# Patient Record
Sex: FEMALE | Race: BLACK OR AFRICAN AMERICAN | NOT HISPANIC OR LATINO | Employment: OTHER | URBAN - METROPOLITAN AREA
[De-identification: names, ages, dates, MRNs, and addresses within clinical notes are randomized per-mention and may not be internally consistent; named-entity substitution may affect disease eponyms.]

---

## 2017-01-09 ENCOUNTER — TELEPHONE (OUTPATIENT)
Dept: NEPHROLOGY | Facility: CLINIC | Age: 72
End: 2017-01-09

## 2017-01-11 ENCOUNTER — OFFICE VISIT (OUTPATIENT)
Dept: TRANSPLANT | Facility: CLINIC | Age: 72
End: 2017-01-11
Payer: COMMERCIAL

## 2017-01-11 ENCOUNTER — LAB VISIT (OUTPATIENT)
Dept: LAB | Facility: HOSPITAL | Age: 72
End: 2017-01-11
Attending: INTERNAL MEDICINE
Payer: COMMERCIAL

## 2017-01-11 VITALS
HEIGHT: 66 IN | SYSTOLIC BLOOD PRESSURE: 154 MMHG | WEIGHT: 177.25 LBS | HEART RATE: 86 BPM | DIASTOLIC BLOOD PRESSURE: 82 MMHG | BODY MASS INDEX: 28.49 KG/M2

## 2017-01-11 DIAGNOSIS — I50.1 LEFT HEART FAILURE: ICD-10-CM

## 2017-01-11 DIAGNOSIS — I50.42 CHRONIC COMBINED SYSTOLIC AND DIASTOLIC HEART FAILURE, NYHA CLASS 3: ICD-10-CM

## 2017-01-11 DIAGNOSIS — R06.00 DYSPNEA, UNSPECIFIED TYPE: Primary | ICD-10-CM

## 2017-01-11 DIAGNOSIS — I50.1 LEFT HEART FAILURE: Primary | ICD-10-CM

## 2017-01-11 DIAGNOSIS — E03.8 SUBCLINICAL HYPOTHYROIDISM: ICD-10-CM

## 2017-01-11 DIAGNOSIS — N18.30 CKD (CHRONIC KIDNEY DISEASE) STAGE 3, GFR 30-59 ML/MIN: ICD-10-CM

## 2017-01-11 DIAGNOSIS — I10 ESSENTIAL HYPERTENSION: ICD-10-CM

## 2017-01-11 LAB — BNP SERPL-MCNC: 665 PG/ML

## 2017-01-11 PROCEDURE — 99204 OFFICE O/P NEW MOD 45 MIN: CPT | Mod: S$GLB,,,

## 2017-01-11 PROCEDURE — 99999 PR PBB SHADOW E&M-EST. PATIENT-LVL III: CPT | Mod: PBBFAC,,,

## 2017-01-11 NOTE — PROGRESS NOTES
Subjective:   HPI:  Ms. Sal is a 71 y.o. year old Black or  female with PMH significant for CMP, EF 40%, HTN, and hypothyroidism, on Synthroid who presents as a referral from Dr. Live Young from [facility] for CHF management. She is accompanied by her daughter who is her caretaker and gives her history. She reports that her mom has had dx of CMP for years, initially followed at [facility], but exacerbated in the last couple of months. She reports that in 2007, after Pinky, she was in the hospital for HF. She was admitted to [facility] under IM 3 weeks ago with worsening SOB. She was diuresed with IVP Lasix with some clinical improvement. She doesn't know what her weight was before or after hospitalization but does report that she made a lot of urine. She does not measure her weight or blood pressure at home. Her daughter gets her medications ready and reports that she takes 40 mg Lasix (2 pills) in the morning and was instructed to take an extra at night if needed (has not given). Her BNP in the hospital was 927 and 665 today. Labs from [facility] today show Cr stable at 1.65. She is unsure if she's ever had an angiogram but remembers having a procedure done through her groin. She knows that she doesn't have any stents.   She currently describes NYHA FC III activity intolerance and sleeps on 3 pillows. Her daughter reports that once she goes to sleep however, she lays flat and is comfortable. She has CP on and off but not very frequent. She is a former smoker but quit 20 years ago.         Past Medical History   Diagnosis Date    Breast cancer     CHF (congestive heart failure)     Coronary artery disease     Hypertension     Hazel     Renal disorder     Thyroid disease      Past Surgical History   Procedure Laterality Date    Breast surgery      Hysterectomy       OB History     No data available        Review of Systems   Constitution: Negative for decreased appetite, weakness and malaise/fatigue.  "  HENT: Negative.    Cardiovascular: Positive for chest pain and dyspnea on exertion.   Respiratory: Positive for shortness of breath. Negative for cough.    Endocrine: Negative.    Hematologic/Lymphatic: Negative.    Skin: Negative.    Musculoskeletal: Negative.    Gastrointestinal: Negative for anorexia and nausea.   Genitourinary: Negative.    Neurological: Negative.    Psychiatric/Behavioral: Negative.    Allergic/Immunologic: Negative.        Objective:   Blood pressure (!) 154/82, pulse 86, height 5' 5.5" (1.664 m), weight 80.4 kg (177 lb 4 oz).body mass index is 29.05 kg/(m^2).  Physical Exam   Constitutional: She is oriented to person, place, and time. She appears well-developed and well-nourished. No distress.   HENT:   Head: Normocephalic and atraumatic.   Eyes: Conjunctivae and EOM are normal. Pupils are equal, round, and reactive to light.   Neck: Normal range of motion. Neck supple. No JVD present.   Cardiovascular: Normal rate and regular rhythm.    Pulmonary/Chest: Effort normal. She has rales ( few scattered rales in bases).   Abdominal: Soft. Bowel sounds are normal.   Musculoskeletal: Normal range of motion. She exhibits edema.   Neurological: She is alert and oriented to person, place, and time. She has normal reflexes.   Skin: Skin is warm and dry. She is not diaphoretic.   Psychiatric: She has a normal mood and affect. Her behavior is normal. Thought content normal.       Labs:    Chemistry        Component Value Date/Time     12/20/2016 0628    K 3.5 12/20/2016 0628     12/20/2016 0628    CO2 21 (L) 12/20/2016 0628    BUN 38 (H) 12/20/2016 0628    CREATININE 1.7 (H) 12/20/2016 0628     12/20/2016 0628        Component Value Date/Time    CALCIUM 8.2 (L) 12/20/2016 0628    ALKPHOS 82 12/20/2016 0628    AST 52 (H) 12/20/2016 0628    ALT 43 12/20/2016 0628    BILITOT 0.4 12/20/2016 0628          Magnesium   Date Value Ref Range Status   12/17/2016 1.8 1.6 - 2.6 mg/dL Final "     Lab Results   Component Value Date    WBC 5.03 12/20/2016    HGB 11.2 (L) 12/20/2016    HCT 32.1 (L) 12/20/2016     12/20/2016     Lab Results   Component Value Date    INR 1.0 12/16/2016     BNP   Date Value Ref Range Status   01/11/2017 665 (H) 0 - 99 pg/mL Final     Comment:     Values of less than 100 pg/ml are consistent with non-CHF populations.   12/16/2016 927 (H) 0 - 99 pg/mL Final     Comment:     Values of less than 100 pg/ml are consistent with non-CHF populations.     No results found for: LDH  No results found for this or any previous visit.  No results found for this or any previous visit.    Assessment:      1. Dyspnea, unspecified type    2. Chronic combined systolic and diastolic heart failure, NYHA class 3    3. Essential hypertension    4. Subclinical hypothyroidism    5. CKD (chronic kidney disease) stage 3, GFR 30-59 ml/min        Plan:     Increase Lasix to 60 mg po daily x 3 days only and resume 40 mg po daily  Dobutamine Stress Echo r/o ischemia  F/u with Endocrinology as scheduled for management of hypothyroidism  Pt and her daughter are instructed to get a blood pressure cuff and monitor blood pressures daily and start daily am weights and document; she will call me on Friday 1/13 with readings  F/u in month with BMP, BNP or prn      Patient is now NYHA III  Recommend 2 gram sodium restriction and 1500cc fluid restriction.  Encourage physical activity with graded exercise program.  Requested patient to weigh themselves daily, and to notify us if their weight increases by more than 3 lbs in 1 day or 5 lbs in 1 week.

## 2017-01-11 NOTE — MR AVS SNAPSHOT
[facility]  [address]  [place] [address]  Phone: [phone]                  Anai Sal   2017 11:30 AM   Office Visit    Description:  Female : 1945   Provider:  NUSRAT Myers   Department:  [facility]           Reason for Visit     Congestive Heart Failure           Diagnoses this Visit        Comments    Dyspnea, unspecified type    -  Primary            To Do List           Future Appointments        Provider Department Dept Phone    2017 10:00 AM MD [facility] - Endo/Diab/Metab [phone]    2017 10:30 AM MD [facility] - Hematology Oncology [phone]      Goals (5 Years of Data)     None      Follow-Up and Disposition     Return in about 1 month (around 2017).      [facility]     [facility] Nurse Care [facility] -  Assistance  Registered nurses in the [facility] provide clinical advisement, health education, appointment booking, and other advisory services.  Call for this free service at [phone].             Medications           Message regarding Medications     Verify the changes and/or additions to your medication regime listed below are the same as discussed with your clinician today.  If any of these changes or additions are incorrect, please notify your healthcare provider.             Verify that the below list of medications is an accurate representation of the medications you are currently taking.  If none reported, the list may be blank. If incorrect, please contact your healthcare provider. Carry this list with you in case of emergency.           Current Medications     allopurinol (ZYLOPRIM) 100 MG tablet Take 100 mg by mouth 2 (two) times daily.     aspirin 81 MG Chew Take 1 tablet (81 mg total) by mouth once daily.    benztropine (COGENTIN) 0.5 MG tablet Take 1 tablet (0.5 mg total) by mouth nightly.    cetirizine (ZYRTEC) 5 MG tablet Take 1 tablet (5 mg  "total) by mouth once daily.    fluticasone (FLONASE) 50 mcg/actuation nasal spray 2 sprays by Each Nare route once daily.    furosemide (LASIX) 20 MG tablet Take 3 tablets (60 mg total) by mouth once daily.    gabapentin (NEURONTIN) 300 MG capsule Take 300 mg by mouth 3 (three) times daily as needed (for nerve pain).     guaifenesin (MUCINEX) 600 mg 12 hr tablet Take 1 tablet (600 mg total) by mouth 2 (two) times daily.    levothyroxine (SYNTHROID) 50 MCG tablet Take 1 tablet (50 mcg total) by mouth before breakfast.    melatonin 3 mg Tab Take by mouth every evening.    quetiapine (SEROQUEL) 200 MG Tab Take 1 tablet (200 mg total) by mouth nightly.    valsartan (DIOVAN) 160 MG tablet Take 1 tablet (160 mg total) by mouth once daily.           Clinical Reference Information           Vital Signs - Last Recorded  Most recent update: 1/11/2017 12:20 PM by Shawn Richards    BP Pulse Ht Wt BMI    (!) 154/82 86 5' 5.5" (1.664 m) 80.4 kg (177 lb 4 oz) 29.05 kg/m2      Blood Pressure          Most Recent Value    BP  (!)  154/82      Allergies as of 1/11/2017     No Known Allergies      Immunizations Administered on Date of Encounter - 1/11/2017     None      Orders Placed During Today's Visit     Future Labs/Procedures Expected by Expires    Pharmacological stress test w/ Color Remy  As directed 1/11/2018      [facility] Sign-Up     Activating your [facility] account is as easy as 1-2-3!     1) Visit [email or web address], select Sign Up Now, enter this activation code and your date of birth, then select Next.  [email or web address]  Expires: 2/25/2017  8:19 AM      2) Create a username and password to use when you visit [facility] in the future and select a security question in case you lose your password and select Next.    3) Enter your e-mail address and click Sign Up!    Additional Information  If you have questions, please e-mail [email or web address] or call [phone] to talk to our [facility] staff. Remember, [facility] is NOT " to be used for urgent needs. For medical emergencies, dial 911.

## 2017-01-25 ENCOUNTER — TELEPHONE (OUTPATIENT)
Dept: TRANSPLANT | Facility: CLINIC | Age: 72
End: 2017-01-25

## 2017-01-25 ENCOUNTER — OFFICE VISIT (OUTPATIENT)
Dept: ENDOCRINOLOGY | Facility: CLINIC | Age: 72
End: 2017-01-25
Payer: COMMERCIAL

## 2017-01-25 VITALS
HEART RATE: 101 BPM | BODY MASS INDEX: 28.63 KG/M2 | SYSTOLIC BLOOD PRESSURE: 130 MMHG | HEIGHT: 66 IN | DIASTOLIC BLOOD PRESSURE: 72 MMHG | WEIGHT: 178.13 LBS

## 2017-01-25 DIAGNOSIS — R06.02 SHORTNESS OF BREATH: ICD-10-CM

## 2017-01-25 DIAGNOSIS — E03.9 PRIMARY HYPOTHYROIDISM: Primary | ICD-10-CM

## 2017-01-25 PROCEDURE — 99204 OFFICE O/P NEW MOD 45 MIN: CPT | Mod: S$GLB,,, | Performed by: INTERNAL MEDICINE

## 2017-01-25 PROCEDURE — 99999 PR PBB SHADOW E&M-EST. PATIENT-LVL III: CPT | Mod: PBBFAC,,, | Performed by: INTERNAL MEDICINE

## 2017-01-25 NOTE — LETTER
January 26, 2017      Live Young MD  [address] [facility] [address]           [facility] - Endo/Diab/Metab  [address]  [place] [address]  Phone: [phone]  Fax: [phone]          Patient: Anai Sal   MR Number: [id number]   YOB: 1945   Date of Visit: 1/25/2017       Dear Dr. Live Young:    Thank you for referring Anai Sal to me for evaluation. Attached you will find relevant portions of my assessment and plan of care.    If you have questions, please do not hesitate to call me. I look forward to following Anai Sal along with you.    Sincerely,    Kaci Whalen MD    Enclosure  CC:  No Recipients    If you would like to receive this communication electronically, please contact [email or web address] or [phone] to request more information on [employer] access.    For providers and/or their staff who would like to refer a patient to [facility], please contact us through our one-stop-shop provider referral line, [facility], at [phone].    If you feel you have received this communication in error or would no longer like to receive these types of communications, please e-mail [email or web address]

## 2017-01-25 NOTE — Clinical Note
We forgot to have her sign medical release form, any way you can mail it to her or get her to sign it?

## 2017-01-25 NOTE — TELEPHONE ENCOUNTER
1215 pm:  Reviewed chart and returned call to daughter, DAVID, LVMOHINI asking she call me back to discuss her mothers weights and generally how she is feeling.     ----- Message from Antonieta Zeng sent at 1/25/2017 11:40 AM CST -----  Contact: Pt daughter Treshone #[phone]  Pt daughter Treshone called to report pt weight is 180 from last visit with Yue. If needed call Pt daughter Treshone #[phone]. Thanks.

## 2017-01-25 NOTE — MR AVS SNAPSHOT
[facility] Endo/Diab/Metab  [address]  [facility] [address]  Phone: [phone]  Fax: [phone]                  Anai Sal   2017 10:00 AM   Office Visit    Description:  Female : 1945   Provider:  Kaci Whalen MD   Department:  [facility] - Endo/Diab/Metab           Reason for Visit     Consult           Diagnoses this Visit        Comments    Primary hypothyroidism    -  Primary            To Do List           Future Appointments        Provider Department Dept Phone    2017 11:20 AM LAB, APPOINTMENT [place] [facility] [phone]    2017 3:15 PM DOBUTAMINE, ECHO [facility] - Echo/Stress Lab [phone]    2017 4:40 PM Bud Benoit MD [facility] - Nephrology [phone]    2017 9:30 AM NUSRAT Myers [facility] [phone]    2017 10:30 AM Gogo Herrera MD [facility] - Hematology Oncology [phone]      Goals (5 Years of Data)     None      [facility]     [facility] Care [facility] -  Assistance  Registered nurses in the [facility] provide clinical advisement, health education, appointment booking, and other advisory services.  Call for this free service at [phone].             Medications           Message regarding Medications     Verify the changes and/or additions to your medication regime listed below are the same as discussed with your clinician today.  If any of these changes or additions are incorrect, please notify your healthcare provider.             Verify that the below list of medications is an accurate representation of the medications you are currently taking.  If none reported, the list may be blank. If incorrect, please contact your healthcare provider. Carry this list with you in case of emergency.           Current Medications     allopurinol (ZYLOPRIM) 100 MG tablet Take 100 mg by mouth 2 (two) times daily.     aspirin 81 MG Chew Take 1 tablet (81 mg  "total) by mouth once daily.    cetirizine (ZYRTEC) 5 MG tablet Take 1 tablet (5 mg total) by mouth once daily.    fluticasone (FLONASE) 50 mcg/actuation nasal spray 2 sprays by Each Nare route once daily.    furosemide (LASIX) 20 MG tablet Take 3 tablets (60 mg total) by mouth once daily.    gabapentin (NEURONTIN) 300 MG capsule Take 300 mg by mouth 3 (three) times daily as needed (for nerve pain).     guaifenesin (MUCINEX) 600 mg 12 hr tablet Take 1 tablet (600 mg total) by mouth 2 (two) times daily.    levothyroxine (SYNTHROID) 50 MCG tablet Take 1 tablet (50 mcg total) by mouth before breakfast.    melatonin 3 mg Tab Take by mouth every evening.    valsartan (DIOVAN) 160 MG tablet Take 1 tablet (160 mg total) by mouth once daily.    benztropine (COGENTIN) 0.5 MG tablet Take 1 tablet (0.5 mg total) by mouth nightly.    quetiapine (SEROQUEL) 200 MG Tab Take 1 tablet (200 mg total) by mouth nightly.           Clinical Reference Information           Vital Signs - Last Recorded  Most recent update: 1/25/2017 10:15 AM by Gabi Weaver LPN    BP Pulse Ht Wt BMI    130/72 101 5' 6" (1.676 m) 80.8 kg (178 lb 2.1 oz) 28.75 kg/m2      Blood Pressure          Most Recent Value    BP  130/72      Allergies as of 1/25/2017     No Known Allergies      Immunizations Administered on Date of Encounter - 1/25/2017     None      Orders Placed During Today's Visit      Normal Orders This Visit    TSH     Future Labs/Procedures Expected by Expires    TSH  1/25/2017 3/26/2018    TSH  1/25/2017 3/26/2018      [facility] Sign-Up     Activating your [facility] account is as easy as 1-2-3!     1) Visit [email or web address], select Sign Up Now, enter this activation code and your date of birth, then select Next.  [email or web address]  Expires: 2/25/2017  8:19 AM      2) Create a username and password to use when you visit [facility] in the future and select a security question in case you lose your password and select Next.    3) Enter your e-mail " address and click Sign Up!    Additional Information  If you have questions, please e-mail [email or web address] or call [phone] to talk to our [facility] staff. Remember, [facility] is NOT to be used for urgent needs. For medical emergencies, dial 911.

## 2017-01-25 NOTE — PROGRESS NOTES
"Subjective:     Patient ID: Oziel Temple is a 71 y.o. female.    Chief Complaint: Consult    HPI:   Ms. Temple is a 71 y.o. female who is here for a consult visit for evaluation of hypothyroidism. This was diagnosed   Presented with    She is currently feeling well. Denies any current symptoms including weight gain, inability to lose weight, cold intolerance, depressed mood, fatigue and constipation. Denies any palpitations, difficulty getting to sleep, restlessness, anxiety or irritability.     Current medication is   Takes it properly without food first thing in the morning with water. Waits 30 - 45 minutes before breakfast or other pills.    Others tried    Review of Systems  Objective:     Physical Exam    Vitals:    01/25/17 1014   BP: 130/72   Pulse: 101   Weight: 80.8 kg (178 lb 2.1 oz)   Height: 5' 6" (1.676 m)   Results for OZIEL TEMPLE (MRN [id number]) as of 1/25/2017 10:19   Ref. Range 12/16/2016 18:52   TSH Latest Ref Range: 0.400 - 4.000 uIU/mL 20.038 (H)   Free T4 Latest Ref Range: 0.71 - 1.51 ng/dL 0.79   Results for OZIEL TEMPLE (MRN [id number]) as of 1/25/2017 10:19   Ref. Range 1/11/2017 14:18   Creatinine Latest Ref Range: 0.5 - 1.4 mg/dL 1.7 (H)   eGFR if non African American Latest Ref Range: >60 mL/min/1.73 m^2 29.9 (A)   eGFR if African American Latest Ref Range: >60 mL/min/1.73 m^2 34.5 (A)   Results for OZIEL TEMPLE (MRN [id number]) as of 1/25/2017 10:19   Ref. Range 12/20/2016 06:28   Hemoglobin Latest Ref Range: 12.0 - 16.0 g/dL 11.2 (L)   Hematocrit Latest Ref Range: 37.0 - 48.5 % 32.1 (L)       Assessment/Plan:     ***    "

## 2017-01-25 NOTE — PROGRESS NOTES
Subjective:       Patient ID: Anai Sal is a 71 y.o. female.      Chief Complaint: thyroid issues      HPI  Anai Sal is a 71 y.o. female with multiple medical problems including CHF, COPD who comes to establish care for thyroid dysfunction. Family reports difficulty controlling thyroid hormone. Initially presented with thyrotoxicosis, underwent complete evaluation at [facility]. Treated with radioactive iodine. Clinical course was labile and per the family she required use of levothyroxine and methimazole. She was recently hospitalized in December for CHF exacerbation and acute bronchitis, she was found to be hypothyroid at that time and so she was started on levothyroxine 50 mcg.    She complains of palpitations, cold intolerance, SOB with exertion and while lying flat, fatigue and weight gain that she attributes to CHF  She denies tremors, diarrhea, constipation, diaphoresis.  She is on lasix 20 tid.          Review of Systems   Constitutional: Negative for chills, diaphoresis, fever, malaise/fatigue and weight loss.   HENT: Negative for sore throat.    Eyes: Negative for blurred vision and pain.   Respiratory: Positive for shortness of breath. Negative for cough.    Cardiovascular: Negative for chest pain and leg swelling.   Gastrointestinal: Negative for abdominal pain, nausea and vomiting.   Genitourinary: Negative for dysuria and frequency.   Musculoskeletal: Negative for back pain and myalgias.   Skin: Negative for itching and rash.   Neurological: Negative for dizziness, tremors, loss of consciousness and headaches.         Objective:     Vitals:    01/25/17 1014   BP: 130/72   Pulse: 101       Physical Exam   Constitutional: She is oriented to person, place, and time and well-developed, well-nourished, and in no distress.   HENT:   Head: Normocephalic and atraumatic.   Moist mucous membranes   Eyes: Conjunctivae and EOM are normal. Pupils are equal, round, and reactive to light.   Neck: Neck supple.  No JVD present. No tracheal deviation present. No thyromegaly present.   Barely palpable thyroid gland   Cardiovascular: Normal rate, regular rhythm, normal heart sounds and intact distal pulses.    No murmur heard.  Pulmonary/Chest: Effort normal. She has no wheezes.   Abdominal: Soft. Bowel sounds are normal. She exhibits no distension. There is no tenderness.   Musculoskeletal: She exhibits no edema.   Neurological: She is alert and oriented to person, place, and time. GCS score is 15.   Skin: Skin is warm and dry.         Assessment/Plan:       Primary hypothyroidism  --TSH lab today, plan to make adjustments in levothyroxine based on labs.  Advised to take on an empty stomach with no other pills/vitamins, and no breakfast for 30 minutes.    --may need to get medical records from [facility] before ordering further tests.      This case was discussed with Dr. Koby Allne, PGY-1

## 2017-01-26 NOTE — PROGRESS NOTES
I have personally taken the history and examined this patient and agree with the resident's note as stated above.

## 2017-02-01 DIAGNOSIS — E03.9 HYPOTHYROIDISM, UNSPECIFIED TYPE: Primary | ICD-10-CM

## 2017-02-03 DIAGNOSIS — J44.1 COPD WITH EXACERBATION: Primary | ICD-10-CM

## 2017-02-08 ENCOUNTER — OFFICE VISIT (OUTPATIENT)
Dept: NEPHROLOGY | Facility: CLINIC | Age: 72
End: 2017-02-08
Payer: COMMERCIAL

## 2017-02-08 ENCOUNTER — HOSPITAL ENCOUNTER (OUTPATIENT)
Dept: CARDIOLOGY | Facility: CLINIC | Age: 72
Discharge: HOME | End: 2017-02-08
Payer: COMMERCIAL

## 2017-02-08 VITALS
HEART RATE: 92 BPM | WEIGHT: 179.69 LBS | BODY MASS INDEX: 28.88 KG/M2 | HEIGHT: 66 IN | SYSTOLIC BLOOD PRESSURE: 140 MMHG | DIASTOLIC BLOOD PRESSURE: 90 MMHG | OXYGEN SATURATION: 98 %

## 2017-02-08 DIAGNOSIS — I10 ESSENTIAL HYPERTENSION: ICD-10-CM

## 2017-02-08 DIAGNOSIS — R06.00 DYSPNEA, UNSPECIFIED TYPE: ICD-10-CM

## 2017-02-08 DIAGNOSIS — N18.30 CKD (CHRONIC KIDNEY DISEASE) STAGE 3, GFR 30-59 ML/MIN: Primary | ICD-10-CM

## 2017-02-08 DIAGNOSIS — R60.9 EDEMA, UNSPECIFIED TYPE: ICD-10-CM

## 2017-02-08 DIAGNOSIS — E87.20 METABOLIC ACIDOSIS: ICD-10-CM

## 2017-02-08 LAB
AORTIC VALVE REGURGITATION: ABNORMAL
DIASTOLIC DYSFUNCTION: YES
ESTIMATED PA SYSTOLIC PRESSURE: 64
GLOBAL PERICARDIAL EFFUSION: ABNORMAL
MITRAL VALVE MOBILITY: NORMAL
MITRAL VALVE REGURGITATION: ABNORMAL
RETIRED EF AND QEF - SEE NOTES: 25 (ref 55–65)
TRICUSPID VALVE REGURGITATION: ABNORMAL

## 2017-02-08 PROCEDURE — 99999 PR PBB SHADOW E&M-EST. PATIENT-LVL III: CPT | Mod: PBBFAC,,, | Performed by: INTERNAL MEDICINE

## 2017-02-08 PROCEDURE — 93306 TTE W/DOPPLER COMPLETE: CPT | Mod: S$GLB,,, | Performed by: INTERNAL MEDICINE

## 2017-02-08 PROCEDURE — 99215 OFFICE O/P EST HI 40 MIN: CPT | Mod: S$GLB,,, | Performed by: INTERNAL MEDICINE

## 2017-02-08 RX ORDER — DICLOFENAC SODIUM 10 MG/G
4 GEL TOPICAL 4 TIMES DAILY
Status: ON HOLD | COMMUNITY
End: 2018-04-23

## 2017-02-08 RX ORDER — SODIUM BICARBONATE 650 MG/1
1300 TABLET ORAL 2 TIMES DAILY
Qty: 360 TABLET | Refills: 3 | Status: ON HOLD | COMMUNITY
Start: 2017-02-08 | End: 2017-12-18 | Stop reason: HOSPADM

## 2017-02-08 RX ORDER — VALSARTAN 320 MG/1
320 TABLET ORAL DAILY
Qty: 90 TABLET | Refills: 3 | Status: ON HOLD | OUTPATIENT
Start: 2017-02-08 | End: 2017-03-16

## 2017-02-08 NOTE — MR AVS SNAPSHOT
[facility]  [address]  [place] [address]  Phone: [phone]  Fax: [phone]                  Anai Sal   2017 4:40 PM   Office Visit    Description:  Female : 1945   Provider:  Bud Benoit MD   Department:  [facility] - Nephrology           Diagnoses this Visit        Comments    CKD (chronic kidney disease) stage 3, GFR 30-59 ml/min    -  Primary     Edema, unspecified type         Metabolic acidosis         Essential hypertension                To Do List           Future Appointments        Provider Department Dept Phone    2017 9:30 AM NUSRAT Myers [facility] [phone]    2/15/2017 7:45 AM NUCLEAR CAMERA, [facility] - Nuclear Camera [phone]    2017 10:30 AM MD [facility] - Hematology Oncology [phone]    3/22/2017 10:30 AM AUDIOGRAM, AUDIO [facility] - Audiology [phone]    3/22/2017 11:15 AM Negro Norris MD [facility] - Otorhinolaryngology [phone]      Goals (5 Years of Data)     None      Follow-Up and Disposition     Return in about 3 months (around 2017).       These Medications        Disp Refills Start End    valsartan (DIOVAN) 320 MG tablet 90 tablet 3 2017    Take 1 tablet (320 mg total) by mouth once daily. - Oral    Pharmacy: [facility] [place], [place] - [address] Ph #: [phone]         PURCHASE these Medications (No prescription required)        Start End    sodium bicarbonate 650 MG tablet 2017    Sig - Route: Take 2 tablets (1,300 mg total) by mouth 2 (two) times daily. - Oral    Class: OTC      [facility]     [facility] On Call Nurse Care Line -  Assistance  Registered nurses in the [facility] On Call Center provide clinical advisement, health education, appointment booking, and other advisory services.  Call for this free service at [phone].             Medications           Message regarding  Medications     Verify the changes and/or additions to your medication regime listed below are the same as discussed with your clinician today.  If any of these changes or additions are incorrect, please notify your healthcare provider.        START taking these NEW medications        Refills    sodium bicarbonate 650 MG tablet 3    Sig: Take 2 tablets (1,300 mg total) by mouth 2 (two) times daily.    Class: OTC    Route: Oral      CHANGE how you are taking these medications     Start Taking Instead of    valsartan (DIOVAN) 320 MG tablet valsartan (DIOVAN) 160 MG tablet    Dosage:  Take 1 tablet (320 mg total) by mouth once daily. Dosage:  Take 1 tablet (160 mg total) by mouth once daily.    Reason for Change:  Reorder            Verify that the below list of medications is an accurate representation of the medications you are currently taking.  If none reported, the list may be blank. If incorrect, please contact your healthcare provider. Carry this list with you in case of emergency.           Current Medications     allopurinol (ZYLOPRIM) 100 MG tablet Take 100 mg by mouth 2 (two) times daily.     aspirin 81 MG Chew Take 1 tablet (81 mg total) by mouth once daily.    benztropine (COGENTIN) 0.5 MG tablet Take 1 tablet (0.5 mg total) by mouth nightly.    cetirizine (ZYRTEC) 5 MG tablet Take 1 tablet (5 mg total) by mouth once daily.    diclofenac sodium 1 % Gel Apply 4 g topically 4 (four) times daily. Prn pain    fluticasone (FLONASE) 50 mcg/actuation nasal spray 2 sprays by Each Nare route once daily.    furosemide (LASIX) 20 MG tablet Take 3 tablets (60 mg total) by mouth once daily.    gabapentin (NEURONTIN) 300 MG capsule Take 300 mg by mouth 3 (three) times daily as needed (for nerve pain).     guaifenesin (MUCINEX) 600 mg 12 hr tablet Take 1 tablet (600 mg total) by mouth 2 (two) times daily.    levothyroxine (SYNTHROID) 50 MCG tablet Take 1 tablet (50 mcg total) by mouth before breakfast.    melatonin 3 mg  "Tab Take by mouth every evening.    quetiapine (SEROQUEL) 200 MG Tab Take 1 tablet (200 mg total) by mouth nightly.    valsartan (DIOVAN) 320 MG tablet Take 1 tablet (320 mg total) by mouth once daily.    sodium bicarbonate 650 MG tablet Take 2 tablets (1,300 mg total) by mouth 2 (two) times daily.           Clinical Reference Information           Your Vitals Were     BP Pulse Height Weight SpO2 BMI    140/90 92 5' 6" (1.676 m) 81.5 kg (179 lb 10.8 oz) 98% 29 kg/m2      Blood Pressure          Most Recent Value    BP  (!)  140/90      Allergies as of 2/8/2017     No Known Allergies      Immunizations Administered on Date of Encounter - 2/8/2017     None      [facility] Sign-Up     Activating your [facility] account is as easy as 1-2-3!     1) Visit [email or web address], select Sign Up Now, enter this activation code and your date of birth, then select Next.  [email or web address]  Expires: 2/25/2017  8:19 AM      2) Create a username and password to use when you visit [facility] in the future and select a security question in case you lose your password and select Next.    3) Enter your e-mail address and click Sign Up!    Additional Information  If you have questions, please e-mail [email or web address] or call [phone] to talk to our [facility] staff. Remember, [facility] is NOT to be used for urgent needs. For medical emergencies, dial 911.         Language Assistance Services     ATTENTION: Language assistance services are available, free of charge. Please call [phone].      ATENCIÓN: Si habla español, tiene a mane disposición servicios gratuitos de asistencia lingüística. Llame al [phone].     LAURIE Ý: N?u b?n nói Ti?ng Vi?t, có các d?ch v? h? tr? ngôn ng? mi?n phí dành cho b?n. G?i s? [phone].         [facility] complies with applicable Federal civil rights laws and does not discriminate on the basis of race, color, national origin, age, disability, or sex.        "

## 2017-02-08 NOTE — PROGRESS NOTES
Pt here for dse today. Echo portion done. Informed by sonographer pt function is down from December and has moderate to severe mr. She spoke with dr gould. md suggesting pt be changed to nuclear medicine stress test. appt made for 2-15-17 @4948 . Instructions given to jason rosenberg. Verbalizes understanding. Spoke with francesco marques re: above as clayton jade is off today. She gave vorb for nuc med stress test.  Sl to right wrist d/jadiel. Pressure applied.

## 2017-02-09 DIAGNOSIS — J43.8 OTHER EMPHYSEMA: Primary | ICD-10-CM

## 2017-02-09 DIAGNOSIS — N18.30 CHRONIC KIDNEY DISEASE, STAGE III (MODERATE): Primary | ICD-10-CM

## 2017-02-10 ENCOUNTER — HOSPITAL ENCOUNTER (OUTPATIENT)
Dept: PULMONOLOGY | Facility: CLINIC | Age: 72
Discharge: HOME | End: 2017-02-10
Payer: COMMERCIAL

## 2017-02-10 DIAGNOSIS — J44.9 CHRONIC OBSTRUCTIVE PULMONARY DISEASE: ICD-10-CM

## 2017-02-10 DIAGNOSIS — J43.8 OTHER EMPHYSEMA: ICD-10-CM

## 2017-02-10 PROCEDURE — 94060 EVALUATION OF WHEEZING: CPT | Mod: S$GLB,,, | Performed by: INTERNAL MEDICINE

## 2017-02-10 PROCEDURE — 82803 BLOOD GASES ANY COMBINATION: CPT | Mod: S$GLB,,, | Performed by: INTERNAL MEDICINE

## 2017-02-10 PROCEDURE — 94729 DIFFUSING CAPACITY: CPT | Mod: S$GLB,,, | Performed by: INTERNAL MEDICINE

## 2017-02-10 PROCEDURE — 36415 COLL VENOUS BLD VENIPUNCTURE: CPT | Mod: 59,S$GLB,, | Performed by: INTERNAL MEDICINE

## 2017-02-14 ENCOUNTER — TELEPHONE (OUTPATIENT)
Dept: CARDIOLOGY | Facility: CLINIC | Age: 72
End: 2017-02-14

## 2017-02-14 NOTE — PROGRESS NOTES
Return Visit Report  Nephrology      Consult Requested By: PCP  Reason for Consult: Proteinuria    History of Present Illness:  Patient is a 71 y.o. female returns for evaluation of proteinuria. Three months ago, a routine UA showed large protein by dipstick. Her PMH is significant for HTN, diastolic heart failure, bipolar depression and insomnia. 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  She was in her usual state of health until 6 months ago when she started noticing edema in lower extremities. The edema has been constant.     Today, she reports improvement in edema. Denies pain, SOB, CP, rash, hematuria or foamy urine.  The patient denies taking NSAIDs or new antibiotics, recreational drugs, recent episode of dehydration, diarrhea, nausea or vomiting, acute illness, hospitalization or exposure to IV radiocontrast.     Past Medical History   Diagnosis Date    Breast cancer     CHF (congestive heart failure)     Coronary artery disease     Hypertension     Hazel     Renal disorder     Thyroid disease    Bipolar depression  Hypertension  Proteinuria  Osteoarthritis  Diastolic heart failure    Current Outpatient Prescriptions:     allopurinol (ZYLOPRIM) 100 MG tablet, Take 100 mg by mouth 2 (two) times daily. , Disp: , Rfl:     aspirin 81 MG Chew, Take 1 tablet (81 mg total) by mouth once daily., Disp: , Rfl: 0    benztropine (COGENTIN) 0.5 MG tablet, Take 1 tablet (0.5 mg total) by mouth nightly., Disp: 30 tablet, Rfl: 0    cetirizine (ZYRTEC) 5 MG tablet, Take 1 tablet (5 mg total) by mouth once daily., Disp: , Rfl: 0    diclofenac sodium 1 % Gel, Apply 4 g topically 4 (four) times daily. Prn pain, Disp: , Rfl:     fluticasone (FLONASE) 50 mcg/actuation nasal spray, 2 sprays by Each Nare route once daily., Disp: 1 Bottle, Rfl: 3    furosemide (LASIX) 20 MG tablet, Take 3 tablets (60 mg total) by mouth once daily., Disp: 180 tablet, Rfl: 3    gabapentin (NEURONTIN) 300 MG capsule,  Take 300 mg by mouth 3 (three) times daily as needed (for nerve pain). , Disp: , Rfl:     guaifenesin (MUCINEX) 600 mg 12 hr tablet, Take 1 tablet (600 mg total) by mouth 2 (two) times daily., Disp: , Rfl:     levothyroxine (SYNTHROID) 50 MCG tablet, Take 1 tablet (50 mcg total) by mouth before breakfast., Disp: 30 tablet, Rfl: 11    melatonin 3 mg Tab, Take by mouth every evening., Disp: , Rfl:     quetiapine (SEROQUEL) 200 MG Tab, Take 1 tablet (200 mg total) by mouth nightly., Disp: 30 tablet, Rfl: 0    valsartan (DIOVAN) 320 MG tablet, Take 1 tablet (320 mg total) by mouth once daily., Disp: 90 tablet, Rfl: 3    sodium bicarbonate 650 MG tablet, Take 2 tablets (1,300 mg total) by mouth 2 (two) times daily., Disp: 360 tablet, Rfl: 3  Review of patient's allergies indicates:  No Known Allergies       Review of Systems   Constitutional: Negative.    Eyes: Negative.    Cardiovascular: Positive for leg swelling. Negative for chest pain, palpitations and orthopnea.   Gastrointestinal: Negative.    Genitourinary: Negative.    Musculoskeletal: Positive for joint pain.   Skin: Negative.    Neurological: Negative.    Psychiatric/Behavioral: Negative for depression. The patient is not nervous/anxious and does not have insomnia.    All other systems reviewed and are negative.      Vitals:    02/08/17 1639   BP: (!) 140/90   Pulse: 92       PHYSICAL EXAMINATION:  General: no distress, well nourished  Skin: color, texture, turgor normal. No rash or lesions  HEENT: Head: normocephalic. Ear/Nose: normal. Eyes: reactive pupils, normal conjunctiva. Oral mucosa moist, no ulcers. Throat: no erythema.  Neck: supple, symmetrical, trachea midline, no JVD, no carotid bruit  Lungs: clear to auscultation bilaterally and normal respiratory effort  Cardiovascular: Heart: regular rate and rhythm, S1, S2 normal, no murmur, rub or gallop. Pulses: 2+ and symmetric.  Abdomen: bowel sounds present, no abdominal bruit, soft, non-tender  non-distented; no masses, organomegaly or ascites.   Musculoskeletal: no pitting edema in lower extremities, no clubbing or cyanosis  Lymph Nodes: No cervical or supraclavicular adenopathy  Neurologic: AAOx3, normal strength and tone. No focal deficit. No asterixis.       LABORATORY DATA: Reviewed. Of note:  Serum Cr 1.7 mg/dL  UA: 3+ protein  UPCR 1.5 g/g    IMAGING STUDIES  Kidney US: Reviewed, size 8.3 and 9.8 cm, multiple cysts, non-obstructing stones      IMPRESSION / RECOMMENDATIONS:     1. CKD (chronic kidney disease) stage 3B, GFR 35 ml/min  Mild renal impairment. Unknown cause. Overtly proteinuric. Given race and proteinuria, FSGS is a consideration. Not a diabetic. Kidney size and appearance precludes performing a biopsy. Serology work up unrevealing. High risk for progression to ESRD. On an ARB, will increase the dose.  Will follow work up below. SBP elevated and is edematous. Edema improved after increasing furosemide (LASIX) 20 MG tablet to bid. Has metabolic acidosis. Will start oral alkali.    2. Proteinuria  Increasing valsartan dose   3. Edema, unspecified type  Better managed with higher loop diuretic dose.        SUMMARY OF PLAN  1. Continue furosemide 20 mg bid  2. Increase valsartan from 160 to 320 mg qd  3. Start sodium bicarbonate 1300 mg bid  4. Low salt diet  5. RTC in 2-3 months

## 2017-02-14 NOTE — TELEPHONE ENCOUNTER
Called pt and spoke with pt's daughter, explained to her regarding Spect test not being approved as of today. I will be calling her in the morning, our authorization department is working on it. Gave her instructions for her mom.

## 2017-02-15 ENCOUNTER — TELEPHONE (OUTPATIENT)
Dept: CARDIOLOGY | Facility: CLINIC | Age: 72
End: 2017-02-15

## 2017-02-15 NOTE — TELEPHONE ENCOUNTER
Called pt, left msg on pt's daughter's number regarding SPECT test not being approved yet.  It needs to be rescheduled.  Per authorization insurance, pending still with insurance.

## 2017-02-16 ENCOUNTER — INITIAL CONSULT (OUTPATIENT)
Dept: HEMATOLOGY/ONCOLOGY | Facility: CLINIC | Age: 72
End: 2017-02-16
Payer: COMMERCIAL

## 2017-02-16 VITALS
HEIGHT: 66 IN | HEART RATE: 93 BPM | TEMPERATURE: 98 F | RESPIRATION RATE: 20 BRPM | SYSTOLIC BLOOD PRESSURE: 144 MMHG | BODY MASS INDEX: 29.2 KG/M2 | OXYGEN SATURATION: 99 % | DIASTOLIC BLOOD PRESSURE: 80 MMHG | WEIGHT: 181.69 LBS

## 2017-02-16 DIAGNOSIS — Z85.3 HISTORY OF BREAST CANCER: ICD-10-CM

## 2017-02-16 PROCEDURE — 99999 PR PBB SHADOW E&M-EST. PATIENT-LVL III: CPT | Mod: PBBFAC,,, | Performed by: INTERNAL MEDICINE

## 2017-02-16 PROCEDURE — 99205 OFFICE O/P NEW HI 60 MIN: CPT | Mod: S$GLB,,, | Performed by: INTERNAL MEDICINE

## 2017-02-16 NOTE — LETTER
February 16, 2017      Live Young MD  [address] [facility] [address]           [facility]  [address]  [place] [address]  Phone: [phone]          Patient: Anai Sal   MR Number: [id number]   YOB: 1945   Date of Visit: 2/16/2017       Dear Dr. Live Young:    Thank you for referring Anai Sal to me for evaluation. Attached you will find relevant portions of my assessment and plan of care.    If you have questions, please do not hesitate to call me. I look forward to following Anai Sal along with you.    Sincerely,    Gogo Herrera MD    Enclosure  CC:  No Recipients    If you would like to receive this communication electronically, please contact [email or web address] or [phone] to request more information on [employer] access.    For providers and/or their staff who would like to refer a patient to [facility], please contact us through our one-stop-shop provider referral line, [facility], at [phone].    If you feel you have received this communication in error or would no longer like to receive these types of communications, please e-mail [email or web address]

## 2017-02-16 NOTE — PROGRESS NOTES
Subjective:       Patient ID: Anai Sal is a 71 y.o. female.    Chief Complaint: Consult    HPI     Presents to establish care for history of breast cancer  - diagnosed with right breast cancer in 1979  Treated at [facility]  Underwent mastectomy and states got 1 dose of chemotherapy    She has multiple comorbidities- she has multiple issues related to these but no breast concerns.    Active Ambulatory Problems     Diagnosis Date Noted    Recurrent major depressive disorder, in partial remission 09/30/2016    Insomnia 09/30/2016    CKD (chronic kidney disease) stage 3, GFR 30-59 ml/min 11/11/2016    Proteinuria 11/11/2016    Edema 11/11/2016    HTN (hypertension) 12/16/2016    Acute exacerbation of CHF (congestive heart failure) 12/16/2016    COPD exacerbation 12/16/2016    Demand ischemia of myocardium 12/16/2016    Primary hypothyroidism 12/16/2016    Chronic gout 12/16/2016    Acute bronchitis due to infection 12/18/2016    Chronic combined systolic and diastolic heart failure, NYHA class 3 01/11/2017    History of breast cancer 02/16/2017     Resolved Ambulatory Problems     Diagnosis Date Noted    Diastolic heart failure 10/09/2008    Dyspnea 12/16/2016    Atypical pneumonia 12/16/2016    Hx of diastolic dysfunction 12/16/2016     Past Medical History   Diagnosis Date    Breast cancer     CHF (congestive heart failure)     Coronary artery disease     Hypertension     Hazel     Renal disorder     Thyroid disease      FH:  No breast cancer  No cancer    SH:    3 children  No tobacco  Prior     Review of Systems   Constitutional: Negative for activity change, appetite change, chills, diaphoresis, fatigue, fever and unexpected weight change.   HENT: Negative for congestion, dental problem, ear pain, hearing loss, mouth sores, nosebleeds, rhinorrhea, tinnitus, trouble swallowing and voice change.    Eyes: Negative for redness and visual disturbance.    Respiratory: Negative for cough, chest tightness, shortness of breath and wheezing.    Cardiovascular: Negative for chest pain, palpitations and leg swelling.   Gastrointestinal: Negative for abdominal distention, abdominal pain, blood in stool, constipation, diarrhea, nausea and vomiting.   Genitourinary: Negative for decreased urine volume, difficulty urinating, dysuria, frequency and hematuria.   Musculoskeletal: Negative for arthralgias, back pain, gait problem, joint swelling, neck pain and neck stiffness.   Skin: Negative for color change, pallor, rash and wound.   Neurological: Positive for weakness (generalized) and light-headedness (with position change). Negative for dizziness, numbness and headaches.   Hematological: Negative for adenopathy. Does not bruise/bleed easily.   Psychiatric/Behavioral: Negative for confusion, dysphoric mood and sleep disturbance.       Objective:      Physical Exam   Constitutional: She is oriented to person, place, and time. She appears well-developed and well-nourished. No distress.   Presents alone   HENT:   Head: Normocephalic and atraumatic.   Right Ear: External ear normal.   Left Ear: External ear normal.   Nose: Nose normal.   Mouth/Throat: Oropharynx is clear and moist. No oropharyngeal exudate.   Eyes: Conjunctivae and EOM are normal. Pupils are equal, round, and reactive to light. Right eye exhibits no discharge. Left eye exhibits no discharge. No scleral icterus. Right pupil is round and reactive. Left pupil is round and reactive.   Neck: Normal range of motion. Neck supple. No JVD present. No tracheal deviation present. No thyromegaly present.   Cardiovascular: Normal rate, regular rhythm, normal heart sounds and intact distal pulses.  Exam reveals no gallop and no friction rub.    No murmur heard.  Pulmonary/Chest: Effort normal and breath sounds normal. No respiratory distress. She has no wheezes. She has no rales. She exhibits no tenderness.   Breasts- post  right mastectomy  Left breast- no masses  No LAD   Abdominal: Soft. Bowel sounds are normal. She exhibits no distension and no mass. There is no hepatosplenomegaly. There is no tenderness. There is no rebound and no guarding.   No organomegaly   Musculoskeletal: Normal range of motion. She exhibits no edema or tenderness.   Lymphadenopathy:        Head (right side): No submandibular adenopathy present.        Head (left side): No submandibular adenopathy present.     She has no cervical adenopathy.        Right cervical: No superficial cervical, no deep cervical and no posterior cervical adenopathy present.       Left cervical: No superficial cervical, no deep cervical and no posterior cervical adenopathy present.        Right: No inguinal and no supraclavicular adenopathy present.        Left: No inguinal and no supraclavicular adenopathy present.   Neurological: She is alert and oriented to person, place, and time. She has normal strength. No cranial nerve deficit or sensory deficit. She exhibits normal muscle tone. Coordination normal.   Skin: Skin is warm and dry. No bruising, no lesion, no petechiae and no rash noted. She is not diaphoretic. No erythema. No pallor.   Psychiatric: She has a normal mood and affect. Her behavior is normal. Judgment and thought content normal. Her mood appears not anxious. She does not exhibit a depressed mood.   Nursing note and vitals reviewed.      Assessment:       1. History of breast cancer        Plan:     1. KASSY  Can maintain mammogram schedule annually        Distress Screening Results: Psychosocial Distress screening score of Distress Score: 4 noted and reviewed. No intervention indicated.

## 2017-02-17 DIAGNOSIS — Z85.3 HISTORY OF BREAST CANCER: Primary | ICD-10-CM

## 2017-03-07 ENCOUNTER — HOSPITAL ENCOUNTER (INPATIENT)
Facility: HOSPITAL | Age: 72
LOS: 7 days | Discharge: HOME HEALTH | DRG: 291 | End: 2017-03-16
Attending: EMERGENCY MEDICINE | Admitting: HOSPITALIST
Payer: COMMERCIAL

## 2017-03-07 DIAGNOSIS — R06.02 SOB (SHORTNESS OF BREATH): ICD-10-CM

## 2017-03-07 DIAGNOSIS — I10 ESSENTIAL HYPERTENSION: ICD-10-CM

## 2017-03-07 DIAGNOSIS — E03.9 PRIMARY HYPOTHYROIDISM: ICD-10-CM

## 2017-03-07 DIAGNOSIS — N18.30 CKD (CHRONIC KIDNEY DISEASE) STAGE 3, GFR 30-59 ML/MIN: ICD-10-CM

## 2017-03-07 DIAGNOSIS — N17.9 AKI (ACUTE KIDNEY INJURY): ICD-10-CM

## 2017-03-07 DIAGNOSIS — I50.9 ACUTE ON CHRONIC CONGESTIVE HEART FAILURE: ICD-10-CM

## 2017-03-07 DIAGNOSIS — I50.42 CHRONIC COMBINED SYSTOLIC AND DIASTOLIC HEART FAILURE, NYHA CLASS 3: ICD-10-CM

## 2017-03-07 DIAGNOSIS — I50.9 CONGESTIVE HEART FAILURE, UNSPECIFIED CONGESTIVE HEART FAILURE CHRONICITY, UNSPECIFIED CONGESTIVE HEART FAILURE TYPE: ICD-10-CM

## 2017-03-07 DIAGNOSIS — M1A.9XX0 CHRONIC GOUT, UNSPECIFIED CAUSE, UNSPECIFIED SITE: ICD-10-CM

## 2017-03-07 DIAGNOSIS — R60.9 EDEMA, UNSPECIFIED TYPE: ICD-10-CM

## 2017-03-07 DIAGNOSIS — R80.9 PROTEINURIA: ICD-10-CM

## 2017-03-07 DIAGNOSIS — I50.33 ACUTE ON CHRONIC DIASTOLIC CONGESTIVE HEART FAILURE: Primary | ICD-10-CM

## 2017-03-07 DIAGNOSIS — R79.89 ELEVATED TROPONIN: ICD-10-CM

## 2017-03-07 PROCEDURE — 93005 ELECTROCARDIOGRAM TRACING: CPT

## 2017-03-07 PROCEDURE — 99285 EMERGENCY DEPT VISIT HI MDM: CPT | Mod: ,,, | Performed by: EMERGENCY MEDICINE

## 2017-03-07 PROCEDURE — 99291 CRITICAL CARE FIRST HOUR: CPT | Mod: 25

## 2017-03-07 PROCEDURE — 93010 ELECTROCARDIOGRAM REPORT: CPT | Mod: ,,, | Performed by: INTERNAL MEDICINE

## 2017-03-07 NOTE — IP AVS SNAPSHOT
[facility]  [address]  [facility] [address]  Phone: [phone]           Patient Discharge Instructions     Our goal is to set you up for success. This packet includes information on your condition, medications, and your home care. It will help you to care for yourself so you don't get sicker and need to go back to the hospital.     Please ask your nurse if you have any questions.        There are many details to remember when preparing to leave the hospital. Here is what you will need to do:    1. Take your medicine. If you are prescribed medications, review your Medication List in the following pages. You may have new medications to  at the pharmacy and others that you'll need to stop taking. Review the instructions for how and when to take your medications. Talk with your doctor or nurses if you are unsure of what to do.     2. Go to your follow-up appointments. Specific follow-up information is listed in the following pages. Your may be contacted by a transition nurse or clinical provider about future appointments. Be sure we have all of the phone numbers to reach you, if needed. Please contact your provider's office if you are unable to make an appointment.     3. Watch for warning signs. Your doctor or nurse will give you detailed warning signs to watch for and when to call for assistance. These instructions may also include educational information about your condition. If you experience any of warning signs to your health, call your doctor.               [facility]  Unless otherwise directed by your provider, please contact [facility], our nurse care line that is available for 24/7 assistance.     [phone] (toll-free)    Registered nurses in the [facility] provide clinical advisement, health education, appointment booking, and other advisory services.                    ** Verify the list of medication(s) below is accurate and up  to date. Carry this with you in case of emergency. If your medications have changed, please notify your healthcare provider.             Medication List      START taking these medications        Additional Info    Begin Date AM Noon PM Bedtime    carvedilol 12.5 MG tablet   Commonly known as:  COREG   Quantity:  60 tablet   Refills:  1   Dose:  12.5 mg    Last time this was given:  12.5 mg on 3/16/2017  8:33 AM   Instructions:  Take 1 tablet (12.5 mg total) by mouth 2 (two) times daily.     3/16                               CHANGE how you take these medications        Additional Info    Begin Date AM Noon PM Bedtime    furosemide 20 MG tablet   Commonly known as:  LASIX   Quantity:  180 tablet   Refills:  3   Dose:  40 mg   What changed:  how much to take    Instructions:  Take 2 tablets (40 mg total) by mouth once daily.     3/17                          valsartan 320 MG tablet   Commonly known as:  DIOVAN   Quantity:  90 tablet   Refills:  3   What changed:    - how much to take  - how to take this  - when to take this  - additional instructions    Instructions:  HOLD UNTIL FOLLOW UP WITH YOUR DOCTOR WITH KIDNEY LABWORK BEFOREHAND     Do not take until you have seen kidney doctor                         CONTINUE taking these medications        Additional Info    Begin Date AM Noon PM Bedtime    allopurinol 100 MG tablet   Commonly known as:  ZYLOPRIM   Refills:  0   Dose:  100 mg    Last time this was given:  100 mg on 3/12/2017  9:19 PM   Instructions:  Take 100 mg by mouth 2 (two) times daily.     3/16                             aspirin 81 MG Chew   Refills:  0   Dose:  81 mg    Last time this was given:  81 mg on 3/16/2017  8:33 AM   Instructions:  Take 1 tablet (81 mg total) by mouth once daily.     3/17                          benztropine 0.5 MG tablet   Commonly known as:  COGENTIN   Quantity:  30 tablet   Refills:  0   Dose:  0.5 mg    Last time this was given:  0.5 mg on 3/15/2017  8:12 PM    Instructions:  Take 1 tablet (0.5 mg total) by mouth nightly.     3/16                          cetirizine 5 MG tablet   Commonly known as:  ZYRTEC   Refills:  0   Dose:  5 mg    Last time this was given:  5 mg on 3/16/2017  8:33 AM   Instructions:  Take 1 tablet (5 mg total) by mouth once daily.     3/17                          diclofenac sodium 1 % Gel   Refills:  0   Dose:  4 g    Instructions:  Apply 4 g topically 4 (four) times daily. Prn pain     As needed for pain                       fluticasone 50 mcg/actuation nasal spray   Commonly known as:  FLONASE   Quantity:  1 Bottle   Refills:  3   Dose:  2 spray    Last time this was given:  2 sprays on 3/14/2017 10:05 AM   Instructions:  2 sprays by Each Nare route once daily.     3/17                          gabapentin 300 MG capsule   Commonly known as:  NEURONTIN   Refills:  0   Dose:  300 mg    Instructions:  Take 300 mg by mouth 3 (three) times daily as needed (for nerve pain).     As needed for nerve pain                       guaifenesin 600 mg 12 hr tablet   Commonly known as:  MUCINEX   Refills:  0   Dose:  600 mg    Last time this was given:  600 mg on 3/12/2017  9:02 AM   Instructions:  Take 1 tablet (600 mg total) by mouth 2 (two) times daily.     3/16                             levothyroxine 50 MCG tablet   Commonly known as:  SYNTHROID   Quantity:  30 tablet   Refills:  11   Dose:  50 mcg    Last time this was given:  50 mcg on 3/16/2017  5:00 AM   Instructions:  Take 1 tablet (50 mcg total) by mouth before breakfast.     3/17                            melatonin 3 mg Tab   Refills:  0    Instructions:  Take by mouth every evening.     3/16                          quetiapine 200 MG Tab   Commonly known as:  SEROQUEL   Quantity:  30 tablet   Refills:  0   Dose:  200 mg    Last time this was given:  200 mg on 3/15/2017  8:10 PM   Instructions:  Take 1 tablet (200 mg total) by mouth nightly.     3/16                          sodium bicarbonate  650 MG tablet   Quantity:  360 tablet   Refills:  3   Dose:  1300 mg    Last time this was given:  1,300 mg on 3/16/2017  8:33 AM   Instructions:  Take 2 tablets (1,300 mg total) by mouth 2 (two) times daily.     3/16                                  Where to Get Your Medications      These medications were sent to [facility] [address]  [address] [place] [address]     Phone:  [phone]     carvedilol 12.5 MG tablet         You can get these medications from any pharmacy     Bring a paper prescription for each of these medications     furosemide 20 MG tablet         Information about where to get these medications is not yet available     ! Ask your nurse or doctor about these medications     valsartan 320 MG tablet                  Please bring to all follow up appointments:    1. A copy of your discharge instructions.  2. All medicines you are currently taking in their original bottles.  3. Identification and insurance card.    Please arrive 15 minutes ahead of scheduled appointment time.    Please call 24 hours in advance if you must reschedule your appointment and/or time.        Your Scheduled Appointments     Mar 17, 2017  1:00 PM CDT   Mammo Diag with [facility] MAMMO3 DX   [facility] ([facility] )    [address] [facility] [address] [phone]            Mar 22, 2017 10:30 AM CDT   Audiogram with AUDIOGRAM, AUDIO   [facility] - Audiology ([facility] )    [address]  [place] [address] [phone]            Mar 22, 2017 11:15 AM CDT   New Patient with Negro Norris MD   [facility] - Otorhinolaryngology ([facility] )    [address]  [place] [address]   [phone]            May 01, 2017 10:30 AM CDT   Non-Fasting Lab with LAB, APPOINTMENT [place] [facility] ([facility])    [address] [facility] [address]   [phone]             May 01, 2017 10:35 AM CDT   Urine with SPECIMEN, MAIN CAMPUS [facility] ([facility])    [address] [facility] [address] [phone]              Follow-up Information     Follow up with Live Young MD In 2 weeks.    Specialty:  Family Medicine    Contact information:    [address] [facility] [address]  [phone]          Follow up with Len Live MD.    Specialties:  Cardiology, Transplant    Why:  They will call to schedule follow up    Contact information:    [address]  [place] [address]  [phone]          [facility]    Specialty:  Cardiology    Why:  They will call to schedule follow up        Follow up with [facility] .    Specialty:  Physical Therapy    Contact information:    [address] [place] [id number] [phone]          Follow up with [facility] In 2 days.    Specialty:  Physical Therapy    Why:  Please contact Alvaro Reed CM for [facility]#[phone] for name of  home health agency.    Contact information:    [address] [facility] [id number] [phone]        Referrals     Future Orders    Ambulatory Referral to Interventional Cardiology     Ambulatory Referral to Nephrology     Ambulatory Referral to Transplant, Heart         Discharge Instructions     Future Orders    Activity as tolerated     Call MD for:  difficulty breathing or increased cough     Diet Cardiac         Primary Diagnosis     Your primary diagnosis was:  Heart Failure      Admission Information     Date & Time Provider Department CSN    3/7/2017 11:29 PM Servando Obrien MD [facility] [id number]      Care Providers     Provider Role Specialty Primary office phone    Servando Obrien MD Attending Provider Hospitalist [phone]    Randell Friedman MD Team Attending  Hospitalist [phone]    Servando Obrien MD Team Attending   "Hospitalist [phone]    Usama Fletcher DO Consulting Physician  Nephrology [phone]      Your Vitals Were     BP Pulse Temp Resp Height Weight    112/68 (BP Location: Left arm, Patient Position: Lying, BP Method: Automatic) 63 97.8 °F (36.6 °C) (Oral) 18 5' 7" (1.702 m) 80.1 kg (176 lb 9.4 oz)    SpO2 BMI             98% 27.66 kg/m2         Recent Lab Values        1/11/2017                           2:18 PM           A1C 4.2 (L)           Comment for A1C at  2:18 PM on 1/11/2017:  According to ADA guidelines, hemoglobin A1C <7.0% represents  optimal control in non-pregnant diabetic patients.  Different  metrics may apply to specific populations.   Standards of Medical Care in Diabetes - 2016.  For the purpose of screening for the presence of diabetes:  <5.7%     Consistent with the absence of diabetes  5.7-6.4%  Consistent with increasing risk for diabetes   (prediabetes)  >or=6.5%  Consistent with diabetes  Currently no consensus exists for use of hemoglobin A1C  for diagnosis of diabetes for children.        Allergies as of 3/16/2017     No Known Allergies      Advance Directives     An advance directive is a document which, in the event you are no longer able to make decisions for yourself, tells your healthcare team what kind of treatment you do or do not want to receive, or who you would like to make those decisions for you.  If you do not currently have an advance directive, [facility] encourages you to create one.  For more information call:  [phone]), [phone] ([phone]),  or log on to [email or web address].        Language Assistance Services     ATTENTION: Language assistance services are available, free of charge. Please call [phone].      ATENCIÓN: Si habla español, tiene a mane disposición servicios gratuitos de asistencia lingüística. Llame al [phone].     CHÚ Ý: N?u b?n nói Ti?ng Vi?t, có các d?ch v? h? tr? ngôn ng? mi?n phí dành cho b?n. G?i s? " [phone].        Heart Failure Education       Heart Failure: Being Active  You have a condition called heart failure. Being active doesnt mean that you have to wear yourself out. Even a little movement each day helps to strengthen your heart. If you cant get out to exercise, you can do simple stretching and strengthening exercises at home. These are good ways to keep you well-conditioned and prevent you and your heart from becoming excessively weak.    Ideas to get you started  · Add a little movement to things you do now. Walk to mail letters. Park your car at the far end of the parking lot and walk to the store. Walk up a flight of stairs instead of taking the elevator.  · Choose activities you enjoy. You might walk, swim, or ride an exercise bike. Things like gardening and washing the car count, too. Other possibilities include: washing dishes, walking the dog, walking around the mall, and doing aerobic activities with friends.  · Join a group exercise program at a [facility] or [facility], a senior center, or a community center. Or look into a hospital cardiac rehabilitation program. Ask your doctor if you qualify.  Tips to keep you going  · Get up and get dressed each day. Go to a coffee shop and read a newspaper or go somewhere that you'll be in the presence of other active people. Youll feel more like being active.  · Make a plan. Choose one or more activities that you enjoy and that you can easily do. Then plan to do at least one each day. You might write your plan on a calendar.  · Go with a friend or a group if you like company. This can help you feel supported and stay motivated, too.  · Plan social events that you enjoy. This will keep you mentally engaged as well as physically motivated to do things you find pleasure in.  For your safety  · Talk with your healthcare provider before starting an exercise program.  · Exercise indoors when its too hot or too cold outside, or when the air quality is poor. Try  walking at a shopping mall.  · Wear socks and sturdy shoes to maintain your balance and prevent falls.  · Start slowly. Do a few minutes several times a day at first. Increase your time and speed little by little.  · Stop and rest whenever you feel tired or get short of breath.  · Dont push yourself on days when you dont feel well.  Date Last Reviewed: 3/20/2016  © 6412-1702 [employer]. [address] [place] [address], [place] [address]. All rights reserved. This information is not intended as a substitute for professional medical care. Always follow your healthcare professional's instructions.              Heart Failure: Evaluating Your Heart  You have a condition called heart failure. To evaluate your condition, your doctor will examine you, ask questions, and do some tests. Along with looking for signs of heart failure, the doctor looks for any other health problems that may have led to heart failure. The results of your evaluation will help your doctor form a treatment plan.  Health history and physical exam  Your visit will start with a health history. Tell the doctor about any symptoms youve noticed and about all medicines you take. Then youll have a physical exam. This includes listening to your heartbeat and breathing. Youll also be checked for swelling (edema) in your legs and neck. When you have fluid buildup or fluid in the lungs, it may be called congestive heart failure.  Diagnosing heart failure     During an echocardiogram, sound waves bounce off the heart. These are converted into a picture on the screen.   The following may be done to help your doctor form a diagnosis:  · X-rays show the size and shape of your heart. These pictures can also show fluid in your lungs.  · An electrocardiogram (ECG or EKG) shows the pattern of your heartbeat. Small pads (electrodes) are placed on your chest, arms, and legs. Wires connect the pads to the ECG machine, which records your hearts electrical  signals. This can give the doctor information about heart function.  · An echocardiogram uses ultrasound waves to show the structure and movement of your heart muscle. This shows how well the heart pumps. It also shows the thickness of the heart walls, and if the heart is enlarged. It is one of the most useful, non-invasive tests as it provides information about the heart's general function. This helps your doctor make treatment decisions.  · Lab tests evaluate small amounts of blood or urine for signs of problems. A BNP lab test can help diagnose and evaluate heart failure. BNP stands for B-type natriuretic peptide. The ventricles secrete more BNP when heart failure worsens. Lab tests can also provide information about metabolic dysfunction or heart dysfunction.  Your treatment plan  Based on the results of your evaluation and tests, your doctor will develop a treatment plan. This plan is designed to relieve some of your heart failure symptoms and help make you more comfortable. Your treatment plan may include:  · Medicine to help your heart work better and improve your quality of life  · Changes in what you eat and drink to help prevent fluid from backing up in your body  · Daily monitoring of your weight and heart failure symptoms to see how well your treatment plan is working  · Exercise to help you stay healthy  · Help with quitting smoking  · Emotional and psychological support to help adjust to the changes  · Referrals to other specialists to make sure you are being treated comprehensively  Date Last Reviewed: 3/21/2016  © 0871-5097 [employer]. [address] [place] [address], [place] [address]. All rights reserved. This information is not intended as a substitute for professional medical care. Always follow your healthcare professional's instructions.              Heart Failure: Making Changes to Your Diet  You have a condition called heart failure. When you have heart failure, excess fluid is more  likely to build up in your body because your heart isn't working well. This makes the heart work harder to pump blood. Fluid buildup causes symptoms such as shortness of breath and swelling (edema). This is often referred to as congestive heart failure or CHF. Controlling the amount of salt (sodium) you eat may help stop fluid from building up. Your doctor may also tell you to reduce the amount of fluid you drink.  Reading food labels    Your healthcare provider will tell you how much sodium you can eat each day. Read food labels to keep track. Keep in mind that certain foods are high in salt. These include canned, frozen, and processed foods. Check the amount of sodium in each serving. Watch out for high-sodium ingredients. These include MSG (monosodium glutamate), baking soda, and sodium phosphate.   Eating less salt  Give yourself time to get used to eating less salt. It may take a little while. Here are some tips to help:  · Take the saltshaker off the table. Replace it with salt-free herb mixes and spices.  · Eat fresh or plain frozen vegetables. These have much less salt than canned vegetables.  · Choose low-sodium snacks like sodium-free pretzels, crackers, or air-popped popcorn.  · Dont add salt to your food when youre cooking. Instead, season your foods with pepper, lemon, garlic, or onion.  · When you eat out, ask that your food be cooked without added salt.  · Avoid eating fried foods as these often have a great deal of salt.  If youre told to limit fluids  You may need to limit how much fluid you have to help prevent swelling. This includes anything that is liquid at room temperature, such as ice cream and soup. If your doctor tells you to limit fluid, try these tips:  · Measure drinks in a measuring cup before you drink them. This will help you meet daily goals.  · Chill drinks to make them more refreshing.  · Suck on frozen lemon wedges to quench thirst.  · Only drink when youre thirsty.  · Chew  sugarless gum or suck on hard candy to keep your mouth moist.  · Weigh yourself daily to know if your body's fluid content is rising.  My sodium goal  Your healthcare provider may give you a sodium goal to meet each day. This includes sodium found in food as well as salt that you add. My goal is to eat no more than ___________ mg of sodium per day.     When to call your doctor  Call your doctor right away if you have any symptoms of worsening heart failure. These can include:  · Sudden weight gain  · Increased swelling of your legs or ankles  · Trouble breathing when youre resting or at night  · Increase in the number of pillows you have to sleep on  · Chest pain, pressure, discomfort, or pain in the jaw, neck, or back   Date Last Reviewed: 3/21/2016  © 5105-7672 [employer]. [address] [place] [address]. All rights reserved. This information is not intended as a substitute for professional medical care. Always follow your healthcare professional's instructions.              Heart Failure: Medicines to Help Your Heart    You have a condition called heart failure (also known as congestive heart failure, or CHF). Your doctor will likely prescribe medicines for heart failure and any underlying health problems you have. Most heart failure patients take one or more types of medicinen. Your healthcare provider will work to find the combination of medicines that works best for you.  Heart failure medicines  Here are the most common heart failure medicines:  · ACE inhibitors lower blood pressure and decrease strain on the heart. This makes it easier for the heart to pump. Angiotensin receptor blockers have similar effects. These are prescribed for some patients instead of ACE inhibitors.  · Beta-blockers relieve stress on the heart. They also improve symptoms. They may also improve the heart's pumping action over time.  · Diuretics (also called water pills) help rid your body of excess water.  This can help rid your body of swelling (edema). Having less fluid to pump means your heart doesnt have to work as hard. Some diuretics make your body lose a mineral called potassium. Your doctor will tell you if you need to take supplements or eat more foods high in potassium.  · Digoxin helps your heart pump with more strength. This helps your heart pump more blood with each beat. So, more oxygen-rich blood travels to the rest of the body.  · Aldosterone antagonists help alter hormones and decrease strain on the heart.  · Hydralazine and nitrates are two separate medicines used together to treat heart failure. They may come in one combination pill. They lower blood pressure and decrease how hard the heart has to pump.  Medicines for related conditions  Controlling other heart problems helps keep heart failure under control, too. Depending on other heart problems you have, medicines may be prescribed to:  · Lower blood pressure (antihypertensives).  · Lower cholesterol levels (statins).  · Prevent blood clots (anticoagulants or aspirin).  · Keep the heartbeat steady (antiarrhythmics).  Date Last Reviewed: 3/5/2016  © 7412-9098 [employer]. [address] [place] [address]. All rights reserved. This information is not intended as a substitute for professional medical care. Always follow your healthcare professional's instructions.              Heart Failure: Procedures That May Help    The heart is a muscle that pumps oxygen-rich blood to all parts of the body. When you have heart failure, the heart is not able to pump as well as it should. Blood and fluid may back up into the lungs (congestive heart failure), and some parts of the body dont get enough oxygen-rich blood to work normally. These problems lead to the symptoms of heart failure.     Certain procedures may help the heart pump better in some cases of heart failure. Some procedures are done to treat health problems that may have  caused the heart failure such as coronary artery disease or heart rhythm problems. For more serious heart failure, other options are available.  Treating artery and valve problems  If you have coronary artery disease or valve disease, procedures may be done to improve blood flow. This helps the heart pump better, which can improve heart failure symptoms. First, your doctor may do a cardiac catheterization to help detect clogged blood vessels or valve damage. During this procedure, a  thin tube (catheter) in inserted into a blood vessel and guided to the heart. There a dye is injected and a special type of X-ray (angiogram) is taken of the blood vessels. Procedures to open a blocked artery or fix damaged valves can also be done using catheterization.  · Angioplasty uses a balloon-tipped instrument at the end of the catheter. The balloon is inflated to widen the narrowed artery. In many cases, a stent is expanded to further support the narrowed artery. A stent is a metal mesh tube.  · Valve surgery repairs or replacement of faulty valves can also be done during catheterization so blood can flow properly through the chambers of the heart.  Bypass surgery is another option to help treat blocked arteries. It uses a healthy blood vessel from elsewhere in the body. The healthy blood vessel is attached above and below the blocked area so that blood can flow around the blocked artery.  Treating heart rhythm problems  A device may be placed in the chest to help a weak heart maintain a healthy, heartbeat so the heart can pump more effectively:  · Pacemaker. A pacemaker is an implanted device that regulates your heartbeat electronically. It monitors your heart's rhythm and generates a painless electric impulse that helps the heart beat in a regular rhythm. A pacemaker is programmed to meet your specific heart rhythm needs.  · Biventricular pacing/cardiac resynchronization therapy. A type of pacemaker that paces both pumping  chambers of the heart at the same time to coordinate contractions and to improve the heart's function. Some people with heart failure are candidates for this therapy.  · Implantable cardioverter defibrillator. A device similar to a pacemaker that senses when the heart is beating too fast and delivers an electrical shock to convert the fast rhythm to a normal rhythm. This can be a life saving device.  In severe cases  In more serious cases of heart failure when other treatments no longer work, other options may include:  · Ventricular assist devices (VADs). These are mechanical devices used to take over the pumping function for one or both of the heart's ventricles, or pumping chambers. A VAD may be necessary when heart failure progresses to the point that medicines and other treatments no longer help. In some cases, a VAD may be used as a bridge to transplant.  · Heart transplant. This is replacing the diseased heart with a healthy one from a donor. This is an option for a few people who are very sick. A heart transplant is very serious and not an option for all patients. Your doctor can tell you more.  Date Last Reviewed: 3/20/2016  © 4202-6439 [employer]. [address] [place] [address]. All rights reserved. This information is not intended as a substitute for professional medical care. Always follow your healthcare professional's instructions.              Heart Failure: Tracking Your Weight  You have a condition called heart failure. When you have heart failure, a sudden weight gain or a steady rise in weight is a warning sign that your body is retaining too much water and salt. This could mean your heart failure is getting worse. If left untreated, it can cause problems for your lungs and result in shortness of breath. Weighing yourself each day is the best way to know if youre retaining water. If your weight goes up quickly, call your doctor. You will be given instructions on how to  get rid of the excess water. You will likely need medicines and to avoid salt. This will help your heart work better.  Call your doctor if you gain more than 2 pounds in 1 day, more than 5 pounds in 1 week, or whatever weight gain you were told to report by your doctor. This is often a sign of worsening heart failure and needs to be evaluated and treated. Your doctor will tell you what to do next.   Tips for weighing yourself    · Weigh yourself at the same time each morning, wearing the same clothes. Weigh yourself after urinating and before eating.  · Use the same scale each day. Make sure the numbers are easy to read. Put the scale on a flat, hard surface -- not on a rug or carpet.  · Do not stop weighing yourself. If you forget one day, weigh again the next morning.  How to use your weight chart  · Keep your weight chart near the scale. Write your weight on the chart as soon as you get off the scale.  · Fill in the month and the start date on the chart. Then write down your weight each day. Your chart will look like this:    · If you miss a day, leave the space blank. Weigh yourself the next day and write your weight in the next space.  · Take your weight chart with you when you go to see your doctor.  Date Last Reviewed: 3/20/2016  © 1131-3044 [employer]. [address] [place] [address]. All rights reserved. This information is not intended as a substitute for professional medical care. Always follow your healthcare professional's instructions.              Heart Failure: Warning Signs of a Flare-Up  You have a condition called heart failure. Once you have heart failure, flare-ups can happen. Below are signs that can mean your heart failure is getting worse. If you notice any of these warning signs, call your healthcare provider.  Swelling    · Your feet, ankles, or lower legs get puffier.  · You notice skin changes on your lower legs.  · Your shoes feel too tight.  · Your clothes are  tighter in the waist.  · You have trouble getting rings on or off your fingers.  Shortness of breath  · You have to breathe harder even when youre doing your normal activities or when youre resting.  · You are short of breath walking up stairs or even short distances.  · You wake up at night short of breath or coughing.  · You need to use more pillows or sit up to sleep.  · You wake up tired or restless.  Other warning signs  · You feel weaker, dizzy, or more tired.  · You have chest pain or changes in your heartbeat.  · You have a cough that wont go away.  · You cant remember things or dont feel like eating.  Tracking your weight  Gaining weight is often the first warning sign that heart failure is getting worse. Gaining even a few pounds can be a sign that your body is retaining excess water and salt. Weighing yourself each day in the morning after you urinate and before you eat, is the best way to know if you're retaining water. Get a scale that is easy to read and make sure you wear the same clothes and use the same scale every time you weigh. Your healthcare provider will show you how to track your weight. Call your doctor if you gain more than 2 pounds in 1 day, 5 pounds in 1 week, or whatever weight gain you were told to report by your doctor. This is often a sign of worsening heart failure and needs to be evaluated and treated before it compromises your breathing. Your doctor will tell you what to do next.    Date Last Reviewed: 3/15/2016  © 2964-3736 [employer]. [address] [place] [address], [place] [address]. All rights reserved. This information is not intended as a substitute for professional medical care. Always follow your healthcare professional's instructions.              Pneumonmia Discharge Instructions                Chronic Kindey Disease Education              [facility] complies with applicable Federal civil rights laws and does not discriminate on the basis  of race, color, national origin, age, disability, or sex.

## 2017-03-08 PROBLEM — I50.9 CONGESTIVE HEART FAILURE: Status: ACTIVE | Noted: 2017-03-08

## 2017-03-08 LAB
ALBUMIN SERPL BCP-MCNC: 3.3 G/DL
ALP SERPL-CCNC: 106 U/L
ALT SERPL W/O P-5'-P-CCNC: 32 U/L
ANION GAP SERPL CALC-SCNC: 7 MMOL/L
AST SERPL-CCNC: 37 U/L
BASOPHILS # BLD AUTO: 0.02 K/UL
BASOPHILS NFR BLD: 0.5 %
BILIRUB SERPL-MCNC: 1 MG/DL
BNP SERPL-MCNC: 1886 PG/ML
BUN SERPL-MCNC: 41 MG/DL
CALCIUM SERPL-MCNC: 8.9 MG/DL
CHLORIDE SERPL-SCNC: 108 MMOL/L
CO2 SERPL-SCNC: 21 MMOL/L
CREAT SERPL-MCNC: 2 MG/DL
DIFFERENTIAL METHOD: ABNORMAL
EOSINOPHIL # BLD AUTO: 0.1 K/UL
EOSINOPHIL NFR BLD: 2.3 %
ERYTHROCYTE [DISTWIDTH] IN BLOOD BY AUTOMATED COUNT: 14.4 %
EST. GFR  (AFRICAN AMERICAN): 28.3 ML/MIN/1.73 M^2
EST. GFR  (NON AFRICAN AMERICAN): 24.6 ML/MIN/1.73 M^2
FLUAV AG SPEC QL IA: NEGATIVE
FLUBV AG SPEC QL IA: NEGATIVE
GLUCOSE SERPL-MCNC: 102 MG/DL
HCT VFR BLD AUTO: 32.6 %
HGB BLD-MCNC: 10.9 G/DL
LYMPHOCYTES # BLD AUTO: 1.3 K/UL
LYMPHOCYTES NFR BLD: 32.1 %
MAGNESIUM SERPL-MCNC: 2 MG/DL
MCH RBC QN AUTO: 32.2 PG
MCHC RBC AUTO-ENTMCNC: 33.4 %
MCV RBC AUTO: 96 FL
MONOCYTES # BLD AUTO: 0.4 K/UL
MONOCYTES NFR BLD: 9 %
NEUTROPHILS # BLD AUTO: 2.2 K/UL
NEUTROPHILS NFR BLD: 55.6 %
PHOSPHATE SERPL-MCNC: 4 MG/DL
PLATELET # BLD AUTO: 168 K/UL
PMV BLD AUTO: 12 FL
POTASSIUM SERPL-SCNC: 4.6 MMOL/L
PROT SERPL-MCNC: 7.6 G/DL
RBC # BLD AUTO: 3.38 M/UL
SODIUM SERPL-SCNC: 136 MMOL/L
SPECIMEN SOURCE: NORMAL
T4 FREE SERPL-MCNC: 1.1 NG/DL
TROPONIN I SERPL DL<=0.01 NG/ML-MCNC: 0.05 NG/ML
TROPONIN I SERPL DL<=0.01 NG/ML-MCNC: 0.05 NG/ML
TROPONIN I SERPL DL<=0.01 NG/ML-MCNC: 0.06 NG/ML
TSH SERPL DL<=0.005 MIU/L-ACNC: 16.69 UIU/ML
WBC # BLD AUTO: 3.89 K/UL

## 2017-03-08 PROCEDURE — 84100 ASSAY OF PHOSPHORUS: CPT

## 2017-03-08 PROCEDURE — 84484 ASSAY OF TROPONIN QUANT: CPT

## 2017-03-08 PROCEDURE — 94640 AIRWAY INHALATION TREATMENT: CPT

## 2017-03-08 PROCEDURE — 94761 N-INVAS EAR/PLS OXIMETRY MLT: CPT

## 2017-03-08 PROCEDURE — 25000003 PHARM REV CODE 250: Performed by: EMERGENCY MEDICINE

## 2017-03-08 PROCEDURE — G0378 HOSPITAL OBSERVATION PER HR: HCPCS

## 2017-03-08 PROCEDURE — 83880 ASSAY OF NATRIURETIC PEPTIDE: CPT

## 2017-03-08 PROCEDURE — 80053 COMPREHEN METABOLIC PANEL: CPT

## 2017-03-08 PROCEDURE — 84484 ASSAY OF TROPONIN QUANT: CPT | Mod: 91

## 2017-03-08 PROCEDURE — 83735 ASSAY OF MAGNESIUM: CPT

## 2017-03-08 PROCEDURE — 96376 TX/PRO/DX INJ SAME DRUG ADON: CPT

## 2017-03-08 PROCEDURE — 99222 1ST HOSP IP/OBS MODERATE 55: CPT | Mod: ,,, | Performed by: PHYSICIAN ASSISTANT

## 2017-03-08 PROCEDURE — 96374 THER/PROPH/DIAG INJ IV PUSH: CPT

## 2017-03-08 PROCEDURE — 25000003 PHARM REV CODE 250: Performed by: NURSE PRACTITIONER

## 2017-03-08 PROCEDURE — 25000242 PHARM REV CODE 250 ALT 637 W/ HCPCS: Performed by: EMERGENCY MEDICINE

## 2017-03-08 PROCEDURE — 63600175 PHARM REV CODE 636 W HCPCS: Performed by: EMERGENCY MEDICINE

## 2017-03-08 PROCEDURE — 93010 ELECTROCARDIOGRAM REPORT: CPT | Mod: 76,,, | Performed by: INTERNAL MEDICINE

## 2017-03-08 PROCEDURE — 85025 COMPLETE CBC W/AUTO DIFF WBC: CPT

## 2017-03-08 PROCEDURE — 25000242 PHARM REV CODE 250 ALT 637 W/ HCPCS: Performed by: NURSE PRACTITIONER

## 2017-03-08 PROCEDURE — 63600175 PHARM REV CODE 636 W HCPCS: Performed by: NURSE PRACTITIONER

## 2017-03-08 PROCEDURE — 84443 ASSAY THYROID STIM HORMONE: CPT

## 2017-03-08 PROCEDURE — 25000003 PHARM REV CODE 250: Performed by: HOSPITALIST

## 2017-03-08 PROCEDURE — 93005 ELECTROCARDIOGRAM TRACING: CPT

## 2017-03-08 PROCEDURE — 87400 INFLUENZA A/B EACH AG IA: CPT

## 2017-03-08 PROCEDURE — 84439 ASSAY OF FREE THYROXINE: CPT

## 2017-03-08 PROCEDURE — 96372 THER/PROPH/DIAG INJ SC/IM: CPT

## 2017-03-08 RX ORDER — VALSARTAN 160 MG/1
320 TABLET ORAL DAILY
Status: DISCONTINUED | OUTPATIENT
Start: 2017-03-08 | End: 2017-03-08

## 2017-03-08 RX ORDER — HEPARIN SODIUM 5000 [USP'U]/ML
5000 INJECTION, SOLUTION INTRAVENOUS; SUBCUTANEOUS EVERY 8 HOURS
Status: DISCONTINUED | OUTPATIENT
Start: 2017-03-08 | End: 2017-03-16 | Stop reason: HOSPADM

## 2017-03-08 RX ORDER — IPRATROPIUM BROMIDE AND ALBUTEROL SULFATE 2.5; .5 MG/3ML; MG/3ML
3 SOLUTION RESPIRATORY (INHALATION)
Status: COMPLETED | OUTPATIENT
Start: 2017-03-08 | End: 2017-03-08

## 2017-03-08 RX ORDER — BENZTROPINE MESYLATE 0.5 MG/1
0.5 TABLET ORAL NIGHTLY
Status: DISCONTINUED | OUTPATIENT
Start: 2017-03-08 | End: 2017-03-16 | Stop reason: HOSPADM

## 2017-03-08 RX ORDER — GABAPENTIN 300 MG/1
300 CAPSULE ORAL 3 TIMES DAILY PRN
Status: DISCONTINUED | OUTPATIENT
Start: 2017-03-08 | End: 2017-03-16 | Stop reason: HOSPADM

## 2017-03-08 RX ORDER — GUAIFENESIN 600 MG/1
600 TABLET, EXTENDED RELEASE ORAL 2 TIMES DAILY
Status: DISCONTINUED | OUTPATIENT
Start: 2017-03-08 | End: 2017-03-12

## 2017-03-08 RX ORDER — HYDRALAZINE HYDROCHLORIDE 25 MG/1
25 TABLET, FILM COATED ORAL EVERY 8 HOURS PRN
Status: DISCONTINUED | OUTPATIENT
Start: 2017-03-08 | End: 2017-03-16 | Stop reason: HOSPADM

## 2017-03-08 RX ORDER — ACETAMINOPHEN 325 MG/1
650 TABLET ORAL EVERY 6 HOURS PRN
Status: DISCONTINUED | OUTPATIENT
Start: 2017-03-08 | End: 2017-03-16 | Stop reason: HOSPADM

## 2017-03-08 RX ORDER — ALLOPURINOL 100 MG/1
100 TABLET ORAL 2 TIMES DAILY
Status: DISCONTINUED | OUTPATIENT
Start: 2017-03-08 | End: 2017-03-13

## 2017-03-08 RX ORDER — NAPROXEN SODIUM 220 MG/1
81 TABLET, FILM COATED ORAL DAILY
Status: DISCONTINUED | OUTPATIENT
Start: 2017-03-08 | End: 2017-03-16 | Stop reason: HOSPADM

## 2017-03-08 RX ORDER — FUROSEMIDE 10 MG/ML
40 INJECTION INTRAMUSCULAR; INTRAVENOUS
Status: COMPLETED | OUTPATIENT
Start: 2017-03-08 | End: 2017-03-08

## 2017-03-08 RX ORDER — FLUTICASONE PROPIONATE 50 MCG
2 SPRAY, SUSPENSION (ML) NASAL DAILY
Status: DISCONTINUED | OUTPATIENT
Start: 2017-03-08 | End: 2017-03-16 | Stop reason: HOSPADM

## 2017-03-08 RX ORDER — CETIRIZINE HYDROCHLORIDE 5 MG/1
5 TABLET ORAL DAILY
Status: DISCONTINUED | OUTPATIENT
Start: 2017-03-08 | End: 2017-03-16 | Stop reason: HOSPADM

## 2017-03-08 RX ORDER — GLUCAGON 1 MG
1 KIT INJECTION
Status: DISCONTINUED | OUTPATIENT
Start: 2017-03-08 | End: 2017-03-16 | Stop reason: HOSPADM

## 2017-03-08 RX ORDER — SODIUM BICARBONATE 650 MG/1
1300 TABLET ORAL 2 TIMES DAILY
Status: DISCONTINUED | OUTPATIENT
Start: 2017-03-08 | End: 2017-03-16 | Stop reason: HOSPADM

## 2017-03-08 RX ORDER — QUETIAPINE FUMARATE 200 MG/1
200 TABLET, FILM COATED ORAL NIGHTLY
Status: DISCONTINUED | OUTPATIENT
Start: 2017-03-08 | End: 2017-03-10

## 2017-03-08 RX ORDER — ONDANSETRON 2 MG/ML
4 INJECTION INTRAMUSCULAR; INTRAVENOUS EVERY 12 HOURS PRN
Status: DISCONTINUED | OUTPATIENT
Start: 2017-03-08 | End: 2017-03-16 | Stop reason: HOSPADM

## 2017-03-08 RX ORDER — IBUPROFEN 200 MG
24 TABLET ORAL
Status: DISCONTINUED | OUTPATIENT
Start: 2017-03-08 | End: 2017-03-16 | Stop reason: HOSPADM

## 2017-03-08 RX ORDER — LEVOTHYROXINE SODIUM 50 UG/1
50 TABLET ORAL
Status: DISCONTINUED | OUTPATIENT
Start: 2017-03-09 | End: 2017-03-16 | Stop reason: HOSPADM

## 2017-03-08 RX ORDER — ASPIRIN 325 MG
325 TABLET ORAL
Status: COMPLETED | OUTPATIENT
Start: 2017-03-08 | End: 2017-03-08

## 2017-03-08 RX ORDER — IPRATROPIUM BROMIDE AND ALBUTEROL SULFATE 2.5; .5 MG/3ML; MG/3ML
3 SOLUTION RESPIRATORY (INHALATION)
Status: DISCONTINUED | OUTPATIENT
Start: 2017-03-08 | End: 2017-03-12

## 2017-03-08 RX ORDER — IBUPROFEN 200 MG
16 TABLET ORAL
Status: DISCONTINUED | OUTPATIENT
Start: 2017-03-08 | End: 2017-03-16 | Stop reason: HOSPADM

## 2017-03-08 RX ORDER — FUROSEMIDE 10 MG/ML
40 INJECTION INTRAMUSCULAR; INTRAVENOUS 2 TIMES DAILY
Status: DISCONTINUED | OUTPATIENT
Start: 2017-03-08 | End: 2017-03-10

## 2017-03-08 RX ORDER — ONDANSETRON 8 MG/1
8 TABLET, ORALLY DISINTEGRATING ORAL EVERY 8 HOURS PRN
Status: DISCONTINUED | OUTPATIENT
Start: 2017-03-08 | End: 2017-03-16 | Stop reason: HOSPADM

## 2017-03-08 RX ADMIN — IPRATROPIUM BROMIDE AND ALBUTEROL SULFATE 3 ML: .5; 3 SOLUTION RESPIRATORY (INHALATION) at 03:03

## 2017-03-08 RX ADMIN — ASPIRIN 325 MG ORAL TABLET 325 MG: 325 PILL ORAL at 03:03

## 2017-03-08 RX ADMIN — IPRATROPIUM BROMIDE AND ALBUTEROL SULFATE 3 ML: .5; 3 SOLUTION RESPIRATORY (INHALATION) at 12:03

## 2017-03-08 RX ADMIN — FUROSEMIDE 40 MG: 10 INJECTION, SOLUTION INTRAMUSCULAR; INTRAVENOUS at 05:03

## 2017-03-08 RX ADMIN — HEPARIN SODIUM 5000 UNITS: 5000 INJECTION, SOLUTION INTRAVENOUS; SUBCUTANEOUS at 02:03

## 2017-03-08 RX ADMIN — PANTOPRAZOLE SODIUM 600 MG: 40 TABLET, DELAYED RELEASE ORAL at 08:03

## 2017-03-08 RX ADMIN — PANTOPRAZOLE SODIUM 600 MG: 40 TABLET, DELAYED RELEASE ORAL at 02:03

## 2017-03-08 RX ADMIN — CETIRIZINE HYDROCHLORIDE 5 MG: 5 TABLET, FILM COATED ORAL at 02:03

## 2017-03-08 RX ADMIN — ASPIRIN 81 MG CHEWABLE TABLET 81 MG: 81 TABLET CHEWABLE at 02:03

## 2017-03-08 RX ADMIN — IPRATROPIUM BROMIDE AND ALBUTEROL SULFATE 3 ML: .5; 3 SOLUTION RESPIRATORY (INHALATION) at 07:03

## 2017-03-08 RX ADMIN — HYDRALAZINE HYDROCHLORIDE 25 MG: 25 TABLET, FILM COATED ORAL at 09:03

## 2017-03-08 RX ADMIN — SODIUM BICARBONATE 650 MG TABLET 1300 MG: at 02:03

## 2017-03-08 RX ADMIN — HEPARIN SODIUM 5000 UNITS: 5000 INJECTION, SOLUTION INTRAVENOUS; SUBCUTANEOUS at 09:03

## 2017-03-08 RX ADMIN — SODIUM BICARBONATE 650 MG TABLET 1300 MG: at 08:03

## 2017-03-08 RX ADMIN — QUETIAPINE FUMARATE 200 MG: 200 TABLET, FILM COATED ORAL at 10:03

## 2017-03-08 RX ADMIN — FUROSEMIDE 40 MG: 10 INJECTION, SOLUTION INTRAMUSCULAR; INTRAVENOUS at 03:03

## 2017-03-08 RX ADMIN — ALLOPURINOL 100 MG: 100 TABLET ORAL at 08:03

## 2017-03-08 NOTE — H&P
"[facility] Hospital Medicine  History & Physical    Patient Name: Anai Sal  MRN: [id number]  Admission Date: 3/7/2017  Attending Physician: Servando Obrien MD   Primary Care Provider: Live Young MD    [facility] Medicine Team: [facility] HOSP MED E Alma Jhaveri PA-C     Patient information was obtained from patient and ER records.     Subjective:     Principal Problem:Congestive heart failure    Chief Complaint:   Chief Complaint   Patient presents with    Fatigue     Feeling shaky. Having palpitations.        HPI: 70 y/o female who presents to the ED with c/o worsening SOB that has progressed over the past few days. She has a PMH of CAD, CHF, HTN, hypothyroidism, and renal disease.  While in the ED she received 40 mg of lasix IV x1 and breathing treatments.  At this time she states her breathing has improved. However, she does state that she remains mildly SOB. She is tolerating room air with O2 saturations of %.  She denies any significant dietary changes, and states that she does not use salt.  She does state that she drinks "plenty" of water.  She denies HA, chills, nausea, or chest pain at this time. However, she states that at times she does have feeling of dizziness with sudden movement.  BNP on arrival was 1886.      In the ED, BUN / Crt were elevated as well 41/2.0. CXR performed while in the ED suggestive of small right sided pleural effusion without overt signs of pulmonary edema. Her last ECHO was performed 2/8/17 and her EF was down to 25% from 40% December 2016.            Past Medical History:   Diagnosis Date    Breast cancer     CHF (congestive heart failure)     Coronary artery disease     Hypertension     Hazel     Renal disorder     Thyroid disease        Past Surgical History:   Procedure Laterality Date    BREAST SURGERY      HYSTERECTOMY      MASTECTOMY         Review of patient's allergies indicates:  No Known Allergies    No current " facility-administered medications on file prior to encounter.      Current Outpatient Prescriptions on File Prior to Encounter   Medication Sig    allopurinol (ZYLOPRIM) 100 MG tablet Take 100 mg by mouth 2 (two) times daily.     aspirin 81 MG Chew Take 1 tablet (81 mg total) by mouth once daily.    cetirizine (ZYRTEC) 5 MG tablet Take 1 tablet (5 mg total) by mouth once daily.    diclofenac sodium 1 % Gel Apply 4 g topically 4 (four) times daily. Prn pain    fluticasone (FLONASE) 50 mcg/actuation nasal spray 2 sprays by Each Nare route once daily.    furosemide (LASIX) 20 MG tablet Take 3 tablets (60 mg total) by mouth once daily.    gabapentin (NEURONTIN) 300 MG capsule Take 300 mg by mouth 3 (three) times daily as needed (for nerve pain).     guaifenesin (MUCINEX) 600 mg 12 hr tablet Take 1 tablet (600 mg total) by mouth 2 (two) times daily.    levothyroxine (SYNTHROID) 50 MCG tablet Take 1 tablet (50 mcg total) by mouth before breakfast.    melatonin 3 mg Tab Take by mouth every evening.    sodium bicarbonate 650 MG tablet Take 2 tablets (1,300 mg total) by mouth 2 (two) times daily.    valsartan (DIOVAN) 320 MG tablet Take 1 tablet (320 mg total) by mouth once daily.    benztropine (COGENTIN) 0.5 MG tablet Take 1 tablet (0.5 mg total) by mouth nightly.    quetiapine (SEROQUEL) 200 MG Tab Take 1 tablet (200 mg total) by mouth nightly.     Family History     Family history is unknown by patient.        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   HENT: Positive for hearing loss. Negative for congestion and rhinorrhea.    Respiratory:  Positive for chest tightness and shortness of breath. Negative for cough.    Cardiovascular: Positive for leg swelling. Negative for chest pain and palpitations.   Gastrointestinal: Negative for abdominal distention, abdominal pain and constipation.   Endocrine: Negative for cold intolerance and heat intolerance.   Genitourinary: Negative for dysuria, frequency and urgency.   Musculoskeletal: Negative for arthralgias and back pain.   Allergic/Immunologic: Negative for environmental allergies and food allergies.   Neurological: Negative for dizziness, seizures, syncope and light-headedness.     Objective:     Vital Signs (Most Recent):  Temp: 97.8 °F (36.6 °C) (03/08/17 0558)  Pulse: 86 (03/08/17 0558)  Resp: 18 (03/08/17 0558)  BP: 136/86 (03/08/17 0558)  SpO2: 100 % (RA) (03/08/17 0558) Vital Signs (24h Range):  Temp:  [97.8 °F (36.6 °C)-98.5 °F (36.9 °C)] 97.8 °F (36.6 °C)  Pulse:  [] 86  Resp:  [16-24] 18  SpO2:  [94 %-100 %] 100 %  BP: (136-154)/(82-92) 136/86     Weight: 80.7 kg (178 lb)  Body mass index is 27.88 kg/(m^2).    Physical Exam   Constitutional: She is oriented to person, place, and time. She appears well-developed and well-nourished. No distress.   HENT:   Head: Normocephalic and atraumatic.   Eyes: EOM are normal. Pupils are equal, round, and reactive to light.   Neck: Normal range of motion. Neck supple.   Cardiovascular: Normal rate, regular rhythm and normal heart sounds.    No murmur heard.  Pulmonary/Chest: Effort normal. No respiratory distress. She has rales.   Hx of right sided mastectomy    Abdominal: Soft. Bowel sounds are normal. She exhibits no distension. There is no tenderness.   Musculoskeletal: Normal range of motion. She exhibits edema (+2 BLE).   Neurological: She is alert and oriented to person, place, and time.   Skin: Skin is warm and dry. She is not diaphoretic.   Psychiatric: She has a normal mood and affect. Her behavior is normal.   Nursing note and vitals  reviewed.       Significant Labs:   BMP:     Recent Labs  Lab 03/08/17  0020         K 4.6      CO2 21*   BUN 41*   CREATININE 2.0*   CALCIUM 8.9     CBC:     Recent Labs  Lab 03/08/17 0020   WBC 3.89*   HGB 10.9*   HCT 32.6*        Cardiac Markers:     Recent Labs  Lab 03/08/17 0020   BNP 1886*       Significant Imaging: CXR: I have reviewed all pertinent results/findings within the past 24 hours and my personal findings are:  similar to those of the images findings.    Assessment/Plan:     * Congestive heart failure  - lasix 40mg IV BID  - 1500 fluid restriction  - continue to trend troponin which appears to be at her baseline but trending up slightly (0.047-->0.053)  - monitor electrolytes closely and replete as indicated  - repeat EKG consistent with prior      CKD (chronic kidney disease) stage 3, GFR 30-59 ml/min  - trend labs daily BUN / Cr - increased from baseline (1.7-1.8) (2.0)   - hold home does of valsartan for now  - strict I/O's, daily weights        HTN (hypertension)  - hold valsartan for now  - add PRN hydralazine for SBP greater than 160      Primary hypothyroidism  - continue levothyroxine as prescribed  - TSH pending      Chronic gout  - continue home allopurinol     VTE Risk Mitigation         Ordered     heparin (porcine) injection 5,000 Units  Every 8 hours     Route:  Subcutaneous        03/08/17 0902     Medium Risk of VTE  Once      03/08/17 0902     Place JANIA hose  Until discontinued      03/08/17 0902     Place sequential compression device  Until discontinued      03/08/17 0902        Alma Jhaveri PA-C  Department of Hospital Medicine   [facility]  Staff: Dr. Obrien

## 2017-03-08 NOTE — PROVIDER PROGRESS NOTES - EMERGENCY DEPT.
Encounter Date: 3/7/2017    ED Physician Progress Notes       SCRIBE NOTE: I, Tia Ch, am scribing for, and in the presence of,  Dr. Sauceda.  Physician Statement: I, Dr. Sauceda, personally performed the services described in this documentation as scribed by Tia Ch in my presence, and it is both accurate and complete.      EKG - STEMI Decision  Initial Reading: No STEMI present.

## 2017-03-08 NOTE — ASSESSMENT & PLAN NOTE
- lasix 40mg IV BID  - 1500 fluid restriction  - continue to trend troponin which appears to be at her baseline but trending up slightly (0.047-->0.053)  - monitor electrolytes closely and replete as indicated  - repeat EKG

## 2017-03-08 NOTE — PROVIDER PROGRESS NOTES - EMERGENCY DEPT.
Encounter Date: 3/7/2017    ED Physician Progress Notes       SCRIBE NOTE: I, Megan Serrato, am scribing for, and in the presence of,  Dr. Hartmann.  Physician Statement: I, Dr. Hartmann, personally performed the services described in this documentation as scribed by Megan Serrato in my presence, and it is both accurate and complete.     Physician Note:   Time seen by provider: 11:40 PM    This is a 71 y.o. female with PM Hx of HTN, CHF, and renal disorder who presents with complaint of SOB since yesterday. Pt also endorses palpitations, cough productive of mucus, congestion, and chest pain described as tightness. Pt states her blood pressure at home was high. She denies fever, chills, or any other symptoms at this time.     I initially evaluated this patient and ordered workup while in intake.  The patient will receive a full evaluation in an ED pod when space is available.  All results from tests ordered in intake will be not be followed by the intake team, including myself. All results will be followed by the ED Pod team.

## 2017-03-08 NOTE — SUBJECTIVE & OBJECTIVE
Past Medical History:   Diagnosis Date    Breast cancer     CHF (congestive heart failure)     Coronary artery disease     Hypertension     Hazel     Renal disorder     Thyroid disease        Past Surgical History:   Procedure Laterality Date    BREAST SURGERY      HYSTERECTOMY      MASTECTOMY         Review of patient's allergies indicates:  No Known Allergies    No current facility-administered medications on file prior to encounter.      Current Outpatient Prescriptions on File Prior to Encounter   Medication Sig    allopurinol (ZYLOPRIM) 100 MG tablet Take 100 mg by mouth 2 (two) times daily.     aspirin 81 MG Chew Take 1 tablet (81 mg total) by mouth once daily.    cetirizine (ZYRTEC) 5 MG tablet Take 1 tablet (5 mg total) by mouth once daily.    diclofenac sodium 1 % Gel Apply 4 g topically 4 (four) times daily. Prn pain    fluticasone (FLONASE) 50 mcg/actuation nasal spray 2 sprays by Each Nare route once daily.    furosemide (LASIX) 20 MG tablet Take 3 tablets (60 mg total) by mouth once daily.    gabapentin (NEURONTIN) 300 MG capsule Take 300 mg by mouth 3 (three) times daily as needed (for nerve pain).     guaifenesin (MUCINEX) 600 mg 12 hr tablet Take 1 tablet (600 mg total) by mouth 2 (two) times daily.    levothyroxine (SYNTHROID) 50 MCG tablet Take 1 tablet (50 mcg total) by mouth before breakfast.    melatonin 3 mg Tab Take by mouth every evening.    sodium bicarbonate 650 MG tablet Take 2 tablets (1,300 mg total) by mouth 2 (two) times daily.    valsartan (DIOVAN) 320 MG tablet Take 1 tablet (320 mg total) by mouth once daily.    benztropine (COGENTIN) 0.5 MG tablet Take 1 tablet (0.5 mg total) by mouth nightly.    quetiapine (SEROQUEL) 200 MG Tab Take 1 tablet (200 mg total) by mouth nightly.     Family History     Family history is unknown by patient.        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   HENT: Positive for hearing loss. Negative for congestion and rhinorrhea.    Respiratory: Positive for chest tightness and shortness of breath. Negative for cough.    Cardiovascular: Positive for leg swelling. Negative for chest pain and palpitations.   Gastrointestinal: Negative for abdominal distention, abdominal pain and constipation.   Endocrine: Negative for cold intolerance and heat intolerance.   Genitourinary: Negative for dysuria, frequency and urgency.   Musculoskeletal: Negative for arthralgias and back pain.   Allergic/Immunologic: Negative for environmental allergies and food allergies.   Neurological: Negative for dizziness, seizures, syncope and light-headedness.     Objective:     Vital Signs (Most Recent):  Temp: 97.8 °F (36.6 °C) (03/08/17 0558)  Pulse: 86 (03/08/17 0558)  Resp: 18 (03/08/17 0558)  BP: 136/86 (03/08/17 0558)  SpO2: 100 % (RA) (03/08/17 0558) Vital Signs (24h Range):  Temp:  [97.8 °F (36.6 °C)-98.5 °F (36.9 °C)] 97.8 °F (36.6 °C)  Pulse:  [] 86  Resp:  [16-24] 18  SpO2:  [94 %-100 %] 100 %  BP: (136-154)/(82-92) 136/86     Weight: 80.7 kg (178 lb)  Body mass index is 27.88 kg/(m^2).    Physical Exam   Constitutional: She is oriented to person, place, and time. She appears well-developed and well-nourished. No distress.   HENT:   Head: Normocephalic and atraumatic.   Eyes: EOM are normal. Pupils are equal, round, and reactive to light.   Neck: Normal range of motion. Neck supple.   Cardiovascular: Normal rate, regular rhythm and normal heart sounds.    No murmur heard.  Pulmonary/Chest: Effort normal. No respiratory distress. She has rales.   Hx of right sided mastectomy     Abdominal: Soft. Bowel sounds are normal. She exhibits no distension. There is no tenderness.   Musculoskeletal: Normal range of motion. She exhibits edema (+2 BLE).   Neurological: She is alert and oriented to person, place, and time.   Skin: Skin is warm and dry. She is not diaphoretic.   Psychiatric: She has a normal mood and affect. Her behavior is normal.   Nursing note and vitals reviewed.       Significant Labs:   BMP:     Recent Labs  Lab 03/08/17  0020         K 4.6      CO2 21*   BUN 41*   CREATININE 2.0*   CALCIUM 8.9     CBC:     Recent Labs  Lab 03/08/17  0020   WBC 3.89*   HGB 10.9*   HCT 32.6*        Cardiac Markers:     Recent Labs  Lab 03/08/17  0020   BNP 1886*       Significant Imaging: CXR: I have reviewed all pertinent results/findings within the past 24 hours and my personal findings are:  similar to those of the images findings.

## 2017-03-08 NOTE — ED TRIAGE NOTES
Patient states she was lying in bed and started feeling palpitations, SOB, anxious, nausea, and unsure of vomiting.

## 2017-03-08 NOTE — ED PROVIDER NOTES
Encounter Date: 3/7/2017       History     Chief Complaint   Patient presents with    Fatigue     Feeling shaky. Having palpitations.     Review of patient's allergies indicates:  No Known Allergies  HPI Comments: 71-year-old female with a past medical history significant for CAD and CHF presents for the evaluation of shortness of breath.  Patient reports that she has chronic shortness of breath, however it has gotten progressively worse over the past week.  She is no longer able to climb the stairs in her house and has had to move her bed downstairs.  Patient reports she sleeps approximately 3 pillows at night.  She is prescribed 60 mg of Lasix by mouth daily, and her son reports that the sister keeps track of her medications and make sure that she takes each of them daily.  Patient does state that when she gets short of breath sometimes she [facility], however she was unable to today.  After 1 breathing treatment here in the ER she is not improved.  She reports that she does have chest pain occasionally with shortness of breath episodes, however she is not currently short of breath.  She reports generalized fatigue and chills.  Shortness of breath associated with a cough.    The history is provided by the patient. No  was used.     Past Medical History:   Diagnosis Date    Breast cancer     CHF (congestive heart failure)     Coronary artery disease     Hypertension     Hazel     Renal disorder     Thyroid disease      Past Surgical History:   Procedure Laterality Date    BREAST SURGERY      HYSTERECTOMY      MASTECTOMY       Family History   Problem Relation Age of Onset    Family history unknown: Yes     Social History   Substance Use Topics    Smoking status: Former Smoker     Packs/day: 2.00     Years: 20.00     Types: Cigarettes     Quit date: 2/16/1997    Smokeless tobacco: Not on file    Alcohol use No      Comment: alcoholic 20  yrs ago     Review of Systems   Constitutional: Positive for activity change (decreased), chills and fatigue. Negative for unexpected weight change.   HENT: Negative for facial swelling.    Eyes: Negative for pain.   Respiratory: Positive for cough and shortness of breath. Negative for choking and stridor.    Cardiovascular: Positive for palpitations and leg swelling.   Gastrointestinal: Negative for abdominal distention, abdominal pain, nausea and vomiting.   Genitourinary: Negative for difficulty urinating and hematuria.   Musculoskeletal: Negative for neck stiffness.   Skin: Negative for rash.   Neurological: Negative for syncope and facial asymmetry.   Hematological: Negative.    Psychiatric/Behavioral: Negative.        Physical Exam   Initial Vitals   BP Pulse Resp Temp SpO2   03/07/17 2034 03/07/17 2034 03/07/17 2034 03/07/17 2034 03/07/17 2034   148/82 100 16 98.5 °F (36.9 °C) 97 %     Physical Exam    Nursing note and vitals reviewed.  Constitutional: She appears well-developed. She is not diaphoretic.   HENT:   Head: Normocephalic and atraumatic.   Right Ear: External ear normal.   Left Ear: External ear normal.   edentulous   Eyes: Right eye exhibits no discharge. Left eye exhibits no discharge.   Neck: No tracheal deviation present.   Cardiovascular: Normal rate and regular rhythm.   1+ edema of BLE   Pulmonary/Chest: No respiratory distress. She has rales (R>L). She exhibits no tenderness.   Abdominal: Soft. Bowel sounds are normal. There is no tenderness.   Musculoskeletal: She exhibits no edema.   Neurological: She is alert and oriented to person, place, and time. She has normal strength.   Skin: Skin is warm and dry. No erythema. No pallor.   Psychiatric: She has a normal mood and affect. Thought content normal.         ED Course   Procedures  Labs Reviewed   CBC W/ AUTO DIFFERENTIAL - Abnormal; Notable for the following:        Result Value    WBC 3.89 (*)     RBC 3.38 (*)     Hemoglobin 10.9 (*)      Hematocrit 32.6 (*)     MCH 32.2 (*)     All other components within normal limits   COMPREHENSIVE METABOLIC PANEL - Abnormal; Notable for the following:     CO2 21 (*)     BUN, Bld 41 (*)     Creatinine 2.0 (*)     Albumin 3.3 (*)     Anion Gap 7 (*)     eGFR if  28.3 (*)     eGFR if non  24.6 (*)     All other components within normal limits   B-TYPE NATRIURETIC PEPTIDE - Abnormal; Notable for the following:     BNP 1886 (*)     All other components within normal limits   TROPONIN I - Abnormal; Notable for the following:     Troponin I 0.047 (*)     All other components within normal limits   INFLUENZA A AND B ANTIGEN   TROPONIN I     EKG Readings: (Independently Interpreted)   Sinus, no acute st elevation, no ectopy, lateral st depression    Repeat ekg:  Sinus, no acute st elevation, no ectopy, lateral st depression       X-Rays:   Independently Interpreted Readings:   Other Readings:  Cxr:  Mild congestive changes, no consolidation, no ptx.     Medical Decision Making:   Initial Assessment:   71-year-old female presents for emergent evaluation of shortness of breath  Differential Diagnosis:   CHF exacerbation, pneumonia, bronchitis, flu, COPD, asthma, ACS, pneumothorax  ED Management:  Labwork was ordered in triage.  Chest x-ray shows a right sided pleural effusion.  Troponin is elevated, however chart review symptoms chronically elevated.  The patient's BNP is over 1800, which is twice the value when she was last Inpatient for a CHF exacerbation. Assessment after nebulizer treatment shows only mildly improved symptoms.  We will administer 60 mg of IV Lasix and consult medicine for an observation stay for CHF exacerbation.  Patient's vital signs are stable and oxygen saturation is 98% on room air at this time.  We'll continue to monitor.  Case discussed with Dr. Taylor.              Attending Attestation:   Physician Attestation Statement for Resident:  As the supervising MD    Physician Attestation Statement: I have personally seen and examined this patient.   I agree with the above history. -:   As the supervising MD I agree with the above PE.   -: Bibasilar crackles, scattered wheezes, speaking full sentences  Rrr, nl s1/2  abd soft, ntnd  2+ edema, symmetric  aox3   As the supervising MD I agree with the above treatment, course, plan, and disposition.  I have reviewed and agree with the residents interpretation of the following: lab data, x-rays and EKG.  I have reviewed the following: old records at this facility.                    ED Course     Clinical Impression:   The primary encounter diagnosis was Congestive heart failure, unspecified congestive heart failure chronicity, unspecified congestive heart failure type. Diagnoses of SOB (shortness of breath), Essential hypertension, CKD (chronic kidney disease) stage 3, GFR 30-59 ml/min, and Elevated troponin were also pertinent to this visit.          Lit Urban MD  Resident  03/08/17 0400       Shady Tyalor MD  03/08/17 0531       Shady Taylor MD  03/08/17 0542

## 2017-03-08 NOTE — ASSESSMENT & PLAN NOTE
- trend labs daily BUN / Crt increased from baseline (1.7-1.8) (2.0)   - hold home does of valsartan for now  - strict I/O's

## 2017-03-09 LAB
ALBUMIN SERPL BCP-MCNC: 2.9 G/DL
ALP SERPL-CCNC: 95 U/L
ALT SERPL W/O P-5'-P-CCNC: 26 U/L
ANION GAP SERPL CALC-SCNC: 10 MMOL/L
AST SERPL-CCNC: 33 U/L
BACTERIA #/AREA URNS AUTO: NORMAL /HPF
BASOPHILS # BLD AUTO: 0.02 K/UL
BASOPHILS NFR BLD: 0.6 %
BILIRUB SERPL-MCNC: 1.2 MG/DL
BILIRUB UR QL STRIP: NEGATIVE
BUN SERPL-MCNC: 42 MG/DL
CALCIUM SERPL-MCNC: 8.6 MG/DL
CHLORIDE SERPL-SCNC: 109 MMOL/L
CLARITY UR REFRACT.AUTO: CLEAR
CO2 SERPL-SCNC: 21 MMOL/L
COLOR UR AUTO: YELLOW
CREAT SERPL-MCNC: 1.8 MG/DL
DIFFERENTIAL METHOD: ABNORMAL
EOSINOPHIL # BLD AUTO: 0.1 K/UL
EOSINOPHIL NFR BLD: 2.8 %
ERYTHROCYTE [DISTWIDTH] IN BLOOD BY AUTOMATED COUNT: 14.4 %
EST. GFR  (AFRICAN AMERICAN): 32.2 ML/MIN/1.73 M^2
EST. GFR  (NON AFRICAN AMERICAN): 27.9 ML/MIN/1.73 M^2
GLUCOSE SERPL-MCNC: 92 MG/DL
GLUCOSE UR QL STRIP: NEGATIVE
HCT VFR BLD AUTO: 29.4 %
HGB BLD-MCNC: 10 G/DL
HGB UR QL STRIP: NEGATIVE
HYALINE CASTS UR QL AUTO: 0 /LPF
KETONES UR QL STRIP: NEGATIVE
LEUKOCYTE ESTERASE UR QL STRIP: NEGATIVE
LYMPHOCYTES # BLD AUTO: 1.2 K/UL
LYMPHOCYTES NFR BLD: 32.5 %
MCH RBC QN AUTO: 32.1 PG
MCHC RBC AUTO-ENTMCNC: 34 %
MCV RBC AUTO: 94 FL
MICROSCOPIC COMMENT: NORMAL
MONOCYTES # BLD AUTO: 0.4 K/UL
MONOCYTES NFR BLD: 10.9 %
NEUTROPHILS # BLD AUTO: 1.9 K/UL
NEUTROPHILS NFR BLD: 52.6 %
NITRITE UR QL STRIP: NEGATIVE
PH UR STRIP: 6 [PH] (ref 5–8)
PLATELET # BLD AUTO: 151 K/UL
PMV BLD AUTO: 12.2 FL
POTASSIUM SERPL-SCNC: 3.7 MMOL/L
PROCALCITONIN SERPL IA-MCNC: <0.09 NG/ML
PROT SERPL-MCNC: 6.9 G/DL
PROT UR QL STRIP: ABNORMAL
RBC # BLD AUTO: 3.12 M/UL
RBC #/AREA URNS AUTO: 1 /HPF (ref 0–4)
SODIUM SERPL-SCNC: 140 MMOL/L
SP GR UR STRIP: 1.01 (ref 1–1.03)
URN SPEC COLLECT METH UR: ABNORMAL
UROBILINOGEN UR STRIP-ACNC: NEGATIVE EU/DL
WBC # BLD AUTO: 3.57 K/UL
WBC #/AREA URNS AUTO: 1 /HPF (ref 0–5)

## 2017-03-09 PROCEDURE — 80053 COMPREHEN METABOLIC PANEL: CPT

## 2017-03-09 PROCEDURE — 36415 COLL VENOUS BLD VENIPUNCTURE: CPT

## 2017-03-09 PROCEDURE — 25000003 PHARM REV CODE 250: Performed by: NURSE PRACTITIONER

## 2017-03-09 PROCEDURE — 94640 AIRWAY INHALATION TREATMENT: CPT

## 2017-03-09 PROCEDURE — 84145 PROCALCITONIN (PCT): CPT

## 2017-03-09 PROCEDURE — 99232 SBSQ HOSP IP/OBS MODERATE 35: CPT | Mod: ,,, | Performed by: PHYSICIAN ASSISTANT

## 2017-03-09 PROCEDURE — 63600175 PHARM REV CODE 636 W HCPCS: Performed by: NURSE PRACTITIONER

## 2017-03-09 PROCEDURE — 94761 N-INVAS EAR/PLS OXIMETRY MLT: CPT

## 2017-03-09 PROCEDURE — 63600175 PHARM REV CODE 636 W HCPCS: Performed by: PHYSICIAN ASSISTANT

## 2017-03-09 PROCEDURE — 81001 URINALYSIS AUTO W/SCOPE: CPT

## 2017-03-09 PROCEDURE — 25000003 PHARM REV CODE 250: Performed by: HOSPITALIST

## 2017-03-09 PROCEDURE — 25000003 PHARM REV CODE 250: Performed by: PHYSICIAN ASSISTANT

## 2017-03-09 PROCEDURE — 11000001 HC ACUTE MED/SURG PRIVATE ROOM

## 2017-03-09 PROCEDURE — 25000242 PHARM REV CODE 250 ALT 637 W/ HCPCS: Performed by: NURSE PRACTITIONER

## 2017-03-09 PROCEDURE — 85025 COMPLETE CBC W/AUTO DIFF WBC: CPT

## 2017-03-09 PROCEDURE — 25000003 PHARM REV CODE 250: Performed by: INTERNAL MEDICINE

## 2017-03-09 RX ORDER — CARVEDILOL 12.5 MG/1
12.5 TABLET ORAL 2 TIMES DAILY
Status: DISCONTINUED | OUTPATIENT
Start: 2017-03-09 | End: 2017-03-10

## 2017-03-09 RX ORDER — RAMELTEON 8 MG/1
8 TABLET ORAL NIGHTLY PRN
Status: DISCONTINUED | OUTPATIENT
Start: 2017-03-09 | End: 2017-03-16 | Stop reason: HOSPADM

## 2017-03-09 RX ORDER — PREDNISONE 20 MG/1
60 TABLET ORAL DAILY
Status: DISCONTINUED | OUTPATIENT
Start: 2017-03-09 | End: 2017-03-12

## 2017-03-09 RX ORDER — POTASSIUM CHLORIDE 20 MEQ/1
20 TABLET, EXTENDED RELEASE ORAL DAILY
Status: DISCONTINUED | OUTPATIENT
Start: 2017-03-09 | End: 2017-03-10

## 2017-03-09 RX ADMIN — IPRATROPIUM BROMIDE AND ALBUTEROL SULFATE 3 ML: .5; 3 SOLUTION RESPIRATORY (INHALATION) at 05:03

## 2017-03-09 RX ADMIN — POTASSIUM CHLORIDE 20 MEQ: 1500 TABLET, EXTENDED RELEASE ORAL at 09:03

## 2017-03-09 RX ADMIN — PANTOPRAZOLE SODIUM 600 MG: 40 TABLET, DELAYED RELEASE ORAL at 10:03

## 2017-03-09 RX ADMIN — BENZTROPINE MESYLATE 0.5 MG: 0.5 TABLET ORAL at 12:03

## 2017-03-09 RX ADMIN — BENZTROPINE MESYLATE 0.5 MG: 0.5 TABLET ORAL at 10:03

## 2017-03-09 RX ADMIN — SODIUM BICARBONATE 650 MG TABLET 1300 MG: at 10:03

## 2017-03-09 RX ADMIN — SODIUM BICARBONATE 650 MG TABLET 1300 MG: at 09:03

## 2017-03-09 RX ADMIN — ALLOPURINOL 100 MG: 100 TABLET ORAL at 10:03

## 2017-03-09 RX ADMIN — IPRATROPIUM BROMIDE AND ALBUTEROL SULFATE 3 ML: .5; 3 SOLUTION RESPIRATORY (INHALATION) at 09:03

## 2017-03-09 RX ADMIN — LEVOTHYROXINE SODIUM 50 MCG: 50 TABLET ORAL at 06:03

## 2017-03-09 RX ADMIN — FUROSEMIDE 40 MG: 10 INJECTION, SOLUTION INTRAMUSCULAR; INTRAVENOUS at 05:03

## 2017-03-09 RX ADMIN — PANTOPRAZOLE SODIUM 600 MG: 40 TABLET, DELAYED RELEASE ORAL at 09:03

## 2017-03-09 RX ADMIN — IPRATROPIUM BROMIDE AND ALBUTEROL SULFATE 3 ML: .5; 3 SOLUTION RESPIRATORY (INHALATION) at 12:03

## 2017-03-09 RX ADMIN — ASPIRIN 81 MG CHEWABLE TABLET 81 MG: 81 TABLET CHEWABLE at 09:03

## 2017-03-09 RX ADMIN — FLUTICASONE PROPIONATE 2 SPRAY: 50 SPRAY, METERED NASAL at 11:03

## 2017-03-09 RX ADMIN — IPRATROPIUM BROMIDE AND ALBUTEROL SULFATE 3 ML: .5; 3 SOLUTION RESPIRATORY (INHALATION) at 08:03

## 2017-03-09 RX ADMIN — CARVEDILOL 12.5 MG: 12.5 TABLET, FILM COATED ORAL at 10:03

## 2017-03-09 RX ADMIN — PREDNISONE 60 MG: 20 TABLET ORAL at 09:03

## 2017-03-09 RX ADMIN — HEPARIN SODIUM 5000 UNITS: 5000 INJECTION, SOLUTION INTRAVENOUS; SUBCUTANEOUS at 05:03

## 2017-03-09 RX ADMIN — CETIRIZINE HYDROCHLORIDE 5 MG: 5 TABLET, FILM COATED ORAL at 09:03

## 2017-03-09 RX ADMIN — ALLOPURINOL 100 MG: 100 TABLET ORAL at 09:03

## 2017-03-09 RX ADMIN — HEPARIN SODIUM 5000 UNITS: 5000 INJECTION, SOLUTION INTRAVENOUS; SUBCUTANEOUS at 10:03

## 2017-03-09 RX ADMIN — RAMELTEON 8 MG: 8 TABLET, FILM COATED ORAL at 10:03

## 2017-03-09 NOTE — PROGRESS NOTES
Pt only PIV infiltrated. Charge nurse and RN stuck pt 5 times. ICU nurse Stuck pt 1 time. Unable to get access. MD notified. Pt has no IV medications. Asked MD if wanting to put in PICC consult. MD Hui stated hold off on it and will talk to day team about it.

## 2017-03-09 NOTE — ED NOTES
Attempted to call report, nurse unable to take report at this time. States she or the charge nurse will call back.

## 2017-03-09 NOTE — PROGRESS NOTES
"[facility] Hospital Medicine  Progress Note    Patient Name: Anai Sal  MRN: [id number]  Patient Class: OP- Observation   Admission Date: 3/7/2017  Length of Stay: 0 days  Attending Physician: Servando Obrien MD  Primary Care Provider: Live Young MD    [facility] Medicine Team: [facility] MED E Alma Jhaveri PA-C    Subjective:     Principal Problem:Congestive heart failure    HPI:  72 y/o female who presents to the ED with c/o worsening SOB that has progressed over the past few days. She has a PMH of CAD, CHF, HTN, hypothyroidism, and renal disease.  While in the ED she received 40 mg of lasix IV x1 and breathing treatments.  At this time she states her breathing has improved. However, she does state that she remains mildly SOB. She is tolerating room air with O2 saturations of %.  She denies any significant dietary changes, and states that she does not use salt.  She does state that she drinks "plenty" of water.  She denies HA, chills, nausea, or chest pain at this time. However, she states that at times she does have feeling of dizziness with sudden movement.  BNP on arrival was 1886.  Her BUN / Crt were elevated as well 41/2.0. CXR performed while in the ED suggestive of small right sided pleural effusion without overt signs of pulmonary edema. Her last ECHO was performed 2/8/17 and her EF was down to 25% from 40% which was done in December 2016.            Hospital Course:  Admitted for worsening SOB and exacerbation of CHF, She is receiving IV furosemide and edema is improving.  She remains with C/O SOB despite breathing treatments and diuresis.  She remains somewhat labored with a slight wheeze noted today - prednisone started. Repeat ECHO pending.  Family states that she was restless and agitated despite receiving her home dose of Seroquel.  U/A pending     Interval History: No acute events overnight. Reports continued shortness of breath, but not requiring supplemental " oxygen and with good oxygen saturation. Daughter reported some confusion overnight, patient alert and oriented on exam this morning. Will check UA to rule out other infectious process.    Review of Systems   Constitutional: Negative for chills and fever.   Respiratory: Positive for chest tightness and shortness of breath. Negative for cough.    Cardiovascular: Positive for leg swelling. Negative for chest pain and palpitations.   Gastrointestinal: Negative for abdominal distention and abdominal pain.   Genitourinary: Negative for dysuria and frequency.   Musculoskeletal: Negative for arthralgias and back pain.   Neurological: Negative for dizziness and light-headedness.   Psychiatric/Behavioral: Positive for sleep disturbance.     Objective:     Vital Signs (Most Recent):  Temp: 98.5 °F (36.9 °C) (03/09/17 0800)  Pulse: 86 (03/09/17 1100)  Resp: 12 (03/09/17 0842)  BP: (!) 138/91 (03/09/17 0800)  SpO2: 96 % (03/09/17 0842) Vital Signs (24h Range):  Temp:  [98.5 °F (36.9 °C)] 98.5 °F (36.9 °C)  Pulse:  [86-98] 86  Resp:  [12-30] 12  SpO2:  [96 %-99 %] 96 %  BP: (138-161)/(81-98) 138/91     Weight: 80.7 kg (178 lb)  Body mass index is 27.88 kg/(m^2).    Intake/Output Summary (Last 24 hours) at 03/09/17 1124  Last data filed at 03/09/17 0900   Gross per 24 hour   Intake              180 ml   Output              250 ml   Net              -70 ml      Physical Exam   Constitutional: She is oriented to person, place, and time. She appears well-developed and well-nourished. No distress.   Cardiovascular: Normal rate, regular rhythm and normal heart sounds.    No murmur heard.  Pulmonary/Chest: She has wheezes. She has no rales.   Hx of right sided mastectomy, mild use of accessory muscles.   Abdominal: Soft. Bowel sounds are normal. She exhibits no distension. There is no tenderness.   Musculoskeletal: Normal range of motion. She exhibits edema (+1 BLE).   Neurological: She is alert and oriented to person, place, and time.    Skin: Skin is warm and dry. She is not diaphoretic.   Psychiatric: She has a normal mood and affect.   Nursing note and vitals reviewed.      Significant Labs:   BMP:   Recent Labs  Lab 03/08/17 1959 03/09/17 0351   GLU  --  92   NA  --  140   K  --  3.7   CL  --  109   CO2  --  21*   BUN  --  42*   CREATININE  --  1.8*   CALCIUM  --  8.6*   MG 2.0  --      CBC:   Recent Labs  Lab 03/08/17 0020 03/09/17 0351   WBC 3.89* 3.57*   HGB 10.9* 10.0*   HCT 32.6* 29.4*    151     Cardiac Markers:   Recent Labs  Lab 03/08/17 0020   BNP 1886*       Significant Imaging: I have reviewed all pertinent imaging results/findings within the past 24 hours.    Assessment/Plan:      * Congestive heart failure  - remains SOB despite reduction in edema  - repeat ECHO pending  - Will start carvedilol 12.5 mg BID as patient not currently on BB or ACE and BP mildly elevated   - continue lasix 40mg IV BID, will start PO prednisone daily as well  - 1500 fluid restriction  - monitor electrolytes closely and replete as indicated      CKD (chronic kidney disease) stage 3, GFR 30-59 ml/min  -continue to trend labs daily - Crt trending down near baseline   - hold home does of valsartan for now  - strict I/O's      Insomnia  - Agitated overnight with inability to sleep  - continue home Seroquel dosing      Essential hypertension  - BP stable   - continue to hold valsartan  - add PRN hydralazine for SBP greater than 160      Primary hypothyroidism  - TSH 16,692 - T4 1.10  - continue levothyroxine as prescribed       Chronic gout  - no active flares   - continue home allopurinol     VTE Risk Mitigation         Ordered     heparin (porcine) injection 5,000 Units  Every 8 hours     Route:  Subcutaneous        03/08/17 0902     Medium Risk of VTE  Once      03/08/17 0902     Place JANIA hose  Until discontinued      03/08/17 0902     Place sequential compression device  Until discontinued      03/08/17 0902          Alma Jhaveri,  JESSICA  Department of Hospital Medicine   [facility]  Staff: Dr. Obrien

## 2017-03-09 NOTE — PLAN OF CARE
Problem: Patient Care Overview  Goal: Plan of Care Review  Outcome: Ongoing (interventions implemented as appropriate)  Lasix 40 mg BID IVP. Troponin positive x3 in ED. 1.5L fluid restriction. No IV access- see previous note.Plan of care reviewed with patient verbalized understanding. Pt has no complaints of any pain or SOB. VSS. Right masectomy-limb alert placed. Fall precaution in place. No acute events overnight. Will continue to monitor

## 2017-03-09 NOTE — PROGRESS NOTES
Pt had no concept of POC    Pt educated on purpose of lasix (to remove excess fluid). Pt educated to pee and Hat so we can measure her urine and keep track of urine output. Pt educated on fluid restriction. Pt and family at bedside verbalized understanding.

## 2017-03-09 NOTE — ASSESSMENT & PLAN NOTE
- rest and agitated overnight with inability to sleep  - continue home Seroquel dosing   - will consider adding sleep aid tomorrow if unable to rest now that she has been admitted to the floor.

## 2017-03-09 NOTE — ASSESSMENT & PLAN NOTE
- remains SOB despite reduction in edema  - repeat ECHO    - continue lasix 40mg IV BID  - 1500 fluid restriction  - monitor electrolytes closely and replete as indicated

## 2017-03-09 NOTE — ASSESSMENT & PLAN NOTE
-continue to trend labs daily - Crt trending down near baseline   - hold home does of valsartan for now  - strict I/O's

## 2017-03-09 NOTE — PROGRESS NOTES
Patient transferred to CSU. Patient arrived to floor via stretcher with transport, no evidence of distress. Patient placed on tele. Vital signs obtained. Patient voices no complaints at this time. Plan of care initiated with patient. Bed in lowest position, locked, SR up x2, call bell in reach. Will continue to monitor patient.

## 2017-03-09 NOTE — SUBJECTIVE & OBJECTIVE
Interval History: No reported events overnight, remains with c/o SOB    Review of Systems   Constitutional: Negative for chills and fever.   HENT: Positive for hearing loss. Negative for congestion and rhinorrhea.    Eyes: Negative for visual disturbance.   Respiratory: Positive for chest tightness and shortness of breath. Negative for cough.    Cardiovascular: Positive for leg swelling. Negative for chest pain and palpitations.   Gastrointestinal: Negative for abdominal distention and abdominal pain.   Endocrine: Negative for cold intolerance and heat intolerance.   Genitourinary: Negative for dysuria and frequency.   Musculoskeletal: Negative for arthralgias and back pain.   Skin: Negative for rash.   Allergic/Immunologic: Negative for environmental allergies and food allergies.   Neurological: Negative for dizziness and light-headedness.   Hematological: Does not bruise/bleed easily.   Psychiatric/Behavioral: Positive for sleep disturbance.     Objective:     Vital Signs (Most Recent):  Temp: 98.5 °F (36.9 °C) (03/09/17 0800)  Pulse: 86 (03/09/17 1100)  Resp: 12 (03/09/17 0842)  BP: (!) 138/91 (03/09/17 0800)  SpO2: 96 % (03/09/17 0842) Vital Signs (24h Range):  Temp:  [98.5 °F (36.9 °C)] 98.5 °F (36.9 °C)  Pulse:  [86-98] 86  Resp:  [12-30] 12  SpO2:  [96 %-99 %] 96 %  BP: (138-161)/(81-98) 138/91     Weight: 80.7 kg (178 lb)  Body mass index is 27.88 kg/(m^2).    Intake/Output Summary (Last 24 hours) at 03/09/17 1124  Last data filed at 03/09/17 0900   Gross per 24 hour   Intake              180 ml   Output              250 ml   Net              -70 ml      Physical Exam   Constitutional: She is oriented to person, place, and time. She appears well-developed and well-nourished. No distress.   HENT:   Head: Normocephalic and atraumatic.   Eyes: EOM are normal.   Neck: Normal range of motion. Neck supple.   Cardiovascular: Normal rate, regular rhythm and normal heart sounds.    No murmur heard.  Pulmonary/Chest:  She has wheezes. She has no rales.   Hx of right sided mastectomy, mild use of accessory muscles.   Abdominal: Soft. Bowel sounds are normal. She exhibits no distension. There is no tenderness.   Musculoskeletal: Normal range of motion. She exhibits edema (+1 BLE).   Neurological: She is alert and oriented to person, place, and time.   Skin: Skin is warm and dry. She is not diaphoretic.   Psychiatric: She has a normal mood and affect.   Nursing note and vitals reviewed.      Significant Labs:   BMP:   Recent Labs  Lab 03/08/17 1959 03/09/17  0351   GLU  --  92   NA  --  140   K  --  3.7   CL  --  109   CO2  --  21*   BUN  --  42*   CREATININE  --  1.8*   CALCIUM  --  8.6*   MG 2.0  --      CBC:   Recent Labs  Lab 03/08/17 0020 03/09/17  0351   WBC 3.89* 3.57*   HGB 10.9* 10.0*   HCT 32.6* 29.4*    151     Cardiac Markers:   Recent Labs  Lab 03/08/17  0020   BNP 1886*       Significant Imaging: I have reviewed all pertinent imaging results/findings within the past 24 hours.

## 2017-03-09 NOTE — PROGRESS NOTES
Patient is enrolled in PACE Program (PACE [facility]) for care management. Pt is not eligible for enrollment in [facility] HF Program. Dietary consult placed for education and reinforcement of a low sodium (1500mg/day) + fluid restricted (50oz/day) diet.     Removed from list.

## 2017-03-09 NOTE — ED NOTES
Spoke with HELEN Harris about patient requesting seroquel. Patient takes at home nightly to help her sleep. MD to review chart.

## 2017-03-09 NOTE — PLAN OF CARE
Live Young MD  [address] [facility] [address]    Future Appointments  Date Time Provider Department Center   3/17/2017 1:00 PM NOM MAMMO3 DX [facility] MAMMO [facility]   3/22/2017 10:30 AM AUDIOGRAM, AUDIO [facility] AUDIO [facility]   3/22/2017 11:15 AM Negro Norris MD [facility] ENT [facility]   4/11/2017 7:15 AM NUCLEAR CAMERA, [facility] NOM NUC CAM [facility]   5/1/2017 10:30 AM LAB, APPOINTMENT [facility] LAB VNP [facility]   5/1/2017 10:35 AM SPECIMEN, [facility] SPECLAB [facility]   5/10/2017 11:40 AM Bud Benoit MD [facility] NEPHRO [facility]         [facility] [place] - [address]  [address]  [address]  [facility] [address]  Phone: [phone] Fax: [phone]      Payor: [employer] / Plan: PACE [facility] / Product Type: Medicare Advantage /        03/09/17 1427   Discharge Assessment   Assessment Type Discharge Planning Assessment   Confirmed/corrected address and phone number on facesheet? Yes   Assessment information obtained from? Patient   Expected Length of Stay (days) 3   Prior to hospitilization cognitive status: Alert/Oriented   Prior to hospitalization functional status: Assistive Equipment   Current cognitive status: Alert/Oriented   Current Functional Status: Assistive Equipment   Arrived From (PACE patient)   Lives With child(kylie), adult   Able to Return to Prior Arrangements yes   Is patient able to care for self after discharge? Yes   Who are your caregiver(s) and their phone number(s)? Treshone Turner  daughter [phone]   Patient's perception of discharge disposition (home with family resume PACE  program)   Equipment Currently Used at Home bath bench;cane, straight;walker, rolling   Does the patient have transportation to healthcare appointments? Yes   Transportation Available family or friend will provide   On Dialysis? No   Discharge Plan A Home with family  (resume PACE )   Patient/Family In  Agreement With Plan yes

## 2017-03-09 NOTE — PROGRESS NOTES
11 beat run of VT. Pt stating she just went to the bathroom. Pt denies any pain. MD Hui notified. No orders at this time. Will wait for morning labs to be drawn. Will continue to monitor.

## 2017-03-09 NOTE — PLAN OF CARE
Sw covering SANDRA today 3/9/2017. Sw ready to assist in discharge planning once discharge needs are known.     Halima Graham, CAROLINE [phone]

## 2017-03-10 LAB
ALBUMIN SERPL BCP-MCNC: 3 G/DL
ALP SERPL-CCNC: 94 U/L
ALT SERPL W/O P-5'-P-CCNC: 24 U/L
ANION GAP SERPL CALC-SCNC: 9 MMOL/L
AST SERPL-CCNC: 30 U/L
BASOPHILS # BLD AUTO: 0 K/UL
BASOPHILS NFR BLD: 0 %
BILIRUB SERPL-MCNC: 1.4 MG/DL
BNP SERPL-MCNC: 3140 PG/ML
BUN SERPL-MCNC: 45 MG/DL
CALCIUM SERPL-MCNC: 9 MG/DL
CHLORIDE SERPL-SCNC: 109 MMOL/L
CO2 SERPL-SCNC: 23 MMOL/L
CREAT SERPL-MCNC: 2 MG/DL
DIASTOLIC DYSFUNCTION: YES
DIFFERENTIAL METHOD: ABNORMAL
EOSINOPHIL # BLD AUTO: 0 K/UL
EOSINOPHIL NFR BLD: 0 %
ERYTHROCYTE [DISTWIDTH] IN BLOOD BY AUTOMATED COUNT: 14.5 %
EST. GFR  (AFRICAN AMERICAN): 28.3 ML/MIN/1.73 M^2
EST. GFR  (NON AFRICAN AMERICAN): 24.6 ML/MIN/1.73 M^2
ESTIMATED PA SYSTOLIC PRESSURE: 45
GLUCOSE SERPL-MCNC: 114 MG/DL
HCT VFR BLD AUTO: 28.9 %
HGB BLD-MCNC: 10.1 G/DL
LYMPHOCYTES # BLD AUTO: 0.5 K/UL
LYMPHOCYTES NFR BLD: 18.2 %
MCH RBC QN AUTO: 32.4 PG
MCHC RBC AUTO-ENTMCNC: 34.9 %
MCV RBC AUTO: 93 FL
MITRAL VALVE REGURGITATION: ABNORMAL
MONOCYTES # BLD AUTO: 0.2 K/UL
MONOCYTES NFR BLD: 8.6 %
NEUTROPHILS # BLD AUTO: 2 K/UL
NEUTROPHILS NFR BLD: 72.8 %
PLATELET # BLD AUTO: 178 K/UL
PMV BLD AUTO: 12.2 FL
POTASSIUM SERPL-SCNC: 4.7 MMOL/L
PROT SERPL-MCNC: 7.1 G/DL
RBC # BLD AUTO: 3.12 M/UL
RETIRED EF AND QEF - SEE NOTES: 20 (ref 55–65)
SODIUM SERPL-SCNC: 141 MMOL/L
TRICUSPID VALVE REGURGITATION: ABNORMAL
TROPONIN I SERPL DL<=0.01 NG/ML-MCNC: 0.05 NG/ML
WBC # BLD AUTO: 2.69 K/UL

## 2017-03-10 PROCEDURE — 36415 COLL VENOUS BLD VENIPUNCTURE: CPT

## 2017-03-10 PROCEDURE — 63600175 PHARM REV CODE 636 W HCPCS: Performed by: PHYSICIAN ASSISTANT

## 2017-03-10 PROCEDURE — 25000003 PHARM REV CODE 250: Performed by: PHYSICIAN ASSISTANT

## 2017-03-10 PROCEDURE — G8987 SELF CARE CURRENT STATUS: HCPCS | Mod: CK

## 2017-03-10 PROCEDURE — 63600175 PHARM REV CODE 636 W HCPCS: Performed by: NURSE PRACTITIONER

## 2017-03-10 PROCEDURE — 97161 PT EVAL LOW COMPLEX 20 MIN: CPT

## 2017-03-10 PROCEDURE — 93306 TTE W/DOPPLER COMPLETE: CPT | Mod: 26,,, | Performed by: INTERNAL MEDICINE

## 2017-03-10 PROCEDURE — 93306 TTE W/DOPPLER COMPLETE: CPT

## 2017-03-10 PROCEDURE — 97166 OT EVAL MOD COMPLEX 45 MIN: CPT

## 2017-03-10 PROCEDURE — 83880 ASSAY OF NATRIURETIC PEPTIDE: CPT

## 2017-03-10 PROCEDURE — 99232 SBSQ HOSP IP/OBS MODERATE 35: CPT | Mod: ,,, | Performed by: PHYSICIAN ASSISTANT

## 2017-03-10 PROCEDURE — 94640 AIRWAY INHALATION TREATMENT: CPT

## 2017-03-10 PROCEDURE — G8988 SELF CARE GOAL STATUS: HCPCS | Mod: CJ

## 2017-03-10 PROCEDURE — 25000003 PHARM REV CODE 250: Performed by: HOSPITALIST

## 2017-03-10 PROCEDURE — 11000001 HC ACUTE MED/SURG PRIVATE ROOM

## 2017-03-10 PROCEDURE — 85025 COMPLETE CBC W/AUTO DIFF WBC: CPT

## 2017-03-10 PROCEDURE — 25000242 PHARM REV CODE 250 ALT 637 W/ HCPCS: Performed by: NURSE PRACTITIONER

## 2017-03-10 PROCEDURE — 25000003 PHARM REV CODE 250: Performed by: NURSE PRACTITIONER

## 2017-03-10 PROCEDURE — 80053 COMPREHEN METABOLIC PANEL: CPT

## 2017-03-10 PROCEDURE — 84484 ASSAY OF TROPONIN QUANT: CPT

## 2017-03-10 RX ORDER — QUETIAPINE FUMARATE 200 MG/1
200 TABLET, FILM COATED ORAL
Status: DISCONTINUED | OUTPATIENT
Start: 2017-03-10 | End: 2017-03-16 | Stop reason: HOSPADM

## 2017-03-10 RX ORDER — FUROSEMIDE 10 MG/ML
60 INJECTION INTRAMUSCULAR; INTRAVENOUS 2 TIMES DAILY
Status: DISCONTINUED | OUTPATIENT
Start: 2017-03-10 | End: 2017-03-11

## 2017-03-10 RX ORDER — ATORVASTATIN CALCIUM 20 MG/1
80 TABLET, FILM COATED ORAL NIGHTLY
Status: DISCONTINUED | OUTPATIENT
Start: 2017-03-10 | End: 2017-03-16

## 2017-03-10 RX ORDER — CARVEDILOL 25 MG/1
25 TABLET ORAL 2 TIMES DAILY
Status: DISCONTINUED | OUTPATIENT
Start: 2017-03-10 | End: 2017-03-12

## 2017-03-10 RX ADMIN — IPRATROPIUM BROMIDE AND ALBUTEROL SULFATE 3 ML: .5; 3 SOLUTION RESPIRATORY (INHALATION) at 04:03

## 2017-03-10 RX ADMIN — PREDNISONE 60 MG: 20 TABLET ORAL at 08:03

## 2017-03-10 RX ADMIN — IPRATROPIUM BROMIDE AND ALBUTEROL SULFATE 3 ML: .5; 3 SOLUTION RESPIRATORY (INHALATION) at 12:03

## 2017-03-10 RX ADMIN — HEPARIN SODIUM 5000 UNITS: 5000 INJECTION, SOLUTION INTRAVENOUS; SUBCUTANEOUS at 01:03

## 2017-03-10 RX ADMIN — CARVEDILOL 12.5 MG: 12.5 TABLET, FILM COATED ORAL at 08:03

## 2017-03-10 RX ADMIN — FLUTICASONE PROPIONATE 2 SPRAY: 50 SPRAY, METERED NASAL at 08:03

## 2017-03-10 RX ADMIN — CARVEDILOL 25 MG: 25 TABLET, FILM COATED ORAL at 09:03

## 2017-03-10 RX ADMIN — CETIRIZINE HYDROCHLORIDE 5 MG: 5 TABLET, FILM COATED ORAL at 08:03

## 2017-03-10 RX ADMIN — SODIUM BICARBONATE 650 MG TABLET 1300 MG: at 09:03

## 2017-03-10 RX ADMIN — QUETIAPINE FUMARATE 200 MG: 200 TABLET, FILM COATED ORAL at 04:03

## 2017-03-10 RX ADMIN — ALLOPURINOL 100 MG: 100 TABLET ORAL at 08:03

## 2017-03-10 RX ADMIN — ATORVASTATIN CALCIUM 80 MG: 20 TABLET, FILM COATED ORAL at 09:03

## 2017-03-10 RX ADMIN — HEPARIN SODIUM 5000 UNITS: 5000 INJECTION, SOLUTION INTRAVENOUS; SUBCUTANEOUS at 09:03

## 2017-03-10 RX ADMIN — PANTOPRAZOLE SODIUM 600 MG: 40 TABLET, DELAYED RELEASE ORAL at 08:03

## 2017-03-10 RX ADMIN — IPRATROPIUM BROMIDE AND ALBUTEROL SULFATE 3 ML: .5; 3 SOLUTION RESPIRATORY (INHALATION) at 07:03

## 2017-03-10 RX ADMIN — QUETIAPINE FUMARATE 200 MG: 200 TABLET, FILM COATED ORAL at 12:03

## 2017-03-10 RX ADMIN — LEVOTHYROXINE SODIUM 50 MCG: 50 TABLET ORAL at 05:03

## 2017-03-10 RX ADMIN — ALLOPURINOL 100 MG: 100 TABLET ORAL at 09:03

## 2017-03-10 RX ADMIN — PANTOPRAZOLE SODIUM 600 MG: 40 TABLET, DELAYED RELEASE ORAL at 09:03

## 2017-03-10 RX ADMIN — FUROSEMIDE 40 MG: 10 INJECTION, SOLUTION INTRAMUSCULAR; INTRAVENOUS at 08:03

## 2017-03-10 RX ADMIN — ASPIRIN 81 MG CHEWABLE TABLET 81 MG: 81 TABLET CHEWABLE at 08:03

## 2017-03-10 RX ADMIN — HEPARIN SODIUM 5000 UNITS: 5000 INJECTION, SOLUTION INTRAVENOUS; SUBCUTANEOUS at 05:03

## 2017-03-10 RX ADMIN — FUROSEMIDE 60 MG: 10 INJECTION, SOLUTION INTRAMUSCULAR; INTRAVENOUS at 05:03

## 2017-03-10 RX ADMIN — SODIUM BICARBONATE 650 MG TABLET 1300 MG: at 08:03

## 2017-03-10 NOTE — ASSESSMENT & PLAN NOTE
- work of breathing improved  - continue lasix 40mg IV BID  - 1500 fluid restriction  - monitor electrolytes closely and replete as indicated  - repeat ECHO pending for today

## 2017-03-10 NOTE — PLAN OF CARE
Problem: Patient Care Overview  Goal: Plan of Care Review  Outcome: Ongoing (interventions implemented as appropriate)  Pt remains free of falls and injury this shift. Vital signs stable. Plan of care reviewed with patient, verbalized understanding. Pt went for 2D echo and US today. Pt getting 40 lasix IVP BID to decrease fluid. New PIV placed by picc team. No signs of acute distress noted. Will continue to monitor.

## 2017-03-10 NOTE — PROGRESS NOTES
Cardiology consult   Attending Physician: Servando Obrien MD  Reason for Consult: acute on chronic heart failure exacerbation     HPI:   71 y.o. woman with PMH of HTN, combined systolic and diastolic heart failure, CKD3, hypothyroidism, + 40 ppd smoking hx, and ?COPD, ?CAD who presented with shortness of breath and chest pain. Patient states she had worsening shortness of breath for the past few days and presented to the ED where she was given IV lasix with improvement in symptoms. [facility] states she had a cardiologist but does not remember the physician's name. ECHO 12/2016 with EF 40%, ECHO 2/2017 EF 25-30%, and repeat ECHO this admission EF unchanged from the one in February.     Patient states at baseline, she gets shortness of breath with exertion associated with chest pressure and lightheadedness, relieved with rest. Patient endorses orthopnea and sleeps with 3-4 pillows at night. She did notice weight gain which has been decreasing since receiving lasix. Patient normally takes 60 mg lasix po daily. She was scheduled for a spect, although per chart review it appears it is awaiting insurance authorization.    At home, patient was taking ASA and valsartan but was not on a beta blocker or statin.      ROS:    Constitution: Negative for fever, chills, +weight gain  HENT: Negative for sore throat, rhinorrhea, or headache.  Eyes: Negative for blurred or double vision.   Cardiovascular: See above  Pulmonary: Positive for SOB   Gastrointestinal: Negative for abdominal pain, nausea, vomiting, or diarrhea.   : Negative for dysuria.   Extremities: Positive for lower extremity swelling   Neurological: Negative for focal weakness or sensory changes.  PMH:     Past Medical History:   Diagnosis Date    Breast cancer     CHF (congestive heart failure)     Coronary artery disease     Hypertension     Hazel     Renal disorder     Thyroid disease      Past Surgical History:    Procedure Laterality Date    BREAST SURGERY      HYSTERECTOMY      MASTECTOMY       Allergies:   Review of patient's allergies indicates:  No Known Allergies  Medications:     No current facility-administered medications on file prior to encounter.      Current Outpatient Prescriptions on File Prior to Encounter   Medication Sig Dispense Refill    allopurinol (ZYLOPRIM) 100 MG tablet Take 100 mg by mouth 2 (two) times daily.       aspirin 81 MG Chew Take 1 tablet (81 mg total) by mouth once daily.  0    cetirizine (ZYRTEC) 5 MG tablet Take 1 tablet (5 mg total) by mouth once daily.  0    diclofenac sodium 1 % Gel Apply 4 g topically 4 (four) times daily. Prn pain      fluticasone (FLONASE) 50 mcg/actuation nasal spray 2 sprays by Each Nare route once daily. 1 Bottle 3    furosemide (LASIX) 20 MG tablet Take 3 tablets (60 mg total) by mouth once daily. 180 tablet 3    gabapentin (NEURONTIN) 300 MG capsule Take 300 mg by mouth 3 (three) times daily as needed (for nerve pain).       guaifenesin (MUCINEX) 600 mg 12 hr tablet Take 1 tablet (600 mg total) by mouth 2 (two) times daily.      levothyroxine (SYNTHROID) 50 MCG tablet Take 1 tablet (50 mcg total) by mouth before breakfast. 30 tablet 11    melatonin 3 mg Tab Take by mouth every evening.      sodium bicarbonate 650 MG tablet Take 2 tablets (1,300 mg total) by mouth 2 (two) times daily. 360 tablet 3    valsartan (DIOVAN) 320 MG tablet Take 1 tablet (320 mg total) by mouth once daily. 90 tablet 3    benztropine (COGENTIN) 0.5 MG tablet Take 1 tablet (0.5 mg total) by mouth nightly. 30 tablet 0    quetiapine (SEROQUEL) 200 MG Tab Take 1 tablet (200 mg total) by mouth nightly. 30 tablet 0       Inpatient Medications   Continuous Infusions:   Scheduled Meds:   albuterol-ipratropium 2.5mg-0.5mg/3mL  3 mL Nebulization Q4H WAKE    allopurinol  100 mg Oral BID    aspirin  81 mg Oral Daily    benztropine  0.5 mg Oral Nightly    carvedilol  12.5 mg  Oral BID    cetirizine  5 mg Oral Daily    fluticasone  2 spray Each Nare Daily    furosemide  40 mg Intravenous BID    guaifenesin  600 mg Oral BID    heparin (porcine)  5,000 Units Subcutaneous Q8H    levothyroxine  50 mcg Oral Before breakfast    predniSONE  60 mg Oral Daily    quetiapine  200 mg Oral Daily with dinner    sodium bicarbonate  1,300 mg Oral BID     PRN Meds:acetaminophen, dextrose 50%, dextrose 50%, gabapentin, glucagon (human recombinant), glucose, glucose, hydrALAZINE, ondansetron, ondansetron, ramelteon     Social History:     Social History   Substance Use Topics    Smoking status: Former Smoker     Packs/day: 2.00     Years: 20.00     Types: Cigarettes     Quit date: 2/16/1997    Smokeless tobacco: Not on file    Alcohol use No      Comment: 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     Family History:     Family History   Problem Relation Age of Onset    Family history unknown: Yes     Physical Exam:     Vitals:  Temp:  [97.6 °F (36.4 °C)-99.1 °F (37.3 °C)]   Pulse:  []   Resp:  [14-18]   BP: (118-168)/()   SpO2:  [94 %-98 %]  on room air  I/O's:    Intake/Output Summary (Last 24 hours) at 03/10/17 1335  Last data filed at 03/10/17 0342   Gross per 24 hour   Intake              600 ml   Output              750 ml   Net             -150 ml        Constitutional: NAD, conversant, occasionally short of breath when speaking  HEENT: Sclera anicteric, PERRLA, EOMI  Neck: Unable to visualize JVD, no carotid bruits  CV: RRR, no murmur, normal S1/S2  Pulm: crackles appreciated at right lung base, otherwise CTAB. Occasionally short of breath when conversing  GI: Abdomen soft, NTND, +BS  Extremities: 2+ LE edema, warm and well perfused  Skin: No ecchymosis, erythema, or ulcers  Psych: AOx3, appropriate affect  Neuro: CNII-XII intact, no focal deficits    Labs:       Recent Labs  Lab 03/08/17  0020 03/09/17  0351 03/10/17  0530    140 141   K 4.6 3.7 4.7    109 109   CO2 21* 21* 23   BUN  41* 42* 45*   CREATININE 2.0* 1.8* 2.0*   ANIONGAP 7* 10 9       Recent Labs  Lab 03/08/17  0020 03/09/17  0351 03/10/17  0530   AST 37 33 30   ALT 32 26 24   ALKPHOS 106 95 94   BILITOT 1.0 1.2* 1.4*   ALBUMIN 3.3* 2.9* 3.0*       Recent Labs  Lab 03/08/17  0020 03/08/17  0446 03/08/17  1959   TROPONINI 0.047* 0.053* 0.058*   BNP 1886*  --   --       Recent Labs  Lab 03/08/17  0020 03/09/17  0351 03/10/17  0530   WBC 3.89* 3.57* 2.69*   HGB 10.9* 10.0* 10.1*   HCT 32.6* 29.4* 28.9*    151 178   GRAN 55.6  2.2 52.6  1.9 72.8  2.0     No results for input(s): PTT, INR in the last 168 hours.  Lab Results   Component Value Date    CHOL 176 01/11/2017    HDL 63 01/11/2017    LDLCALC 76.8 01/11/2017    TRIG 181 (H) 01/11/2017     Lab Results   Component Value Date    HGBA1C 4.2 (L) 01/11/2017        Micro:  Blood Cultures  Lab Results   Component Value Date    LABBLOO No growth after 5 days. 12/16/2016    LABBLOO No growth after 5 days. 12/16/2016     Urine Cultures  Lab Results   Component Value Date    LABURIN No growth 12/16/2016       Imaging:       EF   Date Value Ref Range Status   03/10/2017 20 (A) 55 - 65    02/08/2017 25 (A) 55 - 65    12/19/2016 40 (A) 55 - 65        EKG: ST and T wave abnormality in inferior leads, concerning for inferior ischemia      ASSESSMENT/PLAN:     Current Problems List:  Active Hospital Problems    Diagnosis  POA    *Congestive heart failure [I50.9]  Yes    Essential hypertension [I10]  Yes    Chronic gout [M1A.9XX0]  Yes    Primary hypothyroidism [E03.9]  Yes    CKD (chronic kidney disease) stage 3, GFR 30-59 ml/min [N18.3]  Yes    Insomnia [G47.00]  Yes      Resolved Hospital Problems    Diagnosis Date Resolved POA   No resolved problems to display.        ASSESSMENT:   71 y.o. year old female with 71 y.o. woman with PMH of HTN, combined systolic and diastolic heart failure, CKD3, hypothyroidism, and ?COPD, ?CAD who presented shortness of breath. Cardiology consulted  for acute on chronic heart failure exacerbation.    PLAN:  Acute on chronic heart failure exacerbation  - patient presented with shortness of breath and chest pain, improved with IV lasix however remains with shortness of breath and LE edema   - 2/8/17 ECHO with EF 25%, repeat ECHO unchanged   - recommend continuing fluid restriction   - recommend strict I&Os and daily weights   - recommend aggressive diuresis by increasing lasix iv to 60 mg bid  - recommend optimizing medical management. Agree with addition of coreg, would recommend increasing coreg to 25 mg bid for HR goal <60 to decrease oxygen demand  - recommend continuation of ASA and valsartan with addition of atorvastatin 80 mg daily to optimize medical management    Unstable angina  - patient endorses hx of chest pressure and shortness of breath that occurs at rest and with exertion  - 3/8/17 EKG concerning for inferior ischemia   - in setting of chest pain, recommend trending troponins q6h  - patient previously scheduled for spect, however awaiting insurance authorization. Agree with spect for ischemic workup.   - patient originally with cardiologist at [facility], however, she does not remember the name. Will attempt to obtain records from [facility] to determine if patient has had recent ischemic workup/prior heart cath  - will consider diagnostic left heart cath pending information obtained from [facility]'s records  - patient will need establishment of care with cardiologist at [facility] upon discharge    Thank you for this consult.  Will continue to follow with you.    Bailee Rizzo MD, PGY1  Pager: [phone]

## 2017-03-10 NOTE — ASSESSMENT & PLAN NOTE
- remains restless and agitated overnight  - advance timing of home Seroquel to 17:00   - PRN ramelteon

## 2017-03-10 NOTE — PLAN OF CARE
Problem: Patient Care Overview  Goal: Plan of Care Review  Outcome: Ongoing (interventions implemented as appropriate)  Lasix BID IVP. Educated pt and family on 1.5L fluid restriction. Pt received rozerem and Seroquel tonight-pt slept a little better tonight but awoke during random times of the night restless. Awaiting repeat echo. U/S done=no DVT. Plan of care reviewed with patient. VSS. Fall precaution in place. Daughter at bedside. No acute events overnight. Will continue to monitor

## 2017-03-10 NOTE — PLAN OF CARE
Problem: Physical Therapy Goal  Goal: Physical Therapy Goal  Goals to be met by: 3/20/17     Patient will increase functional independence with mobility by performin. Supine to sit with Supervision  2. Sit to stand transfer with Supervision  3. Bed to chair transfer with Supervision using Rolling Walker  4. Gait x 150 feet with Supervision using Rolling Walker.   5. Ascend/descend 12 stairs with bilateral Handrails Contact Guard Assistance.   6. Lower extremity exercise program x10 reps, with assistance as needed, in order to increase LE strength and (I) with functional mobility   Outcome: Ongoing (interventions implemented as appropriate)  PT evaluation complete and appropriate goals established.     Tia Maciel, PT, DPT   3/10/2017  [phone]

## 2017-03-10 NOTE — PROGRESS NOTES
"[facility] Hospital Medicine  Progress Note    Patient Name: Anai Sal  MRN: [id number]  Patient Class: IP- Inpatient   Admission Date: 3/7/2017  Length of Stay: 1 days  Attending Physician: Servando Obrien MD  Primary Care Provider: Live Young MD    [facility] Medicine Team: [facility] MED E Alma Jhaveri PA-C    Subjective:     Principal Problem:Congestive heart failure    HPI:  72 y/o female who presents to the ED with c/o worsening SOB that has progressed over the past few days. She has a PMH of CAD, CHF, HTN, hypothyroidism, and renal disease.  While in the ED she received 40 mg of lasix IV x1 and breathing treatments.  At this time she states her breathing has improved. However, she does state that she remains mildly SOB. She is tolerating room air with O2 saturations of %.  She denies any significant dietary changes, and states that she does not use salt.  She does state that she drinks "plenty" of water.  She denies HA, chills, nausea, or chest pain at this time. However, she states that at times she does have feeling of dizziness with sudden movement.  BNP on arrival was 1886.  Her BUN / Crt were elevated as well 41/2.0. CXR performed while in the ED suggestive of small right sided pleural effusion without overt signs of pulmonary edema. Her last ECHO was performed 2/8/17 and her EF was down to 25% from 40% which was done in December 2016.            Hospital Course:  Admitted for worsening SOB and exacerbation of CHF, She is receiving IV furosemide and edema is improving. SOB improving. Prednisone started 3/9/17.  Family states that she remains restless and agitated despite receiving her home dose of Seroquel and PRN ramelteon.  U/A negative, US BLE negative, repeat ECHO with EF 20 (decreased from 40 in December 2016), diastolic dysfunction, moderate to severe mitral valve regurgitation, and PA pressure 45.    Interval History: Family reports patient remains restless " overnight, otherwise no reported overnight events. Patient states shortness of breath is continuing to improve daily.     Review of Systems   Constitutional: Negative for chills and fever.   HENT: Positive for hearing loss. Negative for congestion and rhinorrhea.    Respiratory: Positive for shortness of breath. Negative for cough and chest tightness.    Cardiovascular: Positive for leg swelling. Negative for chest pain and palpitations.   Gastrointestinal: Negative for abdominal distention and abdominal pain.   Musculoskeletal: Negative for arthralgias and back pain.   Neurological: Negative for dizziness and light-headedness.   Psychiatric/Behavioral: Positive for sleep disturbance.     Objective:     Vital Signs (Most Recent):  Temp: 98.2 °F (36.8 °C) (03/10/17 0756)  Pulse: 80 (03/10/17 0900)  Resp: 18 (03/10/17 0756)  BP: 118/72 (03/10/17 0756)  SpO2: 95 % (03/10/17 0756) Vital Signs (24h Range):  Temp:  [97.7 °F (36.5 °C)-99.1 °F (37.3 °C)] 98.2 °F (36.8 °C)  Pulse:  [] 80  Resp:  [12-18] 18  SpO2:  [94 %-98 %] 95 %  BP: (118-168)/() 118/72     Weight: 77.4 kg (170 lb 10.2 oz)  Body mass index is 26.73 kg/(m^2).    Intake/Output Summary (Last 24 hours) at 03/10/17 1015  Last data filed at 03/10/17 0342   Gross per 24 hour   Intake              600 ml   Output              750 ml   Net             -150 ml      Physical Exam   Constitutional: She is oriented to person, place, and time. She appears well-developed and well-nourished. No distress.   Cardiovascular: Normal rate, regular rhythm and normal heart sounds.    No murmur heard.  Pulmonary/Chest: Effort normal and breath sounds normal. No respiratory distress. She has no wheezes. She has no rales.   Hx of right sided mastectomy, mild use of accessory muscles.   Abdominal: Soft. Bowel sounds are normal. She exhibits no distension. There is no tenderness.   Musculoskeletal: Normal range of motion. She exhibits edema (+1 BLE).   Neurological: She  is alert and oriented to person, place, and time.   Skin: Skin is warm and dry. She is not diaphoretic.   Psychiatric: She has a normal mood and affect.   Nursing note and vitals reviewed.      Significant Labs:   BMP:   Recent Labs  Lab 03/08/17  1959  03/10/17  0530   GLU  --   < > 114*   NA  --   < > 141   K  --   < > 4.7   CL  --   < > 109   CO2  --   < > 23   BUN  --   < > 45*   CREATININE  --   < > 2.0*   CALCIUM  --   < > 9.0   MG 2.0  --   --    < > = values in this interval not displayed.  CBC:   Recent Labs  Lab 03/09/17  0351 03/10/17  0530   WBC 3.57* 2.69*   HGB 10.0* 10.1*   HCT 29.4* 28.9*    178       Significant Imaging: I have reviewed all pertinent imaging results/findings within the past 24 hours.    Assessment/Plan:      * Congestive heart failure  - work of breathing improved  - continue lasix 40mg IV BID  - 1500 fluid restriction  - monitor electrolytes closely and replete as indicated  - repeat ECHO with EF of 20 (decreased from 40 in December 2016), diastolic dysfunction, moderate to severe mitral valve regurgitation, and PA pressure 45.  - Will consult cardiology for further recommendations given rapid decrease in EF over past 3 months.       CKD (chronic kidney disease) stage 3, GFR 30-59 ml/min  -continue to trend labs daily - Crt ranging between 1.8-2.0   - hold home valsartan  - strict I/O's      Insomnia  - remains restless and agitated overnight  - advance timing of home Seroquel to 17:00   - PRN ramelteon        Essential hypertension  - BP stable   - continue to hold valsartan  - add PRN hydralazine for SBP greater than 160      Primary hypothyroidism  - TSH 16,692 - T4 1.10  - continue levothyroxine as prescribed       Chronic gout  - no active flares   - continue home allopurinol     VTE Risk Mitigation         Ordered     heparin (porcine) injection 5,000 Units  Every 8 hours     Route:  Subcutaneous        03/08/17 0902     Medium Risk of VTE  Once      03/08/17 0902      Place JANIA hose  Until discontinued      03/08/17 0902     Place sequential compression device  Until discontinued      03/08/17 0902          Alma Jhaveri PA-C  Department of Hospital Medicine   [facility]  Staff: Dr. Obrien

## 2017-03-10 NOTE — PLAN OF CARE
Problem: Occupational Therapy Goal  Goal: Occupational Therapy Goal  Goals to be met by: 3/20/17     Patient will increase functional independence with ADLs by performing:    UE Dressing with Stand-by Assistance.  LE Dressing with Stand-by Assistance.  Grooming while standing at sink with Stand-by Assistance.  Toileting from toilet with Stand-by Assistance for hygiene and clothing management.   Toilet transfer to toilet with Stand-by Assistance.  Outcome: Ongoing (interventions implemented as appropriate)  OT eval completed.  POC set 4x per week with goals due x10 days.   ERIBERTO Holly  3/10/2017

## 2017-03-10 NOTE — ASSESSMENT & PLAN NOTE
-continue to trend labs daily - Crt ranging between 1.8-2.0   - hold home valsartan  - strict I/O's

## 2017-03-10 NOTE — SUBJECTIVE & OBJECTIVE
Interval History: family reports patient remains restless overnight, otherwise no reported overnight events    Review of Systems   Constitutional: Negative for chills and fever.   HENT: Positive for hearing loss. Negative for congestion and rhinorrhea.    Eyes: Negative for visual disturbance.   Respiratory: Positive for shortness of breath. Negative for cough and chest tightness.    Cardiovascular: Positive for leg swelling. Negative for chest pain and palpitations.   Gastrointestinal: Negative for abdominal distention and abdominal pain.   Endocrine: Negative for cold intolerance and heat intolerance.   Genitourinary: Negative for dysuria and frequency.   Musculoskeletal: Negative for arthralgias and back pain.   Neurological: Negative for dizziness and light-headedness.   Psychiatric/Behavioral: Positive for sleep disturbance.     Objective:     Vital Signs (Most Recent):  Temp: 98.2 °F (36.8 °C) (03/10/17 0756)  Pulse: 80 (03/10/17 0900)  Resp: 18 (03/10/17 0756)  BP: 118/72 (03/10/17 0756)  SpO2: 95 % (03/10/17 0756) Vital Signs (24h Range):  Temp:  [97.7 °F (36.5 °C)-99.1 °F (37.3 °C)] 98.2 °F (36.8 °C)  Pulse:  [] 80  Resp:  [12-18] 18  SpO2:  [94 %-98 %] 95 %  BP: (118-168)/() 118/72     Weight: 77.4 kg (170 lb 10.2 oz)  Body mass index is 26.73 kg/(m^2).    Intake/Output Summary (Last 24 hours) at 03/10/17 1015  Last data filed at 03/10/17 0342   Gross per 24 hour   Intake              600 ml   Output              750 ml   Net             -150 ml      Physical Exam   Constitutional: She is oriented to person, place, and time. She appears well-developed and well-nourished. No distress.   HENT:   Head: Normocephalic and atraumatic.   Eyes: EOM are normal.   Neck: Normal range of motion. Neck supple.   Cardiovascular: Normal rate, regular rhythm and normal heart sounds.    No murmur heard.  Pulmonary/Chest: Effort normal and breath sounds normal. No respiratory distress. She has no wheezes. She has  no rales.   Hx of right sided mastectomy, mild use of accessory muscles.   Abdominal: Soft. Bowel sounds are normal. She exhibits no distension. There is no tenderness.   Musculoskeletal: Normal range of motion. She exhibits edema (+1 BLE).   Neurological: She is alert and oriented to person, place, and time.   Skin: Skin is warm and dry. She is not diaphoretic.   Psychiatric: She has a normal mood and affect.   Nursing note and vitals reviewed.      Significant Labs:   BMP:   Recent Labs  Lab 03/08/17  1959  03/10/17  0530   GLU  --   < > 114*   NA  --   < > 141   K  --   < > 4.7   CL  --   < > 109   CO2  --   < > 23   BUN  --   < > 45*   CREATININE  --   < > 2.0*   CALCIUM  --   < > 9.0   MG 2.0  --   --    < > = values in this interval not displayed.  CBC:   Recent Labs  Lab 03/09/17  0351 03/10/17  0530   WBC 3.57* 2.69*   HGB 10.0* 10.1*   HCT 29.4* 28.9*    178       Significant Imaging: I have reviewed all pertinent imaging results/findings within the past 24 hours.

## 2017-03-10 NOTE — PLAN OF CARE
Sw assigned to SANDRA today. Sw awaiting PT OT recs to inform d/c plan.     Halima Graham, [facility] [place] [place] [id number]

## 2017-03-10 NOTE — PT/OT/SLP EVAL
Occupational Therapy  Evaluation    Anai Sal   MRN: [id number]   Admitting Diagnosis: Congestive heart failure    OT Date of Treatment: 03/10/17   OT Start Time: 1005  OT Stop Time: 1018  OT Total Time (min): 13 min    Billable Minutes:  Evaluation 13    Diagnosis: Congestive heart failure   SOB    Past Medical History:   Diagnosis Date    Breast cancer     CHF (congestive heart failure)     Coronary artery disease     Hypertension     Hazel     Renal disorder     Thyroid disease       Past Surgical History:   Procedure Laterality Date    BREAST SURGERY      HYSTERECTOMY      MASTECTOMY         Referring physician: Servando Obrien  Date referred to OT: 3/9/17    General Precautions: Standard, fall (cardiac)  Orthopedic Precautions: N/A  Braces: N/A    Do you have any cultural, spiritual, Pentecostal conflicts, given your current situation?: none     Patient History:  Living Environment  Lives With: child(kylie), adult  Living Arrangements: house  Home Accessibility: stairs to enter home, stairs within home  Home Layout: Able to live on 1st floor  Number of Stairs to Enter Home: 3  Number of Stairs Within Home:  (1 flight)  Stair Railings at Home: outside, present on left side, inside, present on right side  Transportation Available: car, family or friend will provide  Living Environment Comment: Pt lives with daughter in a 2 story house with 3 VITALIY and L rail and 1 flight of stairs to second floor with R rail.  Pt's tub/shower combo is on the second floor.  Pt was (I) PTA for self-care and ADLs with exception of tub transfers for which the  CNA was assisting 3x per week.  Pt reports she has not ambulated 100+ years in approx 2 years and has not walked more than x~30 feet in a few months.  Pt will have assist of daughter at D/C but she will be alone for 2 hours at a time 2* daughter attends school.   Equipment Currently Used at Home: bath bench, walker, rolling, cane, straight, bedside commode    Prior level  "of function:   Bed Mobility/Transfers: needs device and assist  Grooming: independent  Bathing: independent  Upper Body Dressing: independent  Lower Body Dressing: independent  Toileting: independent  Home Management Skills: unable to perform  Driving License: No  Mode of Transportation: Car, Family, Friends     Dominant hand: right    Subjective:  Communicated with RN prior to session.  "What do you mean get closer to the walker?  Can you show me?"  Chief Complaint: none  Patient/Family stated goals: to get stronger and return home     Pain Ratin/10  Pain Rating Post-Intervention: 0/10    Objective:  Patient found with: peripheral IV, telemetry, bed alarm (IV not connected)    Cognitive Exam:  Oriented to: x4  Follows Commands/attention: Follows multistep  commands  Communication: clear/fluent  Memory:  No Deficits noted  Safety awareness/insight to disability: ranged  Coping skills/emotional control: Appropriate to situation    Visual/perceptual:  Intact    Physical Exam:  Postural examination/scapula alignment: Rounded shoulder  Skin integrity: Visible skin intact  Edema: None noted     Sensation:   Intact    Upper Extremity Range of Motion:  Right Upper Extremity: Deficits: shoulder flex ~100 deg; shoulder abd ~90 deg; PROM only approx 10 deg more in each plane  Left Upper Extremity: Deficits:   shoulder flex ~100 deg; shoulder abd ~90 deg; PROM only approx 10 deg more in each plane    Upper Extremity Strength:  Right Upper Extremity: WFL in available range  Left Upper Extremity: WFL in available range   Strength: 4/5    Fine motor coordination:   Intact    Gross motor coordination: WFL    Functional Mobility:  Bed Mobility:  Rolling/Turning Right: Stand by assistance  Scooting/Bridging: Stand by Assistance (towards EOB)  Supine to Sit: Stand by Assistance  Sit to Supine: Stand by Assistance    Transfers:  Sit <> Stand Assistance: Stand By Assistance (from EOB x1 trial)  Sit <> Stand Assistive Device: " "Rolling Walker    Functional Ambulation: Pt performed x~75 feet with CGA and RW but Mod VC's for walker mgmt (to remain close and slow speed)    Activities of Daily Living:  Feeding Level of Assistance: Set-up Assistance (drinking)  UE Dressing Level of Assistance: Moderate assistance (gown mgmt behind back in standing)  LE Dressing Level of Assistance: Moderate assistance (B socks)  Toileting Level of Assistance: Activity did not occur  Bathing Level of Assistance: Activity did not occur    Balance:   Static Sit: FAIR+: Able to take MINIMAL challenges from all directions  Dynamic Sit: FAIR+: Maintains balance through MINIMAL excursions of active trunk motion  Static Stand: FAIR: Maintains without assist but unable to take challenges  Dynamic stand: FAIR: Needs CONTACT GUARD during gait    Therapeutic Activities and Exercises:  Pt educated on OT role and POC.  Pt encouraged to sit UIC but she declined.  Pt VU to call RN staff for all transfers and gait and was agreeable to current recommendation of  OT/PT.      AM-PAC 6 CLICK ADL  How much help from another person does this patient currently need?  1 = Unable, Total/Dependent Assistance  2 = A lot, Maximum/Moderate Assistance  3 = A little, Minimum/Contact Guard/Supervision  4 = None, Modified [place]/Independent    Putting on and taking off regular lower body clothing? : 2  Bathing (including washing, rinsing, drying)?: 2  Toileting, which includes using toilet, bedpan, or urinal? : 3  Putting on and taking off regular upper body clothing?: 2  Taking care of personal grooming such as brushing teeth?: 3  Eating meals?: 4  Total Score: 16    AM-PAC Raw Score CMS "G-Code Modifier Level of Impairment Assistance   6 % Total / Unable   7 - 9 CM 80 - 100% Maximal Assist   10 - 14 CL 60 - 80% Moderate Assist   15 - 19 CK 40 - 60% Moderate Assist   20 - 22 CJ 20 - 40% Minimal Assist   23 CI 1-20% SBA / CGA   24 CH 0% Independent/ Mod I       Patient left HOB " elevated with all lines intact, call button in reach, bed alarm on and RN notified    Assessment:  Anai Sal is a 71 y.o. female with a medical diagnosis of Congestive heart failure and presents with good motivation and participation with therapy services.  Pt tolerated treatment well with no reports of pain.  Pt remains limited with higher level safety and activity tolerance.  She required increased VC's and assist for ADL completion and she remains debilitated with gait safety.  Pt remains a good candidate for skilled OT services to address deficits, improve safety and quality of life, and to promote pt's ability to return home to age in place.     Rehab identified problem list/impairments: Rehab identified problem list/impairments: weakness, impaired endurance, impaired self care skills, impaired functional mobilty, gait instability, impaired balance, decreased ROM, decreased upper extremity function, decreased coordination, decreased safety awareness, impaired coordination, impaired cardiopulmonary response to activity    Rehab potential is good.    Activity tolerance: Good    Discharge recommendations: Discharge Facility/Level Of Care Needs: home health PT, home health OT     Barriers to discharge: Barriers to Discharge: Decreased caregiver support, Inaccessible home environment (steps to enter home and inside home; pt alone at times during day)    Equipment recommendations: none     GOALS:   Occupational Therapy Goals        Problem: Occupational Therapy Goal    Goal Priority Disciplines Outcome Interventions   Occupational Therapy Goal     OT, PT/OT Ongoing (interventions implemented as appropriate)    Description:  Goals to be met by: 3/20/17     Patient will increase functional independence with ADLs by performing:    UE Dressing with Stand-by Assistance.  LE Dressing with Stand-by Assistance.  Grooming while standing at sink with Stand-by Assistance.  Toileting from toilet with Stand-by Assistance  for hygiene and clothing management.   Toilet transfer to toilet with Stand-by Assistance.                PLAN:  Patient to be seen 4 x/week to address the above listed problems via self-care/home management, therapeutic activities, therapeutic exercises  Plan of Care expires: 04/09/17  Plan of Care reviewed with: patient    OT G-codes  Functional Assessment Tool Used: [facility]  Score: 16  Functional Limitation: Self care  Self Care Current Status (): CK  Self Care Goal Status (): ERIBERTO Ovalle  03/10/2017

## 2017-03-10 NOTE — PLAN OF CARE
Problem: Patient Care Overview  Goal: Plan of Care Review  Outcome: Ongoing (interventions implemented as appropriate)  Patient free of falls/trauma/injuries. Patient with heart failure being diuresed with lasix IVP. Diuresing. Patient has trace edema to the BLE. Pt has GIBBONS, but denies any chest pain.  General skin remains intact. Pt not eating meal trays. Contacted dietary for something patient might like to eat.

## 2017-03-10 NOTE — PT/OT/SLP EVAL
Physical Therapy  Evaluation    Anai Sal   MRN: [id number]   Admitting Diagnosis: Congestive heart failure    PT Received On: 03/10/17  PT Start Time: 1004     PT Stop Time: 1018    PT Total Time (min): 14 min       Billable Minutes:  Evaluation 14 (co-eval with OT)    Diagnosis: Congestive heart failure    Past Medical History:   Diagnosis Date    Breast cancer     CHF (congestive heart failure)     Coronary artery disease     Hypertension     Hazel     Renal disorder     Thyroid disease       Past Surgical History:   Procedure Laterality Date    BREAST SURGERY      HYSTERECTOMY      MASTECTOMY         Referring physician: Servando Obrien MD  Date referred to PT: 3/9/17    General Precautions: Standard, fall  Orthopedic Precautions: N/A   Braces: N/A       Do you have any cultural, spiritual, Zoroastrianism conflicts, given your current situation?: none noted     Patient History:  Lives With: child(kylie), adult  Living Arrangements: house  Home Accessibility: stairs within home, stairs to enter home  Home Layout: Bathroom on 2nd floor  Number of Stairs to Enter Home: 3  Number of Stairs Within Home: 12  Stair Railings at Home: inside, present on right side, outside, present on left side  Transportation Available: family or friend will provide  Living Environment Comment: Pt lives with her daughter in a 2SH with 3 VITALIY and L handrail. Pt reports that PTA she used RW for mobility, and required assist with bathing with bathroom on 2nd floor of home. Pt had caregiver 3x/week, who provided assist with bathing. Pt was also attending [employer] M-[facility] from 6am to 3 pm. Pt's daughter provides limited assist, as she is in school and pt is home alone at times.   Equipment Currently Used at Home: cane, straight, bath bench, walker, rolling  DME owned (not currently used): none    Previous Level of Function:  Ambulation Skills: needs device  Transfer Skills: needs device  ADL Skills: needs assist  (bathing)    Subjective:  Communicated with RN prior to session.  Pt agreeable to therapy.  Chief Complaint: none noted  Patient goals: return home     Pain Ratin/10     Objective:   Patient found with: telemetry     Cognitive Exam:  Oriented to: Person, Place, Time and Situation    Follows Commands/attention: Follows two-step commands  Communication: clear/fluent  Safety awareness/insight to disability: impaired    Physical Exam:  Postural examination/scapula alignment: Rounded shoulder and Head forward    Skin integrity: Visible skin intact  Edema: None noted     Sensation:   Intact    Lower Extremity Range of Motion:  Right Lower Extremity: WFL  Left Lower Extremity: WFL    Lower Extremity Strength:  Right Lower Extremity: WFL  Left Lower Extremity: WFL    Functional Mobility:  Bed Mobility:  Supine to Sit: Stand by Assistance (with HOB elevated)    Transfers:  Sit <> Stand Assistance: Stand By Assistance  Sit <> Stand Assistive Device: Rolling Walker    Max v/c for hand placement and RW management. Limited understanding noted.       Gait:   Gait Distance: 75 ft.   Assistance 1: Contact Guard Assistance  Gait Assistive Device: Rolling walker  Gait Pattern: swing-through gait  Gait Deviation(s): forward lean, decreased weight-shifting ability   Max v/c and t/c throughout for maintaining close proximity to RW, decreasing forward lean, and slowing pace. Pt with limited understanding.     Balance:   Static Sit: GOOD: Takes MODERATE challenges from all directions  Dynamic Sit: GOOD: Maintains balance through MODERATE excursions of active trunk movement  Static Stand: FAIR+: Takes MINIMAL challenges from all directions  Dynamic stand: FAIR: Needs CONTACT GUARD during gait    Therapeutic Activities and Exercises:  Pt educated on role of PT and PT POC. Pt verbalized understanding.   Pt educated on safety with mobility including RW management during transfers and ambulation. Pt verbalized understanding, but pt will  likely need reinforcement as understanding appears limited.     AM-PAC 6 CLICK MOBILITY  How much help from another person does this patient currently need?   1 = Unable, Total/Dependent Assistance  2 = A lot, Maximum/Moderate Assistance  3 = A little, Minimum/Contact Guard/Supervision  4 = None, Modified [place]/Independent    Turning over in bed (including adjusting bedclothes, sheets and blankets)?: 4  Sitting down on and standing up from a chair with arms (e.g., wheelchair, bedside commode, etc.): 3  Moving from lying on back to sitting on the side of the bed?: 3  Moving to and from a bed to a chair (including a wheelchair)?: 3  Need to walk in hospital room?: 3  Climbing 3-5 steps with a railing?: 3  Total Score: 19     AM-PAC Raw Score CMS G-Code Modifier Level of Impairment Assistance   6 % Total / Unable   7 - 9 CM 80 - 100% Maximal Assist   10 - 14 CL 60 - 80% Moderate Assist   15 - 19 CK 40 - 60% Moderate Assist   20 - 22 CJ 20 - 40% Minimal Assist   23 CI 1-20% SBA / CGA   24 CH 0% Independent/ Mod I     Patient left supine with all lines intact and call button in reach.    Assessment:   Anai Sal is a 71 y.o. female with a medical diagnosis of Congestive heart failure and presents with limited functional mobility 2* impaired endurance, decreased balance, and deconditioning. Pt with poor safety awareness and RW management during ambulation, requiring at least CGA and max cueing during gait. Pt will need further education on technique and RW management to improve safety awareness during transfers and ambulation. Pt will also require stair training prior to d/c as her bathroom is on the 2nd floor of house. Pt would benefit from skilled IP PT in order to address current deficits and progress functional mobility.     Rehab identified problem list/impairments: Rehab identified problem list/impairments: weakness, impaired self care skills, impaired balance, decreased coordination, decreased  safety awareness, impaired endurance, impaired functional mobilty, gait instability    Rehab potential is good.    Activity tolerance: Good    Discharge recommendations: Discharge Facility/Level Of Care Needs: home health PT, home health OT     Barriers to discharge: Barriers to Discharge: Inaccessible home environment, Decreased caregiver support (daughter provides limited assist- pt home alone at times; 3 VITALIY home with bathroom on 2nd floor of house)    Equipment recommendations: Equipment Needed After Discharge: none     GOALS:   Physical Therapy Goals        Problem: Physical Therapy Goal    Goal Priority Disciplines Outcome Goal Variances Interventions   Physical Therapy Goal     PT/OT, PT Ongoing (interventions implemented as appropriate)     Description:  Goals to be met by: 3/20/17     Patient will increase functional independence with mobility by performin. Supine to sit with Supervision  2. Sit to stand transfer with Supervision  3. Bed to chair transfer with Supervision using Rolling Walker  4. Gait  x 150 feet with Supervision using Rolling Walker.   5. Ascend/descend 12 stairs with bilateral Handrails Contact Guard Assistance.   6. Lower extremity exercise program x10 reps, with assistance as needed, in order to increase LE strength and (I) with functional mobility                 PLAN:    Patient to be seen 3 x/week to address the above listed problems via gait training, therapeutic activities, therapeutic exercises  Plan of Care expires: 17  Plan of Care reviewed with: patient        Tia Raheem, PT, DPT   3/10/2017  [phone]

## 2017-03-11 LAB
ALBUMIN SERPL BCP-MCNC: 2.9 G/DL
ALP SERPL-CCNC: 84 U/L
ALT SERPL W/O P-5'-P-CCNC: 26 U/L
ANION GAP SERPL CALC-SCNC: 10 MMOL/L
AST SERPL-CCNC: 35 U/L
BASOPHILS # BLD AUTO: 0 K/UL
BASOPHILS NFR BLD: 0 %
BILIRUB SERPL-MCNC: 1.1 MG/DL
BUN SERPL-MCNC: 56 MG/DL
CALCIUM SERPL-MCNC: 8.9 MG/DL
CHLORIDE SERPL-SCNC: 109 MMOL/L
CHOLEST/HDLC SERPL: 2.7 {RATIO}
CO2 SERPL-SCNC: 21 MMOL/L
CREAT SERPL-MCNC: 2.5 MG/DL
DIFFERENTIAL METHOD: ABNORMAL
EOSINOPHIL # BLD AUTO: 0 K/UL
EOSINOPHIL NFR BLD: 0 %
ERYTHROCYTE [DISTWIDTH] IN BLOOD BY AUTOMATED COUNT: 14.5 %
EST. GFR  (AFRICAN AMERICAN): 21.6 ML/MIN/1.73 M^2
EST. GFR  (NON AFRICAN AMERICAN): 18.8 ML/MIN/1.73 M^2
GLUCOSE SERPL-MCNC: 111 MG/DL
HCT VFR BLD AUTO: 29.1 %
HDL/CHOLESTEROL RATIO: 36.9 %
HDLC SERPL-MCNC: 103 MG/DL
HDLC SERPL-MCNC: 38 MG/DL
HGB BLD-MCNC: 10.3 G/DL
LDLC SERPL CALC-MCNC: 57 MG/DL
LYMPHOCYTES # BLD AUTO: 0.9 K/UL
LYMPHOCYTES NFR BLD: 27.1 %
MCH RBC QN AUTO: 32.8 PG
MCHC RBC AUTO-ENTMCNC: 35.4 %
MCV RBC AUTO: 93 FL
MONOCYTES # BLD AUTO: 0.3 K/UL
MONOCYTES NFR BLD: 8.8 %
NEUTROPHILS # BLD AUTO: 2 K/UL
NEUTROPHILS NFR BLD: 64.1 %
NONHDLC SERPL-MCNC: 65 MG/DL
PLATELET # BLD AUTO: 170 K/UL
PMV BLD AUTO: 12.3 FL
POTASSIUM SERPL-SCNC: 5.1 MMOL/L
PROT SERPL-MCNC: 6.6 G/DL
RBC # BLD AUTO: 3.14 M/UL
SODIUM SERPL-SCNC: 140 MMOL/L
TRIGL SERPL-MCNC: 40 MG/DL
TROPONIN I SERPL DL<=0.01 NG/ML-MCNC: 0.05 NG/ML
WBC # BLD AUTO: 3.17 K/UL

## 2017-03-11 PROCEDURE — 85025 COMPLETE CBC W/AUTO DIFF WBC: CPT

## 2017-03-11 PROCEDURE — 94761 N-INVAS EAR/PLS OXIMETRY MLT: CPT

## 2017-03-11 PROCEDURE — 94640 AIRWAY INHALATION TREATMENT: CPT

## 2017-03-11 PROCEDURE — 25000003 PHARM REV CODE 250: Performed by: NURSE PRACTITIONER

## 2017-03-11 PROCEDURE — 80061 LIPID PANEL: CPT

## 2017-03-11 PROCEDURE — 99222 1ST HOSP IP/OBS MODERATE 55: CPT | Mod: ,,, | Performed by: INTERNAL MEDICINE

## 2017-03-11 PROCEDURE — 25000003 PHARM REV CODE 250: Performed by: PHYSICIAN ASSISTANT

## 2017-03-11 PROCEDURE — 80053 COMPREHEN METABOLIC PANEL: CPT

## 2017-03-11 PROCEDURE — 36415 COLL VENOUS BLD VENIPUNCTURE: CPT

## 2017-03-11 PROCEDURE — 94760 N-INVAS EAR/PLS OXIMETRY 1: CPT

## 2017-03-11 PROCEDURE — 99232 SBSQ HOSP IP/OBS MODERATE 35: CPT | Mod: ,,, | Performed by: PHYSICIAN ASSISTANT

## 2017-03-11 PROCEDURE — 63600175 PHARM REV CODE 636 W HCPCS: Performed by: PHYSICIAN ASSISTANT

## 2017-03-11 PROCEDURE — 25000242 PHARM REV CODE 250 ALT 637 W/ HCPCS: Performed by: NURSE PRACTITIONER

## 2017-03-11 PROCEDURE — 84484 ASSAY OF TROPONIN QUANT: CPT

## 2017-03-11 PROCEDURE — 11000001 HC ACUTE MED/SURG PRIVATE ROOM

## 2017-03-11 RX ADMIN — ALLOPURINOL 100 MG: 100 TABLET ORAL at 09:03

## 2017-03-11 RX ADMIN — PREDNISONE 60 MG: 20 TABLET ORAL at 09:03

## 2017-03-11 RX ADMIN — IPRATROPIUM BROMIDE AND ALBUTEROL SULFATE 3 ML: .5; 3 SOLUTION RESPIRATORY (INHALATION) at 05:03

## 2017-03-11 RX ADMIN — HEPARIN SODIUM 5000 UNITS: 5000 INJECTION, SOLUTION INTRAVENOUS; SUBCUTANEOUS at 09:03

## 2017-03-11 RX ADMIN — FLUTICASONE PROPIONATE 2 SPRAY: 50 SPRAY, METERED NASAL at 09:03

## 2017-03-11 RX ADMIN — IPRATROPIUM BROMIDE AND ALBUTEROL SULFATE 3 ML: .5; 3 SOLUTION RESPIRATORY (INHALATION) at 11:03

## 2017-03-11 RX ADMIN — IPRATROPIUM BROMIDE AND ALBUTEROL SULFATE 3 ML: .5; 3 SOLUTION RESPIRATORY (INHALATION) at 08:03

## 2017-03-11 RX ADMIN — CETIRIZINE HYDROCHLORIDE 5 MG: 5 TABLET, FILM COATED ORAL at 09:03

## 2017-03-11 RX ADMIN — LEVOTHYROXINE SODIUM 50 MCG: 50 TABLET ORAL at 06:03

## 2017-03-11 RX ADMIN — CARVEDILOL 25 MG: 25 TABLET, FILM COATED ORAL at 09:03

## 2017-03-11 RX ADMIN — PANTOPRAZOLE SODIUM 600 MG: 40 TABLET, DELAYED RELEASE ORAL at 09:03

## 2017-03-11 RX ADMIN — ATORVASTATIN CALCIUM 80 MG: 20 TABLET, FILM COATED ORAL at 09:03

## 2017-03-11 RX ADMIN — SODIUM BICARBONATE 650 MG TABLET 1300 MG: at 09:03

## 2017-03-11 RX ADMIN — ASPIRIN 81 MG CHEWABLE TABLET 81 MG: 81 TABLET CHEWABLE at 09:03

## 2017-03-11 RX ADMIN — FUROSEMIDE 60 MG: 10 INJECTION, SOLUTION INTRAMUSCULAR; INTRAVENOUS at 09:03

## 2017-03-11 RX ADMIN — QUETIAPINE FUMARATE 200 MG: 200 TABLET, FILM COATED ORAL at 04:03

## 2017-03-11 RX ADMIN — IPRATROPIUM BROMIDE AND ALBUTEROL SULFATE 3 ML: .5; 3 SOLUTION RESPIRATORY (INHALATION) at 09:03

## 2017-03-11 RX ADMIN — HEPARIN SODIUM 5000 UNITS: 5000 INJECTION, SOLUTION INTRAVENOUS; SUBCUTANEOUS at 01:03

## 2017-03-11 RX ADMIN — HEPARIN SODIUM 5000 UNITS: 5000 INJECTION, SOLUTION INTRAVENOUS; SUBCUTANEOUS at 06:03

## 2017-03-11 NOTE — ASSESSMENT & PLAN NOTE
- remains restless and agitated overnight. No evidence of acute infection, no electrolyte derangements   - Continue home Seroquel  - PRN ramelteon

## 2017-03-11 NOTE — ASSESSMENT & PLAN NOTE
- Cr elevated to 2.5 today. Holding IV lasix. Will consider adding back PO tomorrow depending on volume status/labs.   - hold home valsartan  - strict I/O's

## 2017-03-11 NOTE — SUBJECTIVE & OBJECTIVE
Interval History: Family reports patient slept well until around 1:30am, then was restless and at times confused about where she was.     Review of Systems   Constitutional: Negative for chills and fever.   Respiratory: Positive for shortness of breath. Negative for cough and chest tightness.    Cardiovascular: Positive for leg swelling. Negative for chest pain and palpitations.   Gastrointestinal: Negative for abdominal pain, nausea and vomiting.   Neurological: Negative for dizziness and light-headedness.   Psychiatric/Behavioral: Positive for sleep disturbance.     Objective:     Vital Signs (Most Recent):  Temp: 97.5 °F (36.4 °C) (03/11/17 1230)  Pulse: 67 (03/11/17 1230)  Resp: 18 (03/11/17 1230)  BP: (!) 90/53 (03/11/17 1230)  SpO2: 98 % (03/11/17 1230) Vital Signs (24h Range):  Temp:  [97.5 °F (36.4 °C)-98.7 °F (37.1 °C)] 97.5 °F (36.4 °C)  Pulse:  [62-78] 67  Resp:  [16-18] 18  SpO2:  [96 %-98 %] 98 %  BP: ()/(53-88) 90/53     Weight: 79.9 kg (176 lb 2.4 oz)  Body mass index is 27.59 kg/(m^2).    Intake/Output Summary (Last 24 hours) at 03/11/17 1315  Last data filed at 03/11/17 0423   Gross per 24 hour   Intake              720 ml   Output              630 ml   Net               90 ml      Physical Exam   Constitutional: She is oriented to person, place, and time. She appears well-developed and well-nourished. No distress.   Cardiovascular: Normal rate, regular rhythm and normal heart sounds.    No murmur heard.  Pulmonary/Chest: Effort normal and breath sounds normal. No respiratory distress. She has no wheezes. She has no rales.   Crackles to RLL, no wheezes or rhonchi.    Abdominal: Soft. Bowel sounds are normal. She exhibits no distension. There is no tenderness.   Musculoskeletal: Normal range of motion. She exhibits edema (1+ LE).   Neurological: She is alert and oriented to person, place, and time.   Skin: She is not diaphoretic.       Significant Labs: All pertinent labs within the past 24  hours have been reviewed.    Significant Imaging: I have reviewed all pertinent imaging results/findings within the past 24 hours.

## 2017-03-11 NOTE — PROGRESS NOTES
Cardiology  Progress Note                                                              Team: [facility]     Patient Name: Anai Sal   YOB: 1945  Location: [address] [place] [address] A    Admit Date: 3/7/2017                     LOS: 2    SUBJECTIVE     INTERVAL HISTORY:   Patient doing well this AM. States her shortness of breath has improved and denies episodes of chest pain since yesterday. She is upset she can not have salt on her food, however, I discussed this would likely worsen her heart failure symptoms. Of note, patient's sister noted increased confusion of patient, mostly at night. Patient oriented to self, place, but not year or month.    HPI:   71 y.o. woman with PMH of HTN, combined systolic and diastolic heart failure, CKD3, hypothyroidism, + 40 ppd smoking hx, and ?COPD, ?CAD who presented with shortness of breath and chest pain. Patient states she had worsening shortness of breath for the past few days and presented to the ED where she was given IV lasix with improvement in symptoms. Patient used to follow with [facility] where she states she had a cardiologist but does not remember the physician's name. ECHO 12/2016 with EF 40%, ECHO 2/2017 EF 25-30%, and repeat ECHO this admission EF unchanged from the one in February.      Patient states at baseline, she gets shortness of breath with exertion associated with chest pressure and lightheadedness, relieved with rest. Patient endorses orthopnea and sleeps with 3-4 pillows at night. She did notice weight gain which has been decreasing since receiving lasix. Patient normally takes 60 mg lasix po daily. She was scheduled for a spect, although per chart review it appears it is awaiting insurance authorization.     At home, patient was taking ASA and valsartan but was not on a beta blocker or statin.     REVIEW OF SYSTEMS:  Constitution: Negative for fever, chills, +weight change  HENT: Negative for sore throat, rhinorrhea, or headache.  Eyes: Negative  "for blurred or double vision.   Cardiovascular: See above  Pulmonary: Positive for SOB   Gastrointestinal: Negative for abdominal pain, nausea, vomiting, or diarrhea.   : Negative for dysuria.   Extremities: Positive for lower extremity swelling   Neurological: Negative for focal weakness or sensory changes.    MEDICATIONS:  Scheduled:   albuterol-ipratropium 2.5mg-0.5mg/3mL  3 mL Nebulization Q4H WAKE    allopurinol  100 mg Oral BID    aspirin  81 mg Oral Daily    atorvastatin  80 mg Oral QHS    benztropine  0.5 mg Oral Nightly    carvedilol  25 mg Oral BID    cetirizine  5 mg Oral Daily    fluticasone  2 spray Each Nare Daily    furosemide  60 mg Intravenous BID    guaifenesin  600 mg Oral BID    heparin (porcine)  5,000 Units Subcutaneous Q8H    levothyroxine  50 mcg Oral Before breakfast    predniSONE  60 mg Oral Daily    quetiapine  200 mg Oral Daily with dinner    sodium bicarbonate  1,300 mg Oral BID     Continuous:     PRN:  acetaminophen, dextrose 50%, dextrose 50%, gabapentin, glucagon (human recombinant), glucose, glucose, hydrALAZINE, ondansetron, ondansetron, ramelteon      OBJECTIVE:     VITALS  Most Recent Range (Last 24H)   BP (!) 91/57 (BP Location: Left arm, Patient Position: Lying, BP Method: Automatic)  Pulse 68  Temp 97.6 °F (36.4 °C) (Oral)   Resp 18  Ht 5' 7" (1.702 m)  Wt 79.9 kg (176 lb 2.4 oz)  SpO2 98%  Breastfeeding? No  BMI 27.59 kg/m2 Temp:  [97.6 °F (36.4 °C)-98.7 °F (37.1 °C)]   Pulse:  [63-83]   Resp:  [14-18]   BP: ()/(57-88)   SpO2:  [95 %-98 %]      Intake and Output  BMI     Intake/Output Summary (Last 24 hours) at 03/11/17 0765  Last data filed at 03/11/17 0423   Gross per 24 hour   Intake              720 ml   Output              630 ml   Net               90 ml       Net I/O since admission: Body mass index is 27.59 kg/(m^2).        PHYSICAL EXAM  Constitutional: NAD, conversant, occasionally short of breath when speaking  HEENT: Sclera " anicteric, PERRLA, EOMI  Neck: Unable to visualize JVD, no carotid bruits  CV: RRR, no murmur, normal S1/S2  Pulm: crackles appreciated at right lung base, otherwise CTAB. Occasionally short of breath when conversing   GI: Abdomen soft, NTND, +BS  Extremities: 2+ LE edema, warm and well perfused  Skin: No ecchymosis, erythema, or ulcers  Psych: alert and oriented to self, place, but not year  Neuro: CNII-XII intact, no focal deficits      LABS  CBC/Anemia Labs Coag Labs     Recent Labs  Lab 03/09/17  0351 03/10/17  0530 03/11/17  0412   WBC 3.57* 2.69* 3.17*   HGB 10.0* 10.1* 10.3*   HCT 29.4* 28.9* 29.1*   MCV 94 93 93    178 170    Lab Results   Component Value Date    INR 1.0 12/16/2016        BMP     Recent Labs  Lab 03/09/17  0351 03/10/17  0530 03/11/17  0412   GLU 92 114* 111*    141 140   K 3.7 4.7 5.1    109 109   CO2 21* 23 21*   BUN 42* 45* 56*   CREATININE 1.8* 2.0* 2.5*   CALCIUM 8.6* 9.0 8.9      Mag & Phos LFTs     Recent Labs  Lab 03/08/17 1959   MG 2.0   PHOS 4.0      Recent Labs  Lab 03/09/17  0351 03/10/17  0530 03/11/17  0412   PROT 6.9 7.1 6.6   BILITOT 1.2* 1.4* 1.1*   ALKPHOS 95 94 84   AST 33 30 35   ALT 26 24 26             Cardiac Enzymes Ejection Fractions/BNP     Recent Labs  Lab 03/08/17  1959 03/10/17  1744 03/11/17  0412   TROPONINI 0.058* 0.048* 0.053*    EF   Date Value Ref Range Status   03/10/2017 20 (A) 55 - 65    02/08/2017 25 (A) 55 - 65    12/19/2016 40 (A) 55 - 65        Recent Labs  Lab 03/08/17  0020 03/10/17  1215   BNP 1886* 3140*        Lab Results   Component Value Date    HGBA1C 4.2 (L) 01/11/2017         Microbiology Results (last 7 days)     ** No results found for the last 168 hours. **          INVESTIGATION RESULTS    Imaging Results         US Lower Extremity Veins Bilateral (Final result) Result time:  03/09/17 15:08:48    Final result by Kenyatta Reddy MD (03/09/17 15:08:48)    Impression:      #1. No evidence of left or right lower  extremity DVT.      Electronically signed by: HILARIO HARDY MD  Date:     03/09/17  Time:    15:08     Narrative:    HISTORY: Pain and swelling    COMPARISON: None    FINDINGS:Duplex scan of the bilateral lower extremities was performed using B-Mode/gray scale imaging and Doppler spectral analysis and color flow.      The bilateral Common Femoral, proximal GSV, Superficial Femoral, Popliteal, Peroneal, Anterior Tibial, and Posterior Tibial veins are patent and unremarkable.            X-Ray Chest PA And Lateral (Final result) Result time:  03/08/17 00:56:18    Final result by Leroy Vargas MD (03/08/17 00:56:18)    Impression:         Small right effusion with associated compressive atelectasis/scarring, otherwise, stable chronic findings.          Electronically signed by: LEROY VARGAS MD  Date:     03/08/17  Time:    00:56     Narrative:    Chest PA and lateral    Indication:Shortness of breath    Comparison:12/16/2016    Findings:  The cardiomediastinal silhouette is prominent, similar to the previous examination noting calcification of the aorta.  There is a right pleural effusion with associated atelectasis/scarring.  The trachea is midline.  The lungs are symmetrically expanded bilaterally with coarse interstitial attenuation bilaterally, similar to the previous exam. No large focal consolidation seen.  There is no pneumothorax.  The osseous structures are remarkable for degenerative changes of the spine and shoulders.  Postsurgical changes of right mastectomy noted.                ASSESSMENT/PLAN:     Current Problems List:  Active Hospital Problems    Diagnosis  POA    *Congestive heart failure [I50.9]  Yes    Essential hypertension [I10]  Yes    Chronic gout [M1A.9XX0]  Yes    Primary hypothyroidism [E03.9]  Yes    CKD (chronic kidney disease) stage 3, GFR 30-59 ml/min [N18.3]  Yes    Insomnia [G47.00]  Yes      Resolved Hospital Problems    Diagnosis Date Resolved POA   No resolved problems  to display.        ASSESSMENT:   71 y.o. year old female with 71 y.o. woman with PMH of HTN, combined systolic and diastolic heart failure, CKD3, hypothyroidism, and ?COPD, ?CAD who presented shortness of breath. Cardiology consulted for acute on chronic heart failure exacerbation.      PLAN:  Acute on chronic heart failure exacerbation  - patient presented with shortness of breath and chest pain, improved with IV lasix however remains with shortness of breath and LE edema   - 2/8/17 ECHO with EF 25%, repeat ECHO unchanged   - recommend continuing fluid restriction   - recommend strict I&Os and daily weights   - given worsening kidney function, would hold lasix iv today and would reassess the need for diuresis tomorrow. May consider po lasix tomorrow.    - recommend optimizing medical management. Agree with addition of coreg, would recommend continuing coreg to 25 mg bid for HR goal <60 to decrease oxygen demand  - recommend continuation of ASA and valsartan, atorvastatin to optimize medical management     Unstable angina  - patient endorses hx of chest pressure and shortness of breath that occurs at rest and with exertion  - 3/8/17 EKG concerning for inferior ischemia   - in setting of chest pain, recommend trending troponins q6h  - patient previously scheduled for spect, however awaiting insurance authorization. Agree with spect for ischemic workup.   - patient originally with cardiologist at [facility], however, she does not remember the name. Will attempt to obtain records from [facility] to determine if patient has had recent ischemic workup/prior heart cath  -with worsening kidney function, would defer diagnostic left heart cath. However, recommend PET stress test for ischemic workup   - patient will need establishment of care with cardiologist at [facility] upon discharge     Thank you for this consult.  Will continue to follow with you.    Discussed with cardiology and staff. Staff attestation to follow.     Bailee  Luzma Rizzo MD, PGY1   Pager: [phone]

## 2017-03-11 NOTE — PLAN OF CARE
Problem: Patient Care Overview  Goal: Plan of Care Review  Outcome: Ongoing (interventions implemented as appropriate)  Plan of care discussed with patient. Patient is free of fall/trauma/injury. Denies CP, SOB, or pain/discomfort. All questions addressed. Will continue to monitor.

## 2017-03-11 NOTE — PROGRESS NOTES
"[facility] Hospital Medicine  Progress Note    Patient Name: Anai Sal  MRN: [id number]  Patient Class: IP- Inpatient   Admission Date: 3/7/2017  Length of Stay: 2 days  Attending Physician: Servando Obrien MD  Primary Care Provider: Live Young MD    [facility] Medicine Team: [facility] Alma Jhaveri PA-C    Subjective:     Principal Problem:Congestive heart failure    HPI:  72 y/o female who presents to the ED with c/o worsening SOB that has progressed over the past few days. She has a PMH of CAD, CHF, HTN, hypothyroidism, and renal disease.  While in the ED she received 40 mg of lasix IV x1 and breathing treatments.  At this time she states her breathing has improved. However, she does state that she remains mildly SOB. She is tolerating room air with O2 saturations of %.  She denies any significant dietary changes, and states that she does not use salt.  She does state that she drinks "plenty" of water.  She denies HA, chills, nausea, or chest pain at this time. However, she states that at times she does have feeling of dizziness with sudden movement.  BNP on arrival was 1886.  Her BUN / Crt were elevated as well 41/2.0. CXR performed while in the ED suggestive of small right sided pleural effusion without overt signs of pulmonary edema. Her last ECHO was performed 2/8/17 and her EF was down to 25% from 40% which was done in December 2016.            Hospital Course:  Admitted for worsening SOB and exacerbation of CHF. Diuresed with IV furosemide with improvement in edema, held 3/11 due to worsening kidney function.  Prednisone started 3/9/17.  U/A negative, US BLE negative, repeat ECHO stable from ECHO of 2/8/17 with EF 25%. Added coreg. Cardiology consulted and following, increased coreg dose, added ASA and valsartan, atorvastatin. Recommended PET stress, pending at this time.     Interval History: Family reports patient slept well until around 1:30am, then was restless " and at times confused about where she was.     Review of Systems   Constitutional: Negative for chills and fever.   Respiratory: Positive for shortness of breath. Negative for cough and chest tightness.    Cardiovascular: Positive for leg swelling. Negative for chest pain and palpitations.   Gastrointestinal: Negative for abdominal pain, nausea and vomiting.   Neurological: Negative for dizziness and light-headedness.   Psychiatric/Behavioral: Positive for sleep disturbance.     Objective:     Vital Signs (Most Recent):  Temp: 97.5 °F (36.4 °C) (03/11/17 1230)  Pulse: 67 (03/11/17 1230)  Resp: 18 (03/11/17 1230)  BP: (!) 90/53 (03/11/17 1230)  SpO2: 98 % (03/11/17 1230) Vital Signs (24h Range):  Temp:  [97.5 °F (36.4 °C)-98.7 °F (37.1 °C)] 97.5 °F (36.4 °C)  Pulse:  [62-78] 67  Resp:  [16-18] 18  SpO2:  [96 %-98 %] 98 %  BP: ()/(53-88) 90/53     Weight: 79.9 kg (176 lb 2.4 oz)  Body mass index is 27.59 kg/(m^2).    Intake/Output Summary (Last 24 hours) at 03/11/17 1315  Last data filed at 03/11/17 0423   Gross per 24 hour   Intake              720 ml   Output              630 ml   Net               90 ml      Physical Exam   Constitutional: She is oriented to person, place, and time. She appears well-developed and well-nourished. No distress.   Cardiovascular: Normal rate, regular rhythm and normal heart sounds.    No murmur heard.  Pulmonary/Chest: Effort normal and breath sounds normal. No respiratory distress. She has no wheezes. She has no rales.   Crackles to RLL, no wheezes or rhonchi.    Abdominal: Soft. Bowel sounds are normal. She exhibits no distension. There is no tenderness.   Musculoskeletal: Normal range of motion. She exhibits edema (1+ LE).   Neurological: She is alert and oriented to person, place, and time.   Skin: She is not diaphoretic.       Significant Labs: All pertinent labs within the past 24 hours have been reviewed.    Significant Imaging: I have reviewed all pertinent imaging  results/findings within the past 24 hours.    Assessment/Plan:      * Congestive heart failure  - Cardiology consulted and following. Shortness of breath and LE edema with some improvement since admission.   - Holding IV lasix today given increased Cr. Consider adding PO lasix back tomorrow.   - 1500 fluid restriction  - Strict intake/output, daily weights  - repeat ECHO stable from prior, EF 20-25%  - Continue optimizing medical management - continue increased coreg, ASA, valsartan, atorvastatin  - Troponins trended, appears to be at baseline. No report of chest pain today.  - Order for PET stress placed per cardiology recommendations      CKD (chronic kidney disease) stage 3, GFR 30-59 ml/min  - Cr elevated to 2.5 today. Holding IV lasix. Will consider adding back PO tomorrow depending on volume status/labs.   - hold home valsartan  - strict I/O's        Insomnia  - remains restless and agitated overnight. No evidence of acute infection, no electrolyte derangements   - Continue home Seroquel  - PRN ramelteon        Essential hypertension  - BP stable   - continue to hold valsartan  - add PRN hydralazine for SBP greater than 160      Primary hypothyroidism  - TSH 16,692 - T4 1.10  - continue levothyroxine as prescribed       Chronic gout  - no active flares   - continue home allopurinol     VTE Risk Mitigation         Ordered     heparin (porcine) injection 5,000 Units  Every 8 hours     Route:  Subcutaneous        03/08/17 0902     Medium Risk of VTE  Once      03/08/17 0902     Place JANIA hose  Until discontinued      03/08/17 0902     Place sequential compression device  Until discontinued      03/08/17 0902          Alma Jhaveri PA-C  Department of Hospital Medicine   [facility]  Staff: Dr. Obrien

## 2017-03-11 NOTE — PROGRESS NOTES
Pt hasn't urinated since 1300. Lasix received at 1700. Encouraged pt to sit on toilet. Pt refused.  Family member noted patient tends to hold urine. Bladder scan pt shows 381cc. Encourage pt to use patient- pt stated she doesn't has the  Urge to go. MD notified. MD Delon notified stated no need to use in and out cath and to wait until pt has urge to go. No orders at this time. Will continue to monitor.

## 2017-03-11 NOTE — ASSESSMENT & PLAN NOTE
- Cardiology consulted and following. Shortness of breath and LE edema with some improvement since admission.   - Holding IV lasix today given increased Cr. Consider adding PO lasix back tomorrow.   - 1500 fluid restriction  - Strict intake/output, daily weights  - repeat ECHO stable from prior, EF 20-25%  - Continue optimizing medical management - continue increased coreg, ASA, valsartan, atorvastatin  - Troponins trended, appears to be at baseline. No report of chest pain today.  - Order for PET stress placed per cardiology recommendations

## 2017-03-11 NOTE — PLAN OF CARE
Problem: Patient Care Overview  Goal: Plan of Care Review  Outcome: Ongoing (interventions implemented as appropriate)  Lasix increase to 60mg BID IVP 1.5L fluid restriction reinforced to patient and family. BNP 3140. Pt sleeping a lot better tonight. Sister at bedside.Plan of care reviewed with patient. Pt has no complaints of any pain or SOB. VSS. Fall precaution in place. No acute events overnight. Will continue to monitor

## 2017-03-11 NOTE — NURSING
Notified MD on call that pt experienced 10 beat run of v tach on tele. Pt asymptomatic, sleeping. Pt also hypotensive with most recent BP of 87/54. No new orders at this time, will continue to monitor.

## 2017-03-12 LAB
ALBUMIN SERPL BCP-MCNC: 2.9 G/DL
ALP SERPL-CCNC: 78 U/L
ALT SERPL W/O P-5'-P-CCNC: 41 U/L
ANION GAP SERPL CALC-SCNC: 13 MMOL/L
AST SERPL-CCNC: 60 U/L
BACTERIA #/AREA URNS AUTO: ABNORMAL /HPF
BILIRUB SERPL-MCNC: 0.8 MG/DL
BILIRUB UR QL STRIP: NEGATIVE
BUN SERPL-MCNC: 71 MG/DL
CALCIUM SERPL-MCNC: 8.4 MG/DL
CHLORIDE SERPL-SCNC: 107 MMOL/L
CLARITY UR REFRACT.AUTO: CLEAR
CO2 SERPL-SCNC: 17 MMOL/L
COLOR UR AUTO: YELLOW
CREAT SERPL-MCNC: 3.2 MG/DL
CREAT UR-MCNC: 125 MG/DL
EST. GFR  (AFRICAN AMERICAN): 16.1 ML/MIN/1.73 M^2
EST. GFR  (NON AFRICAN AMERICAN): 13.9 ML/MIN/1.73 M^2
GLUCOSE SERPL-MCNC: 120 MG/DL
GLUCOSE UR QL STRIP: NEGATIVE
HGB UR QL STRIP: NEGATIVE
HYALINE CASTS UR QL AUTO: 4 /LPF
KETONES UR QL STRIP: NEGATIVE
LEUKOCYTE ESTERASE UR QL STRIP: NEGATIVE
MICROSCOPIC COMMENT: ABNORMAL
NITRITE UR QL STRIP: NEGATIVE
PH UR STRIP: 6 [PH] (ref 5–8)
POTASSIUM SERPL-SCNC: 5.3 MMOL/L
PROT SERPL-MCNC: 6.6 G/DL
PROT UR QL STRIP: ABNORMAL
PROT UR-MCNC: 29 MG/DL
PROT/CREAT RATIO, UR: 0.23
RBC #/AREA URNS AUTO: 1 /HPF (ref 0–4)
SODIUM SERPL-SCNC: 137 MMOL/L
SP GR UR STRIP: 1.01 (ref 1–1.03)
SQUAMOUS #/AREA URNS AUTO: 1 /HPF
URN SPEC COLLECT METH UR: ABNORMAL
UROBILINOGEN UR STRIP-ACNC: NEGATIVE EU/DL
WBC #/AREA URNS AUTO: 1 /HPF (ref 0–5)

## 2017-03-12 PROCEDURE — 25000003 PHARM REV CODE 250: Performed by: NURSE PRACTITIONER

## 2017-03-12 PROCEDURE — 99232 SBSQ HOSP IP/OBS MODERATE 35: CPT | Mod: ,,, | Performed by: INTERNAL MEDICINE

## 2017-03-12 PROCEDURE — 82570 ASSAY OF URINE CREATININE: CPT

## 2017-03-12 PROCEDURE — 99232 SBSQ HOSP IP/OBS MODERATE 35: CPT | Mod: ,,, | Performed by: PHYSICIAN ASSISTANT

## 2017-03-12 PROCEDURE — 25000242 PHARM REV CODE 250 ALT 637 W/ HCPCS: Performed by: NURSE PRACTITIONER

## 2017-03-12 PROCEDURE — 81001 URINALYSIS AUTO W/SCOPE: CPT

## 2017-03-12 PROCEDURE — 94640 AIRWAY INHALATION TREATMENT: CPT

## 2017-03-12 PROCEDURE — 11000001 HC ACUTE MED/SURG PRIVATE ROOM

## 2017-03-12 PROCEDURE — 63600175 PHARM REV CODE 636 W HCPCS: Performed by: PHYSICIAN ASSISTANT

## 2017-03-12 PROCEDURE — 25000003 PHARM REV CODE 250: Performed by: HOSPITALIST

## 2017-03-12 PROCEDURE — 25000003 PHARM REV CODE 250: Performed by: PHYSICIAN ASSISTANT

## 2017-03-12 PROCEDURE — 80053 COMPREHEN METABOLIC PANEL: CPT

## 2017-03-12 RX ORDER — CARVEDILOL 12.5 MG/1
12.5 TABLET ORAL 2 TIMES DAILY
Status: DISCONTINUED | OUTPATIENT
Start: 2017-03-13 | End: 2017-03-16 | Stop reason: HOSPADM

## 2017-03-12 RX ORDER — VALSARTAN 160 MG/1
160 TABLET ORAL DAILY
Status: DISCONTINUED | OUTPATIENT
Start: 2017-03-13 | End: 2017-03-12

## 2017-03-12 RX ORDER — CARVEDILOL 12.5 MG/1
12.5 TABLET ORAL 2 TIMES DAILY
Status: DISCONTINUED | OUTPATIENT
Start: 2017-03-12 | End: 2017-03-12

## 2017-03-12 RX ORDER — IPRATROPIUM BROMIDE AND ALBUTEROL SULFATE 2.5; .5 MG/3ML; MG/3ML
3 SOLUTION RESPIRATORY (INHALATION) EVERY 4 HOURS PRN
Status: DISCONTINUED | OUTPATIENT
Start: 2017-03-12 | End: 2017-03-16 | Stop reason: HOSPADM

## 2017-03-12 RX ADMIN — PANTOPRAZOLE SODIUM 600 MG: 40 TABLET, DELAYED RELEASE ORAL at 09:03

## 2017-03-12 RX ADMIN — ALLOPURINOL 100 MG: 100 TABLET ORAL at 09:03

## 2017-03-12 RX ADMIN — HEPARIN SODIUM 5000 UNITS: 5000 INJECTION, SOLUTION INTRAVENOUS; SUBCUTANEOUS at 05:03

## 2017-03-12 RX ADMIN — PREDNISONE 60 MG: 20 TABLET ORAL at 09:03

## 2017-03-12 RX ADMIN — ATORVASTATIN CALCIUM 80 MG: 20 TABLET, FILM COATED ORAL at 09:03

## 2017-03-12 RX ADMIN — ASPIRIN 81 MG CHEWABLE TABLET 81 MG: 81 TABLET CHEWABLE at 09:03

## 2017-03-12 RX ADMIN — HEPARIN SODIUM 5000 UNITS: 5000 INJECTION, SOLUTION INTRAVENOUS; SUBCUTANEOUS at 09:03

## 2017-03-12 RX ADMIN — CETIRIZINE HYDROCHLORIDE 5 MG: 5 TABLET, FILM COATED ORAL at 09:03

## 2017-03-12 RX ADMIN — FLUTICASONE PROPIONATE 2 SPRAY: 50 SPRAY, METERED NASAL at 09:03

## 2017-03-12 RX ADMIN — SODIUM BICARBONATE 650 MG TABLET 1300 MG: at 09:03

## 2017-03-12 RX ADMIN — LEVOTHYROXINE SODIUM 50 MCG: 50 TABLET ORAL at 05:03

## 2017-03-12 RX ADMIN — HEPARIN SODIUM 5000 UNITS: 5000 INJECTION, SOLUTION INTRAVENOUS; SUBCUTANEOUS at 02:03

## 2017-03-12 RX ADMIN — BENZTROPINE MESYLATE 0.5 MG: 0.5 TABLET ORAL at 09:03

## 2017-03-12 RX ADMIN — IPRATROPIUM BROMIDE AND ALBUTEROL SULFATE 3 ML: .5; 3 SOLUTION RESPIRATORY (INHALATION) at 09:03

## 2017-03-12 NOTE — ASSESSMENT & PLAN NOTE
- Cr increased to 3.2 today. Continue holding IV lasix. Will consider adding back PO depending on volume status/labs.   - Continue to hold home valsartan  - strict I/O's

## 2017-03-12 NOTE — PROGRESS NOTES
Cardiology  Progress Note                                                              Team: [facility]     Patient Name: Anai Sal   YOB: 1945  Location: [facility]    Admit Date: 3/7/2017                     LOS: 3    SUBJECTIVE     INTERVAL HISTORY:   Patient doing well this AM. States her shortness of breath has improved and denies episodes of chest pain since yesterday. Of note, patient's sister noted increased confusion of patient, mostly at night. Patient oriented to self, place, but not year or month.    HPI:   71 y.o. woman with PMH of HTN, combined systolic and diastolic heart failure, CKD3, hypothyroidism, + 40 ppd smoking hx, and ?COPD, ?CAD who presented with shortness of breath and chest pain. Patient states she had worsening shortness of breath for the past few days and presented to the ED where she was given IV lasix with improvement in symptoms. Patient used to follow with [facility] where she states she had a cardiologist but does not remember the physician's name. ECHO 12/2016 with EF 40%, ECHO 2/2017 EF 25-30%, and repeat ECHO this admission EF unchanged from the one in February.      Patient states at baseline, she gets shortness of breath with exertion associated with chest pressure and lightheadedness, relieved with rest. Patient endorses orthopnea and sleeps with 3-4 pillows at night. She did notice weight gain which has been decreasing since receiving lasix. Patient normally takes 60 mg lasix po daily. She was scheduled for a spect, although per chart review it appears it is awaiting insurance authorization.     At home, patient was taking ASA and valsartan but was not on a beta blocker or statin.     REVIEW OF SYSTEMS:  Constitution: Negative for fever, chills, +weight change  HENT: Negative for sore throat, rhinorrhea, or headache.  Eyes: Negative for blurred or double vision.   Cardiovascular: See above  Pulmonary: Positive for SOB (improving)   Gastrointestinal:  "Negative for abdominal pain, nausea, vomiting, or diarrhea.   : Negative for dysuria.   Extremities: Positive for lower extremity swelling   Neurological: Negative for focal weakness or sensory changes.    MEDICATIONS:  Scheduled:   allopurinol  100 mg Oral BID    aspirin  81 mg Oral Daily    atorvastatin  80 mg Oral QHS    benztropine  0.5 mg Oral Nightly    [START ON 3/13/2017] carvedilol  12.5 mg Oral BID    cetirizine  5 mg Oral Daily    fluticasone  2 spray Each Nare Daily    guaifenesin  600 mg Oral BID    heparin (porcine)  5,000 Units Subcutaneous Q8H    levothyroxine  50 mcg Oral Before breakfast    quetiapine  200 mg Oral Daily with dinner    sodium bicarbonate  1,300 mg Oral BID     Continuous:     PRN:  acetaminophen, albuterol-ipratropium 2.5mg-0.5mg/3mL, dextrose 50%, dextrose 50%, gabapentin, glucagon (human recombinant), glucose, glucose, hydrALAZINE, ondansetron, ondansetron, ramelteon      OBJECTIVE:     VITALS  Most Recent Range (Last 24H)   BP (!) 85/52 (BP Location: Right arm, Patient Position: Lying, BP Method: Automatic)  Pulse 60  Temp 97.5 °F (36.4 °C) (Oral)   Resp 16  Ht 5' 7" (1.702 m)  Wt 80.9 kg (178 lb 5.6 oz)  SpO2 99%  Breastfeeding? No  BMI 27.93 kg/m2 Temp:  [97.1 °F (36.2 °C)-97.6 °F (36.4 °C)]   Pulse:  [56-70]   Resp:  [12-20]   BP: ()/(52-82)   SpO2:  [97 %-99 %]      Intake and Output  BMI     Intake/Output Summary (Last 24 hours) at 03/12/17 1145  Last data filed at 03/12/17 0600   Gross per 24 hour   Intake              500 ml   Output              500 ml   Net                0 ml       Net I/O since admission: Body mass index is 27.93 kg/(m^2).        PHYSICAL EXAM  Constitutional: NAD, conversant, occasionally short of breath when speaking  HEENT: Sclera anicteric, PERRLA, EOMI  Neck: Unable to visualize JVD, no carotid bruits  CV: RRR, no murmur, normal S1/S2  Pulm: crackles appreciated at right lung base, otherwise CTAB. Occasionally short of " breath when conversing   GI: Abdomen soft, NTND, +BS  Extremities: 2+ LE edema, warm and well perfused  Skin: No ecchymosis, erythema, or ulcers  Psych: alert and oriented to self, place, but not year  Neuro: CNII-XII intact, no focal deficits      LABS  CBC/Anemia Labs Coag Labs     Recent Labs  Lab 03/09/17  0351 03/10/17  0530 03/11/17  0412   WBC 3.57* 2.69* 3.17*   HGB 10.0* 10.1* 10.3*   HCT 29.4* 28.9* 29.1*   MCV 94 93 93    178 170    Lab Results   Component Value Date    INR 1.0 12/16/2016        BMP     Recent Labs  Lab 03/10/17  0530 03/11/17  0412 03/12/17  0430   * 111* 120*    140 137   K 4.7 5.1 5.3*    109 107   CO2 23 21* 17*   BUN 45* 56* 71*   CREATININE 2.0* 2.5* 3.2*   CALCIUM 9.0 8.9 8.4*      Mag & Phos LFTs     Recent Labs  Lab 03/08/17 1959   MG 2.0   PHOS 4.0      Recent Labs  Lab 03/10/17  0530 03/11/17  0412 03/12/17  0430   PROT 7.1 6.6 6.6   BILITOT 1.4* 1.1* 0.8   ALKPHOS 94 84 78   AST 30 35 60*   ALT 24 26 41             Cardiac Enzymes Ejection Fractions/BNP     Recent Labs  Lab 03/08/17  1959 03/10/17  1744 03/11/17  0412   TROPONINI 0.058* 0.048* 0.053*    EF   Date Value Ref Range Status   03/10/2017 20 (A) 55 - 65    02/08/2017 25 (A) 55 - 65    12/19/2016 40 (A) 55 - 65        Recent Labs  Lab 03/08/17  0020 03/10/17  1215   BNP 1886* 3140*        Lab Results   Component Value Date    HGBA1C 4.2 (L) 01/11/2017         Microbiology Results (last 7 days)     ** No results found for the last 168 hours. **          INVESTIGATION RESULTS    Imaging Results         US Lower Extremity Veins Bilateral (Final result) Result time:  03/09/17 15:08:48    Final result by Kenyatta Hardy MD (03/09/17 15:08:48)    Impression:      #1. No evidence of left or right lower extremity DVT.      Electronically signed by: KENYATTA HARDY MD  Date:     03/09/17  Time:    15:08     Narrative:    HISTORY: Pain and swelling    COMPARISON: None    FINDINGS:Duplex scan of the  bilateral lower extremities was performed using B-Mode/gray scale imaging and Doppler spectral analysis and color flow.      The bilateral Common Femoral, proximal GSV, Superficial Femoral, Popliteal, Peroneal, Anterior Tibial, and Posterior Tibial veins are patent and unremarkable.            X-Ray Chest PA And Lateral (Final result) Result time:  03/08/17 00:56:18    Final result by Leroy Vargas MD (03/08/17 00:56:18)    Impression:         Small right effusion with associated compressive atelectasis/scarring, otherwise, stable chronic findings.          Electronically signed by: LEROY VARGAS MD  Date:     03/08/17  Time:    00:56     Narrative:    Chest PA and lateral    Indication:Shortness of breath    Comparison:12/16/2016    Findings:  The cardiomediastinal silhouette is prominent, similar to the previous examination noting calcification of the aorta.  There is a right pleural effusion with associated atelectasis/scarring.  The trachea is midline.  The lungs are symmetrically expanded bilaterally with coarse interstitial attenuation bilaterally, similar to the previous exam. No large focal consolidation seen.  There is no pneumothorax.  The osseous structures are remarkable for degenerative changes of the spine and shoulders.  Postsurgical changes of right mastectomy noted.                ASSESSMENT/PLAN:     Current Problems List:  Active Hospital Problems    Diagnosis  POA    *Acute on chronic congestive heart failure [I50.9]  Yes    Essential hypertension [I10]  Yes    Chronic gout [M1A.9XX0]  Yes    Primary hypothyroidism [E03.9]  Yes    CKD (chronic kidney disease) stage 3, GFR 30-59 ml/min [N18.3]  Yes    Insomnia [G47.00]  Yes      Resolved Hospital Problems    Diagnosis Date Resolved POA   No resolved problems to display.        ASSESSMENT:   71 y.o. year old female with 71 y.o. woman with PMH of HTN, combined systolic and diastolic heart failure, CKD3, hypothyroidism, and ?COPD, ?CAD  who presented shortness of breath. Cardiology consulted for acute on chronic heart failure exacerbation.      PLAN:  Acute on chronic heart failure exacerbation  - patient presented with shortness of breath and chest pain, improved with IV lasix however remains with shortness of breath and LE edema   - 2/8/17 ECHO with EF 25%, repeat ECHO unchanged   - recommend continuing fluid restriction   - recommend strict I&Os and daily weights   - given worsening kidney function, continue to hold lasix iv today, for patient remains hemodynamically stable on fluid overload stand point. May consider po lasix tomorrow.    - recommend continuation of ASA and valsartan, coreg, atorvastatin to optimize medical management     Unstable angina  - patient endorses hx of chest pressure and shortness of breath that occurs at rest and with exertion  - 3/8/17 EKG concerning for inferior ischemia   - in setting of chest pain, recommend trending troponins q6h  - patient previously scheduled for spect, however awaiting insurance authorization. Agree with spect for ischemic workup.   - patient originally with cardiologist at [facility], however, she does not remember the name. Will attempt to obtain records from [facility] to determine if patient has had recent ischemic workup/prior heart cath  -with worsening kidney function, would defer diagnostic left heart cath. However, recommended PET stress test for ischemic workup   - patient will need establishment of care with cardiologist at [facility] upon discharge     Thank you for this consult.  Will continue to follow with you.    Discussed with cardiology and staff. Staff attestation to follow.     Sultana Fierro MD  PGY1  Page: [phone]

## 2017-03-12 NOTE — PROGRESS NOTES
"[facility] Hospital Medicine  Progress Note    Patient Name: Anai Sal  MRN: [id number]  Patient Class: IP- Inpatient   Admission Date: 3/7/2017  Length of Stay: 3 days  Attending Physician: Servando Obrien MD  Primary Care Provider: Live Young MD    [facility] Medicine Team: [facility]OSP MED E Alma Jhaveri PA-C    Subjective:     Principal Problem:Acute on chronic congestive heart failure    HPI:  70 y/o female who presents to the ED with c/o worsening SOB that has progressed over the past few days. She has a PMH of CAD, CHF, HTN, hypothyroidism, and renal disease.  While in the ED she received 40 mg of lasix IV x1 and breathing treatments.  At this time she states her breathing has improved. However, she does state that she remains mildly SOB. She is tolerating room air with O2 saturations of %.  She denies any significant dietary changes, and states that she does not use salt.  She does state that she drinks "plenty" of water.  She denies HA, chills, nausea, or chest pain at this time. However, she states that at times she does have feeling of dizziness with sudden movement.  BNP on arrival was 1886.  Her BUN / Crt were elevated as well 41/2.0. CXR performed while in the ED suggestive of small right sided pleural effusion without overt signs of pulmonary edema. Her last ECHO was performed 2/8/17 and her EF was down to 25% from 40% which was done in December 2016.            Hospital Course:  Admitted for worsening SOB and exacerbation of CHF. Diuresed with IV furosemide with improvement in edema, held 3/11 and 3/12 due to worsening kidney function.  Prednisone started 3/9/17.  U/A negative, US BLE negative, repeat ECHO stable from ECHO of 2/8/17 with EF 25%. Added coreg. Cardiology consulted and following, increased coreg dose, added ASA and valsartan, atorvastatin. Recommended PET stress, pending at this time.     Interval History: No acute events overnight. Reports breathing is " continuing to improve. Patient appears to have had better night of sleeping per nursing notes.    Review of Systems   Constitutional: Negative for chills and fever.   Respiratory: Positive for shortness of breath (improving). Negative for cough and chest tightness.    Cardiovascular: Positive for leg swelling (improving). Negative for chest pain and palpitations.   Gastrointestinal: Negative for abdominal pain, nausea and vomiting.   Neurological: Negative for dizziness and light-headedness.   Psychiatric/Behavioral: Positive for sleep disturbance.     Objective:     Vital Signs (Most Recent):  Temp: 97.5 °F (36.4 °C) (03/12/17 1134)  Pulse: 60 (03/12/17 1134)  Resp: 16 (03/12/17 1134)  BP: (!) 85/52 (03/12/17 1134)  SpO2: 99 % (03/12/17 1134) Vital Signs (24h Range):  Temp:  [97.1 °F (36.2 °C)-97.6 °F (36.4 °C)] 97.5 °F (36.4 °C)  Pulse:  [56-70] 60  Resp:  [12-20] 16  SpO2:  [97 %-99 %] 99 %  BP: ()/(52-82) 85/52     Weight: 80.9 kg (178 lb 5.6 oz)  Body mass index is 27.93 kg/(m^2).    Intake/Output Summary (Last 24 hours) at 03/12/17 1212  Last data filed at 03/12/17 0600   Gross per 24 hour   Intake              500 ml   Output              500 ml   Net                0 ml      Physical Exam   Constitutional: She is oriented to person, place, and time. She appears well-developed and well-nourished. No distress.   Cardiovascular: Normal rate, regular rhythm and normal heart sounds.    No murmur heard.  Pulmonary/Chest: Effort normal and breath sounds normal. No respiratory distress. She has no wheezes. She has no rales.   Mild crackles to RLL, no wheezes or rhonchi.    Abdominal: Soft. Bowel sounds are normal. She exhibits no distension. There is no tenderness.   Musculoskeletal: Normal range of motion. She exhibits no edema (improved).   Neurological: She is alert and oriented to person, place, and time.       Significant Labs: All pertinent labs within the past 24 hours have been reviewed.    Significant  Imaging: I have reviewed all pertinent imaging results/findings within the past 24 hours.    Assessment/Plan:      * Acute on chronic congestive heart failure  - Cardiology consulted and following. Shortness of breath and LE edema improving since admission.   - Holding IV lasix again today given increased Cr. Consider adding PO lasix back once kidney function improving.   - 1500 fluid restriction  - Strict intake/output, daily weights  - repeat ECHO stable from prior, EF 20-25%  - Continue optimizing medical management - continue increased coreg, ASA, valsartan, atorvastatin. Holding coreg and valsartan today given low BP.  - Troponins trended, appears to be at baseline. No report of chest pain today.  - Order for PET stress placed per cardiology recommendations, pending      CKD (chronic kidney disease) stage 3, GFR 30-59 ml/min  - Cr increased to 3.2 today. Continue holding IV lasix. Will consider adding back PO depending on volume status/labs.   - Continue to hold home valsartan  - strict I/O's        Insomnia  - No nursing notes reporting agitation overnight, no family at bedside this am. No evidence of acute infection, no electrolyte derangements   - Continue home Seroquel  - PRN ramelteon        Essential hypertension  - BP stable   - continue to hold valsartan  - add PRN hydralazine for SBP greater than 160      Primary hypothyroidism  - TSH 16.692 - T4 1.10  - continue levothyroxine as prescribed       Chronic gout  - no active flares   - continue home allopurinol     VTE Risk Mitigation         Ordered     heparin (porcine) injection 5,000 Units  Every 8 hours     Route:  Subcutaneous        03/08/17 0902     Medium Risk of VTE  Once      03/08/17 0902     Place JANIA hose  Until discontinued      03/08/17 0902     Place sequential compression device  Until discontinued      03/08/17 0902          Alma Jhaveri PA-C  Department of Hospital Medicine   [facility]  Staff: Dr. Obrien

## 2017-03-12 NOTE — ASSESSMENT & PLAN NOTE
- Cardiology consulted and following. Shortness of breath and LE edema improving since admission.   - Holding IV lasix again today given increased Cr. Consider adding PO lasix back once kidney function improving.   - 1500 fluid restriction  - Strict intake/output, daily weights  - repeat ECHO stable from prior, EF 20-25%  - Continue optimizing medical management - continue increased coreg, ASA, valsartan, atorvastatin. Holding coreg and valsartan today given low BP.  - Troponins trended, appears to be at baseline. No report of chest pain today.  - Order for PET stress placed per cardiology recommendations, pending

## 2017-03-12 NOTE — ASSESSMENT & PLAN NOTE
- No nursing notes reporting agitation overnight, no family at bedside this am. No evidence of acute infection, no electrolyte derangements   - Continue home Seroquel  - PRN ramelteon

## 2017-03-12 NOTE — PLAN OF CARE
Problem: Patient Care Overview  Goal: Plan of Care Review  Outcome: Ongoing (interventions implemented as appropriate)  Plan of care reviewed with patient. Lasix on hold due to CR increase Cr 2.5. Possible change to PO Lasix tomorrow. Pet stress test ordered. Dose of Seroquel given at 1600. Pt sleeping well with no interruptions. VSS. Fall precaution in place. No acute events overnight. Will continue to monitor

## 2017-03-12 NOTE — SUBJECTIVE & OBJECTIVE
Interval History: No acute events overnight. Reports breathing is continuing to improve. Patient appears to have had better night of sleeping per nursing notes.    Review of Systems   Constitutional: Negative for chills and fever.   Respiratory: Positive for shortness of breath (improving). Negative for cough and chest tightness.    Cardiovascular: Positive for leg swelling (improving). Negative for chest pain and palpitations.   Gastrointestinal: Negative for abdominal pain, nausea and vomiting.   Neurological: Negative for dizziness and light-headedness.   Psychiatric/Behavioral: Positive for sleep disturbance.     Objective:     Vital Signs (Most Recent):  Temp: 97.5 °F (36.4 °C) (03/12/17 1134)  Pulse: 60 (03/12/17 1134)  Resp: 16 (03/12/17 1134)  BP: (!) 85/52 (03/12/17 1134)  SpO2: 99 % (03/12/17 1134) Vital Signs (24h Range):  Temp:  [97.1 °F (36.2 °C)-97.6 °F (36.4 °C)] 97.5 °F (36.4 °C)  Pulse:  [56-70] 60  Resp:  [12-20] 16  SpO2:  [97 %-99 %] 99 %  BP: ()/(52-82) 85/52     Weight: 80.9 kg (178 lb 5.6 oz)  Body mass index is 27.93 kg/(m^2).    Intake/Output Summary (Last 24 hours) at 03/12/17 1212  Last data filed at 03/12/17 0600   Gross per 24 hour   Intake              500 ml   Output              500 ml   Net                0 ml      Physical Exam   Constitutional: She is oriented to person, place, and time. She appears well-developed and well-nourished. No distress.   Cardiovascular: Normal rate, regular rhythm and normal heart sounds.    No murmur heard.  Pulmonary/Chest: Effort normal and breath sounds normal. No respiratory distress. She has no wheezes. She has no rales.   Mild crackles to RLL, no wheezes or rhonchi.    Abdominal: Soft. Bowel sounds are normal. She exhibits no distension. There is no tenderness.   Musculoskeletal: Normal range of motion. She exhibits no edema (improved).   Neurological: She is alert and oriented to person, place, and time.       Significant Labs: All  pertinent labs within the past 24 hours have been reviewed.    Significant Imaging: I have reviewed all pertinent imaging results/findings within the past 24 hours.

## 2017-03-13 LAB
ALBUMIN SERPL BCP-MCNC: 3.1 G/DL
ALP SERPL-CCNC: 83 U/L
ALT SERPL W/O P-5'-P-CCNC: 58 U/L
ANION GAP SERPL CALC-SCNC: 14 MMOL/L
AST SERPL-CCNC: 73 U/L
BASOPHILS # BLD AUTO: 0 K/UL
BASOPHILS NFR BLD: 0 %
BILIRUB SERPL-MCNC: 0.7 MG/DL
BUN SERPL-MCNC: 86 MG/DL
CALCIUM SERPL-MCNC: 8.5 MG/DL
CHLORIDE SERPL-SCNC: 104 MMOL/L
CO2 SERPL-SCNC: 20 MMOL/L
CREAT SERPL-MCNC: 3.4 MG/DL
DIFFERENTIAL METHOD: ABNORMAL
EOSINOPHIL # BLD AUTO: 0 K/UL
EOSINOPHIL NFR BLD: 0 %
ERYTHROCYTE [DISTWIDTH] IN BLOOD BY AUTOMATED COUNT: 14.1 %
EST. GFR  (AFRICAN AMERICAN): 14.9 ML/MIN/1.73 M^2
EST. GFR  (NON AFRICAN AMERICAN): 12.9 ML/MIN/1.73 M^2
GLUCOSE SERPL-MCNC: 127 MG/DL
HCT VFR BLD AUTO: 29.3 %
HGB BLD-MCNC: 10.5 G/DL
LYMPHOCYTES # BLD AUTO: 0.6 K/UL
LYMPHOCYTES NFR BLD: 20.2 %
MCH RBC QN AUTO: 32.5 PG
MCHC RBC AUTO-ENTMCNC: 35.8 %
MCV RBC AUTO: 91 FL
MONOCYTES # BLD AUTO: 0.2 K/UL
MONOCYTES NFR BLD: 7 %
NEUTROPHILS # BLD AUTO: 2.2 K/UL
NEUTROPHILS NFR BLD: 72.8 %
PLATELET # BLD AUTO: 176 K/UL
PMV BLD AUTO: 12.7 FL
POTASSIUM SERPL-SCNC: 4.6 MMOL/L
PROT SERPL-MCNC: 6.9 G/DL
RBC # BLD AUTO: 3.23 M/UL
SODIUM SERPL-SCNC: 138 MMOL/L
WBC # BLD AUTO: 3.02 K/UL

## 2017-03-13 PROCEDURE — 99232 SBSQ HOSP IP/OBS MODERATE 35: CPT | Mod: ,,, | Performed by: PHYSICIAN ASSISTANT

## 2017-03-13 PROCEDURE — 11000001 HC ACUTE MED/SURG PRIVATE ROOM

## 2017-03-13 PROCEDURE — 36415 COLL VENOUS BLD VENIPUNCTURE: CPT

## 2017-03-13 PROCEDURE — 25000003 PHARM REV CODE 250: Performed by: NURSE PRACTITIONER

## 2017-03-13 PROCEDURE — 25000003 PHARM REV CODE 250: Performed by: PHYSICIAN ASSISTANT

## 2017-03-13 PROCEDURE — 99232 SBSQ HOSP IP/OBS MODERATE 35: CPT | Mod: ,,, | Performed by: INTERNAL MEDICINE

## 2017-03-13 PROCEDURE — 85025 COMPLETE CBC W/AUTO DIFF WBC: CPT

## 2017-03-13 PROCEDURE — 80053 COMPREHEN METABOLIC PANEL: CPT

## 2017-03-13 PROCEDURE — 25000003 PHARM REV CODE 250: Performed by: HOSPITALIST

## 2017-03-13 RX ADMIN — SODIUM BICARBONATE 650 MG TABLET 1300 MG: at 08:03

## 2017-03-13 RX ADMIN — CARVEDILOL 12.5 MG: 12.5 TABLET, FILM COATED ORAL at 08:03

## 2017-03-13 RX ADMIN — HEPARIN SODIUM 5000 UNITS: 5000 INJECTION, SOLUTION INTRAVENOUS; SUBCUTANEOUS at 05:03

## 2017-03-13 RX ADMIN — CARVEDILOL 12.5 MG: 12.5 TABLET, FILM COATED ORAL at 09:03

## 2017-03-13 RX ADMIN — HEPARIN SODIUM 5000 UNITS: 5000 INJECTION, SOLUTION INTRAVENOUS; SUBCUTANEOUS at 01:03

## 2017-03-13 RX ADMIN — ASPIRIN 81 MG CHEWABLE TABLET 81 MG: 81 TABLET CHEWABLE at 08:03

## 2017-03-13 RX ADMIN — BENZTROPINE MESYLATE 0.5 MG: 0.5 TABLET ORAL at 09:03

## 2017-03-13 RX ADMIN — SODIUM BICARBONATE 650 MG TABLET 1300 MG: at 09:03

## 2017-03-13 RX ADMIN — ATORVASTATIN CALCIUM 80 MG: 20 TABLET, FILM COATED ORAL at 09:03

## 2017-03-13 RX ADMIN — CETIRIZINE HYDROCHLORIDE 5 MG: 5 TABLET, FILM COATED ORAL at 08:03

## 2017-03-13 RX ADMIN — LEVOTHYROXINE SODIUM 50 MCG: 50 TABLET ORAL at 05:03

## 2017-03-13 RX ADMIN — FLUTICASONE PROPIONATE 2 SPRAY: 50 SPRAY, METERED NASAL at 08:03

## 2017-03-13 RX ADMIN — HEPARIN SODIUM 5000 UNITS: 5000 INJECTION, SOLUTION INTRAVENOUS; SUBCUTANEOUS at 09:03

## 2017-03-13 NOTE — SUBJECTIVE & OBJECTIVE
Interval History: No acute events overnight. Slept well per daughter at bedside. Walked around the floor yesterday, daughter reports she became short of breath with farther distance but appears somewhat improved since admission. Not back to baseline at this point.    Review of Systems   Constitutional: Negative for chills and fever.   Respiratory: Positive for shortness of breath (improving). Negative for cough and chest tightness.    Cardiovascular: Positive for leg swelling (improving). Negative for chest pain and palpitations.   Gastrointestinal: Negative for abdominal pain, nausea and vomiting.   Neurological: Negative for dizziness and light-headedness.   Psychiatric/Behavioral: Negative for agitation and sleep disturbance.     Objective:     Vital Signs (Most Recent):  Temp: 97.7 °F (36.5 °C) (03/13/17 0830)  Pulse: 63 (03/13/17 0900)  Resp: 18 (03/13/17 0830)  BP: 119/74 (03/13/17 0830)  SpO2: 97 % (03/13/17 0830) Vital Signs (24h Range):  Temp:  [97.5 °F (36.4 °C)-98.1 °F (36.7 °C)] 97.7 °F (36.5 °C)  Pulse:  [56-74] 63  Resp:  [16-18] 18  SpO2:  [96 %-99 %] 97 %  BP: ()/(52-79) 119/74     Weight: 81.4 kg (179 lb 7.3 oz)  Body mass index is 28.11 kg/(m^2).    Intake/Output Summary (Last 24 hours) at 03/13/17 1059  Last data filed at 03/13/17 0500   Gross per 24 hour   Intake              240 ml   Output              975 ml   Net             -735 ml      Physical Exam   Constitutional: She is oriented to person, place, and time. She appears well-developed and well-nourished. No distress.   Cardiovascular: Normal rate, regular rhythm and normal heart sounds.    No murmur heard.  Pulmonary/Chest: Effort normal and breath sounds normal. No respiratory distress. She has no wheezes. She has no rales.   Abdominal: Soft. Bowel sounds are normal. She exhibits no distension. There is no tenderness.   Musculoskeletal: Normal range of motion. She exhibits no edema (improved).   Neurological: She is alert and  oriented to person, place, and time.       Significant Labs: All pertinent labs within the past 24 hours have been reviewed.    Significant Imaging: I have reviewed all pertinent imaging results/findings within the past 24 hours.

## 2017-03-13 NOTE — ASSESSMENT & PLAN NOTE
- Cr mildly increased to 3.4 today. Continue holding IV lasix, holding allopurinol. Will consider adding back PO depending on volume status/labs.   - Continue to hold home valsartan  - strict I/O's

## 2017-03-13 NOTE — ASSESSMENT & PLAN NOTE
- Cardiology consulted and following. Shortness of breath and LE edema improving since admission.   - Holding IV lasix again today given increased Cr. Consider adding PO lasix back once kidney function improving.   - 1500 fluid restriction  - Strict intake/output, daily weights  - repeat ECHO stable from prior, EF 20-25%  - Continue optimizing medical management - continue increased coreg, ASA, valsartan, atorvastatin. Restarted coreg at lower dose. Will restart valsartan when CANDI improved.   - Troponins trended, appears to be at baseline. No report of chest pain today.  - Order for PET stress placed per cardiology recommendations, pending (likely will not get done until Wednesday)  - NM spect ordered per cardiology recommendations for ischemic evaluation, pending today. Will consider JEN to evaluate mitral valve as showing moderate-severe regurgitation, could be contributing to shortness of breath.

## 2017-03-13 NOTE — PLAN OF CARE
Problem: Patient Care Overview  Goal: Plan of Care Review  Outcome: Ongoing (interventions implemented as appropriate)  Pt verbalizes no complaints. Denies CP, SOB, or other discomforts. Pt lasix is on hold r/t increased creatinine. Pt stress test moved to tomorrow. Notified physician.  Pt remains free of fall or injury. Pt verbalizes understanding of plan of care. Will continue to monitor.

## 2017-03-13 NOTE — PLAN OF CARE
Problem: Patient Care Overview  Goal: Plan of Care Review  Outcome: Ongoing (interventions implemented as appropriate)  Plan of care discussed with pt. Pt free of falls/trauma/injuries this shift. Daughter at bedside. NPO tonight. Pet stress test planned. Pt sleeping throughout night. VSS & NADN. Pt denies CP, SOB, or pain/discomfort at this time. All questions addressed.  Will continue to monitor.

## 2017-03-13 NOTE — PROGRESS NOTES
Cardiology  Progress Note                                                              Team: [facility]     Patient Name: Anai Sal   YOB: 1945  Location: 356/[facility]    Admit Date: 3/7/2017                     LOS: 4    SUBJECTIVE     INTERVAL HISTORY:   Patient states shortness of breath has improved. Denies significant chest pain. Endorses some palpitations this AM. Oriented to self, place, day, but not year or month. States leg swelling seems improved.    Per daughter, patient's breathing appears improved, however, she does have some dyspnea on exertion.     HPI:   71 y.o. woman with PMH of HTN, combined systolic and diastolic heart failure, HCV, CKD3, hypothyroidism, + 40 ppd smoking hx, and ?COPD, ?CAD who presented with shortness of breath and chest pain. Patient states she had worsening shortness of breath for the past few days and presented to the ED where she was given IV lasix with improvement in symptoms. [facility] she states she had a cardiologist but does not remember the physician's name. ECHO 12/2016 with EF 40%, ECHO 2/2017 EF 25-30%, and repeat ECHO this admission EF unchanged from the one in February.      Patient states at baseline, she gets shortness of breath with exertion associated with chest pressure and lightheadedness, relieved with rest. Patient endorses orthopnea and sleeps with 3-4 pillows at night. She did notice weight gain which has been decreasing since receiving lasix. Patient normally takes 60 mg lasix po daily. She was scheduled for a spect, although per chart review it appears it is awaiting insurance authorization.     At home, patient was taking ASA and valsartan but was not on a beta blocker or statin.     REVIEW OF SYSTEMS:  Constitution: Negative for fever, chills, +weight change  HENT: Negative for sore throat, rhinorrhea, or headache.  Eyes: Negative for blurred or double vision.   Cardiovascular: See  "above  Pulmonary: Positive for SOB (improving)   Gastrointestinal: Negative for abdominal pain, nausea, vomiting, or diarrhea.   : Negative for dysuria.   Extremities: Positive for lower extremity swelling   Neurological: Negative for focal weakness or sensory changes.    MEDICATIONS:  Scheduled:   allopurinol  100 mg Oral BID    aspirin  81 mg Oral Daily    atorvastatin  80 mg Oral QHS    benztropine  0.5 mg Oral Nightly    carvedilol  12.5 mg Oral BID    cetirizine  5 mg Oral Daily    fluticasone  2 spray Each Nare Daily    heparin (porcine)  5,000 Units Subcutaneous Q8H    levothyroxine  50 mcg Oral Before breakfast    quetiapine  200 mg Oral Daily with dinner    sodium bicarbonate  1,300 mg Oral BID     Continuous:     PRN:  acetaminophen, albuterol-ipratropium 2.5mg-0.5mg/3mL, dextrose 50%, dextrose 50%, gabapentin, glucagon (human recombinant), glucose, glucose, hydrALAZINE, ondansetron, ondansetron, ramelteon      OBJECTIVE:     VITALS  Most Recent Range (Last 24H)   /79 (BP Location: Left arm, Patient Position: Lying, BP Method: Automatic)  Pulse 67  Temp 97.7 °F (36.5 °C) (Oral)   Resp 18  Ht 5' 7" (1.702 m)  Wt 81.4 kg (179 lb 7.3 oz)  SpO2 97%  Breastfeeding? No  BMI 28.11 kg/m2 Temp:  [97.5 °F (36.4 °C)-98.1 °F (36.7 °C)]   Pulse:  [56-74]   Resp:  [16-18]   BP: ()/(52-79)   SpO2:  [96 %-99 %]      Intake and Output  BMI     Intake/Output Summary (Last 24 hours) at 03/13/17 0658  Last data filed at 03/13/17 0500   Gross per 24 hour   Intake              240 ml   Output              975 ml   Net             -735 ml       Net I/O since admission: Body mass index is 28.11 kg/(m^2).        PHYSICAL EXAM  Constitutional: awake, alert, NAD  HEENT: Sclera anicteric, PERRLA, EOMI  Neck: Unable to visualize JVD, no carotid bruits  CV: RRR, no murmur, normal S1/S2  Pulm: clear to auscultation bilaterally   GI: Abdomen soft, NTND, +BS  Extremities: 1+ LE edema, warm and well " perfused  Skin: No ecchymosis, erythema, or ulcers  Psych: alert and oriented to self, place, day but not year or month   Neuro: CNII-XII intact, no focal deficits      LABS  CBC/Anemia Labs Coag Labs     Recent Labs  Lab 03/10/17  0530 03/11/17  0412 03/13/17  0414   WBC 2.69* 3.17* 3.02*   HGB 10.1* 10.3* 10.5*   HCT 28.9* 29.1* 29.3*   MCV 93 93 91    170 176    Lab Results   Component Value Date    INR 1.0 12/16/2016        BMP     Recent Labs  Lab 03/11/17  0412 03/12/17  0430 03/13/17  0414   * 120* 127*    137 138   K 5.1 5.3* 4.6    107 104   CO2 21* 17* 20*   BUN 56* 71* 86*   CREATININE 2.5* 3.2* 3.4*   CALCIUM 8.9 8.4* 8.5*      Mag & Phos LFTs     Recent Labs  Lab 03/08/17 1959   MG 2.0   PHOS 4.0      Recent Labs  Lab 03/11/17  0412 03/12/17  0430 03/13/17  0414   PROT 6.6 6.6 6.9   BILITOT 1.1* 0.8 0.7   ALKPHOS 84 78 83   AST 35 60* 73*   ALT 26 41 58*             Cardiac Enzymes Ejection Fractions/BNP     Recent Labs  Lab 03/08/17  1959 03/10/17  1744 03/11/17  0412   TROPONINI 0.058* 0.048* 0.053*    EF   Date Value Ref Range Status   03/10/2017 20 (A) 55 - 65    02/08/2017 25 (A) 55 - 65    12/19/2016 40 (A) 55 - 65        Recent Labs  Lab 03/08/17  0020 03/10/17  1215   BNP 1886* 3140*        Lab Results   Component Value Date    HGBA1C 4.2 (L) 01/11/2017         Microbiology Results (last 7 days)     ** No results found for the last 168 hours. **          INVESTIGATION RESULTS    Imaging Results         US Lower Extremity Veins Bilateral (Final result) Result time:  03/09/17 15:08:48    Final result by Kenyatta Hardy MD (03/09/17 15:08:48)    Impression:      #1. No evidence of left or right lower extremity DVT.      Electronically signed by: KENYATTA HARDY MD  Date:     03/09/17  Time:    15:08     Narrative:    HISTORY: Pain and swelling    COMPARISON: None    FINDINGS:Duplex scan of the bilateral lower extremities was performed using B-Mode/gray scale imaging  and Doppler spectral analysis and color flow.      The bilateral Common Femoral, proximal GSV, Superficial Femoral, Popliteal, Peroneal, Anterior Tibial, and Posterior Tibial veins are patent and unremarkable.            X-Ray Chest PA And Lateral (Final result) Result time:  03/08/17 00:56:18    Final result by Leroy Vargas MD (03/08/17 00:56:18)    Impression:         Small right effusion with associated compressive atelectasis/scarring, otherwise, stable chronic findings.          Electronically signed by: LEROY VARGAS MD  Date:     03/08/17  Time:    00:56     Narrative:    Chest PA and lateral    Indication:Shortness of breath    Comparison:12/16/2016    Findings:  The cardiomediastinal silhouette is prominent, similar to the previous examination noting calcification of the aorta.  There is a right pleural effusion with associated atelectasis/scarring.  The trachea is midline.  The lungs are symmetrically expanded bilaterally with coarse interstitial attenuation bilaterally, similar to the previous exam. No large focal consolidation seen.  There is no pneumothorax.  The osseous structures are remarkable for degenerative changes of the spine and shoulders.  Postsurgical changes of right mastectomy noted.                ASSESSMENT/PLAN:     Current Problems List:  Active Hospital Problems    Diagnosis  POA    *Acute on chronic congestive heart failure [I50.9]  Yes    Essential hypertension [I10]  Yes    Chronic gout [M1A.9XX0]  Yes    Primary hypothyroidism [E03.9]  Yes    CKD (chronic kidney disease) stage 3, GFR 30-59 ml/min [N18.3]  Yes    Insomnia [G47.00]  Yes      Resolved Hospital Problems    Diagnosis Date Resolved POA   No resolved problems to display.        ASSESSMENT:   71 y.o. year old female with 71 y.o. woman with PMH of HTN, combined systolic and diastolic heart failure, CKD3, HCV, hypothyroidism, and ?COPD, ?CAD who presented shortness of breath. Cardiology consulted for acute on  chronic heart failure exacerbation.    PLAN:  Acute on chronic heart failure exacerbation  - patient presented with shortness of breath and chest pain, improved with IV lasix however remains with shortness of breath and LE edema   - 2/8/17 ECHO with EF 25%, repeat ECHO unchanged   - recommend continuing fluid restriction   - recommend strict I&Os and daily weights   - given worsening kidney function, continue to hold lasix iv today, for patient remains hemodynamically stable on fluid overload stand point. May consider po lasix if appears volume overloaded tomorrow.     - recommend continuation of ASA , coreg, atorvastatin to optimize medical management. Recommend continuation of valsartan when CANDI improves.      Unstable angina  - patient endorses hx of chest pressure and shortness of breath that occurs at rest and with exertion  - 3/8/17 EKG concerning for inferior ischemia   - in setting of chest pain, recommend trending troponins q6h  - patient previously scheduled for spect, however awaiting insurance authorization. Agree with spect for ischemic workup.   - patient originally with cardiologist at [facility], however, she does not remember the name. Will attempt to obtain records from [facility] to determine if patient has had recent ischemic workup/prior heart cath  -with worsening kidney function, would defer diagnostic left heart cath. However, recommend NM spect for ischemic evaluation (order has been placed), as PET stress test unavailable until Wednesday.   - will consider JEN to evaluate mitral valve, as showing moderate-severe regurgitation and could be complicating heart failure symptoms  - patient follows Dr. Live in heart failure clinic. Will need f/u 1-2 weeks post-discharge.    Thank you for this consult.  Will continue to follow with you.    Discussed with cardiology and staff. Staff attestation to follow.     Bailee Rizzo MD, PGY1  Pager: [phone]

## 2017-03-13 NOTE — PLAN OF CARE
03/13/17 1516   Right Care Assessment   Can the patient answer the patient profile reliably? Yes, cognitively intact   How often would a person be available to care for the patient? Often   Describe the patient's ability to walk at the present time. Walks with the help of equipment   How does the patient rate their overall health at the present time? Poor   Number of comorbid conditions (as recorded on the chart) Three   During the past month, has the patient often been bothered by feeling down, depressed or hopeless? No   During the past month, has the patient often been bothered by little interest or pleasure in doing things? No     Discharge pending completion of further cardiology recommendations.  Patient currently is a [facility] patient.

## 2017-03-13 NOTE — ASSESSMENT & PLAN NOTE
- Slept well overnight per daughter at bedside.. No evidence of acute infection, no electrolyte derangements   - Continue home Seroquel  - PRN ramelteon

## 2017-03-13 NOTE — PROGRESS NOTES
"[facility] Hospital Medicine  Progress Note    Patient Name: Anai Sal  MRN: [id number]  Patient Class: IP- Inpatient   Admission Date: 3/7/2017  Length of Stay: 4 days  Attending Physician: Servando Obrien MD  Primary Care Provider: Live Young MD    [facility] Medicine Team: [facility] Alma Jhaveri PA-C    Subjective:     Principal Problem:Acute on chronic congestive heart failure    HPI:  70 y/o female who presents to the ED with c/o worsening SOB that has progressed over the past few days. She has a PMH of CAD, CHF, HTN, hypothyroidism, and renal disease.  While in the ED she received 40 mg of lasix IV x1 and breathing treatments.  At this time she states her breathing has improved. However, she does state that she remains mildly SOB. She is tolerating room air with O2 saturations of %.  She denies any significant dietary changes, and states that she does not use salt.  She does state that she drinks "plenty" of water.  She denies HA, chills, nausea, or chest pain at this time. However, she states that at times she does have feeling of dizziness with sudden movement.  BNP on arrival was 1886.  Her BUN / Crt were elevated as well 41/2.0. CXR performed while in the ED suggestive of small right sided pleural effusion without overt signs of pulmonary edema. Her last ECHO was performed 2/8/17 and her EF was down to 25% from 40% which was done in December 2016.            Hospital Course:  Admitted for worsening SOB and exacerbation of CHF. Diuresed with IV furosemide with improvement in edema, held 3/11 and 3/12 due to worsening kidney function.  Prednisone started 3/9/17.  U/A negative, US BLE negative, repeat ECHO stable from ECHO of 2/8/17 with EF 25%. Added coreg. Cardiology consulted and following, increased coreg dose, added ASA and valsartan, atorvastatin. Recommended PET stress, pending at this time. Completing NM spect for ischemic evaluation 3/13.    Interval History: " No acute events overnight. Slept well per daughter at bedside. Walked around the floor yesterday, daughter reports she became short of breath with farther distance but appears somewhat improved since admission. Not back to baseline at this point.    Review of Systems   Constitutional: Negative for chills and fever.   Respiratory: Positive for shortness of breath (improving). Negative for cough and chest tightness.    Cardiovascular: Positive for leg swelling (improving). Negative for chest pain and palpitations.   Gastrointestinal: Negative for abdominal pain, nausea and vomiting.   Neurological: Negative for dizziness and light-headedness.   Psychiatric/Behavioral: Negative for agitation and sleep disturbance.     Objective:     Vital Signs (Most Recent):  Temp: 97.7 °F (36.5 °C) (03/13/17 0830)  Pulse: 63 (03/13/17 0900)  Resp: 18 (03/13/17 0830)  BP: 119/74 (03/13/17 0830)  SpO2: 97 % (03/13/17 0830) Vital Signs (24h Range):  Temp:  [97.5 °F (36.4 °C)-98.1 °F (36.7 °C)] 97.7 °F (36.5 °C)  Pulse:  [56-74] 63  Resp:  [16-18] 18  SpO2:  [96 %-99 %] 97 %  BP: ()/(52-79) 119/74     Weight: 81.4 kg (179 lb 7.3 oz)  Body mass index is 28.11 kg/(m^2).    Intake/Output Summary (Last 24 hours) at 03/13/17 1059  Last data filed at 03/13/17 0500   Gross per 24 hour   Intake              240 ml   Output              975 ml   Net             -735 ml      Physical Exam   Constitutional: She is oriented to person, place, and time. She appears well-developed and well-nourished. No distress.   Cardiovascular: Normal rate, regular rhythm and normal heart sounds.    No murmur heard.  Pulmonary/Chest: Effort normal and breath sounds normal. No respiratory distress. She has no wheezes. She has no rales.   Abdominal: Soft. Bowel sounds are normal. She exhibits no distension. There is no tenderness.   Musculoskeletal: Normal range of motion. She exhibits no edema (improved).   Neurological: She is alert and oriented to person,  place, and time.       Significant Labs: All pertinent labs within the past 24 hours have been reviewed.    Significant Imaging: I have reviewed all pertinent imaging results/findings within the past 24 hours.    Assessment/Plan:      * Acute on chronic congestive heart failure  - Cardiology consulted and following. Shortness of breath and LE edema improving since admission.   - Holding IV lasix again today given increased Cr. Consider adding PO lasix back once kidney function improving.   - 1500 fluid restriction  - Strict intake/output, daily weights  - repeat ECHO stable from prior, EF 20-25%  - Continue optimizing medical management - continue increased coreg, ASA, valsartan, atorvastatin. Restarted coreg at lower dose. Will restart valsartan when CANDI improved.   - Troponins trended, appears to be at baseline. No report of chest pain today.  - Order for PET stress placed per cardiology recommendations, pending (likely will not get done until Wednesday)  - NM spect ordered per cardiology recommendations for ischemic evaluation, pending today. Will consider JEN to evaluate mitral valve as showing moderate-severe regurgitation, could be contributing to shortness of breath.      CKD (chronic kidney disease) stage 3, GFR 30-59 ml/min  - Cr mildly increased to 3.4 today. Continue holding IV lasix, holding allopurinol. Will consider adding back PO depending on volume status/labs.   - Continue to hold home valsartan  - strict I/O's        Insomnia  - Slept well overnight per daughter at bedside.. No evidence of acute infection, no electrolyte derangements   - Continue home Seroquel  - PRN ramelteon        Essential hypertension  - BP stable   - continue to hold valsartan  - add PRN hydralazine for SBP greater than 160      Primary hypothyroidism  - TSH 16.692 - T4 1.10  - continue levothyroxine as prescribed       Chronic gout  - no active flares   - Holding home allopurinol in setting of CANDI    VTE Risk Mitigation          Ordered     heparin (porcine) injection 5,000 Units  Every 8 hours     Route:  Subcutaneous        03/08/17 0902     Medium Risk of VTE  Once      03/08/17 0902     Place JANIA hose  Until discontinued      03/08/17 0902     Place sequential compression device  Until discontinued      03/08/17 0902          Alma Jhaveri PA-C  Department of Hospital Medicine   [facility]  Staff: Dr. Obrien

## 2017-03-14 PROBLEM — I20.0 UNSTABLE ANGINA: Status: ACTIVE | Noted: 2017-03-14

## 2017-03-14 PROBLEM — N17.9 AKI (ACUTE KIDNEY INJURY): Status: ACTIVE | Noted: 2017-03-14

## 2017-03-14 PROBLEM — B18.2 CHRONIC HEPATITIS C VIRUS INFECTION: Status: ACTIVE | Noted: 2017-03-14

## 2017-03-14 LAB
ALBUMIN SERPL BCP-MCNC: 3 G/DL
ALP SERPL-CCNC: 80 U/L
ALT SERPL W/O P-5'-P-CCNC: 65 U/L
ANION GAP SERPL CALC-SCNC: 11 MMOL/L
AORTIC VALVE REGURGITATION: ABNORMAL
AST SERPL-CCNC: 70 U/L
BASOPHILS # BLD AUTO: 0.01 K/UL
BASOPHILS NFR BLD: 0.2 %
BILIRUB SERPL-MCNC: 0.8 MG/DL
BUN SERPL-MCNC: 91 MG/DL
C3 SERPL-MCNC: 60 MG/DL
C4 SERPL-MCNC: 19 MG/DL
CALCIUM SERPL-MCNC: 8.3 MG/DL
CHLORIDE SERPL-SCNC: 108 MMOL/L
CK MB SERPL-MCNC: 2.3 NG/ML
CK MB SERPL-RTO: 4.1 %
CK SERPL-CCNC: 56 U/L
CK SERPL-CCNC: 56 U/L
CO2 SERPL-SCNC: 21 MMOL/L
CREAT SERPL-MCNC: 3.2 MG/DL
DIASTOLIC DYSFUNCTION: YES
DIFFERENTIAL METHOD: ABNORMAL
EOSINOPHIL # BLD AUTO: 0 K/UL
EOSINOPHIL NFR BLD: 0.2 %
ERYTHROCYTE [DISTWIDTH] IN BLOOD BY AUTOMATED COUNT: 14.5 %
EST. GFR  (AFRICAN AMERICAN): 16.1 ML/MIN/1.73 M^2
EST. GFR  (NON AFRICAN AMERICAN): 13.9 ML/MIN/1.73 M^2
ESTIMATED PA SYSTOLIC PRESSURE: 60.16
GLUCOSE SERPL-MCNC: 108 MG/DL
HCT VFR BLD AUTO: 29.3 %
HGB BLD-MCNC: 10.3 G/DL
LYMPHOCYTES # BLD AUTO: 1.5 K/UL
LYMPHOCYTES NFR BLD: 34.5 %
MCH RBC QN AUTO: 32.8 PG
MCHC RBC AUTO-ENTMCNC: 35.2 %
MCV RBC AUTO: 93 FL
MITRAL VALVE MOBILITY: NORMAL
MITRAL VALVE REGURGITATION: ABNORMAL
MONOCYTES # BLD AUTO: 0.8 K/UL
MONOCYTES NFR BLD: 17.8 %
NEUTROPHILS # BLD AUTO: 2 K/UL
NEUTROPHILS NFR BLD: 46.8 %
PLATELET # BLD AUTO: 171 K/UL
PMV BLD AUTO: 12.9 FL
POTASSIUM SERPL-SCNC: 4.2 MMOL/L
PROT SERPL-MCNC: 6.6 G/DL
RBC # BLD AUTO: 3.14 M/UL
RETIRED EF AND QEF - SEE NOTES: 20 (ref 55–65)
SODIUM SERPL-SCNC: 140 MMOL/L
TRICUSPID VALVE REGURGITATION: ABNORMAL
TROPONIN I SERPL DL<=0.01 NG/ML-MCNC: 0.12 NG/ML
TROPONIN I SERPL DL<=0.01 NG/ML-MCNC: 0.12 NG/ML
URATE SERPL-MCNC: 8.7 MG/DL
WBC # BLD AUTO: 4.32 K/UL

## 2017-03-14 PROCEDURE — 25000003 PHARM REV CODE 250: Performed by: STUDENT IN AN ORGANIZED HEALTH CARE EDUCATION/TRAINING PROGRAM

## 2017-03-14 PROCEDURE — 85025 COMPLETE CBC W/AUTO DIFF WBC: CPT

## 2017-03-14 PROCEDURE — 25000003 PHARM REV CODE 250: Performed by: NURSE PRACTITIONER

## 2017-03-14 PROCEDURE — 80053 COMPREHEN METABOLIC PANEL: CPT

## 2017-03-14 PROCEDURE — 93010 ELECTROCARDIOGRAM REPORT: CPT | Mod: ,,, | Performed by: INTERNAL MEDICINE

## 2017-03-14 PROCEDURE — 99233 SBSQ HOSP IP/OBS HIGH 50: CPT | Mod: ,,, | Performed by: PHYSICIAN ASSISTANT

## 2017-03-14 PROCEDURE — 99223 1ST HOSP IP/OBS HIGH 75: CPT | Mod: ,,, | Performed by: INTERNAL MEDICINE

## 2017-03-14 PROCEDURE — 97535 SELF CARE MNGMENT TRAINING: CPT

## 2017-03-14 PROCEDURE — 25000003 PHARM REV CODE 250: Performed by: PHYSICIAN ASSISTANT

## 2017-03-14 PROCEDURE — 93306 TTE W/DOPPLER COMPLETE: CPT

## 2017-03-14 PROCEDURE — 86160 COMPLEMENT ANTIGEN: CPT | Mod: 59

## 2017-03-14 PROCEDURE — 11000001 HC ACUTE MED/SURG PRIVATE ROOM

## 2017-03-14 PROCEDURE — 86160 COMPLEMENT ANTIGEN: CPT

## 2017-03-14 PROCEDURE — 93306 TTE W/DOPPLER COMPLETE: CPT | Mod: 26,,, | Performed by: INTERNAL MEDICINE

## 2017-03-14 PROCEDURE — 84484 ASSAY OF TROPONIN QUANT: CPT | Mod: 91

## 2017-03-14 PROCEDURE — 84550 ASSAY OF BLOOD/URIC ACID: CPT

## 2017-03-14 PROCEDURE — 36415 COLL VENOUS BLD VENIPUNCTURE: CPT

## 2017-03-14 PROCEDURE — 82553 CREATINE MB FRACTION: CPT

## 2017-03-14 PROCEDURE — 99232 SBSQ HOSP IP/OBS MODERATE 35: CPT | Mod: ,,, | Performed by: INTERNAL MEDICINE

## 2017-03-14 PROCEDURE — 25000003 PHARM REV CODE 250: Performed by: HOSPITALIST

## 2017-03-14 PROCEDURE — 97530 THERAPEUTIC ACTIVITIES: CPT

## 2017-03-14 PROCEDURE — 93005 ELECTROCARDIOGRAM TRACING: CPT

## 2017-03-14 RX ORDER — NITROGLYCERIN 0.4 MG/1
0.4 TABLET SUBLINGUAL EVERY 5 MIN PRN
Status: DISCONTINUED | OUTPATIENT
Start: 2017-03-14 | End: 2017-03-16 | Stop reason: HOSPADM

## 2017-03-14 RX ORDER — CLOPIDOGREL BISULFATE 75 MG/1
75 TABLET ORAL DAILY
Status: DISCONTINUED | OUTPATIENT
Start: 2017-03-15 | End: 2017-03-16

## 2017-03-14 RX ORDER — CLOPIDOGREL 300 MG/1
300 TABLET, FILM COATED ORAL ONCE
Status: COMPLETED | OUTPATIENT
Start: 2017-03-14 | End: 2017-03-14

## 2017-03-14 RX ADMIN — HEPARIN SODIUM 5000 UNITS: 5000 INJECTION, SOLUTION INTRAVENOUS; SUBCUTANEOUS at 02:03

## 2017-03-14 RX ADMIN — ASPIRIN 81 MG CHEWABLE TABLET 81 MG: 81 TABLET CHEWABLE at 10:03

## 2017-03-14 RX ADMIN — SODIUM BICARBONATE 650 MG TABLET 1300 MG: at 09:03

## 2017-03-14 RX ADMIN — CARVEDILOL 12.5 MG: 12.5 TABLET, FILM COATED ORAL at 10:03

## 2017-03-14 RX ADMIN — SODIUM BICARBONATE 650 MG TABLET 1300 MG: at 08:03

## 2017-03-14 RX ADMIN — ATORVASTATIN CALCIUM 80 MG: 20 TABLET, FILM COATED ORAL at 08:03

## 2017-03-14 RX ADMIN — HEPARIN SODIUM 5000 UNITS: 5000 INJECTION, SOLUTION INTRAVENOUS; SUBCUTANEOUS at 08:03

## 2017-03-14 RX ADMIN — HEPARIN SODIUM 5000 UNITS: 5000 INJECTION, SOLUTION INTRAVENOUS; SUBCUTANEOUS at 05:03

## 2017-03-14 RX ADMIN — BENZTROPINE MESYLATE 0.5 MG: 0.5 TABLET ORAL at 08:03

## 2017-03-14 RX ADMIN — LEVOTHYROXINE SODIUM 50 MCG: 50 TABLET ORAL at 05:03

## 2017-03-14 RX ADMIN — QUETIAPINE FUMARATE 200 MG: 200 TABLET, FILM COATED ORAL at 11:03

## 2017-03-14 RX ADMIN — CLOPIDOGREL BISULFATE 300 MG: 300 TABLET, FILM COATED ORAL at 11:03

## 2017-03-14 RX ADMIN — CARVEDILOL 12.5 MG: 12.5 TABLET, FILM COATED ORAL at 08:03

## 2017-03-14 RX ADMIN — CETIRIZINE HYDROCHLORIDE 5 MG: 5 TABLET, FILM COATED ORAL at 10:03

## 2017-03-14 RX ADMIN — FLUTICASONE PROPIONATE 2 SPRAY: 50 SPRAY, METERED NASAL at 10:03

## 2017-03-14 NOTE — CONSULTS
Consult Note  Nephrology    Consult Requested By: Servando Obrien MD  Reason for Consult: CANDI on CKD stage 3     SUBJECTIVE:     History of Present Illness:  Patient is a 71 y.o. female admitted 3/7/2017 presented to the ED with c/o worsening SOB due to acute on CHF (systolic with EF of 20% and diastolic dysfunction) . She has a PMH of CAD, CHF, HTN, hypothyroidism and CKD stage 3. Pt was initially started on diuretics until 3/11 but then held due to worsening kidney function.      Renal consulted for CANDI on CKD. Pt being followed at renal clinic by Dr. Benoit. Seen last on 2/8/2017. She does have significant proteinuria but biopsy was deferred due to chronic disease and multiple cysts B/L on renal USG. DEcision was taken to treat conservatively with ACE inhibitors at that time. No h/o nausea, vomiting, NSAID use, recurrent UTIs or stones.     Past Medical History:   Diagnosis Date    Breast cancer     CHF (congestive heart failure)     Coronary artery disease     Hypertension     Hazel     Renal disorder     Thyroid disease      Past Surgical History:   Procedure Laterality Date    BREAST SURGERY      HYSTERECTOMY      MASTECTOMY       Family History   Problem Relation Age of Onset    Family history unknown: Yes     Social History   Substance Use Topics    Smoking status: Former Smoker     Packs/day: 2.00     Years: 20.00     Types: Cigarettes     Quit date: 2/16/1997    Smokeless tobacco: None    Alcohol use No      Comment: 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 No abdominal pain  Neuro: no headache, no dizziness.   Psych: no depression, no agitation    OBJECTIVE:     Vital Signs (Most Recent)  Temp: 98 °F (36.7 °C) (03/14/17 1143)  Pulse: (!) 59 (03/14/17  1143)  Resp: 20 (03/14/17 1143)  BP: 117/73 (03/14/17 1143)  SpO2: 100 % (03/14/17 1143)    Vital Signs Range (Last 24H):  Temp:  [98 °F (36.7 °C)-98.2 °F (36.8 °C)]   Pulse:  [56-74]   Resp:  [18-20]   BP: (112-136)/(61-82)   SpO2:  [98 %-100 %]     Physical Exam:  General: Well developed, well nourished in NAD  HEENT: Conjunctiva clear; Oropharynx clear  Neck: No JVD noted, Supple  CV- Normal S1, S2 with no murmurs,gallops,rubs  Resp- Lungs CTA Bilaterally, Unlabored  Abdomen- NTND, BS normoactive x4 quads, soft  Extrem- No cyanosis, clubbing, edema.  Skin- No rashes, lesions, ulcers  Neuro: awake, Oriented x3, no FND    Laboratory:  CBC:   Recent Labs  Lab 03/14/17  0550   WBC 4.32   RBC 3.14*   HGB 10.3*   HCT 29.3*      MCV 93   MCH 32.8*   MCHC 35.2     CMP:   Recent Labs  Lab 03/14/17  0550      CALCIUM 8.3*   ALBUMIN 3.0*   PROT 6.6      K 4.2   CO2 21*      BUN 91*   CREATININE 3.2*   ALKPHOS 80   ALT 65*   AST 70*   BILITOT 0.8       Diagnostic Results:  Labs: Reviewed    ASSESSMENT/PLAN:     71 y.o. female with PMH of CAD, systolic and diastolic CHF, HTN, hypothyroidism and CKD stage 3 admitted due to acute on chronic heart failure. Renal consulted for CANDI on CKD stage 3     CANDI on CKD stage 3   - etiology of CANDI may be due to cardiorenal syndrome vs pre renal due to over diuresis   - CKD may be due to HTN  - baseline creat 1.7 to 1.8   - creat at admission 2.0, peaked yest at 3.4, today down to 3.2   - pt non oliguric   -.rec Obtain urine lytes, urine creat, urine urea to calculate FeNa/Fe urea  -  Urine protein creat ratio 0.23 better than before,  - lytes and acid base stable   - continue to hold ACE inhibitor and diuretics  - Avoid NSAIDs, contrast, nephrotoxins   - Monitor strict I/Os, daily weights  - Renally dose medications to current GFR  - Will continue to monitor.    Anemia   - check iron studies     MBD  - add daily phos  - check PTH, Vit D     Jessy Man MD    Nephrology fellow   Pager [phone]      I have reviewed and concur with the fellow's history, physical, assessment, and plan. I have personally interviewed and examined the patient at bedside.

## 2017-03-14 NOTE — PLAN OF CARE
Problem: Patient Care Overview  Goal: Plan of Care Review  Outcome: Ongoing (interventions implemented as appropriate)  Pt verbalizes no complaints. Denies CP, SOB, or other discomforts. Pt reports chest pain to cardiology. When RN asked pt, pt stated no current chest pain.  STAT troponin drawn and EKG taken. Pt stress test canceled. Pt remains free of fall or injury. Pt verbalizes understanding of plan of care. Will continue to monitor.

## 2017-03-14 NOTE — ASSESSMENT & PLAN NOTE
Cr at 3.2 today, down from 3.4 yesterday. Nephrology consulted, appreciate recs.  -Continue holding IV lasix, holding allopurinol. Will consider adding back PO depending on volume status/labs.   - Continue to hold home valsartan  - strict I/O's

## 2017-03-14 NOTE — PLAN OF CARE
Problem: Patient Care Overview  Goal: Plan of Care Review  Outcome: Ongoing (interventions implemented as appropriate)  Plan of care discussed with pt. Pt free of falls/trauma/injuries this shift. All medications given as scheduled. PET stres test rescheduled for Wednesday 3/15.  VSS & NADN. Pt denies CP, SOB, or pain/discomfort at this time. Daughter staying over night.  All questions addressed.  Will continue to monitor.

## 2017-03-14 NOTE — PT/OT/SLP PROGRESS
"Occupational Therapy  Treatment    Anai Sal   MRN: [id number]   Admitting Diagnosis: Acute on chronic congestive heart failure    OT Date of Treatment: 17   OT Start Time: 1415  OT Stop Time: 1438  OT Total Time (min): 23 min    Billable Minutes:  Self Care/Home Management 13 and Therapeutic Activity 10    General Precautions: Standard, fall  Orthopedic Precautions: N/A  Braces: N/A    Do you have any cultural, spiritual, Congregation conflicts, given your current situation?: none    Subjective:  Communicated with RN prior to session.  "I need to use the bathroom."    Pain Ratin/10  Pain Rating Post-Intervention: 0/10    Objective:  Patient found with: telemetry     Functional Mobility:  Bed Mobility:  Supine to Sit: Stand by Assistance, With side rail    Transfers:  Sit <> Stand Assistance: Stand By Assistance from EOB and toilet  Sit <> Stand Assistive Device: Rolling Walker  Toilet Transfer Assistance: Stand By Assistance  Toilet Transfer Assistive Device: No Assistive Device    Functional Ambulation: Within room and hallway household distance with CGA using RW; cues for safety using RW    Activities of Daily Living:  UE Dressing Level of Assistance: Minimum assistance to don gown around back  Grooming Position: Standing at sink  Grooming Level of Assistance: Stand by assistance to wash hands  Toileting Where Assessed: Toilet  Toileting Level of Assistance: Contact guard; assist to pull down pants    Balance:   Static Sit: GOOD+: Takes MAXIMAL challenges from all directions.    Dynamic Sit: GOOD+: Maintains balance through MAXIMAL excursions of active trunk motion  Static Stand: GOOD-: Takes MODERATE challenges from all directions inconsistently  Dynamic stand: FAIR+: Needs CLOSE SUPERVISION during gait and is able to right self with minor LOB    Therapeutic Activities and Exercises:  Pt performed functional mobility within room and hallway, requiring cues for safety awareness using RW; returned to " room, performed toileting with CGA and grooming standing at sink with SBA; returned to bedside chair, c/o SOB which resolved after seated rest break    AM-PAC 6 CLICK ADL   How much help from another person does this patient currently need?   1 = Unable, Total/Dependent Assistance  2 = A lot, Maximum/Moderate Assistance  3 = A little, Minimum/Contact Guard/Supervision  4 = None, Modified [place]/Independent    Putting on and taking off regular lower body clothing? : 2  Bathing (including washing, rinsing, drying)?: 2  Toileting, which includes using toilet, bedpan, or urinal? : 3  Putting on and taking off regular upper body clothing?: 3  Taking care of personal grooming such as brushing teeth?: 3  Eating meals?: 4  Total Score: 17     AM-PAC Raw Score CMS G-Code Modifier Level of Impairment Assistance   6 % Total / Unable   7 - 9 CM 80 - 100% Maximal Assist   10 - 14 CL 60 - 80% Moderate Assist   15 - 19 CK 40 - 60% Moderate Assist   20 - 22 CJ 20 - 40% Minimal Assist   23 CI 1-20% SBA / CGA   24 CH 0% Independent/ Mod I     Patient left up in chair with all lines intact, call button in reach and sister present    ASSESSMENT:  Anai Sal is a 71 y.o. female with a medical diagnosis of Acute on chronic congestive heart failure and presents with decreased strength, endurance, and safety awareness needed for ADLs and functional mobility. Pt would continue to benefit from OT to increase independence. Recommend HH upon D/C.    Rehab potential is good.    Activity tolerance: Good    Discharge recommendations: Discharge Facility/Level Of Care Needs: home with home health     Barriers to discharge: Barriers to Discharge: Inaccessible home environment, Decreased caregiver support (VITALIY; home alone during day )    Equipment recommendations:       GOALS:   Occupational Therapy Goals        Problem: Occupational Therapy Goal    Goal Priority Disciplines Outcome Interventions   Occupational Therapy Goal     OT,  PT/OT Ongoing (interventions implemented as appropriate)    Description:  Goals to be met by: 3/20/17     Patient will increase functional independence with ADLs by performing:    UE Dressing with Stand-by Assistance.  LE Dressing with Stand-by Assistance.  Grooming while standing at sink with Stand-by Assistance.-MET  Revised: Grooming while standing at sink with supervision.  Toileting from toilet with Stand-by Assistance for hygiene and clothing management.   Toilet transfer to toilet with Stand-by Assistance.-MET  Revised: Toilet transfer to toilet with supervision.                   Plan:  Patient to be seen 4 x/week to address the above listed problems via self-care/home management, therapeutic activities, therapeutic exercises  Plan of Care expires: 04/09/17  Plan of Care reviewed with: patient, sibling         Annabel ERIBERTO Cassidy  03/14/2017

## 2017-03-14 NOTE — ASSESSMENT & PLAN NOTE
CANDI in setting of cardiorenal syndrome v. Pre renal due to overdiuresis  -Baseline creatinine 1.7-1.8, peaked at 3.4 yesterday, down to 3.2 today.  -urine studies pending  -continue to hold ACE-i and diuretics  -avoid NSAIDs, contrast, nephrotoxins  -monitor strict I/Os, daily weights  -renally dose medications to current GFR

## 2017-03-14 NOTE — PROGRESS NOTES
Cardiology  Progress Note                                                              [place] [facility] [place] [facility]     Patient Name: Anai Sal   YOB: 1945  Location: [facility]    Admit Date: 3/7/2017                     LOS: 5    SUBJECTIVE     INTERVAL HISTORY:   Patient still endorses shortness of breath with exertion. Endorsing pressure-like chest pain this AM, points to left of sternum. States it started a few minutes prior. Endorses shortness of breath, however, does not appear to be in respiratory distress. Denies dizziness, lightheadedness. Preferentially lying on her side as she states her breathing is worse when she lies on her back.     HPI:   71 y.o. woman with PMH of HTN, combined systolic and diastolic heart failure, HCV, CKD3, hypothyroidism, + 40 ppd smoking hx, and ?COPD, ?CAD who presented with shortness of breath and chest pain. Patient states she had worsening shortness of breath for the past few days and presented to the ED where she was given IV lasix with improvement in symptoms. Patient used to follow with [facility] where she states she had a cardiologist but does not remember the physician's name. ECHO 12/2016 with EF 40%, ECHO 2/2017 EF 25-30%, and repeat ECHO this admission EF unchanged from the one in February.      Patient states at baseline, she gets shortness of breath with exertion associated with chest pressure and lightheadedness, relieved with rest. Patient endorses orthopnea and sleeps with 3-4 pillows at night. She did notice weight gain which has been decreasing since receiving lasix. Patient normally takes 60 mg lasix po daily. She was scheduled for a spect, although per chart review it appears it is awaiting insurance authorization.     At home, patient was taking ASA and valsartan but was not on a beta blocker or statin.     REVIEW OF SYSTEMS:  Constitution: Negative for fever, chills, +weight change  HENT: Negative for sore throat, rhinorrhea, or  "headache.  Eyes: Negative for blurred or double vision.   Cardiovascular: See above  Pulmonary: Positive for SOB (improving)   Gastrointestinal: Negative for abdominal pain, nausea, vomiting, or diarrhea.   : Negative for dysuria.   Extremities: Positive for lower extremity swelling, improved  Neurological: Negative for focal weakness or sensory changes.    MEDICATIONS:  Scheduled:   aspirin  81 mg Oral Daily    atorvastatin  80 mg Oral QHS    benztropine  0.5 mg Oral Nightly    carvedilol  12.5 mg Oral BID    cetirizine  5 mg Oral Daily    fluticasone  2 spray Each Nare Daily    heparin (porcine)  5,000 Units Subcutaneous Q8H    levothyroxine  50 mcg Oral Before breakfast    quetiapine  200 mg Oral Daily with dinner    sodium bicarbonate  1,300 mg Oral BID     Continuous:     PRN:  acetaminophen, albuterol-ipratropium 2.5mg-0.5mg/3mL, dextrose 50%, dextrose 50%, gabapentin, glucagon (human recombinant), glucose, glucose, hydrALAZINE, ondansetron, ondansetron, ramelteon      OBJECTIVE:     VITALS  Most Recent Range (Last 24H)   /66 (BP Location: Left arm, Patient Position: Lying, BP Method: Automatic)  Pulse (!) 59  Temp 98.1 °F (36.7 °C) (Oral)   Resp 20  Ht 5' 7" (1.702 m)  Wt 81.4 kg (179 lb 7.3 oz)  SpO2 98%  Breastfeeding? No  BMI 28.11 kg/m2 Temp:  [97.7 °F (36.5 °C)-98.2 °F (36.8 °C)]   Pulse:  [56-66]   Resp:  [18-20]   BP: (112-130)/(61-82)   SpO2:  [97 %-98 %]      Intake and Output  BMI     Intake/Output Summary (Last 24 hours) at 03/14/17 0717  Last data filed at 03/14/17 0600   Gross per 24 hour   Intake              780 ml   Output             1250 ml   Net             -470 ml       Net I/O since admission: Body mass index is 28.11 kg/(m^2).        PHYSICAL EXAM  Constitutional: awake, alert, NAD  HEENT: Sclera anicteric, PERRLA, EOMI  Neck: Unable to visualize JVD, no carotid bruits  CV: RRR, no murmur, normal S1/S2  Pulm: clear to auscultation bilaterally   GI: Abdomen soft, " NTND, +BS  Extremities: 1+ LE edema, warm and well perfused  Skin: No ecchymosis, erythema, or ulcers  Psych: alert and oriented to self, place, day but not year or month   Neuro: CNII-XII intact, no focal deficits      LABS  CBC/Anemia Labs Coag Labs     Recent Labs  Lab 03/11/17  0412 03/13/17  0414 03/14/17  0550   WBC 3.17* 3.02* 4.32   HGB 10.3* 10.5* 10.3*   HCT 29.1* 29.3* 29.3*   MCV 93 91 93    176 171    Lab Results   Component Value Date    INR 1.0 12/16/2016        BMP     Recent Labs  Lab 03/12/17  0430 03/13/17  0414 03/14/17  0550   * 127* 108    138 140   K 5.3* 4.6 4.2    104 108   CO2 17* 20* 21*   BUN 71* 86* 91*   CREATININE 3.2* 3.4* 3.2*   CALCIUM 8.4* 8.5* 8.3*      Mag & Phos LFTs     Recent Labs  Lab 03/08/17 1959   MG 2.0   PHOS 4.0      Recent Labs  Lab 03/12/17  0430 03/13/17  0414 03/14/17  0550   PROT 6.6 6.9 6.6   BILITOT 0.8 0.7 0.8   ALKPHOS 78 83 80   AST 60* 73* 70*   ALT 41 58* 65*             Cardiac Enzymes Ejection Fractions/BNP     Recent Labs  Lab 03/08/17  1959 03/10/17  1744 03/11/17  0412   TROPONINI 0.058* 0.048* 0.053*    EF   Date Value Ref Range Status   03/10/2017 20 (A) 55 - 65    02/08/2017 25 (A) 55 - 65    12/19/2016 40 (A) 55 - 65        Recent Labs  Lab 03/08/17  0020 03/10/17  1215   BNP 1886* 3140*        Lab Results   Component Value Date    HGBA1C 4.2 (L) 01/11/2017         Microbiology Results (last 7 days)     ** No results found for the last 168 hours. **          INVESTIGATION RESULTS    Imaging Results         US Lower Extremity Veins Bilateral (Final result) Result time:  03/09/17 15:08:48    Final result by Kenyatta Hardy MD (03/09/17 15:08:48)    Impression:      #1. No evidence of left or right lower extremity DVT.      Electronically signed by: KENYATTA HARDY MD  Date:     03/09/17  Time:    15:08     Narrative:    HISTORY: Pain and swelling    COMPARISON: None    FINDINGS:Duplex scan of the bilateral lower  extremities was performed using B-Mode/gray scale imaging and Doppler spectral analysis and color flow.      The bilateral Common Femoral, proximal GSV, Superficial Femoral, Popliteal, Peroneal, Anterior Tibial, and Posterior Tibial veins are patent and unremarkable.            X-Ray Chest PA And Lateral (Final result) Result time:  03/08/17 00:56:18    Final result by Leroy Vargas MD (03/08/17 00:56:18)    Impression:         Small right effusion with associated compressive atelectasis/scarring, otherwise, stable chronic findings.          Electronically signed by: LEROY VARGAS MD  Date:     03/08/17  Time:    00:56     Narrative:    Chest PA and lateral    Indication:Shortness of breath    Comparison:12/16/2016    Findings:  The cardiomediastinal silhouette is prominent, similar to the previous examination noting calcification of the aorta.  There is a right pleural effusion with associated atelectasis/scarring.  The trachea is midline.  The lungs are symmetrically expanded bilaterally with coarse interstitial attenuation bilaterally, similar to the previous exam. No large focal consolidation seen.  There is no pneumothorax.  The osseous structures are remarkable for degenerative changes of the spine and shoulders.  Postsurgical changes of right mastectomy noted.                ASSESSMENT/PLAN:     Current Problems List:  Active Hospital Problems    Diagnosis  POA    *Acute on chronic congestive heart failure [I50.9]  Yes    Essential hypertension [I10]  Yes    Chronic gout [M1A.9XX0]  Yes    Primary hypothyroidism [E03.9]  Yes    CKD (chronic kidney disease) stage 3, GFR 30-59 ml/min [N18.3]  Yes    Insomnia [G47.00]  Yes      Resolved Hospital Problems    Diagnosis Date Resolved POA   No resolved problems to display.        ASSESSMENT:   71 y.o. year old female with 71 y.o. woman with PMH of HTN, combined systolic and diastolic heart failure, CKD3, HCV, hypothyroidism, and ?COPD, ?CAD who presented  shortness of breath. Cardiology consulted for acute on chronic heart failure exacerbation.    PLAN:  Acute on chronic heart failure exacerbation  - patient presented with shortness of breath and chest pain, improved with IV lasix however remains with shortness of breath and LE edema   - 2/8/17 ECHO with EF 25%, repeat ECHO unchanged   - recommend continuing fluid restriction   - recommend strict I&Os and daily weights   - given worsening kidney function, continue to hold lasix iv today, for patient remains hemodynamically stable on fluid overload stand point. May consider po lasix if appears volume overloaded tomorrow.     - recommend continuation of ASA , coreg to optimize medical management. Recommend continuation of valsartan when CANDI improves.   - Recommend holding statin in setting of transaminitis and LDL <70. Per daughter, patient previously placed on statin at [facility] but later statin was discontinued (unsure of reason).     Unstable angina  - patient endorses hx of chest pressure and shortness of breath that occurs at rest and with exertion  - 3/8/17 EKG concerning for inferior ischemia   - in setting of chest pain, recommend trending troponins q6h  - patient previously scheduled for spect, however awaiting insurance authorization. Agree with spect for ischemic workup.   - patient originally with cardiologist at [facility], however, she does not remember the name. Will attempt to obtain records from [facility] to determine if patient has had recent ischemic workup/prior heart cath  - patient with chest pain this AM. Troponin mildly increased from previous at 0.122. EKG unchanged from previous, c/w with LVH although some T wave inversion anterolateral leads. Will f/u repeat troponin.   - recommend starting plavix, with plavix load 300 mg today and 75 mg daily starting tomorrow (order has been placed)  - recommend sublingual NTG prn for chest pain (order has been placed)  - recommend ischemic evaluation. If repeat  troponin peaks, will consider spect today. Given patient's CANDI, left heart cath was originally deferred, however, will consider left heart cath if troponin continues to increase. Risks and benefits were discussed with patient and patient's family.  - recommend repeat 2D ECHO (order has been placed)  - recommend JEN to evaluate mitral valve for severe MR, as showing moderate-severe regurgitation on ECHO with reduced LVEF and cavity dilatation.  - patient follows Dr. Live in heart failure clinic. Will need f/u 1-2 weeks post-discharge.    Thank you for this consult.  Will continue to follow with you.    Discussed with cardiology and staff. Staff attestation to follow.     Bailee Rizzo MD, PGY1  Pager: [phone]

## 2017-03-14 NOTE — PLAN OF CARE
Problem: Occupational Therapy Goal  Goal: Occupational Therapy Goal  Goals to be met by: 3/20/17     Patient will increase functional independence with ADLs by performing:    UE Dressing with Stand-by Assistance.  LE Dressing with Stand-by Assistance.  Grooming while standing at sink with Stand-by Assistance.-MET  Revised: Grooming while standing at sink with supervision.  Toileting from toilet with Stand-by Assistance for hygiene and clothing management.   Toilet transfer to toilet with Stand-by Assistance.-MET  Revised: Toilet transfer to toilet with supervision.     Outcome: Ongoing (interventions implemented as appropriate)  Continue OT plan of care.

## 2017-03-14 NOTE — ASSESSMENT & PLAN NOTE
Patient endorses hx of chest pressure and shortness of breath that occurs at rest and with exertion  -active chest pain today, Troponin was .118, will trend and cardiology will continue to monitor.  -SPECT on hold due to active chest pain, will reschedule if CP subsides.  -Plavix ordered with load 300 mg today and 75 mg daily starting tomorrow.  -sublingual NTG prn for chest pain.  - repeat 2D ECHO ordered.  - recommend JEN to evaluate mitral valve for severe MR, as showing moderate-severe regurgitation on ECHO with reduced LVEF and cavity dilatation.  - patient follows Dr. Live in heart failure clinic, will f/u in 1-2 weeks after discharge.

## 2017-03-14 NOTE — ASSESSMENT & PLAN NOTE
- Cardiology consulted and following. Shortness of breath and LE edema improving since admission.   - Holding IV lasix again today given increased Cr. Consider adding PO lasix back once kidney function improving.   - 1500 fluid restriction  - Strict intake/output, daily weights  - repeat ECHO stable from prior, EF 20-25%  - Continue optimizing medical management - continue increased coreg, ASA, valsartan, atorvastatin. Restarted coreg at lower dose. Will restart valsartan when CANDI improved.

## 2017-03-14 NOTE — PROGRESS NOTES
Spect held today 2/2 patient with chest pain and concern for NSTEMI. Troponins remain flat. Will plan for PET stress tomorrow. Will place order to make patient NPO at midnight.      Bailee Rizzo MD, PGY1  Pager: [phone]

## 2017-03-14 NOTE — SUBJECTIVE & OBJECTIVE
Interval History: Patient still SOB today. Elevated Troponin .118 this morning, cardiology monitoring. Awaiting Nephrology recs.    Review of Systems   Constitutional: Negative for chills and fever.   Respiratory: Positive for shortness of breath (improving at baseline). Negative for cough and chest tightness.    Cardiovascular: Positive for chest pain and leg swelling (improving). Negative for palpitations.   Gastrointestinal: Negative for abdominal pain, nausea and vomiting.   Neurological: Negative for dizziness and light-headedness.   Psychiatric/Behavioral: Negative for agitation and sleep disturbance.     Objective:     Vital Signs (Most Recent):  Temp: 98 °F (36.7 °C) (03/14/17 1143)  Pulse: (!) 59 (03/14/17 1143)  Resp: 20 (03/14/17 1143)  BP: 117/73 (03/14/17 1143)  SpO2: 100 % (03/14/17 1143) Vital Signs (24h Range):  Temp:  [98 °F (36.7 °C)-98.2 °F (36.8 °C)] 98 °F (36.7 °C)  Pulse:  [56-74] 59  Resp:  [18-20] 20  SpO2:  [98 %-100 %] 100 %  BP: (112-136)/(61-82) 117/73     Weight: 81.4 kg (179 lb 7.3 oz)  Body mass index is 28.11 kg/(m^2).    Intake/Output Summary (Last 24 hours) at 03/14/17 1407  Last data filed at 03/14/17 1200   Gross per 24 hour   Intake              720 ml   Output              900 ml   Net             -180 ml      Physical Exam   Constitutional: She is oriented to person, place, and time. She appears well-developed and well-nourished. No distress.   Eyes: EOM are normal.   Neck: Normal range of motion.   Cardiovascular: Normal rate, regular rhythm and normal heart sounds.    No murmur heard.  Pulmonary/Chest: Effort normal. No respiratory distress. She has decreased breath sounds. She has no wheezes. She has no rales.   Abdominal: Soft. Bowel sounds are normal. She exhibits no distension. There is no tenderness.   Musculoskeletal: Normal range of motion. She exhibits edema (improved).   Neurological: She is alert and oriented to person, place, and time.   Skin: Skin is warm and dry.        Significant Labs:   CBC:   Recent Labs  Lab 03/13/17  0414 03/14/17  0550   WBC 3.02* 4.32   HGB 10.5* 10.3*   HCT 29.3* 29.3*    171     CMP:   Recent Labs  Lab 03/13/17  0414 03/14/17  0550    140   K 4.6 4.2    108   CO2 20* 21*   * 108   BUN 86* 91*   CREATININE 3.4* 3.2*   CALCIUM 8.5* 8.3*   PROT 6.9 6.6   ALBUMIN 3.1* 3.0*   BILITOT 0.7 0.8   ALKPHOS 83 80   AST 73* 70*   ALT 58* 65*   ANIONGAP 14 11   EGFRNONAA 12.9* 13.9*     Troponin:   Recent Labs  Lab 03/14/17  0857 03/14/17  1102   TROPONINI 0.122* 0.118*     All pertinent labs within the past 24 hours have been reviewed.    Significant Imaging: I have reviewed all pertinent imaging results/findings within the past 24 hours.

## 2017-03-14 NOTE — PROGRESS NOTES
DR. SLIM Rizzo ordered that pt test be put on hold to monitor troponin and other cardiac enzymes. Will continue to monitor.

## 2017-03-15 ENCOUNTER — CLINICAL SUPPORT (OUTPATIENT)
Dept: CARDIOLOGY | Facility: CLINIC | Age: 72
End: 2017-03-15
Payer: COMMERCIAL

## 2017-03-15 LAB
ALBUMIN SERPL BCP-MCNC: 2.9 G/DL
ALP SERPL-CCNC: 77 U/L
ALT SERPL W/O P-5'-P-CCNC: 62 U/L
ANION GAP SERPL CALC-SCNC: 11 MMOL/L
AST SERPL-CCNC: 63 U/L
BASOPHILS # BLD AUTO: 0 K/UL
BASOPHILS NFR BLD: 0 %
BILIRUB SERPL-MCNC: 1 MG/DL
BUN SERPL-MCNC: 79 MG/DL
CALCIUM SERPL-MCNC: 8.4 MG/DL
CHLORIDE SERPL-SCNC: 108 MMOL/L
CO2 SERPL-SCNC: 24 MMOL/L
CREAT SERPL-MCNC: 2.7 MG/DL
DIASTOLIC DYSFUNCTION: NO
DIFFERENTIAL METHOD: ABNORMAL
EOSINOPHIL # BLD AUTO: 0 K/UL
EOSINOPHIL NFR BLD: 0.3 %
ERYTHROCYTE [DISTWIDTH] IN BLOOD BY AUTOMATED COUNT: 14.5 %
EST. GFR  (AFRICAN AMERICAN): 19.7 ML/MIN/1.73 M^2
EST. GFR  (NON AFRICAN AMERICAN): 17.1 ML/MIN/1.73 M^2
GLUCOSE SERPL-MCNC: 97 MG/DL
HCT VFR BLD AUTO: 28.3 %
HGB BLD-MCNC: 9.8 G/DL
LYMPHOCYTES # BLD AUTO: 0.9 K/UL
LYMPHOCYTES NFR BLD: 29 %
MCH RBC QN AUTO: 32.2 PG
MCHC RBC AUTO-ENTMCNC: 34.6 %
MCV RBC AUTO: 93 FL
MONOCYTES # BLD AUTO: 0.7 K/UL
MONOCYTES NFR BLD: 23.1 %
NEUTROPHILS # BLD AUTO: 1.4 K/UL
NEUTROPHILS NFR BLD: 47.3 %
PLATELET # BLD AUTO: 171 K/UL
PMV BLD AUTO: 12.8 FL
POTASSIUM SERPL-SCNC: 3.9 MMOL/L
PROT SERPL-MCNC: 6.6 G/DL
RBC # BLD AUTO: 3.04 M/UL
SODIUM SERPL-SCNC: 143 MMOL/L
WBC # BLD AUTO: 3.03 K/UL

## 2017-03-15 PROCEDURE — 93018 CV STRESS TEST I&R ONLY: CPT | Mod: ,,, | Performed by: INTERNAL MEDICINE

## 2017-03-15 PROCEDURE — 99999 PR PBB SHADOW E&M-EST. PATIENT-LVL III: CPT | Mod: PBBFAC,,,

## 2017-03-15 PROCEDURE — 93016 CV STRESS TEST SUPVJ ONLY: CPT | Mod: ,,, | Performed by: INTERNAL MEDICINE

## 2017-03-15 PROCEDURE — 99233 SBSQ HOSP IP/OBS HIGH 50: CPT | Mod: ,,, | Performed by: PHYSICIAN ASSISTANT

## 2017-03-15 PROCEDURE — 25000003 PHARM REV CODE 250: Performed by: STUDENT IN AN ORGANIZED HEALTH CARE EDUCATION/TRAINING PROGRAM

## 2017-03-15 PROCEDURE — 78492 MYOCRD IMG PET MLT RST&STRS: CPT | Mod: 26,,, | Performed by: INTERNAL MEDICINE

## 2017-03-15 PROCEDURE — 97530 THERAPEUTIC ACTIVITIES: CPT

## 2017-03-15 PROCEDURE — 97116 GAIT TRAINING THERAPY: CPT

## 2017-03-15 PROCEDURE — 25000003 PHARM REV CODE 250: Performed by: NURSE PRACTITIONER

## 2017-03-15 PROCEDURE — 25000003 PHARM REV CODE 250: Performed by: HOSPITALIST

## 2017-03-15 PROCEDURE — 78492 MYOCRD IMG PET MLT RST&STRS: CPT

## 2017-03-15 PROCEDURE — 11000001 HC ACUTE MED/SURG PRIVATE ROOM

## 2017-03-15 PROCEDURE — 25000003 PHARM REV CODE 250: Performed by: INTERNAL MEDICINE

## 2017-03-15 PROCEDURE — 36415 COLL VENOUS BLD VENIPUNCTURE: CPT

## 2017-03-15 PROCEDURE — 99233 SBSQ HOSP IP/OBS HIGH 50: CPT | Mod: GC,,, | Performed by: INTERNAL MEDICINE

## 2017-03-15 PROCEDURE — 85025 COMPLETE CBC W/AUTO DIFF WBC: CPT

## 2017-03-15 PROCEDURE — 99231 SBSQ HOSP IP/OBS SF/LOW 25: CPT | Mod: ,,, | Performed by: INTERNAL MEDICINE

## 2017-03-15 PROCEDURE — 25000003 PHARM REV CODE 250: Performed by: PHYSICIAN ASSISTANT

## 2017-03-15 PROCEDURE — 80053 COMPREHEN METABOLIC PANEL: CPT

## 2017-03-15 RX ADMIN — BENZTROPINE MESYLATE 0.5 MG: 0.5 TABLET ORAL at 08:03

## 2017-03-15 RX ADMIN — CETIRIZINE HYDROCHLORIDE 5 MG: 5 TABLET, FILM COATED ORAL at 08:03

## 2017-03-15 RX ADMIN — ASPIRIN 81 MG CHEWABLE TABLET 81 MG: 81 TABLET CHEWABLE at 08:03

## 2017-03-15 RX ADMIN — QUETIAPINE FUMARATE 200 MG: 200 TABLET, FILM COATED ORAL at 08:03

## 2017-03-15 RX ADMIN — LEVOTHYROXINE SODIUM 50 MCG: 50 TABLET ORAL at 05:03

## 2017-03-15 RX ADMIN — RAMELTEON 8 MG: 8 TABLET, FILM COATED ORAL at 08:03

## 2017-03-15 RX ADMIN — ATORVASTATIN CALCIUM 80 MG: 20 TABLET, FILM COATED ORAL at 08:03

## 2017-03-15 RX ADMIN — CARVEDILOL 12.5 MG: 12.5 TABLET, FILM COATED ORAL at 08:03

## 2017-03-15 RX ADMIN — SODIUM BICARBONATE 650 MG TABLET 1300 MG: at 08:03

## 2017-03-15 RX ADMIN — HEPARIN SODIUM 5000 UNITS: 5000 INJECTION, SOLUTION INTRAVENOUS; SUBCUTANEOUS at 05:03

## 2017-03-15 RX ADMIN — HEPARIN SODIUM 5000 UNITS: 5000 INJECTION, SOLUTION INTRAVENOUS; SUBCUTANEOUS at 10:03

## 2017-03-15 RX ADMIN — CLOPIDOGREL 75 MG: 75 TABLET, FILM COATED ORAL at 08:03

## 2017-03-15 RX ADMIN — HEPARIN SODIUM 5000 UNITS: 5000 INJECTION, SOLUTION INTRAVENOUS; SUBCUTANEOUS at 02:03

## 2017-03-15 NOTE — PLAN OF CARE
Problem: Physical Therapy Goal  Goal: Physical Therapy Goal  Goals to be met by: 3/20/17     Patient will increase functional independence with mobility by performin. Supine to sit with Supervision  2. Sit to stand transfer with Supervision  3. Bed to chair transfer with Supervision using Rolling Walker  4. Gait x 150 feet with Supervision using Rolling Walker.   5. Ascend/descend 12 stairs with bilateral Handrails Contact Guard Assistance.   6. Lower extremity exercise program x10 reps, with assistance as needed, in order to increase LE strength and (I) with functional mobility    Outcome: Ongoing (interventions implemented as appropriate)  Goals reviewed and remain appropriate. Pt progressing towards goals.     Tia Maciel, PT, DPT   3/15/2017  [phone]

## 2017-03-15 NOTE — PT/OT/SLP PROGRESS
Physical Therapy  Treatment    Anai Sal   MRN: [id number]   Admitting Diagnosis: Acute on chronic congestive heart failure    PT Received On: 03/15/17  PT Start Time: 853     PT Stop Time: 902    PT Total Time (min): 9 min       Billable Minutes:  Gait Training9    Treatment Type: Treatment  PT/PTA: PT     PTA Visit Number: 0       General Precautions: Standard, fall, NPO  Orthopedic Precautions: N/A   Braces: N/A    Do you have any cultural, spiritual, Confucianism conflicts, given your current situation?: none noted     Subjective:  Communicated with RN prior to session.  Pt agreeable to therapy.     Pain Ratin/10    Objective:   Patient found with: telemetry    Functional Mobility:  Bed Mobility:   Supine to Sit: Stand by Assistance    Transfers:  Sit <> Stand Assistance: Stand By Assistance  Sit <> Stand Assistive Device: Rolling Walker    Mod v/c and t/c for safety and RW management     Gait:   Gait Distance: 30 ft.   Assistance 1: Contact Guard Assistance  Gait Assistive Device: Rolling walker  Gait Pattern: swing-through gait  Gait Deviation(s): forward lean, decreased weight-shifting ability   Mod v/c for upright posture and RW management    RN arrived to transport pt to PET stress test with w/c parked down hallway. Pt encouraged to increase ambulation distance but pt reported increased fatigue and agreeable to ambulating only distance to w/c.     Balance:   Static Sit: GOOD: Takes MODERATE challenges from all directions  Dynamic Sit: GOOD: Maintains balance through MODERATE excursions of active trunk movement  Static Stand: GOOD-: Takes MODERATE challenges from all directions inconsistently  Dynamic stand: FAIR: Needs CONTACT GUARD during gait      AM-PAC 6 CLICK MOBILITY  How much help from another person does this patient currently need?   1 = Unable, Total/Dependent Assistance  2 = A lot, Maximum/Moderate Assistance  3 = A little, Minimum/Contact Guard/Supervision  4 = None, Modified  [place]/Independent    Turning over in bed (including adjusting bedclothes, sheets and blankets)?: 4  Sitting down on and standing up from a chair with arms (e.g., wheelchair, bedside commode, etc.): 3  Moving from lying on back to sitting on the side of the bed?: 3  Moving to and from a bed to a chair (including a wheelchair)?: 3  Need to walk in hospital room?: 3  Climbing 3-5 steps with a railing?: 3  Total Score: 19    AM-PAC Raw Score CMS G-Code Modifier Level of Impairment Assistance   6 % Total / Unable   7 - 9 CM 80 - 100% Maximal Assist   10 - 14 CL 60 - 80% Moderate Assist   15 - 19 CK 40 - 60% Moderate Assist   20 - 22 CJ 20 - 40% Minimal Assist   23 CI 1-20% SBA / CGA   24 CH 0% Independent/ Mod I     Patient left seated in w/c to go to PET stress with all lines intact, call button in reach, ASNDIP Aj notified and PET stress RN present.    Assessment:  Anai Sal is a 71 y.o. female with a medical diagnosis of Acute on chronic congestive heart failure and presents with limited functional mobility 2* impaired endurance, decreased balance, and weakness. Therapy session limited 2* arrival of RN to escort pt to PET stress test. Pt not able to progress ambulation distance 2* fatigue, but mild improvements noted with RW management and upright posture; however, pt continues to require cueing for improved safety and technique with RW. Pt would continue to benefit from skilled IP PT in order to address current deficits and progress functional mobility. Will implement stair training as appropriate.     Rehab identified problem list/impairments: Rehab identified problem list/impairments: weakness, gait instability, impaired endurance, impaired balance, impaired self care skills, impaired functional mobilty, decreased coordination, decreased safety awareness, impaired cardiopulmonary response to activity    Rehab potential is good.    Activity tolerance: Fair    Discharge recommendations: Discharge  Facility/Level Of Care Needs: home health PT, home health OT     Barriers to discharge: Barriers to Discharge: Inaccessible home environment, Decreased caregiver support (VITALIY home; alone during the day)    Equipment recommendations: Equipment Needed After Discharge: none     GOALS:   Physical Therapy Goals        Problem: Physical Therapy Goal    Goal Priority Disciplines Outcome Goal Variances Interventions   Physical Therapy Goal     PT/OT, PT Ongoing (interventions implemented as appropriate)     Description:  Goals to be met by: 3/20/17     Patient will increase functional independence with mobility by performin. Supine to sit with Supervision  2. Sit to stand transfer with Supervision  3. Bed to chair transfer with Supervision using Rolling Walker  4. Gait  x 150 feet with Supervision using Rolling Walker.   5. Ascend/descend 12 stairs with bilateral Handrails Contact Guard Assistance.   6. Lower extremity exercise program x10 reps, with assistance as needed, in order to increase LE strength and (I) with functional mobility                 PLAN:    Patient to be seen 3 x/week  to address the above listed problems via gait training, therapeutic activities, therapeutic exercises  Plan of Care expires: 17  Plan of Care reviewed with: patient        Tia Raheem, PT, DPT   3/15/2017  [phone]

## 2017-03-15 NOTE — ASSESSMENT & PLAN NOTE
- Cardiology consulted and following. Shortness of breath and LE edema improving since admission.   - Holding IV lasix again today given increased Cr. Consider adding PO lasix back tomorrow as kidney function has improved but not at baseline  - 1500 fluid restriction  - Strict intake/output, daily weights  - repeat ECHO stable from prior, EF 20-25%  - Continue optimizing medical management - continue increased coreg, ASA, valsartan, atorvastatin. Restarted coreg at lower dose. Will restart valsartan when CANDI improved.

## 2017-03-15 NOTE — PT/OT/SLP PROGRESS
"Occupational Therapy  Treatment    Anai Sal   MRN: [id number]   Admitting Diagnosis: Acute on chronic congestive heart failure    OT Date of Treatment: 03/15/17   OT Start Time: 805  OT Stop Time: 817  OT Total Time (min): 12 min    Billable Minutes:  Therapeutic Activity 12    General Precautions: Standard, fall, NPO (cardiac)  Orthopedic Precautions: N/A  Braces: N/A    Do you have any cultural, spiritual, Church conflicts, given your current situation?: none    Subjective:  Communicated with RN prior to session.  "I am tired.  I just, I cant." (initially refusing therapy)     Pain Ratin/10  Pain Rating Post-Intervention: 0/10    Objective:  Patient found with: telemetry, oxygen, peripheral IV (IV not connected; O2 not donned)     Functional Mobility:  Bed Mobility:  Rolling/Turning to Left: Stand by assistance  Rolling/Turning Right: Stand by assistance  Scooting/Bridging: Stand by Assistance (towards EOB)  Supine to Sit: Stand by Assistance  Sit to Supine: Stand by Assistance    Transfers:   Sit <> Stand Assistance: Stand By Assistance  Sit <> Stand Assistive Device: Rolling Walker  Bed <> Chair Technique: Stand Pivot  Bed <> Chair Transfer Assistance: Stand By Assistance  Bed <> Chair Assistive Device: Rolling Walker (refused to remain in chair)    Functional Ambulation: Pt performed x~2 steps from bed to chair and from chair to bed with RW and SBA to CGA.  No LOB but poor safety (attempting to sit before close to bed/chair; plopped down without reaching back despite VC's)    Activities of Daily Living:  Feeding Level of Assistance: Activity did not occur (NPO this AM for PET STRESS)  UE Dressing Level of Assistance: Minimum assistance (personal shirt/gown mgmt)  LE Dressing Level of Assistance: Activity did not occur  Grooming Position: bedside chair  Grooming Level of Assistance: Stand by assistance (face washing with warm cloth)  Toileting Level of Assistance: Activity did not occur  Bathing " Level of Assistance: Activity did not occur    Balance:   Static Sit: FAIR+: Able to take MINIMAL challenges from all directions  Dynamic Sit: FAIR+: Maintains balance through MINIMAL excursions of active trunk motion  Static Stand: FAIR: Maintains without assist but unable to take challenges  Dynamic stand: FAIR: Needs CONTACT GUARD during gait    Therapeutic Activities and Exercises:  Pt educated on OT role and POC; initially refusing therapy but was agreeable to get OOB to straighten bed and scoot more towards HOB.  Pt able to stand x~2 min and assist OT with removing of draw sheet and abby pad and was able to assist with folding and placement of new draw sheet and abby pad.  CGA during this with RW.     AM-PAC 6 CLICK ADL   How much help from another person does this patient currently need?   1 = Unable, Total/Dependent Assistance  2 = A lot, Maximum/Moderate Assistance  3 = A little, Minimum/Contact Guard/Supervision  4 = None, Modified [place]/Independent    Putting on and taking off regular lower body clothing? : 2  Bathing (including washing, rinsing, drying)?: 2  Toileting, which includes using toilet, bedpan, or urinal? : 3  Putting on and taking off regular upper body clothing?: 3  Taking care of personal grooming such as brushing teeth?: 3  Eating meals?: 4  Total Score: 17     AM-PAC Raw Score CMS G-Code Modifier Level of Impairment Assistance   6 % Total / Unable   7 - 9 CM 80 - 100% Maximal Assist   10 - 14 CL 60 - 80% Moderate Assist   15 - 19 CK 40 - 60% Moderate Assist   20 - 22 CJ 20 - 40% Minimal Assist   23 CI 1-20% SBA / CGA   24 CH 0% Independent/ Mod I     Patient left right sidelying with all lines intact, call button in reach and RN notified    ASSESSMENT:  Anai Sal is a 71 y.o. female with a medical diagnosis of Acute on chronic congestive heart failure and presents with fair motivation and participation with therapy.  Pt with limited understanding of importance of  engaging in therapy, despite fatigue, to promote improved activity tolerance. Pt remains appropriate for continued OT services to address deficits and maximize overall functional performance.     Rehab identified problem list/impairments: Rehab identified problem list/impairments: weakness, impaired endurance, impaired self care skills, impaired functional mobilty, gait instability, impaired balance, decreased coordination, decreased upper extremity function, decreased ROM, impaired cardiopulmonary response to activity, decreased safety awareness    Rehab potential is good.    Activity tolerance: Good    Discharge recommendations: Discharge Facility/Level Of Care Needs: home health PT, home health OT     Barriers to discharge: Barriers to Discharge: Inaccessible home environment, Decreased caregiver support (VITALIY home; alone during the day)    Equipment recommendations: none     GOALS:   Occupational Therapy Goals        Problem: Occupational Therapy Goal    Goal Priority Disciplines Outcome Interventions   Occupational Therapy Goal     OT, PT/OT Ongoing (interventions implemented as appropriate)    Description:  Goals to be met by: 3/20/17     Patient will increase functional independence with ADLs by performing:    UE Dressing with Stand-by Assistance.  LE Dressing with Stand-by Assistance.  Grooming while standing at sink with Stand-by Assistance.-MET   Revised: Grooming while standing at sink with supervision.  Toileting from toilet with Stand-by Assistance for hygiene and clothing management.   Toilet transfer to toilet with Stand-by Assistance.-MET   Revised: Toilet transfer to toilet with supervision.                    Plan:  Patient to be seen 4 x/week to address the above listed problems via self-care/home management, therapeutic exercises  Plan of Care expires: 04/09/17  Plan of Care reviewed with: patient         Krysta ReichERIBERTO Buckley  03/15/2017

## 2017-03-15 NOTE — PROGRESS NOTES
"[facility] Hospital Medicine  Progress Note    Patient Name: Anai Sal  MRN: [id number]  Patient Class: IP- Inpatient   Admission Date: 3/7/2017  Length of Stay: 6 days  Attending Physician: Servando Obrien MD  Primary Care Provider: Live Young MD    [facility] Medicine Team: [facility] Hussein Chavis PA-C    Subjective:     Principal Problem:Acute on chronic congestive heart failure    HPI:  70 y/o female who presents to the ED with c/o worsening SOB that has progressed over the past few days. She has a PMH of CAD, CHF, HTN, hypothyroidism, and renal disease.  While in the ED she received 40 mg of lasix IV x1 and breathing treatments.  At this time she states her breathing has improved. However, she does state that she remains mildly SOB. She is tolerating room air with O2 saturations of %.  She denies any significant dietary changes, and states that she does not use salt.  She does state that she drinks "plenty" of water.  She denies HA, chills, nausea, or chest pain at this time. However, she states that at times she does have feeling of dizziness with sudden movement.  BNP on arrival was 1886.  Her BUN / Crt were elevated as well 41/2.0. CXR performed while in the ED suggestive of small right sided pleural effusion without overt signs of pulmonary edema. Her last ECHO was performed 2/8/17 and her EF was down to 25% from 40% which was done in December 2016.            Hospital Course:  Admitted for worsening SOB and exacerbation of CHF with EKG concerning for inferior ischemia. Diuresed with IV furosemide with improvement in edema, held 3/11 and 3/12 due to worsening kidney function.  US BLE negative, repeat ECHO stable from ECHO of 2/8/17 with EF 25%. Cardiology consulted and following, initiated coreg, added ASA and valsartan, atorvastatin. Stress PET completed, results pending. Patient started on Plavix 75mg daily. Patient will need to follow up with Dr. Live in heart " failure clinic. Consulted Nephrology, suggest CANDI due to overdiuresis. Baseline creatinine 1.7-1.8, peaked at 3.4, improved with holding nephrotoxic agents.    Interval History: Stress PET. Breathing well on RA. Renal function improving. No chest pain.    Review of Systems   Constitutional: Negative for chills and fever.   Respiratory: Positive for shortness of breath (improving at baseline). Negative for cough and chest tightness.    Cardiovascular: Negative for chest pain and palpitations. Leg swelling: improving.   Gastrointestinal: Negative for abdominal pain, nausea and vomiting.   Neurological: Negative for dizziness and light-headedness.   Psychiatric/Behavioral: Negative for agitation and sleep disturbance.     Objective:     Vital Signs (Most Recent):  Temp: 96.4 °F (35.8 °C) (03/15/17 0808)  Pulse: 62 (03/15/17 1300)  Resp: 18 (03/15/17 1145)  BP: 125/71 (03/15/17 1145)  SpO2: 97 % (03/15/17 1145) Vital Signs (24h Range):  Temp:  [96.4 °F (35.8 °C)-98.9 °F (37.2 °C)] 96.4 °F (35.8 °C)  Pulse:  [57-68] 62  Resp:  [18-20] 18  SpO2:  [96 %-99 %] 97 %  BP: (121-145)/(71-88) 125/71     Weight: 80.3 kg (177 lb 0.5 oz)  Body mass index is 27.73 kg/(m^2).    Intake/Output Summary (Last 24 hours) at 03/15/17 1437  Last data filed at 03/15/17 0512   Gross per 24 hour   Intake              170 ml   Output              250 ml   Net              -80 ml      Physical Exam   Constitutional: She is oriented to person, place, and time. She appears well-developed and well-nourished. No distress.   Eyes: EOM are normal.   Neck: Normal range of motion.   Cardiovascular: Normal rate, regular rhythm and normal heart sounds.    No murmur heard.  Pulmonary/Chest: Effort normal. No respiratory distress. She has decreased breath sounds. She has no wheezes. She has no rales.   Abdominal: Soft. Bowel sounds are normal. She exhibits no distension. There is no tenderness.   Musculoskeletal: Normal range of motion. She exhibits edema  (trace).   Neurological: She is alert and oriented to person, place, and time.   Skin: Skin is warm and dry.       Significant Labs:   CBC:   Recent Labs  Lab 03/14/17  0550 03/15/17  0451   WBC 4.32 3.03*   HGB 10.3* 9.8*   HCT 29.3* 28.3*    171     CMP:   Recent Labs  Lab 03/14/17  0550 03/15/17  0451    143   K 4.2 3.9    108   CO2 21* 24    97   BUN 91* 79*   CREATININE 3.2* 2.7*   CALCIUM 8.3* 8.4*   PROT 6.6 6.6   ALBUMIN 3.0* 2.9*   BILITOT 0.8 1.0   ALKPHOS 80 77   AST 70* 63*   ALT 65* 62*   ANIONGAP 11 11   EGFRNONAA 13.9* 17.1*     Troponin:   Recent Labs  Lab 03/14/17  0857 03/14/17  1102   TROPONINI 0.122* 0.118*       Significant Imaging: I have reviewed all pertinent imaging results/findings within the past 24 hours.    Assessment/Plan:      * Acute on chronic congestive heart failure  - Cardiology consulted and following. Shortness of breath and LE edema improving since admission.   - Holding IV lasix again today given increased Cr. Consider adding PO lasix back tomorrow as kidney function has improved but not at baseline  - 1500 fluid restriction  - Strict intake/output, daily weights  - repeat ECHO stable from prior, EF 20-25%  - Continue optimizing medical management - continue increased coreg, ASA, valsartan, atorvastatin. Restarted coreg at lower dose. Will restart valsartan when CANDI improved.     Unstable angina  Patient endorses hx of chest pressure and shortness of breath that occurs at rest and with exertion  -no active chest pain today  -Plavix 75 daily  -sublingual NTG prn for chest pain.  patient planned for PET stress test today, will follow up results   - recommend JEN if PET stress test negative to evaluate mitral valve for severe MR, as showing moderate-severe regurgitation on ECHO with reduced LVEF and cavity dilatation.  -patient follows Dr. Live in heart failure clinic, will f/u in 1-2 weeks after discharge.    CKD (chronic kidney disease) stage 3, GFR  30-59 ml/min  Plan as above    Essential hypertension  - BP stable   - continue to hold valsartan  - add PRN hydralazine for SBP greater than 160    CANDI (acute kidney injury)  CANDI due to overdiuresis  -Baseline creatinine 1.7-1.8, peaked at 3.4, improved to 2.7.  -urine studies pending  -continue to hold ACE-i and diuretics  -avoid NSAIDs, contrast, nephrotoxins  -monitor strict I/Os, daily weights  -renally dose medications to current GFR    Insomnia  - Slept well overnight per daughter at bedside.. No evidence of acute infection, no electrolyte derangements   - Continue home Seroquel  - PRN ramelteon      Primary hypothyroidism  - TSH 16.692 - T4 1.10  - continue levothyroxine as prescribed     Chronic gout  - no active flares   - Holding home allopurinol in setting of CANDI    Chronic hepatitis C virus infection  -will continue to monitor in setting of transaminitis    VTE Risk Mitigation         Ordered     heparin (porcine) injection 5,000 Units  Every 8 hours     Route:  Subcutaneous        03/08/17 0902     Medium Risk of VTE  Once      03/08/17 0902     Place JANIA hose  Until discontinued      03/08/17 0902     Place sequential compression device  Until discontinued      03/08/17 0902          Hussein Chavis PA-C  Department of Hospital Medicine   [facility]

## 2017-03-15 NOTE — PLAN OF CARE
Problem: Patient Care Overview  Goal: Plan of Care Review  Outcome: Ongoing (interventions implemented as appropriate)  Plan of care discussed with patient. Patient is free of fall/trauma/injury. Denies CP, SOB, or pain/discomfort. PET stress done. All questions addressed. Will continue to monitor.

## 2017-03-15 NOTE — PROGRESS NOTES
Progress Note  Nephrology    Admit Date: 3/7/2017   LOS: 6 days     SUBJECTIVE:     Follow-up For:  CANDI on CKD     Pt sleeping comfortably on RA this morning. Underwent PET stress test this morning     Review of Systems:    Constitutional: no chills, no fevers  HEENT: no issues   Resp: no shortness of breath, no cough   Cardiac: no chest pain, no palpitations  Abd: no nausea or vomiting. No abdominal pain  Neuro: no headache, no dizziness.   Psych: no depression, no agitation    OBJECTIVE:     Vital Signs (Most Recent)  Temp: 96.4 °F (35.8 °C) (03/15/17 0808)  Pulse: 61 (03/15/17 1145)  Resp: 18 (03/15/17 1145)  BP: 125/71 (03/15/17 1145)  SpO2: 97 % (03/15/17 1145)    Vital Signs Range (Last 24H):  Temp:  [96.4 °F (35.8 °C)-98.9 °F (37.2 °C)]   Pulse:  [57-68]   Resp:  [18-20]   BP: (121-145)/(71-88)   SpO2:  [96 %-99 %]       Physical Exam:  General: Well developed, well nourished in NAD  HEENT: Conjunctiva clear; Oropharynx clear  Neck: No JVD noted, Supple  CV- Normal S1, S2 with no murmurs,gallops,rubs  Resp- Lungs CTA Bilaterally, Unlabored  Abdomen- NTND, BS normoactive x4 quads, soft  Extrem- No cyanosis, clubbing, edema.  Skin- No rashes, lesions, ulcers  Neuro: awake, Oriented x3, no FND    Laboratory:  CBC:   Recent Labs  Lab 03/15/17  0451   WBC 3.03*   RBC 3.04*   HGB 9.8*   HCT 28.3*      MCV 93   MCH 32.2*   MCHC 34.6     CMP:   Recent Labs  Lab 03/15/17  0451   GLU 97   CALCIUM 8.4*   ALBUMIN 2.9*   PROT 6.6      K 3.9   CO2 24      BUN 79*   CREATININE 2.7*   ALKPHOS 77   ALT 62*   AST 63*   BILITOT 1.0       Recent Labs  Lab 03/12/17  1655   COLORU Yellow   SPECGRAV 1.010   PHUR 6.0   PROTEINUA 1+*   BACTERIA None   NITRITE Negative   LEUKOCYTESUR Negative   UROBILINOGEN Negative   HYALINECASTS 4*       Diagnostic Results:  Labs: Reviewed    ASSESSMENT/PLAN:     71 y.o. female with PMH of CAD, systolic and diastolic CHF, HTN, hypothyroidism and CKD stage 3 admitted due to acute  on chronic heart failure. Renal consulted for CANDI on CKD stage 3      CANDI on CKD stage 3   - etiology of CANDI may be due to over diuresis   - CKD may be due to HTN  - baseline creat 1.7 to 1.8   - creat at admission 2.0, peaked at 3.4,down today to 2.7  - pt non oliguric   -.rec Obtain urine lytes, urine creat, urine urea to calculate FeNa/Fe urea  - Urine protein creat ratio 0.23 better than before,  - lytes and acid base stable   - Pt clinically does not appear volume overloaded at this time, sleeping comfortably on RA, no peripheral edema, would continue to hold diuretics at this time   - Avoid NSAIDs, contrast, nephrotoxins   - Monitor strict I/Os, daily weights  - Renally dose medications to current GFR  - Will continue to monitor.     Anemia   - check iron studies      MBD  - add daily phos  - check PTH, Vit D      Jessy Man MD   Nephrology fellow   Pager [phone]        I have reviewed and concur with the fellow's history, physical, assessment, and plan. I have personally interviewed and examined the patient at bedside.

## 2017-03-15 NOTE — PLAN OF CARE
Problem: Occupational Therapy Goal  Goal: Occupational Therapy Goal  Goals to be met by: 3/20/17     Patient will increase functional independence with ADLs by performing:    UE Dressing with Stand-by Assistance.  LE Dressing with Stand-by Assistance.  Grooming while standing at sink with Stand-by Assistance.-MET  Revised: Grooming while standing at sink with supervision.  Toileting from toilet with Stand-by Assistance for hygiene and clothing management.   Toilet transfer to toilet with Stand-by Assistance.-MET  Revised: Toilet transfer to toilet with supervision.     Outcome: Ongoing (interventions implemented as appropriate)  No goals met this date.  POC remains appropraitely set.   ERIBERTO Holly  3/15/2017

## 2017-03-15 NOTE — ASSESSMENT & PLAN NOTE
CANDI due to overdiuresis  -Baseline creatinine 1.7-1.8, peaked at 3.4, improved to 2.7.  -urine studies pending  -continue to hold ACE-i and diuretics  -avoid NSAIDs, contrast, nephrotoxins  -monitor strict I/Os, daily weights  -renally dose medications to current GFR

## 2017-03-15 NOTE — PROGRESS NOTES
Cardiology  Progress Note                                                              Team: [facility] MED E     Patient Name: Anai Sal   YOB: 1945  Location: [address] [place] [address] A    Admit Date: 3/7/2017                     LOS: 6    SUBJECTIVE     INTERVAL HISTORY:   Patient doing well this morning. Denies chest pain, shortness of breath, or palpitations. Plan for PET stress test today.    HPI:   71 y.o. woman with PMH of HTN, combined systolic and diastolic heart failure, HCV, CKD3, hypothyroidism, + 40 ppd smoking hx, and ?COPD, ?CAD who presented with shortness of breath and chest pain. Patient states she had worsening shortness of breath for the past few days and presented to the ED where she was given IV lasix with improvement in symptoms. Patient used to follow with [facility] where she states she had a cardiologist but does not remember the physician's name. ECHO 12/2016 with EF 40%, ECHO 2/2017 EF 25-30%, and repeat ECHO this admission EF unchanged from the one in February.      Patient states at baseline, she gets shortness of breath with exertion associated with chest pressure and lightheadedness, relieved with rest. Patient endorses orthopnea and sleeps with 3-4 pillows at night. She did notice weight gain which has been decreasing since receiving lasix. Patient normally takes 60 mg lasix po daily. She was scheduled for a spect, although per chart review it appears it is awaiting insurance authorization.     At home, patient was taking ASA and valsartan but was not on a beta blocker or statin.     REVIEW OF SYSTEMS:  Constitution: Negative for fever, chills, +weight change  HENT: Negative for sore throat, rhinorrhea, or headache.  Eyes: Negative for blurred or double vision.   Cardiovascular: See above  Pulmonary: Positive for SOB (improving)   Gastrointestinal: Negative for abdominal pain, nausea, vomiting, or diarrhea.   : Negative for dysuria.   Extremities: Positive for lower extremity  "swelling, improved  Neurological: Negative for focal weakness or sensory changes.    MEDICATIONS:  Scheduled:   aspirin  81 mg Oral Daily    atorvastatin  80 mg Oral QHS    benztropine  0.5 mg Oral Nightly    carvedilol  12.5 mg Oral BID    cetirizine  5 mg Oral Daily    clopidogrel  75 mg Oral Daily    fluticasone  2 spray Each Nare Daily    heparin (porcine)  5,000 Units Subcutaneous Q8H    levothyroxine  50 mcg Oral Before breakfast    quetiapine  200 mg Oral Daily with dinner    sodium bicarbonate  1,300 mg Oral BID     Continuous:     PRN:  acetaminophen, albuterol-ipratropium 2.5mg-0.5mg/3mL, dextrose 50%, dextrose 50%, gabapentin, glucagon (human recombinant), glucose, glucose, hydrALAZINE, nitroGLYCERIN, ondansetron, ondansetron, ramelteon      OBJECTIVE:     VITALS  Most Recent Range (Last 24H)   /88 (BP Location: Left arm, Patient Position: Lying, BP Method: Automatic)  Pulse (!) 59  Temp 97.5 °F (36.4 °C) (Oral)   Resp 18  Ht 5' 7" (1.702 m)  Wt 80.3 kg (177 lb 0.5 oz)  SpO2 96%  Breastfeeding? No  BMI 27.73 kg/m2 Temp:  [97.4 °F (36.3 °C)-98.9 °F (37.2 °C)]   Pulse:  [56-68]   Resp:  [18-20]   BP: (117-145)/(73-88)   SpO2:  [96 %-100 %]      Intake and Output  BMI     Intake/Output Summary (Last 24 hours) at 03/15/17 5062  Last data filed at 03/15/17 0512   Gross per 24 hour   Intake              530 ml   Output              250 ml   Net              280 ml       Net I/O since admission: Body mass index is 27.73 kg/(m^2).        PHYSICAL EXAM  Constitutional: awake, alert, NAD  HEENT: Sclera anicteric, PERRLA, EOMI  Neck: + JVD, no carotid bruits  CV: RRR, no murmur, normal S1/S2  Pulm: clear to auscultation bilaterally   GI: Abdomen soft, NTND, +BS  Extremities: No LE edema, warm and well perfused  Skin: No ecchymosis, erythema, or ulcers  Psych: alert and oriented to self, place, day but not year or month   Neuro: CNII-XII intact, no focal deficits      LABS  CBC/Anemia Labs " Coag Labs     Recent Labs  Lab 03/11/17  0412 03/13/17  0414 03/14/17  0550   WBC 3.17* 3.02* 4.32   HGB 10.3* 10.5* 10.3*   HCT 29.1* 29.3* 29.3*   MCV 93 91 93    176 171    Lab Results   Component Value Date    INR 1.0 12/16/2016        BMP     Recent Labs  Lab 03/13/17  0414 03/14/17  0550 03/15/17  0451   * 108 97    140 143   K 4.6 4.2 3.9    108 108   CO2 20* 21* 24   BUN 86* 91* 79*   CREATININE 3.4* 3.2* 2.7*   CALCIUM 8.5* 8.3* 8.4*      Mag & Phos LFTs     Recent Labs  Lab 03/08/17 1959   MG 2.0   PHOS 4.0      Recent Labs  Lab 03/13/17  0414 03/14/17  0550 03/15/17  0451   PROT 6.9 6.6 6.6   BILITOT 0.7 0.8 1.0   ALKPHOS 83 80 77   AST 73* 70* 63*   ALT 58* 65* 62*             Cardiac Enzymes Ejection Fractions/BNP     Recent Labs  Lab 03/11/17  0412 03/14/17  0857 03/14/17  1102   CPK  --  56  56  --    TROPONINI 0.053* 0.122* 0.118*   CPKMB  --  2.3  --    MB  --  4.1  --     EF   Date Value Ref Range Status   03/14/2017 20 (A) 55 - 65    03/10/2017 20 (A) 55 - 65    02/08/2017 25 (A) 55 - 65        Recent Labs  Lab 03/10/17  1215   BNP 3140*        Lab Results   Component Value Date    HGBA1C 4.2 (L) 01/11/2017         Microbiology Results (last 7 days)     ** No results found for the last 168 hours. **          INVESTIGATION RESULTS    Imaging Results         US Lower Extremity Veins Bilateral (Final result) Result time:  03/09/17 15:08:48    Final result by Kenyatta Hardy MD (03/09/17 15:08:48)    Impression:      #1. No evidence of left or right lower extremity DVT.      Electronically signed by: KENYATTA HARDY MD  Date:     03/09/17  Time:    15:08     Narrative:    HISTORY: Pain and swelling    COMPARISON: None    FINDINGS:Duplex scan of the bilateral lower extremities was performed using B-Mode/gray scale imaging and Doppler spectral analysis and color flow.      The bilateral Common Femoral, proximal GSV, Superficial Femoral, Popliteal, Peroneal, Anterior  Tibial, and Posterior Tibial veins are patent and unremarkable.            X-Ray Chest PA And Lateral (Final result) Result time:  03/08/17 00:56:18    Final result by Leroy Vargas MD (03/08/17 00:56:18)    Impression:         Small right effusion with associated compressive atelectasis/scarring, otherwise, stable chronic findings.          Electronically signed by: LEROY VARGAS MD  Date:     03/08/17  Time:    00:56     Narrative:    Chest PA and lateral    Indication:Shortness of breath    Comparison:12/16/2016    Findings:  The cardiomediastinal silhouette is prominent, similar to the previous examination noting calcification of the aorta.  There is a right pleural effusion with associated atelectasis/scarring.  The trachea is midline.  The lungs are symmetrically expanded bilaterally with coarse interstitial attenuation bilaterally, similar to the previous exam. No large focal consolidation seen.  There is no pneumothorax.  The osseous structures are remarkable for degenerative changes of the spine and shoulders.  Postsurgical changes of right mastectomy noted.                ASSESSMENT/PLAN:     Current Problems List:  Active Hospital Problems    Diagnosis  POA    *Acute on chronic congestive heart failure [I50.9]  Yes    Chronic hepatitis C virus infection [B18.2]  Yes    Unstable angina [I20.0]  Yes    CANDI (acute kidney injury) [N17.9]  No    Essential hypertension [I10]  Yes    Chronic gout [M1A.9XX0]  Yes    Primary hypothyroidism [E03.9]  Yes    CKD (chronic kidney disease) stage 3, GFR 30-59 ml/min [N18.3]  Yes    Insomnia [G47.00]  Yes      Resolved Hospital Problems    Diagnosis Date Resolved POA   No resolved problems to display.        ASSESSMENT:   71 y.o. year old female with 71 y.o. woman with PMH of HTN, combined systolic and diastolic heart failure, CKD3, HCV, hypothyroidism, and ?COPD, ?CAD who presented shortness of breath. Cardiology consulted for acute on chronic heart  failure exacerbation.    PLAN:  Acute on chronic heart failure exacerbation  - patient presented with shortness of breath and chest pain, improved with IV lasix however remains with shortness of breath and LE edema   - 2/8/17 ECHO with EF 25%, repeat ECHO unchanged   - recommend continuing fluid restriction   - recommend strict I&Os and daily weights   - creatinine improving, would recommend starting lasix po 40 mg daily,as patient with shortness of breath this morning and ?JVD  - recommend continuation of ASA , coreg to optimize medical management. Recommend continuation of valsartan when CANDI improves.   - Will decide on whether to continue statin pending results of PET stress as patient with transaminitis and LDL <70. Per daughter, patient previously placed on statin at [facility] but later statin was discontinued (unsure of reason).     Unstable angina  - patient endorses hx of chest pressure and shortness of breath that occurs at rest and with exertion  - 3/8/17 EKG concerning for inferior ischemia   - in setting of chest pain, recommend trending troponins q6h  - patient previously scheduled for spect, however awaiting insurance authorization. Agree with spect for ischemic workup.   - patient originally with cardiologist at [facility], however, she does not remember the name. Will attempt to obtain records from [facility] to determine if patient has had recent ischemic workup/prior heart cath  - repeat 2D ECHO without significant change from prior   -  recommend continuing plavix  - recommend continuing NTG prn for chest pain  - patient planned for PET stress test today, will follow up results   - recommend JEN if PET stress test negative to evaluate mitral valve for severe MR, as showing moderate-severe regurgitation on ECHO with reduced LVEF and cavity dilatation.  - patient follows Dr. Live in heart failure clinic. Will need f/u 1-2 weeks post-discharge.    Thank you for this consult.  Will continue to follow with  you.    Discussed with cardiology and staff. Staff attestation to follow.     Bailee Rizzo MD, PGY1  Pager: [phone]

## 2017-03-15 NOTE — ASSESSMENT & PLAN NOTE
Patient endorses hx of chest pressure and shortness of breath that occurs at rest and with exertion  -no active chest pain today  -Plavix 75 daily  -sublingual NTG prn for chest pain.  patient planned for PET stress test today, will follow up results   - recommend JEN if PET stress test negative to evaluate mitral valve for severe MR, as showing moderate-severe regurgitation on ECHO with reduced LVEF and cavity dilatation.  -patient follows Dr. Live in heart failure clinic, will f/u in 1-2 weeks after discharge.

## 2017-03-15 NOTE — PLAN OF CARE
Problem: Patient Care Overview  Goal: Individualization & Mutuality  Outcome: Ongoing (interventions implemented as appropriate)  Pt free of falls/trauma/injuries this shift.VSS. Medication administered as perscribed. PRN Seroquel administered for sleep promotion. Pt NPO at midnight for PET stress test today. Patient tolerating POC well. Pt verbalizes understanding of care. Pt denies any chest pain, SOB, or any other discomforts at this time. Will continue to monitor.

## 2017-03-15 NOTE — SUBJECTIVE & OBJECTIVE
Interval History: Stress PET. Breathing well on RA. Renal function improving. No chest pain.    Review of Systems   Constitutional: Negative for chills and fever.   Respiratory: Positive for shortness of breath (improving at baseline). Negative for cough and chest tightness.    Cardiovascular: Negative for chest pain and palpitations. Leg swelling: improving.   Gastrointestinal: Negative for abdominal pain, nausea and vomiting.   Neurological: Negative for dizziness and light-headedness.   Psychiatric/Behavioral: Negative for agitation and sleep disturbance.     Objective:     Vital Signs (Most Recent):  Temp: 96.4 °F (35.8 °C) (03/15/17 0808)  Pulse: 62 (03/15/17 1300)  Resp: 18 (03/15/17 1145)  BP: 125/71 (03/15/17 1145)  SpO2: 97 % (03/15/17 1145) Vital Signs (24h Range):  Temp:  [96.4 °F (35.8 °C)-98.9 °F (37.2 °C)] 96.4 °F (35.8 °C)  Pulse:  [57-68] 62  Resp:  [18-20] 18  SpO2:  [96 %-99 %] 97 %  BP: (121-145)/(71-88) 125/71     Weight: 80.3 kg (177 lb 0.5 oz)  Body mass index is 27.73 kg/(m^2).    Intake/Output Summary (Last 24 hours) at 03/15/17 1437  Last data filed at 03/15/17 0512   Gross per 24 hour   Intake              170 ml   Output              250 ml   Net              -80 ml      Physical Exam   Constitutional: She is oriented to person, place, and time. She appears well-developed and well-nourished. No distress.   Eyes: EOM are normal.   Neck: Normal range of motion.   Cardiovascular: Normal rate, regular rhythm and normal heart sounds.    No murmur heard.  Pulmonary/Chest: Effort normal. No respiratory distress. She has decreased breath sounds. She has no wheezes. She has no rales.   Abdominal: Soft. Bowel sounds are normal. She exhibits no distension. There is no tenderness.   Musculoskeletal: Normal range of motion. She exhibits edema (trace).   Neurological: She is alert and oriented to person, place, and time.   Skin: Skin is warm and dry.       Significant Labs:   CBC:   Recent Labs  Lab  03/14/17  0550 03/15/17  0451   WBC 4.32 3.03*   HGB 10.3* 9.8*   HCT 29.3* 28.3*    171     CMP:   Recent Labs  Lab 03/14/17  0550 03/15/17  0451    143   K 4.2 3.9    108   CO2 21* 24    97   BUN 91* 79*   CREATININE 3.2* 2.7*   CALCIUM 8.3* 8.4*   PROT 6.6 6.6   ALBUMIN 3.0* 2.9*   BILITOT 0.8 1.0   ALKPHOS 80 77   AST 70* 63*   ALT 65* 62*   ANIONGAP 11 11   EGFRNONAA 13.9* 17.1*     Troponin:   Recent Labs  Lab 03/14/17  0857 03/14/17  1102   TROPONINI 0.122* 0.118*       Significant Imaging: I have reviewed all pertinent imaging results/findings within the past 24 hours.

## 2017-03-16 VITALS
TEMPERATURE: 98 F | HEIGHT: 67 IN | OXYGEN SATURATION: 98 % | BODY MASS INDEX: 27.71 KG/M2 | WEIGHT: 176.56 LBS | HEART RATE: 63 BPM | SYSTOLIC BLOOD PRESSURE: 112 MMHG | DIASTOLIC BLOOD PRESSURE: 68 MMHG | RESPIRATION RATE: 18 BRPM

## 2017-03-16 DIAGNOSIS — N18.3 CHRONIC KIDNEY DISEASE (CKD), STAGE 3 (MODERATE): Primary | ICD-10-CM

## 2017-03-16 PROBLEM — I34.0 MITRAL REGURGITATION: Status: ACTIVE | Noted: 2017-03-16

## 2017-03-16 PROBLEM — I50.9 ACUTE ON CHRONIC CONGESTIVE HEART FAILURE: Status: RESOLVED | Noted: 2017-03-08 | Resolved: 2017-03-16

## 2017-03-16 LAB
ALBUMIN SERPL BCP-MCNC: 2.9 G/DL
ALP SERPL-CCNC: 83 U/L
ALT SERPL W/O P-5'-P-CCNC: 60 U/L
ANION GAP SERPL CALC-SCNC: 10 MMOL/L
AST SERPL-CCNC: 59 U/L
BASOPHILS # BLD AUTO: 0 K/UL
BASOPHILS NFR BLD: 0 %
BILIRUB SERPL-MCNC: 1.1 MG/DL
BUN SERPL-MCNC: 68 MG/DL
CALCIUM SERPL-MCNC: 8.6 MG/DL
CHLORIDE SERPL-SCNC: 110 MMOL/L
CO2 SERPL-SCNC: 24 MMOL/L
CREAT SERPL-MCNC: 2.3 MG/DL
DIFFERENTIAL METHOD: ABNORMAL
EOSINOPHIL # BLD AUTO: 0 K/UL
EOSINOPHIL NFR BLD: 0.7 %
ERYTHROCYTE [DISTWIDTH] IN BLOOD BY AUTOMATED COUNT: 14.6 %
EST. GFR  (AFRICAN AMERICAN): 23.9 ML/MIN/1.73 M^2
EST. GFR  (NON AFRICAN AMERICAN): 20.8 ML/MIN/1.73 M^2
GLUCOSE SERPL-MCNC: 105 MG/DL
HCT VFR BLD AUTO: 30.7 %
HGB BLD-MCNC: 10.2 G/DL
LYMPHOCYTES # BLD AUTO: 1 K/UL
LYMPHOCYTES NFR BLD: 34.9 %
MCH RBC QN AUTO: 31.8 PG
MCHC RBC AUTO-ENTMCNC: 33.2 %
MCV RBC AUTO: 96 FL
MONOCYTES # BLD AUTO: 0.5 K/UL
MONOCYTES NFR BLD: 15.4 %
NEUTROPHILS # BLD AUTO: 1.5 K/UL
NEUTROPHILS NFR BLD: 48.7 %
PLATELET # BLD AUTO: 158 K/UL
PMV BLD AUTO: 12.4 FL
POTASSIUM SERPL-SCNC: 3.8 MMOL/L
PROT SERPL-MCNC: 6.5 G/DL
RBC # BLD AUTO: 3.21 M/UL
SODIUM SERPL-SCNC: 144 MMOL/L
WBC # BLD AUTO: 2.98 K/UL

## 2017-03-16 PROCEDURE — 97535 SELF CARE MNGMENT TRAINING: CPT

## 2017-03-16 PROCEDURE — 36415 COLL VENOUS BLD VENIPUNCTURE: CPT

## 2017-03-16 PROCEDURE — 85025 COMPLETE CBC W/AUTO DIFF WBC: CPT

## 2017-03-16 PROCEDURE — 99232 SBSQ HOSP IP/OBS MODERATE 35: CPT | Mod: ,,, | Performed by: INTERNAL MEDICINE

## 2017-03-16 PROCEDURE — 80053 COMPREHEN METABOLIC PANEL: CPT

## 2017-03-16 PROCEDURE — 99239 HOSP IP/OBS DSCHRG MGMT >30: CPT | Mod: ,,, | Performed by: PHYSICIAN ASSISTANT

## 2017-03-16 PROCEDURE — 99233 SBSQ HOSP IP/OBS HIGH 50: CPT | Mod: ,,, | Performed by: INTERNAL MEDICINE

## 2017-03-16 PROCEDURE — 25000003 PHARM REV CODE 250: Performed by: PHYSICIAN ASSISTANT

## 2017-03-16 PROCEDURE — 97110 THERAPEUTIC EXERCISES: CPT

## 2017-03-16 PROCEDURE — 25000003 PHARM REV CODE 250: Performed by: NURSE PRACTITIONER

## 2017-03-16 RX ORDER — CARVEDILOL 12.5 MG/1
12.5 TABLET ORAL 2 TIMES DAILY
Qty: 60 TABLET | Refills: 1 | Status: ON HOLD | OUTPATIENT
Start: 2017-03-16 | End: 2017-04-09 | Stop reason: HOSPADM

## 2017-03-16 RX ORDER — FUROSEMIDE 20 MG/1
40 TABLET ORAL DAILY
Qty: 180 TABLET | Refills: 3 | Status: ON HOLD | OUTPATIENT
Start: 2017-03-16 | End: 2017-04-07

## 2017-03-16 RX ORDER — VALSARTAN 320 MG/1
TABLET ORAL
Qty: 90 TABLET | Refills: 3 | Status: ON HOLD
Start: 2017-03-16 | End: 2017-04-07 | Stop reason: HOSPADM

## 2017-03-16 RX ADMIN — ASPIRIN 81 MG CHEWABLE TABLET 81 MG: 81 TABLET CHEWABLE at 08:03

## 2017-03-16 RX ADMIN — CETIRIZINE HYDROCHLORIDE 5 MG: 5 TABLET, FILM COATED ORAL at 08:03

## 2017-03-16 RX ADMIN — CARVEDILOL 12.5 MG: 12.5 TABLET, FILM COATED ORAL at 08:03

## 2017-03-16 RX ADMIN — SODIUM BICARBONATE 650 MG TABLET 1300 MG: at 08:03

## 2017-03-16 RX ADMIN — HEPARIN SODIUM 5000 UNITS: 5000 INJECTION, SOLUTION INTRAVENOUS; SUBCUTANEOUS at 05:03

## 2017-03-16 RX ADMIN — LEVOTHYROXINE SODIUM 50 MCG: 50 TABLET ORAL at 05:03

## 2017-03-16 NOTE — PLAN OF CARE
Problem: Patient Care Overview  Goal: Individualization & Mutuality  Outcome: Ongoing (interventions implemented as appropriate)  Pt free of falls/trauma/injuries this shift.VSS. Medication administered as perscribed. PRN Seroquel Rozerem administered for sleep promotion. Cr down to 2.7 today. PET stress test today. Patient tolerating POC well. Pt verbalizes understanding of care. Pt denies any chest pain, SOB, or any other discomforts at this time. Will continue to monitor

## 2017-03-16 NOTE — PT/OT/SLP PROGRESS
Physical Therapy  Treatment    Anai Sal   MRN: [id number]   Admitting Diagnosis: Acute on chronic congestive heart failure    PT Received On: 17  PT Start Time: 1037     PT Stop Time: 1051    PT Total Time (min): 14 min       Billable Minutes:  Therapeutic Exercise 14    Treatment Type: Treatment  PT/PTA: PT     PTA Visit Number: 0       General Precautions: Standard, fall  Orthopedic Precautions: N/A   Braces: N/A    Do you have any cultural, spiritual, Baptism conflicts, given your current situation?: none noted     Subjective:  Communicated with RN prior to session.  Pt agreeable to therapy.     Pain Ratin/10    Objective:   Patient found with: telemetry    Functional Mobility:  Bed Mobility: not performed 2* pt seated UIC this session      Transfers:  Sit <> Stand Assistance: Contact Guard Assistance (x2 reps)  Sit <> Stand Assistive Device: Rolling Walker    Max v/c and t/c on technique and RW management     Gait:   Gait Distance: 75 ft. + 75 ft.   Assistance 1: Contact Guard Assistance  Gait Assistive Device: Rolling walker  Gait Pattern: swing-through gait  Gait Deviation(s): decreased velocity of limb motion, forward lean   Seated rest break between ambulation trials   Max v/c and t/c for RW management, upright posture, and maintaining close proximity with RW    Balance:   Static Sit: supervision  Dynamic Sit: SBA  Static Stand: SBA-CGA  Dynamic stand: CGA     Therapeutic Activities and Exercises:  Seated therex BLE 10 reps each: AP, LAQ, hip flexion. Pt instructed to continue performing (I) daily.    Pt instructed on RW technique during transfers and ambulation with PT demonstrating proper technique for improved safety during mobility.     AM-PAC 6 CLICK MOBILITY  How much help from another person does this patient currently need?   1 = Unable, Total/Dependent Assistance  2 = A lot, Maximum/Moderate Assistance  3 = A little, Minimum/Contact Guard/Supervision  4 = None, Modified  Snyder/Independent    Turning over in bed (including adjusting bedclothes, sheets and blankets)?: 4  Sitting down on and standing up from a chair with arms (e.g., wheelchair, bedside commode, etc.): 3  Moving from lying on back to sitting on the side of the bed?: 3  Moving to and from a bed to a chair (including a wheelchair)?: 3  Need to walk in hospital room?: 3  Climbing 3-5 steps with a railing?: 3  Total Score: 19    AM-PAC Raw Score CMS G-Code Modifier Level of Impairment Assistance   6 % Total / Unable   7 - 9 CM 80 - 100% Maximal Assist   10 - 14 CL 60 - 80% Moderate Assist   15 - 19 CK 40 - 60% Moderate Assist   20 - 22 CJ 20 - 40% Minimal Assist   23 CI 1-20% SBA / CGA   24 CH 0% Independent/ Mod I     Patient left up in chair with all lines intact, call button in reach and pt's sister present.    Assessment:  Anai Sal is a 71 y.o. female with a medical diagnosis of Acute on chronic congestive heart failure. Pt progressing total ambulation distance but remains limited with pt requiring seated rest break 2* impaired endurance and deconditioning. Pt also continues to require increased cueing for safety awareness and RW management; however, pt with improved RW management and upright posture during second ambulation trial following PT instruction and demonstration of proper RW use. Pt would continue to benefit from skilled IP PT in order to address current deficits and progress functional mobility.     Rehab identified problem list/impairments: Rehab identified problem list/impairments: weakness, gait instability, impaired endurance, impaired balance, impaired self care skills, impaired functional mobilty, decreased coordination, decreased safety awareness, impaired cardiopulmonary response to activity    Rehab potential is good.    Activity tolerance: Good    Discharge recommendations: Discharge Facility/Level Of Care Needs: home health PT, home health OT     Barriers to discharge:  Barriers to Discharge: Inaccessible home environment, Decreased caregiver support    Equipment recommendations: Equipment Needed After Discharge: none     GOALS:   Physical Therapy Goals     Not on file      Multidisciplinary Problems (Resolved)        Problem: Physical Therapy Goal    Goal Priority Disciplines Outcome Goal Variances Interventions   Physical Therapy Goal   (Resolved)     PT/OT, PT Outcome(s) achieved     Description:  Goals to be met by: 3/20/17     Patient will increase functional independence with mobility by performin. Supine to sit with Supervision  2. Sit to stand transfer with Supervision  3. Bed to chair transfer with Supervision using Rolling Walker  4. Gait  x 150 feet with Supervision using Rolling Walker.   5. Ascend/descend 12 stairs with bilateral Handrails Contact Guard Assistance.   6. Lower extremity exercise program x10 reps, with assistance as needed, in order to increase LE strength and (I) with functional mobility                 PLAN:    Patient to be seen 3 x/week  to address the above listed problems via gait training, therapeutic activities, therapeutic exercises  Plan of Care expires: 17  Plan of Care reviewed with: patient, sibling        Tia Maciel, PT, DPT   3/16/2017  [phone]

## 2017-03-16 NOTE — PROGRESS NOTES
Patient is ready for discharge. Patient stable alert and oriented. IVs removed. No complaints of pain. Discussed discharge plan. Reviewed medications and side effects, appointments, and answered questions with patient and family. Waiting on transport.

## 2017-03-16 NOTE — PT/OT/SLP PROGRESS
Occupational Therapy  Treatment    Anai Sal   MRN: [id number]   Admitting Diagnosis: Acute on chronic congestive heart failure    OT Date of Treatment: 17   OT Start Time: 900  OT Stop Time: 926  OT Total Time (min): 26 min    Billable Minutes:  Self Care/Home Management 12 and Therapeutic Exercise 14    General Precautions: Standard, fall, NPO  Orthopedic Precautions: N/A  Braces: N/A    Do you have any cultural, spiritual, Rastafari conflicts, given your current situation?: none    Subjective:  Communicated with RN prior to session.  Pt agreeable to participate in today's treatment session.     Pain Ratin/10   Pain Rating Post-Intervention: 0/10    Objective:  Patient found with: telemetry     Functional Mobility:  Bed Mobility:  Supine to Sit: Supervision  Sit to Supine:  (Did not perform. Pt left seated in bedside chair)    Transfers:   Sit <> Stand Assistance: Stand By Assistance  Sit <> Stand Assistive Device: Rolling Walker  Bed <> Chair Technique: Stand Pivot  Bed <> Chair Transfer Assistance: Stand By Assistance (required max cues for hand and RW placement for safety during t/f.  Will stop using walker when she turns to sit down and plops down in chair without reaching back despite VC's. )  Bed <> Chair Assistive Device: Rolling Walker  Shower Transfer Technique: Stand Pivot  Shower Transfer Assistance: Stand By Assistance  Shower Transfer Assistive Device: Rolling Walker    Functional Ambulation: Pt walked from EOB to bathroom to perform grooming task and returned to bedside chair with SBA-CGA with RW support. Pt had no LOB however required max verbal and tactile cues to slow down and stay within walker for increase safety and independence.     Activities of Daily Living:    Grooming Position: Standing at sink (to wash face)  Grooming Level of Assistance: Stand by assistance   While seated in bedside chair pt applied lotion to L foot with supervision.    Lower extremity dressing: SBA to  jorge/doff goran socks while seated on bedside chair.     Balance:   Static Sit: GOOD+: Takes MAXIMAL challenges from all directions.    Dynamic Sit: GOOD: Maintains balance through MODERATE excursions of active trunk movement  Static Stand: FAIR+: Takes MINIMAL challenges from all directions  Dynamic stand: FAIR: Needs CONTACT GUARD during gait    Therapeutic Activities and Exercises:  Pt/family educated on :  - Importance of performing self care tasks as independently as possible to improve strength/endurance.   - walker management and correct hand placement when performing functional t/f and mobility for increased safety and independence.     Pt completed BUE exercises while seated in bedside chair including: 1x15 reps elbow flexion (w/ 2# water jug), chest press (with 2# water jug), AROM shoulder flexion, arm circles clockwise/counterclockwise, and chair pushups.   AM-PAC 6 CLICK ADL   How much help from another person does this patient currently need?   1 = Unable, Total/Dependent Assistance  2 = A lot, Maximum/Moderate Assistance  3 = A little, Minimum/Contact Guard/Supervision  4 = None, Modified [place]/Independent    Putting on and taking off regular lower body clothing? : 2  Bathing (including washing, rinsing, drying)?: 3  Toileting, which includes using toilet, bedpan, or urinal? : 3  Putting on and taking off regular upper body clothing?: 3  Taking care of personal grooming such as brushing teeth?: 3  Eating meals?: 3  Total Score: 17     AM-PAC Raw Score CMS G-Code Modifier Level of Impairment Assistance   6 % Total / Unable   7 - 9 CM 80 - 100% Maximal Assist   10 - 14 CL 60 - 80% Moderate Assist   15 - 19 CK 40 - 60% Moderate Assist   20 - 22 CJ 20 - 40% Minimal Assist   23 CI 1-20% SBA / CGA   24 CH 0% Independent/ Mod I     Patient left up in chair with all lines intact, call button in reach and RN notified    ASSESSMENT:  Anai Sal is a 71 y.o. female with a medical diagnosis of  Acute on chronic congestive heart failure and presents with decreased strength, endurance, safety awareness, and overall independence in ADL tasks and functional mobility. Pt tolerated treatment well this date and is progressing well with goals. Pt progressed to supervision during bed mobility however remains SBA for functional t/f and CGA-SBA during functional mobility due to poor safety awareness. Pt completed grooming task with SBA while standing at sink with good performance. She performed multiple therapeutic BUE exercises while seated on bedside chair requiring min rest breaks between each exercise. Pt would benefit from continued skilled OT to address deficits and maximize return to PLOF.     Rehab identified problem list/impairments: Rehab identified problem list/impairments: weakness, impaired endurance, impaired self care skills, impaired functional mobilty, gait instability, decreased safety awareness, impaired cardiopulmonary response to activity    Rehab potential is good.    Activity tolerance: Good    Discharge recommendations: Discharge Facility/Level Of Care Needs: home health OT, home health PT     Barriers to discharge: Barriers to Discharge: Inaccessible home environment, Decreased caregiver support    Equipment recommendations: none     GOALS:   Occupational Therapy Goals        Problem: Occupational Therapy Goal    Goal Priority Disciplines Outcome Interventions   Occupational Therapy Goal     OT, PT/OT Ongoing (interventions implemented as appropriate)    Description:  Goals to be met by: 3/20/17     Patient will increase functional independence with ADLs by performing:    UE Dressing with Stand-by Assistance.  LE Dressing with Stand-by Assistance.  Grooming while standing at sink with Stand-by Assistance.-MET   Revised: Grooming while standing at sink with supervision.  Toileting from toilet with Stand-by Assistance for hygiene and clothing management.   Toilet transfer to toilet with  Stand-by Assistance.-MET   Revised: Toilet transfer to toilet with supervision.                    Plan:  Patient to be seen 4 x/week to address the above listed problems via self-care/home management, therapeutic activities, therapeutic exercises  Plan of Care expires: 04/09/17  Plan of Care reviewed with: patient, family         KAMILLE Brandon  03/16/2017

## 2017-03-16 NOTE — ASSESSMENT & PLAN NOTE
CANDI due to overdiuresis  -Baseline creatinine 1.7-1.8, peaked at 3.4, improved to 2.3 on day of discharge.  -continue to hold valsartan until CANDI resolves. Okay to reume lasix 40 mg daily  -renally dose medications to current GFR

## 2017-03-16 NOTE — DISCHARGE SUMMARY
"[facility] Hospital Medicine  Discharge Summary      Patient Name: Anai Sal  MRN: [id number]  Admission Date: 3/7/2017  Hospital Length of Stay: 7 days  Discharge Date and Time:  03/16/2017 2:03 PM  Attending Physician: Servando Obrien MD   Discharging Provider: Hussein Chavis PA-C  Primary Care Provider: Live Young MD  [facility] Medicine Team: [facility] MED E Hussein Chavis PA-C    HPI:   70 y/o female who presents to the ED with c/o worsening SOB that has progressed over the past few days. She has a PMH of CAD, CHF, HTN, hypothyroidism, and renal disease.  While in the ED she received 40 mg of lasix IV x1 and breathing treatments.  At this time she states her breathing has improved. However, she does state that she remains mildly SOB. She is tolerating room air with O2 saturations of %.  She denies any significant dietary changes, and states that she does not use salt.  She does state that she drinks "plenty" of water.  She denies HA, chills, nausea, or chest pain at this time. However, she states that at times she does have feeling of dizziness with sudden movement.  BNP on arrival was 1886.  Her BUN / Crt were elevated as well 41/2.0. CXR performed while in the ED suggestive of small right sided pleural effusion without overt signs of pulmonary edema. Her last ECHO was performed 2/8/17 and her EF was down to 25% from 40% which was done in December 2016.            * No surgery found *      Indwelling Lines/Drains at time of discharge:   Lines/Drains/Airways          No matching active lines, drains, or airways        Hospital Course:   Admitted for worsening SOB and exacerbation of CHF with EKG concerning for inferior ischemia. Diuresed with IV furosemide with improvement in edema and SOB, hospital course complicated by worsening kidney function, lasix and ARB held with improvement in renal function noted. US BLE negative, repeat ECHO stable from ECHO of 2/8/17 with EF 25%. " Cardiology consulted and following, initiated coreg, added ASA and valsartan, atorvastatin. Stress PET completed, no ischemic changes noted. After review of ECHO to evaluate MR, mitral valve appears functional and is likely 2/2 cardiomyopathy and RV dilatation. Patient will need to follow up with Dr. Live in heart failure clinic for continued HF regimen optimization. Nephrology consulted for CANDI, suggest CANDI due to overdiuresis. Baseline creatinine 1.7-1.8, peaked at 3.4, improved with holding nephrotoxic agents. Patient to continue lasix 40 mg daily and holding valsartan until follow up with nephrology as renal function not yet to baseline (Cr 2.3). Patient developed mild transaminitis with initiation of statin, however, in setting of Hepatitis C and LDL<70 and no ischemic disease noted on PET stress, would recommend holding statin at discharge. Patient discharged home with home health. Patient has pulmonology follow up to evaluation for COPD.     Consults:   Consults         Status Ordering Provider     Inpatient consult to Cardiology  Once     Provider:  (Not yet assigned)    Completed EDWINA NORTON     Inpatient consult to Midline team  Once     Provider:  (Not yet assigned)    Completed BLAKE FERNANDEZ     Inpatient consult to Nephrology  Once     Provider:  (Not yet assigned)    Completed BAMBI FRANCO     Inpatient consult to PICC team (NIAS)  Once     Provider:  (Not yet assigned)    Completed PATRICIA GUNN          Significant Diagnostic Studies: Labs:   CMP   Recent Labs  Lab 03/15/17  0451 03/16/17  0457    144   K 3.9 3.8    110   CO2 24 24   GLU 97 105   BUN 79* 68*   CREATININE 2.7* 2.3*   CALCIUM 8.4* 8.6*   PROT 6.6 6.5   ALBUMIN 2.9* 2.9*   BILITOT 1.0 1.1*   ALKPHOS 77 83   AST 63* 59*   ALT 62* 60*   ANIONGAP 11 10   ESTGFRAFRICA 19.7* 23.9*   EGFRNONAA 17.1* 20.8*   , CBC   Recent Labs  Lab 03/15/17  0451 03/16/17  0457   WBC 3.03* 2.98*   HGB 9.8* 10.2*   HCT 28.3* 30.7*   PLT  171 158    and All labs within the past 24 hours have been reviewed  Cardiac Graphics: ECG: consistent with report, Echocardiogram:   2D echo with color flow doppler:   Results for orders placed or performed during the hospital encounter of 03/07/17   2D echo with color flow doppler   Result Value Ref Range    EF 20 (A) 55 - 65    Mitral Valve Regurgitation SEVERE (A)     Diastolic Dysfunction Yes (A)     Aortic Valve Regurgitation TRIVIAL     Est. PA Systolic Pressure 60.16 (A)     Mitral Valve Mobility NORMAL     Tricuspid Valve Regurgitation MILD TO MODERATE     and Stress Test: negative for ischemia or myocardial injury    Pending Diagnostic Studies:     None        Final Active Diagnoses:    Diagnosis Date Noted POA    Unstable angina [I20.0] 03/14/2017 Yes    CANDI (acute kidney injury) [N17.9] 03/14/2017 No    Essential hypertension [I10] 12/16/2016 Yes    CKD (chronic kidney disease) stage 3, GFR 30-59 ml/min [N18.3] 11/11/2016 Yes    Mitral regurgitation [I34.0] 03/16/2017 Yes    Chronic hepatitis C virus infection [B18.2] 03/14/2017 Yes    Chronic gout [M1A.9XX0] 12/16/2016 Yes    Primary hypothyroidism [E03.9] 12/16/2016 Yes    Insomnia [G47.00] 09/30/2016 Yes      Problems Resolved During this Admission:    Diagnosis Date Noted Date Resolved POA    PRINCIPAL PROBLEM:  Acute on chronic congestive heart failure [I50.9] 03/08/2017 03/16/2017 Yes      * Acute on chronic congestive heart failure, resolved as of 3/16/2017  - Cardiology consulted and following. Shortness of breath and LE edema improving since admission. Now at baseline.  - Resume PO lasix 40 mg daily. Hold valsartan until nephrology follow up.  - 1500 fluid restriction  - Continue optimizing medical management - continue coreg, ASA, valsartan (when CANDI resolved), atorvastatin. Restarted coreg at lower dose. Will restart valsartan when CANDI improved.     Unstable angina  Patient endorses hx of chest pressure and shortness of breath that  occurs at rest and with exertion  - no active chest pain today  - patient started on Plavix during admission, no indication for continuation at discharge as no ischemia on PET  - Patient with mild transaminitis s/p initiation of statin, in setting of LDL<70 and no ischemic disease noted on PET stress, would recommend holding statin  - PET stress test negative for ischemia or myocardial injury  - after review of ECHO to evaluate MR, mitral valve appears functional and is likely 2/2 cardiomyopathy and RV dilatation  - recommend optimization of heart failure regimen   - patient follows Dr. Live in heart failure clinic. Will need f/u 1-2 weeks post-discharge.  - patient will also need follow up with interventional valve clinic in 1-2 weeks.     CKD (chronic kidney disease) stage 3, GFR 30-59 ml/min  - Plan as above    Essential hypertension  - BP stable   - continue to hold valsartan on discharge    CANDI (acute kidney injury)  CANDI due to overdiuresis  -Baseline creatinine 1.7-1.8, peaked at 3.4, improved to 2.3 on day of discharge.  -continue to hold valsartan until CANDI resolves. Okay to reume lasix 40 mg daily  -renally dose medications to current GFR    Insomnia  - Slept well overnight per daughter at bedside.. No evidence of acute infection, no electrolyte derangements   - Continue home Seroquel  - PRN ramelteon      Primary hypothyroidism  - TSH 16.692 - T4 1.10  - continue levothyroxine as prescribed     Chronic gout  - no active flares     Chronic hepatitis C virus infection  -will continue to monitor in setting of transaminitis    Mitral regurgitation  - after review of ECHO to evaluate MR, mitral valve appears functional and is likely 2/2 cardiomyopathy and RV dilatation  - recommend optimization of heart failure regimen       Discharged Condition: fair    Disposition: Home-Health Care [facility]    Follow Up:  Follow-up Information     Follow up with Live Young MD In 2 weeks.    Specialty:  Family Medicine     Contact information:    [address] [facility] [address]  [phone]          Follow up with Len Garcia MD.    Specialties:  Cardiology, Transplant    Why:  They will call to schedule follow up    Contact information:    [address]  [facility] [address]  [phone]          Follow up with INTERVENTIONAL, VALVE CLINIC.    Specialty:  Cardiology    Why:  They will call to schedule follow up        Follow up with [facility] .    Specialty:  Physical Therapy    Contact information:    [address] [place] [id number] [phone]          Follow up with [facility] In 2 days.    Specialty:  Physical Therapy    Why:  Please contact Alvaro Reed CM for [facility]#[phone] for name of  home health agency.    Contact information:    [facility] [facility] [address]  [phone]          Patient Instructions:     Ambulatory Referral to Interventional Cardiology   Referral Priority: Routine Referral Type: Consultation   Referral Reason: Specialty Services Required    Requested Specialty: INTERVENTIONAL CARDIOLOGY    Number of Visits Requested: 1      Ambulatory Referral to Transplant, Heart   Referral Priority: Routine Referral Type: Transplants   Referred to Provider: LEN GARCIA    Number of Visits Requested: 1      Ambulatory Referral to Nephrology   Referral Priority: Routine Referral Type: Consultation   Referral Reason: Specialty Services Required    Referred to Provider: MICHAEL YEUNG Requested Specialty: Nephrology   Number of Visits Requested: 1      Diet Cardiac     Activity as tolerated     Call MD for:  difficulty breathing or increased cough       Medications:  Reconciled Home Medications:   Current Discharge Medication List      START taking these medications    Details   carvedilol (COREG) 12.5 MG tablet Take 1 tablet (12.5 mg total) by mouth 2 (two) times daily.  Qty: 60 tablet, Refills: 1         CONTINUE these  medications which have CHANGED    Details   furosemide (LASIX) 20 MG tablet Take 2 tablets (40 mg total) by mouth once daily.  Qty: 180 tablet, Refills: 3    Associated Diagnoses: CKD (chronic kidney disease) stage 3, GFR 30-59 ml/min; Proteinuria; Edema, unspecified type      valsartan (DIOVAN) 320 MG tablet HOLD UNTIL FOLLOW UP WITH YOUR DOCTOR WITH KIDNEY LABWORK BEFOREHAND  Qty: 90 tablet, Refills: 3    Associated Diagnoses: CKD (chronic kidney disease) stage 3, GFR 30-59 ml/min; Essential hypertension         CONTINUE these medications which have NOT CHANGED    Details   allopurinol (ZYLOPRIM) 100 MG tablet Take 100 mg by mouth 2 (two) times daily.       aspirin 81 MG Chew Take 1 tablet (81 mg total) by mouth once daily.  Refills: 0      cetirizine (ZYRTEC) 5 MG tablet Take 1 tablet (5 mg total) by mouth once daily.  Refills: 0      diclofenac sodium 1 % Gel Apply 4 g topically 4 (four) times daily. Prn pain      fluticasone (FLONASE) 50 mcg/actuation nasal spray 2 sprays by Each Nare route once daily.  Qty: 1 Bottle, Refills: 3      gabapentin (NEURONTIN) 300 MG capsule Take 300 mg by mouth 3 (three) times daily as needed (for nerve pain).       guaifenesin (MUCINEX) 600 mg 12 hr tablet Take 1 tablet (600 mg total) by mouth 2 (two) times daily.      levothyroxine (SYNTHROID) 50 MCG tablet Take 1 tablet (50 mcg total) by mouth before breakfast.  Qty: 30 tablet, Refills: 11      melatonin 3 mg Tab Take by mouth every evening.      sodium bicarbonate 650 MG tablet Take 2 tablets (1,300 mg total) by mouth 2 (two) times daily.  Qty: 360 tablet, Refills: 3    Associated Diagnoses: CKD (chronic kidney disease) stage 3, GFR 30-59 ml/min; Metabolic acidosis      benztropine (COGENTIN) 0.5 MG tablet Take 1 tablet (0.5 mg total) by mouth nightly.  Qty: 30 tablet, Refills: 0      quetiapine (SEROQUEL) 200 MG Tab Take 1 tablet (200 mg total) by mouth nightly.  Qty: 30 tablet, Refills: 0           Time spent on the  discharge of patient: 35 minutes    Hussein Chavis PA-C  Department of Hospital Medicine  [facility]

## 2017-03-16 NOTE — ASSESSMENT & PLAN NOTE
- after review of ECHO to evaluate MR, mitral valve appears functional and is likely 2/2 cardiomyopathy and RV dilatation  - recommend optimization of heart failure regimen

## 2017-03-16 NOTE — PLAN OF CARE
[facility]    HOME HEALTH ORDERS  FACE TO FACE ENCOUNTER    Patient Name: Anai Sal  YOB: 1945    PCP: Live Young MD   PCP Address: [address] [address]  PCP Phone Number: [phone]  PCP Fax: [phone]    Encounter Date: 03/16/2017    Admit to Home Health    Diagnoses:  Active Hospital Problems    Diagnosis  POA    Unstable angina [I20.0]  Yes     Priority: 2     CANDI (acute kidney injury) [N17.9]  No     Priority: 3     Essential hypertension [I10]  Yes     Priority: 3     CKD (chronic kidney disease) stage 3, GFR 30-59 ml/min [N18.3]  Yes     Priority: 3     Chronic hepatitis C virus infection [B18.2]  Yes    Chronic gout [M1A.9XX0]  Yes    Primary hypothyroidism [E03.9]  Yes    Insomnia [G47.00]  Yes      Resolved Hospital Problems    Diagnosis Date Resolved POA    *Acute on chronic congestive heart failure [I50.9] 03/16/2017 Yes     Priority: 1 - High       Future Appointments  Date Time Provider Department Center   3/17/2017 1:00 PM [facility] MAMMO3 DX [facility] MAMMO [facility]   3/22/2017 10:30 AM AUDIOGRAM, AUDIO [facility] AUDIO [facility]   3/22/2017 11:15 AM Negro Norris MD [facility] ENT [facility]   5/1/2017 10:30 AM LAB, APPOINTMENT [facility] LAB VNP [facility]   5/1/2017 10:35 AM SPECIMEN, [facility] SPECLAB [facility]   5/10/2017 11:40 AM Bud Benoit MD [facility] NEPHRO [facility]     Follow-up Information     Follow up with Live Young MD In 2 weeks.    Specialty:  Family Medicine    Contact information:    [address] [address]  [phone]          Follow up with Len Live MD.    Specialties:  Cardiology, Transplant    Why:  They will call to schedule follow up    Contact information:    [address] [facility] [address]  [phone]          Follow up with [facility]    Specialty:  Cardiology    Why:  They will call to schedule follow up            I have seen  and examined this patient face to face today. My clinical findings that support the need for the home health skilled services and home bound status are the following:  Weakness/numbness causing balance and gait disturbance due to Heart Failure, Coronary Heart Disease and Weakness/Debility making it taxing to leave home.  Requiring assistive device to leave home due to unsteady gait caused by  Heart Failure, Coronary Artery Disease and Weakness/Debility.  Medical restrictions requiring assistance of another human to leave home due to  Dyspnea on exertion (SOB), Fluid/volume overload, Unstable ambulation and Decreased range of motions in extremities.    Allergies:Review of patient's allergies indicates:  No Known Allergies    Diet: cardiac diet and fluid restriction: 1500 mL    Activities: activity as tolerated    Nursing:   SN to complete comprehensive assessment including routine vital signs. Instruct on disease process and s/s of complications to report to MD. Review/verify medication list sent home with the patient at time of discharge  and instruct patient/caregiver as needed. Frequency may be adjusted depending on start of care date.    Notify MD if SBP > 160 or < 90; DBP > 90 or < 50; HR > 120 or < 50; Temp > 101; Other:         CONSULTS:    Physical Therapy to evaluate and treat. Evaluate for home safety and equipment needs; Establish/upgrade home exercise program. Perform / instruct on therapeutic exercises, gait training, transfer training, and Range of Motion.  Occupational Therapy to evaluate and treat. Evaluate home environment for safety and equipment needs. Perform/Instruct on transfers, ADL training, ROM, and therapeutic exercises.   to evaluate for community resources/long-range planning.  Aide to provide assistance with personal care, ADLs, and vital signs.    MISCELLANEOUS CARE:  Heart Failure:      SN to instruct on the following:    Instruct on the definition of CHF.   Instruct on the  signs/sympoms of CHF to be reported.   Instruct on and monitor daily weights.   Instruct on factors that cause exacerbation.   Instruct on action, dose, schedule, and side effects of medications.   Instruct on diet as prescribed.   Instruct on activity allowed.   Instruct on life-style modifications for life long management of CHF   SN to assess compliance with daily weights, diet, medications, fluid retention,    safety precautions, activities permitted and life-style modifications.   Additional 1-2 SN visits per week as needed for signs and symptoms     of CHF exacerbation.      For Weight Gain > 2-3 lbs in 1 day or 4-6 lbs over 1 week notify PCP:  Increase lasix (oral diuretic) dose to 60 for 5 days temporarily  Obtain BMP lab test in 3 days  If weight does not decrease by 5 lbs after 5 days of increased diuretic usage: Notify PCP.    WOUND CARE ORDERS  n/a      Medications: Review discharge medications with patient and family and provide education.      Current Discharge Medication List      START taking these medications    Details   carvedilol (COREG) 12.5 MG tablet Take 1 tablet (12.5 mg total) by mouth 2 (two) times daily.  Qty: 60 tablet, Refills: 1         CONTINUE these medications which have CHANGED    Details   furosemide (LASIX) 20 MG tablet Take 2 tablets (40 mg total) by mouth once daily.  Qty: 180 tablet, Refills: 3    Associated Diagnoses: CKD (chronic kidney disease) stage 3, GFR 30-59 ml/min; Proteinuria; Edema, unspecified type      valsartan (DIOVAN) 320 MG tablet HOLD UNTIL FOLLOW UP WITH YOUR DOCTOR WITH KIDNEY LABWORK BEFOREHAND  Qty: 90 tablet, Refills: 3    Associated Diagnoses: CKD (chronic kidney disease) stage 3, GFR 30-59 ml/min; Essential hypertension         CONTINUE these medications which have NOT CHANGED    Details   allopurinol (ZYLOPRIM) 100 MG tablet Take 100 mg by mouth 2 (two) times daily.       aspirin 81 MG Chew Take 1 tablet (81 mg total) by mouth once daily.  Refills: 0       cetirizine (ZYRTEC) 5 MG tablet Take 1 tablet (5 mg total) by mouth once daily.  Refills: 0      diclofenac sodium 1 % Gel Apply 4 g topically 4 (four) times daily. Prn pain      fluticasone (FLONASE) 50 mcg/actuation nasal spray 2 sprays by Each Nare route once daily.  Qty: 1 Bottle, Refills: 3      gabapentin (NEURONTIN) 300 MG capsule Take 300 mg by mouth 3 (three) times daily as needed (for nerve pain).       guaifenesin (MUCINEX) 600 mg 12 hr tablet Take 1 tablet (600 mg total) by mouth 2 (two) times daily.      levothyroxine (SYNTHROID) 50 MCG tablet Take 1 tablet (50 mcg total) by mouth before breakfast.  Qty: 30 tablet, Refills: 11      melatonin 3 mg Tab Take by mouth every evening.      sodium bicarbonate 650 MG tablet Take 2 tablets (1,300 mg total) by mouth 2 (two) times daily.  Qty: 360 tablet, Refills: 3    Associated Diagnoses: CKD (chronic kidney disease) stage 3, GFR 30-59 ml/min; Metabolic acidosis      benztropine (COGENTIN) 0.5 MG tablet Take 1 tablet (0.5 mg total) by mouth nightly.  Qty: 30 tablet, Refills: 0      quetiapine (SEROQUEL) 200 MG Tab Take 1 tablet (200 mg total) by mouth nightly.  Qty: 30 tablet, Refills: 0             I certify that this patient is confined to her home and needs intermittent skilled nursing care, physical therapy and occupational therapy.

## 2017-03-16 NOTE — PLAN OF CARE
TIFFANIE spoke w/Alvaro Reed CM for [facility] Ph#[phone], who states that she has received clinicals and will arrange h/h.    Lars Spencer LMSW  Ext. [phone]

## 2017-03-16 NOTE — PLAN OF CARE
SW has faxed clinical info along w/home health orders to [facility] For home health arrangement. CM for [facility] Alvaro [facility] CM ph#[phone].    Expected dc is 03/17/17.    Lars Spencer LMSW  Ext. [phone]

## 2017-03-16 NOTE — PROGRESS NOTES
Progress Note  Nephrology    Admit Date: 3/7/2017   LOS: 7 days     SUBJECTIVE:     Follow-up For:  CANDI on CKD     Pt sleeping comfortably on RA this morning. PET stress test neg for ischemia.   Pt to be discharged today     Review of Systems:    Constitutional: no chills, no fevers  HEENT: no issues   Resp: no shortness of breath, no cough   Cardiac: no chest pain, no palpitations  Abd: no nausea or vomiting. No abdominal pain  Neuro: no headache, no dizziness.   Psych: no depression, no agitation    OBJECTIVE:     Vital Signs (Most Recent)  Temp: 97.5 °F (36.4 °C) (03/16/17 0831)  Pulse: (!) 58 (03/16/17 1100)  Resp: 18 (03/16/17 0831)  BP: 127/72 (03/16/17 0831)  SpO2: 97 % (03/16/17 0831)    Vital Signs Range (Last 24H):  Temp:  [97.4 °F (36.3 °C)-97.6 °F (36.4 °C)]   Pulse:  [58-69]   Resp:  [18]   BP: (119-151)/(68-87)   SpO2:  [96 %-99 %]       Physical Exam:  General: Well developed, well nourished in NAD  HEENT: Conjunctiva clear; Oropharynx clear  Neck: No JVD noted, Supple  CV- Normal S1, S2 with no murmurs,gallops,rubs  Resp- Lungs CTA Bilaterally, Unlabored  Abdomen- NTND, BS normoactive x4 quads, soft  Extrem- No cyanosis, clubbing, edema.  Skin- No rashes, lesions, ulcers  Neuro: awake, Oriented x3, no FND    Laboratory:  CBC:     Recent Labs  Lab 03/16/17  0457   WBC 2.98*   RBC 3.21*   HGB 10.2*   HCT 30.7*      MCV 96   MCH 31.8*   MCHC 33.2     CMP:     Recent Labs  Lab 03/16/17  0457      CALCIUM 8.6*   ALBUMIN 2.9*   PROT 6.5      K 3.8   CO2 24      BUN 68*   CREATININE 2.3*   ALKPHOS 83   ALT 60*   AST 59*   BILITOT 1.1*       Recent Labs  Lab 03/12/17  1655   COLORU Yellow   SPECGRAV 1.010   PHUR 6.0   PROTEINUA 1+*   BACTERIA None   NITRITE Negative   LEUKOCYTESUR Negative   UROBILINOGEN Negative   HYALINECASTS 4*       Diagnostic Results:  Labs: Reviewed    ASSESSMENT/PLAN:     71 y.o. female with PMH of CAD, systolic and diastolic CHF, HTN, hypothyroidism and CKD  stage 3 admitted due to acute on chronic heart failure. Renal consulted for CANDI on CKD stage 3      CANDI on CKD stage 3   - etiology of CANDI may be due to over diuresis   - CKD may be due to HTN  - baseline creat 1.7 to 1.8   - creat at admission 2.0, peaked at 3.4,down to 2.3 today   - pt non oliguric   - Urine protein creat ratio 0.23 better than before,  - lytes and acid base stable   - ok to restart low dose lasix PO 40 daily   - hold valsartan until follow up appt   - needs to f/u with nephrologist Dr. Benoit in clinic in 2-3 wks with rpt labs   - will send msg to clinic RN to set up appt     Anemia   - check iron studies as OP     MBD  - add daily phos  - check PTH, Vit D as OP     Jessy Man MD   Nephrology fellow   Pager [phone]        I have reviewed and concur with the fellow's history, physical, assessment, and plan. I have personally interviewed and examined the patient at bedside.

## 2017-03-16 NOTE — ASSESSMENT & PLAN NOTE
- Cardiology consulted and following. Shortness of breath and LE edema improving since admission. Now at baseline.  - Resume PO lasix 40 mg daily. Hold valsartan until nephrology follow up.  - 1500 fluid restriction  - Continue optimizing medical management - continue coreg, ASA, valsartan (when CANDI resolved), atorvastatin. Restarted coreg at lower dose. Will restart valsartan when CANDI improved.

## 2017-03-16 NOTE — PROGRESS NOTES
Cardiology  Progress Note                                                              Team: [facility]     Patient Name: Anai Sal   YOB: 1945  Location: [address] [place] [address] A    Admit Date: 3/7/2017                     LOS: 7    SUBJECTIVE     INTERVAL HISTORY:   Patient with no complaints this morning. Denies episodes of chest pain or shortness of breath overnight. PET stress test yesterday negative for ischemia or myocardial injury.    HPI:   71 y.o. woman with PMH of HTN, combined systolic and diastolic heart failure, HCV, CKD3, hypothyroidism, + 40 ppd smoking hx, and ?COPD, ?CAD who presented with shortness of breath and chest pain. Patient states she had worsening shortness of breath for the past few days and presented to the ED where she was given IV lasix with improvement in symptoms. Patient used to follow with [facility] where she states she had a cardiologist but does not remember the physician's name. ECHO 12/2016 with EF 40%, ECHO 2/2017 EF 25-30%, and repeat ECHO this admission EF unchanged from the one in February.      Patient states at baseline, she gets shortness of breath with exertion associated with chest pressure and lightheadedness, relieved with rest. Patient endorses orthopnea and sleeps with 3-4 pillows at night. She did notice weight gain which has been decreasing since receiving lasix. Patient normally takes 60 mg lasix po daily. She was scheduled for a spect, although per chart review it appears it is awaiting insurance authorization.     At home, patient was taking ASA and valsartan but was not on a beta blocker or statin.     REVIEW OF SYSTEMS:  Constitution: Negative for fever, chills, +weight change  HENT: Negative for sore throat, rhinorrhea, or headache.  Eyes: Negative for blurred or double vision.   Cardiovascular: See above  Pulmonary: Positive for SOB (improving)   Gastrointestinal: Negative for abdominal pain, nausea, vomiting, or diarrhea.   : Negative for  "dysuria.   Extremities: Positive for lower extremity swelling, improved  Neurological: Negative for focal weakness or sensory changes.    MEDICATIONS:  Scheduled:   aspirin  81 mg Oral Daily    atorvastatin  80 mg Oral QHS    benztropine  0.5 mg Oral Nightly    carvedilol  12.5 mg Oral BID    cetirizine  5 mg Oral Daily    clopidogrel  75 mg Oral Daily    fluticasone  2 spray Each Nare Daily    heparin (porcine)  5,000 Units Subcutaneous Q8H    levothyroxine  50 mcg Oral Before breakfast    quetiapine  200 mg Oral Daily with dinner    sodium bicarbonate  1,300 mg Oral BID     Continuous:     PRN:  acetaminophen, albuterol-ipratropium 2.5mg-0.5mg/3mL, dextrose 50%, dextrose 50%, gabapentin, glucagon (human recombinant), glucose, glucose, hydrALAZINE, nitroGLYCERIN, ondansetron, ondansetron, ramelteon      OBJECTIVE:     VITALS  Most Recent Range (Last 24H)   /68 (Patient Position: Lying, BP Method: Automatic)  Pulse (!) 58  Temp 97.6 °F (36.4 °C) (Oral)   Resp 18  Ht 5' 7" (1.702 m)  Wt 80.1 kg (176 lb 9.4 oz)  SpO2 99%  Breastfeeding? No  BMI 27.66 kg/m2 Temp:  [96.4 °F (35.8 °C)-97.6 °F (36.4 °C)]   Pulse:  [58-69]   Resp:  [18]   BP: (119-151)/(68-87)   SpO2:  [96 %-99 %]      Intake and Output  BMI     Intake/Output Summary (Last 24 hours) at 03/16/17 0718  Last data filed at 03/16/17 0446   Gross per 24 hour   Intake              360 ml   Output              450 ml   Net              -90 ml       Net I/O since admission: Body mass index is 27.66 kg/(m^2).        PHYSICAL EXAM  Constitutional: awake, alert, NAD  HEENT: Sclera anicteric, PERRLA, EOMI  Neck: unavailable to appreciate JVD, no carotid bruits  CV: RRR, no murmur, normal S1/S2  Pulm: clear to auscultation bilaterally  GI: Abdomen soft, NTND, +BS  Extremities: No LE edema, warm and well perfused  Skin: No ecchymosis, erythema, or ulcers  Psych: alert and oriented to self, place, day but not year or month   Neuro: CNII-XII " intact, no focal deficits      LABS  CBC/Anemia Labs Coag Labs     Recent Labs  Lab 03/14/17  0550 03/15/17  0451 03/16/17  0457   WBC 4.32 3.03* 2.98*   HGB 10.3* 9.8* 10.2*   HCT 29.3* 28.3* 30.7*   MCV 93 93 96    171 158    Lab Results   Component Value Date    INR 1.0 12/16/2016        BMP     Recent Labs  Lab 03/14/17  0550 03/15/17  0451 03/16/17  0457    97 105    143 144   K 4.2 3.9 3.8    108 110   CO2 21* 24 24   BUN 91* 79* 68*   CREATININE 3.2* 2.7* 2.3*   CALCIUM 8.3* 8.4* 8.6*      Mag & Phos LFTs   No results for input(s): MG, PHOS in the last 168 hours.   Recent Labs  Lab 03/14/17  0550 03/15/17  0451 03/16/17 0457   PROT 6.6 6.6 6.5   BILITOT 0.8 1.0 1.1*   ALKPHOS 80 77 83   AST 70* 63* 59*   ALT 65* 62* 60*             Cardiac Enzymes Ejection Fractions/BNP     Recent Labs  Lab 03/11/17  0412 03/14/17  0857 03/14/17  1102   CPK  --  56  56  --    TROPONINI 0.053* 0.122* 0.118*   CPKMB  --  2.3  --    MB  --  4.1  --     EF   Date Value Ref Range Status   03/14/2017 20 (A) 55 - 65    03/10/2017 20 (A) 55 - 65    02/08/2017 25 (A) 55 - 65        Recent Labs  Lab 03/10/17  1215   BNP 3140*        Lab Results   Component Value Date    HGBA1C 4.2 (L) 01/11/2017         Microbiology Results (last 7 days)     ** No results found for the last 168 hours. **          INVESTIGATION RESULTS    Imaging Results         US Lower Extremity Veins Bilateral (Final result) Result time:  03/09/17 15:08:48    Final result by Kenyatta Hardy MD (03/09/17 15:08:48)    Impression:      #1. No evidence of left or right lower extremity DVT.      Electronically signed by: KENYATTA HARDY MD  Date:     03/09/17  Time:    15:08     Narrative:    HISTORY: Pain and swelling    COMPARISON: None    FINDINGS:Duplex scan of the bilateral lower extremities was performed using B-Mode/gray scale imaging and Doppler spectral analysis and color flow.      The bilateral Common Femoral, proximal GSV,  Superficial Femoral, Popliteal, Peroneal, Anterior Tibial, and Posterior Tibial veins are patent and unremarkable.            X-Ray Chest PA And Lateral (Final result) Result time:  03/08/17 00:56:18    Final result by Leroy Vargas MD (03/08/17 00:56:18)    Impression:         Small right effusion with associated compressive atelectasis/scarring, otherwise, stable chronic findings.          Electronically signed by: LEROY VARGAS MD  Date:     03/08/17  Time:    00:56     Narrative:    Chest PA and lateral    Indication:Shortness of breath    Comparison:12/16/2016    Findings:  The cardiomediastinal silhouette is prominent, similar to the previous examination noting calcification of the aorta.  There is a right pleural effusion with associated atelectasis/scarring.  The trachea is midline.  The lungs are symmetrically expanded bilaterally with coarse interstitial attenuation bilaterally, similar to the previous exam. No large focal consolidation seen.  There is no pneumothorax.  The osseous structures are remarkable for degenerative changes of the spine and shoulders.  Postsurgical changes of right mastectomy noted.                ASSESSMENT/PLAN:     Current Problems List:  Active Hospital Problems    Diagnosis  POA    *Acute on chronic congestive heart failure [I50.9]  Yes    Chronic hepatitis C virus infection [B18.2]  Yes    Unstable angina [I20.0]  Yes    CANDI (acute kidney injury) [N17.9]  No    Essential hypertension [I10]  Yes    Chronic gout [M1A.9XX0]  Yes    Primary hypothyroidism [E03.9]  Yes    CKD (chronic kidney disease) stage 3, GFR 30-59 ml/min [N18.3]  Yes    Insomnia [G47.00]  Yes      Resolved Hospital Problems    Diagnosis Date Resolved POA   No resolved problems to display.        ASSESSMENT:   71 y.o. year old female with 71 y.o. woman with PMH of HTN, combined systolic and diastolic heart failure, CKD3, HCV, hypothyroidism, and ?COPD, ?CAD who presented shortness of breath.  Cardiology consulted for acute on chronic heart failure exacerbation.    PLAN:  Acute on chronic heart failure exacerbation  - likely 2/2 NICM  - patient presented with shortness of breath and chest pain, improved with IV lasix however remains with shortness of breath and LE edema   - 2/8/17 ECHO with EF 25%, repeat ECHO unchanged   - recommend continuing fluid restriction   - recommend strict I&Os and daily weights   - creatinine improving, would recommend starting lasix po 40 mg daily  - recommend continuation of ASA , coreg to optimize medical management. Recommend continuation of valsartan when CANDI improves. Would recommend restarting at valsartan 40 mg bid when stable.   - Per daughter, patient previously placed on statin but then discontinued at [facility] (unsure of reaction). Patient with only mild transaminitis, however, in setting of LDL<70 and no ischemic disease noted on PET stress, would recommend holding statin (order has been discontinued)  - after review of ECHO to evaluate MR, mitral valve appears functional and is likely 2/2 cardiomyopathy and RV dilatation  - recommend optimization of heart failure regimen   - patient follows Dr. Live in heart failure clinic. Will need f/u 1-2 weeks post-discharge.  - patient will also need follow up with interventional valve clinic in 1-2 weeks.      Atypical chest pain  - patient endorses hx of chest pressure and shortness of breath that occurs at rest and with exertion  - 3/8/17 EKG concerning for inferior ischemia   - in setting of chest pain, recommend trending troponins q6h  - patient previously scheduled for spect, however awaiting insurance authorization. Agree with spect for ischemic workup.   - patient originally with cardiologist at [facility], however, she does not remember the name. Will attempt to obtain records from [facility] to determine if patient has had recent ischemic workup/prior heart cath  - repeat 2D ECHO without significant change from prior   -  PET stress negative for ischemia or myocardial injury  - would recommend discontinuing plavix as no ischemic disease noted on PET (order has been discontinued).   - would recommend discontinuing prn NTG as unlikely to relieve nonischemic chest pain    Thank you for this consult.  Cardiology will sign off.    Discussed with cardiology and staff. Staff attestation to follow.     Bailee Rizzo MD, PGY1  Pager: [phone]

## 2017-03-16 NOTE — ASSESSMENT & PLAN NOTE
Patient endorses hx of chest pressure and shortness of breath that occurs at rest and with exertion  - no active chest pain today  - patient started on Plavix during admission, no indication for continuation at discharge as no ischemia on PET  - Patient with mild transaminitis s/p initiation of statin, in setting of LDL<70 and no ischemic disease noted on PET stress, would recommend holding statin  - PET stress test negative for ischemia or myocardial injury  - after review of ECHO to evaluate MR, mitral valve appears functional and is likely 2/2 cardiomyopathy and RV dilatation  - recommend optimization of heart failure regimen   - patient follows Dr. Live in heart failure clinic. Will need f/u 1-2 weeks post-discharge.  - patient will also need follow up with interventional valve clinic in 1-2 weeks.

## 2017-03-17 NOTE — PT/OT/SLP DISCHARGE
Occupational Therapy Discharge Summary    Anai Sal  MRN: [id number]   Acute on chronic congestive heart failure   Patient Discharged from acute Occupational Therapy on 3/16/17.  Please refer to prior OT note dated on 3/16/17 for functional status.     Assessment:   Patient was discharge unexpectedly.  Information required to complete and accurate discharge summary is unknown.  Refer to therapy initial evaluation and last progress note for initial and most recent functional status and goal achievement.  Recommendations made may be found in medical record.  GOALS:   Occupational Therapy Goals     Not on file      Multidisciplinary Problems (Resolved)        Problem: Occupational Therapy Goal    Goal Priority Disciplines Outcome Interventions   Occupational Therapy Goal   (Resolved)     OT, PT/OT Outcome(s) achieved    Description:  Goals to be met by: 3/20/17     Patient will increase functional independence with ADLs by performing:    UE Dressing with Stand-by Assistance.  LE Dressing with Stand-by Assistance.  Grooming while standing at sink with Stand-by Assistance.-MET   Revised: Grooming while standing at sink with supervision.  Toileting from toilet with Stand-by Assistance for hygiene and clothing management.   Toilet transfer to toilet with Stand-by Assistance.-MET   Revised: Toilet transfer to toilet with supervision.                  Reasons for Discontinuation of Therapy Services  Transfer to alternate level of care.      Plan:  Patient Discharged to: Home with Home Health Service     ERIBERTO Holly  3/17/2017  .

## 2017-03-17 NOTE — PT/OT/SLP DISCHARGE
Physical Therapy Discharge Summary    Anai Sal  MRN: [id number]   Acute on chronic congestive heart failure   Patient Discharged from acute Physical Therapy on 3/16/17.  Please refer to prior PT noted date on 3/16/17 for functional status.     Assessment:   Patient was discharge unexpectedly.  Information required to complete and accurate discharge summary is unknown.  Refer to therapy initial evaluation and last progress note for initial and most recent functional status and goal achievement.  Recommendations made may be found in medical record.  GOALS:   Physical Therapy Goals     Not on file      Multidisciplinary Problems (Resolved)        Problem: Physical Therapy Goal    Goal Priority Disciplines Outcome Goal Variances Interventions   Physical Therapy Goal   (Resolved)     PT/OT, PT Outcome(s) achieved     Description:  Goals to be met by: 3/20/17     Patient will increase functional independence with mobility by performin. Supine to sit with Supervision  2. Sit to stand transfer with Supervision  3. Bed to chair transfer with Supervision using Rolling Walker  4. Gait  x 150 feet with Supervision using Rolling Walker.   5. Ascend/descend 12 stairs with bilateral Handrails Contact Guard Assistance.   6. Lower extremity exercise program x10 reps, with assistance as needed, in order to increase LE strength and (I) with functional mobility               Reasons for Discontinuation of Therapy Services  Transfer to alternate level of care.      Plan:  Patient Discharged to: Home with Home Health Service.    Tia Maciel, PT, DPT   3/17/2017  [phone]

## 2017-03-20 ENCOUNTER — PATIENT OUTREACH (OUTPATIENT)
Dept: ADMINISTRATIVE | Facility: CLINIC | Age: 72
End: 2017-03-20
Payer: COMMERCIAL

## 2017-03-20 NOTE — PATIENT INSTRUCTIONS

## 2017-03-22 ENCOUNTER — OFFICE VISIT (OUTPATIENT)
Dept: OTOLARYNGOLOGY | Facility: CLINIC | Age: 72
End: 2017-03-22
Payer: COMMERCIAL

## 2017-03-22 ENCOUNTER — CLINICAL SUPPORT (OUTPATIENT)
Dept: AUDIOLOGY | Facility: CLINIC | Age: 72
End: 2017-03-22
Payer: COMMERCIAL

## 2017-03-22 VITALS — HEIGHT: 67 IN | BODY MASS INDEX: 45.99 KG/M2 | WEIGHT: 293 LBS

## 2017-03-22 DIAGNOSIS — H90.3 SENSORY HEARING LOSS, BILATERAL: Primary | ICD-10-CM

## 2017-03-22 DIAGNOSIS — H90.3 SENSORINEURAL HEARING LOSS (SNHL) OF BOTH EARS: ICD-10-CM

## 2017-03-22 DIAGNOSIS — H60.8X3 CHRONIC ECZEMATOUS OTITIS EXTERNA OF BOTH EARS: Primary | ICD-10-CM

## 2017-03-22 PROCEDURE — 99999 PR PBB SHADOW E&M-EST. PATIENT-LVL III: CPT | Mod: PBBFAC,,, | Performed by: OTOLARYNGOLOGY

## 2017-03-22 PROCEDURE — 99203 OFFICE O/P NEW LOW 30 MIN: CPT | Mod: S$GLB,,, | Performed by: OTOLARYNGOLOGY

## 2017-03-22 PROCEDURE — 92557 COMPREHENSIVE HEARING TEST: CPT | Mod: S$GLB,,, | Performed by: AUDIOLOGIST

## 2017-03-22 RX ORDER — BETAMETHASONE VALERATE 0.1 %
LOTION (ML) TOPICAL DAILY
Qty: 1 BOTTLE | Refills: 3 | Status: ON HOLD | OUTPATIENT
Start: 2017-03-22 | End: 2017-12-18 | Stop reason: HOSPADM

## 2017-03-22 NOTE — PROGRESS NOTES
Subjective:       Patient ID: Anai Sal is a 71 y.o. female.    Chief Complaint: Hearing Loss    HPI   71F w/ many medical problems presents with hearing loss and ear itching. The hearing loss has been present for months-years and troubles her while she watches TV. She has intermittent tinnitus and does endorse noise exposure in her life. No known family history, history of recurrent infections, or ear surgery. No dizziness.     Past Medical History: Patient has a past medical history of Breast cancer; CHF (congestive heart failure); Chronic hepatitis C virus infection (3/14/2017); Coronary artery disease; Hypertension; Hazel; Renal disorder; and Thyroid disease.    Past Surgical History: Patient has a past surgical history that includes Breast surgery; Hysterectomy; and Mastectomy.    Social History: Patient reports that she quit smoking about 20 years ago. Her smoking use included Cigarettes. She has a 40.00 pack-year smoking history. She does not have any smokeless tobacco history on file. She reports that she does not drink alcohol or use illicit drugs.    Family History: Family history is unknown by patient.    Medications:   Current Outpatient Prescriptions   Medication Sig    allopurinol (ZYLOPRIM) 100 MG tablet Take 100 mg by mouth 2 (two) times daily.     aspirin 81 MG Chew Take 1 tablet (81 mg total) by mouth once daily.    carvedilol (COREG) 12.5 MG tablet Take 1 tablet (12.5 mg total) by mouth 2 (two) times daily.    cetirizine (ZYRTEC) 5 MG tablet Take 1 tablet (5 mg total) by mouth once daily.    diclofenac sodium 1 % Gel Apply 4 g topically 4 (four) times daily. Prn pain    fluticasone (FLONASE) 50 mcg/actuation nasal spray 2 sprays by Each Nare route once daily.    furosemide (LASIX) 20 MG tablet Take 2 tablets (40 mg total) by mouth once daily.    gabapentin (NEURONTIN) 300 MG capsule Take 300 mg by mouth 3 (three) times daily as needed (for nerve pain).     guaifenesin (MUCINEX) 600  mg 12 hr tablet Take 1 tablet (600 mg total) by mouth 2 (two) times daily.    levothyroxine (SYNTHROID) 50 MCG tablet Take 1 tablet (50 mcg total) by mouth before breakfast.    melatonin 3 mg Tab Take by mouth every evening.    sodium bicarbonate 650 MG tablet Take 2 tablets (1,300 mg total) by mouth 2 (two) times daily.    valsartan (DIOVAN) 320 MG tablet HOLD UNTIL FOLLOW UP WITH YOUR DOCTOR WITH KIDNEY LABWORK BEFOREHAND    benztropine (COGENTIN) 0.5 MG tablet Take 1 tablet (0.5 mg total) by mouth nightly.    betamethasone valerate 0.1% (VALISONE) 0.1 % Lotn Apply topically once daily. 2 drops both ears once daily    quetiapine (SEROQUEL) 200 MG Tab Take 1 tablet (200 mg total) by mouth nightly.     No current facility-administered medications for this visit.        Allergies: Patient has No Known Allergies.    Review of Systems   Constitutional: Negative for fever and unexpected weight change.   HENT: Positive for hearing loss. Negative for ear discharge, ear pain and tinnitus.    Eyes: Negative for photophobia and visual disturbance.   Respiratory: Negative for cough and shortness of breath.    Cardiovascular: Negative for chest pain.   Gastrointestinal: Negative for abdominal pain, nausea and vomiting.   Endocrine: Negative for cold intolerance and heat intolerance.   Genitourinary: Negative for dysuria and flank pain.   Musculoskeletal: Negative for back pain and neck pain.   Skin: Negative for rash.   Allergic/Immunologic: Negative for environmental allergies and immunocompromised state.   Neurological: Negative for dizziness, facial asymmetry and headaches.   Hematological: Negative for adenopathy.   Psychiatric/Behavioral: Negative for behavioral problems.       Objective:      Physical Exam   Constitutional: She is oriented to person, place, and time. She appears well-developed and well-nourished. No distress.   HENT:   Head: Normocephalic and atraumatic.   Right Ear: Tympanic membrane and external  ear normal. No drainage. Tympanic membrane is not scarred and not perforated. No middle ear effusion. Decreased hearing is noted.   Left Ear: Tympanic membrane and external ear normal. No drainage. Tympanic membrane is not scarred and not perforated.  No middle ear effusion. Decreased hearing is noted.   Ears:    Nose: Nose normal. No nasal deformity or septal deviation.   Mouth/Throat: Uvula is midline, oropharynx is clear and moist and mucous membranes are normal. Abnormal dentition (edentulous). Tonsils are 1+ on the right. Tonsils are 1+ on the left.   Eyes: Conjunctivae and EOM are normal. Pupils are equal, round, and reactive to light.   Neck: Normal range of motion. Neck supple.   Cardiovascular: Regular rhythm and intact distal pulses.    Pulmonary/Chest: Effort normal. No stridor. No respiratory distress.   Musculoskeletal: Normal range of motion.   Lymphadenopathy:     She has no cervical adenopathy.   Neurological: She is alert and oriented to person, place, and time. No cranial nerve deficit.   Skin: Skin is warm and dry. No rash noted.   Psychiatric: She has a normal mood and affect.           Assessment:       1. Chronic eczematous otitis externa of both ears    2. Sensorineural hearing loss (SNHL) of both ears        Plan:       Anai was seen today for hearing loss.    Diagnoses and all orders for this visit:    Chronic eczematous otitis externa of both ears  -     Ambulatory referral to Audiology    Sensorineural hearing loss (SNHL) of both ears  -     Ambulatory referral to Audiology    Other orders  -     betamethasone valerate 0.1% (VALISONE) 0.1 % Lotn; Apply topically once daily. 2 drops both ears once daily        Offered hearing aids vs TV ears, pt would like to think about it.    RTC prn.

## 2017-03-31 ENCOUNTER — OFFICE VISIT (OUTPATIENT)
Dept: TRANSPLANT | Facility: CLINIC | Age: 72
End: 2017-03-31
Payer: COMMERCIAL

## 2017-03-31 VITALS
BODY MASS INDEX: 30.11 KG/M2 | DIASTOLIC BLOOD PRESSURE: 86 MMHG | HEART RATE: 70 BPM | SYSTOLIC BLOOD PRESSURE: 139 MMHG | HEIGHT: 66 IN | WEIGHT: 187.38 LBS

## 2017-03-31 DIAGNOSIS — I10 ESSENTIAL HYPERTENSION: ICD-10-CM

## 2017-03-31 DIAGNOSIS — R06.02 SOB (SHORTNESS OF BREATH): ICD-10-CM

## 2017-03-31 DIAGNOSIS — N18.30 CKD (CHRONIC KIDNEY DISEASE) STAGE 3, GFR 30-59 ML/MIN: Primary | ICD-10-CM

## 2017-03-31 DIAGNOSIS — J44.1 COPD EXACERBATION: ICD-10-CM

## 2017-03-31 PROCEDURE — 99999 PR PBB SHADOW E&M-EST. PATIENT-LVL IV: CPT | Mod: PBBFAC,,, | Performed by: PHYSICIAN ASSISTANT

## 2017-03-31 PROCEDURE — 99214 OFFICE O/P EST MOD 30 MIN: CPT | Mod: S$GLB,,, | Performed by: PHYSICIAN ASSISTANT

## 2017-03-31 RX ORDER — FUROSEMIDE 10 MG/ML
40 INJECTION INTRAMUSCULAR; INTRAVENOUS ONCE
Status: DISCONTINUED | OUTPATIENT
Start: 2017-03-31 | End: 2017-04-07 | Stop reason: HOSPADM

## 2017-03-31 NOTE — MR AVS SNAPSHOT
[facility]  [address]  [facility] [address]  Phone: [phone]                  Anai Sal   3/31/2017 11:00 AM   Office Visit    Description:  Female : 1945   Provider:  NUSRAT Bhatti   Department:  [facility]           Reason for Visit     Congestive Heart Failure           Diagnoses this Visit        Comments    CKD (chronic kidney disease) stage 3, GFR 30-59 ml/min    -  Primary     SOB (shortness of breath)         Essential hypertension         COPD exacerbation                To Do List           Future Appointments        Provider Department Dept Phone    2017 10:30 AM LAB, APPOINTMENT [place] [facility] [phone]    2017 10:35 AM SPECIMEN, MAIN [facility] [phone]    5/10/2017 11:40 AM Bud Benoit MD [facility] - Nephrology [phone]      Goals (5 Years of Data)     None      [facility]     [facility] Care Line -  Assistance  Unless otherwise directed by your provider, please contact [facility], our nurse care line that is available for  assistance.     Registered nurses in the [facility] provide: appointment scheduling, clinical advisement, health education, and other advisory services.  Call: [phone] (toll free)               Medications           Message regarding Medications     Verify the changes and/or additions to your medication regime listed below are the same as discussed with your clinician today.  If any of these changes or additions are incorrect, please notify your healthcare provider.        These medications were administered today        Dose Freq    furosemide injection 40 mg 40 mg Once    Sig: Inject 4 mLs (40 mg total) into the vein once.    Class: Normal    Route: Intravenous           Verify that the below list of medications is an accurate representation of the medications you are currently taking.   "If none reported, the list may be blank. If incorrect, please contact your healthcare provider. Carry this list with you in case of emergency.           Current Medications     allopurinol (ZYLOPRIM) 100 MG tablet Take 100 mg by mouth 2 (two) times daily.     aspirin 81 MG Chew Take 1 tablet (81 mg total) by mouth once daily.    benztropine (COGENTIN) 0.5 MG tablet Take 1 tablet (0.5 mg total) by mouth nightly.    betamethasone valerate 0.1% (VALISONE) 0.1 % Lotn Apply topically once daily. 2 drops both ears once daily    carvedilol (COREG) 12.5 MG tablet Take 1 tablet (12.5 mg total) by mouth 2 (two) times daily.    cetirizine (ZYRTEC) 5 MG tablet Take 1 tablet (5 mg total) by mouth once daily.    diclofenac sodium 1 % Gel Apply 4 g topically 4 (four) times daily. Prn pain    fluticasone (FLONASE) 50 mcg/actuation nasal spray 2 sprays by Each Nare route once daily.    furosemide (LASIX) 20 MG tablet Take 2 tablets (40 mg total) by mouth once daily.    gabapentin (NEURONTIN) 300 MG capsule Take 300 mg by mouth 3 (three) times daily as needed (for nerve pain).     guaifenesin (MUCINEX) 600 mg 12 hr tablet Take 1 tablet (600 mg total) by mouth 2 (two) times daily.    levothyroxine (SYNTHROID) 50 MCG tablet Take 1 tablet (50 mcg total) by mouth before breakfast.    melatonin 3 mg Tab Take by mouth every evening.    quetiapine (SEROQUEL) 200 MG Tab Take 1 tablet (200 mg total) by mouth nightly.    sodium bicarbonate 650 MG tablet Take 2 tablets (1,300 mg total) by mouth 2 (two) times daily.    valsartan (DIOVAN) 320 MG tablet HOLD UNTIL FOLLOW UP WITH YOUR DOCTOR WITH KIDNEY LABWORK BEFOREHAND           Clinical Reference Information           Your Vitals Were     BP Pulse Height Weight BMI    139/86 70 5' 6" (1.676 m) 85 kg (187 lb 6.3 oz) 30.25 kg/m2      Blood Pressure          Most Recent Value    BP  139/86      Allergies as of 3/31/2017     No Known Allergies      Immunizations Administered on Date of Encounter - " 3/31/2017     None      [facility] Sign-Up     Activating your [facility] account is as easy as 1-2-3!     1) Visit [email or web address], select Sign Up Now, enter this activation code and your date of birth, then select Next.  [email or web address]  Expires: 5/15/2017  6:44 AM      2) Create a username and password to use when you visit [facility] in the future and select a security question in case you lose your password and select Next.    3) Enter your e-mail address and click Sign Up!    Additional Information  If you have questions, please e-mail [email or web address] or call [phone] to talk to our [employer] staff. Remember, [facility] is NOT to be used for urgent needs. For medical emergencies, dial 911.         Language Assistance Services     ATTENTION: Language assistance services are available, free of charge. Please call [phone].      ATENCIÓN: Si habla delfino, tiene a mane disposición servicios gratuitos de asistencia lingüística. Llame al [phone].     CHÚ Ý: N?u b?n nói Ti?ng Vi?t, có các d?ch v? h? tr? ngôn ng? mi?n phí dành cho b?n. G?i s? [phone].         [facility] complies with applicable Federal civil rights laws and does not discriminate on the basis of race, color, national origin, age, disability, or sex.

## 2017-04-04 ENCOUNTER — HOSPITAL ENCOUNTER (INPATIENT)
Facility: HOSPITAL | Age: 72
LOS: 9 days | Discharge: HOME HEALTH | DRG: 291 | End: 2017-04-13
Attending: EMERGENCY MEDICINE | Admitting: HOSPITALIST
Payer: COMMERCIAL

## 2017-04-04 DIAGNOSIS — N17.9 ACUTE RENAL FAILURE SUPERIMPOSED ON STAGE 4 CHRONIC KIDNEY DISEASE: ICD-10-CM

## 2017-04-04 DIAGNOSIS — R41.0 DELIRIUM: ICD-10-CM

## 2017-04-04 DIAGNOSIS — I48.91 A-FIB: ICD-10-CM

## 2017-04-04 DIAGNOSIS — E03.9 PRIMARY HYPOTHYROIDISM: ICD-10-CM

## 2017-04-04 DIAGNOSIS — I50.43 ACUTE ON CHRONIC COMBINED SYSTOLIC AND DIASTOLIC CONGESTIVE HEART FAILURE: ICD-10-CM

## 2017-04-04 DIAGNOSIS — I48.0 PAROXYSMAL ATRIAL FIBRILLATION: ICD-10-CM

## 2017-04-04 DIAGNOSIS — R47.81 SLURRED SPEECH: ICD-10-CM

## 2017-04-04 DIAGNOSIS — R60.9 EDEMA, UNSPECIFIED TYPE: ICD-10-CM

## 2017-04-04 DIAGNOSIS — I34.0 MITRAL VALVE INSUFFICIENCY, UNSPECIFIED ETIOLOGY: ICD-10-CM

## 2017-04-04 DIAGNOSIS — R06.02 SOB (SHORTNESS OF BREATH): ICD-10-CM

## 2017-04-04 DIAGNOSIS — I10 ESSENTIAL HYPERTENSION: ICD-10-CM

## 2017-04-04 DIAGNOSIS — I50.9 CONGESTIVE HEART FAILURE, UNSPECIFIED CONGESTIVE HEART FAILURE CHRONICITY, UNSPECIFIED CONGESTIVE HEART FAILURE TYPE: Primary | ICD-10-CM

## 2017-04-04 DIAGNOSIS — R00.0 TACHYARRHYTHMIA: ICD-10-CM

## 2017-04-04 DIAGNOSIS — J44.9 CHRONIC OBSTRUCTIVE PULMONARY DISEASE, UNSPECIFIED COPD TYPE: ICD-10-CM

## 2017-04-04 DIAGNOSIS — R06.02 SHORTNESS OF BREATH: ICD-10-CM

## 2017-04-04 DIAGNOSIS — R80.9 PROTEINURIA: ICD-10-CM

## 2017-04-04 DIAGNOSIS — N18.4 ACUTE RENAL FAILURE SUPERIMPOSED ON STAGE 4 CHRONIC KIDNEY DISEASE: ICD-10-CM

## 2017-04-04 DIAGNOSIS — N18.30 CKD (CHRONIC KIDNEY DISEASE) STAGE 3, GFR 30-59 ML/MIN: ICD-10-CM

## 2017-04-04 DIAGNOSIS — G93.40 ENCEPHALOPATHY ACUTE: ICD-10-CM

## 2017-04-04 DIAGNOSIS — I50.23 ACUTE ON CHRONIC SYSTOLIC CONGESTIVE HEART FAILURE: ICD-10-CM

## 2017-04-04 DIAGNOSIS — I50.42 CHRONIC COMBINED SYSTOLIC AND DIASTOLIC HEART FAILURE, NYHA CLASS 3: ICD-10-CM

## 2017-04-04 DIAGNOSIS — N18.4 STAGE 4 CHRONIC KIDNEY DISEASE: ICD-10-CM

## 2017-04-04 DIAGNOSIS — I48.91 ATRIAL FIBRILLATION: ICD-10-CM

## 2017-04-04 DIAGNOSIS — D72.819 LEUKOPENIA, UNSPECIFIED TYPE: ICD-10-CM

## 2017-04-04 LAB
ALBUMIN SERPL BCP-MCNC: 3.3 G/DL
ALP SERPL-CCNC: 83 U/L
ALT SERPL W/O P-5'-P-CCNC: 15 U/L
ANION GAP SERPL CALC-SCNC: 10 MMOL/L
AST SERPL-CCNC: 24 U/L
BACTERIA #/AREA URNS AUTO: ABNORMAL /HPF
BASOPHILS # BLD AUTO: 0 K/UL
BASOPHILS # BLD AUTO: 0.01 K/UL
BASOPHILS NFR BLD: 0 %
BASOPHILS NFR BLD: 0.4 %
BILIRUB SERPL-MCNC: 1.4 MG/DL
BILIRUB UR QL STRIP: NEGATIVE
BNP SERPL-MCNC: 4304 PG/ML
BUN SERPL-MCNC: 40 MG/DL
CALCIUM SERPL-MCNC: 9 MG/DL
CHLORIDE SERPL-SCNC: 107 MMOL/L
CLARITY UR REFRACT.AUTO: CLEAR
CO2 SERPL-SCNC: 24 MMOL/L
COLOR UR AUTO: YELLOW
CREAT SERPL-MCNC: 2.5 MG/DL
DIFFERENTIAL METHOD: ABNORMAL
DIFFERENTIAL METHOD: ABNORMAL
EOSINOPHIL # BLD AUTO: 0 K/UL
EOSINOPHIL # BLD AUTO: 0 K/UL
EOSINOPHIL NFR BLD: 1.1 %
EOSINOPHIL NFR BLD: 1.5 %
ERYTHROCYTE [DISTWIDTH] IN BLOOD BY AUTOMATED COUNT: 16.2 %
ERYTHROCYTE [DISTWIDTH] IN BLOOD BY AUTOMATED COUNT: 16.2 %
EST. GFR  (AFRICAN AMERICAN): 21.6 ML/MIN/1.73 M^2
EST. GFR  (NON AFRICAN AMERICAN): 18.8 ML/MIN/1.73 M^2
GLUCOSE SERPL-MCNC: 93 MG/DL
GLUCOSE UR QL STRIP: NEGATIVE
HCT VFR BLD AUTO: 29.1 %
HCT VFR BLD AUTO: 32 %
HGB BLD-MCNC: 10.3 G/DL
HGB BLD-MCNC: 9.7 G/DL
HGB UR QL STRIP: NEGATIVE
HYALINE CASTS UR QL AUTO: 5 /LPF
KETONES UR QL STRIP: NEGATIVE
LEUKOCYTE ESTERASE UR QL STRIP: ABNORMAL
LYMPHOCYTES # BLD AUTO: 0.8 K/UL
LYMPHOCYTES # BLD AUTO: 1 K/UL
LYMPHOCYTES NFR BLD: 28.6 %
LYMPHOCYTES NFR BLD: 38.1 %
MCH RBC QN AUTO: 30.3 PG
MCH RBC QN AUTO: 30.8 PG
MCHC RBC AUTO-ENTMCNC: 32.2 %
MCHC RBC AUTO-ENTMCNC: 33.3 %
MCV RBC AUTO: 92 FL
MCV RBC AUTO: 94 FL
MICROSCOPIC COMMENT: ABNORMAL
MONOCYTES # BLD AUTO: 0.3 K/UL
MONOCYTES # BLD AUTO: 0.3 K/UL
MONOCYTES NFR BLD: 10.2 %
MONOCYTES NFR BLD: 10.8 %
NEUTROPHILS # BLD AUTO: 1.3 K/UL
NEUTROPHILS # BLD AUTO: 1.6 K/UL
NEUTROPHILS NFR BLD: 49.6 %
NEUTROPHILS NFR BLD: 59.7 %
NITRITE UR QL STRIP: NEGATIVE
PH UR STRIP: 6 [PH] (ref 5–8)
PLATELET # BLD AUTO: 131 K/UL
PLATELET # BLD AUTO: 147 K/UL
PMV BLD AUTO: 11.7 FL
PMV BLD AUTO: 12 FL
POTASSIUM SERPL-SCNC: 4.3 MMOL/L
PROT SERPL-MCNC: 7.3 G/DL
PROT UR QL STRIP: ABNORMAL
RBC # BLD AUTO: 3.15 M/UL
RBC # BLD AUTO: 3.4 M/UL
RBC #/AREA URNS AUTO: 1 /HPF (ref 0–4)
SODIUM SERPL-SCNC: 141 MMOL/L
SP GR UR STRIP: 1.01 (ref 1–1.03)
SQUAMOUS #/AREA URNS AUTO: 5 /HPF
T4 FREE SERPL-MCNC: 0.92 NG/DL
TROPONIN I SERPL DL<=0.01 NG/ML-MCNC: 0.03 NG/ML
TROPONIN I SERPL DL<=0.01 NG/ML-MCNC: 0.03 NG/ML
TROPONIN I SERPL DL<=0.01 NG/ML-MCNC: 0.04 NG/ML
TSH SERPL DL<=0.005 MIU/L-ACNC: 21.94 UIU/ML
URN SPEC COLLECT METH UR: ABNORMAL
UROBILINOGEN UR STRIP-ACNC: NEGATIVE EU/DL
WBC # BLD AUTO: 2.6 K/UL
WBC # BLD AUTO: 2.66 K/UL
WBC #/AREA URNS AUTO: 5 /HPF (ref 0–5)

## 2017-04-04 PROCEDURE — 27000221 HC OXYGEN, UP TO 24 HOURS

## 2017-04-04 PROCEDURE — 99285 EMERGENCY DEPT VISIT HI MDM: CPT | Mod: ,,, | Performed by: PHYSICIAN ASSISTANT

## 2017-04-04 PROCEDURE — 12000002 HC ACUTE/MED SURGE SEMI-PRIVATE ROOM

## 2017-04-04 PROCEDURE — 99285 EMERGENCY DEPT VISIT HI MDM: CPT | Mod: 25

## 2017-04-04 PROCEDURE — 25000003 PHARM REV CODE 250: Performed by: PHYSICIAN ASSISTANT

## 2017-04-04 PROCEDURE — 81001 URINALYSIS AUTO W/SCOPE: CPT

## 2017-04-04 PROCEDURE — 84443 ASSAY THYROID STIM HORMONE: CPT

## 2017-04-04 PROCEDURE — 94760 N-INVAS EAR/PLS OXIMETRY 1: CPT

## 2017-04-04 PROCEDURE — 96372 THER/PROPH/DIAG INJ SC/IM: CPT

## 2017-04-04 PROCEDURE — 63600175 PHARM REV CODE 636 W HCPCS: Performed by: PHYSICIAN ASSISTANT

## 2017-04-04 PROCEDURE — 84484 ASSAY OF TROPONIN QUANT: CPT | Mod: 91

## 2017-04-04 PROCEDURE — 84484 ASSAY OF TROPONIN QUANT: CPT

## 2017-04-04 PROCEDURE — 84439 ASSAY OF FREE THYROXINE: CPT

## 2017-04-04 PROCEDURE — 25000242 PHARM REV CODE 250 ALT 637 W/ HCPCS: Performed by: HOSPITALIST

## 2017-04-04 PROCEDURE — 99223 1ST HOSP IP/OBS HIGH 75: CPT | Mod: GC,,, | Performed by: HOSPITALIST

## 2017-04-04 PROCEDURE — 96376 TX/PRO/DX INJ SAME DRUG ADON: CPT

## 2017-04-04 PROCEDURE — 82962 GLUCOSE BLOOD TEST: CPT

## 2017-04-04 PROCEDURE — 83880 ASSAY OF NATRIURETIC PEPTIDE: CPT

## 2017-04-04 PROCEDURE — 85025 COMPLETE CBC W/AUTO DIFF WBC: CPT

## 2017-04-04 PROCEDURE — 25000003 PHARM REV CODE 250: Performed by: HOSPITALIST

## 2017-04-04 PROCEDURE — 96374 THER/PROPH/DIAG INJ IV PUSH: CPT

## 2017-04-04 PROCEDURE — 94640 AIRWAY INHALATION TREATMENT: CPT

## 2017-04-04 PROCEDURE — 80053 COMPREHEN METABOLIC PANEL: CPT

## 2017-04-04 RX ORDER — NAPROXEN SODIUM 220 MG/1
81 TABLET, FILM COATED ORAL DAILY
Status: DISCONTINUED | OUTPATIENT
Start: 2017-04-05 | End: 2017-04-13 | Stop reason: HOSPADM

## 2017-04-04 RX ORDER — ALLOPURINOL 100 MG/1
100 TABLET ORAL DAILY
Status: DISCONTINUED | OUTPATIENT
Start: 2017-04-05 | End: 2017-04-13 | Stop reason: HOSPADM

## 2017-04-04 RX ORDER — ISOSORBIDE DINITRATE AND HYDRALAZINE HYDROCHLORIDE 37.5; 2 MG/1; MG/1
1 TABLET ORAL 3 TIMES DAILY
Status: DISCONTINUED | OUTPATIENT
Start: 2017-04-04 | End: 2017-04-06

## 2017-04-04 RX ORDER — ALBUTEROL SULFATE 0.83 MG/ML
2.5 SOLUTION RESPIRATORY (INHALATION)
Status: DISCONTINUED | OUTPATIENT
Start: 2017-04-04 | End: 2017-04-07

## 2017-04-04 RX ORDER — FUROSEMIDE 40 MG/1
40 TABLET ORAL
Status: COMPLETED | OUTPATIENT
Start: 2017-04-04 | End: 2017-04-04

## 2017-04-04 RX ORDER — SODIUM BICARBONATE 650 MG/1
1300 TABLET ORAL 2 TIMES DAILY
Status: DISCONTINUED | OUTPATIENT
Start: 2017-04-04 | End: 2017-04-11

## 2017-04-04 RX ORDER — ACETAMINOPHEN 325 MG/1
650 TABLET ORAL EVERY 6 HOURS PRN
Status: DISCONTINUED | OUTPATIENT
Start: 2017-04-04 | End: 2017-04-13 | Stop reason: HOSPADM

## 2017-04-04 RX ORDER — FUROSEMIDE 10 MG/ML
80 INJECTION INTRAMUSCULAR; INTRAVENOUS 2 TIMES DAILY
Status: DISCONTINUED | OUTPATIENT
Start: 2017-04-05 | End: 2017-04-07

## 2017-04-04 RX ORDER — BENZTROPINE MESYLATE 0.5 MG/1
0.5 TABLET ORAL NIGHTLY
Status: DISCONTINUED | OUTPATIENT
Start: 2017-04-04 | End: 2017-04-13 | Stop reason: HOSPADM

## 2017-04-04 RX ORDER — DICLOFENAC SODIUM 10 MG/G
4 GEL TOPICAL 4 TIMES DAILY PRN
Status: DISCONTINUED | OUTPATIENT
Start: 2017-04-04 | End: 2017-04-10

## 2017-04-04 RX ORDER — RAMELTEON 8 MG/1
8 TABLET ORAL NIGHTLY PRN
Status: DISCONTINUED | OUTPATIENT
Start: 2017-04-04 | End: 2017-04-13 | Stop reason: HOSPADM

## 2017-04-04 RX ORDER — FUROSEMIDE 10 MG/ML
80 INJECTION INTRAMUSCULAR; INTRAVENOUS
Status: COMPLETED | OUTPATIENT
Start: 2017-04-04 | End: 2017-04-04

## 2017-04-04 RX ORDER — GABAPENTIN 100 MG/1
200 CAPSULE ORAL 2 TIMES DAILY
Status: DISCONTINUED | OUTPATIENT
Start: 2017-04-04 | End: 2017-04-08

## 2017-04-04 RX ORDER — HEPARIN SODIUM 5000 [USP'U]/ML
5000 INJECTION, SOLUTION INTRAVENOUS; SUBCUTANEOUS EVERY 8 HOURS
Status: DISCONTINUED | OUTPATIENT
Start: 2017-04-04 | End: 2017-04-08

## 2017-04-04 RX ORDER — SODIUM CHLORIDE 0.9 % (FLUSH) 0.9 %
3 SYRINGE (ML) INJECTION EVERY 8 HOURS
Status: DISCONTINUED | OUTPATIENT
Start: 2017-04-04 | End: 2017-04-13 | Stop reason: HOSPADM

## 2017-04-04 RX ORDER — FLUTICASONE PROPIONATE 50 MCG
2 SPRAY, SUSPENSION (ML) NASAL DAILY
Status: DISCONTINUED | OUTPATIENT
Start: 2017-04-05 | End: 2017-04-13 | Stop reason: HOSPADM

## 2017-04-04 RX ORDER — CARVEDILOL 12.5 MG/1
12.5 TABLET ORAL ONCE
Status: COMPLETED | OUTPATIENT
Start: 2017-04-04 | End: 2017-04-04

## 2017-04-04 RX ORDER — GUAIFENESIN 600 MG/1
600 TABLET, EXTENDED RELEASE ORAL 2 TIMES DAILY
Status: COMPLETED | OUTPATIENT
Start: 2017-04-04 | End: 2017-04-12

## 2017-04-04 RX ORDER — LEVOTHYROXINE SODIUM 50 UG/1
50 TABLET ORAL
Status: DISCONTINUED | OUTPATIENT
Start: 2017-04-05 | End: 2017-04-06

## 2017-04-04 RX ADMIN — FUROSEMIDE 40 MG: 40 TABLET ORAL at 01:04

## 2017-04-04 RX ADMIN — FUROSEMIDE 80 MG: 10 INJECTION, SOLUTION INTRAMUSCULAR; INTRAVENOUS at 03:04

## 2017-04-04 RX ADMIN — CARVEDILOL 12.5 MG: 12.5 TABLET, FILM COATED ORAL at 05:04

## 2017-04-04 RX ADMIN — GABAPENTIN 200 MG: 100 CAPSULE ORAL at 09:04

## 2017-04-04 RX ADMIN — GUAIFENESIN 600 MG: 600 TABLET, EXTENDED RELEASE ORAL at 09:04

## 2017-04-04 RX ADMIN — HEPARIN SODIUM 5000 UNITS: 5000 INJECTION, SOLUTION INTRAVENOUS; SUBCUTANEOUS at 09:04

## 2017-04-04 RX ADMIN — ALBUTEROL SULFATE 2.5 MG: 2.5 SOLUTION RESPIRATORY (INHALATION) at 09:04

## 2017-04-04 RX ADMIN — HYDRALAZINE HYDROCHLORIDE AND ISOSORBIDE DINITRATE 1 TABLET: 37.5; 2 TABLET, FILM COATED ORAL at 09:04

## 2017-04-04 RX ADMIN — SODIUM BICARBONATE 650 MG TABLET 1300 MG: at 09:04

## 2017-04-04 RX ADMIN — Medication 3 ML: at 09:04

## 2017-04-04 NOTE — ED NOTES
Patient resting in stretcher and is in NAD at this time. Family at bedside. Pt is awake and alert, VSS, respirations even and unlabored. Pt denies pain at this time. Pt updated on POC. Bed low and locked with side rails up x2, call bell in pt reach.

## 2017-04-04 NOTE — ED TRIAGE NOTES
Patient arrived via EMS coming from [facility] for left facial droop and left side weakness onset unknown. Patient was being seen in clinic for SOB and inability to catch breath, pt denies SOB or difficulty breathing at this time. Pt denies pain at this time. Denies chest pain, dizziness, or headache at this time. EMS reports glucose 116.

## 2017-04-04 NOTE — ED NOTES
Pt ambulatory to bathroom with slow, unsteady gait. Pt ambulates with assist x1. Urine sample obtained.

## 2017-04-04 NOTE — ED NOTES
Patient resting in stretcher and is in NAD at this time. Pt is awake and alert, VSS, respirations even and unlabored. Pt denies pain at this time. Pt updated on POC. Bed low and locked with side rails up x2, call bell in pt reach.

## 2017-04-04 NOTE — PROGRESS NOTES
Patient follows with [facility] clinic and is enrolled in [facility] ([facility]) for care management. Pt is not eligible for enrollment in [facility] Program.    Removed from hf list.

## 2017-04-04 NOTE — ED NOTES
Patient provided meal menu and phone, daughter at bedside, pt and daughter instructed on how to order meal.

## 2017-04-04 NOTE — ED NOTES
Patient identifiers verified and correct for Anai Sal.    LOC: The patient is awake, alert and oriented x 4. Pt is speaking appropriately, mildly slurred speech.  APPEARANCE: Patient resting comfortably and in no acute distress. Pt is clean and well groomed. No JVD visible. Pt reports pain level of 0.  SKIN: Skin is warm dry and intact, and color is consistent with ethnicity. Scattered bruising at various stages of healing noted to left upper extremity, pt states related to blood work being drawn during last hospital visit. No tenting observed and capillary refill <3 seconds. No clubbing noted to nail beds. No breakdown visible and mucus membranes dry and acyanotic.  MUSCULOSKELETAL: Full range of motion present in bilateral upper extremities, limited ROM in bilateral lower extremities. Hand  equal and leg strength strong +2 bilaterally.  RESPIRATORY: Pt reports SOB and difficulty breathing this morning, denies at this time. Airway is open and patent. Respirations-unlabored, regular rate, equal bilaterally on inspiration and expiration. No accessory muscle use noted. Lungs clear to auscultation in all fields bilaterally anterior and posterior - diminished breath sounds noted to right lower lobe posteriorly. Pt placed on continuous pulse ox monitor, SpO2 maintained at 96% on room air.  CARDIAC: Patient has regular heart rate and rhythm. Pitting edema 2+ noted to bilateral lower extremities. Patient has no c/o chest pain. Pt placed on continuous cardiac monitor and automatic blood pressure cuff - normal sinus rhythm noted with HR 67 at this time.  ABDOMEN: Soft and non-tender to palpation with no distention noted. Normoactive bowel sounds X4 quadrants. Pt has no complaints of abnormal bowel movements. Pt reports normal appetite.   NEUROLOGIC: Pt was sent to ED by clinic for left sided weakness and left facial droop. SEE NEUROLOGICAL ASSESSMENT FLOW SHEET DOCUMENTATION.  : No complaints of frequency,  burning, urgency or blood in the urine.

## 2017-04-04 NOTE — ED PROVIDER NOTES
"Encounter Date: 4/4/2017    SCRIBE #1 NOTE: I, Maryse Martinez, am scribing for, and in the presence of,  Dr. Sauceda . I have scribed the following portions of the note - Other sections scribed: Attending ED notes .       History     Chief Complaint   Patient presents with    Slurred Speech     Pt reports slurred speech-unknown last seen normal.  Pt states "this has been going on off/on for some time"   Pt was sent from [facility].     Review of patient's allergies indicates:  No Known Allergies  HPI Comments: 70 y/o female with history of CHF, HTN, Breast Cancer, CAD, marlon, presents to the ER with chief complaint of shortness of breath.  The patient also has slurred speech intermittent x 1 week per the patients daughter.  The patient reports that she began feeling short of breath while on the bus this morning.  She arrived at the [facility] and was seen by a physician there.  She was sent to the ER due to "diminished level of consciousness, slurred speech, O2 desats to 85 on room air".   The patient was recently admitted to the hospital 3/9-3/16 for treatment of acute on chronic CHF.    The patient was discharged home with plan for 40 mg Lasix per day.  She was seen in clinic 3/31.  Attempted to give an IV dose of Lasix, but they were unable to obtain IV access.  The patient was advised to take 60 mg of Lasix daily since that time.      The patient reports shortness of breath.  She denies numbness or weakness of the extremities, dizziness, changes in vision, recent fall, or any additional complaints at this time.    Past Medical History:   Diagnosis Date    Breast cancer     CHF (congestive heart failure)     Chronic hepatitis C virus infection 3/14/2017    Coronary artery disease     Hypertension     Marlon     Renal disorder     Thyroid disease      Past Surgical History:   Procedure Laterality Date    BREAST SURGERY      HYSTERECTOMY      MASTECTOMY       Family History   Problem Relation Age of " Onset    Family history unknown: Yes     Social History   Substance Use Topics    Smoking status: Former Smoker     Packs/day: 2.00     Years: 20.00     Types: Cigarettes     Quit date: 2/16/1997    Smokeless tobacco: None    Alcohol use No      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   HENT: Negative for sore throat and trouble swallowing.    Respiratory: Positive for shortness of breath. Negative for cough.    Cardiovascular: Negative for chest pain.   Gastrointestinal: Negative for abdominal pain, nausea and vomiting.   Genitourinary: Negative for dysuria.   Musculoskeletal: Negative for back pain.   Skin: Negative for rash and wound.   Neurological: Positive for speech difficulty. Negative for dizziness, syncope, weakness, numbness and headaches.   Hematological: Does not bruise/bleed easily.   Psychiatric/Behavioral: Negative for agitation. The patient is not nervous/anxious.        Physical Exam   Initial Vitals   BP Pulse Resp Temp SpO2   04/04/17 0949 04/04/17 0949 04/04/17 0949 04/04/17 0949 04/04/17 0949   151/91 66 18 97.7 °F (36.5 °C) 97 %     Physical Exam    Constitutional: She appears well-developed and well-nourished.   HENT:   Head: Atraumatic.   Mouth/Throat: Oropharynx is clear and moist.   Eyes: Conjunctivae and EOM are normal. Pupils are equal, round, and reactive to light.   Neck: Normal range of motion. Neck supple.   Cardiovascular: Normal rate and regular rhythm.   Pulmonary/Chest: Breath sounds normal. No respiratory distress.   Crackles at lung bases   Abdominal: Soft. Bowel sounds are normal. She exhibits no distension. There is no tenderness. There is no rebound and no guarding.   Musculoskeletal: She exhibits edema (2+ pitting edema bilateral lower legs).   Neurological: She is alert and oriented to person, place, and time. She has normal strength. No cranial nerve deficit or sensory deficit.   She is oriented to person,  place, but not time.    No facial asymmetry.  Normal rapid alternating hand movements, normal gait.     Skin: No rash noted.   Psychiatric: She has a normal mood and affect.         ED Course   Procedures  Labs Reviewed   CBC W/ AUTO DIFFERENTIAL - Abnormal; Notable for the following:        Result Value    WBC 2.66 (*)     RBC 3.40 (*)     Hemoglobin 10.3 (*)     Hematocrit 32.0 (*)     RDW 16.2 (*)     Platelets 131 (*)     Gran # 1.6 (*)     Lymph # 0.8 (*)     All other components within normal limits   COMPREHENSIVE METABOLIC PANEL - Abnormal; Notable for the following:     BUN, Bld 40 (*)     Creatinine 2.5 (*)     Albumin 3.3 (*)     Total Bilirubin 1.4 (*)     eGFR if  21.6 (*)     eGFR if non  18.8 (*)     All other components within normal limits   B-TYPE NATRIURETIC PEPTIDE - Abnormal; Notable for the following:     BNP 4304 (*)     All other components within normal limits   TROPONIN I - Abnormal; Notable for the following:     Troponin I 0.036 (*)     All other components within normal limits   TSH - Abnormal; Notable for the following:     TSH 21.939 (*)     All other components within normal limits   URINALYSIS - Abnormal; Notable for the following:     Protein, UA 2+ (*)     Leukocytes, UA 1+ (*)     All other components within normal limits   URINALYSIS MICROSCOPIC - Abnormal; Notable for the following:     Hyaline Casts, UA 5 (*)     All other components within normal limits   T4, FREE     EKG Readings: (Independently Interpreted)   Initial Reading: No STEMI. Rhythm: Normal Sinus Rhythm. Heart Rate: 69. ST Segments: Non-Specific ST Segment Depression.   ST depressions improved from prior        X-Rays:   Independently Interpreted Readings:   Chest X-Ray: Cardiomegaly present.  Increased vascular markings consistent with CHF are present.   Head CT: No hemorrhage.  No skull fracture.  No acute stroke.           APC / Resident Notes:   I reviewed the notes provided by  "the [facility] physician.  The patient has gained 1 pound since 3/21.  They note desats to 85% on room air, but the patient remains 97% on room air in the ER.    The patient is alert and oriented to person, place, but not time.  She has normal strength and sensation, somewhat slurred speech, but no other neuro deficits on exam.  The patients daughter states her speech has been slurred "off and on" x 1 week.  The patient ambulates with a walker.    She had recent admission for CHF exacerbation.  Repeat Echo was stable with EF 25%, PET stress test was negative for ischemia.     [facility] notes also states ST depression with short bursts of Afib noted on EKG.  EKG in the ER is unchanged from previous EKG 3/15/17.   Patients TSH is elevated and her daughter states that the patient has been refusing to take her Synthroid.  Free T4 is WNL.  The patient's BNP is elevated to 4304 today.  She will require IV lasix.  We are unable to get peripheral IV after multiple attempts, but the patient is refusing placement of an EJ.  PICC team has been called to place line.  We will give oral dose of lasix (40 mg ) as the patient has not taken her home meds today.    Patient has been accepted for admission by Internal medicine.  She is in agreement with plan for admission.    I discussed the care of this patient with my supervising MD, and the patient was also seen by her.,          Scribe Attestation:   Scribe #1: I performed the above scribed service and the documentation accurately describes the services I performed. I attest to the accuracy of the note.    Attending Attestation:     Physician Attestation Statement for NP/PA:   I have conducted a face to face encounter with this patient in addition to the NP/PA, due to Medical Complexity    Other NP/PA Attestation Additions:    History of Present Illness: Emergent evaluation of shortness of breath.  There is some report of slurred speech, but this seems to be baseline for the patient.  " The patient is endorsing shortness of breath.  Patient has multiple medical comorbidities, initial plan includes CT imaging of the head, chest imaging, lab work including cardiac enzymes.         Physician Attestation for Scribe:  Physician Attestation Statement for Scribe #1: I, Dr. Sauceda , reviewed documentation, as scribed by Maryse Martinez  in my presence, and it is both accurate and complete.         Attending ED Notes:   Labwork is concerning for elevated BNP.  Patient will need diuresis.  This is consistent with findings on the chest x-ray.    11:59 AM   Pt is refusing EJ line which is delaying her care.     12:36 PM  Despite multiple attempts by nursing staff, patient will not let me attempt EJ. She understands this refusal is delaying her care.           ED Course     Clinical Impression:   The primary encounter diagnosis was Congestive heart failure, unspecified congestive heart failure chronicity, unspecified congestive heart failure type. Diagnoses of Shortness of breath and Slurred speech were also pertinent to this visit.    Disposition:   Disposition: Admitted  Condition: NUSRAT English  04/04/17 1334       Lien Sauceda MD  04/04/17 1613

## 2017-04-04 NOTE — ED NOTES
IV attempt by Dr Babb unsuccessful, that was the 7th attempt total. Dr Babb discussing option of EJ again with patient

## 2017-04-04 NOTE — ED NOTES
Dr Sauceda at bedside discussing IV access with patient. Dr Sauceda discussing EJ and medications with patient. Pt refusing EJ.

## 2017-04-04 NOTE — PROGRESS NOTES
Subjective:       Patient ID: Anai Sal is a 71 y.o. female.    Chief Complaint: Congestive Heart Failure    HPI   71 year old female who presents for visit after recent hospital admit for ADHF. She was Admitted for worsening SOB and exacerbation of CHF with EKG concerning for inferior ischemia. Diuresed with IV furosemide with improvement in edema and SOB, hospital course complicated by worsening kidney function, lasix and ARB held with improvement in renal function noted. US BLE negative, repeat ECHO stable from ECHO of 2/8/17 with EF 25%. Cardiology consulted and following, initiated coreg, added ASA and valsartan, atorvastatin. Stress PET completed, no ischemic changes noted. After review of ECHO to evaluate MR, mitral valve appears functional and is likely 2/2 cardiomyopathy and RV dilatation. Today she feels like her breathing is worse again. Is here with her daughter who isnt sure of her weights at home but thinks they are stable. Feels LE is more swollen than when she was discharged.           Review of Systems   Constitutional: Negative for activity change, chills and fever.   HENT: Negative for congestion, sneezing and tinnitus.    Respiratory: Positive for shortness of breath. Negative for apnea and chest tightness.    Cardiovascular: Positive for leg swelling. Negative for chest pain.   Endocrine: Negative for cold intolerance.   Genitourinary: Negative for dyspareunia, dysuria, enuresis, flank pain and hematuria.   Musculoskeletal: Negative for arthralgias.   Psychiatric/Behavioral: Negative for agitation.       Objective:      Physical Exam   Constitutional: She appears well-developed and well-nourished.   HENT:   Head: Normocephalic.   Eyes: Pupils are equal, round, and reactive to light.   Neck: Normal range of motion. JVD present.   Cardiovascular: Normal rate, regular rhythm and normal heart sounds.  Exam reveals no gallop and no friction rub.    No murmur heard.  Pulmonary/Chest: Effort  normal. No respiratory distress. She has no wheezes. She has no rales.   Abdominal: She exhibits no distension. There is no tenderness.   Musculoskeletal: Normal range of motion. She exhibits edema.   Neurological: She is alert.   Skin: Skin is warm. There is erythema.       Assessment:       1. CKD (chronic kidney disease) stage 3, GFR 30-59 ml/min    2. SOB (shortness of breath)    3. Essential hypertension    4. COPD exacerbation        Plan:   Attempted to give lasix IV in clinic today but could not get access.  Will increase lasix over the weekend po and call on Monday. If no better, will need admission for IV diuresis  RTC in 3 weeks  Weigh daily, call with three pound weight gain overnight

## 2017-04-04 NOTE — IP AVS SNAPSHOT
[facility]  [address]  [facility] [address]  Phone: [phone]           Patient Discharge Instructions   Our goal is to set you up for success. This packet includes information on your condition, medications, and your home care.  It will help you care for yourself to prevent having to return to the hospital.     Please ask your nurse if you have any questions.      There are many details to remember when preparing to leave the hospital. Here is what you will need to do:    1. Take your medicine. If you are prescribed medications, review your Medication List on the following pages. You may have new medications to  at the pharmacy and others that you'll need to stop taking. Review the instructions for how and when to take your medications. Talk with your doctor or nurses if you are unsure of what to do.     2. Go to your follow-up appointments. Specific follow-up information is listed in the following pages. Your may be contacted by a nurse or clinical provider about future appointments. Be sure we have all of the phone numbers to reach you. Please contact your provider's office if you are unable to make an appointment.     3. Watch for warning signs. Your doctor or nurse will give you detailed warning signs to watch for and when to call for assistance. These instructions may also include educational information about your condition. If you experience any of warning signs to your health, call your doctor.           [facility]  Unless otherwise directed by your provider, please   contact [facility], our nurse care line   that is available for 24/7 assistance.     [phone] (toll-free)     Registered nurses in the [facility]   provide: appointment scheduling, clinical advisement, health education, and other advisory services.                  ** Verify the list of medication(s) below is accurate and up to date. Carry this with you in case of  emergency. If your medications have changed, please notify your healthcare provider.             Medication List      START taking these medications        Additional Info    Begin Date AM Noon PM Bedtime    fluticasone-salmeterol 250-50 mcg/dose 250-50 mcg/dose diskus inhaler   Commonly known as:  ADVAIR   Refills:  0   Dose:  1 puff    Instructions:  Inhale 1 puff into the lungs 2 (two) times daily. Controller            4/14/17                   isosorbide-hydrALAZINE 20-37.5 mg 20-37.5 mg Tab   Commonly known as:  BIDIL   Quantity:  180 tablet   Refills:  2   Dose:  2 tablet    Last time this was given:  2 tablets on 4/13/2017  2:16 PM   Instructions:  Take 2 tablets by mouth 3 (three) times daily.                    4/13/17           metoprolol succinate 50 MG 24 hr tablet   Commonly known as:  TOPROL-XL   Quantity:  30 tablet   Refills:  2   Dose:  50 mg    Last time this was given:  50 mg on 4/13/2017  9:12 AM   Instructions:  Take 1 tablet (50 mg total) by mouth once daily.            4/14/17                     CHANGE how you take these medications        Additional Info    Begin Date AM Noon PM Bedtime    furosemide 40 MG tablet   Commonly known as:  LASIX   Quantity:  60 tablet   Refills:  2   Dose:  20 mg   What changed:    - medication strength  - how much to take  - when to take this    Last time this was given:  20 mg on 4/13/2017  9:12 AM   Instructions:  Take 0.5 tablets (20 mg total) by mouth 2 (two) times daily.                    4/13/17             CONTINUE taking these medications        Additional Info    Begin Date AM Noon PM Bedtime    allopurinol 100 MG tablet   Commonly known as:  ZYLOPRIM   Refills:  0   Dose:  100 mg    Last time this was given:  100 mg on 4/13/2017  9:12 AM   Instructions:  Take 100 mg by mouth 2 (two) times daily.                    4/13/17           aspirin 81 MG Chew   Refills:  0   Dose:  81 mg    Last time this was given:  81 mg on 4/13/2017  9:12 AM    Instructions:  Take 1 tablet (81 mg total) by mouth once daily.            4/14/17                   benztropine 0.5 MG tablet   Commonly known as:  COGENTIN   Quantity:  30 tablet   Refills:  0   Dose:  0.5 mg    Last time this was given:  0.5 mg on 4/12/2017 10:02 PM   Instructions:  Take 1 tablet (0.5 mg total) by mouth nightly.                    4/13/17           betamethasone valerate 0.1% 0.1 % Lotn   Commonly known as:  VALISONE   Quantity:  1 Bottle   Refills:  3    Instructions:  Apply topically once daily. 2 drops both ears once daily            4/14/17                   diclofenac sodium 1 % Gel   Refills:  0   Dose:  4 g    Instructions:  Apply 4 g topically 4 (four) times daily. Prn pain                            fluticasone 50 mcg/actuation nasal spray   Commonly known as:  FLONASE   Quantity:  1 Bottle   Refills:  3   Dose:  2 spray    Last time this was given:  2 sprays on 4/11/2017  9:41 AM   Instructions:  2 sprays by Each Nare route once daily.            4/14/17                   gabapentin 300 MG capsule   Commonly known as:  NEURONTIN   Refills:  0   Dose:  300 mg    Last time this was given:  200 mg on 4/8/2017  9:23 AM   Instructions:  Take 300 mg by mouth 3 (three) times daily as needed (for nerve pain).                            guaifenesin 600 mg 12 hr tablet   Commonly known as:  MUCINEX   Refills:  0   Dose:  600 mg    Last time this was given:  600 mg on 4/12/2017 10:01 PM   Instructions:  Take 1 tablet (600 mg total) by mouth 2 (two) times daily.            4/14/17                   levothyroxine 50 MCG tablet   Commonly known as:  SYNTHROID   Quantity:  30 tablet   Refills:  11   Dose:  50 mcg    Last time this was given:  50 mcg on 4/13/2017  5:40 AM   Instructions:  Take 1 tablet (50 mcg total) by mouth before breakfast.            4/14/17                   melatonin 3 mg Tab   Refills:  0    Instructions:  Take by mouth every evening.                    4/13/17            quetiapine 200 MG Tab   Commonly known as:  SEROQUEL   Quantity:  30 tablet   Refills:  0   Dose:  200 mg    Last time this was given:  200 mg on 4/12/2017 10:02 PM   Instructions:  Take 1 tablet (200 mg total) by mouth nightly.                    4/13/17           sodium bicarbonate 650 MG tablet   Quantity:  360 tablet   Refills:  3   Dose:  1300 mg    Last time this was given:  1,300 mg on 4/11/2017  9:40 AM   Instructions:  Take 2 tablets (1,300 mg total) by mouth 2 (two) times daily.            4/14/17                     STOP taking these medications     carvedilol 12.5 MG tablet   Commonly known as:  COREG       valsartan 320 MG tablet   Commonly known as:  DIOVAN            Where to Get Your Medications      These medications were sent to [facility]  [address] [address]     Phone:  [phone]     furosemide 40 MG tablet    isosorbide-hydrALAZINE 20-37.5 mg 20-37.5 mg Tab    metoprolol succinate 50 MG 24 hr tablet         Information about where to get these medications is not yet available     ! Ask your nurse or doctor about these medications     fluticasone-salmeterol 250-50 mcg/dose 250-50 mcg/dose diskus inhaler                  Please bring to all follow up appointments:    1. A copy of your discharge instructions.  2. All medicines you are currently taking in their original bottles.  3. Identification and insurance card.    Please arrive 15 minutes ahead of scheduled appointment time.    Please call 24 hours in advance if you must reschedule your appointment and/or time.        Your Scheduled Appointments     May 01, 2017 10:30 AM CDT   Non-Fasting Lab with LAB, APPOINTMENT [place] [facility] ([facility])    [address] [facility] [address] [phone]            May 01, 2017 10:35 AM CDT   Urine with SPECIMEN, MAIN CAMPUS [facility]  ([facility])    [address]  [facility] [address]   [phone]            May 02, 2017  9:05 AM CDT   Non-Fasting Lab with LAB, APPOINTMENT [place] [facility] ([facility])    [address]  [facility] [address]   [phone]            May 02, 2017 10:30 AM CDT   Established Patient Visit with Len Live MD   [facility] )    [address]  [place] [address]   [phone]            May 02, 2017 11:00 AM CDT   New Patient with Kamila Hughes MD   [facility] ([facility] )    [address]  [place] [address]   [phone]              Follow-up Information     Follow up with LAB, APPOINTMENT [place] On 5/1/2017.    Specialty:  Lab    Why:  Monday 5/1 @ 10:30am        Follow up with Kamila Hughes MD On 5/2/2017.    Specialty:  Pulmonary Disease    Why:  Tuesday 5/2 @ 11am; 9th floor clinic    Contact information:    [address] [facility] [address] [address]  [phone]          Follow up with Len Live MD.    Specialties:  Cardiology, Transplant    Why:   left a message with Cardiology Clinic about patient's missed appt. Patient will be contacted with appt date & time.     Contact information:    [address]  [place] [address]  [phone]          Schedule an appointment as soon as possible for a visit with Live Young MD.    Specialty:  Family Medicine    Why:   left a message with [facility] (no answer at clinic) for patient to be contacted for follow up appt.    Contact information:    [address] [facility] [address]  [phone]        Referrals     Future Orders    Ambulatory Referral to Neuropsychology           Primary Diagnosis     Your primary diagnosis was:  Heart Failure      Admission Information     Date & Time Provider Department CSN    4/4/2017  9:53  "AM Natalia Martínez MD [facility] [id number]      Care Providers     Provider Role Specialty Primary office phone    Natalia Martínez MD Attending Provider Hospitalist [phone]    Tadeo Spain MD Team Attending  Hospitalist [phone]    Natalia Martínez MD Team Attending  Hospitalist [phone]    Usama Fletcher DO Consulting Physician  Nephrology [phone]      Your Vitals Were     BP Pulse Temp Resp Height Weight    128/60 (BP Location: Left arm, Patient Position: Lying, BP Method: Automatic) 76 98.5 °F (36.9 °C) (Oral) 16 5' 6" (1.676 m) 71.4 kg (157 lb 6.5 oz)    SpO2 BMI             98% 25.41 kg/m2         Recent Lab Values        1/11/2017                           2:18 PM           A1C 4.2 (L)           Comment for A1C at  2:18 PM on 1/11/2017:  According to ADA guidelines, hemoglobin A1C <7.0% represents  optimal control in non-pregnant diabetic patients.  Different  metrics may apply to specific populations.   Standards of Medical Care in Diabetes - 2016.  For the purpose of screening for the presence of diabetes:  <5.7%     Consistent with the absence of diabetes  5.7-6.4%  Consistent with increasing risk for diabetes   (prediabetes)  >or=6.5%  Consistent with diabetes  Currently no consensus exists for use of hemoglobin A1C  for diagnosis of diabetes for children.        Pending Labs     Order Current Status    CBC with Automated Differential In process    Comprehensive metabolic panel In process    Magnesium In process      Allergies as of 4/13/2017     No Known Allergies      Advance Directives     An advance directive is a document which, in the event you are no longer able to make decisions for yourself, tells your healthcare team what kind of treatment you do or do not want to receive, or who you would like to make those decisions for you.  If you do not currently have an advance directive, [facility] encourages you to create one.  For more information " call:  [phone]), [phone] ([phone]),  or log on to [email or web address].        Smoking Cessation     If you would like to quit smoking:   You may be eligible for free services if you are a [place] resident and started smoking cigarettes before September 1, 1988.  Call the Smoking Cessation Trust ([facility]) toll free at ([phone]) [phone] or (546) [phone].   Call 0-800-QUIT-NOW if you do not meet the above criteria.   Contact us via email: [email or web address]   View our website for more information: [email or web address]        Language Assistance Services     ATTENTION: Language assistance services are available, free of charge. Please call [phone].      ATENCIÓN: Si habla delfino, tiene a mane disposición servicios gratuitos de asistencia lingüística. Llame al [phone].     CHÚ Ý: N?u b?n nói Ti?ng Vi?t, có các d?ch v? h? tr? ngôn ng? mi?n phí dành cho b?n. G?i s? [phone].        Heart Failure Education       Heart Failure: Being Active  You have a condition called heart failure. Being active doesnt mean that you have to wear yourself out. Even a little movement each day helps to strengthen your heart. If you cant get out to exercise, you can do simple stretching and strengthening exercises at home. These are good ways to keep you well-conditioned and prevent you and your heart from becoming excessively weak.    Ideas to get you started  · Add a little movement to things you do now. Walk to mail letters. Park your car at the far end of the parking lot and walk to the store. Walk up a flight of stairs instead of taking the elevator.  · Choose activities you enjoy. You might walk, swim, or ride an exercise bike. Things like gardening and washing the car count, too. Other possibilities include: washing dishes, walking the dog, walking around the mall, and doing aerobic activities with friends.  · Join a group exercise program at a [facility], a senior  [place], or a community center. Or look into a hospital cardiac rehabilitation program. Ask your doctor if you qualify.  Tips to keep you going  · Get up and get dressed each day. Go to a coffee shop and read a newspaper or go somewhere that you'll be in the presence of other active people. Youll feel more like being active.  · Make a plan. Choose one or more activities that you enjoy and that you can easily do. Then plan to do at least one each day. You might write your plan on a calendar.  · Go with a friend or a group if you like company. This can help you feel supported and stay motivated, too.  · Plan social events that you enjoy. This will keep you mentally engaged as well as physically motivated to do things you find pleasure in.  For your safety  · Talk with your healthcare provider before starting an exercise program.  · Exercise indoors when its too hot or too cold outside, or when the air quality is poor. Try walking at a shopping mall.  · Wear socks and sturdy shoes to maintain your balance and prevent falls.  · Start slowly. Do a few minutes several times a day at first. Increase your time and speed little by little.  · Stop and rest whenever you feel tired or get short of breath.  · Dont push yourself on days when you dont feel well.  Date Last Reviewed: 3/20/2016  © 7799-3545 [employer]. [address] [place] [address]. All rights reserved. This information is not intended as a substitute for professional medical care. Always follow your healthcare professional's instructions.              Heart Failure: Evaluating Your Heart  You have a condition called heart failure. To evaluate your condition, your doctor will examine you, ask questions, and do some tests. Along with looking for signs of heart failure, the doctor looks for any other health problems that may have led to heart failure. The results of your evaluation will help your doctor form a treatment plan.  Health  history and physical exam  Your visit will start with a health history. Tell the doctor about any symptoms youve noticed and about all medicines you take. Then youll have a physical exam. This includes listening to your heartbeat and breathing. Youll also be checked for swelling (edema) in your legs and neck. When you have fluid buildup or fluid in the lungs, it may be called congestive heart failure.  Diagnosing heart failure     During an echocardiogram, sound waves bounce off the heart. These are converted into a picture on the screen.   The following may be done to help your doctor form a diagnosis:  · X-rays show the size and shape of your heart. These pictures can also show fluid in your lungs.  · An electrocardiogram (ECG or EKG) shows the pattern of your heartbeat. Small pads (electrodes) are placed on your chest, arms, and legs. Wires connect the pads to the ECG machine, which records your hearts electrical signals. This can give the doctor information about heart function.  · An echocardiogram uses ultrasound waves to show the structure and movement of your heart muscle. This shows how well the heart pumps. It also shows the thickness of the heart walls, and if the heart is enlarged. It is one of the most useful, non-invasive tests as it provides information about the heart's general function. This helps your doctor make treatment decisions.  · Lab tests evaluate small amounts of blood or urine for signs of problems. A BNP lab test can help diagnose and evaluate heart failure. BNP stands for B-type natriuretic peptide. The ventricles secrete more BNP when heart failure worsens. Lab tests can also provide information about metabolic dysfunction or heart dysfunction.  Your treatment plan  Based on the results of your evaluation and tests, your doctor will develop a treatment plan. This plan is designed to relieve some of your heart failure symptoms and help make you more comfortable. Your treatment plan  may include:  · Medicine to help your heart work better and improve your quality of life  · Changes in what you eat and drink to help prevent fluid from backing up in your body  · Daily monitoring of your weight and heart failure symptoms to see how well your treatment plan is working  · Exercise to help you stay healthy  · Help with quitting smoking  · Emotional and psychological support to help adjust to the changes  · Referrals to other specialists to make sure you are being treated comprehensively  Date Last Reviewed: 3/21/2016  © 9060-7515 [employer]. [address] [place] [address], [place] [address]. All rights reserved. This information is not intended as a substitute for professional medical care. Always follow your healthcare professional's instructions.              Heart Failure: Making Changes to Your Diet  You have a condition called heart failure. When you have heart failure, excess fluid is more likely to build up in your body because your heart isn't working well. This makes the heart work harder to pump blood. Fluid buildup causes symptoms such as shortness of breath and swelling (edema). This is often referred to as congestive heart failure or CHF. Controlling the amount of salt (sodium) you eat may help stop fluid from building up. Your doctor may also tell you to reduce the amount of fluid you drink.  Reading food labels    Your healthcare provider will tell you how much sodium you can eat each day. Read food labels to keep track. Keep in mind that certain foods are high in salt. These include canned, frozen, and processed foods. Check the amount of sodium in each serving. Watch out for high-sodium ingredients. These include MSG (monosodium glutamate), baking soda, and sodium phosphate.   Eating less salt  Give yourself time to get used to eating less salt. It may take a little while. Here are some tips to help:  · Take the saltshaker off the table. Replace it with salt-free herb mixes and  spices.  · Eat fresh or plain frozen vegetables. These have much less salt than canned vegetables.  · Choose low-sodium snacks like sodium-free pretzels, crackers, or air-popped popcorn.  · Dont add salt to your food when youre cooking. Instead, season your foods with pepper, lemon, garlic, or onion.  · When you eat out, ask that your food be cooked without added salt.  · Avoid eating fried foods as these often have a great deal of salt.  If youre told to limit fluids  You may need to limit how much fluid you have to help prevent swelling. This includes anything that is liquid at room temperature, such as ice cream and soup. If your doctor tells you to limit fluid, try these tips:  · Measure drinks in a measuring cup before you drink them. This will help you meet daily goals.  · Chill drinks to make them more refreshing.  · Suck on frozen lemon wedges to quench thirst.  · Only drink when youre thirsty.  · Chew sugarless gum or suck on hard candy to keep your mouth moist.  · Weigh yourself daily to know if your body's fluid content is rising.  My sodium goal  Your healthcare provider may give you a sodium goal to meet each day. This includes sodium found in food as well as salt that you add. My goal is to eat no more than ___________ mg of sodium per day.     When to call your doctor  Call your doctor right away if you have any symptoms of worsening heart failure. These can include:  · Sudden weight gain  · Increased swelling of your legs or ankles  · Trouble breathing when youre resting or at night  · Increase in the number of pillows you have to sleep on  · Chest pain, pressure, discomfort, or pain in the jaw, neck, or back   Date Last Reviewed: 3/21/2016  © 5618-0572 "[employer]. [address] [place] [address], [place] [address]. All rights reserved. This information is not intended as a substitute for professional medical care. Always follow your healthcare professional's instructions.               Heart Failure: Medicines to Help Your Heart    You have a condition called heart failure (also known as congestive heart failure, or CHF). Your doctor will likely prescribe medicines for heart failure and any underlying health problems you have. Most heart failure patients take one or more types of medicinen. Your healthcare provider will work to find the combination of medicines that works best for you.  Heart failure medicines  Here are the most common heart failure medicines:  · ACE inhibitors lower blood pressure and decrease strain on the heart. This makes it easier for the heart to pump. Angiotensin receptor blockers have similar effects. These are prescribed for some patients instead of ACE inhibitors.  · Beta-blockers relieve stress on the heart. They also improve symptoms. They may also improve the heart's pumping action over time.  · Diuretics (also called water pills) help rid your body of excess water. This can help rid your body of swelling (edema). Having less fluid to pump means your heart doesnt have to work as hard. Some diuretics make your body lose a mineral called potassium. Your doctor will tell you if you need to take supplements or eat more foods high in potassium.  · Digoxin helps your heart pump with more strength. This helps your heart pump more blood with each beat. So, more oxygen-rich blood travels to the rest of the body.  · Aldosterone antagonists help alter hormones and decrease strain on the heart.  · Hydralazine and nitrates are two separate medicines used together to treat heart failure. They may come in one combination pill. They lower blood pressure and decrease how hard the heart has to pump.  Medicines for related conditions  Controlling other heart problems helps keep heart failure under control, too. Depending on other heart problems you have, medicines may be prescribed to:  · Lower blood pressure (antihypertensives).  · Lower cholesterol levels (statins).  · Prevent  blood clots (anticoagulants or aspirin).  · Keep the heartbeat steady (antiarrhythmics).  Date Last Reviewed: 3/5/2016  © 2713-8947 [employer]. [address] [place] [address], [place] [address]. All rights reserved. This information is not intended as a substitute for professional medical care. Always follow your healthcare professional's instructions.              Heart Failure: Procedures That May Help    The heart is a muscle that pumps oxygen-rich blood to all parts of the body. When you have heart failure, the heart is not able to pump as well as it should. Blood and fluid may back up into the lungs (congestive heart failure), and some parts of the body dont get enough oxygen-rich blood to work normally. These problems lead to the symptoms of heart failure.     Certain procedures may help the heart pump better in some cases of heart failure. Some procedures are done to treat health problems that may have caused the heart failure such as coronary artery disease or heart rhythm problems. For more serious heart failure, other options are available.  Treating artery and valve problems  If you have coronary artery disease or valve disease, procedures may be done to improve blood flow. This helps the heart pump better, which can improve heart failure symptoms. First, your doctor may do a cardiac catheterization to help detect clogged blood vessels or valve damage. During this procedure, a  thin tube (catheter) in inserted into a blood vessel and guided to the heart. There a dye is injected and a special type of X-ray (angiogram) is taken of the blood vessels. Procedures to open a blocked artery or fix damaged valves can also be done using catheterization.  · Angioplasty uses a balloon-tipped instrument at the end of the catheter. The balloon is inflated to widen the narrowed artery. In many cases, a stent is expanded to further support the narrowed artery. A stent is a metal mesh tube.  · Valve surgery  repairs or replacement of faulty valves can also be done during catheterization so blood can flow properly through the chambers of the heart.  Bypass surgery is another option to help treat blocked arteries. It uses a healthy blood vessel from elsewhere in the body. The healthy blood vessel is attached above and below the blocked area so that blood can flow around the blocked artery.  Treating heart rhythm problems  A device may be placed in the chest to help a weak heart maintain a healthy, heartbeat so the heart can pump more effectively:  · Pacemaker. A pacemaker is an implanted device that regulates your heartbeat electronically. It monitors your heart's rhythm and generates a painless electric impulse that helps the heart beat in a regular rhythm. A pacemaker is programmed to meet your specific heart rhythm needs.  · Biventricular pacing/cardiac resynchronization therapy. A type of pacemaker that paces both pumping chambers of the heart at the same time to coordinate contractions and to improve the heart's function. Some people with heart failure are candidates for this therapy.  · Implantable cardioverter defibrillator. A device similar to a pacemaker that senses when the heart is beating too fast and delivers an electrical shock to convert the fast rhythm to a normal rhythm. This can be a life saving device.  In severe cases  In more serious cases of heart failure when other treatments no longer work, other options may include:  · Ventricular assist devices (VADs). These are mechanical devices used to take over the pumping function for one or both of the heart's ventricles, or pumping chambers. A VAD may be necessary when heart failure progresses to the point that medicines and other treatments no longer help. In some cases, a VAD may be used as a bridge to transplant.  · Heart transplant. This is replacing the diseased heart with a healthy one from a donor. This is an option for a few people who are very  sick. A heart transplant is very serious and not an option for all patients. Your doctor can tell you more.  Date Last Reviewed: 3/20/2016  © 2272-0468 [employer]. [address] [place] [address], [place] [address]. All rights reserved. This information is not intended as a substitute for professional medical care. Always follow your healthcare professional's instructions.              Heart Failure: Tracking Your Weight  You have a condition called heart failure. When you have heart failure, a sudden weight gain or a steady rise in weight is a warning sign that your body is retaining too much water and salt. This could mean your heart failure is getting worse. If left untreated, it can cause problems for your lungs and result in shortness of breath. Weighing yourself each day is the best way to know if youre retaining water. If your weight goes up quickly, call your doctor. You will be given instructions on how to get rid of the excess water. You will likely need medicines and to avoid salt. This will help your heart work better.  Call your doctor if you gain more than 2 pounds in 1 day, more than 5 pounds in 1 week, or whatever weight gain you were told to report by your doctor. This is often a sign of worsening heart failure and needs to be evaluated and treated. Your doctor will tell you what to do next.   Tips for weighing yourself    · Weigh yourself at the same time each morning, wearing the same clothes. Weigh yourself after urinating and before eating.  · Use the same scale each day. Make sure the numbers are easy to read. Put the scale on a flat, hard surface -- not on a rug or carpet.  · Do not stop weighing yourself. If you forget one day, weigh again the next morning.  How to use your weight chart  · Keep your weight chart near the scale. Write your weight on the chart as soon as you get off the scale.  · Fill in the month and the start date on the chart. Then write down your weight each day.  Your chart will look like this:    · If you miss a day, leave the space blank. Weigh yourself the next day and write your weight in the next space.  · Take your weight chart with you when you go to see your doctor.  Date Last Reviewed: 3/20/2016  © 7046-8041 [employer]. [address] [place] [address], [place] [address]. All rights reserved. This information is not intended as a substitute for professional medical care. Always follow your healthcare professional's instructions.              Heart Failure: Warning Signs of a Flare-Up  You have a condition called heart failure. Once you have heart failure, flare-ups can happen. Below are signs that can mean your heart failure is getting worse. If you notice any of these warning signs, call your healthcare provider.  Swelling    · Your feet, ankles, or lower legs get puffier.  · You notice skin changes on your lower legs.  · Your shoes feel too tight.  · Your clothes are tighter in the waist.  · You have trouble getting rings on or off your fingers.  Shortness of breath  · You have to breathe harder even when youre doing your normal activities or when youre resting.  · You are short of breath walking up stairs or even short distances.  · You wake up at night short of breath or coughing.  · You need to use more pillows or sit up to sleep.  · You wake up tired or restless.  Other warning signs  · You feel weaker, dizzy, or more tired.  · You have chest pain or changes in your heartbeat.  · You have a cough that wont go away.  · You cant remember things or dont feel like eating.  Tracking your weight  Gaining weight is often the first warning sign that heart failure is getting worse. Gaining even a few pounds can be a sign that your body is retaining excess water and salt. Weighing yourself each day in the morning after you urinate and before you eat, is the best way to know if you're retaining water. Get a scale that is easy to read and make sure you wear the  same clothes and use the same scale every time you weigh. Your healthcare provider will show you how to track your weight. Call your doctor if you gain more than 2 pounds in 1 day, 5 pounds in 1 week, or whatever weight gain you were told to report by your doctor. This is often a sign of worsening heart failure and needs to be evaluated and treated before it compromises your breathing. Your doctor will tell you what to do next.    Date Last Reviewed: 3/15/2016  © 4275-2077 [employer]. [address] [place] [address]. All rights reserved. This information is not intended as a substitute for professional medical care. Always follow your healthcare professional's instructions.              Pneumonmia Discharge Instructions                Chronic Kindey Disease Education             [facility] Sign-Up     Activating your [facility] account is as easy as 1-2-3!     1) Visit [email or web address], select Sign Up Now, enter this activation code and your date of birth, then select Next.  [email or web address]  Expires: 5/15/2017  6:44 AM      2) Create a username and password to use when you visit [facility] in the future and select a security question in case you lose your password and select Next.    3) Enter your e-mail address and click Sign Up!    Additional Information  If you have questions, please e-mail [email or web address] or call [phone] to talk to our [facility] staff. Remember, [facility] is NOT to be used for urgent needs. For medical emergencies, dial 911.          [facility] complies with applicable Federal civil rights laws and does not discriminate on the basis of race, color, national origin, age, disability, or sex.

## 2017-04-04 NOTE — H&P
"[facility] Hospital Medicine  History & Physical    Patient Name: Anai Sal  MRN: [id number]  Admission Date: 4/4/2017  Attending Physician: Tadeo Spain MD  Primary Care Provider: Live Young MD    [facility] Medicine Team: [facility] MED C Tadeo Spain MD     Patient information was obtained from patient, past medical records and ER records.     Subjective:     Principal Problem:Acute on chronic systolic congestive heart failure    Chief Complaint:   Chief Complaint   Patient presents with    Slurred Speech     Pt reports slurred speech-unknown last seen normal.  Pt states "this has been going on off/on for some time"   [facility].        HPI:     72 y/o AAF hx of CHF ef 20%, HTN, hx Breast CA, CAD, HCV presents with dyspnea and slurred speech.  [facility] is [facility] in [facility] and per ED notes, patient with worsening dyspnea this morning and also hypoxia on room air, slurred speech and acute encephalopathy.   She has had recent admission to hospital medicine for management of CHF exacerbation and was discharged on lasix 20mg BID.     Per ED provider who spoke with patients daughter (Tershone [phone]), [facility] had increased patient to 60mg PO lasix over the past week.  The patient does not manage her medications and is unable to provide additional history    On interview of the patient, she lives with daughter, performs most ADL, she does not use oxygen at home and denies active chest pain, has some dyspnea and reports she has fluid on her legs.  She is oriented to person/hospital/president, city/state.  She is NOT oriented to Day/Date/Month/Year.     Past Medical History:   Diagnosis Date    Breast cancer     CHF (congestive heart failure)     Chronic hepatitis C virus infection 3/14/2017    Coronary artery disease     Hypertension     Hazel     Renal disorder     Thyroid disease        Past Surgical History:   Procedure Laterality Date    BREAST " SURGERY      HYSTERECTOMY      MASTECTOMY         Review of patient's allergies indicates:  No Known Allergies    Current Facility-Administered Medications on File Prior to Encounter   Medication    furosemide injection 40 mg     Current Outpatient Prescriptions on File Prior to Encounter   Medication Sig    allopurinol (ZYLOPRIM) 100 MG tablet Take 100 mg by mouth 2 (two) times daily.     aspirin 81 MG Chew Take 1 tablet (81 mg total) by mouth once daily.    benztropine (COGENTIN) 0.5 MG tablet Take 1 tablet (0.5 mg total) by mouth nightly.    betamethasone valerate 0.1% (VALISONE) 0.1 % Lotn Apply topically once daily. 2 drops both ears once daily    carvedilol (COREG) 12.5 MG tablet Take 1 tablet (12.5 mg total) by mouth 2 (two) times daily.    diclofenac sodium 1 % Gel Apply 4 g topically 4 (four) times daily. Prn pain    furosemide (LASIX) 20 MG tablet Take 2 tablets (40 mg total) by mouth once daily.    gabapentin (NEURONTIN) 300 MG capsule Take 300 mg by mouth 3 (three) times daily as needed (for nerve pain).     guaifenesin (MUCINEX) 600 mg 12 hr tablet Take 1 tablet (600 mg total) by mouth 2 (two) times daily.    levothyroxine (SYNTHROID) 50 MCG tablet Take 1 tablet (50 mcg total) by mouth before breakfast.    melatonin 3 mg Tab Take by mouth every evening.    quetiapine (SEROQUEL) 200 MG Tab Take 1 tablet (200 mg total) by mouth nightly.    sodium bicarbonate 650 MG tablet Take 2 tablets (1,300 mg total) by mouth 2 (two) times daily.    [DISCONTINUED] cetirizine (ZYRTEC) 5 MG tablet Take 1 tablet (5 mg total) by mouth once daily.    fluticasone (FLONASE) 50 mcg/actuation nasal spray 2 sprays by Each Nare route once daily.    valsartan (DIOVAN) 320 MG tablet HOLD UNTIL FOLLOW UP WITH YOUR DOCTOR WITH KIDNEY LABWORK BEFOREHAND     Family History     Family history is unknown by patient.        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   HENT: Negative for postnasal drip, rhinorrhea and sore throat.    Eyes: Negative for redness.   Cardiovascular: Positive for palpitations and leg swelling. Negative for chest pain.   Gastrointestinal: Positive for constipation. Negative for abdominal pain.   Endocrine: Positive for cold intolerance.   Genitourinary: Negative for frequency and urgency.   Musculoskeletal: Negative for arthralgias.   Skin: Negative for rash.   Neurological: Positive for speech difficulty.   Hematological: Negative for adenopathy.   Psychiatric/Behavioral: Positive for confusion. The patient is not nervous/anxious and is not hyperactive.      Objective:     Vital Signs (Most Recent):  Temp: 97.7 °F (36.5 °C) (04/04/17 0949)  Pulse: 68 (04/04/17 1531)  Resp: 16 (04/04/17 1531)  BP: (!) 168/90 (04/04/17 1531)  SpO2: 99 % (04/04/17 1531) Vital Signs (24h Range):  Temp:  [97.7 °F (36.5 °C)] 97.7 °F (36.5 °C)  Pulse:  [65-75] 68  Resp:  [14-19] 16  SpO2:  [94 %-100 %] 99 %  BP: (151-178)/(79-99) 168/90     Weight: 84.8 kg (187 lb)  Body mass index is 30.18 kg/(m^2).    Physical Exam   Constitutional: She appears well-developed.   HENT:   Head: Normocephalic and atraumatic.   Eyes: Conjunctivae are normal. Pupils are equal, round, and reactive to light. No scleral icterus.   Neck: Neck supple.   JVD unable to visualize   Cardiovascular: Normal rate, regular rhythm and intact distal pulses.  Exam reveals no friction rub.    No murmur heard.  Pulmonary/Chest: Effort normal. She has wheezes. She has rales.   Patient with prolonged expiratory phase, speaking in short sentences, no accessory muscle use, no tachypnea   Abdominal: Soft. Bowel sounds are normal. She  exhibits distension. She exhibits no mass. There is no tenderness. There is no rebound and no guarding.   Musculoskeletal: She exhibits edema.   Patient with 3+ pitting edema below the knees and some pitting edema in dependent portions of the thighs   Neurological: She is alert. She exhibits normal muscle tone. GCS eye subscore is 4. GCS verbal subscore is 5. GCS motor subscore is 6.   Oriented to hospital, time.   LE strength - 5/5 in hip flexor, knee flex/ext bilaterally  Dysarthric speech, missing teeth unsure of baseline  Right sided facial droop   Skin:   No sacral/buttock pressure injury noted, when pt turned   Psychiatric: She has a normal mood and affect.      Physical Exam   Constitutional: She appears well-developed.   HENT:   Head: Normocephalic and atraumatic.   Eyes: Conjunctivae are normal. Pupils are equal, round, and reactive to light. No scleral icterus.   Neck: Neck supple.   JVD unable to visualize   Cardiovascular: Normal rate, regular rhythm and intact distal pulses.  Exam reveals no friction rub.    No murmur heard.  Pulmonary/Chest: Effort normal. She has wheezes. She has rales.   Patient with prolonged expiratory phase, speaking in short sentences, no accessory muscle use, no tachypnea   Abdominal: Soft. Bowel sounds are normal. She exhibits distension. She exhibits no mass. There is no tenderness. There is no rebound and no guarding.   Musculoskeletal: She exhibits edema.   Patient with 3+ pitting edema below the knees and some pitting edema in dependent portions of the thighs   Neurological: She is alert. She exhibits normal muscle tone. GCS eye subscore is 4. GCS verbal subscore is 5. GCS motor subscore is 6.   Oriented to hospital, time.   LE strength - 5/5 in hip flexor, knee flex/ext bilaterally  Dysarthric speech, missing teeth unsure of baseline  Right sided facial droop   Skin:   No sacral/buttock pressure injury noted, when pt turned   Psychiatric: She has a normal mood and affect.        Significant Labs:   CBC:   Recent Labs  Lab 04/04/17  1029   WBC 2.66*   HGB 10.3*   HCT 32.0*   *     CMP:   Recent Labs  Lab 04/04/17  1029      K 4.3      CO2 24   GLU 93   BUN 40*   CREATININE 2.5*   CALCIUM 9.0   PROT 7.3   ALBUMIN 3.3*   BILITOT 1.4*   ALKPHOS 83   AST 24   ALT 15   ANIONGAP 10   EGFRNONAA 18.8*     Cardiac Markers:   Recent Labs  Lab 04/04/17  1029   BNP 4304*     TSH:   Recent Labs  Lab 04/04/17  1029   TSH 21.939*       Significant Imaging:  CT head and CHest Xray radiology reports reviewed     Assessment/Plan:       1. Acute on chronic systolic heart failure exacerbation, Essential Hypertension  -admit to med/tele unit  -pt with difficult IV access, but has peripheral IV in hand.  Give 80mg IV lasix x1 now and start 80mg IV BID for tomorrow  -EKG is normal sinus with some lateral t-wave inversions, trend troponins to r/o ischemia  -etiology of exacerbation is unclear, Patient with REcent ECHO and PET Stress test, will not repeat studies at this time   -Patient is hypertensive, will add BIDIL to anti-hypertensive regiment.  If further hypertension >180 systolic use Nitropaste.   -PO Fluid and Na restricted diet    2. CKD stage 4  - renal dosing of medications.   - continue PO bicarb     3. Acute encephalopathy   - s/p CT head w/o signs of hemorrhage or ischemia    4. Hypothyroidism  -patient with elevated TSH, on low dose leveothyroxine t4 wnl  -will consider increased levothyroxine dose.     5. COPD  - continue advair inhaler  - schedule PRN nebs while awake     DVT ppx - heparin sq  Code status - Full code, presumed due to encephalopathy.  Attempted to call daughter no answer.     Active Diagnoses:    Diagnosis Date Noted POA    PRINCIPAL PROBLEM:  Acute on chronic systolic congestive heart failure [I50.23] 04/04/2017 Yes    Stage 4 chronic kidney disease [N18.4] 11/11/2016 Yes    Essential hypertension [I10] 12/16/2016 Yes    Primary hypothyroidism [E03.9]  12/16/2016 Yes    COPD (chronic obstructive pulmonary disease) [J44.9] 04/04/2017 Yes    Leukopenia [D72.819] 04/04/2017 Yes    Encephalopathy acute [G93.40] 04/04/2017 Yes      Problems Resolved During this Admission:    Diagnosis Date Noted Date Resolved POA     VTE Risk Mitigation         Ordered     heparin (porcine) injection 5,000 Units  Every 8 hours     Route:  Subcutaneous        04/04/17 1621     Place sequential compression device  Until discontinued      04/04/17 1621              Tadeo Spain MD  Department of Hospital Medicine   [facility]

## 2017-04-04 NOTE — ED NOTES
Patient resting in stretcher and is in NAD at this time. Pt is awake alert and oriented x4. Pt denies pain at this time. Pt updated on POC. Bed low and locked with side rails up x2, call bell in pt reach.

## 2017-04-05 LAB
ALBUMIN SERPL BCP-MCNC: 1.6 G/DL
ALP SERPL-CCNC: 40 U/L
ALT SERPL W/O P-5'-P-CCNC: 7 U/L
ANION GAP SERPL CALC-SCNC: 7 MMOL/L
ANION GAP SERPL CALC-SCNC: 9 MMOL/L
AST SERPL-CCNC: 15 U/L
BASOPHILS # BLD AUTO: 0 K/UL
BASOPHILS NFR BLD: 0 %
BILIRUB SERPL-MCNC: 0.6 MG/DL
BUN SERPL-MCNC: 29 MG/DL
BUN SERPL-MCNC: 41 MG/DL
CA-I BLDV-SCNC: 1.07 MMOL/L
CALCIUM SERPL-MCNC: 5.2 MG/DL
CALCIUM SERPL-MCNC: 8.8 MG/DL
CHLORIDE SERPL-SCNC: 108 MMOL/L
CHLORIDE SERPL-SCNC: 124 MMOL/L
CO2 SERPL-SCNC: 15 MMOL/L
CO2 SERPL-SCNC: 27 MMOL/L
CREAT SERPL-MCNC: 1.3 MG/DL
CREAT SERPL-MCNC: 2.5 MG/DL
DIFFERENTIAL METHOD: ABNORMAL
EOSINOPHIL # BLD AUTO: 0.1 K/UL
EOSINOPHIL NFR BLD: 3 %
ERYTHROCYTE [DISTWIDTH] IN BLOOD BY AUTOMATED COUNT: 16.2 %
EST. GFR  (AFRICAN AMERICAN): 21.6 ML/MIN/1.73 M^2
EST. GFR  (AFRICAN AMERICAN): 47.7 ML/MIN/1.73 M^2
EST. GFR  (NON AFRICAN AMERICAN): 18.8 ML/MIN/1.73 M^2
EST. GFR  (NON AFRICAN AMERICAN): 41.4 ML/MIN/1.73 M^2
GLUCOSE SERPL-MCNC: 59 MG/DL
GLUCOSE SERPL-MCNC: 91 MG/DL
HCT VFR BLD AUTO: 23.9 %
HGB BLD-MCNC: 8 G/DL
LYMPHOCYTES # BLD AUTO: 1.2 K/UL
LYMPHOCYTES NFR BLD: 41.1 %
MAGNESIUM SERPL-MCNC: 2.1 MG/DL
MCH RBC QN AUTO: 30.9 PG
MCHC RBC AUTO-ENTMCNC: 33.5 %
MCV RBC AUTO: 92 FL
MONOCYTES # BLD AUTO: 0.4 K/UL
MONOCYTES NFR BLD: 13.8 %
NEUTROPHILS # BLD AUTO: 1.2 K/UL
NEUTROPHILS NFR BLD: 41.8 %
PLATELET # BLD AUTO: 105 K/UL
PMV BLD AUTO: 11.6 FL
POCT GLUCOSE: 104 MG/DL (ref 70–110)
POCT GLUCOSE: 114 MG/DL (ref 70–110)
POCT GLUCOSE: 132 MG/DL (ref 70–110)
POTASSIUM SERPL-SCNC: 2.7 MMOL/L
POTASSIUM SERPL-SCNC: 4.2 MMOL/L
PROT SERPL-MCNC: 3.4 G/DL
RBC # BLD AUTO: 2.59 M/UL
SODIUM SERPL-SCNC: 144 MMOL/L
SODIUM SERPL-SCNC: 146 MMOL/L
WBC # BLD AUTO: 2.97 K/UL

## 2017-04-05 PROCEDURE — 97161 PT EVAL LOW COMPLEX 20 MIN: CPT

## 2017-04-05 PROCEDURE — 25000003 PHARM REV CODE 250: Performed by: HOSPITALIST

## 2017-04-05 PROCEDURE — 85025 COMPLETE CBC W/AUTO DIFF WBC: CPT

## 2017-04-05 PROCEDURE — 94640 AIRWAY INHALATION TREATMENT: CPT

## 2017-04-05 PROCEDURE — 25000242 PHARM REV CODE 250 ALT 637 W/ HCPCS: Performed by: HOSPITALIST

## 2017-04-05 PROCEDURE — 97530 THERAPEUTIC ACTIVITIES: CPT

## 2017-04-05 PROCEDURE — 63600175 PHARM REV CODE 636 W HCPCS: Performed by: HOSPITALIST

## 2017-04-05 PROCEDURE — 80048 BASIC METABOLIC PNL TOTAL CA: CPT

## 2017-04-05 PROCEDURE — 20600001 HC STEP DOWN PRIVATE ROOM

## 2017-04-05 PROCEDURE — 80053 COMPREHEN METABOLIC PANEL: CPT

## 2017-04-05 PROCEDURE — 83735 ASSAY OF MAGNESIUM: CPT

## 2017-04-05 PROCEDURE — G8979 MOBILITY GOAL STATUS: HCPCS | Mod: CJ

## 2017-04-05 PROCEDURE — 99232 SBSQ HOSP IP/OBS MODERATE 35: CPT | Mod: ,,, | Performed by: HOSPITALIST

## 2017-04-05 PROCEDURE — G8978 MOBILITY CURRENT STATUS: HCPCS | Mod: CK

## 2017-04-05 PROCEDURE — 97116 GAIT TRAINING THERAPY: CPT

## 2017-04-05 PROCEDURE — 82330 ASSAY OF CALCIUM: CPT

## 2017-04-05 PROCEDURE — 97165 OT EVAL LOW COMPLEX 30 MIN: CPT

## 2017-04-05 RX ORDER — METOLAZONE 5 MG/1
5 TABLET ORAL ONCE
Status: COMPLETED | OUTPATIENT
Start: 2017-04-05 | End: 2017-04-05

## 2017-04-05 RX ADMIN — BENZTROPINE MESYLATE 0.5 MG: 0.5 TABLET ORAL at 09:04

## 2017-04-05 RX ADMIN — FUROSEMIDE 80 MG: 10 INJECTION, SOLUTION INTRAMUSCULAR; INTRAVENOUS at 06:04

## 2017-04-05 RX ADMIN — Medication 3 ML: at 02:04

## 2017-04-05 RX ADMIN — FLUTICASONE PROPIONATE 2 SPRAY: 50 SPRAY, METERED NASAL at 09:04

## 2017-04-05 RX ADMIN — ASPIRIN 81 MG CHEWABLE TABLET 81 MG: 81 TABLET CHEWABLE at 08:04

## 2017-04-05 RX ADMIN — SODIUM BICARBONATE 650 MG TABLET 1300 MG: at 09:04

## 2017-04-05 RX ADMIN — ALBUTEROL SULFATE 2.5 MG: 2.5 SOLUTION RESPIRATORY (INHALATION) at 07:04

## 2017-04-05 RX ADMIN — Medication 3 ML: at 10:04

## 2017-04-05 RX ADMIN — BENZTROPINE MESYLATE 0.5 MG: 0.5 TABLET ORAL at 12:04

## 2017-04-05 RX ADMIN — ALLOPURINOL 100 MG: 100 TABLET ORAL at 08:04

## 2017-04-05 RX ADMIN — SODIUM BICARBONATE 650 MG TABLET 1300 MG: at 08:04

## 2017-04-05 RX ADMIN — FUROSEMIDE 80 MG: 10 INJECTION, SOLUTION INTRAMUSCULAR; INTRAVENOUS at 08:04

## 2017-04-05 RX ADMIN — HEPARIN SODIUM 5000 UNITS: 5000 INJECTION, SOLUTION INTRAVENOUS; SUBCUTANEOUS at 06:04

## 2017-04-05 RX ADMIN — LEVOTHYROXINE SODIUM 50 MCG: 50 TABLET ORAL at 06:04

## 2017-04-05 RX ADMIN — HYDRALAZINE HYDROCHLORIDE AND ISOSORBIDE DINITRATE 1 TABLET: 37.5; 2 TABLET, FILM COATED ORAL at 02:04

## 2017-04-05 RX ADMIN — HYDRALAZINE HYDROCHLORIDE AND ISOSORBIDE DINITRATE 1 TABLET: 37.5; 2 TABLET, FILM COATED ORAL at 06:04

## 2017-04-05 RX ADMIN — HEPARIN SODIUM 5000 UNITS: 5000 INJECTION, SOLUTION INTRAVENOUS; SUBCUTANEOUS at 02:04

## 2017-04-05 RX ADMIN — GABAPENTIN 200 MG: 100 CAPSULE ORAL at 09:04

## 2017-04-05 RX ADMIN — GABAPENTIN 100 MG: 100 CAPSULE ORAL at 08:04

## 2017-04-05 RX ADMIN — GUAIFENESIN 600 MG: 600 TABLET, EXTENDED RELEASE ORAL at 09:04

## 2017-04-05 RX ADMIN — Medication 3 ML: at 06:04

## 2017-04-05 RX ADMIN — METOLAZONE 5 MG: 5 TABLET ORAL at 05:04

## 2017-04-05 RX ADMIN — HYDRALAZINE HYDROCHLORIDE AND ISOSORBIDE DINITRATE 1 TABLET: 37.5; 2 TABLET, FILM COATED ORAL at 09:04

## 2017-04-05 NOTE — PLAN OF CARE
Problem: Physical Therapy Goal  Goal: Physical Therapy Goal  Goals to be met by: 17     Patient will increase functional independence with mobility by performin. Sit to stand transfer with [place].  2. Bed to chair transfer with Supervision using Rolling Walker.  3. Gait x 150 feet with Supervision using Rolling Walker.   4. Ascend/descend 13 stair with right Handrails Supervision .   5. Lower extremity exercise program x 20 reps per handout, with assistance as needed.  Outcome: Ongoing (interventions implemented as appropriate)  PT goals established.

## 2017-04-05 NOTE — PLAN OF CARE
Problem: Occupational Therapy Goal  Goal: Occupational Therapy Goal  Goals to be met by: 4/12/2017     Patient will increase functional independence with ADLs by performing:    UE Dressing with Minimal Assistance.  LE Dressing with Stand-by Assistance.  Grooming while standing at sink with Stand-by Assistance.  Toileting from toilet with Stand-by Assistance for hygiene and clothing management.   Toilet transfer to toilet with Stand-by Assistance.  Pt will complete BUE HEP (I)ly using handout.   Pt will complete functional mobility at house-hold level distance in prep for ADLs using RW with SBA.   Pt will demo good endurance throughout morning ADL routine with no SOB.   Outcome: Ongoing (interventions implemented as appropriate)  OT eval completed and goals set.      Tia Morgan, DAVIDR/L  4/5/2017

## 2017-04-05 NOTE — ED NOTES
report received. Pt resting on stretcher locked in lowest position, side rails up x2, call bell in reach, daughter at bedside. Pt remains on continuous pulse ox, cycling blood pressure and cardiac monitoring. No needs at this time, will continue to monitor.

## 2017-04-05 NOTE — ED NOTES
Called phlebotomy again to request help in bed 10, coordinator stated he would try to find phlebotomist and give message to come

## 2017-04-05 NOTE — ED NOTES
Pt sleeping on stretcher locked in lowest position, side rails up x2, call bell within reach. Respirations even, spontaneous and unlabored. Pt appears in NAD. Will continue to monitor.

## 2017-04-05 NOTE — NURSING TRANSFER
Nursing Transfer Note      4/5/2017     Transfer From: ED    Transfer via stretcher    Transfer with cardiac monitoring    Transported by Patient Escort    Medicines sent: no    Chart send with patient: Yes    Patient reassessed at: 4/5/17 1800     Upon arrival to floor: cardiac monitor applied, patient oriented to room, call bell in reach and bed in lowest position

## 2017-04-05 NOTE — ED NOTES
Attempted lab draw x3, unsuccessful.  Called phlebotomy to request them to come attempt to draw patient, no answer after three phone calls.

## 2017-04-05 NOTE — PT/OT/SLP EVAL
Physical Therapy  Evaluation    Anai Sal   MRN: [id number]   Admitting Diagnosis: Acute on chronic systolic congestive heart failure    PT Received On: 17  PT Start Time: 1024     PT Stop Time: 1106    PT Total Time (min): 42 min       Billable Minutes:  Evaluation 11, Gait Training 21 and Therapeutic Activity 10    Diagnosis: Acute on chronic systolic congestive heart failure      Past Medical History:   Diagnosis Date    Breast cancer     CHF (congestive heart failure)     Chronic hepatitis C virus infection 3/14/2017    Coronary artery disease     Hypertension     Hazel     Renal disorder     Thyroid disease       Past Surgical History:   Procedure Laterality Date    BREAST SURGERY      HYSTERECTOMY      MASTECTOMY         Referring physician: Tadeo Spain MD  Date referred to PT: 17    General Precautions: Standard, fall  Orthopedic Precautions: N/A   Braces: N/A       Do you have any cultural, spiritual, Congregational conflicts, given your current situation?: none    Patient History:  Lives With: child(kylie), adult  Living Arrangements: house  Home Accessibility: stairs to enter home  Home Layout: Able to live on 1st floor, Bathroom on 2nd floor  Number of Stairs to Enter Home: 3  Number of Stairs Within Home: 13  Stair Railings at Home: inside, present on right side, outside, present on right side  Transportation Available: family or friend will provide  Equipment Currently Used at Home: bath bench, grab bar, walker, rolling, cane, quad  DME owned (not currently used): rolling walker, transfer tub bench and a quad cane    Previous Level of Function:  Ambulation Skills: needs device (with RW)  Transfer Skills: needs assist  ADL Skills: needs assist  Work/Leisure Activity: unable to perform    Subjective:  Communicated with nursing prior to session.    Chief Complaint: SOB  Patient goals: To get some help and be safer    Pain Ratin/10               Pain Rating Post-Intervention:  0/10    Objective:   Patient found with: peripheral IV, pulse ox (continuous), blood pressure cuff, telemetry     Cognitive Exam:  Oriented to: Person, Place and Situation    Follows Commands/attention: Follows multistep  commands  Communication: clear/fluent  Safety awareness/insight to disability: intact    Physical Exam:  Postural examination/scapula alignment: Rounded shoulder and Abnormal trunk flexion    Skin integrity: Visible skin intact  Edema: Mild     Sensation:   Intact    Upper Extremity Range of Motion:  Right Upper Extremity: WFL  Left Upper Extremity: WFL    Upper Extremity Strength:  Right Upper Extremity: WFL  Left Upper Extremity: Deficits: 3+/5 sh flex    Lower Extremity Range of Motion:  Right Lower Extremity: WFL  Left Lower Extremity: WFL    Lower Extremity Strength:  Right Lower Extremity: WFL  Left Lower Extremity: Deficits: 3+/5 hip flex, 3+/5 ankle DF     Fine motor coordination:  Impaired to B foot FILOMENA    Gross motor coordination: WFL    Functional Mobility:  Bed Mobility:  Rolling/Turning to Left: Modified independent  Scooting/Bridging: Supervision (Scooting to EOB, scooting to HOB with verbal cuing to continue)  Supine to Sit: Stand by Assistance (with inc time to perf)  Sit to Supine: Contact Guard Assistance (for safety)    Transfers:  Sit <> Stand Assistance: Minimum Assistance (Pt perf 6xs without supp O2, maintained SaO2 92%)  Sit <> Stand Assistive Device: No Assistive Device    Gait:   Gait Distance: Pt amb 118', with inc SOB, on room air, SaO2 93% directly following gait training, SaO2 98% after 2 min on room air  Assistance 1: Contact Guard Assistance  Gait Assistive Device: Rolling walker  Gait Pattern: swing-through gait  Gait Deviation(s): decreased christi, increased time in double stance, decreased stride length, decreased step length, forward lean       Balance:   Static Sit: FAIR+: Able to take MINIMAL challenges from all directions  Dynamic Sit: FAIR+: Maintains  balance through MINIMAL excursions of active trunk motion  Static Stand: POOR+: Needs MINIMAL assist to maintain  Dynamic stand: FAIR: Needs CONTACT GUARD during gait    Therapeutic Activities and Exercises:  Deep and pursed lip breathing ed perf during gait and in bed following scooting and transfer training sec to SOB.  Pt able to demo to slow RR.  Sit<-->stand: 6xs in a row with Maria G to perf without supp O2    AM-PAC 6 CLICK MOBILITY  How much help from another person does this patient currently need?   1 = Unable, Total/Dependent Assistance  2 = A lot, Maximum/Moderate Assistance  3 = A little, Minimum/Contact Guard/Supervision  4 = None, Modified [place]/Independent    Turning over in bed (including adjusting bedclothes, sheets and blankets)?: 4  Sitting down on and standing up from a chair with arms (e.g., wheelchair, bedside commode, etc.): 3  Moving from lying on back to sitting on the side of the bed?: 4  Moving to and from a bed to a chair (including a wheelchair)?: 3  Need to walk in hospital room?: 3  Climbing 3-5 steps with a railing?: 2  Total Score: 19     AM-PAC Raw Score CMS G-Code Modifier Level of Impairment Assistance   6 % Total / Unable   7 - 9 CM 80 - 100% Maximal Assist   10 - 14 CL 60 - 80% Moderate Assist   15 - 19 CK 40 - 60% Moderate Assist   20 - 22 CJ 20 - 40% Minimal Assist   23 CI 1-20% SBA / CGA   24 CH 0% Independent/ Mod I     Patient left supine with all lines intact, call button in reach and nursing notified.    Assessment:   Anai Sal is a 71 y.o. female with a medical diagnosis of Acute on chronic systolic congestive heart failure and presents with impairments in balance, functional mobility, and aerobic capacity.  Pt is safe with gait, with AD, with S, however does not feel safe enough to amb without another person present.  Therefore, the pt stays in bed throughout the day while her daughter is in school.  She does not get out of the bed until her daughter  returns for the day.  Discharge recommendation to SNF, so that pt can build the strength, balance, and confidence to amb in the home without her daughter present, and improve the pts quality of life.  Pt wheezes with expiration>inspiration.  Consulted with nursing following tx session, for breathing treatments.  Discontinued supp O2 via NC, pt was SaO2 98% on room air, consulted with nursing following.  Pt will benefit from skilled PT services to address the below impairments.      Rehab identified problem list/impairments: Rehab identified problem list/impairments: weakness, impaired endurance, impaired self care skills, impaired functional mobilty, impaired balance, gait instability, decreased safety awareness, impaired coordination, decreased upper extremity function, decreased lower extremity function, impaired cardiopulmonary response to activity    Rehab potential is good.    Activity tolerance: Good    Discharge recommendations: Discharge Facility/Level Of Care Needs: nursing facility, skilled     Barriers to discharge: Barriers to Discharge: Decreased caregiver support, Inaccessible home environment    Equipment recommendations: Equipment Needed After Discharge: none     GOALS:   Physical Therapy Goals        Problem: Physical Therapy Goal    Goal Priority Disciplines Outcome Goal Variances Interventions   Physical Therapy Goal     PT/OT, PT Ongoing (interventions implemented as appropriate)     Description:  Goals to be met by: 17     Patient will increase functional independence with mobility by performin. Sit to stand transfer with [place].  2. Bed to chair transfer with Supervision using Rolling Walker.  3. Gait  x 150 feet with Supervision using Rolling Walker.   4. Ascend/descend 13 stair with right Handrails Supervision .   5. Lower extremity exercise program x 20 reps per handout, with assistance as needed.                PLAN:    Patient to be seen 4 x/week to address the above  listed problems via gait training, therapeutic activities, therapeutic exercises, neuromuscular re-education  Plan of Care expires: 05/05/17  Plan of Care reviewed with: patient          Annabel Looneyin, PT  04/05/2017

## 2017-04-05 NOTE — PT/OT/SLP EVAL
Occupational Therapy  Evaluation/Treatment    Anai Sal   MRN: [id number]   Admitting Diagnosis: Acute on chronic systolic congestive heart failure    OT Date of Treatment: 04/05/17   OT Start Time: 1030  OT Stop Time: 1110  OT Total Time (min): 40 min    Billable Minutes:  Evaluation 30  Therapeutic Activity 10    Diagnosis: Acute on chronic systolic congestive heart failure     Past Medical History:   Diagnosis Date    Breast cancer     CHF (congestive heart failure)     Chronic hepatitis C virus infection 3/14/2017    Coronary artery disease     Hypertension     Hazel     Renal disorder     Thyroid disease       Past Surgical History:   Procedure Laterality Date    BREAST SURGERY      HYSTERECTOMY      MASTECTOMY         Referring physician: Dr. Alvarado  Date referred to OT: 4/4/17    General Precautions: Standard, fall  Orthopedic Precautions: N/A  Braces: N/A    Do you have any cultural, spiritual, Hoahaoism conflicts, given your current situation?: None     Patient History:  Living Environment  Lives With: child(kylie), adult  Living Arrangements: house  Home Accessibility: stairs to enter home, stairs within home  Number of Stairs to Enter Home: 3  Number of Stairs Within Home:  (full flight)  Stair Railings at Home: outside, present on right side, inside, present on right side  Transportation Available: car, family or friend will provide  Living Environment Comment: Pt reports living with her daughter in 2-story house w/ x3 VITALIY and (R) HR. Pt's bedroom is located on first floor, however she has to ascend full flight of stairs with (R) HR to use tub-shower with TTB. PTA, pt was receiving (A) for ADLs and using RW for functional mobility. Pt's daughter is in nursing school, therefore there are times during the day in which she is alone. No other family/friends are available to (A). Pt reports spending large periods of time in bed while her daughter is at school because of her decreased balance.  "  Equipment Currently Used at Home: bath bench, cane, quad, walker, rolling     Subjective:  Communicated with RN prior to session.    Chief Complaint: "I fell short of breathe."  Patient/Family stated goals: to go to SNF prior to D/C home    Pain Ratin/10              Pain Rating Post-Intervention: 0/10    Objective:  Patient found with: pulse ox (continuous), telemetry, oxygen, blood pressure cuff    Cognitive Exam:  Oriented to: Person, Place, Time and Situation  Follows Commands/attention: Follows multistep  commands  Communication: clear/fluent  Memory:  No Deficits noted  Safety awareness/insight to disability: intact  Coping skills/emotional control: Appropriate to situation    Visual/perceptual:  Intact    Physical Exam:  Postural examination/scapula alignment: Rounded shoulder, Head forward and Posterior pelvic tilt  Skin integrity: Thin and Dry  Edema: Mild BLE    Sensation:   Intact    Upper Extremity Range of Motion:  Right Upper Extremity: WFL  Left Upper Extremity: WFL    Upper Extremity Strength:  Right Upper Extremity: WFL  Left Upper Extremity: WFL, however weaker than (R) side   Strength: WFL    Fine motor coordination:   Intact    Gross motor coordination: WFL    Functional Mobility:  Bed Mobility:  Supine to Sit: Contact Guard Assistance  Sit to Supine: Stand by Assistance    Transfers:  Sit <> Stand Assistance: Contact Guard Assistance (x6 from EOB)  Sit <> Stand Assistive Device: No Assistive Device    Functional Ambulation: Pt completed functional mobility at house-hold level distance (~115 ft) using RW with CGA. Pt w/ increased WOB, however SPO2 at 92-93% on RA. RN made aware.     Activities of Daily Living:     UE Dressing Level of Assistance: Moderate assistance (required (A) to lace behind her backside and thread (R) UE)  UE adaptive equipment: None    LE Dressing Level of Assistance: Contact guard (to doff/jorge goran socks while seated EOB using crossed-leg technique; CGA for " "dynamic sitting balance)  LE adaptive equipment: None    Balance:   Static Sit: FAIR+: Able to take MINIMAL challenges from all directions  Dynamic Sit: FAIR+: Maintains balance through MINIMAL excursions of active trunk motion  Static Stand: FAIR+: Takes MINIMAL challenges from all directions  Dynamic stand: FAIR: Needs CONTACT GUARD during gait    Therapeutic Activities and Exercises:    Pt initially on 2 L supplemental O2. Trialed x6 sit>stand from EOB on RA with pt able to sustain her SPO2 at 95%. Pt then ambulated on RA with notable SOB, however SPO2 remained at 92-93% on RA. RN made aware.     Pt educated on OT role and POC. Additional time also spent educating on pursed-lip breathing, transfer techniques, and D/C recs.    AM-PAC 6 CLICK ADL  How much help from another person does this patient currently need?  1 = Unable, Total/Dependent Assistance  2 = A lot, Maximum/Moderate Assistance  3 = A little, Minimum/Contact Guard/Supervision  4 = None, Modified [place]/Independent    Putting on and taking off regular lower body clothing? : 3  Bathing (including washing, rinsing, drying)?: 3  Toileting, which includes using toilet, bedpan, or urinal? : 3  Putting on and taking off regular upper body clothing?: 2  Taking care of personal grooming such as brushing teeth?: 3  Eating meals?: 4  Total Score: 18    AM-PAC Raw Score CMS "G-Code Modifier Level of Impairment Assistance   6 % Total / Unable   7 - 9 CM 80 - 100% Maximal Assist   10 - 14 CL 60 - 80% Moderate Assist   15 - 19 CK 40 - 60% Moderate Assist   20 - 22 CJ 20 - 40% Minimal Assist   23 CI 1-20% SBA / CGA   24 CH 0% Independent/ Mod I       Patient left supine with all lines intact, call button in reach and RN notified    Assessment:  Anai Sal is a 71 y.o. female with a medical diagnosis of Acute on chronic systolic congestive heart failure and presents with below stated deficits which are impacting her (I)/safety with self-care and " functional mobility. Pt is currently requiring CGA for ambulating house-hold level distances. While ADLs required mod-CGA. Pt appears to be most limited by her impaired balance, generalized weakness and decreased endurance. Pt reports spending large amounts of time in bed when daughter away at school 2/2 balance deficits and fear of falling. Feel that pt would benefit from acute OT services to further address stated deficits and maximize return to PLOF. Recommending short stay SNF upon D/C. No DME needs.      Rehab identified problem list/impairments: Rehab identified problem list/impairments: weakness, impaired endurance, impaired self care skills, impaired functional mobilty, gait instability, impaired balance, impaired cardiopulmonary response to activity    Rehab potential is good.    Activity tolerance: Fair    Discharge recommendations: Discharge Facility/Level Of Care Needs: nursing facility, skilled     Barriers to discharge: Barriers to Discharge: Inaccessible home environment, Decreased caregiver support    Equipment recommendations: none     GOALS:   Occupational Therapy Goals        Problem: Occupational Therapy Goal    Goal Priority Disciplines Outcome Interventions   Occupational Therapy Goal     OT, PT/OT Ongoing (interventions implemented as appropriate)    Description:  Goals to be met by: 4/12/2017     Patient will increase functional independence with ADLs by performing:    UE Dressing with Minimal Assistance.  LE Dressing with Stand-by Assistance.  Grooming while standing at sink with Stand-by Assistance.  Toileting from toilet with Stand-by Assistance for hygiene and clothing management.   Toilet transfer to toilet with Stand-by Assistance.  Pt will complete BUE HEP (I)'ly using handout.   Pt will complete functional mobility at house-hold level distance in prep for ADLs using RW with SBA.   Pt will demo good endurance throughout morning ADL routine with no SOB.                   PLAN:  Patient  to be seen 4 x/week to address the above listed problems via self-care/home management, therapeutic activities, therapeutic exercises  Plan of Care expires: 05/05/17  Plan of Care reviewed with: patient         Tia Morgan, OTR/L  04/05/2017

## 2017-04-06 ENCOUNTER — TELEPHONE (OUTPATIENT)
Dept: TRANSPLANT | Facility: CLINIC | Age: 72
End: 2017-04-06

## 2017-04-06 DIAGNOSIS — I34.0 MITRAL VALVE INSUFFICIENCY, UNSPECIFIED ETIOLOGY: Primary | ICD-10-CM

## 2017-04-06 LAB
ALBUMIN SERPL BCP-MCNC: 3.3 G/DL
ALP SERPL-CCNC: 81 U/L
ALT SERPL W/O P-5'-P-CCNC: 15 U/L
ANION GAP SERPL CALC-SCNC: 12 MMOL/L
AST SERPL-CCNC: 26 U/L
BASOPHILS # BLD AUTO: 0 K/UL
BASOPHILS NFR BLD: 0 %
BILIRUB SERPL-MCNC: 1.7 MG/DL
BUN SERPL-MCNC: 38 MG/DL
CALCIUM SERPL-MCNC: 9.5 MG/DL
CHLORIDE SERPL-SCNC: 105 MMOL/L
CO2 SERPL-SCNC: 27 MMOL/L
CREAT SERPL-MCNC: 2.3 MG/DL
DIFFERENTIAL METHOD: ABNORMAL
EOSINOPHIL # BLD AUTO: 0.1 K/UL
EOSINOPHIL NFR BLD: 1.9 %
ERYTHROCYTE [DISTWIDTH] IN BLOOD BY AUTOMATED COUNT: 16.3 %
EST. GFR  (AFRICAN AMERICAN): 23.9 ML/MIN/1.73 M^2
EST. GFR  (NON AFRICAN AMERICAN): 20.8 ML/MIN/1.73 M^2
GLUCOSE SERPL-MCNC: 89 MG/DL
HCT VFR BLD AUTO: 28.9 %
HGB BLD-MCNC: 9.8 G/DL
LYMPHOCYTES # BLD AUTO: 1.1 K/UL
LYMPHOCYTES NFR BLD: 40.4 %
MAGNESIUM SERPL-MCNC: 2 MG/DL
MCH RBC QN AUTO: 31 PG
MCHC RBC AUTO-ENTMCNC: 33.9 %
MCV RBC AUTO: 92 FL
MONOCYTES # BLD AUTO: 0.3 K/UL
MONOCYTES NFR BLD: 12.1 %
NEUTROPHILS # BLD AUTO: 1.2 K/UL
NEUTROPHILS NFR BLD: 45.2 %
PLATELET # BLD AUTO: 168 K/UL
PMV BLD AUTO: 11.4 FL
POCT GLUCOSE: 111 MG/DL (ref 70–110)
POTASSIUM SERPL-SCNC: 3.6 MMOL/L
PROT SERPL-MCNC: 7.1 G/DL
RBC # BLD AUTO: 3.16 M/UL
SODIUM SERPL-SCNC: 144 MMOL/L
WBC # BLD AUTO: 2.65 K/UL

## 2017-04-06 PROCEDURE — 99232 SBSQ HOSP IP/OBS MODERATE 35: CPT | Mod: ,,, | Performed by: HOSPITALIST

## 2017-04-06 PROCEDURE — 36415 COLL VENOUS BLD VENIPUNCTURE: CPT

## 2017-04-06 PROCEDURE — 94761 N-INVAS EAR/PLS OXIMETRY MLT: CPT

## 2017-04-06 PROCEDURE — 63600175 PHARM REV CODE 636 W HCPCS: Performed by: HOSPITALIST

## 2017-04-06 PROCEDURE — C1751 CATH, INF, PER/CENT/MIDLINE: HCPCS

## 2017-04-06 PROCEDURE — 97116 GAIT TRAINING THERAPY: CPT

## 2017-04-06 PROCEDURE — 20600001 HC STEP DOWN PRIVATE ROOM

## 2017-04-06 PROCEDURE — 36569 INSJ PICC 5 YR+ W/O IMAGING: CPT

## 2017-04-06 PROCEDURE — 97530 THERAPEUTIC ACTIVITIES: CPT

## 2017-04-06 PROCEDURE — 83735 ASSAY OF MAGNESIUM: CPT

## 2017-04-06 PROCEDURE — 80053 COMPREHEN METABOLIC PANEL: CPT

## 2017-04-06 PROCEDURE — 99223 1ST HOSP IP/OBS HIGH 75: CPT | Mod: ,,, | Performed by: NURSE PRACTITIONER

## 2017-04-06 PROCEDURE — 85025 COMPLETE CBC W/AUTO DIFF WBC: CPT

## 2017-04-06 PROCEDURE — 94640 AIRWAY INHALATION TREATMENT: CPT

## 2017-04-06 PROCEDURE — 25000242 PHARM REV CODE 250 ALT 637 W/ HCPCS: Performed by: HOSPITALIST

## 2017-04-06 PROCEDURE — 25000003 PHARM REV CODE 250: Performed by: HOSPITALIST

## 2017-04-06 PROCEDURE — 76937 US GUIDE VASCULAR ACCESS: CPT

## 2017-04-06 RX ORDER — ISOSORBIDE DINITRATE AND HYDRALAZINE HYDROCHLORIDE 37.5; 2 MG/1; MG/1
2 TABLET ORAL 3 TIMES DAILY
Status: DISCONTINUED | OUTPATIENT
Start: 2017-04-06 | End: 2017-04-13 | Stop reason: HOSPADM

## 2017-04-06 RX ORDER — AMOXICILLIN 250 MG
1 CAPSULE ORAL DAILY PRN
Status: DISCONTINUED | OUTPATIENT
Start: 2017-04-06 | End: 2017-04-08

## 2017-04-06 RX ORDER — POLYETHYLENE GLYCOL 3350 17 G/17G
17 POWDER, FOR SOLUTION ORAL DAILY
Status: DISCONTINUED | OUTPATIENT
Start: 2017-04-06 | End: 2017-04-13 | Stop reason: HOSPADM

## 2017-04-06 RX ORDER — LEVOTHYROXINE SODIUM 75 UG/1
75 TABLET ORAL
Status: DISCONTINUED | OUTPATIENT
Start: 2017-04-07 | End: 2017-04-10

## 2017-04-06 RX ORDER — QUETIAPINE FUMARATE 25 MG/1
100 TABLET, FILM COATED ORAL NIGHTLY
Status: DISCONTINUED | OUTPATIENT
Start: 2017-04-06 | End: 2017-04-07

## 2017-04-06 RX ORDER — FLUTICASONE FUROATE AND VILANTEROL 100; 25 UG/1; UG/1
1 POWDER RESPIRATORY (INHALATION) DAILY
Status: DISCONTINUED | OUTPATIENT
Start: 2017-04-06 | End: 2017-04-13 | Stop reason: HOSPADM

## 2017-04-06 RX ORDER — FLUTICASONE PROPIONATE AND SALMETEROL 250; 50 UG/1; UG/1
1 POWDER RESPIRATORY (INHALATION) 2 TIMES DAILY
Status: DISCONTINUED | OUTPATIENT
Start: 2017-04-06 | End: 2017-04-06 | Stop reason: CLARIF

## 2017-04-06 RX ADMIN — HYDRALAZINE HYDROCHLORIDE AND ISOSORBIDE DINITRATE 1 TABLET: 37.5; 2 TABLET, FILM COATED ORAL at 05:04

## 2017-04-06 RX ADMIN — ALLOPURINOL 100 MG: 100 TABLET ORAL at 08:04

## 2017-04-06 RX ADMIN — Medication 3 ML: at 10:04

## 2017-04-06 RX ADMIN — HYDRALAZINE HYDROCHLORIDE AND ISOSORBIDE DINITRATE 2 TABLET: 37.5; 2 TABLET, FILM COATED ORAL at 09:04

## 2017-04-06 RX ADMIN — SODIUM BICARBONATE 650 MG TABLET 1300 MG: at 08:04

## 2017-04-06 RX ADMIN — Medication 3 ML: at 06:04

## 2017-04-06 RX ADMIN — GABAPENTIN 200 MG: 100 CAPSULE ORAL at 09:04

## 2017-04-06 RX ADMIN — GUAIFENESIN 600 MG: 600 TABLET, EXTENDED RELEASE ORAL at 08:04

## 2017-04-06 RX ADMIN — GABAPENTIN 200 MG: 100 CAPSULE ORAL at 08:04

## 2017-04-06 RX ADMIN — HYDRALAZINE HYDROCHLORIDE AND ISOSORBIDE DINITRATE 2 TABLET: 37.5; 2 TABLET, FILM COATED ORAL at 02:04

## 2017-04-06 RX ADMIN — QUETIAPINE FUMARATE 100 MG: 25 TABLET, FILM COATED ORAL at 09:04

## 2017-04-06 RX ADMIN — FLUTICASONE FUROATE AND VILANTEROL TRIFENATATE 1 PUFF: 100; 25 POWDER RESPIRATORY (INHALATION) at 02:04

## 2017-04-06 RX ADMIN — FLUTICASONE PROPIONATE 2 SPRAY: 50 SPRAY, METERED NASAL at 09:04

## 2017-04-06 RX ADMIN — FUROSEMIDE 80 MG: 10 INJECTION, SOLUTION INTRAMUSCULAR; INTRAVENOUS at 06:04

## 2017-04-06 RX ADMIN — BENZTROPINE MESYLATE 0.5 MG: 0.5 TABLET ORAL at 09:04

## 2017-04-06 RX ADMIN — GUAIFENESIN 600 MG: 600 TABLET, EXTENDED RELEASE ORAL at 09:04

## 2017-04-06 RX ADMIN — HEPARIN SODIUM 5000 UNITS: 5000 INJECTION, SOLUTION INTRAVENOUS; SUBCUTANEOUS at 09:04

## 2017-04-06 RX ADMIN — ALBUTEROL SULFATE 2.5 MG: 2.5 SOLUTION RESPIRATORY (INHALATION) at 02:04

## 2017-04-06 RX ADMIN — ALBUTEROL SULFATE 2.5 MG: 2.5 SOLUTION RESPIRATORY (INHALATION) at 08:04

## 2017-04-06 RX ADMIN — FUROSEMIDE 80 MG: 10 INJECTION, SOLUTION INTRAMUSCULAR; INTRAVENOUS at 12:04

## 2017-04-06 RX ADMIN — Medication 3 ML: at 02:04

## 2017-04-06 RX ADMIN — POLYETHYLENE GLYCOL 3350 17 G: 17 POWDER, FOR SOLUTION ORAL at 12:04

## 2017-04-06 RX ADMIN — SODIUM BICARBONATE 650 MG TABLET 1300 MG: at 09:04

## 2017-04-06 RX ADMIN — LEVOTHYROXINE SODIUM 50 MCG: 50 TABLET ORAL at 05:04

## 2017-04-06 RX ADMIN — HEPARIN SODIUM 5000 UNITS: 5000 INJECTION, SOLUTION INTRAVENOUS; SUBCUTANEOUS at 02:04

## 2017-04-06 RX ADMIN — ALBUTEROL SULFATE 2.5 MG: 2.5 SOLUTION RESPIRATORY (INHALATION) at 07:04

## 2017-04-06 RX ADMIN — ASPIRIN 81 MG CHEWABLE TABLET 81 MG: 81 TABLET CHEWABLE at 08:04

## 2017-04-06 NOTE — PLAN OF CARE
04/06/17 1026   Readmission Questionnaire   At the time of your discharge, did someone talk to you about what your health problems were? Yes   At the time of discharge, did someone talk to you about what to watch out for regarding worsening of your health problem? Yes   At the time of discharge, did someone talk to you about what to do if you experienced worsening of your health problem? Yes   At the time of discharge, did someone talk to you about which medication to take when you left the hospital and which ones to stop taking? Yes   At the time of discharge, did someone talk to you about when and where to follow up with a doctor after you left the hospital? Yes   How often do you need to have someone help you when you read instructions, pamphlets, or other written material from your doctor or pharmacy? Sometimes   Do you have problems taking your medications as prescribed? No   Do you have any problems affording any of  your prescribed medications? No   Do you have problems obtaining/receiving your medications? No   Does the patient have transportation to healthcare appointments? Yes   Lives With child(kylie), adult   Living Arrangements house   Does the patient have family/friends to help with healtcare needs after discharge? yes   Does your caregiver provide all the help you need? Yes   Are you currently feeling confused? No   Are you currently having problems thinking? No   Are you currently having memory problems? No   Have you felt down, depressed, or hopeless? 0   Have you felt little interest or pleasure in doing things? 0   In the last 7 days, my sleep quality was: fair

## 2017-04-06 NOTE — PLAN OF CARE
Problem: Physical Therapy Goal  Goal: Physical Therapy Goal  Goals to be met by: 17     Patient will increase functional independence with mobility by performin. Sit to stand transfer with [place]. - not met  2. Bed to chair transfer with Supervision using Rolling Walker.- not met  3. Gait x 150 feet with Supervision using Rolling Walker. - not met  4. Ascend/descend 13 stair with right Handrails Supervision . - not met  5. Lower extremity exercise program x 20 reps per handout, with assistance as needed.- not met   Pt continues to progress toward goals and  Goals remain appropriate at this time.   Lucero Jorge, PTA

## 2017-04-06 NOTE — NURSING
Notified Judit ZAPIEN PIV infiltrated and unable to obtain IV access. PICC consult placed for US guided IV. Will administer am Lasix once IV established.

## 2017-04-06 NOTE — PLAN OF CARE
04/06/17 1027   Discharge Assessment   Assessment Type Discharge Planning Assessment   Confirmed/corrected address and phone number on facesheet? Yes   Assessment information obtained from? Patient;Medical Record   Expected Length of Stay (days) 2   Communicated expected length of stay with patient/caregiver yes   Prior to hospitilization cognitive status: Alert/Oriented   Prior to hospitalization functional status: Assistive Equipment   Current cognitive status: Alert/Oriented   Current Functional Status: Assistive Equipment   Arrived From home or self-care   Lives With child(kylie), adult   Able to Return to Prior Arrangements yes   Is patient able to care for self after discharge? Yes   Does the patient have family/friends to help with healtcare needs after discharge? yes   How many people do you have in your home that can help with your care after discharge? 1   Patient's perception of discharge disposition home or selfcare   Readmission Within The Last 30 Days previous discharge plan unsuccessful   Patient currently being followed by outpatient case management? No   Patient currently receives home health services? Yes   Patient previously received home health services and would like to resume services if necessary? Yes   If yes, name of home health provider: Per [facility]   Does the patient currently use HME? Yes   Patient currently receives private duty nursing? No   Patient currently receives any other outside agency services? No   Do you have any problems affording any of your prescribed medications? No   Is the patient taking medications as prescribed? yes   Do you have any financial concerns preventing you from receiving the healthcare you need? No   Does the patient have transportation to healthcare appointments? Yes   Transportation Available family or friend will provide   Discharge Plan A Home with family;Other  ([facility])   Patient/Family In Agreement With Plan yes   Readmitted with CHF. Lives with  daughter. Services per [facility]. No new DC needs identified.

## 2017-04-06 NOTE — CONSULTS
Midline placed in Left Brachial vein, [id number] catheter length. [id number] Used real time ultrasound. Image recorded and saved.

## 2017-04-06 NOTE — PLAN OF CARE
Problem: Patient Care Overview  Goal: Plan of Care Review  Outcome: Ongoing (interventions implemented as appropriate)  Plan of care discussed with pt. Pt free of falls/trauma/injuries this shift. Pt being diuresed with Lasix 80 mg IVP BID. PT getting PRN nebs while awake. CXR-mild edema and R pleural fluid. CT of head completed. Pt very unstable attempting to walk to bathroom- bed alarm placed on encouraged pt to use nearby call light before getting out of bed. Spoke to pt's daughter (treshone) per request to give update of plan of care. VSS & NADN. Pt denies CP, SOB, or pain/discomfort at this time. All questions addressed.  Will continue to monitor.

## 2017-04-06 NOTE — PT/OT/SLP PROGRESS
Physical Therapy  Treatment    Anai Sal   MRN: [id number]   Admitting Diagnosis: Acute on chronic systolic congestive heart failure    PT Received On: 17  PT Start Time: 1250     PT Stop Time: 1314    PT Total Time (min): 24 min       Billable Minutes:  Gait [id number] and Therapeutic Activity 10    Treatment Type: Treatment  PT/PTA: PTA     PTA Visit Number: 1       General Precautions: Standard, fall, hearing impaired  Orthopedic Precautions: N/A     Do you have any cultural, spiritual, Catholic conflicts, given your current situation?: none    Subjective:  Communicated with RN prior to session.  Pt agreeable to PT.    Pain Ratin/10     Pain Rating Post-Intervention: 0/10    Objective:   Patient found with: peripheral IV, telemetry    Functional Mobility:  Bed Mobility:   Scooting/Bridging: Modified Independent  Supine to Sit: Modified Independent  Sit to Supine: Modified Independent    Transfers:  Sit <> Stand Assistance: Contact Guard Assistance  Sit <> Stand Assistive Device: Rolling Walker    Gait:   Gait Distance: 380 feet  Assistance 1: Contact Guard Assistance  Gait Assistive Device: Rolling walker  Gait Pattern: reciprocal  Gait Deviation(s): decreased christi, decreased velocity of limb motion, decreased step length, decreased stride length, forward lean, increased time in double stance    Therapeutic Activities and Exercises:  Pt educated on safe use of RW during ambulation and transfers, safe bed mobility and benefits of participating in PT.  Pt was educated in the use of IS.    AM-PAC 6 CLICK MOBILITY  How much help from another person does this patient currently need?   1 = Unable, Total/Dependent Assistance  2 = A lot, Maximum/Moderate Assistance  3 = A little, Minimum/Contact Guard/Supervision  4 = None, Modified [place]/Independent    Turning over in bed (including adjusting bedclothes, sheets and blankets)?: 4  Sitting down on and standing up from a chair with arms (e.g.,  wheelchair, bedside commode, etc.): 3  Moving from lying on back to sitting on the side of the bed?: 4  Moving to and from a bed to a chair (including a wheelchair)?: 3  Need to walk in hospital room?: 3  Climbing 3-5 steps with a railing?: 3  Total Score: 20    AM-PAC Raw Score CMS G-Code Modifier Level of Impairment Assistance   6 % Total / Unable   7 - 9 CM 80 - 100% Maximal Assist   10 - 14 CL 60 - 80% Moderate Assist   15 - 19 CK 40 - 60% Moderate Assist   20 - 22 CJ 20 - 40% Minimal Assist   23 CI 1-20% SBA / CGA   24 CH 0% Independent/ Mod I     Patient left right sidelying with all lines intact and call button in reach.    Assessment:  Anai Sal is a 71 y.o. female with a medical diagnosis of Acute on chronic systolic congestive heart failure and presents with decreased functional mobility and impairments as listed below.    Rehab identified problem list/impairments: Rehab identified problem list/impairments: weakness, impaired endurance, impaired self care skills, impaired functional mobilty, gait instability, impaired balance, decreased lower extremity function, decreased upper extremity function, impaired coordination, impaired cardiopulmonary response to activity    Rehab potential is good.    Activity tolerance: Good    Discharge recommendations: Discharge Facility/Level Of Care Needs: nursing facility, skilled     Barriers to discharge: Barriers to Discharge: Inaccessible home environment, Decreased caregiver support    Equipment recommendations: Equipment Needed After Discharge: none     GOALS:   Physical Therapy Goals        Problem: Physical Therapy Goal    Goal Priority Disciplines Outcome Goal Variances Interventions   Physical Therapy Goal     PT/OT, PT Ongoing (interventions implemented as appropriate)     Description:  Goals to be met by: 17     Patient will increase functional independence with mobility by performin. Sit to stand transfer with [place]. - not  met  2. Bed to chair transfer with Supervision using Rolling Walker.- not met  3. Gait  x 150 feet with Supervision using Rolling Walker. - not met  4. Ascend/descend 13 stair with right Handrails Supervision . - not met  5. Lower extremity exercise program x 20 reps per handout, with assistance as needed.- not met                 PLAN:    Patient to be seen 4 x/week  to address the above listed problems via gait training, therapeutic activities, therapeutic exercises, neuromuscular re-education  Plan of Care expires: 05/05/17  Plan of Care reviewed with: patient    Lucero OSUNA Jorge, PTA  04/06/2017

## 2017-04-06 NOTE — CONSULTS
Consult Note    Inpatient consult to Cardiothoracic Surgery  Consult performed by: MARCUS ANSARI  Consult ordered by: BAMBI OGDEN  Reason for consult: Mitral Clip eval        SUBJECTIVE:     History of Present Illness:  Patient is a 71 y.o. female presents with hx of CHF ef 20%, HTN, hx Breast CA, CAD, HCV presents with dyspnea and slurred speech. Patient is seen in [facility]ACE clinic and per ED notes, patient with worsening dyspnea this morning and also hypoxia on room air, slurred speech and acute encephalopathy. She has had recent admission to hospital medicine for management of CHF exacerbation and was discharged. The patient lives with daughter, performs most ADL, has some dyspnea and reports she has fluid on her legs. She is oriented to person and hospital only.     Review of patient's allergies indicates:  No Known Allergies  Past Medical History:   Diagnosis Date    Breast cancer     CHF (congestive heart failure)     Chronic hepatitis C virus infection 3/14/2017    Coronary artery disease     Hypertension     Hazel     Renal disorder     Thyroid disease      Past Surgical History:   Procedure Laterality Date    BREAST SURGERY      HYSTERECTOMY      MASTECTOMY       Family History   Problem Relation Age of Onset    Family history unknown: Yes     Social History   Substance Use Topics    Smoking status: Former Smoker     Packs/day: 2.00     Years: 20.00     Types: Cigarettes     Quit date: 2/16/1997    Smokeless tobacco: None    Alcohol use No      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   Cardiovascular: Negative for chest pain.   Neurological: Positive for weakness.     OBJECTIVE:     Vital Signs:  Temp:  [98.4 °F (36.9 °C)-98.7 °F (37.1 °C)]   Pulse:  [73-83]   Resp:  [16-20]   BP: (145-166)/(67-83)   SpO2:  [94 %-97 %]     Physical Exam    Constitutional: She appears well-nourished.   HENT:   Head: Normocephalic.   Eyes: Pupils are equal, round, and reactive to light.   Cardiovascular: Normal rate and regular rhythm.    Murmur heard.  Pulmonary/Chest: Effort normal. She has wheezes.   Abdominal: Soft.   Musculoskeletal: Normal range of motion.   Neurological: She is alert.   Skin: Skin is warm and dry.   Psychiatric: She has a normal mood and affect.     Laboratory:  CBC:   Recent Labs  Lab 04/06/17  0848   WBC 2.65*   RBC 3.16*   HGB 9.8*   HCT 28.9*      MCV 92   MCH 31.0   MCHC 33.9     BMP:   Recent Labs  Lab 04/06/17  0848   GLU 89      K 3.6      CO2 27   BUN 38*   CREATININE 2.3*   CALCIUM 9.5   MG 2.0     CMP:   Recent Labs  Lab 04/06/17  0848   GLU 89   CALCIUM 9.5   ALBUMIN 3.3*   PROT 7.1      K 3.6   CO2 27      BUN 38*   CREATININE 2.3*   ALKPHOS 81   ALT 15   AST 26   BILITOT 1.7*     Diagnostic Results:  ECHO CONCLUSIONS     1 - Severely depressed left ventricular systolic function (EF 20-25%).     2 - Mild left ventricular enlargement.     3 - Right ventricular enlargement with moderately depressed systolic function.     4 - Left ventricular diastolic dysfunction.     5 - Biatrial enlargement.     6 - Pulmonary hypertension. The estimated PA systolic pressure is 60 mmHg.     7 - Trivial aortic regurgitation.     8 - Severe mitral regurgitation. RF is calculated to be > 60%.     9 - Mild to moderate tricuspid regurgitation.     10 - Mild pulmonic regurgitation.     11 - Increased central venous pressure.   LVEDD 5.6cmn    CONCLUSIONS: NORMAL MYOCARDIAL PERFUSION PET STRESS TEST  1. The perfusion scan is free of evidence for myocardial ischemia or injury.   2. Resting wall motion is physiologic. Stress wall motion is physiologic.   3. Resting LV function is normal. There is stress induced LV dysfunction with a reduced ejection fraction of 50 %.  (normal is >= 51%)  4. The ventricular volumes are normal at  rest and stress.   5. The extracardiac distribution of radioactivity is normal.   6. There was no previous study available to compare.  7. Although the perfusion images are normal, the patient reported no symptoms with stress and caffeine consumption should be considered. If clinically indicated, the study could be repeated with dobutamine.      ASSESSMENT/PLAN:   Patient is a 71 y.o. female presents with hx of CHF ef 20%, HTN, hx Breast CA, CAD, HCV COPD (severe ventilatory impairment per PFTs) and encephalopathy (resolving).  STS Score: 6.7%  MELD: 16    Plan: Due to patient mental status at this time, multiple co morbidities, and moderate STS Score patient is high risk for surgical MVR. Would continue medical management and consult interventional cardiology for possible mitral clip. Dr. Waldrop to staff.     CTS Attending Note:    I have personally taken the history and agree with the NP's note as stated above. Patient is inoperable due to MELD score at this time

## 2017-04-06 NOTE — PLAN OF CARE
Problem: Patient Care Overview  Goal: Plan of Care Review  Outcome: Ongoing (interventions implemented as appropriate)  POC reviewed with patient and daughter with both verbalizing understanding. VSS and patient denies presence of pain. Midline placed today d/t poor venous access. Patient continues on Lasix IV push and is diuresing well. IR cards and CTS consulted today. Patient in no apparent sign of distress, will continue to monitor.

## 2017-04-06 NOTE — CONSULTS
[facility]  Interventional Cardiology  Valve Center  Consult Note    Patient Name: Anai Sal  MRN: [id number]  Admission Date: 4/4/2017  Hospital Length of Stay: 2 days  Code Status: Full Code   Principal Problem:Acute on chronic systolic congestive heart failure      Consults  Subjective:     HPI:   Ms. Sal is referred by Dr. Live/Toya RN @ [place] for evaluation of severe functional MR. Dr. Costa reviewed the echo with Dr. Pritchard -- she has severe functional MR with a central jet, PASP 60s, EF= 20%. The plan was to screen her for inclusion in COAPT.     She is currently admitted for decompensated heart failure. She was sent to the ED from [place] with shortness of breath and hypoxia (O2 sat 85% on RA) and AMS with slurred speech. She has had intermittent episodes of slurred speech x 1 week per the patient's daughter.  She was recently admitted to the hospital 3/9-3/16 for treatment of acute on chronic CHF.     At baseline, she is able to perform most her ADLs independently. She lives with her daughter. She ambulates with a walker.     STS= 9.0%  MELD= 18    Past Medical History:   Diagnosis Date    Breast cancer     CHF (congestive heart failure)     Chronic hepatitis C virus infection 3/14/2017    Coronary artery disease     Hypertension     Hazel     Renal disorder     Thyroid disease        Past Surgical History:   Procedure Laterality Date    BREAST SURGERY      HYSTERECTOMY      MASTECTOMY         Review of patient's allergies indicates:  No Known Allergies    No current facility-administered medications on file prior to encounter.      Current Outpatient Prescriptions on File Prior to Encounter   Medication Sig    allopurinol (ZYLOPRIM) 100 MG tablet Take 100 mg by mouth 2 (two) times daily.     aspirin 81 MG Chew Take 1 tablet (81 mg total) by mouth once daily.    benztropine (COGENTIN) 0.5 MG tablet Take 1 tablet (0.5 mg total) by mouth nightly.    carvedilol  (COREG) 12.5 MG tablet Take 1 tablet (12.5 mg total) by mouth 2 (two) times daily.    diclofenac sodium 1 % Gel Apply 4 g topically 4 (four) times daily. Prn pain    furosemide (LASIX) 20 MG tablet Take 2 tablets (40 mg total) by mouth once daily.    gabapentin (NEURONTIN) 300 MG capsule Take 300 mg by mouth 3 (three) times daily as needed (for nerve pain).     guaifenesin (MUCINEX) 600 mg 12 hr tablet Take 1 tablet (600 mg total) by mouth 2 (two) times daily.    levothyroxine (SYNTHROID) 50 MCG tablet Take 1 tablet (50 mcg total) by mouth before breakfast.    melatonin 3 mg Tab Take by mouth every evening.    quetiapine (SEROQUEL) 200 MG Tab Take 1 tablet (200 mg total) by mouth nightly.    sodium bicarbonate 650 MG tablet Take 2 tablets (1,300 mg total) by mouth 2 (two) times daily.    betamethasone valerate 0.1% (VALISONE) 0.1 % Lotn Apply topically once daily. 2 drops both ears once daily    fluticasone (FLONASE) 50 mcg/actuation nasal spray 2 sprays by Each Nare route once daily.    valsartan (DIOVAN) 320 MG tablet HOLD UNTIL FOLLOW UP WITH YOUR DOCTOR WITH KIDNEY LABWORK BEFOREHAND     Family History     Family history is unknown by patient.        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 C/O GIBBONS, orthopnea, LE edema  Gastrointestinal: No abdominal pain, nausea, vomiting, black or bloody  stools  Genitourinary: No difficulty urinating or hematuria, no incontinence.   Musculoskeletal: No joint pain or muscle pain or weakness  Neurological: No focal weakness, numbness, tingling, or sensory changes.     Objective:     Vital Signs (Most Recent):  Temp: 98.5 °F (36.9 °C) (04/06/17 1200)  Pulse: 74 (04/06/17 1200)  Resp: 20 (04/06/17 1200)  BP: (!) 166/80 (04/06/17 1200)  SpO2: 97 % (04/06/17 1200) Vital Signs (24h Range):  Temp:  [97.4 °F (36.3 °C)-98.7 °F (37.1 °C)] 98.5 °F (36.9 °C)  Pulse:  [66-78] 74  Resp:  [17-20] 20  SpO2:  [95 %-97 %] 97 %  BP: (125-176)/(60-91) 166/80     Weight: 84.8 kg (187 lb)  Body mass index is 30.18 kg/(m^2).    SpO2: 97 %  O2 Device (Oxygen Therapy): room air      Intake/Output Summary (Last 24 hours) at 04/06/17 1334  Last data filed at 04/06/17 0600   Gross per 24 hour   Intake              240 ml   Output             2650 ml   Net            -2410 ml       Lines/Drains/Airways     Peripheral Intravenous Line                 Midline Catheter Insertion/Assessment  - Single Lumen 04/06/17 1232 Left basilic vein (medial side of arm) 18g x 8cm less than 1 day                Physical Exam  General Appearance:    Alert, cooperative, no distress, appears stated age   Head:    Normocephalic, without obvious abnormality, atraumatic   Eyes:    PERRL, conjunctiva/corneas clear, EOM's intact, both eyes   Neck:   Supple, symmetrical, trachea midline, no carotid    Bruit, + JVD   Lungs:     Decreased bibasilar BS with crackles, respirations unlabored   Chest Wall:    No tenderness or deformity    Heart:    Regular rate and rhythm, S1 and S2 normal, no rub or gallop,    III/VI SM at RSB   Abdomen:     Soft, non-tender, bowel sounds active all four quadrants,     no masses, no organomegaly   Extremities:   Extremities normal, atraumatic, no cyanosis or edema   Pulses:   2+ and symmetric all extremities   Skin:   Skin color, texture, turgor normal, no rashes or lesions   Neurologic:    Grossly intact         Significant Labs:   BMP:   Recent Labs  Lab 04/05/17  0429 04/05/17  0929 04/06/17  0848   GLU 59* 91 89   * 144 144   K 2.7* 4.2 3.6   * 108 105   CO2 15* 27 27   BUN 29* 41* 38*   CREATININE 1.3 2.5* 2.3*   CALCIUM 5.2* 8.8 9.5   MG  --  2.1 2.0   , CBC   Recent Labs  Lab 04/04/17  1841 04/05/17  0429 04/06/17  0848   WBC 2.60* 2.97* 2.65*   HGB 9.7* 8.0* 9.8*   HCT 29.1* 23.9* 28.9*   * 105* 168    and INR No results for input(s): INR, PROTIME in the last 48 hours.    Significant Imaging:   Echo (3/14/17):     1 - Severely depressed left ventricular systolic function (EF 20-25%).     2 - Mild left ventricular enlargement.     3 - Right ventricular enlargement with moderately depressed systolic function.     4 - Left ventricular diastolic dysfunction.     5 - Biatrial enlargement.     6 - Pulmonary hypertension. The estimated PA systolic pressure is 60 mmHg.     7 - Trivial aortic regurgitation.     8 - Severe mitral regurgitation. RF is calculated to be > 60%.     9 - Mild to moderate tricuspid regurgitation.     10 - Mild pulmonic regurgitation.     11 - Increased central venous pressure.     Assessment and Plan:      Mitral regurgitation - severe, functional. Symptomatic (NYHA Class IV sx).     Acute on chronic diastolic heart failure - inpatient management per primary team, patient regularly follows with [facility]/Dr. Live as an outpatient.     Stage 4 chronic kidney disease - likely hypertensive nephrosclerosis. Cr stable at this time. Will monitor closely.     Essential hypertension - controlled on coreg and diovan.     COPD - severe by PFTs in Feb 2017,n ot on inhalers    Hypothyroidism - stable, on synthroid    Hx of breast CA - s/p right mastectomy and chemo in 1979, KASSY per Dr. Herrera on 2/16/17    Chronic hepatitis C virus infection - [place] C Ab + in Jan 2017, MELD= 18    Encephalopathy acute - improving. CT head negative       Will begin  screening/enrollment process for COAPT; this is a randomized clinical trial comparing MitraClip to standard medical therapy. She will need CTS consult to evaluate surgical candidacy and HTS to optimize her medical therapy.     Thank you for your consult.     Elisa Ellis PA-C  Cardiology   [facility]

## 2017-04-06 NOTE — PROGRESS NOTES
[facility] Hospital Medicine  Progress Note    Patient Name: Anai Sal  MRN: [id number]  Patient Class: IP- Inpatient   Admission Date: 4/4/2017  Length of Stay: 1 days  Attending Physician: Tadeo Spain MD  Primary Care Provider: Live Young MD    [facility] Medicine Team: [facility] HOSP MED C Tadeo Spain MD    Subjective:     Principal Problem:Acute on chronic systolic congestive heart failure    HPI:72 y/o AAF hx of CHF ef 20%, HTN, hx Breast CA, CAD, HCV presents with dyspnea and slurred speech. Patient is seen in PACE clinic and per ED notes, patient with worsening dyspnea this morning and also hypoxia on room air, slurred speech and acute encephalopathy. She has had recent admission to hospital medicine for management of CHF exacerbation and was discharged on lasix 20mg BID.      Per ED provider who spoke with patients daughter (Tershone [phone]), PACE clinic had increased patient to 60mg PO lasix over the past week. The patient does not manage her medications and is unable to provide additional history     On interview of the patient, she lives with daughter, performs most ADL, she does not use oxygen at home and denies active chest pain, has some dyspnea and reports she has fluid on her legs. She is oriented to person/hospital/president, city/state. She is NOT oriented to Day/Date/Month/Year.     Hospital Course:  Overnight patient started on IV lasix for diuresis    Interval History: Patient w/o acute complaints, reports urinating, still on 2L oxygen, has no acute complaints and is lying supine in bed.     Review of Systems   Constitutional: Negative for fever.   HENT: Negative for sore throat.    Cardiovascular: Negative for chest pain.   Gastrointestinal: Positive for constipation. Negative for abdominal distention and abdominal pain.     Objective:     Vital Signs (Most Recent):  Temp: 97.6 °F (36.4 °C) (04/05/17 1800)  Pulse: 78 (04/05/17 1930)  Resp: 18 (04/05/17  1930)  BP: (!) 162/87 (04/05/17 1800)  SpO2: 97 % (04/05/17 1930) Vital Signs (24h Range):  Temp:  [97.4 °F (36.3 °C)-97.6 °F (36.4 °C)] 97.6 °F (36.4 °C)  Pulse:  [56-78] 78  Resp:  [12-22] 18  SpO2:  [95 %-99 %] 97 %  BP: (112-166)/(55-91) 162/87     Weight: 84.8 kg (187 lb)  Body mass index is 30.18 kg/(m^2).  No intake or output data in the 24 hours ending 04/05/17 2130   Physical Exam   Constitutional: She is oriented to person, place, and time.   Eyes: Conjunctivae are normal.   Neck: Normal range of motion.   Unable to appreciate JVP   Cardiovascular: Normal rate, regular rhythm and normal heart sounds.    Pulmonary/Chest: Effort normal. She has wheezes. She has rales.   Abdominal: Soft. Bowel sounds are normal. She exhibits no distension. There is no tenderness. There is no guarding.   Neurological: She is alert and oriented to person, place, and time.       Significant Labs:   BMP:   Recent Labs  Lab 04/05/17  0929   GLU 91      K 4.2      CO2 27   BUN 41*   CREATININE 2.5*   CALCIUM 8.8   MG 2.1     CBC:   Recent Labs  Lab 04/04/17  1029 04/04/17  1841 04/05/17  0429   WBC 2.66* 2.60* 2.97*   HGB 10.3* 9.7* 8.0*   HCT 32.0* 29.1* 23.9*   * 147* 105*          Assessment/Plan:      1. Acute on chronic systolic heart failure exacerbation, Essential Hypertension  -continue IV lasix, continue antihypertensives  -PT/OT orders       2. CKD stage 4  - renal dosing of medications.   - continue PO bicarb      3. Acute encephalopathy   - s/p CT head w/o signs of hemorrhage or ischemia  -mental status improved this AM, will have to discuss with family if pt is at baseline.      4. Hypothyroidism  -patient with elevated TSH, on low dose leveothyroxine t4 wnl  -will consider increased levothyroxine dose.      5. COPD  - continue advair inhaler  - schedule PRN nebs while awake      DVT ppx - heparin sq  Code status - Full code, presumed due to encephalopathy.     Active Diagnoses:    Diagnosis Date  Noted POA    PRINCIPAL PROBLEM:  Acute on chronic systolic congestive heart failure [I50.23] 04/04/2017 Yes    Stage 4 chronic kidney disease [N18.4] 11/11/2016 Yes    Essential hypertension [I10] 12/16/2016 Yes    Primary hypothyroidism [E03.9] 12/16/2016 Yes    COPD (chronic obstructive pulmonary disease) [J44.9] 04/04/2017 Yes    Leukopenia [D72.819] 04/04/2017 Yes    Encephalopathy acute [G93.40] 04/04/2017 Yes      Problems Resolved During this Admission:    Diagnosis Date Noted Date Resolved POA     VTE Risk Mitigation         Ordered     heparin (porcine) injection 5,000 Units  Every 8 hours     Route:  Subcutaneous        04/04/17 1621     Place sequential compression device  Until discontinued      04/04/17 1621          Tadeo Spain MD  Department of Hospital Medicine   [facility]

## 2017-04-07 PROBLEM — G93.40 ENCEPHALOPATHY ACUTE: Status: RESOLVED | Noted: 2017-04-04 | Resolved: 2017-04-07

## 2017-04-07 LAB
ALBUMIN SERPL BCP-MCNC: 3.2 G/DL
ALP SERPL-CCNC: 73 U/L
ALT SERPL W/O P-5'-P-CCNC: 15 U/L
ANION GAP SERPL CALC-SCNC: 9 MMOL/L
AST SERPL-CCNC: 28 U/L
BASOPHILS # BLD AUTO: 0 K/UL
BASOPHILS NFR BLD: 0 %
BILIRUB SERPL-MCNC: 2.1 MG/DL
BUN SERPL-MCNC: 37 MG/DL
CALCIUM SERPL-MCNC: 9.3 MG/DL
CHLORIDE SERPL-SCNC: 102 MMOL/L
CO2 SERPL-SCNC: 29 MMOL/L
CREAT SERPL-MCNC: 2.4 MG/DL
DIFFERENTIAL METHOD: ABNORMAL
EOSINOPHIL # BLD AUTO: 0.1 K/UL
EOSINOPHIL NFR BLD: 2.8 %
ERYTHROCYTE [DISTWIDTH] IN BLOOD BY AUTOMATED COUNT: ABNORMAL %
EST. GFR  (AFRICAN AMERICAN): 22.7 ML/MIN/1.73 M^2
EST. GFR  (NON AFRICAN AMERICAN): 19.7 ML/MIN/1.73 M^2
GLUCOSE SERPL-MCNC: 95 MG/DL
HCT VFR BLD AUTO: 28.3 %
HGB BLD-MCNC: 9.5 G/DL
LYMPHOCYTES # BLD AUTO: 1.2 K/UL
LYMPHOCYTES NFR BLD: 42.8 %
MCH RBC QN AUTO: 30.7 PG
MCHC RBC AUTO-ENTMCNC: 33.6 %
MCV RBC AUTO: 92 FL
MONOCYTES # BLD AUTO: 0.3 K/UL
MONOCYTES NFR BLD: 11.7 %
NEUTROPHILS # BLD AUTO: 1.2 K/UL
NEUTROPHILS NFR BLD: 42.7 %
PLATELET # BLD AUTO: 132 K/UL
PMV BLD AUTO: ABNORMAL FL
POTASSIUM SERPL-SCNC: 3.4 MMOL/L
PROT SERPL-MCNC: 7 G/DL
RBC # BLD AUTO: 3.09 M/UL
SODIUM SERPL-SCNC: 140 MMOL/L
WBC # BLD AUTO: 2.9 K/UL

## 2017-04-07 PROCEDURE — 99232 SBSQ HOSP IP/OBS MODERATE 35: CPT | Mod: ,,, | Performed by: HOSPITALIST

## 2017-04-07 PROCEDURE — 85025 COMPLETE CBC W/AUTO DIFF WBC: CPT

## 2017-04-07 PROCEDURE — 36415 COLL VENOUS BLD VENIPUNCTURE: CPT

## 2017-04-07 PROCEDURE — 94640 AIRWAY INHALATION TREATMENT: CPT

## 2017-04-07 PROCEDURE — 63600175 PHARM REV CODE 636 W HCPCS: Performed by: HOSPITALIST

## 2017-04-07 PROCEDURE — 80053 COMPREHEN METABOLIC PANEL: CPT

## 2017-04-07 PROCEDURE — 20600001 HC STEP DOWN PRIVATE ROOM

## 2017-04-07 PROCEDURE — 25000003 PHARM REV CODE 250: Performed by: HOSPITALIST

## 2017-04-07 PROCEDURE — 86580 TB INTRADERMAL TEST: CPT | Performed by: HOSPITALIST

## 2017-04-07 PROCEDURE — 25000242 PHARM REV CODE 250 ALT 637 W/ HCPCS: Performed by: HOSPITALIST

## 2017-04-07 RX ORDER — POTASSIUM CHLORIDE 20 MEQ/1
40 TABLET, EXTENDED RELEASE ORAL ONCE
Status: COMPLETED | OUTPATIENT
Start: 2017-04-07 | End: 2017-04-07

## 2017-04-07 RX ORDER — CARVEDILOL 12.5 MG/1
12.5 TABLET ORAL 2 TIMES DAILY
Status: DISCONTINUED | OUTPATIENT
Start: 2017-04-07 | End: 2017-04-08

## 2017-04-07 RX ORDER — QUETIAPINE FUMARATE 200 MG/1
200 TABLET, FILM COATED ORAL NIGHTLY
Status: DISCONTINUED | OUTPATIENT
Start: 2017-04-07 | End: 2017-04-13 | Stop reason: HOSPADM

## 2017-04-07 RX ORDER — FLUTICASONE PROPIONATE AND SALMETEROL 250; 50 UG/1; UG/1
1 POWDER RESPIRATORY (INHALATION) 2 TIMES DAILY
Refills: 0
Start: 2017-04-07 | End: 2018-04-23

## 2017-04-07 RX ORDER — FUROSEMIDE 40 MG/1
40 TABLET ORAL 2 TIMES DAILY
Qty: 60 TABLET | Refills: 2 | Status: SHIPPED | OUTPATIENT
Start: 2017-04-07 | End: 2017-04-13

## 2017-04-07 RX ORDER — FUROSEMIDE 40 MG/1
40 TABLET ORAL 2 TIMES DAILY
Status: DISCONTINUED | OUTPATIENT
Start: 2017-04-07 | End: 2017-04-10

## 2017-04-07 RX ORDER — ISOSORBIDE DINITRATE AND HYDRALAZINE HYDROCHLORIDE 37.5; 2 MG/1; MG/1
2 TABLET ORAL 3 TIMES DAILY
Qty: 180 TABLET | Refills: 2 | Status: ON HOLD | OUTPATIENT
Start: 2017-04-07 | End: 2018-02-06 | Stop reason: HOSPADM

## 2017-04-07 RX ORDER — CARVEDILOL 12.5 MG/1
12.5 TABLET ORAL 2 TIMES DAILY
Status: DISCONTINUED | OUTPATIENT
Start: 2017-04-07 | End: 2017-04-07

## 2017-04-07 RX ADMIN — HYDRALAZINE HYDROCHLORIDE AND ISOSORBIDE DINITRATE 2 TABLET: 37.5; 2 TABLET, FILM COATED ORAL at 05:04

## 2017-04-07 RX ADMIN — HEPARIN SODIUM 5000 UNITS: 5000 INJECTION, SOLUTION INTRAVENOUS; SUBCUTANEOUS at 01:04

## 2017-04-07 RX ADMIN — GUAIFENESIN 600 MG: 600 TABLET, EXTENDED RELEASE ORAL at 08:04

## 2017-04-07 RX ADMIN — ALLOPURINOL 100 MG: 100 TABLET ORAL at 08:04

## 2017-04-07 RX ADMIN — ALBUTEROL SULFATE 2.5 MG: 2.5 SOLUTION RESPIRATORY (INHALATION) at 09:04

## 2017-04-07 RX ADMIN — FLUTICASONE PROPIONATE 2 SPRAY: 50 SPRAY, METERED NASAL at 08:04

## 2017-04-07 RX ADMIN — SODIUM BICARBONATE 650 MG TABLET 1300 MG: at 08:04

## 2017-04-07 RX ADMIN — RAMELTEON 8 MG: 8 TABLET, FILM COATED ORAL at 11:04

## 2017-04-07 RX ADMIN — POTASSIUM CHLORIDE 40 MEQ: 1500 TABLET, EXTENDED RELEASE ORAL at 01:04

## 2017-04-07 RX ADMIN — GUAIFENESIN 600 MG: 600 TABLET, EXTENDED RELEASE ORAL at 10:04

## 2017-04-07 RX ADMIN — HYDRALAZINE HYDROCHLORIDE AND ISOSORBIDE DINITRATE 2 TABLET: 37.5; 2 TABLET, FILM COATED ORAL at 10:04

## 2017-04-07 RX ADMIN — QUETIAPINE FUMARATE 200 MG: 200 TABLET, FILM COATED ORAL at 10:04

## 2017-04-07 RX ADMIN — CARVEDILOL 12.5 MG: 12.5 TABLET, FILM COATED ORAL at 05:04

## 2017-04-07 RX ADMIN — POLYETHYLENE GLYCOL 3350 17 G: 17 POWDER, FOR SOLUTION ORAL at 08:04

## 2017-04-07 RX ADMIN — HEPARIN SODIUM 5000 UNITS: 5000 INJECTION, SOLUTION INTRAVENOUS; SUBCUTANEOUS at 10:04

## 2017-04-07 RX ADMIN — FUROSEMIDE 80 MG: 10 INJECTION, SOLUTION INTRAMUSCULAR; INTRAVENOUS at 08:04

## 2017-04-07 RX ADMIN — Medication 3 ML: at 10:04

## 2017-04-07 RX ADMIN — FUROSEMIDE 40 MG: 40 TABLET ORAL at 05:04

## 2017-04-07 RX ADMIN — HYDRALAZINE HYDROCHLORIDE AND ISOSORBIDE DINITRATE 2 TABLET: 37.5; 2 TABLET, FILM COATED ORAL at 01:04

## 2017-04-07 RX ADMIN — FLUTICASONE FUROATE AND VILANTEROL TRIFENATATE 1 PUFF: 100; 25 POWDER RESPIRATORY (INHALATION) at 08:04

## 2017-04-07 RX ADMIN — HEPARIN SODIUM 5000 UNITS: 5000 INJECTION, SOLUTION INTRAVENOUS; SUBCUTANEOUS at 05:04

## 2017-04-07 RX ADMIN — BENZTROPINE MESYLATE 0.5 MG: 0.5 TABLET ORAL at 10:04

## 2017-04-07 RX ADMIN — SODIUM BICARBONATE 650 MG TABLET 1300 MG: at 10:04

## 2017-04-07 RX ADMIN — LEVOTHYROXINE SODIUM 75 MCG: 75 TABLET ORAL at 05:04

## 2017-04-07 RX ADMIN — Medication 3 ML: at 01:04

## 2017-04-07 RX ADMIN — GABAPENTIN 200 MG: 100 CAPSULE ORAL at 08:04

## 2017-04-07 RX ADMIN — Medication 3 ML: at 06:04

## 2017-04-07 RX ADMIN — Medication 5 UNITS: at 09:04

## 2017-04-07 RX ADMIN — ASPIRIN 81 MG CHEWABLE TABLET 81 MG: 81 TABLET CHEWABLE at 08:04

## 2017-04-07 NOTE — PROGRESS NOTES
[facility] Hospital Medicine  Progress Note    Patient Name: Anai Sal  MRN: [id number]  Patient Class: IP- Inpatient   Admission Date: 4/4/2017  Length of Stay: 2 days  Attending Physician: Tadeo Spain MD  Primary Care Provider: Live Young MD    [facility] Medicine Team: [facility] HOSP MED C Tadeo Spain MD    Subjective:     Principal Problem:Acute on chronic systolic congestive heart failure    HPI:72 y/o AAF hx of CHF ef 20%, HTN, hx Breast CA, CAD, HCV presents with dyspnea and slurred speech. Patient is seen in [facility] clinic and per ED notes, patient with worsening dyspnea this morning and also hypoxia on room air, slurred speech and acute encephalopathy. She has had recent admission to hospital medicine for management of CHF exacerbation and was discharged on lasix 20mg BID.      Per ED provider who spoke with patients daughter (Tershone [phone]), PACE clinic had increased patient to 60mg PO lasix over the past week. The patient does not manage her medications and is unable to provide additional history     On interview of the patient, she lives with daughter, performs most ADL, she does not use oxygen at home and denies active chest pain, has some dyspnea and reports she has fluid on her legs. She is oriented to person/hospital/president, city/state. She is NOT oriented to Day/Date/Month/Year.     Hospital Course:  Patient admitted, started on IV diuresis with 80mg lasix BID, started on bidil for additional anti-hypertensive control.      Interval History: Patient w/o acute complaints, reports urinating a lot over night, she is on room air this morning    Review of Systems   Constitutional: Negative for fever.   HENT: Negative for sore throat.    Cardiovascular: Negative for chest pain.   Gastrointestinal: Positive for constipation. Negative for abdominal distention and abdominal pain.     Objective:     Vital Signs (Most Recent):  Temp: 98.5 °F (36.9 °C) (04/06/17  1600)  Pulse: 84 (04/06/17 1900)  Resp: 20 (04/06/17 1600)  BP: 138/62 (04/06/17 1600)  SpO2: 96 % (04/06/17 1600) Vital Signs (24h Range):  Temp:  [98 °F (36.7 °C)-98.7 °F (37.1 °C)] 98.5 °F (36.9 °C)  Pulse:  [71-84] 84  Resp:  [16-20] 20  SpO2:  [94 %-97 %] 96 %  BP: (125-176)/(60-89) 138/62     Weight: 84.8 kg (187 lb)  Body mass index is 30.18 kg/(m^2).    Intake/Output Summary (Last 24 hours) at 04/06/17 1926  Last data filed at 04/06/17 1800   Gross per 24 hour   Intake              855 ml   Output             4350 ml   Net            -3495 ml      Physical Exam   Constitutional: She is oriented to person, place, and time.   Eyes: Conjunctivae are normal.   Neck: Normal range of motion.   Upper airway wheezes heard on auscultation   Cardiovascular: Normal rate, regular rhythm and normal heart sounds.    Pulmonary/Chest: Effort normal. She has no wheezes. She has no rales.   Fewer crackles, heard at bases on pulmonary exam   Abdominal: Soft. Bowel sounds are normal. She exhibits no distension. There is no tenderness. There is no guarding.   Neurological: She is alert and oriented to person, place, and time.       Significant Labs:   BMP:     Recent Labs  Lab 04/06/17  0848   GLU 89      K 3.6      CO2 27   BUN 38*   CREATININE 2.3*   CALCIUM 9.5   MG 2.0     CBC:     Recent Labs  Lab 04/05/17  0429 04/06/17  0848   WBC 2.97* 2.65*   HGB 8.0* 9.8*   HCT 23.9* 28.9*   * 168          Assessment/Plan:      1. Acute on chronic systolic heart failure exacerbation/ Essential Hypertension/ Severe Mitral regurgitation  -continue IV lasix,   -Titrate Bidil today   -PT/OT orders - Recommend SNF. Discussed with Daughter at bedside, informed case management  -For mitral regurgitation, patient to have lab workup placed by Dr. Costa's nurse for evaluation for possible mitraclip candidate. Consult to interventional cardiology placed and CT surgery contacted to evaluate patient.      2. CKD stage 4  - renal  dosing of medications.   - continue PO bicarb      3. Acute encephalopathy   -resolved  - s/p CT head w/o signs of hemorrhage or ischemia  -mental status improved again, daughter at bedside and she concurs that mother appears at baseline.      4. Hypothyroidism  -patient with elevated TSH (21), on low dose leveothyroxine t4 wnl  -titrate levothyroxine to 75mcg     5. COPD  - LABA/ICS advair replaced with breo  - schedule PRN nebs while awake     6. Constipation  -scheduled bowel regimen ordered.          DVT ppx - heparin sq  Code status - Full code, presumed due to encephalopathy.     Active Diagnoses:    Diagnosis Date Noted POA    PRINCIPAL PROBLEM:  Acute on chronic systolic congestive heart failure [I50.23] 04/04/2017 Yes    Stage 4 chronic kidney disease [N18.4] 11/11/2016 Yes    Essential hypertension [I10] 12/16/2016 Yes    Primary hypothyroidism [E03.9] 12/16/2016 Yes    COPD (chronic obstructive pulmonary disease) [J44.9] 04/04/2017 Yes    Leukopenia [D72.819] 04/04/2017 Yes    Encephalopathy acute [G93.40] 04/04/2017 Yes      Problems Resolved During this Admission:    Diagnosis Date Noted Date Resolved POA     VTE Risk Mitigation         Ordered     heparin (porcine) injection 5,000 Units  Every 8 hours     Route:  Subcutaneous        04/04/17 1621     Place sequential compression device  Until discontinued      04/04/17 1621          Tadeo Spain MD  Department of Hospital Medicine   [facility]

## 2017-04-07 NOTE — PLAN OF CARE
Problem: Patient Care Overview  Goal: Plan of Care Review  Outcome: Ongoing (interventions implemented as appropriate)  POC reviewed with patient's daughter on the phone with her verbalizing understanidng. VSS and patient denies presence of pain. Patient has remained free of falls and injuries with fall bundle implemented. Patient to be transitioned from IV push Lasix to PO and continues ton diurese well. TB skin test administered to left forearm and to be read 4/9-4/10. Patient's behavior and responses became altered during shift, MD notified, patient placed on Delirium precautions, and adjustments to Psych medications will be made. Patient in no apparent sign of distress, will continue to monitor.

## 2017-04-07 NOTE — PLAN OF CARE
Problem: Patient Care Overview  Goal: Plan of Care Review  Outcome: Ongoing (interventions implemented as appropriate)  Plan of care discussed with pt. Pt free of falls/trauma/injuries this shift. Pt diuresing well with Lasix IVP.SHIMON midline in place dressing CDI-flushes/draws back. Interventional cards c/s for evaluation of mitral clip. VSS & NADN. Pt denies CP, SOB, or pain/discomfort at this time. All questions addressed.  Will continue to monitor.

## 2017-04-07 NOTE — NURSING
Per Delirium precautions RN turned on lights in room, opened blinds, walked patient around unit, and assisted her to the chair. Patient in no apparent sign of distress, will continue to monitor and implement delirium precautions.

## 2017-04-07 NOTE — PLAN OF CARE
[facility]    HOME HEALTH ORDERS  FACE TO FACE ENCOUNTER    Patient Name: Anai Sal  YOB: 1945    PCP: Live Young MD   PCP Address: [address] [place] [id number]  PCP Phone Number: [phone]  PCP Fax: [phone]    Encounter Date: 04/07/2017    Admit to Home Health    Diagnoses:  Active Hospital Problems    Diagnosis  POA    *Acute on chronic systolic congestive heart failure [I50.23]  Yes     Priority: 1 - High    Stage 4 chronic kidney disease [N18.4]  Yes     Priority: 2     Essential hypertension [I10]  Yes     Priority: 3     Primary hypothyroidism [E03.9]  Yes     Priority: 4     COPD (chronic obstructive pulmonary disease) [J44.9]  Yes     Priority: 5     Leukopenia [D72.819]  Yes     Priority: 6     Encephalopathy acute [G93.40]  Yes     Priority: 7       Resolved Hospital Problems    Diagnosis Date Resolved POA   No resolved problems to display.       Future Appointments  Date Time Provider Department [place]   4/12/2017 9:05 AM LAB, APPOINTMENT [facility] LAB [facility]   4/12/2017 10:30 AM Len Live MD [facility] HEARTTX [facility]   5/1/2017 10:30 AM LAB, APPOINTMENT [facility] LAB [facility]   5/1/2017 10:35 AM SPECIMEN, [facility] SPECLAB [facility]   5/2/2017 11:00 AM Kamila Hughes MD [facility] PULMSVC [facility]   5/10/2017 11:40 AM Bud Benoit MD [facility] NEPHRO [facility]   5/15/2017 9:30 AM ECHO, [facility] ECHOLAB [facility]   5/15/2017 10:40 AM Hussein Costa MD [facility] CARDVAL [facility]     Follow-up Information     Follow up with [facility] On 4/12/2017.    Specialty:  Lab    Why:  09:05 labs    Contact information:    [address]  [facility] [address] [phone]    Additional information:    2nd Floor        Follow up with Len Live MD On 4/12/2017.    Specialties:  Cardiology, Transplant    Why:  10:30 appointment    Contact information:     [address]  [facility] [address]  [phone]              I have seen and examined this patient face to face today. My clinical findings that support the need for the home health skilled services and home bound status are the following:  Weakness/numbness causing balance and gait disturbance due to Heart Failure making it taxing to leave home.  Mental confusion making it unsafe for patient to leave home alone due to  Acute Delirium.    Allergies:Review of patient's allergies indicates:  No Known Allergies    Diet: cardiac diet and 2 gram sodium diet    Activities: activity as tolerated    Nursing:   SN to complete comprehensive assessment including routine vital signs. Instruct on disease process and s/s of complications to report to MD. Review/verify medication list sent home with the patient at time of discharge  and instruct patient/caregiver as needed. Frequency may be adjusted depending on start of care date.    Notify MD if SBP > 160 or < 90; DBP > 90 or < 50; HR > 120 or < 50; Temp > 101; Other:         CONSULTS:    Physical Therapy to evaluate and treat. Evaluate for home safety and equipment needs; Establish/upgrade home exercise program. Perform / instruct on therapeutic exercises, gait training, transfer training, and Range of Motion.  Occupational Therapy to evaluate and treat. Evaluate home environment for safety and equipment needs. Perform/Instruct on transfers, ADL training, ROM, and therapeutic exercises.  Aide to provide assistance with personal care, ADLs, and vital signs.    MISCELLANEOUS CARE:  Heart Failure:      SN to instruct on the following:    Instruct on the definition of CHF.   Instruct on the signs/sympoms of CHF to be reported.   Instruct on and monitor daily weights.   Instruct on factors that cause exacerbation.   Instruct on action, dose, schedule, and side effects of medications.   Instruct on diet as prescribed.   Instruct on activity allowed.   Instruct on life-style  modifications for life long management of CHF   SN to assess compliance with daily weights, diet, medications, fluid retention,    safety precautions, activities permitted and life-style modifications.   Additional 1-2 SN visits per week as needed for signs and symptoms     of CHF exacerbation.      For Weight Gain > 2-3 lbs in 1 day or 4-6 lbs over 1 week notify PCP:  Increase Lasix(furosemide) 40 mg (oral diuretic) dose to 80mg PO BID for 5 days temporarily  Obtain BMP lab test in 3 days  If weight does not decrease by 2-3 lbs after 5 days of increased diuretic usage: Notify PCP.    WOUND CARE ORDERS  n/a      Medications: Review discharge medications with patient and family and provide education.      Current Discharge Medication List      START taking these medications    Details   fluticasone-salmeterol 250-50 mcg/dose (ADVAIR) 250-50 mcg/dose diskus inhaler Inhale 1 puff into the lungs 2 (two) times daily. Controller  Refills: 0      isosorbide-hydrALAZINE 20-37.5 mg (BIDIL) 20-37.5 mg Tab Take 2 tablets by mouth 3 (three) times daily.  Qty: 180 tablet, Refills: 2         CONTINUE these medications which have CHANGED    Details   furosemide (LASIX) 40 MG tablet Take 1 tablet (40 mg total) by mouth 2 (two) times daily.  Qty: 60 tablet, Refills: 2    Associated Diagnoses: CKD (chronic kidney disease) stage 3, GFR 30-59 ml/min; Proteinuria; Edema, unspecified type         CONTINUE these medications which have NOT CHANGED    Details   allopurinol (ZYLOPRIM) 100 MG tablet Take 100 mg by mouth 2 (two) times daily.       aspirin 81 MG Chew Take 1 tablet (81 mg total) by mouth once daily.  Refills: 0      benztropine (COGENTIN) 0.5 MG tablet Take 1 tablet (0.5 mg total) by mouth nightly.  Qty: 30 tablet, Refills: 0      carvedilol (COREG) 12.5 MG tablet Take 1 tablet (12.5 mg total) by mouth 2 (two) times daily.  Qty: 60 tablet, Refills: 1      diclofenac sodium 1 % Gel Apply 4 g topically 4 (four) times daily. Prn  pain      gabapentin (NEURONTIN) 300 MG capsule Take 300 mg by mouth 3 (three) times daily as needed (for nerve pain).       guaifenesin (MUCINEX) 600 mg 12 hr tablet Take 1 tablet (600 mg total) by mouth 2 (two) times daily.      levothyroxine (SYNTHROID) 50 MCG tablet Take 1 tablet (50 mcg total) by mouth before breakfast.  Qty: 30 tablet, Refills: 11      melatonin 3 mg Tab Take by mouth every evening.      quetiapine (SEROQUEL) 200 MG Tab Take 1 tablet (200 mg total) by mouth nightly.  Qty: 30 tablet, Refills: 0      sodium bicarbonate 650 MG tablet Take 2 tablets (1,300 mg total) by mouth 2 (two) times daily.  Qty: 360 tablet, Refills: 3    Associated Diagnoses: CKD (chronic kidney disease) stage 3, GFR 30-59 ml/min; Metabolic acidosis      betamethasone valerate 0.1% (VALISONE) 0.1 % Lotn Apply topically once daily. 2 drops both ears once daily  Qty: 1 Bottle, Refills: 3      fluticasone (FLONASE) 50 mcg/actuation nasal spray 2 sprays by Each Nare route once daily.  Qty: 1 Bottle, Refills: 3         STOP taking these medications       valsartan (DIOVAN) 320 MG tablet Comments:   Reason for Stopping:               I certify that this patient is confined to her home and needs intermittent skilled nursing care, physical therapy and occupational therapy.

## 2017-04-07 NOTE — PROGRESS NOTES
[facility] Hospital Medicine  Progress Note    Patient Name: Anai Sal  MRN: [id number]  Patient Class: IP- Inpatient   Admission Date: 4/4/2017  Length of Stay: 3 days  Attending Physician: Tadeo Spain MD  Primary Care Provider: Live Young MD    [facility] Medicine Team: [facility] HOSP MED C Tadeo Spain MD    Subjective:     Principal Problem:Acute on chronic systolic congestive heart failure    HPI:70 y/o AAF hx of CHF ef 20%, HTN, hx Breast CA, CAD, HCV presents with dyspnea and slurred speech. Patient is seen in PACE clinic and per ED notes, patient with worsening dyspnea this morning and also hypoxia on room air, slurred speech and acute encephalopathy. She has had recent admission to hospital medicine for management of CHF exacerbation and was discharged on lasix 20mg BID.      Per ED provider who spoke with patients daughter (Tershone [phone]), PACE clinic had increased patient to 60mg PO lasix over the past week. The patient does not manage her medications and is unable to provide additional history     On interview of the patient, she lives with daughter, performs most ADL, she does not use oxygen at home and denies active chest pain, has some dyspnea and reports she has fluid on her legs. She is oriented to person/hospital/president, city/state. She is NOT oriented to Day/Date/Month/Year.     Hospital Course:  Patient admitted, started on IV diuresis with 80mg lasix BID, started on bidil for additional anti-hypertensive control.      Interval History:  Patient with some confusion this morning, she states she slept overnight, but states she is unclear why she is in the hospital.  Patient was evaluated by CT surgery due to Mitral regurgitation, and patient did not understand what that meant.     Also nursing has some concerns if patient is developing delirium.  Last night I re-initiated seroquel but at a half dose.     Review of Systems   Constitutional: Negative for  fever.   HENT: Negative for sore throat.    Cardiovascular: Negative for chest pain.   Gastrointestinal: Positive for constipation. Negative for abdominal distention and abdominal pain.     Objective:     Vital Signs (Most Recent):  Temp: 98.3 °F (36.8 °C) (04/07/17 1600)  Pulse: 81 (04/07/17 1600)  Resp: 18 (04/07/17 1600)  BP: (!) 151/72 (04/07/17 1615)  SpO2: 99 % (04/07/17 1600) Vital Signs (24h Range):  Temp:  [97.5 °F (36.4 °C)-98.6 °F (37 °C)] 98.3 °F (36.8 °C)  Pulse:  [] 81  Resp:  [16-22] 18  SpO2:  [96 %-99 %] 99 %  BP: (139-177)/(67-90) 151/72     Weight: 76.4 kg (168 lb 6.9 oz)  Body mass index is 27.19 kg/(m^2).    Intake/Output Summary (Last 24 hours) at 04/07/17 1733  Last data filed at 04/07/17 1500   Gross per 24 hour   Intake              700 ml   Output             4150 ml   Net            -3450 ml      Physical Exam   Constitutional: She is oriented to person, place, and time.   Eyes: Conjunctivae are normal.   Neck: Normal range of motion.   Upper airway wheezes decreased   Cardiovascular: Normal rate, regular rhythm and normal heart sounds.    Pulmonary/Chest: Effort normal. She has no wheezes. She has no rales.   Fewer crackles, heard at bases on pulmonary exam   Abdominal: Soft. Bowel sounds are normal. She exhibits no distension. There is no tenderness. There is no guarding.   Neurological: She is alert and oriented to person, place, and time.       Significant Labs:   BMP:     Recent Labs  Lab 04/06/17  0848 04/07/17  0600   GLU 89 95    140   K 3.6 3.4*    102   CO2 27 29   BUN 38* 37*   CREATININE 2.3* 2.4*   CALCIUM 9.5 9.3   MG 2.0  --      CBC:     Recent Labs  Lab 04/06/17  0848 04/07/17  0600   WBC 2.65* 2.90*   HGB 9.8* 9.5*   HCT 28.9* 28.3*    132*          Assessment/Plan:      1. Acute on chronic systolic heart failure exacerbation/ Essential Hypertension/   -transition to PO lasix  -Continue BIDIL, Restart Coreg    -Daughter is not interested in SNF  placement. Will plan to discharge home tomorrow with Home Health   -Has f/u with [facility] Dr. Live (4/12/17)      Severe Mitral regurgitation  --For mitral regurgitation, patient to have lab workup placed by Dr. Costa's nurse for evaluation for possible mitraclip candidate. Consult to interventional cardiology placed   -CT surgery contacted, they do not think patient is surgical candidate  -Will be seen as outpatient by Dr. Costa for mitraclip evaluation.      2. CKD stage 4  - renal dosing of medications.   - continue PO bicarb      3. Acute encephalopathy   -resolved      4. Hypothyroidism  -patient with elevated TSH (21), on low dose leveothyroxine t4 wnl  -titrate levothyroxine to 75mcg     5. COPD  - LABA/ICS advair replaced with breo  -d/c scheduled nebs    6. Constipation  -scheduled bowel regimen ordered.     7. Delirium  -restarted seroquel yesterday and have titrated to home dose today  -Delirium precautions placed     DVT ppx - heparin sq  Code status - Full code,     Active Diagnoses:    Diagnosis Date Noted POA    PRINCIPAL PROBLEM:  Acute on chronic systolic congestive heart failure [I50.23] 04/04/2017 Yes    Stage 4 chronic kidney disease [N18.4] 11/11/2016 Yes    Essential hypertension [I10] 12/16/2016 Yes    Primary hypothyroidism [E03.9] 12/16/2016 Yes    COPD (chronic obstructive pulmonary disease) [J44.9] 04/04/2017 Yes    Leukopenia [D72.819] 04/04/2017 Yes      Problems Resolved During this Admission:    Diagnosis Date Noted Date Resolved POA    Encephalopathy acute [G93.40] 04/04/2017 04/07/2017 Yes     VTE Risk Mitigation         Ordered     heparin (porcine) injection 5,000 Units  Every 8 hours     Route:  Subcutaneous        04/04/17 1621     Place sequential compression device  Until discontinued      04/04/17 1621          Tadeo Spain MD  Department of Hospital Medicine   [facility]

## 2017-04-07 NOTE — NURSING
Notified MD Spain that patient's behavior has changed. RN administering afternoon medications and patient attempted to use fork to take pills, patient started eating sugar packets, and responses to questions asked by RN where not appropriate. MD informed RN to place patient under delirium precautions and continue to monitor behavior. MD taking a look at psych medications and will make adjustments as needed. Patient in no apparent sign of distress, will continue to monitor and implement orders as they come in.

## 2017-04-07 NOTE — PLAN OF CARE
spoke to pts daughter per telephone conversation.  Pt is an alert lady who lives with her daughter and plans to return.  Pt is a PACE pt.  Daughter declines skilled nursing and would like pt to resume her care at home with home health.  Pt is currently being followed by Allen dawson.   contacted cl at [phone].  Left message with Heidi smallwood  coordinator .  sw will follow.

## 2017-04-08 PROBLEM — I48.0 PAROXYSMAL ATRIAL FIBRILLATION: Status: ACTIVE | Noted: 2017-04-08

## 2017-04-08 LAB
ALBUMIN SERPL BCP-MCNC: 3.1 G/DL
ALP SERPL-CCNC: 77 U/L
ALT SERPL W/O P-5'-P-CCNC: 14 U/L
ANION GAP SERPL CALC-SCNC: 13 MMOL/L
APTT BLDCRRT: 36.5 SEC
AST SERPL-CCNC: 24 U/L
BASOPHILS # BLD AUTO: 0.01 K/UL
BASOPHILS NFR BLD: 0.2 %
BILIRUB SERPL-MCNC: 2.2 MG/DL
BILIRUB UR QL STRIP: NEGATIVE
BUN SERPL-MCNC: 36 MG/DL
CALCIUM SERPL-MCNC: 9.5 MG/DL
CHLORIDE SERPL-SCNC: 102 MMOL/L
CLARITY UR REFRACT.AUTO: CLEAR
CO2 SERPL-SCNC: 29 MMOL/L
COLOR UR AUTO: YELLOW
CREAT SERPL-MCNC: 2.6 MG/DL
CREAT UR-MCNC: 43 MG/DL
DIFFERENTIAL METHOD: ABNORMAL
EOSINOPHIL # BLD AUTO: 0.1 K/UL
EOSINOPHIL NFR BLD: 1.6 %
ERYTHROCYTE [DISTWIDTH] IN BLOOD BY AUTOMATED COUNT: 17 %
EST. GFR  (AFRICAN AMERICAN): 20.6 ML/MIN/1.73 M^2
EST. GFR  (NON AFRICAN AMERICAN): 17.9 ML/MIN/1.73 M^2
FACT X PPP CHRO-ACNC: 0.67 IU/ML
GLUCOSE SERPL-MCNC: 96 MG/DL
GLUCOSE UR QL STRIP: NEGATIVE
HCT VFR BLD AUTO: 31.3 %
HGB BLD-MCNC: 10.2 G/DL
HGB UR QL STRIP: NEGATIVE
KETONES UR QL STRIP: NEGATIVE
LEUKOCYTE ESTERASE UR QL STRIP: NEGATIVE
LYMPHOCYTES # BLD AUTO: 2 K/UL
LYMPHOCYTES NFR BLD: 45.8 %
MAGNESIUM SERPL-MCNC: 2 MG/DL
MCH RBC QN AUTO: 30.4 PG
MCHC RBC AUTO-ENTMCNC: 32.6 %
MCV RBC AUTO: 93 FL
MONOCYTES # BLD AUTO: 0.7 K/UL
MONOCYTES NFR BLD: 15.8 %
NEUTROPHILS # BLD AUTO: 1.6 K/UL
NEUTROPHILS NFR BLD: 36.4 %
NITRITE UR QL STRIP: NEGATIVE
PH UR STRIP: >8 [PH] (ref 5–8)
PLATELET # BLD AUTO: 190 K/UL
PMV BLD AUTO: 11.6 FL
POTASSIUM SERPL-SCNC: 3.4 MMOL/L
PROT SERPL-MCNC: 6.9 G/DL
PROT UR QL STRIP: ABNORMAL
RBC # BLD AUTO: 3.35 M/UL
SODIUM SERPL-SCNC: 144 MMOL/L
SP GR UR STRIP: 1.01 (ref 1–1.03)
TROPONIN I SERPL DL<=0.01 NG/ML-MCNC: 0.05 NG/ML
TROPONIN I SERPL DL<=0.01 NG/ML-MCNC: 0.06 NG/ML
URN SPEC COLLECT METH UR: ABNORMAL
UROBILINOGEN UR STRIP-ACNC: NEGATIVE EU/DL
UUN UR-MCNC: 169 MG/DL
WBC # BLD AUTO: 4.37 K/UL

## 2017-04-08 PROCEDURE — 80053 COMPREHEN METABOLIC PANEL: CPT

## 2017-04-08 PROCEDURE — 36415 COLL VENOUS BLD VENIPUNCTURE: CPT

## 2017-04-08 PROCEDURE — 82570 ASSAY OF URINE CREATININE: CPT

## 2017-04-08 PROCEDURE — 85730 THROMBOPLASTIN TIME PARTIAL: CPT

## 2017-04-08 PROCEDURE — 97530 THERAPEUTIC ACTIVITIES: CPT

## 2017-04-08 PROCEDURE — 84484 ASSAY OF TROPONIN QUANT: CPT

## 2017-04-08 PROCEDURE — 25000003 PHARM REV CODE 250: Performed by: HOSPITALIST

## 2017-04-08 PROCEDURE — 97116 GAIT TRAINING THERAPY: CPT

## 2017-04-08 PROCEDURE — 81003 URINALYSIS AUTO W/O SCOPE: CPT

## 2017-04-08 PROCEDURE — 93010 ELECTROCARDIOGRAM REPORT: CPT | Mod: 77,,, | Performed by: INTERNAL MEDICINE

## 2017-04-08 PROCEDURE — 99233 SBSQ HOSP IP/OBS HIGH 50: CPT | Mod: ,,, | Performed by: HOSPITALIST

## 2017-04-08 PROCEDURE — 84540 ASSAY OF URINE/UREA-N: CPT

## 2017-04-08 PROCEDURE — 83735 ASSAY OF MAGNESIUM: CPT

## 2017-04-08 PROCEDURE — 85025 COMPLETE CBC W/AUTO DIFF WBC: CPT

## 2017-04-08 PROCEDURE — 85520 HEPARIN ASSAY: CPT

## 2017-04-08 PROCEDURE — 93005 ELECTROCARDIOGRAM TRACING: CPT

## 2017-04-08 PROCEDURE — 20600001 HC STEP DOWN PRIVATE ROOM

## 2017-04-08 PROCEDURE — 93010 ELECTROCARDIOGRAM REPORT: CPT | Mod: ,,, | Performed by: INTERNAL MEDICINE

## 2017-04-08 RX ORDER — ASPIRIN 325 MG
325 TABLET ORAL ONCE
Status: COMPLETED | OUTPATIENT
Start: 2017-04-08 | End: 2017-04-08

## 2017-04-08 RX ORDER — CLOPIDOGREL 300 MG/1
300 TABLET, FILM COATED ORAL ONCE
Status: COMPLETED | OUTPATIENT
Start: 2017-04-08 | End: 2017-04-08

## 2017-04-08 RX ORDER — AMOXICILLIN 250 MG
1 CAPSULE ORAL DAILY
Status: DISCONTINUED | OUTPATIENT
Start: 2017-04-08 | End: 2017-04-13 | Stop reason: HOSPADM

## 2017-04-08 RX ORDER — HEPARIN SODIUM,PORCINE/D5W 25000/250
17 INTRAVENOUS SOLUTION INTRAVENOUS CONTINUOUS
Status: DISCONTINUED | OUTPATIENT
Start: 2017-04-08 | End: 2017-04-09

## 2017-04-08 RX ORDER — LACTULOSE 10 G/15ML
20 SOLUTION ORAL ONCE
Status: COMPLETED | OUTPATIENT
Start: 2017-04-08 | End: 2017-04-08

## 2017-04-08 RX ORDER — POTASSIUM CHLORIDE 20 MEQ/1
40 TABLET, EXTENDED RELEASE ORAL ONCE
Status: COMPLETED | OUTPATIENT
Start: 2017-04-08 | End: 2017-04-08

## 2017-04-08 RX ORDER — METOPROLOL TARTRATE 1 MG/ML
5 INJECTION, SOLUTION INTRAVENOUS ONCE
Status: COMPLETED | OUTPATIENT
Start: 2017-04-08 | End: 2017-04-08

## 2017-04-08 RX ORDER — METOPROLOL TARTRATE 25 MG/1
25 TABLET, FILM COATED ORAL 3 TIMES DAILY
Status: DISCONTINUED | OUTPATIENT
Start: 2017-04-08 | End: 2017-04-09

## 2017-04-08 RX ORDER — HEPARIN SODIUM 5000 [USP'U]/ML
5000 INJECTION, SOLUTION INTRAVENOUS; SUBCUTANEOUS EVERY 8 HOURS
Status: DISCONTINUED | OUTPATIENT
Start: 2017-04-08 | End: 2017-04-08

## 2017-04-08 RX ADMIN — METOPROLOL TARTRATE 25 MG: 25 TABLET ORAL at 09:04

## 2017-04-08 RX ADMIN — FLUTICASONE PROPIONATE 2 SPRAY: 50 SPRAY, METERED NASAL at 09:04

## 2017-04-08 RX ADMIN — GUAIFENESIN 600 MG: 600 TABLET, EXTENDED RELEASE ORAL at 09:04

## 2017-04-08 RX ADMIN — FLUTICASONE FUROATE AND VILANTEROL TRIFENATATE 1 PUFF: 100; 25 POWDER RESPIRATORY (INHALATION) at 09:04

## 2017-04-08 RX ADMIN — FUROSEMIDE 40 MG: 40 TABLET ORAL at 09:04

## 2017-04-08 RX ADMIN — ASPIRIN 81 MG CHEWABLE TABLET 81 MG: 81 TABLET CHEWABLE at 09:04

## 2017-04-08 RX ADMIN — SODIUM BICARBONATE 650 MG TABLET 1300 MG: at 09:04

## 2017-04-08 RX ADMIN — METOPROLOL TARTRATE 5 MG: 5 INJECTION INTRAVENOUS at 02:04

## 2017-04-08 RX ADMIN — QUETIAPINE FUMARATE 200 MG: 200 TABLET, FILM COATED ORAL at 09:04

## 2017-04-08 RX ADMIN — Medication 3 ML: at 02:04

## 2017-04-08 RX ADMIN — HYDRALAZINE HYDROCHLORIDE AND ISOSORBIDE DINITRATE 2 TABLET: 37.5; 2 TABLET, FILM COATED ORAL at 06:04

## 2017-04-08 RX ADMIN — ASPIRIN 325 MG ORAL TABLET 325 MG: 325 PILL ORAL at 06:04

## 2017-04-08 RX ADMIN — CLOPIDOGREL BISULFATE 300 MG: 300 TABLET, FILM COATED ORAL at 06:04

## 2017-04-08 RX ADMIN — POLYETHYLENE GLYCOL 3350 17 G: 17 POWDER, FOR SOLUTION ORAL at 09:04

## 2017-04-08 RX ADMIN — LEVOTHYROXINE SODIUM 75 MCG: 75 TABLET ORAL at 06:04

## 2017-04-08 RX ADMIN — HYDRALAZINE HYDROCHLORIDE AND ISOSORBIDE DINITRATE 2 TABLET: 37.5; 2 TABLET, FILM COATED ORAL at 09:04

## 2017-04-08 RX ADMIN — HYDRALAZINE HYDROCHLORIDE AND ISOSORBIDE DINITRATE 2 TABLET: 37.5; 2 TABLET, FILM COATED ORAL at 02:04

## 2017-04-08 RX ADMIN — LACTULOSE 20 G: 10 SOLUTION ORAL at 06:04

## 2017-04-08 RX ADMIN — Medication 3 ML: at 06:04

## 2017-04-08 RX ADMIN — HEPARIN SODIUM 5000 UNITS: 5000 INJECTION, SOLUTION INTRAVENOUS; SUBCUTANEOUS at 06:04

## 2017-04-08 RX ADMIN — STANDARDIZED SENNA CONCENTRATE AND DOCUSATE SODIUM 1 TABLET: 8.6; 5 TABLET, FILM COATED ORAL at 06:04

## 2017-04-08 RX ADMIN — GABAPENTIN 200 MG: 100 CAPSULE ORAL at 09:04

## 2017-04-08 RX ADMIN — BENZTROPINE MESYLATE 0.5 MG: 0.5 TABLET ORAL at 09:04

## 2017-04-08 RX ADMIN — HEPARIN SODIUM AND DEXTROSE 17 UNITS/KG/HR: 10000; 5 INJECTION INTRAVENOUS at 02:04

## 2017-04-08 RX ADMIN — CARVEDILOL 12.5 MG: 12.5 TABLET, FILM COATED ORAL at 09:04

## 2017-04-08 RX ADMIN — ALLOPURINOL 100 MG: 100 TABLET ORAL at 09:04

## 2017-04-08 RX ADMIN — FUROSEMIDE 40 MG: 40 TABLET ORAL at 06:04

## 2017-04-08 RX ADMIN — POTASSIUM CHLORIDE 40 MEQ: 1500 TABLET, EXTENDED RELEASE ORAL at 02:04

## 2017-04-08 RX ADMIN — METOPROLOL TARTRATE 25 MG: 25 TABLET ORAL at 06:04

## 2017-04-08 NOTE — PT/OT/SLP PROGRESS
Physical Therapy  Treatment    Anai Sal   MRN: [id number]   Admitting Diagnosis: Acute on chronic systolic congestive heart failure    PT Received On: 17  PT Start Time: 0850     PT Stop Time: 14    PT Total Time (min): 24 min       Billable Minutes:  Gait [id number] and Therapeutic Activity 10    Treatment Type: Treatment  PT/PTA: PTA     PTA Visit Number: 2       General Precautions: Standard, fall, hearing impaired  Orthopedic Precautions: N/A     Do you have any cultural, spiritual, Jewish conflicts, given your current situation?: none    Subjective:  Communicated with RN prior to session.  Pt agreeable to PT.      Pain Ratin/10     Pain Rating Post-Intervention: 0/10    Objective:   Patient found with: peripheral IV, telemetry    Functional Mobility:  Transfers:  Sit <> Stand Assistance: Contact Guard Assistance  Sit <> Stand Assistive Device: Rolling Walker    Gait:   Gait Distance: 120 feet  Assistance 1: Contact Guard Assistance  Gait Assistive Device: Rolling walker  Gait Pattern: reciprocal  Gait Deviation(s): decreased christi, decreased velocity of limb motion, decreased stride length, excessive knee flexion, decreased weight-shifting ability, forward lean    Stairs:  Pt ascended/descend 5 stair(s) with No Assistive Device with left with Contact Guard Assistance.     AM-PAC 6 CLICK MOBILITY  How much help from another person does this patient currently need?   1 = Unable, Total/Dependent Assistance  2 = A lot, Maximum/Moderate Assistance  3 = A little, Minimum/Contact Guard/Supervision  4 = None, Modified [place]/Independent    Turning over in bed (including adjusting bedclothes, sheets and blankets)?: 4  Sitting down on and standing up from a chair with arms (e.g., wheelchair, bedside commode, etc.): 3  Moving from lying on back to sitting on the side of the bed?: 4  Moving to and from a bed to a chair (including a wheelchair)?: 3  Need to walk in hospital room?: 3  Climbing 3-5  steps with a railing?: 3  Total Score: 20    AM-PAC Raw Score CMS G-Code Modifier Level of Impairment Assistance   6 % Total / Unable   7 - 9 CM 80 - 100% Maximal Assist   10 - 14 CL 60 - 80% Moderate Assist   15 - 19 CK 40 - 60% Moderate Assist   20 - 22 CJ 20 - 40% Minimal Assist   23 CI 1-20% SBA / CGA   24 CH 0% Independent/ Mod I     Patient left up in chair with all lines intact, call button in reach and RN notified.    Assessment:  Anai Sal is a 71 y.o. female with a medical diagnosis of Acute on chronic systolic congestive heart failure and presents with decreased functional mobility and impairments as listed below.  Increased fatigue and SOB noted today.    Rehab identified problem list/impairments: Rehab identified problem list/impairments: weakness, impaired endurance, impaired functional mobilty, gait instability, impaired self care skills, impaired cognition, decreased lower extremity function, decreased safety awareness, impaired fine motor, impaired cardiopulmonary response to activity    Rehab potential is good.    Activity tolerance: Fair    Discharge recommendations: Discharge Facility/Level Of Care Needs: nursing facility, skilled     Barriers to discharge: Barriers to Discharge: Decreased caregiver support    Equipment recommendations: Equipment Needed After Discharge: none     GOALS:   Physical Therapy Goals        Problem: Physical Therapy Goal    Goal Priority Disciplines Outcome Goal Variances Interventions   Physical Therapy Goal     PT/OT, PT Ongoing (interventions implemented as appropriate)     Description:  Goals to be met by: 17     Patient will increase functional independence with mobility by performin. Sit to stand transfer with [place]. - not met  2. Bed to chair transfer with Supervision using Rolling Walker.- not met  3. Gait  x 150 feet with Supervision using Rolling Walker. - not met  4. Ascend/descend 13 stair with right Handrails Supervision . -  not met  5. Lower extremity exercise program x 20 reps per handout, with assistance as needed.- not met                 PLAN:    Patient to be seen 4 x/week  to address the above listed problems via gait training, therapeutic activities, therapeutic exercises, neuromuscular re-education  Plan of Care expires: 05/05/17  Plan of Care reviewed with: patient    Lucero OSUNA Ania, PTA  04/08/2017

## 2017-04-08 NOTE — PROGRESS NOTES
[facility] Hospital Medicine  Progress Note    Patient Name: Anai Sal  MRN: [id number]  Patient Class: IP- Inpatient   Admission Date: 4/4/2017  Length of Stay: 4 days  Attending Physician: Tadeo Spain MD  Primary Care Provider: Live Young MD    [facility] Medicine Team: [facility] Tadeo Spain MD    Subjective:     Principal Problem:Acute on chronic systolic congestive heart failure    HPI:72 y/o AAF hx of CHF ef 20%, HTN, hx Breast CA, CAD, HCV presents with dyspnea and slurred speech. Patient is seen in [facility] clinic and per ED notes, patient with worsening dyspnea this morning and also hypoxia on room air, slurred speech and acute encephalopathy. She has had recent admission to hospital medicine for management of CHF exacerbation and was discharged on lasix 20mg BID.      Per ED provider who spoke with patients daughter (Tershone [phone]), PACE clinic had increased patient to 60mg PO lasix over the past week. The patient does not manage her medications and is unable to provide additional history     On interview of the patient, she lives with daughter, performs most ADL, she does not use oxygen at home and denies active chest pain, has some dyspnea and reports she has fluid on her legs. She is oriented to person/hospital/president, city/state. She is NOT oriented to Day/Date/Month/Year.     Hospital Course:  Patient admitted, started on IV diuresis with 80mg lasix BID, started on bidil for additional anti-hypertensive control.      Interval History:  Patient with some confusion this morning, she states she slept overnight, but states she is unclear why she is in the hospital.  Patient was evaluated by CT surgery due to Mitral regurgitation, and patient did not understand what that meant.     Also nursing has some concerns if patient is developing delirium.  Last night I re-initiated seroquel but at a half dose.     Review of Systems   Constitutional: Negative for  fever.   HENT: Negative for sore throat.    Cardiovascular: Negative for chest pain.   Gastrointestinal: Positive for constipation. Negative for abdominal distention and abdominal pain.     Objective:     Vital Signs (Most Recent):  Temp: 98.8 °F (37.1 °C) (04/08/17 1606)  Pulse: (!) 114 (04/08/17 1500)  Resp: 18 (04/08/17 1606)  BP: (!) 113/58 (04/08/17 1606)  SpO2: 96 % (04/08/17 1606) Vital Signs (24h Range):  Temp:  [97.7 °F (36.5 °C)-98.8 °F (37.1 °C)] 98.8 °F (37.1 °C)  Pulse:  [] 114  Resp:  [16-20] 18  SpO2:  [95 %-97 %] 96 %  BP: (112-140)/(57-83) 113/58     Weight: 74.7 kg (164 lb 10.9 oz)  Body mass index is 26.58 kg/(m^2).    Intake/Output Summary (Last 24 hours) at 04/08/17 1652  Last data filed at 04/08/17 1606   Gross per 24 hour   Intake           738.17 ml   Output             2400 ml   Net         -1661.83 ml      Physical Exam   Constitutional: She is oriented to person, place, and time.   Eyes: Conjunctivae are normal.   Neck: Normal range of motion.   Cardiovascular: Normal rate, regular rhythm and normal heart sounds.    Pulmonary/Chest: Effort normal. She has no wheezes. She has no rales.   Fewer crackles, heard at bases on pulmonary exam   Abdominal: Soft. Bowel sounds are normal. She exhibits no distension. There is no tenderness. There is no guarding.   Neurological: She is alert and oriented to person, place, and time.       Significant Labs:   BMP:     Recent Labs  Lab 04/08/17  0445 04/08/17  1413   GLU 96  --      --    K 3.4*  --      --    CO2 29  --    BUN 36*  --    CREATININE 2.6*  --    CALCIUM 9.5  --    MG  --  2.0     CBC:     Recent Labs  Lab 04/07/17  0600 04/08/17  0445   WBC 2.90* 4.37   HGB 9.5* 10.2*   HCT 28.3* 31.3*   * 190       Recent Labs  Lab 04/04/17  1029 04/04/17  1843 04/08/17  1413   TROPONINI 0.036* 0.030*  0.030* 0.053*            Assessment/Plan:      1. Acute on chronic systolic heart failure exacerbation/ Essential Hypertension/    -transition to PO lasix  -Continue BIDIL, stopping coreg per #2  -Daughter is not interested in SNF placement. Holding discharge re: #2   -Has f/u with HTS Dr. Live (4/12/17)    Atrial fibrillation vs flutter  -appears new onset.  EKG with irregular rhythm, some p-waves seen so maybe a-flutter  -troponin obtained, up from admission continue trending q6hrs for 2 checks.   -load with asa/plavix  -Start Heparin Drip - patient may be candidate for cardioversion as this started inpatient while on telemetry.   -Switch to Metoprolol from COreg, given one dose IV Metoprolol 5mg.   If beta blocker is not controlling rate, may need renal dose Digoxin or amiodarone  - Cardiology consult in AM.   -repeat CXR      Severe Mitral regurgitation  --For mitral regurgitation, patient to have lab workup placed by Dr. Costa's nurse for evaluation for possible mitraclip candidate. Consult to interventional cardiology placed   -CT surgery contacted, they do not think patient is surgical candidate  -Will be seen as outpatient by Dr. Costa for mitraclip evaluation.      2. CKD stage 4  - renal dosing of medications.   - continue PO bicarb      3. Delirium  -restarted home seroquel@ bedtime  -Delirium precautions placed  -Believe events this AM were intermittent confusion, wondering if patient may have some baseline dementia.       4. Hypothyroidism  -patient with elevated TSH (21), on low dose leveothyroxine t4 wnl  -titrate levothyroxine to 75mcg     5. COPD  - LABA/ICS advair replaced with breo  -d/c scheduled nebs    6. Constipation  -scheduled bowel regimen ordered. Lactulose x 1  Scheduling senna/docusate    7. Acute encephalopathy   -resolved         DVT ppx - heparin sq  Code status - Full code,     Active Diagnoses:    Diagnosis Date Noted POA    PRINCIPAL PROBLEM:  Acute on chronic systolic congestive heart failure [I50.23] 04/04/2017 Yes    Stage 4 chronic kidney disease [N18.4] 11/11/2016 Yes    Essential hypertension [I10]  12/16/2016 Yes    Primary hypothyroidism [E03.9] 12/16/2016 Yes    COPD (chronic obstructive pulmonary disease) [J44.9] 04/04/2017 Yes    Leukopenia [D72.819] 04/04/2017 Yes      Problems Resolved During this Admission:    Diagnosis Date Noted Date Resolved POA    Encephalopathy acute [G93.40] 04/04/2017 04/07/2017 Yes     VTE Risk Mitigation         Ordered     Medium Risk of VTE  Once      04/08/17 1600     Place sequential compression device  Until discontinued      04/08/17 1600     Place sequential compression device  Until discontinued      04/04/17 1621          Tadeo Spain MD  Department of Hospital Medicine   [facility]

## 2017-04-08 NOTE — PLAN OF CARE
Problem: Patient Care Overview  Goal: Plan of Care Review  Outcome: Ongoing (interventions implemented as appropriate)  Pt free of falls/traumas/injuries. Skin remains clean, dry, and intact. Pt continued on delirium precautions, bed alarm on. Pt re-oriented as needed.   Pt re-educated on importance of measuring accurate intake and out put; pt verbalized and demonstrates understanding. Reviewed plan of care with pt; and pt verbalized understanding.  Pt VSS in no distress will continue to monitor.

## 2017-04-08 NOTE — PROGRESS NOTES
Pt's HR between 118-130 rhythm is irregular. Pt is sleeping. Notified Dr. Spain. STAT EKG obtained. Notified MD of results. Will continue to monitor closely. Pt VSS; in no distress.

## 2017-04-08 NOTE — PLAN OF CARE
Problem: Patient Care Overview  Goal: Plan of Care Review  Outcome: Ongoing (interventions implemented as appropriate)  Plan of care discussed with pt. Pt free of falls/trauma/injuries this shift. Lasix PO. Pt on delirium precaution. Pt responses seem altered during shift. Daughter at bedside states her mother saying things that don't make sense at times. Lights and TV off, bed alarm on while pt sleeping. TB skin test(RFA) to be read on 4/9 @ 0954. VSS & NADN. Pt denies CP, SOB, or pain/discomfort at this time. All questions addressed.  Will continue to monitor.

## 2017-04-08 NOTE — PROGRESS NOTES
Notified Dr. Alvarado pt's HR continues to be 110-120, VSS; MD will readdress meds. Cards consult placed. Will continue to monitor.

## 2017-04-08 NOTE — CHAPLAIN
Provided care and support through pastoral conversation, and compassionate presence.  No spiritual issues or concerns came up during this visit.  Stacey Grafflain MRE

## 2017-04-08 NOTE — PLAN OF CARE
Problem: Physical Therapy Goal  Goal: Physical Therapy Goal  Goals to be met by: 17     Patient will increase functional independence with mobility by performin. Sit to stand transfer with [place]. - not met  2. Bed to chair transfer with Supervision using Rolling Walker.- not met  3. Gait x 150 feet with Supervision using Rolling Walker. - not met  4. Ascend/descend 13 stair with right Handrails Supervision . - not met  5. Lower extremity exercise program x 20 reps per handout, with assistance as needed.- not met   Pt continues to progress toward goals and  Goals remain appropriate at this time.   Lucero Jorge, PTA

## 2017-04-08 NOTE — PROGRESS NOTES
Pt very agitated this morning, convinced pt to take vital. Pt only oriented to self, notified Dr. Minerva ZAPIEN to come to assess pt. Pt VSS, in no distress. Bed alarm on. Working on obtaining NAVARRO.

## 2017-04-09 PROBLEM — I50.23 ACUTE ON CHRONIC SYSTOLIC CONGESTIVE HEART FAILURE: Status: RESOLVED | Noted: 2017-04-04 | Resolved: 2017-04-09

## 2017-04-09 LAB
ALBUMIN SERPL BCP-MCNC: 3 G/DL
ALP SERPL-CCNC: 69 U/L
ALT SERPL W/O P-5'-P-CCNC: 17 U/L
ANION GAP SERPL CALC-SCNC: 14 MMOL/L
AST SERPL-CCNC: 29 U/L
BASOPHILS # BLD AUTO: 0.01 K/UL
BASOPHILS NFR BLD: 0.2 %
BILIRUB SERPL-MCNC: 2.1 MG/DL
BNP SERPL-MCNC: 693 PG/ML
BUN SERPL-MCNC: 39 MG/DL
CALCIUM SERPL-MCNC: 9.2 MG/DL
CHLORIDE SERPL-SCNC: 101 MMOL/L
CO2 SERPL-SCNC: 28 MMOL/L
CREAT SERPL-MCNC: 2.9 MG/DL
DIFFERENTIAL METHOD: ABNORMAL
EOSINOPHIL # BLD AUTO: 0.1 K/UL
EOSINOPHIL NFR BLD: 1.5 %
ERYTHROCYTE [DISTWIDTH] IN BLOOD BY AUTOMATED COUNT: 17.2 %
EST. GFR  (AFRICAN AMERICAN): 18.1 ML/MIN/1.73 M^2
EST. GFR  (NON AFRICAN AMERICAN): 15.7 ML/MIN/1.73 M^2
FACT X PPP CHRO-ACNC: 0.94 IU/ML
FACT X PPP CHRO-ACNC: 0.96 IU/ML
GLUCOSE SERPL-MCNC: 91 MG/DL
HCT VFR BLD AUTO: 29.5 %
HGB BLD-MCNC: 9.9 G/DL
LYMPHOCYTES # BLD AUTO: 2.5 K/UL
LYMPHOCYTES NFR BLD: 51.5 %
MAGNESIUM SERPL-MCNC: 1.8 MG/DL
MCH RBC QN AUTO: 30.4 PG
MCHC RBC AUTO-ENTMCNC: 33.6 %
MCV RBC AUTO: 91 FL
MONOCYTES # BLD AUTO: 0.6 K/UL
MONOCYTES NFR BLD: 12.9 %
NEUTROPHILS # BLD AUTO: 1.6 K/UL
NEUTROPHILS NFR BLD: 33.7 %
PLATELET # BLD AUTO: 192 K/UL
PMV BLD AUTO: 11.6 FL
POTASSIUM SERPL-SCNC: 3.5 MMOL/L
PROT SERPL-MCNC: 6.8 G/DL
RBC # BLD AUTO: 3.26 M/UL
SODIUM SERPL-SCNC: 143 MMOL/L
TB INDURATION 48 - 72 HR READ: 0 MM
TROPONIN I SERPL DL<=0.01 NG/ML-MCNC: 0.06 NG/ML
WBC # BLD AUTO: 4.8 K/UL

## 2017-04-09 PROCEDURE — 85025 COMPLETE CBC W/AUTO DIFF WBC: CPT

## 2017-04-09 PROCEDURE — 99232 SBSQ HOSP IP/OBS MODERATE 35: CPT | Mod: ,,, | Performed by: HOSPITALIST

## 2017-04-09 PROCEDURE — 99222 1ST HOSP IP/OBS MODERATE 55: CPT | Mod: GC,,, | Performed by: INTERNAL MEDICINE

## 2017-04-09 PROCEDURE — 80053 COMPREHEN METABOLIC PANEL: CPT

## 2017-04-09 PROCEDURE — 84484 ASSAY OF TROPONIN QUANT: CPT

## 2017-04-09 PROCEDURE — 25000003 PHARM REV CODE 250: Performed by: HOSPITALIST

## 2017-04-09 PROCEDURE — 83880 ASSAY OF NATRIURETIC PEPTIDE: CPT

## 2017-04-09 PROCEDURE — 63600175 PHARM REV CODE 636 W HCPCS: Performed by: HOSPITALIST

## 2017-04-09 PROCEDURE — 36415 COLL VENOUS BLD VENIPUNCTURE: CPT

## 2017-04-09 PROCEDURE — 20600001 HC STEP DOWN PRIVATE ROOM

## 2017-04-09 PROCEDURE — 83735 ASSAY OF MAGNESIUM: CPT

## 2017-04-09 PROCEDURE — 93005 ELECTROCARDIOGRAM TRACING: CPT

## 2017-04-09 PROCEDURE — 93010 ELECTROCARDIOGRAM REPORT: CPT | Mod: ,,, | Performed by: INTERNAL MEDICINE

## 2017-04-09 PROCEDURE — 85520 HEPARIN ASSAY: CPT | Mod: 91

## 2017-04-09 RX ORDER — MAGNESIUM SULFATE HEPTAHYDRATE 40 MG/ML
2 INJECTION, SOLUTION INTRAVENOUS ONCE
Status: COMPLETED | OUTPATIENT
Start: 2017-04-09 | End: 2017-04-09

## 2017-04-09 RX ORDER — METOPROLOL SUCCINATE 25 MG/1
50 TABLET, EXTENDED RELEASE ORAL DAILY
Status: DISCONTINUED | OUTPATIENT
Start: 2017-04-09 | End: 2017-04-13 | Stop reason: HOSPADM

## 2017-04-09 RX ADMIN — GUAIFENESIN 600 MG: 600 TABLET, EXTENDED RELEASE ORAL at 09:04

## 2017-04-09 RX ADMIN — ASPIRIN 81 MG CHEWABLE TABLET 81 MG: 81 TABLET CHEWABLE at 09:04

## 2017-04-09 RX ADMIN — MAGNESIUM SULFATE IN WATER 2 G: 40 INJECTION, SOLUTION INTRAVENOUS at 01:04

## 2017-04-09 RX ADMIN — HYDRALAZINE HYDROCHLORIDE AND ISOSORBIDE DINITRATE 2 TABLET: 37.5; 2 TABLET, FILM COATED ORAL at 05:04

## 2017-04-09 RX ADMIN — METOPROLOL SUCCINATE 50 MG: 25 TABLET, EXTENDED RELEASE ORAL at 05:04

## 2017-04-09 RX ADMIN — FUROSEMIDE 40 MG: 40 TABLET ORAL at 09:04

## 2017-04-09 RX ADMIN — Medication 3 ML: at 01:04

## 2017-04-09 RX ADMIN — SODIUM BICARBONATE 650 MG TABLET 1300 MG: at 09:04

## 2017-04-09 RX ADMIN — HYDRALAZINE HYDROCHLORIDE AND ISOSORBIDE DINITRATE 2 TABLET: 37.5; 2 TABLET, FILM COATED ORAL at 09:04

## 2017-04-09 RX ADMIN — METOPROLOL TARTRATE 25 MG: 25 TABLET ORAL at 05:04

## 2017-04-09 RX ADMIN — POLYETHYLENE GLYCOL 3350 17 G: 17 POWDER, FOR SOLUTION ORAL at 09:04

## 2017-04-09 RX ADMIN — FLUTICASONE PROPIONATE 2 SPRAY: 50 SPRAY, METERED NASAL at 09:04

## 2017-04-09 RX ADMIN — LEVOTHYROXINE SODIUM 75 MCG: 75 TABLET ORAL at 05:04

## 2017-04-09 RX ADMIN — Medication 3 ML: at 05:04

## 2017-04-09 RX ADMIN — POTASSIUM BICARBONATE 25 MEQ: 25 TABLET, EFFERVESCENT ORAL at 09:04

## 2017-04-09 RX ADMIN — BENZTROPINE MESYLATE 0.5 MG: 0.5 TABLET ORAL at 09:04

## 2017-04-09 RX ADMIN — Medication 3 ML: at 09:04

## 2017-04-09 RX ADMIN — FLUTICASONE FUROATE AND VILANTEROL TRIFENATATE 1 PUFF: 100; 25 POWDER RESPIRATORY (INHALATION) at 09:04

## 2017-04-09 RX ADMIN — HYDRALAZINE HYDROCHLORIDE AND ISOSORBIDE DINITRATE 2 TABLET: 37.5; 2 TABLET, FILM COATED ORAL at 01:04

## 2017-04-09 RX ADMIN — HEPARIN SODIUM AND DEXTROSE 15 UNITS/KG/HR: 10000; 5 INJECTION INTRAVENOUS at 09:04

## 2017-04-09 RX ADMIN — QUETIAPINE FUMARATE 200 MG: 200 TABLET, FILM COATED ORAL at 09:04

## 2017-04-09 RX ADMIN — STANDARDIZED SENNA CONCENTRATE AND DOCUSATE SODIUM 1 TABLET: 8.6; 5 TABLET, FILM COATED ORAL at 09:04

## 2017-04-09 RX ADMIN — FUROSEMIDE 40 MG: 40 TABLET ORAL at 05:04

## 2017-04-09 RX ADMIN — ALLOPURINOL 100 MG: 100 TABLET ORAL at 09:04

## 2017-04-09 NOTE — CONSULTS
[facility]  Cardiology  Consult Note    Patient Name: Anai Sal  MRN: [id number]  Admission Date: 4/4/2017  Hospital Length of Stay: 5 days  Code Status: Full Code   Attending Provider: Tadeo Spain MD   Consulting Provider: Catalino Sanchez MD  Primary Care Physician: Live Young MD  Principal Problem:Acute on chronic systolic congestive heart failure    Patient information was obtained from patient and ER records.     Consults  Subjective:     HPI:   Ms. Sal is a 70 y/o AAF hx of HFrEF (LVEF 20%), HTN, Breast CA, CAD (documented in OM chart, but not in outside records. Unclear of anatomy), Aflutter s/p ablation 2008), HCV, pAfib who initially presented on 04/04/2017 for dyspnea and slurred speech.     Past Medical History:   Diagnosis Date    Breast cancer     CHF (congestive heart failure)     Chronic hepatitis C virus infection 3/14/2017    Coronary artery disease     Hypertension     Hazel     Renal disorder     Thyroid disease        Past Surgical History:   Procedure Laterality Date    BREAST SURGERY      HYSTERECTOMY      MASTECTOMY         Review of patient's allergies indicates:  No Known Allergies    No current facility-administered medications on file prior to encounter.      Current Outpatient Prescriptions on File Prior to Encounter   Medication Sig    allopurinol (ZYLOPRIM) 100 MG tablet Take 100 mg by mouth 2 (two) times daily.     aspirin 81 MG Chew Take 1 tablet (81 mg total) by mouth once daily.    benztropine (COGENTIN) 0.5 MG tablet Take 1 tablet (0.5 mg total) by mouth nightly.    carvedilol (COREG) 12.5 MG tablet Take 1 tablet (12.5 mg total) by mouth 2 (two) times daily.    diclofenac sodium 1 % Gel Apply 4 g topically 4 (four) times daily. Prn pain    gabapentin (NEURONTIN) 300 MG capsule Take 300 mg by mouth 3 (three) times daily as needed (for nerve pain).     guaifenesin (MUCINEX) 600 mg 12 hr tablet Take 1 tablet (600 mg total) by  mouth 2 (two) times daily.    levothyroxine (SYNTHROID) 50 MCG tablet Take 1 tablet (50 mcg total) by mouth before breakfast.    melatonin 3 mg Tab Take by mouth every evening.    quetiapine (SEROQUEL) 200 MG Tab Take 1 tablet (200 mg total) by mouth nightly.    sodium bicarbonate 650 MG tablet Take 2 tablets (1,300 mg total) by mouth 2 (two) times daily.    betamethasone valerate 0.1% (VALISONE) 0.1 % Lotn Apply topically once daily. 2 drops both ears once daily    fluticasone (FLONASE) 50 mcg/actuation nasal spray 2 sprays by Each Nare route once daily.     Family History     Family history is unknown by patient.        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   HENT: Negative.    Cardiovascular: Positive for dyspnea on exertion and orthopnea. Negative for chest pain, irregular heartbeat, leg swelling, palpitations and paroxysmal nocturnal dyspnea.   Respiratory: Negative for shortness of breath and wheezing.    Skin: Negative for color change and rash.   Musculoskeletal: Negative for arthritis, back pain and myalgias.   Gastrointestinal: Negative for abdominal pain, constipation, diarrhea and nausea.   Neurological: Negative for dizziness, numbness and paresthesias.   Psychiatric/Behavioral: Negative.      Objective:     Vital Signs (Most Recent):  Temp: 99.2 °F (37.3 °C) (04/08/17 1958)  Pulse: 86 (04/08/17 1958)  Resp: 18 (04/08/17 1958)  BP: 113/63 (04/08/17 1958)  SpO2: 95 % (04/08/17 1958) Vital Signs (24h Range):  Temp:  [97.7 °F (36.5 °C)-99.2 °F (37.3 °C)] 99.2 °F (37.3 °C)  Pulse:  [] 86  Resp:  [16-20] 18  SpO2:  [95 %-97 %] 95 %  BP:  (112-140)/(57-83) 113/63     Weight: 74.7 kg (164 lb 10.9 oz)  Body mass index is 26.58 kg/(m^2).    SpO2: 95 %  O2 Device (Oxygen Therapy): room air      Intake/Output Summary (Last 24 hours) at 04/08/17 2243  Last data filed at 04/08/17 1700   Gross per 24 hour   Intake           498.17 ml   Output             2550 ml   Net         -2051.83 ml       Lines/Drains/Airways     Peripheral Intravenous Line                 Midline Catheter Insertion/Assessment  - Single Lumen 04/06/17 1232 Left basilic vein (medial side of arm) 18g x 8cm 2 days                Physical Exam   Constitutional: Vital signs are normal. She appears well-developed and well-nourished. She is cooperative.  Non-toxic appearance. No distress.   HENT:   Head: Normocephalic and atraumatic.   Neck: No hepatojugular reflux and no JVD present. Carotid bruit is not present.   Cardiovascular: Normal rate, regular rhythm, S1 normal, S2 normal and normal heart sounds.  Exam reveals no gallop.    No murmur heard.  Pulses:       Dorsalis pedis pulses are 2+ on the right side, and 2+ on the left side.        Posterior tibial pulses are 2+ on the right side, and 2+ on the left side.   2+ LE edema   Pulmonary/Chest: Effort normal. No respiratory distress. She has no decreased breath sounds. She has no wheezes. She has no rhonchi. She has rales in the right lower field and the left lower field.   Abdominal: Soft. Normal appearance and bowel sounds are normal. She exhibits no distension and no mass. There is no tenderness.   Neurological: She is alert.   Skin: Skin is warm, dry and intact.       Significant Labs:   CMP   Recent Labs  Lab 04/07/17  0600 04/08/17  0445    144   K 3.4* 3.4*    102   CO2 29 29   GLU 95 96   BUN 37* 36*   CREATININE 2.4* 2.6*   CALCIUM 9.3 9.5   PROT 7.0 6.9   ALBUMIN 3.2* 3.1*   BILITOT 2.1* 2.2*   ALKPHOS 73 77   AST 28 24   ALT 15 14   ANIONGAP 9 13   ESTGFRAFRICA 22.7* 20.6*   EGFRNONAA 19.7* 17.9*    and CBC   Recent  Labs  Lab 04/07/17  0600 04/08/17  0445   WBC 2.90* 4.37   HGB 9.5* 10.2*   HCT 28.3* 31.3*   * 190       Significant Imaging: Echocardiogram:   2D echo with color flow doppler:   Results for orders placed or performed during the hospital encounter of 03/07/17   2D echo with color flow doppler   Result Value Ref Range    EF 20 (A) 55 - 65    Mitral Valve Regurgitation SEVERE (A)     Diastolic Dysfunction Yes (A)     Aortic Valve Regurgitation TRIVIAL     Est. PA Systolic Pressure 60.16 (A)     Mitral Valve Mobility NORMAL     Tricuspid Valve Regurgitation MILD TO MODERATE     and X-Ray:  Some improvement in pulmonary edema, but still appears grossly volume overloaded.    Assessment and Plan:     * Acute on chronic systolic congestive heart failure  On physical exam, she does not appear that she is volume overloaded, but agree with Lasix 40 mg PO BID for now    Mitral valve insufficiency  Mildly increased LVEDD with the MR  - CTS and interventional cardiology have been consulted  - Continue workup as an outpatient    Paroxysmal atrial fibrillation  Patient went back into Afib today, but back in sinus this morning  - Continue to uptitrate lopressor to 50 mg PO BID for rate control as her B/P can tolerate  - Will consider AC for stroke PPx after PCP has discussion with daughter, CHADS2-VASc = 4, but has Hx of bleeding on OAC in the past      VTE Risk Mitigation         Ordered     Medium Risk of VTE  Once      04/08/17 1600     Place sequential compression device  Until discontinued      04/08/17 1600     Place sequential compression device  Until discontinued      04/04/17 1621          Thank you for your consult. I will follow-up with patient. Please contact us if you have any additional questions.    Catalino Sanchez MD  Cardiology   [facility]

## 2017-04-09 NOTE — CONSULTS
Consult for AFib  Seen earlier by  - d/w with him.  His note not yet been generated.  Pt with chf, severe MR, poor lv with ef 20, PHT, and severely enlarged LA.    EKGs reviewed - she has paroxysmal AFib, and now back in sinus.  HR in Afib 120.  On metoprolol 25 bid and bp ok     I suggest increasing metoprolol to 50 bid and consider chronic anticoagulation if she is an adequate candidate, as she is destined to continue with PAFib, if not permanent AFib in the future.

## 2017-04-09 NOTE — ASSESSMENT & PLAN NOTE
Mildly increased LVEDD with the MR  - CTS and interventional cardiology have been consulted  - Continue workup as an outpatient

## 2017-04-09 NOTE — PLAN OF CARE
Problem: Patient Care Overview  Goal: Plan of Care Review  Outcome: Ongoing (interventions implemented as appropriate)  Pt free of falls/traumas/injuries. Skin remains clean, dry, and intact.  Pt's heparin gtt stopped per MD order. Pt continues to be in NSR since last night. Pt educated on need to call staff for assistance. Pt re-educated on importance of measuring accurate intake and out put; pt verbalized and demonstrates understanding. Reviewed plan of care with pt; and pt verbalized understanding.  Pt VSS in no distress will continue to monitor.

## 2017-04-09 NOTE — PROGRESS NOTES
[facility] Hospital Medicine  Progress Note    Patient Name: Anai Sal  MRN: [id number]  Patient Class: IP- Inpatient   Admission Date: 4/4/2017  Length of Stay: 5 days  Attending Physician: Tadeo Spain MD  Primary Care Provider: Live Young MD    [facility] Medicine Team: [facility] MED C Tadeo Spain MD    Subjective:     Principal Problem:Acute on chronic systolic congestive heart failure    HPI:72 y/o AAF hx of CHF ef 20%, HTN, hx Breast CA, CAD, HCV presents with dyspnea and slurred speech. Patient is seen in [facility] clinic and per ED notes, patient with worsening dyspnea this morning and also hypoxia on room air, slurred speech and acute encephalopathy. She has had recent admission to hospital medicine for management of CHF exacerbation and was discharged on lasix 20mg BID.      Per ED provider who spoke with patients daughter (Tershone [phone]), PACE clinic had increased patient to 60mg PO lasix over the past week. The patient does not manage her medications and is unable to provide additional history     On interview of the patient, she lives with daughter, performs most ADL, she does not use oxygen at home and denies active chest pain, has some dyspnea and reports she has fluid on her legs. She is oriented to person/hospital/president, city/state. She is NOT oriented to Day/Date/Month/Year.     Hospital Course:  Patient admitted, started on IV diuresis with 80mg lasix BID, started on bidil for additional anti-hypertensive control.  She was doing well with IV diuresis and blood pressure control improved with addition of BIDIL and resuming home coreg.  On 4/8 in the afternoon patient with onset of atrial fibrillation with RVR, given IV lopressor and coreg switched to metoprolol tartrate 25mg TID.  On 4/9 in the morning patient has converted back to a sinus rhythm.     Interval History: Pt reports having a BM  Yesterday, states she is feeling a little better than yesterday.     No signs of worsening delirium today.     Review of Systems   Constitutional: Negative for fever.   HENT: Negative for sore throat.    Cardiovascular: Negative for chest pain.   Gastrointestinal: Positive for constipation. Negative for abdominal distention and abdominal pain.     Objective:     Vital Signs (Most Recent):  Temp: 98.3 °F (36.8 °C) (04/09/17 1200)  Pulse: 69 (04/09/17 1200)  Resp: 18 (04/09/17 1200)  BP: (!) 124/58 (04/09/17 1200)  SpO2: 96 % (04/09/17 1200) Vital Signs (24h Range):  Temp:  [98.2 °F (36.8 °C)-99.2 °F (37.3 °C)] 98.3 °F (36.8 °C)  Pulse:  [] 69  Resp:  [16-20] 18  SpO2:  [95 %-96 %] 96 %  BP: (113-129)/(58-76) 124/58     Weight: 73.8 kg (162 lb 11.2 oz)  Body mass index is 26.26 kg/(m^2).    Intake/Output Summary (Last 24 hours) at 04/09/17 1349  Last data filed at 04/09/17 1020   Gross per 24 hour   Intake           768.17 ml   Output             2650 ml   Net         -1881.83 ml      Physical Exam   Constitutional: She is oriented to person, place, and time.   Eyes: Conjunctivae are normal.   Neck: Normal range of motion.   Cardiovascular: Normal rate, regular rhythm and normal heart sounds.    Pulmonary/Chest: Effort normal. She has no wheezes. She has no rales.   Fewer crackles, heard at bases on pulmonary exam   Abdominal: Soft. Bowel sounds are normal. She exhibits no distension. There is no tenderness. There is no guarding.   Neurological: She is alert and oriented to person, place, and time.       Significant Labs:   BMP:     Recent Labs  Lab 04/09/17  0515   GLU 91      K 3.5      CO2 28   BUN 39*   CREATININE 2.9*   CALCIUM 9.2   MG 1.8     CBC:     Recent Labs  Lab 04/08/17  0445 04/09/17  0515   WBC 4.37 4.80   HGB 10.2* 9.9*   HCT 31.3* 29.5*    192       Recent Labs  Lab 04/08/17  1413 04/08/17  2035 04/09/17  0009   TROPONINI 0.053* 0.057* 0.061*            Assessment/Plan:      1. Acute on chronic systolic heart failure exacerbation/ Essential  Hypertension/   -remains on 40mg po BID lasix.   -Continue BIDIL, stopping coreg per #2  -Daughter is not interested in SNF placement. Potential d/c on 4/10.    -Has f/u with HTS Dr. Live (4/12/17)    Atrial fibrillation vs flutter  -in Sinus rhythm this morning   -transition patient to Metoprolol XL 50mg daily   -Cardiology consult recs pending  -Will need to discuss anticoagulation with daughter, she has a Chads-Vasc of 4.  Daughter states she was previously taken off of blood thinner for bleeding complication.  Will have to review chart to see severity.     Severe Mitral regurgitation  --For mitral regurgitation, patient to have lab workup placed by Dr. Costa's nurse for evaluation for possible mitraclip candidate. Consult to interventional cardiology placed   -CT surgery contacted, they do not think patient is surgical candidate  -Will be seen as outpatient by Dr. Costa for mitraclip evaluation.      2. CKD stage 4  - renal dosing of medications.   - continue PO bicarb      3. Delirium  -restarted home seroquel@ bedtime  -Delirium precautions placed  -Believe events this AM were intermittent confusion, wondering if patient may have some baseline dementia.   -Out patient referral for Neuropsych testing. Daughter informed      4. Hypothyroidism  -patient with elevated TSH (21), on low dose leveothyroxine t4 wnl  -will need to discuss with Daughter.  Endocrine phone recs/chart review, if patient is taking home medication appropriately, pt should be discharged on 75mcg/day.  If she has not been taking medication regularly, go back to 50mcg/day on dc.      5. COPD  - LABA/ICS advair replaced with breo  -d/c scheduled nebs    6. Constipation  -scheduled bowel regimen ordered.  Scheduling senna/docusate  -had Bm 4/8.     7. Acute encephalopathy   -resolved         DVT ppx - heparin sq  Code status - Full code,     Active Diagnoses:    Diagnosis Date Noted POA    PRINCIPAL PROBLEM:  Acute on chronic systolic  congestive heart failure [I50.23] 04/04/2017 Yes    Stage 4 chronic kidney disease [N18.4] 11/11/2016 Yes    Essential hypertension [I10] 12/16/2016 Yes    Primary hypothyroidism [E03.9] 12/16/2016 Yes    COPD (chronic obstructive pulmonary disease) [J44.9] 04/04/2017 Yes    Leukopenia [D72.819] 04/04/2017 Yes    Paroxysmal atrial fibrillation [I48.0] 04/08/2017 Yes    Mitral valve insufficiency [I34.0] 03/16/2017 Yes      Problems Resolved During this Admission:    Diagnosis Date Noted Date Resolved POA    Encephalopathy acute [G93.40] 04/04/2017 04/07/2017 Yes     VTE Risk Mitigation         Ordered     Medium Risk of VTE  Once      04/08/17 1600     Place sequential compression device  Until discontinued      04/08/17 1600     Place sequential compression device  Until discontinued      04/04/17 1621          Tadeo Spain MD  Department of Hospital Medicine   [facility]

## 2017-04-09 NOTE — PLAN OF CARE
Problem: Patient Care Overview  Goal: Plan of Care Review  Outcome: Ongoing (interventions implemented as appropriate)  Pt. Remains free from falls/ injury/trauma. No complaints of CP, SOB, pain/discomfort. Plan of Care reviewed with patient. VSS and NADN. Will continue to monitor.

## 2017-04-09 NOTE — SUBJECTIVE & OBJECTIVE
Past Medical History:   Diagnosis Date    Breast cancer     CHF (congestive heart failure)     Chronic hepatitis C virus infection 3/14/2017    Coronary artery disease     Hypertension     Hazel     Renal disorder     Thyroid disease        Past Surgical History:   Procedure Laterality Date    BREAST SURGERY      HYSTERECTOMY      MASTECTOMY         Review of patient's allergies indicates:  No Known Allergies    No current facility-administered medications on file prior to encounter.      Current Outpatient Prescriptions on File Prior to Encounter   Medication Sig    allopurinol (ZYLOPRIM) 100 MG tablet Take 100 mg by mouth 2 (two) times daily.     aspirin 81 MG Chew Take 1 tablet (81 mg total) by mouth once daily.    benztropine (COGENTIN) 0.5 MG tablet Take 1 tablet (0.5 mg total) by mouth nightly.    carvedilol (COREG) 12.5 MG tablet Take 1 tablet (12.5 mg total) by mouth 2 (two) times daily.    diclofenac sodium 1 % Gel Apply 4 g topically 4 (four) times daily. Prn pain    gabapentin (NEURONTIN) 300 MG capsule Take 300 mg by mouth 3 (three) times daily as needed (for nerve pain).     guaifenesin (MUCINEX) 600 mg 12 hr tablet Take 1 tablet (600 mg total) by mouth 2 (two) times daily.    levothyroxine (SYNTHROID) 50 MCG tablet Take 1 tablet (50 mcg total) by mouth before breakfast.    melatonin 3 mg Tab Take by mouth every evening.    quetiapine (SEROQUEL) 200 MG Tab Take 1 tablet (200 mg total) by mouth nightly.    sodium bicarbonate 650 MG tablet Take 2 tablets (1,300 mg total) by mouth 2 (two) times daily.    betamethasone valerate 0.1% (VALISONE) 0.1 % Lotn Apply topically once daily. 2 drops both ears once daily    fluticasone (FLONASE) 50 mcg/actuation nasal spray 2 sprays by Each Nare route once daily.     Family History     Family history is unknown by patient.        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   HENT: Negative.    Cardiovascular: Positive for dyspnea on exertion and orthopnea. Negative for chest pain, irregular heartbeat, leg swelling, palpitations and paroxysmal nocturnal dyspnea.   Respiratory: Negative for shortness of breath and wheezing.    Skin: Negative for color change and rash.   Musculoskeletal: Negative for arthritis, back pain and myalgias.   Gastrointestinal: Negative for abdominal pain, constipation, diarrhea and nausea.   Neurological: Negative for dizziness, numbness and paresthesias.   Psychiatric/Behavioral: Negative.      Objective:     Vital Signs (Most Recent):  Temp: 99.2 °F (37.3 °C) (04/08/17 1958)  Pulse: 86 (04/08/17 1958)  Resp: 18 (04/08/17 1958)  BP: 113/63 (04/08/17 1958)  SpO2: 95 % (04/08/17 1958) Vital Signs (24h Range):  Temp:  [97.7 °F (36.5 °C)-99.2 °F (37.3 °C)] 99.2 °F (37.3 °C)  Pulse:  [] 86  Resp:  [16-20] 18  SpO2:  [95 %-97 %] 95 %  BP: (112-140)/(57-83) 113/63     Weight: 74.7 kg (164 lb 10.9 oz)  Body mass index is 26.58 kg/(m^2).    SpO2: 95 %  O2 Device (Oxygen Therapy): room air      Intake/Output Summary (Last 24 hours) at 04/08/17 2243  Last data filed at 04/08/17 1700   Gross per 24 hour   Intake           498.17 ml   Output             2550 ml   Net         -2051.83 ml       Lines/Drains/Airways     Peripheral Intravenous Line                 Midline Catheter Insertion/Assessment  - Single Lumen 04/06/17 1232 Left basilic vein (medial side of arm) 18g x 8cm 2 days                Physical Exam   Constitutional: Vital signs are normal. She appears well-developed and well-nourished. She is cooperative.  Non-toxic appearance. No distress.   HENT:   Head: Normocephalic  and atraumatic.   Neck: No hepatojugular reflux and no JVD present. Carotid bruit is not present.   Cardiovascular: Normal rate, regular rhythm, S1 normal, S2 normal and normal heart sounds.  Exam reveals no gallop.    No murmur heard.  Pulses:       Dorsalis pedis pulses are 2+ on the right side, and 2+ on the left side.        Posterior tibial pulses are 2+ on the right side, and 2+ on the left side.   2+ LE edema   Pulmonary/Chest: Effort normal. No respiratory distress. She has no decreased breath sounds. She has no wheezes. She has no rhonchi. She has rales in the right lower field and the left lower field.   Abdominal: Soft. Normal appearance and bowel sounds are normal. She exhibits no distension and no mass. There is no tenderness.   Neurological: She is alert.   Skin: Skin is warm, dry and intact.       Significant Labs:   CMP   Recent Labs  Lab 04/07/17  0600 04/08/17  0445    144   K 3.4* 3.4*    102   CO2 29 29   GLU 95 96   BUN 37* 36*   CREATININE 2.4* 2.6*   CALCIUM 9.3 9.5   PROT 7.0 6.9   ALBUMIN 3.2* 3.1*   BILITOT 2.1* 2.2*   ALKPHOS 73 77   AST 28 24   ALT 15 14   ANIONGAP 9 13   ESTGFRAFRICA 22.7* 20.6*   EGFRNONAA 19.7* 17.9*    and CBC   Recent Labs  Lab 04/07/17  0600 04/08/17  0445   WBC 2.90* 4.37   HGB 9.5* 10.2*   HCT 28.3* 31.3*   * 190       Significant Imaging: Echocardiogram:   2D echo with color flow doppler:   Results for orders placed or performed during the hospital encounter of 03/07/17   2D echo with color flow doppler   Result Value Ref Range    EF 20 (A) 55 - 65    Mitral Valve Regurgitation SEVERE (A)     Diastolic Dysfunction Yes (A)     Aortic Valve Regurgitation TRIVIAL     Est. PA Systolic Pressure 60.16 (A)     Mitral Valve Mobility NORMAL     Tricuspid Valve Regurgitation MILD TO MODERATE     and X-Ray:  Some improvement in pulmonary edema, but still appears grossly volume overloaded.

## 2017-04-09 NOTE — ASSESSMENT & PLAN NOTE
Patient went back into Afib today, but back in sinus this morning  - Continue to uptitrate lopressor to 50 mg PO BID for rate control as her B/P can tolerate

## 2017-04-09 NOTE — ASSESSMENT & PLAN NOTE
On physical exam, she does not appear that she is volume overloaded, but agree with Lasix 40 mg PO BID for now

## 2017-04-10 LAB
ALBUMIN SERPL BCP-MCNC: 3 G/DL
ALP SERPL-CCNC: 63 U/L
ALT SERPL W/O P-5'-P-CCNC: 22 U/L
ANION GAP SERPL CALC-SCNC: 15 MMOL/L
AST SERPL-CCNC: 41 U/L
BASOPHILS # BLD AUTO: 0.03 K/UL
BASOPHILS NFR BLD: 0.6 %
BILIRUB SERPL-MCNC: 2.3 MG/DL
BILIRUB UR QL STRIP: NEGATIVE
BILIRUB UR QL STRIP: NEGATIVE
BUN SERPL-MCNC: 75 MG/DL
CALCIUM SERPL-MCNC: 9.4 MG/DL
CHLORIDE SERPL-SCNC: 99 MMOL/L
CLARITY UR REFRACT.AUTO: CLEAR
CLARITY UR REFRACT.AUTO: CLEAR
CO2 SERPL-SCNC: 26 MMOL/L
COLOR UR AUTO: YELLOW
COLOR UR AUTO: YELLOW
CREAT SERPL-MCNC: 3 MG/DL
CREAT UR-MCNC: 45 MG/DL
CREAT UR-MCNC: 45 MG/DL
DIFFERENTIAL METHOD: ABNORMAL
EOSINOPHIL # BLD AUTO: 0.1 K/UL
EOSINOPHIL NFR BLD: 1.6 %
ERYTHROCYTE [DISTWIDTH] IN BLOOD BY AUTOMATED COUNT: 17.6 %
EST. GFR  (AFRICAN AMERICAN): 17.4 ML/MIN/1.73 M^2
EST. GFR  (NON AFRICAN AMERICAN): 15.1 ML/MIN/1.73 M^2
GLUCOSE SERPL-MCNC: 82 MG/DL
GLUCOSE UR QL STRIP: NEGATIVE
GLUCOSE UR QL STRIP: NEGATIVE
HCT VFR BLD AUTO: 25.7 %
HGB BLD-MCNC: 8.8 G/DL
HGB UR QL STRIP: NEGATIVE
HGB UR QL STRIP: NEGATIVE
KETONES UR QL STRIP: NEGATIVE
KETONES UR QL STRIP: NEGATIVE
LEUKOCYTE ESTERASE UR QL STRIP: NEGATIVE
LEUKOCYTE ESTERASE UR QL STRIP: NEGATIVE
LYMPHOCYTES # BLD AUTO: 2.4 K/UL
LYMPHOCYTES NFR BLD: 46 %
MAGNESIUM SERPL-MCNC: 2.5 MG/DL
MCH RBC QN AUTO: 30.9 PG
MCHC RBC AUTO-ENTMCNC: 34.2 %
MCV RBC AUTO: 90 FL
MONOCYTES # BLD AUTO: 0.6 K/UL
MONOCYTES NFR BLD: 12.3 %
NEUTROPHILS # BLD AUTO: 2 K/UL
NEUTROPHILS NFR BLD: 39.3 %
NITRITE UR QL STRIP: NEGATIVE
NITRITE UR QL STRIP: NEGATIVE
PH UR STRIP: 8 [PH] (ref 5–8)
PH UR STRIP: 8 [PH] (ref 5–8)
PHOSPHATE SERPL-MCNC: 3 MG/DL
PLATELET # BLD AUTO: 179 K/UL
PMV BLD AUTO: 11.4 FL
POTASSIUM SERPL-SCNC: 4.1 MMOL/L
PROT SERPL-MCNC: 6.6 G/DL
PROT UR QL STRIP: NEGATIVE
PROT UR QL STRIP: NEGATIVE
PROT UR-MCNC: 15 MG/DL
PROT/CREAT RATIO, UR: 0.33
PTH-INTACT SERPL-MCNC: 134 PG/ML
RBC # BLD AUTO: 2.85 M/UL
SODIUM SERPL-SCNC: 140 MMOL/L
SODIUM UR-SCNC: 94 MMOL/L
SP GR UR STRIP: 1.01 (ref 1–1.03)
SP GR UR STRIP: 1.01 (ref 1–1.03)
URN SPEC COLLECT METH UR: NORMAL
URN SPEC COLLECT METH UR: NORMAL
UROBILINOGEN UR STRIP-ACNC: NEGATIVE EU/DL
UROBILINOGEN UR STRIP-ACNC: NEGATIVE EU/DL
UUN UR-MCNC: 341 MG/DL
WBC # BLD AUTO: 5.13 K/UL

## 2017-04-10 PROCEDURE — 84300 ASSAY OF URINE SODIUM: CPT

## 2017-04-10 PROCEDURE — 84540 ASSAY OF URINE/UREA-N: CPT

## 2017-04-10 PROCEDURE — 83735 ASSAY OF MAGNESIUM: CPT

## 2017-04-10 PROCEDURE — 85025 COMPLETE CBC W/AUTO DIFF WBC: CPT

## 2017-04-10 PROCEDURE — 99232 SBSQ HOSP IP/OBS MODERATE 35: CPT | Mod: ,,, | Performed by: HOSPITALIST

## 2017-04-10 PROCEDURE — 83970 ASSAY OF PARATHORMONE: CPT

## 2017-04-10 PROCEDURE — 97116 GAIT TRAINING THERAPY: CPT | Performed by: PHYSICAL THERAPIST

## 2017-04-10 PROCEDURE — 84100 ASSAY OF PHOSPHORUS: CPT

## 2017-04-10 PROCEDURE — 36415 COLL VENOUS BLD VENIPUNCTURE: CPT

## 2017-04-10 PROCEDURE — 97110 THERAPEUTIC EXERCISES: CPT | Performed by: PHYSICAL THERAPIST

## 2017-04-10 PROCEDURE — 25000003 PHARM REV CODE 250: Performed by: HOSPITALIST

## 2017-04-10 PROCEDURE — 81003 URINALYSIS AUTO W/O SCOPE: CPT

## 2017-04-10 PROCEDURE — 20600001 HC STEP DOWN PRIVATE ROOM

## 2017-04-10 PROCEDURE — 82570 ASSAY OF URINE CREATININE: CPT

## 2017-04-10 PROCEDURE — 80053 COMPREHEN METABOLIC PANEL: CPT

## 2017-04-10 PROCEDURE — 99223 1ST HOSP IP/OBS HIGH 75: CPT | Mod: ,,, | Performed by: INTERNAL MEDICINE

## 2017-04-10 RX ORDER — LEVOTHYROXINE SODIUM 50 UG/1
50 TABLET ORAL
Status: DISCONTINUED | OUTPATIENT
Start: 2017-04-11 | End: 2017-04-13 | Stop reason: HOSPADM

## 2017-04-10 RX ORDER — METOPROLOL SUCCINATE 50 MG/1
50 TABLET, EXTENDED RELEASE ORAL DAILY
Qty: 30 TABLET | Refills: 2 | Status: ON HOLD | OUTPATIENT
Start: 2017-04-10 | End: 2017-12-22

## 2017-04-10 RX ADMIN — ALLOPURINOL 100 MG: 100 TABLET ORAL at 09:04

## 2017-04-10 RX ADMIN — STANDARDIZED SENNA CONCENTRATE AND DOCUSATE SODIUM 1 TABLET: 8.6; 5 TABLET, FILM COATED ORAL at 09:04

## 2017-04-10 RX ADMIN — LEVOTHYROXINE SODIUM 75 MCG: 75 TABLET ORAL at 05:04

## 2017-04-10 RX ADMIN — METOPROLOL SUCCINATE 50 MG: 25 TABLET, EXTENDED RELEASE ORAL at 09:04

## 2017-04-10 RX ADMIN — Medication 3 ML: at 05:04

## 2017-04-10 RX ADMIN — POLYETHYLENE GLYCOL 3350 17 G: 17 POWDER, FOR SOLUTION ORAL at 09:04

## 2017-04-10 RX ADMIN — BENZTROPINE MESYLATE 0.5 MG: 0.5 TABLET ORAL at 09:04

## 2017-04-10 RX ADMIN — SODIUM BICARBONATE 650 MG TABLET 1300 MG: at 09:04

## 2017-04-10 RX ADMIN — GUAIFENESIN 600 MG: 600 TABLET, EXTENDED RELEASE ORAL at 09:04

## 2017-04-10 RX ADMIN — HYDRALAZINE HYDROCHLORIDE AND ISOSORBIDE DINITRATE 2 TABLET: 37.5; 2 TABLET, FILM COATED ORAL at 05:04

## 2017-04-10 RX ADMIN — HYDRALAZINE HYDROCHLORIDE AND ISOSORBIDE DINITRATE 2 TABLET: 37.5; 2 TABLET, FILM COATED ORAL at 09:04

## 2017-04-10 RX ADMIN — Medication 10 ML: at 02:04

## 2017-04-10 RX ADMIN — FLUTICASONE FUROATE AND VILANTEROL TRIFENATATE 1 PUFF: 100; 25 POWDER RESPIRATORY (INHALATION) at 10:04

## 2017-04-10 RX ADMIN — QUETIAPINE FUMARATE 200 MG: 200 TABLET, FILM COATED ORAL at 09:04

## 2017-04-10 RX ADMIN — FLUTICASONE PROPIONATE 2 SPRAY: 50 SPRAY, METERED NASAL at 10:04

## 2017-04-10 RX ADMIN — Medication 3 ML: at 09:04

## 2017-04-10 RX ADMIN — ASPIRIN 81 MG CHEWABLE TABLET 81 MG: 81 TABLET CHEWABLE at 09:04

## 2017-04-10 RX ADMIN — HYDRALAZINE HYDROCHLORIDE AND ISOSORBIDE DINITRATE 2 TABLET: 37.5; 2 TABLET, FILM COATED ORAL at 02:04

## 2017-04-10 NOTE — NURSING
Off floor to ultra sound, ambulated to stretcher with assist x1. Tele maintained, no acute distress.

## 2017-04-10 NOTE — PLAN OF CARE
Problem: Patient Care Overview  Goal: Plan of Care Review  Outcome: Ongoing (interventions implemented as appropriate)  No fall related injury, ambulated in room with min assist, aaox3, tele nsr, no ectopics, nephrology consulted, , retroperitoneal ultrasound obtained. Continue to monitor i&o, maintain fall precautions

## 2017-04-10 NOTE — PLAN OF CARE
Problem: Physical Therapy Goal  Goal: Physical Therapy Goal  Goals to be met by: 17     Patient will increase functional independence with mobility by performin. Sit to stand transfer with [place]. - not met  2. Bed to chair transfer with Supervision using Rolling Walker.- not met  3. Gait x 150 feet with Supervision using Rolling Walker. - not met  4. Ascend/descend 13 stair with right Handrails Supervision . - not met  5. Lower extremity exercise program x 20 reps per handout, with assistance as needed.- not met   Outcome: Ongoing (interventions implemented as appropriate)  Goals remain appropriate and not met     Tariq Triana, PT  4/10/2017

## 2017-04-10 NOTE — PLAN OF CARE
Problem: Patient Care Overview  Goal: Plan of Care Review  Outcome: Ongoing (interventions implemented as appropriate)  Spoke with the patient and her caregivers regarding the plan of care. Noted that the patient is awake and alert. Family at the bedside states that she is confused at times, When assessment completed she is oriented and responds appropriately. She has no complaints of pain or discomfort, she appears to be in no apparent distress. She refused bed alarm stating that it makes too much noise and sometimes she is just changing positions. Instructed to call for assistance with ambulating and she verbalized intent to comply, will continue to monitor.

## 2017-04-10 NOTE — PROGRESS NOTES
"Progress Note  Cardiology    4/10/2017    Anai Sal  Age: 71 y.o.  Location: [facility]  Admit Date: 4/4/2017  Length of Stay: 6    SUBJECTIVE:     Follow-up For:  Acute on chronic systolic congestive heart failure    Interval History: Transitioned to PO diuretics. Dyspnea improved, edema resolved.     OBJECTIVE:     BP (!) 110/57 (BP Location: Left arm, Patient Position: Lying, BP Method: Automatic)  Pulse 81  Temp 98.8 °F (37.1 °C) (Oral)   Resp 18  Ht 5' 6" (1.676 m)  Wt 73.4 kg (161 lb 13.1 oz)  SpO2 (!) 93%  Breastfeeding? No  BMI 26.12 kg/m2  Body mass index is 26.12 kg/(m^2).    Temp:  [97.8 °F (36.6 °C)-98.9 °F (37.2 °C)]   Pulse:  [72-83]   Resp:  [16-20]   BP: (110-138)/(57-75)   SpO2:  [93 %-99 %]       Intake/Output Summary (Last 24 hours) at 04/10/17 1324  Last data filed at 04/10/17 0545   Gross per 24 hour   Intake              800 ml   Output             1500 ml   Net             -700 ml       Physical Exam:  General: AAF in NAD  HEENT: No conjunctival injection, pallor, or icterus. No xanthelasma or Efra's sign present.   Neuro: No focal motor or sensory deficits. Cranial nerves intact.   Neck: No JVD. No carotid bruits.   Heart: S1, S2, soft holosystolic murmur loudest at apex  Pulses: Regular. Carotid 2+, radial 2+, dorsalis pedis 2+, posterior tibial 2+.   Pulmonary: Clear to auscultation bilaterally.   Abdominal: Soft, non-tender. No hepatomegaly or abdominal distention. No abdominal bruits. No pulsatile mass.   Extremities: No clubbing, cyanosis, or edema.   Skin: No bruising, rash, ulceration, xanthomata, or splinter hemorrhage present.     Medications:  Scheduled:   allopurinol  100 mg Oral Daily    aspirin  81 mg Oral Daily    benztropine  0.5 mg Oral Nightly    fluticasone  2 spray Each Nare Daily    fluticasone-vilanterol  1 puff Inhalation Daily    guaifenesin  600 mg Oral BID    isosorbide-hydrALAZINE 20-37.5 mg  2 tablet Oral TID    levothyroxine  75 mcg Oral " Before breakfast    metoprolol succinate  50 mg Oral Daily    polyethylene glycol  17 g Oral Daily    quetiapine  200 mg Oral Nightly    senna-docusate 8.6-50 mg  1 tablet Oral Daily    sodium bicarbonate  1,300 mg Oral BID    sodium chloride 0.9%  3 mL Intravenous Q8H     PRN:  acetaminophen, ramelteon    Allergies:  Review of patient's allergies indicates:  No Known Allergies    Laboratory:     Recent Labs  Lab 04/08/17  0445 04/09/17  0515 04/10/17  0403    143 140   K 3.4* 3.5 4.1    101 99   CO2 29 28 26   BUN 36* 39* 75*   CREATININE 2.6* 2.9* 3.0*   GLU 96 91 82   ANIONGAP 13 14 15       Recent Labs  Lab 04/08/17  0445 04/09/17  0515 04/10/17  0403   AST 24 29 41*   ALT 14 17 22   ALKPHOS 77 69 63   BILITOT 2.2* 2.1* 2.3*   ALBUMIN 3.1* 3.0* 3.0*       Recent Labs  Lab 04/08/17  0445  04/09/17  0515 04/10/17  0403   CALCIUM 9.5  --  9.2 9.4   MG  --   < > 1.8 2.5   PHOS  --   --   --  3.0   < > = values in this interval not displayed.  Lab Results   Component Value Date     (H) 04/09/2017    BNP 4304 (H) 04/04/2017    BNP 3140 (H) 03/10/2017     Lab Results   Component Value Date    CHOL 103 (L) 03/11/2017    HDL 38 (L) 03/11/2017    TRIG 40 03/11/2017     Lab Results   Component Value Date    HGBA1C 4.2 (L) 01/11/2017     Lab Results   Component Value Date    TSH 21.939 (H) 04/04/2017       Recent Labs  Lab 04/04/17  1241 04/08/17  1616   NITRITE Negative Negative   LEUKOCYTESUR 1+* Negative   WBCUA 5  --    BACTERIA Rare  --       Recent Labs  Lab 04/08/17 0445 04/09/17 0515 04/10/17  0403   WBC 4.37 4.80 5.13   HGB 10.2* 9.9* 8.8*   HCT 31.3* 29.5* 25.7*    192 179   GRAN 36.4*  1.6* 33.7*  1.6* 39.3  2.0     No results for input(s): PTT, INR in the last 168 hours.  No results for input(s): PHART, TKK3MOK, PO2ART, MZS7LPG, G3NZQIED in the last 168 hours.  No results for input(s): LACTATE in the last 168 hours.    Recent Labs  Lab 04/04/17  1843 04/08/17  1413  04/08/17 2035 04/09/17  0009   TROPONINI 0.030*  0.030* 0.053* 0.057* 0.061*           Investigation Results:    EF   Date Value Ref Range Status   03/14/2017 20 (A) 55 - 65    03/10/2017 20 (A) 55 - 65    02/08/2017 25 (A) 55 - 65    12/19/2016 40 (A) 55 - 65        ASSESSMENT/PLAN:   Assessment:  71 y.o. female with history of NICM (LVEF 20%, LVEDD 5.6 cm), severe functional MR, PAF, atrial flutter s/p RFA 2008, CKD, hypothyroidism, breast cancer, hypertension, and hepatitis C admitted with acute on chronic systolic cardiomyopathy exacerbation.     Plan:  1. Acute on chronic systolic cardiomyopathy exacerbation, severe functional mitral regurgitation  2. Paroxysmal atrial fibrillation in sinus rhythm - CHADS-VASc 4    Congestion on exam appears to have resolved and patient is net negative 10 L for hospital stay. Has persistent right pleural effusion, though unlikely to resolve with diuretics given worsening renal function. Would hold diuretics today and monitor.   Otherwise continue current medical regimen; ASA 81 daily, Bidil 2 tablets TID, Toprol-XL 50 daily.   Follow-up with HTS and interventional cardiology as outpatient.     Adelso Hannah  Cardiology Fellow  Pager: [phone]

## 2017-04-10 NOTE — PT/OT/SLP PROGRESS
Physical Therapy  Treatment    Anai Sal   MRN: [id number]   Admitting Diagnosis: Acute on chronic systolic congestive heart failure    PT Received On: 04/10/17  PT Start Time: 1030     PT Stop Time: 1053    PT Total Time (min): 23 min       Billable Minutes:  Gait Linjnicy10 and Therapeutic Exercise 8    Treatment Type: Treatment  PT/PTA: PT     PTA Visit Number: 0       General Precautions: Standard, fall, hearing impaired  Orthopedic Precautions: N/A   Braces: N/A    Do you have any cultural, spiritual, Judaism conflicts, given your current situation?: none    Subjective:  Communicated with RN prior to session.      Pain Ratin/10              Pain Rating Post-Intervention: 0/10    Objective:   Patient found with: peripheral IV    Functional Mobility:  Bed Mobility:        Transfers:  Sit <> Stand Assistance: Supervision  Sit <> Stand Assistive Device: Rolling Walker    Gait:   Gait Distance: 100ft.  slow gait speed without LOB  Assistance 1: Stand by Assistance  Gait Assistive Device: Rolling walker  Gait Pattern: reciprocal  Gait Deviation(s): decreased christi    Stairs:  Pt ascended/descend 1 flight(s) with No Assistive Device with right with Contact Guard Assistance and Minimal Assistance.     Balance:   Static Sit: GOOD: Takes MODERATE challenges from all directions  Dynamic Sit: GOOD-: Maintains balance through MODERATE excursions of active trunk movement,     Static Stand: FAIR+: Takes MINIMAL challenges from all directions  Dynamic stand: FAIR+: Needs CLOSE SUPERVISION during gait and is able to right self with minor LOB     Therapeutic Activities and Exercises:  Patient educated on safe mobility and use of a RW and need for family suport on discharge especially with ascending and descending stairs.      AM-PAC 6 CLICK MOBILITY  How much help from another person does this patient currently need?   1 = Unable, Total/Dependent Assistance  2 = A lot, Maximum/Moderate Assistance  3 = A little,  Minimum/Contact Guard/Supervision  4 = None, Modified [place]/Independent    Turning over in bed (including adjusting bedclothes, sheets and blankets)?: 4  Sitting down on and standing up from a chair with arms (e.g., wheelchair, bedside commode, etc.): 3  Moving from lying on back to sitting on the side of the bed?: 4  Moving to and from a bed to a chair (including a wheelchair)?: 3  Need to walk in hospital room?: 3  Climbing 3-5 steps with a railing?: 3  Total Score: 20    AM-PAC Raw Score CMS G-Code Modifier Level of Impairment Assistance   6 % Total / Unable   7 - 9 CM 80 - 100% Maximal Assist   10 - 14 CL 60 - 80% Moderate Assist   15 - 19 CK 40 - 60% Moderate Assist   20 - 22 CJ 20 - 40% Minimal Assist   23 CI 1-20% SBA / CGA   24 CH 0% Independent/ Mod I     Patient left up in chair with all lines intact and call button in reach.    Assessment:  Anai Sal is a 71 y.o. female with a medical diagnosis of Acute on chronic systolic congestive heart failure and presents with improving endurance and ability to ascend and descend stairs with min/CGA.   With family support she would be appropriate for d/c home with HH.  .    Rehab identified problem list/impairments: Rehab identified problem list/impairments: weakness, impaired balance, impaired endurance, impaired functional mobilty, gait instability    Rehab potential is good.    Activity tolerance: Good    Discharge recommendations: Discharge Facility/Level Of Care Needs: home health PT (with family support)     Barriers to discharge: Barriers to Discharge: Decreased caregiver support    Equipment recommendations: Equipment Needed After Discharge: none     GOALS:   Physical Therapy Goals        Problem: Physical Therapy Goal    Goal Priority Disciplines Outcome Goal Variances Interventions   Physical Therapy Goal     PT/OT, PT Ongoing (interventions implemented as appropriate)     Description:  Goals to be met by: 4/19/17     Patient will  increase functional independence with mobility by performin. Sit to stand transfer with [facility]. - not met  2. Bed to chair transfer with Supervision using Rolling Walker.- not met  3. Gait  x 150 feet with Supervision using Rolling Walker. - not met  4. Ascend/descend 13 stair with right Handrails Supervision . - not met  5. Lower extremity exercise program x 20 reps per handout, with assistance as needed.- not met                 PLAN:    Patient to be seen 4 x/week  to address the above listed problems via gait training, therapeutic activities, therapeutic exercises  Plan of Care expires: 17  Plan of Care reviewed with: patient         Tariq Triana, PT  04/10/2017

## 2017-04-10 NOTE — PLAN OF CARE
spoke to YINKA of [facility] at [phone] to discuss discharge plans.  Coordinator, Ms. Gordon stated they will set up any services pt may need at discharge.  They are an all inclusive program and all orders must be faxed to pt primary caregiver at [place].  Pt will return home with her daughter.

## 2017-04-10 NOTE — PHYSICIAN QUERY
PT Name: Anai Sal  MR #: [id number]  Physician Query Form - Renal Clarification     CDS/: Aydin Colby Jr               Contact information: [phone]    This form is a permanent document in the medical record.     QueryDate: April 10, 2017    By submitting this query, we are merely seeking further clarification of documentation. Please utilize your independent clinical judgment when addressing the question(s) below.    The Medical record contains the following:   Indicator Supporting Clinical Findings Location in Medical Record    Kidney (Renal) Insufficiency      Kidney (Renal) Failure / Injury     x Nephrotoxic Agents furosemide injection 80 mg Once    furosemide injection 80 mg BID 4/4 MAR    4/5-4/7 MAR   x BUN/Creatinine GFR BUN=41--->38--->37--->36--->39  Creatinine= 2.5-->2.3-->2.4-->2.6-->2.9  GFR=21.6-->23.9-->22.7-->20.6--->18.1 4/5-4/9 Labs  4/5-4/9 Labs  4/5-4/9 Labs   x Urine: Casts         Eosinophils Hyaline Casts =5 4/4 Labs    Dehydration      Nausea/Vomiting      Dialysis/CRRT     x Treatment: renal dosing of medications.  4/9 Hospital Medicine PN   x Other:  Stage 4 chronic kidney disease  4/9 Hospital Medicine       Provider, please specify the diagnosis or diagnoses associated with above clinical findings.    [ ] Acute Kidney Failure/Injury with Tubular Necrosis    [ ] Other Acute Kidney Failure/Injury (please specify): ____________    [X ] Unspecified Acute Kidney Failure/Injury    [ ] Acute on Chronic Renal Failure (please specify stage*): _____________                               *[employer] Definitions:   Stage Description      eGFR (mL/min)      [ ] IV  Severly reduced kidney function     15-29     [ ] Other (please specify): _______________________________    [ ] Clinically Undetermined    Please document in your progress notes daily for the duration of treatment, until resolved, and include in your discharge summary.

## 2017-04-10 NOTE — PHYSICIAN QUERY
PT Name: Anai Sal  MR #: [id number]    Physician Query Form - Neurological Condition Clarification       CDS/: Aydin Colby Jr               Contact information:[phone]    This form is a permanent document in the medical record.     Query Date: April 10, 2017    By submitting this query, we are merely seeking further clarification of documentation. Please utilize your independent clinical judgment when addressing the question(s) below.    The Medical record contains the following:   Indicators   Supporting Clinical Findings Location in Medical Record   x AMS, Confusion, LOC, etc. Believe events this AM were intermittent confusion, wondering if patient may have some baseline dementia 4/9 Hospital Medicine PN   x Acute / Chronic Illness Acute on chronic systolic heart failure exacerbation/ Essential Hypertension 4/9 [facility] PN   x Radiology Findings Impression   No evidence for acute hemorrhage or major vascular distribution infarct.    Mild chronic microvascular ischemic changes.    Remote right medial orbital wall fracture. 4/4 CT Head Without Contrast    Electrolyte Imbalance      Medication  4/7-4/10 MAR    Treatment     x Other Patient is seen  in [facility] clinic and per ED notes, patient with worsening dyspnea this morning and also hypoxia on room air, slurred speech and acute encephalopathy.    Acute encephalopathy   -resolved 4/9 Hospital Medicine PN        4/9 [facility] PN     Provider, please specify the diagnosis or diagnoses associated with above clinical findings.    [ X ] Metabolic Encephalopathy    [  ] Other Encephalopathy    [  ] Unspecified Encephalopathy    [  ] Other (please specify): _____________________________________    [  ] Clinically Undetermined      Please document in your progress notes daily for the duration of treatment until resolved, and  include in your discharge summary.

## 2017-04-10 NOTE — CONSULTS
Consult Note  Nephrology    Consult Requested By: Tadeo Spain MD  Reason for Consult: CANDI on     SUBJECTIVE:     History of Present Illness:    Patient is a 71 y.o.AA female presents with CKD 3 baseline GFR 35 with proteinuria and UPC is most recently 0.23g/g.   Seen in nephrology clinic by Dr Benoit in feb 2017.     Admitted on 4/4/17 with PMhx of CHF ef 20%, HTN, hx Breast CA, CAD, HCV with complaints of SOB and slurred speech. She had been taking Lasix 60 mg bid daily over the past week in attemps to improve her symptoms. Usually home dose is Lasix 20 mg bid. On admission she was started on Lasix 80mg IV bid with about 2 liter urine output daily, on the third day she transitioned to po lasix 40 mg bid. Despite this de-escelation, her creatinine continued to increase up to 3.0 today with BUN up to 75. She says her SOB is improved (at baseline). Lasix was held today.   Renal ultrasound from 11/16 shows bilateral renal cysts, some minimally complex. CXR shows bilateral effusions.         Past Medical History:   Diagnosis Date    Breast cancer     CHF (congestive heart failure)     Chronic hepatitis C virus infection 3/14/2017    Coronary artery disease     Hypertension     Hazel     Renal disorder     Thyroid disease      Past Surgical History:   Procedure Laterality Date    BREAST SURGERY      HYSTERECTOMY      MASTECTOMY       Family History   Problem Relation Age of Onset    Family history unknown: Yes     Social History   Substance Use Topics    Smoking status: Former Smoker     Packs/day: 2.00     Years: 20.00     Types: Cigarettes     Quit date: 2/16/1997    Smokeless tobacco: None    Alcohol use No      Comment: 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       Review of patient's allergies indicates:  No Known Allergies     Review of Systems:  Constitutional: no fever or chills  Respiratory: +sob  Cardiovascular: no chest pain or palpitations  Gastrointestinal: no nausea or vomiting, no abdominal pain or change  in bowel habits  Musculoskeletal: no arthralgias or myalgias  Neurological: no seizures or tremors    OBJECTIVE:     Vital Signs (Most Recent)  Temp: 98.8 °F (37.1 °C) (04/10/17 0745)  Pulse: 77 (04/10/17 1200)  Resp: 18 (04/10/17 1200)  BP: (!) 113/55 (04/10/17 1200)  SpO2: (!) 93 % (04/10/17 1200)    Vital Signs Range (Last 24H):  Temp:  [97.8 °F (36.6 °C)-98.9 °F (37.2 °C)]   Pulse:  [72-83]   Resp:  [16-20]   BP: (110-138)/(55-75)   SpO2:  [93 %-99 %]     Physical Exam:  General: well developed, well nourished  Lungs:  normal respiratory effort and decreased bs at posterior bases, no rales, CTA anteriorly  Cardiovascular: Heart: regular rate and rhythm. Chest Wall: no tenderness. Extremities: no cyanosis or edema, or clubbing. Pulses: 2+ and symmetric.  Abdomen/Rectal: Abdomen: soft, nondistended. Rectal: not examined  Musculoskeletal:no clubbing, cyanosis and minimal JVD   Neurologic: Mental status: alertness: alert    Laboratory:  CBC:   Recent Labs  Lab 04/10/17  0403   WBC 5.13   RBC 2.85*   HGB 8.8*   HCT 25.7*      MCV 90   MCH 30.9   MCHC 34.2     CMP:   Recent Labs  Lab 04/10/17  0403   GLU 82   CALCIUM 9.4   ALBUMIN 3.0*   PROT 6.6      K 4.1   CO2 26   CL 99   BUN 75*   CREATININE 3.0*   ALKPHOS 63   ALT 22   AST 41*   BILITOT 2.3*       Recent Labs  Lab 04/04/17  1241  04/10/17  1116   COLORU Yellow  < > Yellow  Yellow   SPECGRAV 1.010  < > 1.010  1.010   PHUR 6.0  < > 8.0  8.0   PROTEINUA 2+*  < > Negative  Negative   BACTERIA Rare  --   --    NITRITE Negative  < > Negative  Negative   LEUKOCYTESUR 1+*  < > Negative  Negative   UROBILINOGEN Negative  < > Negative  Negative   HYALINECASTS 5*  --   --    < > = values in this interval not displayed.    Diagnostic Results:  Labs: Reviewed  X-Ray: Reviewed    ASSESSMENT/PLAN:     Patient is a 71 y.o.AA female presents with CKD 3 baseline GFR 35 with proteinuria and UPC is most recently 0.23g/g.   Seen in nephrology clinic by Dr Benoit  in feb 2017.     Admitted on 4/4/17 with PMhx of CHF ef 20%, HTN, hx Breast CA, CAD, HCV with complaints of SOB and slurred speech. She had been taking Lasix 60 mg bid daily over the past week in attem[place] to improve her symptoms. Usually home dose is Lasix 20 mg bid. On admission she was started on Lasix 80mg IV bid with about 2 liter urine output daily, on the third day she transitioned to po lasix 40 mg bid. Despite this de-escelation, her creatinine continued to increase up to 3.0 today with BUN up to 75. She says her SOB is improved (at baseline). Lasix was held today.   Renal ultrasound from 11/16 shows bilateral renal cysts, some minimally complex. CXR shows bilateral effusions.     CANDI on CKD 3-4 due to overdiuresis with lasix  Volume contraction consistent with exam and elevated BUN  Agree with holding lasix and home dose ARB  Will follow daily weights      No indication for RRT at this time.   Avoid IV contrast, gadolinium, NSAIDS, Bactrim, phosphate enemas and aminoglycosides.   Strict ins/outs.   Dose medications to GFR     Severe hypothyroidsim    Synthroid dose increased to 75mcg per primary team    Acid/base  Continue po bicarb 1300 bid    Proteinuria  Decreased since January with improved serum albumin   Restart home valsartan once CANDI resolved  No plans for renal biopsy due to decreased cortical thickness in ultrasound and degree of CKD.     Renal cysts/some minimally complex  Will do renal ultrasound follow up for comparison. If worsened- will need urology follow up    Anemia of CKD  8/25  Iron studies in am    MBD of CKD    Will get vit D in am.       Adelina Lopez MD  Nephrology Fellow  Pager [phone]

## 2017-04-10 NOTE — PLAN OF CARE
[facility]    HOME HEALTH ORDERS  FACE TO FACE ENCOUNTER    Patient Name: Anai Sal  YOB: 1945    PCP: Live Young MD   PCP Address: [address] [place] [id number]  PCP Phone Number: [phone]  PCP Fax: [phone]    Encounter Date: 04/10/2017    Admit to Home Health    Diagnoses:  Active Hospital Problems    Diagnosis  POA    Paroxysmal atrial fibrillation [I48.0]  Yes     Priority: 2     Mitral valve insufficiency [I34.0]  Yes     Priority: 2     Essential hypertension [I10]  Yes     Priority: 3     Primary hypothyroidism [E03.9]  Yes     Priority: 4     Stage 4 chronic kidney disease [N18.4]  Yes     Priority: 4     COPD (chronic obstructive pulmonary disease) [J44.9]  Yes     Priority: 5     Leukopenia [D72.819]  Yes     Priority: 6     Chronic combined systolic and diastolic heart failure, NYHA class 3 [I50.42]  Yes      Resolved Hospital Problems    Diagnosis Date Resolved POA    *Acute on chronic systolic congestive heart failure [I50.23] 04/09/2017 Yes     Priority: 1 - High    Encephalopathy acute [G93.40] 04/07/2017 Yes     Priority: 7        Future Appointments  Date Time Provider Department Center   4/12/2017 9:05 AM LAB, APPOINTMENT [facility] LAB [facility]   4/12/2017 10:30 AM Len Live MD [facility] HEARTTX [facility]   5/1/2017 10:30 AM LAB, APPOINTMENT [facility]   5/1/2017 10:35 AM SPECIMEN, [facility]CL[facility]   5/2/2017 11:00 AM Kamila Hughes MD [facility] PULMSVC [facility]   5/10/2017 11:40 AM Bud Benoit MD [facility] NEPHRO [facility]   5/15/2017 9:30 AM ECHO, [facility] ECHOLAB [facility]   5/15/2017 10:40 AM Hussein Costa MD [facility] CARDVAL [facility]     Follow-up Information     Follow up with [facility] On 4/12/2017.    Specialty:  Lab    Why:  09:05 labs    Contact information:    [address] [place] [address]  [id number] [phone]    Additional information:    2nd Floor        Follow up with Len Live MD On 4/12/2017.    Specialties:  Cardiology, Transplant    Why:  10:30 appointment    Contact information:    [address]  [facility] [id number]  [phone]              I have seen and examined this patient face to face today. My clinical findings that support the need for the home health skilled services and home bound status are the following:  Weakness/numbness causing balance and gait disturbance due to Heart Failure making it taxing to leave home.  Medical restrictions requiring assistance of another human to leave home due to  Dyspnea on exertion (SOB).    Allergies:Review of patient's allergies indicates:  No Known Allergies    Diet: cardiac diet and 2 gram sodium diet    Activities: activity as tolerated    Nursing:   SN to complete comprehensive assessment including routine vital signs. Instruct on disease process and s/s of complications to report to MD. Review/verify medication list sent home with the patient at time of discharge  and instruct patient/caregiver as needed. Frequency may be adjusted depending on start of care date.    Notify MD if SBP > 160 or < 90; DBP > 90 or < 50; HR > 120 or < 50; Temp > 101; Other:   If patient gaining more than 3lb in One day      CONSULTS:    Physical Therapy to evaluate and treat. Evaluate for home safety and equipment needs; Establish/upgrade home exercise program. Perform / instruct on therapeutic exercises, gait training, transfer training, and Range of Motion.  Occupational Therapy to evaluate and treat. Evaluate home environment for safety and equipment needs. Perform/Instruct on transfers, ADL training, ROM, and therapeutic exercises.  Aide to provide assistance with personal care, ADLs, and vital signs.    MISCELLANEOUS CARE:  Heart Failure:      SN to instruct on the following:    Instruct on the definition of CHF.   Instruct on the  signs/sympoms of CHF to be reported.   Instruct on and monitor daily weights.   Instruct on factors that cause exacerbation.   Instruct on action, dose, schedule, and side effects of medications.   Instruct on diet as prescribed.   Instruct on activity allowed.   Instruct on life-style modifications for life long management of CHF   SN to assess compliance with daily weights, diet, medications, fluid retention,    safety precautions, activities permitted and life-style modifications.   Additional 1-2 SN visits per week as needed for signs and symptoms     of CHF exacerbation.      For Weight Gain > 2-3 lbs in 1 day or 4-6 lbs over 1 week notify PCP:  Increase Furosemide (Lasix) (oral diuretic) dose to 80 mg for 5 days temporarily    WOUND CARE ORDERS  n/a      Medications: Review discharge medications with patient and family and provide education.      Current Discharge Medication List      START taking these medications    Details   fluticasone-salmeterol 250-50 mcg/dose (ADVAIR) 250-50 mcg/dose diskus inhaler Inhale 1 puff into the lungs 2 (two) times daily. Controller  Refills: 0      isosorbide-hydrALAZINE 20-37.5 mg (BIDIL) 20-37.5 mg Tab Take 2 tablets by mouth 3 (three) times daily.  Qty: 180 tablet, Refills: 2      metoprolol succinate (TOPROL-XL) 50 MG 24 hr tablet Take 1 tablet (50 mg total) by mouth once daily.  Qty: 30 tablet, Refills: 2         CONTINUE these medications which have CHANGED    Details   furosemide (LASIX) 40 MG tablet Take 1 tablet (40 mg total) by mouth 2 (two) times daily.  Qty: 60 tablet, Refills: 2    Associated Diagnoses: CKD (chronic kidney disease) stage 3, GFR 30-59 ml/min; Proteinuria; Edema, unspecified type         CONTINUE these medications which have NOT CHANGED    Details   allopurinol (ZYLOPRIM) 100 MG tablet Take 100 mg by mouth 2 (two) times daily.       aspirin 81 MG Chew Take 1 tablet (81 mg total) by mouth once daily.  Refills: 0      benztropine (COGENTIN) 0.5 MG  tablet Take 1 tablet (0.5 mg total) by mouth nightly.  Qty: 30 tablet, Refills: 0      diclofenac sodium 1 % Gel Apply 4 g topically 4 (four) times daily. Prn pain      gabapentin (NEURONTIN) 300 MG capsule Take 300 mg by mouth 3 (three) times daily as needed (for nerve pain).       guaifenesin (MUCINEX) 600 mg 12 hr tablet Take 1 tablet (600 mg total) by mouth 2 (two) times daily.      levothyroxine (SYNTHROID) 50 MCG tablet Take 1 tablet (50 mcg total) by mouth before breakfast.  Qty: 30 tablet, Refills: 11      melatonin 3 mg Tab Take by mouth every evening.      quetiapine (SEROQUEL) 200 MG Tab Take 1 tablet (200 mg total) by mouth nightly.  Qty: 30 tablet, Refills: 0      sodium bicarbonate 650 MG tablet Take 2 tablets (1,300 mg total) by mouth 2 (two) times daily.  Qty: 360 tablet, Refills: 3    Associated Diagnoses: CKD (chronic kidney disease) stage 3, GFR 30-59 ml/min; Metabolic acidosis      betamethasone valerate 0.1% (VALISONE) 0.1 % Lotn Apply topically once daily. 2 drops both ears once daily  Qty: 1 Bottle, Refills: 3      fluticasone (FLONASE) 50 mcg/actuation nasal spray 2 sprays by Each Nare route once daily.  Qty: 1 Bottle, Refills: 3         STOP taking these medications       carvedilol (COREG) 12.5 MG tablet Comments:   Reason for Stopping:         valsartan (DIOVAN) 320 MG tablet Comments:   Reason for Stopping:               I certify that this patient is confined to her home and needs intermittent skilled nursing care, physical therapy and occupational therapy.        Tadeo Spain M.D.  Attending Physician  [facility] Medicine Dept.

## 2017-04-11 PROBLEM — I48.91 ATRIAL FIBRILLATION: Status: ACTIVE | Noted: 2017-04-08

## 2017-04-11 PROBLEM — N18.4 ACUTE RENAL FAILURE SUPERIMPOSED ON STAGE 4 CHRONIC KIDNEY DISEASE: Status: ACTIVE | Noted: 2017-04-11

## 2017-04-11 PROBLEM — N17.9 ACUTE RENAL FAILURE SUPERIMPOSED ON STAGE 4 CHRONIC KIDNEY DISEASE: Status: ACTIVE | Noted: 2017-04-11

## 2017-04-11 PROBLEM — R41.0 DELIRIUM: Status: ACTIVE | Noted: 2017-04-04

## 2017-04-11 PROBLEM — I50.43 ACUTE ON CHRONIC COMBINED SYSTOLIC AND DIASTOLIC CONGESTIVE HEART FAILURE: Status: ACTIVE | Noted: 2017-04-04

## 2017-04-11 LAB
25(OH)D3+25(OH)D2 SERPL-MCNC: 30 NG/ML
ALBUMIN SERPL BCP-MCNC: 3.1 G/DL
ALP SERPL-CCNC: 63 U/L
ALT SERPL W/O P-5'-P-CCNC: 24 U/L
ANION GAP SERPL CALC-SCNC: 12 MMOL/L
AST SERPL-CCNC: 48 U/L
BASOPHILS # BLD AUTO: 0.02 K/UL
BASOPHILS NFR BLD: 0.4 %
BILIRUB SERPL-MCNC: 2.3 MG/DL
BUN SERPL-MCNC: 76 MG/DL
CALCIUM SERPL-MCNC: 9.5 MG/DL
CHLORIDE SERPL-SCNC: 101 MMOL/L
CO2 SERPL-SCNC: 28 MMOL/L
CREAT SERPL-MCNC: 3.2 MG/DL
DIFFERENTIAL METHOD: ABNORMAL
EOSINOPHIL # BLD AUTO: 0.1 K/UL
EOSINOPHIL NFR BLD: 1.3 %
ERYTHROCYTE [DISTWIDTH] IN BLOOD BY AUTOMATED COUNT: 18.2 %
EST. GFR  (AFRICAN AMERICAN): 16.1 ML/MIN/1.73 M^2
EST. GFR  (NON AFRICAN AMERICAN): 13.9 ML/MIN/1.73 M^2
FERRITIN SERPL-MCNC: 303 NG/ML
GLUCOSE SERPL-MCNC: 97 MG/DL
HCT VFR BLD AUTO: 25.1 %
HGB BLD-MCNC: 8.4 G/DL
IRON SERPL-MCNC: 327 UG/DL
LYMPHOCYTES # BLD AUTO: 2.5 K/UL
LYMPHOCYTES NFR BLD: 47.4 %
MAGNESIUM SERPL-MCNC: 2.6 MG/DL
MCH RBC QN AUTO: 30.4 PG
MCHC RBC AUTO-ENTMCNC: 33.5 %
MCV RBC AUTO: 91 FL
MONOCYTES # BLD AUTO: 0.6 K/UL
MONOCYTES NFR BLD: 11 %
NEUTROPHILS # BLD AUTO: 2.1 K/UL
NEUTROPHILS NFR BLD: 39.5 %
PLATELET # BLD AUTO: 164 K/UL
PMV BLD AUTO: 11.3 FL
POTASSIUM SERPL-SCNC: 3.7 MMOL/L
PROT SERPL-MCNC: 6.6 G/DL
RBC # BLD AUTO: 2.76 M/UL
SATURATED IRON: 83 %
SODIUM SERPL-SCNC: 141 MMOL/L
TOTAL IRON BINDING CAPACITY: 395 UG/DL
TRANSFERRIN SERPL-MCNC: 267 MG/DL
WBC # BLD AUTO: 5.25 K/UL

## 2017-04-11 PROCEDURE — 20600001 HC STEP DOWN PRIVATE ROOM

## 2017-04-11 PROCEDURE — 36415 COLL VENOUS BLD VENIPUNCTURE: CPT

## 2017-04-11 PROCEDURE — 99233 SBSQ HOSP IP/OBS HIGH 50: CPT | Mod: ,,, | Performed by: INTERNAL MEDICINE

## 2017-04-11 PROCEDURE — 82306 VITAMIN D 25 HYDROXY: CPT

## 2017-04-11 PROCEDURE — 97530 THERAPEUTIC ACTIVITIES: CPT

## 2017-04-11 PROCEDURE — 83540 ASSAY OF IRON: CPT

## 2017-04-11 PROCEDURE — 83735 ASSAY OF MAGNESIUM: CPT

## 2017-04-11 PROCEDURE — 82728 ASSAY OF FERRITIN: CPT

## 2017-04-11 PROCEDURE — 25000003 PHARM REV CODE 250: Performed by: HOSPITALIST

## 2017-04-11 PROCEDURE — 80053 COMPREHEN METABOLIC PANEL: CPT

## 2017-04-11 PROCEDURE — 85025 COMPLETE CBC W/AUTO DIFF WBC: CPT

## 2017-04-11 RX ADMIN — Medication 3 ML: at 02:04

## 2017-04-11 RX ADMIN — Medication 3 ML: at 05:04

## 2017-04-11 RX ADMIN — SODIUM BICARBONATE 650 MG TABLET 1300 MG: at 09:04

## 2017-04-11 RX ADMIN — GUAIFENESIN 600 MG: 600 TABLET, EXTENDED RELEASE ORAL at 09:04

## 2017-04-11 RX ADMIN — BENZTROPINE MESYLATE 0.5 MG: 0.5 TABLET ORAL at 08:04

## 2017-04-11 RX ADMIN — POLYETHYLENE GLYCOL 3350 17 G: 17 POWDER, FOR SOLUTION ORAL at 09:04

## 2017-04-11 RX ADMIN — STANDARDIZED SENNA CONCENTRATE AND DOCUSATE SODIUM 1 TABLET: 8.6; 5 TABLET, FILM COATED ORAL at 09:04

## 2017-04-11 RX ADMIN — FLUTICASONE FUROATE AND VILANTEROL TRIFENATATE 1 PUFF: 100; 25 POWDER RESPIRATORY (INHALATION) at 09:04

## 2017-04-11 RX ADMIN — HYDRALAZINE HYDROCHLORIDE AND ISOSORBIDE DINITRATE 2 TABLET: 37.5; 2 TABLET, FILM COATED ORAL at 02:04

## 2017-04-11 RX ADMIN — HYDRALAZINE HYDROCHLORIDE AND ISOSORBIDE DINITRATE 2 TABLET: 37.5; 2 TABLET, FILM COATED ORAL at 05:04

## 2017-04-11 RX ADMIN — GUAIFENESIN 600 MG: 600 TABLET, EXTENDED RELEASE ORAL at 08:04

## 2017-04-11 RX ADMIN — LEVOTHYROXINE SODIUM 50 MCG: 50 TABLET ORAL at 05:04

## 2017-04-11 RX ADMIN — FLUTICASONE PROPIONATE 2 SPRAY: 50 SPRAY, METERED NASAL at 09:04

## 2017-04-11 RX ADMIN — Medication 3 ML: at 09:04

## 2017-04-11 RX ADMIN — ALLOPURINOL 100 MG: 100 TABLET ORAL at 09:04

## 2017-04-11 RX ADMIN — ASPIRIN 81 MG CHEWABLE TABLET 81 MG: 81 TABLET CHEWABLE at 09:04

## 2017-04-11 RX ADMIN — METOPROLOL SUCCINATE 50 MG: 25 TABLET, EXTENDED RELEASE ORAL at 09:04

## 2017-04-11 RX ADMIN — HYDRALAZINE HYDROCHLORIDE AND ISOSORBIDE DINITRATE 2 TABLET: 37.5; 2 TABLET, FILM COATED ORAL at 09:04

## 2017-04-11 RX ADMIN — QUETIAPINE FUMARATE 200 MG: 200 TABLET, FILM COATED ORAL at 08:04

## 2017-04-11 NOTE — ASSESSMENT & PLAN NOTE
Patient with baseline creatinine which ranges between 2.3-2.5. Now with creatinine of 3.2 with uptrend in past 72 hours. Diuretic therapy held.   1. Monitor creatinine level.  2. Avoid nephrotoxic agents.

## 2017-04-11 NOTE — PLAN OF CARE
Problem: Patient Care Overview  Goal: Plan of Care Review  Outcome: Ongoing (interventions implemented as appropriate)  No fall related injury, continue to monitor labs I&0, labs daily and prn.

## 2017-04-11 NOTE — PROGRESS NOTES
"Progress Note  Cardiology    4/11/2017    Anai Sal  Age: 71 y.o.  [facility]  Admit Date: 4/4/2017  Length of Stay: 7    SUBJECTIVE:     Follow-up For:  Acute on chronic systolic congestive heart failure    Interval History: PO diuretics held. Dyspnea and edema stable.     OBJECTIVE:     /67 (BP Location: Left arm, BP Method: Automatic)  Pulse 70  Temp 98.5 °F (36.9 °C) (Oral)   Resp 18  Ht 5' 6" (1.676 m)  Wt 73.2 kg (161 lb 6 oz)  SpO2 99%  Breastfeeding? No  BMI 26.05 kg/m2  Body mass index is 26.05 kg/(m^2).    Temp:  [98.5 °F (36.9 °C)-98.8 °F (37.1 °C)]   Pulse:  [65-83]   Resp:  [1-19]   BP: (108-127)/(51-76)   SpO2:  [92 %-99 %]       Intake/Output Summary (Last 24 hours) at 04/11/17 1445  Last data filed at 04/11/17 0354   Gross per 24 hour   Intake              360 ml   Output             1300 ml   Net             -940 ml       Physical Exam:  General: AAF in NAD  HEENT: No conjunctival injection, pallor, or icterus. No xanthelasma or Efra's sign present.   Neuro: No focal motor or sensory deficits. Cranial nerves intact.   Neck: No JVD. No carotid bruits.   Heart: S1, S2, soft holosystolic murmur loudest at apex  Pulses: Regular. Carotid 2+, radial 2+, dorsalis pedis 2+, posterior tibial 2+.   Pulmonary: Clear to auscultation bilaterally.   Abdominal: Soft, non-tender. No hepatomegaly or abdominal distention. No abdominal bruits. No pulsatile mass.   Extremities: No clubbing, cyanosis, or edema.   Skin: No bruising, rash, ulceration, xanthomata, or splinter hemorrhage present.     Medications:  Scheduled:   allopurinol  100 mg Oral Daily    aspirin  81 mg Oral Daily    benztropine  0.5 mg Oral Nightly    fluticasone  2 spray Each Nare Daily    fluticasone-vilanterol  1 puff Inhalation Daily    guaifenesin  600 mg Oral BID    isosorbide-hydrALAZINE 20-37.5 mg  2 tablet Oral TID    levothyroxine  50 mcg Oral Before breakfast    metoprolol succinate  50 mg Oral Daily "    polyethylene glycol  17 g Oral Daily    quetiapine  200 mg Oral Nightly    senna-docusate 8.6-50 mg  1 tablet Oral Daily    sodium chloride 0.9%  3 mL Intravenous Q8H     PRN:  acetaminophen, ramelteon    Allergies:  Review of patient's allergies indicates:  No Known Allergies    Laboratory:     Recent Labs  Lab 04/09/17  0515 04/10/17  0403 04/11/17  0411    140 141   K 3.5 4.1 3.7    99 101   CO2 28 26 28   BUN 39* 75* 76*   CREATININE 2.9* 3.0* 3.2*   GLU 91 82 97   ANIONGAP 14 15 12       Recent Labs  Lab 04/09/17  0515 04/10/17  0403 04/11/17  0411   AST 29 41* 48*   ALT 17 22 24   ALKPHOS 69 63 63   BILITOT 2.1* 2.3* 2.3*   ALBUMIN 3.0* 3.0* 3.1*       Recent Labs  Lab 04/09/17  0515 04/10/17  0403 04/11/17  0411   CALCIUM 9.2 9.4 9.5   MG 1.8 2.5 2.6   PHOS  --  3.0  --      Lab Results   Component Value Date     (H) 04/09/2017    BNP 4304 (H) 04/04/2017    BNP 3140 (H) 03/10/2017     Lab Results   Component Value Date    CHOL 103 (L) 03/11/2017    HDL 38 (L) 03/11/2017    TRIG 40 03/11/2017     Lab Results   Component Value Date    HGBA1C 4.2 (L) 01/11/2017     Lab Results   Component Value Date    TSH 21.939 (H) 04/04/2017       Recent Labs  Lab 04/10/17  1116   NITRITE Negative  Negative   LEUKOCYTESUR Negative  Negative      Recent Labs  Lab 04/09/17  0515 04/10/17  0403 04/11/17  0411   WBC 4.80 5.13 5.25   HGB 9.9* 8.8* 8.4*   HCT 29.5* 25.7* 25.1*    179 164   GRAN 33.7*  1.6* 39.3  2.0 39.5  2.1     No results for input(s): PTT, INR in the last 168 hours.  No results for input(s): PHART, NJZ3YFP, PO2ART, UCC5NTC, A1UVJWHY in the last 168 hours.  No results for input(s): LACTATE in the last 168 hours.    Recent Labs  Lab 04/04/17  1843 04/08/17  1413 04/08/17  2035 04/09/17  0009   TROPONINI 0.030*  0.030* 0.053* 0.057* 0.061*           Investigation Results:    EF   Date Value Ref Range Status   03/14/2017 20 (A) 55 - 65    03/10/2017 20 (A) 55 - 65    02/08/2017  25 (A) 55 - 65    12/19/2016 40 (A) 55 - 65        ASSESSMENT/PLAN:   Assessment:  71 y.o. female with history of NICM (LVEF 20%, LVEDD 5.6 cm), severe functional MR, PAF, atrial flutter s/p RFA 2008, CKD, hypothyroidism, breast cancer, hypertension, and hepatitis C admitted with acute on chronic systolic cardiomyopathy exacerbation.     Plan:  1. Acute on chronic systolic cardiomyopathy exacerbation, severe functional mitral regurgitation  2. Paroxysmal atrial fibrillation in sinus rhythm - CHADS-VASc 4    Congestion on exam appears to have resolved and patient is net negative 11 L for hospital stay. Has persistent right pleural effusion, though unlikely to resolve with diuretics given worsening renal function. Would hold diuretics today and monitor.   Otherwise continue current medical regimen; ASA 81 daily, Bidil 2 tablets TID, Toprol-XL 50 daily.   Follow-up with HTS and interventional cardiology as outpatient with metabolic panel prior. Would discharge on Lasix 40 mg PO BID once renal function improves.     Adelso Hannah  Cardiology Fellow  Pager: [phone]

## 2017-04-11 NOTE — PT/OT/SLP PROGRESS
Occupational Therapy  Treatment    Anai Sal   MRN: [id number]   Admitting Diagnosis: Acute on chronic systolic congestive heart failure    OT Date of Treatment: 17   OT Start Time:   OT Stop Time: 111  OT Total Time (min): 10 min    Billable Minutes:  Therapeutic Activity 10 minutes    General Precautions: Standard, fall, hearing impaired  Orthopedic Precautions: N/A  Braces: N/A    Do you have any cultural, spiritual, Yazidi conflicts, given your current situation?: None    Subjective:  Communicated with RN prior to session. Pt agreeable to OT.    Pain Ratin/10  Pain Rating Post-Intervention: 0/10    Objective:     Functional Mobility:  Bed Mobility:  Supine to Sit: Stand by Assistance    Transfers:  Sit <> Stand Assistance: Stand By Assistance from EOB  Sit <> Stand Assistive Device: Rolling Walker  Toilet Transfer Assistance: Supervision  Toilet Transfer Assistive Device: Rolling Walker    Functional Ambulation: Within room and hallway ~150 ft with SBA using RW     Activities of Daily Living:  Toileting Where Assessed: Toilet  Toileting Level of Assistance: Supervision    Balance:   Static Sit: NORMAL: No deviations seen in posture held statically  Dynamic Sit: GOOD+: Maintains balance through MAXIMAL excursions of active trunk motion  Static Stand: GOOD: Takes MODERATE challenges from all directions  Dynamic stand: GOOD-: Needs SUPERVISION only during gait and able to self right with moderate     Therapeutic Activities and Exercises:  Pt supine upon OT entry; performed supine to sit with SBA, T/F with supervision/SBA to bathroom for toileting; further functional mobility in hallway with SBA using RW and returned to bedside chair    AM-PAC 6 CLICK ADL   How much help from another person does this patient currently need?   1 = Unable, Total/Dependent Assistance  2 = A lot, Maximum/Moderate Assistance  3 = A little, Minimum/Contact Guard/Supervision  4 = None, Modified  [place]/Independent    Putting on and taking off regular lower body clothing? : 3  Bathing (including washing, rinsing, drying)?: 3  Toileting, which includes using toilet, bedpan, or urinal? : 3  Putting on and taking off regular upper body clothing?: 3  Taking care of personal grooming such as brushing teeth?: 3  Eating meals?: 4  Total Score: 19     AM-PAC Raw Score CMS G-Code Modifier Level of Impairment Assistance   6 % Total / Unable   7 - 9 CM 80 - 100% Maximal Assist   10 - 14 CL 60 - 80% Moderate Assist   15 - 19 CK 40 - 60% Moderate Assist   20 - 22 CJ 20 - 40% Minimal Assist   23 CI 1-20% SBA / CGA   24 CH 0% Independent/ Mod I     Patient left up in chair with all lines intact and call button in reach    ASSESSMENT:  Anai Sal is a 71 y.o. female with a medical diagnosis of Acute on chronic systolic congestive heart failure and presents with good effort and participation in therapy. Pt would continue to benefit from OT to increase independence with ADLs and functional mobility. Recommend HH upon D/C.    Rehab potential is good.    Activity tolerance: Good    Discharge recommendations: Discharge Facility/Level Of Care Needs: home with home health     Barriers to discharge: Barriers to Discharge: Decreased caregiver support    Equipment recommendations: none     GOALS:   Occupational Therapy Goals        Problem: Occupational Therapy Goal    Goal Priority Disciplines Outcome Interventions   Occupational Therapy Goal     OT, PT/OT Ongoing (interventions implemented as appropriate)    Description:  Goals to be met by: 4/12/2017     Patient will increase functional independence with ADLs by performing:    UE Dressing with Minimal Assistance.  LE Dressing with Stand-by Assistance.  Grooming while standing at sink with Stand-by Assistance.  Toileting from toilet with Stand-by Assistance for hygiene and clothing management. -MET  Toilet transfer to toilet with Stand-by Assistance.-MET  Pt will  complete BUE HEP (I)'ly using handout.   Pt will complete functional mobility at house-hold level distance in prep for ADLs using RW with SBA. -MET  Pt will demo good endurance throughout morning ADL routine with no SOB.                    Plan:  Patient to be seen 4 x/week to address the above listed problems via self-care/home management, therapeutic activities, therapeutic exercises  Plan of Care expires: 05/05/17  Plan of Care reviewed with: patient         ERIBERTO Matthews  04/11/2017

## 2017-04-11 NOTE — PLAN OF CARE
Problem: Patient Care Overview  Goal: Plan of Care Review  Outcome: Ongoing (interventions implemented as appropriate)  Pt remains free of falls/injuries/trauma. VSS. Denies pain. No discomforts or complaints noted. Diuretics on hold. Creatinine 3.0. No SOB noted. Retroperitoneal ultrasound obtained. Plan of care reviewed with pt. Pt verbalizes understanding. Will continue to monitor.

## 2017-04-11 NOTE — PROGRESS NOTES
[facility] Hospital Medicine  Progress Note    Patient Name: Anai Sal  MRN: [id number]  Patient Class: IP- Inpatient   Admission Date: 4/4/2017  Length of Stay: 6 days  Attending Physician: Tadeo Spain MD  Primary Care Provider: Live Young MD    [facility] Medicine Team: [facility] Tadeo Spain MD    Subjective:     Principal Problem:Acute on chronic systolic congestive heart failure    HPI:70 y/o AAF hx of CHF ef 20%, HTN, hx Breast CA, CAD, HCV presents with dyspnea and slurred speech. Patient is seen in [facility] clinic and per ED notes, patient with worsening dyspnea this morning and also hypoxia on room air, slurred speech and acute encephalopathy. She has had recent admission to hospital medicine for management of CHF exacerbation and was discharged on lasix 20mg BID.      Per ED provider who spoke with patients daughter (Tershone [phone]), PACE clinic had increased patient to 60mg PO lasix over the past week. The patient does not manage her medications and is unable to provide additional history     On interview of the patient, she lives with daughter, performs most ADL, she does not use oxygen at home and denies active chest pain, has some dyspnea and reports she has fluid on her legs. She is oriented to person/hospital/president, city/state. She is NOT oriented to Day/Date/Month/Year.     Hospital Course:  Patient admitted, started on IV diuresis with 80mg lasix BID, started on bidil for additional anti-hypertensive control.  She was doing well with IV diuresis and blood pressure control improved with addition of BIDIL and resuming home coreg.  On 4/8 in the afternoon patient with onset of atrial fibrillation with RVR, given IV lopressor and coreg switched to metoprolol tartrate 25mg TID.  On 4/9 in the morning patient has converted back to a sinus rhythm and oral diuretics resumed and patient switched to Toprol XL for heart failure and rate control management.   She has a rising Creatinine on morning of 4/10 despite achieving euvolemia, additional urine studies ordered and Nephrology consulted, diuretics placed on hold.     Interval History: Pt reports feeling good, no acute complaints today      Review of Systems   Constitutional: Negative for fever.   HENT: Negative for sore throat.    Cardiovascular: Negative for chest pain.   Gastrointestinal: Positive for constipation. Negative for abdominal distention and abdominal pain.     Objective:     Vital Signs (Most Recent):  Temp: 98.7 °F (37.1 °C) (04/10/17 1946)  Pulse: 81 (04/10/17 1953)  Resp: 18 (04/10/17 1946)  BP: (!) 120/56 (04/10/17 1946)  SpO2: (!) 92 % (04/10/17 1946) Vital Signs (24h Range):  Temp:  [98.5 °F (36.9 °C)-98.9 °F (37.2 °C)] 98.7 °F (37.1 °C)  Pulse:  [73-83] 81  Resp:  [16-19] 18  SpO2:  [92 %-96 %] 92 %  BP: (110-120)/(55-61) 120/56     Weight: 73.4 kg (161 lb 13.1 oz)  Body mass index is 26.12 kg/(m^2).    Intake/Output Summary (Last 24 hours) at 04/10/17 2109  Last data filed at 04/10/17 1900   Gross per 24 hour   Intake              810 ml   Output             1600 ml   Net             -790 ml      Physical Exam   Constitutional: She is oriented to person, place, and time.   Eyes: Conjunctivae are normal.   Neck: Normal range of motion.   Cardiovascular: Normal rate, regular rhythm and normal heart sounds.    Pulmonary/Chest: Effort normal. She has no wheezes. She has no rales.   cta on auscultation today   Abdominal: Soft. Bowel sounds are normal. She exhibits no distension. There is no tenderness. There is no guarding.   Neurological: She is alert and oriented to person, place, and time.       Significant Labs:   BMP:     Recent Labs  Lab 04/10/17  0403   GLU 82      K 4.1   CL 99   CO2 26   BUN 75*   CREATININE 3.0*   CALCIUM 9.4   MG 2.5     CBC:     Recent Labs  Lab 04/09/17  0515 04/10/17  0403   WBC 4.80 5.13   HGB 9.9* 8.8*   HCT 29.5* 25.7*    179       Recent Labs  Lab  04/08/17  1413 04/08/17  2035 04/09/17  0009   TROPONINI 0.053* 0.057* 0.061*            Assessment/Plan:      1. Acute on chronic systolic heart failure exacerbation/ Essential Hypertension/   -Holding diuretics due to #2  -Continue BIDIL, stopping coreg per #2  -Daughter is not interested in SNF placement. Potential d/c on 4/10.    -Has f/u with HTS Dr. Live (4/12/17)    Atrial fibrillation vs flutter  -in Sinus rhythm this morning   -transition patient to Metoprolol XL 50mg daily   -Cardiology consult recs pending  -Will need to discuss anticoagulation with daughter, she has a Chads-Vasc of 4.  Daughter states she was previously taken off of blood thinner for bleeding complication.  Will have to review chart to see severity.     Severe Mitral regurgitation  --For mitral regurgitation, patient to have lab workup placed by Dr. Costa's nurse for evaluation for possible mitraclip candidate. Consult to interventional cardiology placed   -CT surgery contacted, they do not think patient is surgical candidate  -Will be seen as outpatient by Dr. Costa for mitraclip evaluation, appt in Patient Calendar     2. CKD stage 4,  CANDI on CKD  - renal dosing of medications.   - continue PO bicarb   - Check Urine na/urea/cr. Prot/Cr ratio.    - Nephrology consult today     3. Delirium  -restarted home seroquel@ bedtime  -Delirium precautions placed  -Believe events this AM were intermittent confusion, wondering if patient may have some baseline dementia.   -Suggest Outpatient referral for Neuropsych testing. Daughter informed      4. Hypothyroidism  -patient with elevated TSH (21), on low dose leveothyroxine t4 wnl  -Discussed with daughter, Patient likely was not always getting dose in AM on an empty stomach as daughter would have to make sure patient was up early enough before she went to work to administer meds,    -Per Endocrine phone recs/chart review, will taper back to 50mcg and patient should receive this on DC.      5.  COPD  - LABA/ICS advair replaced with breo      6. Constipation  -scheduled bowel regimen ordered.  Scheduling senna/docusate  -had Bm 4/8.     7. Acute encephalopathy   -resolved      Dispo - home with home health & pace   DVT ppx - heparin sq  Code status - Full code,     Active Diagnoses:    Diagnosis Date Noted POA    PRINCIPAL PROBLEM:  Acute on chronic systolic congestive heart failure [I50.23] 04/04/2017 Yes    Paroxysmal atrial fibrillation [I48.0] 04/08/2017 Yes    Mitral valve insufficiency [I34.0] 03/16/2017 Yes    Essential hypertension [I10] 12/16/2016 Yes    Primary hypothyroidism [E03.9] 12/16/2016 Yes    Stage 4 chronic kidney disease [N18.4] 11/11/2016 Yes    COPD (chronic obstructive pulmonary disease) [J44.9] 04/04/2017 Yes    Leukopenia [D72.819] 04/04/2017 Yes    Chronic combined systolic and diastolic heart failure, NYHA class 3 [I50.42] 01/11/2017 Yes      Problems Resolved During this Admission:    Diagnosis Date Noted Date Resolved POA    Encephalopathy acute [G93.40] 04/04/2017 04/07/2017 Yes     VTE Risk Mitigation         Ordered     Medium Risk of VTE  Once      04/08/17 1600     Place sequential compression device  Until discontinued      04/08/17 1600     Place sequential compression device  Until discontinued      04/04/17 1621          Tadeo Spain MD  Department of Hospital Medicine   [facility]

## 2017-04-11 NOTE — PROGRESS NOTES
[facility] Hospital Medicine  Progress Note    Patient Name: Anai Sal  MRN: [id number]  Patient Class: IP- Inpatient   Admission Date: 4/4/2017  Length of Stay: 7 days  Attending Physician: Natalia Martínez MD  Primary Care Provider: Live Young MD    [facility] Medicine Team: [facility] MED C Natalia Martínez MD    Subjective:     Principal Problem:Acute on chronic combined systolic and diastolic congestive heart failure    HPI:  72 y/o AAF hx of CHF ef 20%, HTN, hx Breast CA, CAD, HCV presents with dyspnea and slurred speech. Patient is seen in [facility] clinic and per ED notes, patient with worsening dyspnea this morning and also hypoxia on room air, slurred speech and acute encephalopathy. She has had recent admission to hospital medicine for management of CHF exacerbation and was discharged on lasix 20mg BID.       Per ED provider who spoke with patients daughter (Tershone [phone]), [facility] clinic had increased patient to 60mg PO lasix over the past week. The patient does not manage her medications and is unable to provide additional history      On interview of the patient, she lives with daughter, performs most ADL, she does not use oxygen at home and denies active chest pain, has some dyspnea and reports she has fluid on her legs. She is oriented to person/hospital/president, city/state. She is NOT oriented to Day/Date/Month/Year.       Hospital Course:  Patient admitted, started on IV diuresis with 80mg lasix BID, started on bidil for additional anti-hypertensive control.  She was doing well with IV diuresis and blood pressure control improved with addition of BIDIL and resuming home coreg.  On 4/8 in the afternoon patient with onset of atrial fibrillation with RVR, given IV lopressor and coreg switched to metoprolol tartrate 25mg TID.  On 4/9 in the morning patient has converted back to a sinus rhythm and oral diuretics resumed and patient switched to Toprol XL for heart  "failure and rate control management.  She has a rising Creatinine on morning of 4/10 despite achieving euvolemia, additional urine studies ordered and Nephrology consulted, diuretics placed on hold. Creatinine slightly up-trended.     Interval History: "Just a little short of breath". No acute overnight events.    Review of Systems   Constitutional: Negative for chills, fatigue, fever and unexpected weight change.   HENT: Negative for ear pain, facial swelling, hearing loss, mouth sores, nosebleeds, rhinorrhea, sinus pressure, sore throat, tinnitus, trouble swallowing and voice change.    Eyes: Negative for photophobia, pain, redness and visual disturbance.   Respiratory: Positive for shortness of breath. Negative for cough, chest tightness and wheezing.    Cardiovascular: Negative for chest pain, palpitations and leg swelling.   Gastrointestinal: Negative for abdominal pain, blood in stool, constipation, diarrhea, nausea and vomiting.   Endocrine: Negative for cold intolerance, heat intolerance, polydipsia, polyphagia and polyuria.   Genitourinary: Negative for decreased urine volume, dysuria, flank pain, frequency, hematuria, menstrual problem, urgency, vaginal bleeding, vaginal discharge and vaginal pain.   Musculoskeletal: Negative for arthralgias, back pain, joint swelling, myalgias and neck pain.   Skin: Negative for rash.   Allergic/Immunologic: Negative for environmental allergies, food allergies and immunocompromised state.   Neurological: Negative for dizziness, seizures, syncope, weakness, light-headedness, numbness and headaches.   Hematological: Negative for adenopathy. Does not bruise/bleed easily.   Psychiatric/Behavioral: Negative for confusion, hallucinations, self-injury, sleep disturbance and suicidal ideas. The patient is not nervous/anxious.      Objective:     Vital Signs (Most Recent):  Temp: 98.5 °F (36.9 °C) (04/11/17 0830)  Pulse: 71 (04/11/17 1625)  Resp: 19 (04/11/17 1625)  BP: 124/75 " (04/11/17 1625)  SpO2: 98 % (04/11/17 1625) Vital Signs (24h Range):  Temp:  [98.5 °F (36.9 °C)-98.7 °F (37.1 °C)] 98.5 °F (36.9 °C)  Pulse:  [65-83] 71  Resp:  [1-19] 19  SpO2:  [92 %-99 %] 98 %  BP: (108-127)/(51-76) 124/75     Weight: 73.2 kg (161 lb 6 oz)  Body mass index is 26.05 kg/(m^2).    Intake/Output Summary (Last 24 hours) at 04/11/17 1717  Last data filed at 04/11/17 1500   Gross per 24 hour   Intake              600 ml   Output             1200 ml   Net             -600 ml      Physical Exam   Constitutional: She is oriented to person, place, and time. She appears well-developed and well-nourished. She is cooperative. She is easily aroused.  Non-toxic appearance. No distress.   HENT:   Head: Normocephalic and atraumatic. Head is without right periorbital erythema and without left periorbital erythema.   Right Ear: Hearing, tympanic membrane, external ear and ear canal normal. No swelling.   Left Ear: Hearing, tympanic membrane, external ear and ear canal normal. No swelling.   Nose: Nose normal. No nasal deformity.   Mouth/Throat: Uvula is midline, oropharynx is clear and moist and mucous membranes are normal. No oropharyngeal exudate.   Eyes: Conjunctivae, EOM and lids are normal. Pupils are equal, round, and reactive to light. Right eye exhibits no discharge and no exudate. Left eye exhibits no discharge and no exudate. Right conjunctiva is not injected. Left conjunctiva is not injected. No scleral icterus.   Neck: Normal range of motion. Neck supple. No tracheal deviation present. No thyromegaly present.   Cardiovascular: Normal rate, regular rhythm, S1 normal, S2 normal, normal heart sounds and intact distal pulses.  Exam reveals no gallop and no friction rub.    No murmur heard.  Pulmonary/Chest: Effort normal and breath sounds normal. No accessory muscle usage or stridor. No tachypnea. No respiratory distress. She has no wheezes. She has no rales. She exhibits no tenderness.   Abdominal: Soft.  Normal appearance and bowel sounds are normal. She exhibits no distension. There is no tenderness. There is no rebound and no guarding.   Musculoskeletal: Normal range of motion. She exhibits no edema or tenderness.   Neurological: She is alert, oriented to person, place, and time and easily aroused. No cranial nerve deficit. Coordination normal.   Skin: Skin is warm and dry. No lesion and no rash noted. She is not diaphoretic. No cyanosis or erythema. No pallor. Nails show no clubbing.   Psychiatric: She has a normal mood and affect. Her speech is normal and behavior is normal. Judgment and thought content normal.   Nursing note and vitals reviewed.      Significant Labs:   BMP:   Recent Labs  Lab 04/11/17  0411   GLU 97      K 3.7      CO2 28   BUN 76*   CREATININE 3.2*   CALCIUM 9.5   MG 2.6     CBC:   Recent Labs  Lab 04/10/17  0403 04/11/17  0411   WBC 5.13 5.25   HGB 8.8* 8.4*   HCT 25.7* 25.1*    164       Significant Imaging: I have reviewed all pertinent imaging results/findings within the past 24 hours.    Assessment/Plan:      * Acute on chronic combined systolic and diastolic congestive heart failure  Patient with SOB at her baseline. Currently euvolemic and with uptrend in creatinine.   1. Continue metoprolol (Toprol XL) 50 mg daily.  2. Continue isosorbide-hydralazine three time daily.  3. Hold furosemide for now.        Chronic combined systolic and diastolic heart failure, NYHA class 3  As above.      Paroxysmal atrial fibrillation  Currently rate controlled.   1. Continue metoprolol.   2. Patient would benefit from anticoagulation in the setting of a CHADS-VASc 4. However patients daughter will decide if to start anticoagulation as an outpatient with her usual physicians.       Essential hypertension  Well controlled at this time.   1. Continue metoprolol, and isosorbide-hydralazine.   2. Hold ARB in the setting of CANDI.  3. Monitor Bp and titrate antihypertensive therapy as  needed.       Stage 4 chronic kidney disease  As above.      Acute renal failure superimposed on stage 4 chronic kidney disease  Patient with baseline creatinine which ranges between 2.3-2.5. Now with creatinine of 3.2 with uptrend in past 72 hours. Diuretic therapy held.   1. Monitor creatinine level.  2. Avoid nephrotoxic agents.        Primary hypothyroidism  Elevated TSH likely due to medication intake.   1. Continue levothyroxine 50 mcg daily.   2. Outpatient follow up with PCP.      Mitral valve insufficiency  Severe stable disease.   1. Outpatient follow up with Dr. Costa for further evaluation.       COPD (chronic obstructive pulmonary disease)  Stable and without acute exacerbation.   1. Continue fluticasone nasal spray.  2. Continue fluticasone-vilanterol inhaler.     Leukopenia  Resolved.      Delirium  Patient with delirium upon admission. Now appears to be improving but still with intermittent confusion.   1. Delirium precautions.  2. Continue quetiapine 200 mg nightly.         VTE Risk Mitigation         Ordered     Medium Risk of VTE  Once      04/08/17 1600     Place sequential compression device  Until discontinued      04/08/17 1600     Place sequential compression device  Until discontinued      04/04/17 1621          Natalia Martínez MD  Department of Hospital Medicine   [facility]

## 2017-04-11 NOTE — ASSESSMENT & PLAN NOTE
Elevated TSH likely due to medication intake.   1. Continue levothyroxine 50 mcg daily.   2. Outpatient follow up with PCP.

## 2017-04-11 NOTE — ASSESSMENT & PLAN NOTE
Well controlled at this time.   1. Continue metoprolol, and isosorbide-hydralazine.   2. Hold ARB in the setting of CANDI.  3. Monitor Bp and titrate antihypertensive therapy as needed.

## 2017-04-11 NOTE — SUBJECTIVE & OBJECTIVE
"Interval History: "Just a little short of breath". No acute overnight events.    Review of Systems   Constitutional: Negative for chills, fatigue, fever and unexpected weight change.   HENT: Negative for ear pain, facial swelling, hearing loss, mouth sores, nosebleeds, rhinorrhea, sinus pressure, sore throat, tinnitus, trouble swallowing and voice change.    Eyes: Negative for photophobia, pain, redness and visual disturbance.   Respiratory: Positive for shortness of breath. Negative for cough, chest tightness and wheezing.    Cardiovascular: Negative for chest pain, palpitations and leg swelling.   Gastrointestinal: Negative for abdominal pain, blood in stool, constipation, diarrhea, nausea and vomiting.   Endocrine: Negative for cold intolerance, heat intolerance, polydipsia, polyphagia and polyuria.   Genitourinary: Negative for decreased urine volume, dysuria, flank pain, frequency, hematuria, menstrual problem, urgency, vaginal bleeding, vaginal discharge and vaginal pain.   Musculoskeletal: Negative for arthralgias, back pain, joint swelling, myalgias and neck pain.   Skin: Negative for rash.   Allergic/Immunologic: Negative for environmental allergies, food allergies and immunocompromised state.   Neurological: Negative for dizziness, seizures, syncope, weakness, light-headedness, numbness and headaches.   Hematological: Negative for adenopathy. Does not bruise/bleed easily.   Psychiatric/Behavioral: Negative for confusion, hallucinations, self-injury, sleep disturbance and suicidal ideas. The patient is not nervous/anxious.      Objective:     Vital Signs (Most Recent):  Temp: 98.5 °F (36.9 °C) (04/11/17 0830)  Pulse: 71 (04/11/17 1625)  Resp: 19 (04/11/17 1625)  BP: 124/75 (04/11/17 1625)  SpO2: 98 % (04/11/17 1625) Vital Signs (24h Range):  Temp:  [98.5 °F (36.9 °C)-98.7 °F (37.1 °C)] 98.5 °F (36.9 °C)  Pulse:  [65-83] 71  Resp:  [1-19] 19  SpO2:  [92 %-99 %] 98 %  BP: (108-127)/(51-76) 124/75     Weight: " 73.2 kg (161 lb 6 oz)  Body mass index is 26.05 kg/(m^2).    Intake/Output Summary (Last 24 hours) at 04/11/17 1717  Last data filed at 04/11/17 1500   Gross per 24 hour   Intake              600 ml   Output             1200 ml   Net             -600 ml      Physical Exam   Constitutional: She is oriented to person, place, and time. She appears well-developed and well-nourished. She is cooperative. She is easily aroused.  Non-toxic appearance. No distress.   HENT:   Head: Normocephalic and atraumatic. Head is without right periorbital erythema and without left periorbital erythema.   Right Ear: Hearing, tympanic membrane, external ear and ear canal normal. No swelling.   Left Ear: Hearing, tympanic membrane, external ear and ear canal normal. No swelling.   Nose: Nose normal. No nasal deformity.   Mouth/Throat: Uvula is midline, oropharynx is clear and moist and mucous membranes are normal. No oropharyngeal exudate.   Eyes: Conjunctivae, EOM and lids are normal. Pupils are equal, round, and reactive to light. Right eye exhibits no discharge and no exudate. Left eye exhibits no discharge and no exudate. Right conjunctiva is not injected. Left conjunctiva is not injected. No scleral icterus.   Neck: Normal range of motion. Neck supple. No tracheal deviation present. No thyromegaly present.   Cardiovascular: Normal rate, regular rhythm, S1 normal, S2 normal, normal heart sounds and intact distal pulses.  Exam reveals no gallop and no friction rub.    No murmur heard.  Pulmonary/Chest: Effort normal and breath sounds normal. No accessory muscle usage or stridor. No tachypnea. No respiratory distress. She has no wheezes. She has no rales. She exhibits no tenderness.   Abdominal: Soft. Normal appearance and bowel sounds are normal. She exhibits no distension. There is no tenderness. There is no rebound and no guarding.   Musculoskeletal: Normal range of motion. She exhibits no edema or tenderness.   Neurological: She is  alert, oriented to person, place, and time and easily aroused. No cranial nerve deficit. Coordination normal.   Skin: Skin is warm and dry. No lesion and no rash noted. She is not diaphoretic. No cyanosis or erythema. No pallor. Nails show no clubbing.   Psychiatric: She has a normal mood and affect. Her speech is normal and behavior is normal. Judgment and thought content normal.   Nursing note and vitals reviewed.      Significant Labs:   BMP:   Recent Labs  Lab 04/11/17  0411   GLU 97      K 3.7      CO2 28   BUN 76*   CREATININE 3.2*   CALCIUM 9.5   MG 2.6     CBC:   Recent Labs  Lab 04/10/17  0403 04/11/17  0411   WBC 5.13 5.25   HGB 8.8* 8.4*   HCT 25.7* 25.1*    164       Significant Imaging: I have reviewed all pertinent imaging results/findings within the past 24 hours.

## 2017-04-11 NOTE — PLAN OF CARE
04/11/17 0750   Right Care Assessment   Can the patient answer the patient profile reliably? Yes, cognitively intact   How often would a person be available to care for the patient? Whenever needed   Describe the patient's ability to walk at the present time. Minor restrictions or changes   How does the patient rate their overall health at the present time? Good   Number of comorbid conditions (as recorded on the chart) Three   During the past month, has the patient often been bothered by feeling down, depressed or hopeless? No   Have you felt down, depressed, or hopeless? 0   During the past month, has the patient often been bothered by little interest or pleasure in doing things? No   Have you felt little interest or pleasure in doing things? 0   [facility]

## 2017-04-11 NOTE — PROGRESS NOTES
Nephrology progress note         SUBJECTIVE:       Interval Hx:  Lasix held. Ms Sal is sitting up in bed having a good lunch.   Complains of mild SOB at her baseline.   BUN/Cr increased slightly    Review of Systems:  Constitutional: no fever or chills  Respiratory: +sob  Cardiovascular: no chest pain or palpitations  Gastrointestinal: no nausea or vomiting, no abdominal pain or change in bowel habits  Musculoskeletal: no arthralgias or myalgias  Neurological: no seizures or tremors    OBJECTIVE:     Vital Signs (Most Recent)  Temp: 98.5 °F (36.9 °C) (04/11/17 0830)  Pulse: 72 (04/11/17 1400)  Resp: 18 (04/11/17 1231)  BP: 127/67 (04/11/17 1231)  SpO2: 99 % (04/11/17 1231)    Vital Signs Range (Last 24H):  Temp:  [98.5 °F (36.9 °C)-98.8 °F (37.1 °C)]   Pulse:  [65-83]   Resp:  [1-19]   BP: (108-127)/(51-76)   SpO2:  [92 %-99 %]     Physical Exam:  General: well developed, well nourished  Lungs:  normal respiratory effort and decreased bs at posterior bases, no rales, CTA anteriorly  Cardiovascular: Heart: regular rate and rhythm. Chest Wall: no tenderness. Extremities: no cyanosis or edema, or clubbing. Pulses: 2+ and symmetric.  Abdomen/Rectal: Abdomen: soft, nondistended. Rectal: not examined  Musculoskeletal:no clubbing, cyanosis and minimal JVD   Neurologic: Mental status: alertness: alert    Laboratory:  CBC:     Recent Labs  Lab 04/11/17  0411   WBC 5.25   RBC 2.76*   HGB 8.4*   HCT 25.1*      MCV 91   MCH 30.4   MCHC 33.5     CMP:     Recent Labs  Lab 04/11/17  0411   GLU 97   CALCIUM 9.5   ALBUMIN 3.1*   PROT 6.6      K 3.7   CO2 28      BUN 76*   CREATININE 3.2*   ALKPHOS 63   ALT 24   AST 48*   BILITOT 2.3*       Recent Labs  Lab 04/10/17  1116   COLORU Yellow  Yellow   SPECGRAV 1.010  1.010   PHUR 8.0  8.0   PROTEINUA Negative  Negative   NITRITE Negative  Negative   LEUKOCYTESUR Negative  Negative   UROBILINOGEN Negative  Negative       Diagnostic Results:  Labs:  Reviewed  X-Ray: Reviewed    ASSESSMENT/PLAN:     Patient is a 71 y.o.AA female presents with CKD 3 baseline GFR 35 with proteinuria and UPC is most recently 0.23g/g.   Seen in nephrology clinic by Dr Beonit in feb 2017.     Admitted on 4/4/17 with PMhx of CHF ef 20%, HTN, hx Breast CA, CAD, HCV with complaints of SOB and slurred speech. She had been taking Lasix 60 mg bid daily over the past week in attemps to improve her symptoms. Usually home dose is Lasix 20 mg bid. On admission she was started on Lasix 80mg IV bid with about 2 liter urine output daily, on the third day she transitioned to po lasix 40 mg bid. Despite this de-escelation, her creatinine continued to increase up to 3.0 today with BUN up to 75. She says her SOB is improved (at baseline). Lasix was held today.   Renal ultrasound from 11/16 shows bilateral renal cysts, some minimally complex. CXR shows bilateral effusions.     CANDI on CKD 3-4 due to overdiuresis with lasix    Lasix held. Ms Sal is sitting up in bed having a good lunch.   Complains of mild SOB at her baseline.   BUN/Cr increased slightly      continue holding lasix and home dose ARB      Acid/base  Held po bicarb due to alkalosis     Proteinuria  Decreased since January with improved serum albumin   Restart home valsartan once CANDI resolved  No plans for renal biopsy due to decreased cortical thickness in ultrasound and degree of CKD.     Renal cysts/some minimally complex  Follow up ultrasound shows multiple simple and mildly complex cysts with thin septation.    The largest cyst on the right measures 1.6 x 1.8 x 1.4 cm and the largest on the left 2.2 x 1.3 x 1.6 cm.   Follow up with urology as outpatient.       Anemia of CKD  TSAT 83 Ferritin 303  Will benefit from Epo TIW    MBD of CKD    Will get vit D 30- adequate    Corrected ca~ 10.3. No need for supplemental vit D      Adelina Lopez MD  Nephrology Fellow  Pager [phone]

## 2017-04-11 NOTE — PLAN OF CARE
Problem: Occupational Therapy Goal  Goal: Occupational Therapy Goal  Goals to be met by: 4/12/2017     Patient will increase functional independence with ADLs by performing:    UE Dressing with Minimal Assistance.  LE Dressing with Stand-by Assistance.  Grooming while standing at sink with Stand-by Assistance.  Toileting from toilet with Stand-by Assistance for hygiene and clothing management. -MET  Toilet transfer to toilet with Stand-by Assistance.-MET  Pt will complete BUE HEP (I)ly using handout.   Pt will complete functional mobility at house-hold level distance in prep for ADLs using RW with SBA. -MET  Pt will demo good endurance throughout morning ADL routine with no SOB.      Outcome: Ongoing (interventions implemented as appropriate)  Continue OT plan of care.

## 2017-04-11 NOTE — ASSESSMENT & PLAN NOTE
Patient with SOB at her baseline. Currently euvolemic and with uptrend in creatinine.   1. Continue metoprolol (Toprol XL) 50 mg daily.  2. Continue isosorbide-hydralazine three time daily.  3. Hold furosemide for now.

## 2017-04-11 NOTE — ASSESSMENT & PLAN NOTE
Patient with delirium upon admission. Now appears to be improving but still with intermittent confusion.   1. Delirium precautions.  2. Continue quetiapine 200 mg nightly.

## 2017-04-11 NOTE — ASSESSMENT & PLAN NOTE
Stable and without acute exacerbation.   1. Continue fluticasone nasal spray.  2. Continue fluticasone-vilanterol inhaler.

## 2017-04-11 NOTE — ASSESSMENT & PLAN NOTE
Currently rate controlled.   1. Continue metoprolol.   2. Patient would benefit from anticoagulation in the setting of a CHADS-VASc 4. However patients daughter will decide if to start anticoagulation as an outpatient with her usual physicians.

## 2017-04-12 LAB
ALBUMIN SERPL BCP-MCNC: 3 G/DL
ALBUMIN SERPL BCP-MCNC: 3 G/DL
ALP SERPL-CCNC: 65 U/L
ALP SERPL-CCNC: 65 U/L
ALT SERPL W/O P-5'-P-CCNC: 26 U/L
ALT SERPL W/O P-5'-P-CCNC: 26 U/L
ANION GAP SERPL CALC-SCNC: 12 MMOL/L
ANION GAP SERPL CALC-SCNC: 12 MMOL/L
AST SERPL-CCNC: 52 U/L
AST SERPL-CCNC: 52 U/L
BASOPHILS # BLD AUTO: 0.01 K/UL
BASOPHILS # BLD AUTO: 0.01 K/UL
BASOPHILS NFR BLD: 0.2 %
BASOPHILS NFR BLD: 0.2 %
BILIRUB SERPL-MCNC: 2 MG/DL
BILIRUB SERPL-MCNC: 2 MG/DL
BUN SERPL-MCNC: 63 MG/DL
BUN SERPL-MCNC: 63 MG/DL
CALCIUM SERPL-MCNC: 9.3 MG/DL
CALCIUM SERPL-MCNC: 9.3 MG/DL
CHLORIDE SERPL-SCNC: 106 MMOL/L
CHLORIDE SERPL-SCNC: 106 MMOL/L
CO2 SERPL-SCNC: 24 MMOL/L
CO2 SERPL-SCNC: 24 MMOL/L
CREAT SERPL-MCNC: 2.8 MG/DL
CREAT SERPL-MCNC: 2.8 MG/DL
DIFFERENTIAL METHOD: ABNORMAL
DIFFERENTIAL METHOD: ABNORMAL
EOSINOPHIL # BLD AUTO: 0 K/UL
EOSINOPHIL # BLD AUTO: 0 K/UL
EOSINOPHIL NFR BLD: 0.9 %
EOSINOPHIL NFR BLD: 0.9 %
ERYTHROCYTE [DISTWIDTH] IN BLOOD BY AUTOMATED COUNT: 18.7 %
ERYTHROCYTE [DISTWIDTH] IN BLOOD BY AUTOMATED COUNT: 18.7 %
EST. GFR  (AFRICAN AMERICAN): 18.9 ML/MIN/1.73 M^2
EST. GFR  (AFRICAN AMERICAN): 18.9 ML/MIN/1.73 M^2
EST. GFR  (NON AFRICAN AMERICAN): 16.4 ML/MIN/1.73 M^2
EST. GFR  (NON AFRICAN AMERICAN): 16.4 ML/MIN/1.73 M^2
GLUCOSE SERPL-MCNC: 109 MG/DL
GLUCOSE SERPL-MCNC: 109 MG/DL
HCT VFR BLD AUTO: 23.4 %
HCT VFR BLD AUTO: 23.4 %
HGB BLD-MCNC: 7.9 G/DL
HGB BLD-MCNC: 7.9 G/DL
LYMPHOCYTES # BLD AUTO: 1.9 K/UL
LYMPHOCYTES # BLD AUTO: 1.9 K/UL
LYMPHOCYTES NFR BLD: 41.8 %
LYMPHOCYTES NFR BLD: 41.8 %
MAGNESIUM SERPL-MCNC: 2.5 MG/DL
MAGNESIUM SERPL-MCNC: 2.5 MG/DL
MCH RBC QN AUTO: 31 PG
MCH RBC QN AUTO: 31 PG
MCHC RBC AUTO-ENTMCNC: 33.8 %
MCHC RBC AUTO-ENTMCNC: 33.8 %
MCV RBC AUTO: 92 FL
MCV RBC AUTO: 92 FL
MONOCYTES # BLD AUTO: 0.5 K/UL
MONOCYTES # BLD AUTO: 0.5 K/UL
MONOCYTES NFR BLD: 12 %
MONOCYTES NFR BLD: 12 %
NEUTROPHILS # BLD AUTO: 2 K/UL
NEUTROPHILS # BLD AUTO: 2 K/UL
NEUTROPHILS NFR BLD: 44.4 %
NEUTROPHILS NFR BLD: 44.4 %
PLATELET # BLD AUTO: 171 K/UL
PLATELET # BLD AUTO: 171 K/UL
PMV BLD AUTO: 10.2 FL
PMV BLD AUTO: 10.2 FL
POTASSIUM SERPL-SCNC: 3.5 MMOL/L
POTASSIUM SERPL-SCNC: 3.5 MMOL/L
PROT SERPL-MCNC: 6.7 G/DL
PROT SERPL-MCNC: 6.7 G/DL
RBC # BLD AUTO: 2.55 M/UL
RBC # BLD AUTO: 2.55 M/UL
SODIUM SERPL-SCNC: 142 MMOL/L
SODIUM SERPL-SCNC: 142 MMOL/L
WBC # BLD AUTO: 4.43 K/UL
WBC # BLD AUTO: 4.43 K/UL

## 2017-04-12 PROCEDURE — 36415 COLL VENOUS BLD VENIPUNCTURE: CPT

## 2017-04-12 PROCEDURE — 85025 COMPLETE CBC W/AUTO DIFF WBC: CPT

## 2017-04-12 PROCEDURE — 25000003 PHARM REV CODE 250: Performed by: HOSPITALIST

## 2017-04-12 PROCEDURE — 25000003 PHARM REV CODE 250: Performed by: INTERNAL MEDICINE

## 2017-04-12 PROCEDURE — 83735 ASSAY OF MAGNESIUM: CPT

## 2017-04-12 PROCEDURE — 20600001 HC STEP DOWN PRIVATE ROOM

## 2017-04-12 PROCEDURE — 99233 SBSQ HOSP IP/OBS HIGH 50: CPT | Mod: ,,, | Performed by: INTERNAL MEDICINE

## 2017-04-12 PROCEDURE — 63600175 PHARM REV CODE 636 W HCPCS: Performed by: INTERNAL MEDICINE

## 2017-04-12 PROCEDURE — 80053 COMPREHEN METABOLIC PANEL: CPT

## 2017-04-12 RX ORDER — FUROSEMIDE 20 MG/1
20 TABLET ORAL 2 TIMES DAILY
Status: DISCONTINUED | OUTPATIENT
Start: 2017-04-13 | End: 2017-04-13

## 2017-04-12 RX ADMIN — GUAIFENESIN 600 MG: 600 TABLET, EXTENDED RELEASE ORAL at 09:04

## 2017-04-12 RX ADMIN — HYDRALAZINE HYDROCHLORIDE AND ISOSORBIDE DINITRATE 2 TABLET: 37.5; 2 TABLET, FILM COATED ORAL at 01:04

## 2017-04-12 RX ADMIN — BENZTROPINE MESYLATE 0.5 MG: 0.5 TABLET ORAL at 10:04

## 2017-04-12 RX ADMIN — GUAIFENESIN 600 MG: 600 TABLET, EXTENDED RELEASE ORAL at 10:04

## 2017-04-12 RX ADMIN — Medication 3 ML: at 10:04

## 2017-04-12 RX ADMIN — ASPIRIN 81 MG CHEWABLE TABLET 81 MG: 81 TABLET CHEWABLE at 09:04

## 2017-04-12 RX ADMIN — Medication 3 ML: at 06:04

## 2017-04-12 RX ADMIN — QUETIAPINE FUMARATE 200 MG: 200 TABLET, FILM COATED ORAL at 10:04

## 2017-04-12 RX ADMIN — ALLOPURINOL 100 MG: 100 TABLET ORAL at 09:04

## 2017-04-12 RX ADMIN — ERYTHROPOIETIN 7100 UNITS: 10000 INJECTION, SOLUTION INTRAVENOUS; SUBCUTANEOUS at 06:04

## 2017-04-12 RX ADMIN — HYDRALAZINE HYDROCHLORIDE AND ISOSORBIDE DINITRATE 2 TABLET: 37.5; 2 TABLET, FILM COATED ORAL at 10:04

## 2017-04-12 RX ADMIN — METOPROLOL SUCCINATE 50 MG: 25 TABLET, EXTENDED RELEASE ORAL at 09:04

## 2017-04-12 RX ADMIN — STANDARDIZED SENNA CONCENTRATE AND DOCUSATE SODIUM 1 TABLET: 8.6; 5 TABLET, FILM COATED ORAL at 09:04

## 2017-04-12 RX ADMIN — Medication 3 ML: at 01:04

## 2017-04-12 NOTE — PLAN OF CARE
Problem: Patient Care Overview  Goal: Plan of Care Review  Pt remained free of falls/injuries/trauma. VSS. Denies pain. No discomforts or complaints noted. Patient Creatinine 3.2. Nephrology consulted. Plan of care reviewed with patient. Patient verbalizes understandin

## 2017-04-12 NOTE — PROGRESS NOTES
Pt refused morning meds. Will continue to monitor. Notified MD Davidson. No new orders were given. Will continue to monitor.

## 2017-04-12 NOTE — PROGRESS NOTES
Nephrology progress note         SUBJECTIVE:       Interval Hx:  Lasix held another day. Ms Sal is laying supine. Feeling well. Would like to go home tomorrow.   Creatinine improved to 2.8.       Review of Systems:  Constitutional: no fever or chills  Respiratory: +sob  Cardiovascular: no chest pain or palpitations  Gastrointestinal: no nausea or vomiting, no abdominal pain or change in bowel habits  Musculoskeletal: no arthralgias or myalgias  Neurological: no seizures or tremors    OBJECTIVE:     Vital Signs (Most Recent)  Temp: 98.6 °F (37 °C) (04/12/17 0907)  Pulse: 73 (04/12/17 1000)  Resp: 16 (04/12/17 0907)  BP: 136/70 (04/12/17 0907)  SpO2: 98 % (04/12/17 0907)    Vital Signs Range (Last 24H):  Temp:  [98.6 °F (37 °C)-98.8 °F (37.1 °C)]   Pulse:  [70-81]   Resp:  [16-20]   BP: (109-141)/(53-77)   SpO2:  [95 %-99 %]     Physical Exam:  General: well developed, well nourished  Lungs:  normal respiratory effort and decreased bs at posterior bases, no rales, CTA anteriorly  Cardiovascular: Heart: regular rate and rhythm. Chest Wall: no tenderness. Extremities: no cyanosis or edema, or clubbing. Pulses: 2+ and symmetric.  Abdomen/Rectal: Abdomen: soft, nondistended. Rectal: not examined  Musculoskeletal:no clubbing, cyanosis and minimal JVD   Neurologic: Mental status: alertness: alert    Laboratory:  CBC:     Recent Labs  Lab 04/12/17  0533   WBC 4.43  4.43   RBC 2.55*  2.55*   HGB 7.9*  7.9*   HCT 23.4*  23.4*     171   MCV 92  92   MCH 31.0  31.0   MCHC 33.8  33.8     CMP:     Recent Labs  Lab 04/12/17  0533     109   CALCIUM 9.3  9.3   ALBUMIN 3.0*  3.0*   PROT 6.7  6.7     142   K 3.5  3.5   CO2 24  24     106   BUN 63*  63*   CREATININE 2.8*  2.8*   ALKPHOS 65  65   ALT 26  26   AST 52*  52*   BILITOT 2.0*  2.0*       Recent Labs  Lab 04/10/17  1116   COLORU Yellow  Yellow   SPECGRAV 1.010  1.010   PHUR 8.0  8.0   PROTEINUA Negative  Negative    NITRITE Negative  Negative   LEUKOCYTESUR Negative  Negative   UROBILINOGEN Negative  Negative       Diagnostic Results:  Labs: Reviewed  X-Ray: Reviewed    ASSESSMENT/PLAN:     Patient is a 71 y.o.AA female presents with CKD 3 baseline GFR 35 with proteinuria and UPC is most recently 0.23g/g.   Seen in nephrology clinic by Dr Benoit in feb 2017.     Admitted on 4/4/17 with PMhx of CHF ef 20%, HTN, hx Breast CA, CAD, HCV with complaints of SOB and slurred speech. She had been taking Lasix 60 mg bid daily over the past week in attemps to improve her symptoms. Usually home dose is Lasix 20 mg bid. On admission she was started on Lasix 80mg IV bid with about 2 liter urine output daily, on the third day she transitioned to po lasix 40 mg bid. Despite this de-escelation, her creatinine continued to increase up to 3.0 today with BUN up to 75. She says her SOB is improved (at baseline). Lasix was held today.   Renal ultrasound from 11/16 shows bilateral renal cysts, some minimally complex. CXR shows bilateral effusions.     CANDI on CKD 3-4 due to overdiuresis with lasix    Lasix held another day. Ms Sal is laying supine. Feeling well. Would like to go home tomorrow.   Creatinine improved to 2.8.     Suggest restarting home dose lasix 20 mg po bid tomorrow   Continue to hold ARB      Acid/base  May restart home bicarb dose on discharge    Proteinuria  Decreased since January with improved serum albumin   Restart home valsartan once CANDI resolved  No plans for renal biopsy due to decreased cortical thickness in ultrasound and degree of CKD.     Renal cysts/some minimally complex  Follow up ultrasound shows multiple simple and mildly complex cysts with thin septation.    The largest cyst on the right measures 1.6 x 1.8 x 1.4 cm and the largest on the left 2.2 x 1.3 x 1.6 cm.   Follow up with urology as outpatient.       Anemia of CKD  TSAT 83 Ferritin 303  Will benefit from Epo TIW- will give today.     MBD of CKD  PTH  134  Will get vit D 30- adequate    Corrected ca~ 10.3. No need for supplemental vit D      Adelina Lopez MD  Nephrology Fellow  Pager [phone]

## 2017-04-12 NOTE — PLAN OF CARE
Problem: Patient Care Overview  Goal: Plan of Care Review  Outcome: Ongoing (interventions implemented as appropriate)  Plan of care reviewed with patient. Patient remains free from injury. No acute events noted at this time. Will continue to monitor patient.

## 2017-04-13 VITALS
WEIGHT: 157.44 LBS | HEIGHT: 66 IN | HEART RATE: 76 BPM | SYSTOLIC BLOOD PRESSURE: 128 MMHG | TEMPERATURE: 99 F | RESPIRATION RATE: 16 BRPM | DIASTOLIC BLOOD PRESSURE: 60 MMHG | BODY MASS INDEX: 25.3 KG/M2 | OXYGEN SATURATION: 98 %

## 2017-04-13 LAB
ANION GAP SERPL CALC-SCNC: 11 MMOL/L
BUN SERPL-MCNC: 50 MG/DL
CALCIUM SERPL-MCNC: 9 MG/DL
CHLORIDE SERPL-SCNC: 109 MMOL/L
CO2 SERPL-SCNC: 23 MMOL/L
CREAT SERPL-MCNC: 2.4 MG/DL
EST. GFR  (AFRICAN AMERICAN): 22.7 ML/MIN/1.73 M^2
EST. GFR  (NON AFRICAN AMERICAN): 19.7 ML/MIN/1.73 M^2
GLUCOSE SERPL-MCNC: 97 MG/DL
POTASSIUM SERPL-SCNC: 3.6 MMOL/L
SODIUM SERPL-SCNC: 143 MMOL/L

## 2017-04-13 PROCEDURE — 97116 GAIT TRAINING THERAPY: CPT

## 2017-04-13 PROCEDURE — 25000003 PHARM REV CODE 250: Performed by: INTERNAL MEDICINE

## 2017-04-13 PROCEDURE — 25000003 PHARM REV CODE 250: Performed by: HOSPITALIST

## 2017-04-13 PROCEDURE — 97110 THERAPEUTIC EXERCISES: CPT

## 2017-04-13 PROCEDURE — 99239 HOSP IP/OBS DSCHRG MGMT >30: CPT | Mod: ,,, | Performed by: INTERNAL MEDICINE

## 2017-04-13 PROCEDURE — 80048 BASIC METABOLIC PNL TOTAL CA: CPT

## 2017-04-13 PROCEDURE — 36415 COLL VENOUS BLD VENIPUNCTURE: CPT

## 2017-04-13 RX ORDER — FUROSEMIDE 20 MG/1
20 TABLET ORAL DAILY
Status: DISCONTINUED | OUTPATIENT
Start: 2017-04-13 | End: 2017-04-13 | Stop reason: HOSPADM

## 2017-04-13 RX ORDER — FUROSEMIDE 40 MG/1
20 TABLET ORAL 2 TIMES DAILY
Qty: 60 TABLET | Refills: 2 | Status: SHIPPED | OUTPATIENT
Start: 2017-04-13 | End: 2017-05-23 | Stop reason: SDUPTHER

## 2017-04-13 RX ADMIN — FUROSEMIDE 20 MG: 20 TABLET ORAL at 09:04

## 2017-04-13 RX ADMIN — LEVOTHYROXINE SODIUM 50 MCG: 50 TABLET ORAL at 05:04

## 2017-04-13 RX ADMIN — METOPROLOL SUCCINATE 50 MG: 25 TABLET, EXTENDED RELEASE ORAL at 09:04

## 2017-04-13 RX ADMIN — ALLOPURINOL 100 MG: 100 TABLET ORAL at 09:04

## 2017-04-13 RX ADMIN — ACETAMINOPHEN 650 MG: 325 TABLET ORAL at 10:04

## 2017-04-13 RX ADMIN — HYDRALAZINE HYDROCHLORIDE AND ISOSORBIDE DINITRATE 2 TABLET: 37.5; 2 TABLET, FILM COATED ORAL at 05:04

## 2017-04-13 RX ADMIN — STANDARDIZED SENNA CONCENTRATE AND DOCUSATE SODIUM 1 TABLET: 8.6; 5 TABLET, FILM COATED ORAL at 09:04

## 2017-04-13 RX ADMIN — HYDRALAZINE HYDROCHLORIDE AND ISOSORBIDE DINITRATE 2 TABLET: 37.5; 2 TABLET, FILM COATED ORAL at 02:04

## 2017-04-13 RX ADMIN — ASPIRIN 81 MG CHEWABLE TABLET 81 MG: 81 TABLET CHEWABLE at 09:04

## 2017-04-13 RX ADMIN — Medication 3 ML: at 02:04

## 2017-04-13 NOTE — PLAN OF CARE
Problem: Patient Care Overview  Goal: Plan of Care Review  Outcome: Ongoing (interventions implemented as appropriate)  Plan of care discussed with patient. Patient is free of fall/trauma/injury. All questions addressed. VSS, NAD noted. Will continue to monitor.

## 2017-04-13 NOTE — ASSESSMENT & PLAN NOTE
Well controlled at this time.   1. Continue metoprolol, and isosorbide-hydralazine.   2. Hold ARB. Follow up with PCP to assess optimal time to re-start.  3. Monitor Bp and titrate antihypertensive therapy as needed.

## 2017-04-13 NOTE — PLAN OF CARE
[facility]    HOME HEALTH ORDERS  FACE TO FACE ENCOUNTER    Patient Name: Anai Sal  YOB: 1945    PCP: Live Young MD   PCP Address: [address] [place] [id number]  PCP Phone Number: [phone]  PCP Fax: [phone]    Encounter Date: 04/13/2017    Admit to Home Health    Diagnoses:  Active Hospital Problems    Diagnosis  POA    *Acute on chronic combined systolic and diastolic congestive heart failure [I50.43]  Yes     Priority: 1 - High    Chronic combined systolic and diastolic heart failure, NYHA class 3 [I50.42]  Yes     Priority: 2     Atrial fibrillation [I48.91]  Yes     Priority: 3     Essential hypertension [I10]  Yes     Priority: 4     Acute renal failure superimposed on stage 4 chronic kidney disease [N17.9, N18.4]  No     Priority: 5     Stage 4 chronic kidney disease [N18.4]  Yes     Priority: 5     Chronic obstructive pulmonary disease [J44.9]  Yes    Leukopenia [D72.819]  Yes    Delirium [R41.0]  Yes    Mitral valve insufficiency [I34.0]  Yes    Primary hypothyroidism [E03.9]  Yes      Resolved Hospital Problems    Diagnosis Date Resolved POA   No resolved problems to display.       Future Appointments  Date Time Provider Department Center   5/1/2017 10:30 AM LAB, APPOINTMENT [facility] LAB VNP [facility]   5/1/2017 10:35 AM SPECIMEN, [facility] SPECLAB [facility]   5/2/2017 11:00 AM Kamila Hughes MD [facility] PULMSVC [facility]   5/10/2017 11:40 AM Bud Benoit MD [facility] NEPHRO [facility]   5/15/2017 9:30 AM ECHO, [facility] ECHOLAB [facility]   5/15/2017 10:40 AM Hussein Costa MD [facility] CARDVAL [facility]     Follow-up Information     Follow up with [facility] On 4/12/2017.    Specialty:  Lab    Why:  09:05 labs    Contact information:    [address]  [facility] [address] [phone]    Additional information:    2nd Floor        Follow up with Len Live MD On  4/12/2017.    Specialties:  Cardiology, Transplant    Why:  10:30 appointment    Contact information:    [address]  [facility] [address]  [phone]              I have seen and examined this patient face to face today. My clinical findings that support the need for the home health skilled services and home bound status are the following:  Weakness/numbness causing balance and gait disturbance due to Heart Failure and Weakness/Debility making it taxing to leave home.  Patient with medication mismanagement issues requiring home bound status as evidenced by  Poor understanding of medication regimen/dosage.  Medical restrictions requiring assistance of another human to leave home due to  Dyspnea on exertion (SOB).    Allergies:Review of patient's allergies indicates:  No Known Allergies    Diet: cardiac diet    Activities: activity as tolerated    Nursing:   SN to complete comprehensive assessment including routine vital signs. Instruct on disease process and s/s of complications to report to MD. Review/verify medication list sent home with the patient at time of discharge  and instruct patient/caregiver as needed. Frequency may be adjusted depending on start of care date.    Notify MD if SBP > 160 or < 90; DBP > 90 or < 50; HR > 120 or < 50; Temp > 101; Other:   N/A      CONSULTS:    Physical Therapy to evaluate and treat. Evaluate for home safety and equipment needs; Establish/upgrade home exercise program. Perform / instruct on therapeutic exercises, gait training, transfer training, and Range of Motion.  Occupational Therapy to evaluate and treat. Evaluate home environment for safety and equipment needs. Perform/Instruct on transfers, ADL training, ROM, and therapeutic exercises.   to evaluate for community resources/long-range planning.    MISCELLANEOUS CARE:  Heart Failure:      SN to instruct on the following:    Instruct on the definition of CHF.   Instruct on the signs/sympoms of CHF to  be reported.   Instruct on and monitor daily weights.   Instruct on factors that cause exacerbation.   Instruct on action, dose, schedule, and side effects of medications.   Instruct on diet as prescribed.   Instruct on activity allowed.   Instruct on life-style modifications for life long management of CHF   SN to assess compliance with daily weights, diet, medications, fluid retention,    safety precautions, activities permitted and life-style modifications.   Additional 1-2 SN visits per week as needed for signs and symptoms     of CHF exacerbation.      For Weight Gain > 2-3 lbs in 1 day or 4-6 lbs over 1 week notify PCP:  Increase furosemide (oral diuretic) dose to 40 mg BID for 5 days temporarily    WOUND CARE ORDERS  n/a      Medications: Review discharge medications with patient and family and provide education.      Current Discharge Medication List      START taking these medications    Details   fluticasone-salmeterol 250-50 mcg/dose (ADVAIR) 250-50 mcg/dose diskus inhaler Inhale 1 puff into the lungs 2 (two) times daily. Controller  Refills: 0      isosorbide-hydrALAZINE 20-37.5 mg (BIDIL) 20-37.5 mg Tab Take 2 tablets by mouth 3 (three) times daily.  Qty: 180 tablet, Refills: 2      metoprolol succinate (TOPROL-XL) 50 MG 24 hr tablet Take 1 tablet (50 mg total) by mouth once daily.  Qty: 30 tablet, Refills: 2         CONTINUE these medications which have CHANGED    Details   furosemide (LASIX) 40 MG tablet Take 0.5 tablets (20 mg total) by mouth 2 (two) times daily.  Qty: 60 tablet, Refills: 2    Associated Diagnoses: CKD (chronic kidney disease) stage 3, GFR 30-59 ml/min; Proteinuria; Edema, unspecified type         CONTINUE these medications which have NOT CHANGED    Details   allopurinol (ZYLOPRIM) 100 MG tablet Take 100 mg by mouth 2 (two) times daily.       aspirin 81 MG Chew Take 1 tablet (81 mg total) by mouth once daily.  Refills: 0      benztropine (COGENTIN) 0.5 MG tablet Take 1 tablet (0.5 mg  total) by mouth nightly.  Qty: 30 tablet, Refills: 0      diclofenac sodium 1 % Gel Apply 4 g topically 4 (four) times daily. Prn pain      gabapentin (NEURONTIN) 300 MG capsule Take 300 mg by mouth 3 (three) times daily as needed (for nerve pain).       guaifenesin (MUCINEX) 600 mg 12 hr tablet Take 1 tablet (600 mg total) by mouth 2 (two) times daily.      levothyroxine (SYNTHROID) 50 MCG tablet Take 1 tablet (50 mcg total) by mouth before breakfast.  Qty: 30 tablet, Refills: 11      melatonin 3 mg Tab Take by mouth every evening.      quetiapine (SEROQUEL) 200 MG Tab Take 1 tablet (200 mg total) by mouth nightly.  Qty: 30 tablet, Refills: 0      sodium bicarbonate 650 MG tablet Take 2 tablets (1,300 mg total) by mouth 2 (two) times daily.  Qty: 360 tablet, Refills: 3    Associated Diagnoses: CKD (chronic kidney disease) stage 3, GFR 30-59 ml/min; Metabolic acidosis      betamethasone valerate 0.1% (VALISONE) 0.1 % Lotn Apply topically once daily. 2 drops both ears once daily  Qty: 1 Bottle, Refills: 3      fluticasone (FLONASE) 50 mcg/actuation nasal spray 2 sprays by Each Nare route once daily.  Qty: 1 Bottle, Refills: 3         STOP taking these medications       carvedilol (COREG) 12.5 MG tablet Comments:   Reason for Stopping:         valsartan (DIOVAN) 320 MG tablet Comments:   Reason for Stopping:               I certify that this patient is confined to her home and needs intermittent skilled nursing care, physical therapy and occupational therapy.

## 2017-04-13 NOTE — PROGRESS NOTES
Nephrology progress note         SUBJECTIVE:       Interval Hx:  Lasix restarted today. Plans for discharge  Creatinine improved to 2.4    Review of Systems:  Constitutional- Negative for Fever, Negative for weakness  Neuro- Negative for Confusion, Negative for hallucinations  CV- Negative for Chest Pain, Negative for palpitations  Resp- Negative for Cough, Negative for SOB  GI- negative for nausea, vomiting, Negative for diarrhea  Extrem- Negative for Pain, Negative for Swelling      OBJECTIVE:     Vital Signs (Most Recent)  Temp: 98.5 °F (36.9 °C) (04/13/17 1109)  Pulse: 74 (04/13/17 1119)  Resp: 18 (04/13/17 1109)  BP: (!) 140/64 (04/13/17 1109)  SpO2: 98 % (04/13/17 1109)    Vital Signs Range (Last 24H):  Temp:  [98.3 °F (36.8 °C)-99.4 °F (37.4 °C)]   Pulse:  [61-86]   Resp:  [16-18]   BP: (126-146)/(59-70)   SpO2:  [94 %-99 %]     Physical Exam:  General: well developed, well nourished  Lungs:  normal respiratory effort and decreased bs at posterior bases, no rales, CTA anteriorly  Cardiovascular: Heart: regular rate and rhythm. Chest Wall: no tenderness. Extremities: no cyanosis or edema, or clubbing. Pulses: 2+ and symmetric.  Abdomen/Rectal: Abdomen: soft, nondistended. Rectal: not examined  Musculoskeletal:no clubbing, cyanosis and minimal JVD   Neurologic: Mental status: alertness: alert    Laboratory:  CBC:     Recent Labs  Lab 04/12/17  0533   WBC 4.43  4.43   RBC 2.55*  2.55*   HGB 7.9*  7.9*   HCT 23.4*  23.4*     171   MCV 92  92   MCH 31.0  31.0   MCHC 33.8  33.8     CMP:     Recent Labs  Lab 04/12/17  0533 04/13/17  0439     109 97   CALCIUM 9.3  9.3 9.0   ALBUMIN 3.0*  3.0*  --    PROT 6.7  6.7  --      142 143   K 3.5  3.5 3.6   CO2 24  24 23     106 109   BUN 63*  63* 50*   CREATININE 2.8*  2.8* 2.4*   ALKPHOS 65  65  --    ALT 26  26  --    AST 52*  52*  --    BILITOT 2.0*  2.0*  --        Recent Labs  Lab 04/10/17  1116   COLORU Yellow  Yellow    SPECGRAV 1.010  1.010   PHUR 8.0  8.0   PROTEINUA Negative  Negative   NITRITE Negative  Negative   LEUKOCYTESUR Negative  Negative   UROBILINOGEN Negative  Negative       Diagnostic Results:  Labs: Reviewed  X-Ray: Reviewed    ASSESSMENT/PLAN:     Patient is a 71 y.o.AA female presents with CKD 3 baseline GFR 35 with proteinuria and UPC is most recently 0.23g/g.   Seen in nephrology clinic by Dr Benoit in feb 2017.     Admitted on 4/4/17 with PMhx of CHF ef 20%, HTN, hx Breast CA, CAD, HCV with complaints of SOB and slurred speech. She had been taking Lasix 60 mg bid daily over the past week in attemps to improve her symptoms. Usually home dose is Lasix 20 mg bid. On admission she was started on Lasix 80mg IV bid with about 2 liter urine output daily, on the third day she transitioned to po lasix 40 mg bid. Despite this de-escelation, her creatinine continued to increase up to 3.0 today with BUN up to 75. She says her SOB is improved (at baseline). Lasix was held today.   Renal ultrasound from 11/16 shows bilateral renal cysts, some minimally complex. CXR shows bilateral effusions.     CANDI on CKD 3-4 due to overdiuresis with lasix  =  Lasix restarted today. Plans for discharge  Creatinine improved to 2.4    Continue to hold ARB and follow up with nephrology      Acid/base  May restart home bicarb dose on discharge    Proteinuria  Decreased since January with improved serum albumin   Restart home valsartan once CANDI resolved  No plans for renal biopsy due to decreased cortical thickness in ultrasound and degree of CKD.     Renal cysts/some minimally complex  Follow up ultrasound shows multiple simple and mildly complex cysts with thin septation.    The largest cyst on the right measures 1.6 x 1.8 x 1.4 cm and the largest on the left 2.2 x 1.3 x 1.6 cm.   Follow up with urology as outpatient.       Anemia of CKD  TSAT 83 Ferritin 303  epo given    MBD of CKD    Will get vit D 30- adequate    Corrected  ca~ 10.3. No need for supplemental vit D      Adelina Lopez MD  Nephrology Fellow  Pager [phone]

## 2017-04-13 NOTE — SUBJECTIVE & OBJECTIVE
"Interval History: "Good". No acute overnight events. Medically stable for discharge.     Review of Systems   Constitutional: Negative for chills, fatigue, fever and unexpected weight change.   HENT: Negative for ear pain, facial swelling, hearing loss, mouth sores, nosebleeds, rhinorrhea, sinus pressure, sore throat, tinnitus, trouble swallowing and voice change.    Eyes: Negative for photophobia, pain, redness and visual disturbance.   Respiratory: Positive for shortness of breath (at baseline). Negative for cough, chest tightness and wheezing.    Cardiovascular: Negative for chest pain, palpitations and leg swelling.   Gastrointestinal: Negative for abdominal pain, blood in stool, constipation, diarrhea, nausea and vomiting.   Endocrine: Negative for cold intolerance, heat intolerance, polydipsia, polyphagia and polyuria.   Genitourinary: Negative for decreased urine volume, dysuria, flank pain, frequency, hematuria, menstrual problem, urgency, vaginal bleeding, vaginal discharge and vaginal pain.   Musculoskeletal: Negative for arthralgias, back pain, joint swelling, myalgias and neck pain.   Skin: Negative for rash.   Allergic/Immunologic: Negative for environmental allergies, food allergies and immunocompromised state.   Neurological: Negative for dizziness, seizures, syncope, weakness, light-headedness, numbness and headaches.   Hematological: Negative for adenopathy. Does not bruise/bleed easily.   Psychiatric/Behavioral: Negative for confusion, hallucinations, self-injury, sleep disturbance and suicidal ideas. The patient is not nervous/anxious.      Objective:     Vital Signs (Most Recent):  Temp: 98.5 °F (36.9 °C) (04/13/17 0726)  Pulse: 79 (04/13/17 1000)  Resp: 18 (04/13/17 0726)  BP: 126/64 (04/13/17 0726)  SpO2: 99 % (04/13/17 0726) Vital Signs (24h Range):  Temp:  [98.3 °F (36.8 °C)-99.4 °F (37.4 °C)] 98.5 °F (36.9 °C)  Pulse:  [61-86] 79  Resp:  [16-18] 18  SpO2:  [94 %-99 %] 99 %  BP: " (126-153)/(59-72) 126/64     Weight: 71.4 kg (157 lb 6.5 oz)  Body mass index is 25.41 kg/(m^2).    Intake/Output Summary (Last 24 hours) at 04/13/17 1045  Last data filed at 04/13/17 0600   Gross per 24 hour   Intake              900 ml   Output              550 ml   Net              350 ml      Physical Exam   Constitutional: She is oriented to person, place, and time. She appears well-developed and well-nourished. She is cooperative. She is easily aroused.  Non-toxic appearance. No distress.   HENT:   Head: Normocephalic and atraumatic. Head is without right periorbital erythema and without left periorbital erythema.   Right Ear: Hearing, tympanic membrane, external ear and ear canal normal. No swelling.   Left Ear: Hearing, tympanic membrane, external ear and ear canal normal. No swelling.   Nose: Nose normal. No nasal deformity.   Mouth/Throat: Uvula is midline, oropharynx is clear and moist and mucous membranes are normal. No oropharyngeal exudate.   Eyes: Conjunctivae, EOM and lids are normal. Pupils are equal, round, and reactive to light. Right eye exhibits no discharge and no exudate. Left eye exhibits no discharge and no exudate. Right conjunctiva is not injected. Left conjunctiva is not injected. No scleral icterus.   Neck: Normal range of motion. Neck supple. No tracheal deviation present. No thyromegaly present.   Cardiovascular: Normal rate, regular rhythm, S1 normal, S2 normal, normal heart sounds and intact distal pulses.  Exam reveals no gallop and no friction rub.    No murmur heard.  Pulmonary/Chest: Effort normal and breath sounds normal. No accessory muscle usage or stridor. No tachypnea. No respiratory distress. She has no wheezes. She has no rales. She exhibits no tenderness.   Abdominal: Soft. Normal appearance and bowel sounds are normal. She exhibits no distension. There is no tenderness. There is no rebound and no guarding.   Musculoskeletal: Normal range of motion. She exhibits no edema  or tenderness.   Neurological: She is alert, oriented to person, place, and time and easily aroused. No cranial nerve deficit. Coordination normal.   Skin: Skin is warm and dry. No lesion and no rash noted. She is not diaphoretic. No cyanosis or erythema. No pallor. Nails show no clubbing.   Psychiatric: She has a normal mood and affect. Her speech is normal and behavior is normal. Judgment and thought content normal.   Nursing note and vitals reviewed.      Significant Labs:   BMP:   Recent Labs  Lab 04/12/17  0533 04/13/17  0439     109 97     142 143   K 3.5  3.5 3.6     106 109   CO2 24  24 23   BUN 63*  63* 50*   CREATININE 2.8*  2.8* 2.4*   CALCIUM 9.3  9.3 9.0   MG 2.5  2.5  --      CBC:   Recent Labs  Lab 04/12/17  0533   WBC 4.43  4.43   HGB 7.9*  7.9*   HCT 23.4*  23.4*     171       Significant Imaging: I have reviewed all pertinent imaging results/findings within the past 24 hours.

## 2017-04-13 NOTE — PLAN OF CARE
received home health orders today.  Will fax all clinical information to [facility] at #[phone]; [phone] to be evaluated and coordinated by [facility] .  Will contact pts daughter to inform her of the above.  [facility] will set up home health and any discharge needs.

## 2017-04-13 NOTE — PROGRESS NOTES
[facility] Hospital Medicine  Progress Note    Patient Name: Anai Sal  MRN: [id number]  Patient Class: IP- Inpatient   Admission Date: 4/4/2017  Length of Stay: 8 days  Attending Physician: Natalia Martínez MD  Primary Care Provider: Live Young MD    [facility] Hospital Medicine Team: [facility] HOSP MED C Natalia Martínez MD    Subjective:     Principal Problem:Acute on chronic combined systolic and diastolic congestive heart failure    HPI:  72 y/o AAF hx of CHF ef 20%, HTN, hx Breast CA, CAD, HCV presents with dyspnea and slurred speech. Patient is seen in [facility] clinic and per ED notes, patient with worsening dyspnea this morning and also hypoxia on room air, slurred speech and acute encephalopathy. She has had recent admission to hospital medicine for management of CHF exacerbation and was discharged on lasix 20mg BID.       Per ED provider who spoke with patients daughter (Tershone [phone]), PACE clinic had increased patient to 60mg PO lasix over the past week. The patient does not manage her medications and is unable to provide additional history      On interview of the patient, she lives with daughter, performs most ADL, she does not use oxygen at home and denies active chest pain, has some dyspnea and reports she has fluid on her legs. She is oriented to person/hospital/president, city/state. She is NOT oriented to Day/Date/Month/Year.       Hospital Course:  Patient admitted, started on IV diuresis with 80mg lasix BID, started on bidil for additional anti-hypertensive control.  She was doing well with IV diuresis and blood pressure control improved with addition of BIDIL and resuming home coreg.  On 4/8 in the afternoon patient with onset of atrial fibrillation with RVR, given IV lopressor and coreg switched to metoprolol tartrate 25mg TID.  On 4/9 in the morning patient has converted back to a sinus rhythm and oral diuretics resumed and patient switched to Toprol XL for heart  "failure and rate control management.  She has a rising Creatinine on morning of 4/10 despite achieving euvolemia, additional urine studies ordered and Nephrology consulted, diuretics placed on hold. Creatinine improved after holding diuretic therapy.     Interval History: "good". No acute overnight events. Creatinine now down trending.    Review of Systems   Constitutional: Negative for chills, fatigue, fever and unexpected weight change.   HENT: Negative for ear pain, facial swelling, hearing loss, mouth sores, nosebleeds, rhinorrhea, sinus pressure, sore throat, tinnitus, trouble swallowing and voice change.    Eyes: Negative for photophobia, pain, redness and visual disturbance.   Respiratory: Positive for shortness of breath. Negative for cough, chest tightness and wheezing.    Cardiovascular: Negative for chest pain, palpitations and leg swelling.   Gastrointestinal: Negative for abdominal pain, blood in stool, constipation, diarrhea, nausea and vomiting.   Endocrine: Negative for cold intolerance, heat intolerance, polydipsia, polyphagia and polyuria.   Genitourinary: Negative for decreased urine volume, dysuria, flank pain, frequency, hematuria, menstrual problem, urgency, vaginal bleeding, vaginal discharge and vaginal pain.   Musculoskeletal: Negative for arthralgias, back pain, joint swelling, myalgias and neck pain.   Skin: Negative for rash.   Allergic/Immunologic: Negative for environmental allergies, food allergies and immunocompromised state.   Neurological: Negative for dizziness, seizures, syncope, weakness, light-headedness, numbness and headaches.   Hematological: Negative for adenopathy. Does not bruise/bleed easily.   Psychiatric/Behavioral: Negative for confusion, hallucinations, self-injury, sleep disturbance and suicidal ideas. The patient is not nervous/anxious.      Objective:     Vital Signs (Most Recent):  Temp: 99.4 °F (37.4 °C) (04/12/17 1511)  Pulse: 85 (04/12/17 1957)  Resp: 16 " (04/12/17 1511)  BP: (!) 146/67 (04/12/17 1957)  SpO2: 98 % (04/12/17 1511) Vital Signs (24h Range):  Temp:  [98.6 °F (37 °C)-99.4 °F (37.4 °C)] 99.4 °F (37.4 °C)  Pulse:  [61-85] 85  Resp:  [16-18] 16  SpO2:  [97 %-99 %] 98 %  BP: (109-153)/(53-77) 146/67     Weight: 71.4 kg (157 lb 6.5 oz)  Body mass index is 25.41 kg/(m^2).    Intake/Output Summary (Last 24 hours) at 04/12/17 2102  Last data filed at 04/12/17 1800   Gross per 24 hour   Intake              720 ml   Output              550 ml   Net              170 ml      Physical Exam   Constitutional: She is oriented to person, place, and time. She appears well-developed and well-nourished. She is cooperative. She is easily aroused.  Non-toxic appearance. No distress.   HENT:   Head: Normocephalic and atraumatic. Head is without right periorbital erythema and without left periorbital erythema.   Right Ear: Hearing, tympanic membrane, external ear and ear canal normal. No swelling.   Left Ear: Hearing, tympanic membrane, external ear and ear canal normal. No swelling.   Nose: Nose normal. No nasal deformity.   Mouth/Throat: Uvula is midline, oropharynx is clear and moist and mucous membranes are normal. No oropharyngeal exudate.   Eyes: Conjunctivae, EOM and lids are normal. Pupils are equal, round, and reactive to light. Right eye exhibits no discharge and no exudate. Left eye exhibits no discharge and no exudate. Right conjunctiva is not injected. Left conjunctiva is not injected. No scleral icterus.   Neck: Normal range of motion. Neck supple. No tracheal deviation present. No thyromegaly present.   Cardiovascular: Normal rate, regular rhythm, S1 normal, S2 normal, normal heart sounds and intact distal pulses.  Exam reveals no gallop and no friction rub.    No murmur heard.  Pulmonary/Chest: Effort normal and breath sounds normal. No accessory muscle usage or stridor. No tachypnea. No respiratory distress. She has no wheezes. She has no rales. She exhibits no  tenderness.   Abdominal: Soft. Normal appearance and bowel sounds are normal. She exhibits no distension. There is no tenderness. There is no rebound and no guarding.   Musculoskeletal: Normal range of motion. She exhibits no edema or tenderness.   Neurological: She is alert, oriented to person, place, and time and easily aroused. No cranial nerve deficit. Coordination normal.   Skin: Skin is warm and dry. No lesion and no rash noted. She is not diaphoretic. No cyanosis or erythema. No pallor. Nails show no clubbing.   Psychiatric: She has a normal mood and affect. Her speech is normal and behavior is normal. Judgment and thought content normal.   Nursing note and vitals reviewed.      Significant Labs:   BMP:   Recent Labs  Lab 04/12/17  0533     109     142   K 3.5  3.5     106   CO2 24  24   BUN 63*  63*   CREATININE 2.8*  2.8*   CALCIUM 9.3  9.3   MG 2.5  2.5     CBC:   Recent Labs  Lab 04/11/17  0411 04/12/17  0533   WBC 5.25 4.43  4.43   HGB 8.4* 7.9*  7.9*   HCT 25.1* 23.4*  23.4*    171  171       Significant Imaging: I have reviewed all pertinent imaging results/findings within the past 24 hours.    Assessment/Plan:      * Acute on chronic combined systolic and diastolic congestive heart failure  Patient with SOB at her baseline. Currently euvolemic and with uptrend in creatinine.   1. Continue metoprolol (Toprol XL) 50 mg daily.  2. Continue isosorbide-hydralazine three time daily.  3. Restart furosemide 20 mg BID tomorrow.        Chronic combined systolic and diastolic heart failure, NYHA class 3  As above.      Atrial fibrillation  Currently rate controlled.   1. Continue metoprolol.   2. Patient would benefit from anticoagulation in the setting of a CHADS-VASc 4. However patients daughter will decide if to start anticoagulation as an outpatient with her usual physicians.       Essential hypertension  Well controlled at this time.   1. Continue metoprolol, and  isosorbide-hydralazine.   2. Hold ARB in the setting of CANDI.  3. Monitor Bp and titrate antihypertensive therapy as needed.       Stage 4 chronic kidney disease  As above.      Acute renal failure superimposed on stage 4 chronic kidney disease  Patient with baseline creatinine which ranges between 2.3-2.5. Creatinine down trended from 3.2 to 2.8.  1. Monitor creatinine level.  2. Avoid nephrotoxic agents.        Primary hypothyroidism  Elevated TSH likely due to medication intake.   1. Continue levothyroxine 50 mcg daily.   2. Outpatient follow up with PCP.      Mitral valve insufficiency  Severe stable disease.   1. Outpatient follow up with Dr. Costa for further evaluation.       Chronic obstructive pulmonary disease  Stable and without acute exacerbation.   1. Continue fluticasone nasal spray.  2. Continue fluticasone-vilanterol inhaler.     Leukopenia  Resolved.      Delirium  Patient with delirium upon admission. Now appears to be improving but still with intermittent confusion.   1. Delirium precautions.  2. Continue quetiapine 200 mg nightly.         VTE Risk Mitigation         Ordered     Medium Risk of VTE  Once      04/08/17 1600     Place sequential compression device  Until discontinued      04/08/17 1600     Place sequential compression device  Until discontinued      04/04/17 1621          Natalia Martínez MD  Department of Hospital Medicine   [facility]

## 2017-04-13 NOTE — PT/OT/SLP PROGRESS
Physical Therapy  Treatment    Anai Sal   MRN: [id number]   Admitting Diagnosis: Acute on chronic combined systolic and diastolic congestive heart failure    PT Received On: 17  PT Start Time: 909     PT Stop Time: 932    PT Total Time (min): 23 min       Billable Minutes:  Gait [id number] and Therapeutic Exercise 8    Treatment Type: Treatment  PT/PTA: PT     PTA Visit Number: 0       General Precautions: Standard, fall, hearing impaired  Orthopedic Precautions: N/A   Braces: N/A    Do you have any cultural, spiritual, Yazidi conflicts, given your current situation?: none    Subjective:  Communicated with RN prior to session.  Pt agreeable to therapy.     Pain Ratin/10    Objective:   Patient found with: telemetry    Functional Mobility:  Bed Mobility:   Supine to Sit: Modified Independent    Transfers:  Sit <> Stand Assistance: Stand By Assistance  Sit <> Stand Assistive Device: Rolling Walker    Gait:   Gait Distance: ~220 ft.   Assistance 1: Stand by Assistance  Gait Assistive Device: Rolling walker  Gait Pattern: swing-through gait  Gait Deviation(s): decreased christi, forward lean    Difficulty maintaining a straight path and navigating around obstacles   Easily distracted in open environment (hallway)   Mod v/c for upright posture, forward gaze, and attending to task    Stairs:  Pt declined stair training 2* fatigue.     Balance:   Static Sit: independent   Dynamic Sit: supervision  Static Stand: supervision-SBA  Dynamic stand: SBA     Therapeutic Activities and Exercises:  Increased time spent encouraging pt to participate in therapy.   Performed seated hip flexion x20 reps BLE. Pt cued to continue LE therex but pt distracted by breakfast and no longer participating.      AM-PAC 6 CLICK MOBILITY  How much help from another person does this patient currently need?   1 = Unable, Total/Dependent Assistance  2 = A lot, Maximum/Moderate Assistance  3 = A little, Minimum/Contact  Guard/Supervision  4 = None, Modified [place]/Independent    Turning over in bed (including adjusting bedclothes, sheets and blankets)?: 4  Sitting down on and standing up from a chair with arms (e.g., wheelchair, bedside commode, etc.): 3  Moving from lying on back to sitting on the side of the bed?: 4  Moving to and from a bed to a chair (including a wheelchair)?: 3  Need to walk in hospital room?: 3  Climbing 3-5 steps with a railing?: 3  Total Score: 20    AM-PAC Raw Score CMS G-Code Modifier Level of Impairment Assistance   6 % Total / Unable   7 - 9 CM 80 - 100% Maximal Assist   10 - 14 CL 60 - 80% Moderate Assist   15 - 19 CK 40 - 60% Moderate Assist   20 - 22 CJ 20 - 40% Minimal Assist   23 CI 1-20% SBA / CGA   24 CH 0% Independent/ Mod I     Patient left up in chair with all lines intact and call button in reach.    Assessment:  Anai Sal is a 71 y.o. female with a medical diagnosis of Acute on chronic combined systolic and diastolic congestive heart failure and presents with limited functional mobility 2* impaired endurance, decreased balance, and generalized weakness. Pt progressing ambulation distance, but with increased difficulty navigating in open environment. Pt declined stair training this date 2* fatigue, but would continue to benefit from stair training to progress safety and (I) with household mobility. Pt would continue to benefit from skilled acute PT in order to address current deficits and progress functional mobility.     Rehab identified problem list/impairments: Rehab identified problem list/impairments: weakness, gait instability, impaired endurance, impaired balance, impaired cardiopulmonary response to activity, impaired self care skills, impaired functional mobilty, decreased safety awareness    Rehab potential is good.    Activity tolerance: Good    Discharge recommendations: Discharge Facility/Level Of Care Needs: home with home health     Barriers to discharge:  Barriers to Discharge: Decreased caregiver support    Equipment recommendations: Equipment Needed After Discharge: none     GOALS:   Physical Therapy Goals     Not on file      Multidisciplinary Problems (Resolved)        Problem: Physical Therapy Goal    Goal Priority Disciplines Outcome Goal Variances Interventions   Physical Therapy Goal   (Resolved)     PT/OT, PT Outcome(s) achieved     Description:  Goals to be met by: 17     Patient will increase functional independence with mobility by performin. Sit to stand transfer with [place]. - not met  2. Bed to chair transfer with Supervision using Rolling Walker.- not met  3. Gait  x 150 feet with Supervision using Rolling Walker. - not met  4. Ascend/descend 13 stair with right Handrails Supervision . - not met  5. Lower extremity exercise program x 20 reps per handout, with assistance as needed.- not met                 PLAN:    Patient to be seen 4 x/week  to address the above listed problems via gait training, therapeutic activities, therapeutic exercises  Plan of Care expires: 17  Plan of Care reviewed with: patient        Tia Raheem, PT, DPT   2017  [phone]

## 2017-04-13 NOTE — NURSING
"Pt has not voided entire shift & refusing to get up to bedside commode, states "it's just too hard right now. I don't have to go." Pt refusing bedpan as well. Bladder scan revealed 515 cc. Educated pt the importance of voiding, that letting urine sit in bladder is risk for UTI, kidney infection, kidney stones. Pt verbalized understanding, stated "I'll do it when I feel like it," Dr. Davidson notified, stated would leave a note for day team. Will continue to monitor.  "

## 2017-04-13 NOTE — DISCHARGE SUMMARY
[facility] Hospital Medicine  Discharge Summary      Patient Name: Anai Sal  MRN: [id number]  Admission Date: 4/4/2017  Hospital Length of Stay: 9 days  Discharge Date and Time: 4/13/2017  7:20 PM  Attending Physician: Natalia Martínez MD   Discharging Provider: Natalia Martínez MD  Primary Care Provider: Live Young MD    [facility] Medicine Team: [facility]OSP MED C Natalia Martínez MD    HPI: 70 y/o AAF hx of CHF ef 20%, HTN, hx Breast CA, CAD, HCV presents with dyspnea and slurred speech. Patient is seen in PACE clinic and per ED notes, patient with worsening dyspnea this morning and also hypoxia on room air, slurred speech and acute encephalopathy. She has had recent admission to hospital medicine for management of CHF exacerbation and was discharged on lasix 20mg BID.       Per ED provider who spoke with patients daughter (Tershone [phone]), PACE clinic had increased patient to 60mg PO lasix over the past week. The patient does not manage her medications and is unable to provide additional history      On interview of the patient, she lives with daughter, performs most ADL, she does not use oxygen at home and denies active chest pain, has some dyspnea and reports she has fluid on her legs. She is oriented to person/hospital/president, city/state. She is NOT oriented to Day/Date/Month/Year.     * No surgery found *      Indwelling Lines/Drains at time of discharge:   Lines/Drains/Airways          No matching active lines, drains, or airways        Hospital Course: Patient admitted, started on IV diuresis with 80mg lasix BID, started on bidil for additional anti-hypertensive control. She was doing well with IV diuresis and blood pressure control improved with addition of BIDIL and resuming home coreg. On 4/8 in the afternoon patient with onset of atrial fibrillation with RVR, given IV lopressor and coreg switched to metoprolol tartrate 25mg TID.     On 4/9 in the morning  patient has converted back to a sinus rhythm and oral diuretics resumed and patient switched to Toprol XL for heart failure and rate control management. She has a rising Creatinine on morning of 4/10 despite achieving euvolemia, additional urine studies ordered and Nephrology consulted, diuretics placed on hold. Creatinine improved after holding diuretic therapy.     On hospital day 9 her creatinine continued to down trend. She was started on lower dose furosemide and tolerated well. She was medically stable for discharge. Her delirium resolved within her hospital stay.    Consults:   Consults         Status Ordering Provider     Inpatient consult to Cardiology  Once     Provider:  (Not yet assigned)    Completed SHIVAM CRAIG     Inpatient consult to Cardiothoracic Surgery  Once     Provider:  (Not yet assigned)    Completed SHONDA BORDEN     Inpatient consult to Interventional Cardiology  Once     Provider:  (Not yet assigned)    Acknowledged SHIVAM CRAIG     Inpatient consult to Nephrology  Once     Provider:  (Not yet assigned)    Completed SHIVAM CRAIG     Inpatient [facility] team [facility]  Once     Provider:  (Not yet assigned)    Completed SHIVAM CRAIG          Significant Diagnostic Studies: Labs:   BMP:   Recent Labs  Lab 04/12/17  0533 04/13/17  0439     109 97     142 143   K 3.5  3.5 3.6     106 109   CO2 24  24 23   BUN 63*  63* 50*   CREATININE 2.8*  2.8* 2.4*   CALCIUM 9.3  9.3 9.0   MG 2.5  2.5  --     and CBC   Recent Labs  Lab 04/12/17  0533   WBC 4.43  4.43   HGB 7.9*  7.9*   HCT 23.4*  23.4*     171       Pending Diagnostic Studies:     Procedure Component Value Units Date/Time    CBC with Automated Differential [[id number]] Collected:  04/12/17 0353    Order Status:  Sent Lab Status:  In process Updated:  04/12/17 0354    Specimen:  Blood from Blood     Comprehensive metabolic panel [[id number]] Collected:  04/12/17 0353    Order  Status:  Sent Lab Status:  In process Updated:  04/12/17 0354    Specimen:  Blood from Blood     Magnesium [[id number]] Collected:  04/12/17 0353    Order Status:  Sent Lab Status:  In process Updated:  04/12/17 0354    Specimen:  Blood from Blood         Final Active Diagnoses:    Diagnosis Date Noted POA    PRINCIPAL PROBLEM:  Acute on chronic combined systolic and diastolic congestive heart failure [I50.43] 04/04/2017 Yes    Chronic combined systolic and diastolic heart failure, NYHA class 3 [I50.42] 01/11/2017 Yes    Atrial fibrillation [I48.91] 04/08/2017 Yes    Essential hypertension [I10] 12/16/2016 Yes    Acute renal failure superimposed on stage 4 chronic kidney disease [N17.9, N18.4] 04/11/2017 No    Stage 4 chronic kidney disease [N18.4] 11/11/2016 Yes    Chronic obstructive pulmonary disease [J44.9] 04/04/2017 Yes    Leukopenia [D72.819] 04/04/2017 Yes    Delirium [R41.0] 04/04/2017 Yes    Mitral valve insufficiency [I34.0] 03/16/2017 Yes    Primary hypothyroidism [E03.9] 12/16/2016 Yes      Problems Resolved During this Admission:    Diagnosis Date Noted Date Resolved POA      Discharged Condition: fair    Disposition: Home or Self Care    Follow Up:  Follow-up Information     [facility]    Specialty:  Lab    Why:  09:05 labs    Contact information:    [address] [place] [id number] [phone]    Additional information:    2nd Floor        Follow up with Len Live MD On 4/12/2017.    Specialties:  Cardiology, Transplant    Why:  10:30 appointment    Contact information:    [address] [place] [id number]  [phone]          Patient Instructions:     Ambulatory Referral to Neuropsychology   Referral Priority: Routine Referral Type: Psychiatric   Referral Reason: Specialty Services Required    Requested Specialty: Psychology    Number of Visits Requested: 1        Medications:  Reconciled Home  Medications:   Current Discharge Medication List      START taking these medications    Details   fluticasone-salmeterol 250-50 mcg/dose (ADVAIR) 250-50 mcg/dose diskus inhaler Inhale 1 puff into the lungs 2 (two) times daily. Controller  Refills: 0      isosorbide-hydrALAZINE 20-37.5 mg (BIDIL) 20-37.5 mg Tab Take 2 tablets by mouth 3 (three) times daily.  Qty: 180 tablet, Refills: 2      metoprolol succinate (TOPROL-XL) 50 MG 24 hr tablet Take 1 tablet (50 mg total) by mouth once daily.  Qty: 30 tablet, Refills: 2         CONTINUE these medications which have CHANGED    Details   furosemide (LASIX) 40 MG tablet Take 0.5 tablets (20 mg total) by mouth 2 (two) times daily.  Qty: 60 tablet, Refills: 2    Associated Diagnoses: CKD (chronic kidney disease) stage 3, GFR 30-59 ml/min; Proteinuria; Edema, unspecified type         CONTINUE these medications which have NOT CHANGED    Details   allopurinol (ZYLOPRIM) 100 MG tablet Take 100 mg by mouth 2 (two) times daily.       aspirin 81 MG Chew Take 1 tablet (81 mg total) by mouth once daily.  Refills: 0      benztropine (COGENTIN) 0.5 MG tablet Take 1 tablet (0.5 mg total) by mouth nightly.  Qty: 30 tablet, Refills: 0      diclofenac sodium 1 % Gel Apply 4 g topically 4 (four) times daily. Prn pain      gabapentin (NEURONTIN) 300 MG capsule Take 300 mg by mouth 3 (three) times daily as needed (for nerve pain).       guaifenesin (MUCINEX) 600 mg 12 hr tablet Take 1 tablet (600 mg total) by mouth 2 (two) times daily.      levothyroxine (SYNTHROID) 50 MCG tablet Take 1 tablet (50 mcg total) by mouth before breakfast.  Qty: 30 tablet, Refills: 11      melatonin 3 mg Tab Take by mouth every evening.      quetiapine (SEROQUEL) 200 MG Tab Take 1 tablet (200 mg total) by mouth nightly.  Qty: 30 tablet, Refills: 0      sodium bicarbonate 650 MG tablet Take 2 tablets (1,300 mg total) by mouth 2 (two) times daily.  Qty: 360 tablet, Refills: 3    Associated Diagnoses: CKD (chronic  kidney disease) stage 3, GFR 30-59 ml/min; Metabolic acidosis      betamethasone valerate 0.1% (VALISONE) 0.1 % Lotn Apply topically once daily. 2 drops both ears once daily  Qty: 1 Bottle, Refills: 3      fluticasone (FLONASE) 50 mcg/actuation nasal spray 2 sprays by Each Nare route once daily.  Qty: 1 Bottle, Refills: 3         STOP taking these medications       carvedilol (COREG) 12.5 MG tablet Comments:   Reason for Stopping:         valsartan (DIOVAN) 320 MG tablet Comments:   Reason for Stopping:             Time spent on the discharge of patient: 35 minutes    Natalia Martínez MD  Department of Hospital Medicine  [facility]

## 2017-04-13 NOTE — ASSESSMENT & PLAN NOTE
Patient with SOB at her baseline. Currently euvolemic and with uptrend in creatinine.   1. Continue metoprolol (Toprol XL) 50 mg daily.  2. Continue isosorbide-hydralazine three time daily.  3. Restart furosemide 20 mg BID tomorrow.

## 2017-04-13 NOTE — NURSING
Patient discharged per MD order. Will wait for daughter to arrive this afternoon to discuss discharge instructions. Will continue to monitor patient.

## 2017-04-13 NOTE — ASSESSMENT & PLAN NOTE
Patient with baseline creatinine which ranges between 2.3-2.5. Creatinine down trended from 3.2 to 2.8.  1. Monitor creatinine level.  2. Avoid nephrotoxic agents.

## 2017-04-13 NOTE — ASSESSMENT & PLAN NOTE
Patient with SOB at her baseline. Currently euvolemic and with uptrend in creatinine.   1. Continue metoprolol (Toprol XL) 50 mg daily.  2. Continue isosorbide-hydralazine three time daily.  3. Continue furosemide 20 mg BID.  4. Outpatient follow up with PCP for furosemide titration.

## 2017-04-13 NOTE — SUBJECTIVE & OBJECTIVE
"Interval History: "good". No acute overnight events. Creatinine now down trending.    Review of Systems   Constitutional: Negative for chills, fatigue, fever and unexpected weight change.   HENT: Negative for ear pain, facial swelling, hearing loss, mouth sores, nosebleeds, rhinorrhea, sinus pressure, sore throat, tinnitus, trouble swallowing and voice change.    Eyes: Negative for photophobia, pain, redness and visual disturbance.   Respiratory: Positive for shortness of breath. Negative for cough, chest tightness and wheezing.    Cardiovascular: Negative for chest pain, palpitations and leg swelling.   Gastrointestinal: Negative for abdominal pain, blood in stool, constipation, diarrhea, nausea and vomiting.   Endocrine: Negative for cold intolerance, heat intolerance, polydipsia, polyphagia and polyuria.   Genitourinary: Negative for decreased urine volume, dysuria, flank pain, frequency, hematuria, menstrual problem, urgency, vaginal bleeding, vaginal discharge and vaginal pain.   Musculoskeletal: Negative for arthralgias, back pain, joint swelling, myalgias and neck pain.   Skin: Negative for rash.   Allergic/Immunologic: Negative for environmental allergies, food allergies and immunocompromised state.   Neurological: Negative for dizziness, seizures, syncope, weakness, light-headedness, numbness and headaches.   Hematological: Negative for adenopathy. Does not bruise/bleed easily.   Psychiatric/Behavioral: Negative for confusion, hallucinations, self-injury, sleep disturbance and suicidal ideas. The patient is not nervous/anxious.      Objective:     Vital Signs (Most Recent):  Temp: 99.4 °F (37.4 °C) (04/12/17 1511)  Pulse: 85 (04/12/17 1957)  Resp: 16 (04/12/17 1511)  BP: (!) 146/67 (04/12/17 1957)  SpO2: 98 % (04/12/17 1511) Vital Signs (24h Range):  Temp:  [98.6 °F (37 °C)-99.4 °F (37.4 °C)] 99.4 °F (37.4 °C)  Pulse:  [61-85] 85  Resp:  [16-18] 16  SpO2:  [97 %-99 %] 98 %  BP: (109-153)/(53-77) 146/67 "     Weight: 71.4 kg (157 lb 6.5 oz)  Body mass index is 25.41 kg/(m^2).    Intake/Output Summary (Last 24 hours) at 04/12/17 2102  Last data filed at 04/12/17 1800   Gross per 24 hour   Intake              720 ml   Output              550 ml   Net              170 ml      Physical Exam   Constitutional: She is oriented to person, place, and time. She appears well-developed and well-nourished. She is cooperative. She is easily aroused.  Non-toxic appearance. No distress.   HENT:   Head: Normocephalic and atraumatic. Head is without right periorbital erythema and without left periorbital erythema.   Right Ear: Hearing, tympanic membrane, external ear and ear canal normal. No swelling.   Left Ear: Hearing, tympanic membrane, external ear and ear canal normal. No swelling.   Nose: Nose normal. No nasal deformity.   Mouth/Throat: Uvula is midline, oropharynx is clear and moist and mucous membranes are normal. No oropharyngeal exudate.   Eyes: Conjunctivae, EOM and lids are normal. Pupils are equal, round, and reactive to light. Right eye exhibits no discharge and no exudate. Left eye exhibits no discharge and no exudate. Right conjunctiva is not injected. Left conjunctiva is not injected. No scleral icterus.   Neck: Normal range of motion. Neck supple. No tracheal deviation present. No thyromegaly present.   Cardiovascular: Normal rate, regular rhythm, S1 normal, S2 normal, normal heart sounds and intact distal pulses.  Exam reveals no gallop and no friction rub.    No murmur heard.  Pulmonary/Chest: Effort normal and breath sounds normal. No accessory muscle usage or stridor. No tachypnea. No respiratory distress. She has no wheezes. She has no rales. She exhibits no tenderness.   Abdominal: Soft. Normal appearance and bowel sounds are normal. She exhibits no distension. There is no tenderness. There is no rebound and no guarding.   Musculoskeletal: Normal range of motion. She exhibits no edema or tenderness.    Neurological: She is alert, oriented to person, place, and time and easily aroused. No cranial nerve deficit. Coordination normal.   Skin: Skin is warm and dry. No lesion and no rash noted. She is not diaphoretic. No cyanosis or erythema. No pallor. Nails show no clubbing.   Psychiatric: She has a normal mood and affect. Her speech is normal and behavior is normal. Judgment and thought content normal.   Nursing note and vitals reviewed.      Significant Labs:   BMP:   Recent Labs  Lab 04/12/17  0533     109     142   K 3.5  3.5     106   CO2 24  24   BUN 63*  63*   CREATININE 2.8*  2.8*   CALCIUM 9.3  9.3   MG 2.5  2.5     CBC:   Recent Labs  Lab 04/11/17  0411 04/12/17  0533   WBC 5.25 4.43  4.43   HGB 8.4* 7.9*  7.9*   HCT 25.1* 23.4*  23.4*    171  171       Significant Imaging: I have reviewed all pertinent imaging results/findings within the past 24 hours.

## 2017-04-13 NOTE — PROGRESS NOTES
[facility] Hospital Medicine  Progress Note    Patient Name: Anai Sal  MRN: [id number]  Patient Class: IP- Inpatient   Admission Date: 4/4/2017  Length of Stay: 9 days  Attending Physician: Natalia Martínez MD  Primary Care Provider: Live Young MD    [facility] Medicine Team: [facility]OS[facility] MED C Natalia Martínez MD    Subjective:     Principal Problem:Acute on chronic combined systolic and diastolic congestive heart failure    HPI:  70 y/o AAF hx of CHF ef 20%, HTN, hx Breast CA, CAD, HCV presents with dyspnea and slurred speech. Patient is seen in [facility] clinic and per ED notes, patient with worsening dyspnea this morning and also hypoxia on room air, slurred speech and acute encephalopathy. She has had recent admission to hospital medicine for management of CHF exacerbation and was discharged on lasix 20mg BID.       Per ED provider who spoke with patients daughter (Tershone [phone]), PACE clinic had increased patient to 60mg PO lasix over the past week. The patient does not manage her medications and is unable to provide additional history      On interview of the patient, she lives with daughter, performs most ADL, she does not use oxygen at home and denies active chest pain, has some dyspnea and reports she has fluid on her legs. She is oriented to person/hospital/president, city/state. She is NOT oriented to Day/Date/Month/Year.       Hospital Course:  Patient admitted, started on IV diuresis with 80mg lasix BID, started on bidil for additional anti-hypertensive control.  She was doing well with IV diuresis and blood pressure control improved with addition of BIDIL and resuming home coreg.  On 4/8 in the afternoon patient with onset of atrial fibrillation with RVR, given IV lopressor and coreg switched to metoprolol tartrate 25mg TID.  On 4/9 in the morning patient has converted back to a sinus rhythm and oral diuretics resumed and patient switched to Toprol XL for heart  "failure and rate control management.  She has a rising Creatinine on morning of 4/10 despite achieving euvolemia, additional urine studies ordered and Nephrology consulted, diuretics placed on hold. Creatinine improved after holding diuretic therapy.    On hospital day 9 her creatinine continued to down trend. She was started on lower dose furosemide and tolerated well. She was medically stable for discharge.       Interval History: "Good". No acute overnight events. Medically stable for discharge.     Review of Systems   Constitutional: Negative for chills, fatigue, fever and unexpected weight change.   HENT: Negative for ear pain, facial swelling, hearing loss, mouth sores, nosebleeds, rhinorrhea, sinus pressure, sore throat, tinnitus, trouble swallowing and voice change.    Eyes: Negative for photophobia, pain, redness and visual disturbance.   Respiratory: Positive for shortness of breath (at baseline). Negative for cough, chest tightness and wheezing.    Cardiovascular: Negative for chest pain, palpitations and leg swelling.   Gastrointestinal: Negative for abdominal pain, blood in stool, constipation, diarrhea, nausea and vomiting.   Endocrine: Negative for cold intolerance, heat intolerance, polydipsia, polyphagia and polyuria.   Genitourinary: Negative for decreased urine volume, dysuria, flank pain, frequency, hematuria, menstrual problem, urgency, vaginal bleeding, vaginal discharge and vaginal pain.   Musculoskeletal: Negative for arthralgias, back pain, joint swelling, myalgias and neck pain.   Skin: Negative for rash.   Allergic/Immunologic: Negative for environmental allergies, food allergies and immunocompromised state.   Neurological: Negative for dizziness, seizures, syncope, weakness, light-headedness, numbness and headaches.   Hematological: Negative for adenopathy. Does not bruise/bleed easily.   Psychiatric/Behavioral: Negative for confusion, hallucinations, self-injury, sleep disturbance and " suicidal ideas. The patient is not nervous/anxious.      Objective:     Vital Signs (Most Recent):  Temp: 98.5 °F (36.9 °C) (04/13/17 0726)  Pulse: 79 (04/13/17 1000)  Resp: 18 (04/13/17 0726)  BP: 126/64 (04/13/17 0726)  SpO2: 99 % (04/13/17 0726) Vital Signs (24h Range):  Temp:  [98.3 °F (36.8 °C)-99.4 °F (37.4 °C)] 98.5 °F (36.9 °C)  Pulse:  [61-86] 79  Resp:  [16-18] 18  SpO2:  [94 %-99 %] 99 %  BP: (126-153)/(59-72) 126/64     Weight: 71.4 kg (157 lb 6.5 oz)  Body mass index is 25.41 kg/(m^2).    Intake/Output Summary (Last 24 hours) at 04/13/17 1045  Last data filed at 04/13/17 0600   Gross per 24 hour   Intake              900 ml   Output              550 ml   Net              350 ml      Physical Exam   Constitutional: She is oriented to person, place, and time. She appears well-developed and well-nourished. She is cooperative. She is easily aroused.  Non-toxic appearance. No distress.   HENT:   Head: Normocephalic and atraumatic. Head is without right periorbital erythema and without left periorbital erythema.   Right Ear: Hearing, tympanic membrane, external ear and ear canal normal. No swelling.   Left Ear: Hearing, tympanic membrane, external ear and ear canal normal. No swelling.   Nose: Nose normal. No nasal deformity.   Mouth/Throat: Uvula is midline, oropharynx is clear and moist and mucous membranes are normal. No oropharyngeal exudate.   Eyes: Conjunctivae, EOM and lids are normal. Pupils are equal, round, and reactive to light. Right eye exhibits no discharge and no exudate. Left eye exhibits no discharge and no exudate. Right conjunctiva is not injected. Left conjunctiva is not injected. No scleral icterus.   Neck: Normal range of motion. Neck supple. No tracheal deviation present. No thyromegaly present.   Cardiovascular: Normal rate, regular rhythm, S1 normal, S2 normal, normal heart sounds and intact distal pulses.  Exam reveals no gallop and no friction rub.    No murmur  heard.  Pulmonary/Chest: Effort normal and breath sounds normal. No accessory muscle usage or stridor. No tachypnea. No respiratory distress. She has no wheezes. She has no rales. She exhibits no tenderness.   Abdominal: Soft. Normal appearance and bowel sounds are normal. She exhibits no distension. There is no tenderness. There is no rebound and no guarding.   Musculoskeletal: Normal range of motion. She exhibits no edema or tenderness.   Neurological: She is alert, oriented to person, place, and time and easily aroused. No cranial nerve deficit. Coordination normal.   Skin: Skin is warm and dry. No lesion and no rash noted. She is not diaphoretic. No cyanosis or erythema. No pallor. Nails show no clubbing.   Psychiatric: She has a normal mood and affect. Her speech is normal and behavior is normal. Judgment and thought content normal.   Nursing note and vitals reviewed.      Significant Labs:   BMP:   Recent Labs  Lab 04/12/17  0533 04/13/17  0439     109 97     142 143   K 3.5  3.5 3.6     106 109   CO2 24  24 23   BUN 63*  63* 50*   CREATININE 2.8*  2.8* 2.4*   CALCIUM 9.3  9.3 9.0   MG 2.5  2.5  --      CBC:   Recent Labs  Lab 04/12/17  0533   WBC 4.43  4.43   HGB 7.9*  7.9*   HCT 23.4*  23.4*     171       Significant Imaging: I have reviewed all pertinent imaging results/findings within the past 24 hours.    Assessment/Plan:      * Acute on chronic combined systolic and diastolic congestive heart failure  Patient with SOB at her baseline. Currently euvolemic and with uptrend in creatinine.   1. Continue metoprolol (Toprol XL) 50 mg daily.  2. Continue isosorbide-hydralazine three time daily.  3. Continue furosemide 20 mg BID.  4. Outpatient follow up with PCP for furosemide titration.        Chronic combined systolic and diastolic heart failure, NYHA class 3  As above.      Atrial fibrillation  Currently rate controlled.   1. Continue metoprolol.   2. Patient would  benefit from anticoagulation in the setting of a CHADS-VASc 4. However patients daughter will decide if to start anticoagulation as an outpatient with her usual physicians.       Essential hypertension  Well controlled at this time.   1. Continue metoprolol, and isosorbide-hydralazine.   2. Hold ARB. Follow up with PCP to assess optimal time to re-start.  3. Monitor Bp and titrate antihypertensive therapy as needed.       Stage 4 chronic kidney disease  As above.      Acute renal failure superimposed on stage 4 chronic kidney disease  Patient with baseline creatinine which ranges between 2.3-2.5. Creatinine down trended from 2.8 to 2.4.  1. Monitor creatinine level.  2. Avoid nephrotoxic agents.        Primary hypothyroidism  Elevated TSH likely due to medication intake.   1. Continue levothyroxine 50 mcg daily.   2. Outpatient follow up with PCP.      Mitral valve insufficiency  Severe stable disease.   1. Outpatient follow up with Dr. Costa for further evaluation.       Chronic obstructive pulmonary disease  Stable and without acute exacerbation.   1. Continue fluticasone nasal spray.  2. Continue fluticasone-vilanterol inhaler.     Leukopenia  Resolved.      Delirium  Patient with delirium upon admission. Now appears to be improving but still with intermittent confusion.   1. Delirium precautions.  2. Continue quetiapine 200 mg nightly.         VTE Risk Mitigation         Ordered     Medium Risk of VTE  Once      04/08/17 1600     Place sequential compression device  Until discontinued      04/08/17 1600     Place sequential compression device  Until discontinued      04/04/17 1621          Natalia Martínez MD  Department of Hospital Medicine   [facility]

## 2017-04-13 NOTE — ASSESSMENT & PLAN NOTE
Patient with baseline creatinine which ranges between 2.3-2.5. Creatinine down trended from 2.8 to 2.4.  1. Monitor creatinine level.  2. Avoid nephrotoxic agents.

## 2017-04-14 NOTE — PT/OT/SLP DISCHARGE
Occupational Therapy Discharge Summary    Anai Sal  MRN: [id number]   Acute on chronic combined systolic and diastolic congestive heart failure   Patient Discharged from acute Occupational Therapy on 4/14/17.  Please refer to prior OT note dated on 4/11/17 for functional status.     Assessment:   Patient was discharge unexpectedly.  Information required to complete and accurate discharge summary is unknown.  Refer to therapy initial evaluation and last progress note for initial and most recent functional status and goal achievement.  Recommendations made may be found in medical record. Patient appropriate for care in another setting.  GOALS:   Occupational Therapy Goals     Not on file      Multidisciplinary Problems (Resolved)        Problem: Occupational Therapy Goal    Goal Priority Disciplines Outcome Interventions   Occupational Therapy Goal   (Resolved)     OT, PT/OT Outcome(s) achieved    Description:  Goals to be met by: 4/12/2017     Patient will increase functional independence with ADLs by performing:    UE Dressing with Minimal Assistance.  LE Dressing with Stand-by Assistance.  Grooming while standing at sink with Stand-by Assistance.  Toileting from toilet with Stand-by Assistance for hygiene and clothing management. -MET  Toilet transfer to toilet with Stand-by Assistance.-MET  Pt will complete BUE HEP (I)'ly using handout.   Pt will complete functional mobility at house-hold level distance in prep for ADLs using RW with SBA. -MET  Pt will demo good endurance throughout morning ADL routine with no SOB.                  Reasons for Discontinuation of Therapy Services  Transfer to alternate level of care.      Plan:  Patient Discharged to: Home with Home Health Service     ERIBERTO Matthews  4/14/2017

## 2017-04-17 NOTE — PT/OT/SLP DISCHARGE
Physical Therapy Discharge Summary    Anai Sal  MRN: [id number]   Acute on chronic combined systolic and diastolic congestive heart failure   Patient Discharged from acute Physical Therapy on 17.  Please refer to prior PT noted date on 17 for functional status.     Assessment:   Patient was discharge unexpectedly.  Information required to complete and accurate discharge summary is unknown.  Refer to therapy initial evaluation and last progress note for initial and most recent functional status and goal achievement.  Recommendations made may be found in medical record.  GOALS:   Physical Therapy Goals     Not on file      Multidisciplinary Problems (Resolved)        Problem: Physical Therapy Goal    Goal Priority Disciplines Outcome Goal Variances Interventions   Physical Therapy Goal   (Resolved)     PT/OT, PT Outcome(s) achieved     Description:  Goals to be met by: 17     Patient will increase functional independence with mobility by performin. Sit to stand transfer with [place]. - not met  2. Bed to chair transfer with Supervision using Rolling Walker.- not met  3. Gait  x 150 feet with Supervision using Rolling Walker. - not met  4. Ascend/descend 13 stair with right Handrails Supervision . - not met  5. Lower extremity exercise program x 20 reps per handout, with assistance as needed.- not met               Reasons for Discontinuation of Therapy Services  Transfer to alternate level of care.      Plan:  Patient Discharged to: Home with Home Health Service.    Tia Maciel, PT, DPT   2017  [phone]

## 2017-04-20 ENCOUNTER — OFFICE VISIT (OUTPATIENT)
Dept: TRANSPLANT | Facility: CLINIC | Age: 72
End: 2017-04-20
Payer: COMMERCIAL

## 2017-04-20 VITALS
WEIGHT: 171.31 LBS | SYSTOLIC BLOOD PRESSURE: 143 MMHG | DIASTOLIC BLOOD PRESSURE: 71 MMHG | HEIGHT: 66 IN | HEART RATE: 80 BPM | BODY MASS INDEX: 27.53 KG/M2

## 2017-04-20 DIAGNOSIS — I50.23 ACUTE ON CHRONIC SYSTOLIC CONGESTIVE HEART FAILURE: ICD-10-CM

## 2017-04-20 DIAGNOSIS — N18.4 STAGE 4 CHRONIC KIDNEY DISEASE: Primary | ICD-10-CM

## 2017-04-20 DIAGNOSIS — B18.2 CHRONIC HEPATITIS C WITHOUT HEPATIC COMA: ICD-10-CM

## 2017-04-20 PROCEDURE — 99214 OFFICE O/P EST MOD 30 MIN: CPT | Mod: S$GLB,,, | Performed by: INTERNAL MEDICINE

## 2017-04-20 PROCEDURE — 99999 PR PBB SHADOW E&M-EST. PATIENT-LVL III: CPT | Mod: PBBFAC,,, | Performed by: INTERNAL MEDICINE

## 2017-04-20 NOTE — PROGRESS NOTES
"Subjective: class 3A    Patient ID:  Anai Sal is a 72 y.o. female who presents for post discharge follow-up of Congestive Heart Failure      HPI  Nonischemic CHF (EF 20%), HTN, hx Breast CA, CAD, HCV who was admitted in early April 2017 for presumed CHF.  She returns today at her baseline it appears. Mild edema present with a  Impressive 7 kg weight lose since last visit.  Denies GIBBONS when she walks with her walker.  Did not maintain fluid restriction prior to last admission.    Review of Systems   Constitution: Negative for decreased appetite, weight gain and weight loss.   Cardiovascular: Negative for chest pain, dyspnea on exertion, leg swelling, near-syncope, orthopnea and palpitations.   Respiratory: Negative for cough and shortness of breath.    Musculoskeletal: Negative for myalgias.   Gastrointestinal: Negative for jaundice.        Objective:    Physical Exam   Constitutional: She appears well-developed and well-nourished. No distress.   BP (!) 143/71  Pulse 80  Ht 5' 6" (1.676 m)  Wt 77.7 kg (171 lb 4.8 oz)  BMI 27.65 kg/m2     HENT:   Head: Normocephalic and atraumatic. Head is without abrasion and without contusion.   Right Ear: External ear normal.   Left Ear: External ear normal.   Nose: Nose normal. No epistaxis.   Mouth/Throat: Oropharynx is clear and moist. Mucous membranes are not cyanotic.   Eyes: Conjunctivae and EOM are normal. Pupils are equal, round, and reactive to light.   Neck: Normal range of motion. Neck supple. No tracheal deviation present.   Cardiovascular: Normal rate, regular rhythm and normal pulses.  Exam reveals no gallop.    Murmur heard.   Medium-pitched blowing holosystolic murmur is present  at the apex radiating to the axilla Consistent with MR  Pulmonary/Chest: Effort normal and breath sounds normal. No stridor. No respiratory distress. She has no wheezes.   Abdominal: Soft. Normal appearance, normal aorta and bowel sounds are normal. She exhibits no distension. " There is no tenderness.   Musculoskeletal: She exhibits no edema or tenderness.   Neurological: She is alert. She has normal strength and normal reflexes. She exhibits normal muscle tone.   Skin: Skin is warm. No rash noted. No erythema.   Psychiatric: She has a normal mood and affect. Her speech is normal and behavior is normal. Judgment and thought content normal. Cognition and memory are normal.   no labs today      Lab Results   Component Value Date     (H) 04/09/2017     04/13/2017    K 3.6 04/13/2017    MG 2.5 04/12/2017    MG 2.5 04/12/2017     04/13/2017    CO2 23 04/13/2017    BUN 50 (H) 04/13/2017    CREATININE 2.4 (H) 04/13/2017    GLU 97 04/13/2017    HGBA1C 4.2 (L) 01/11/2017    AST 52 (H) 04/12/2017    AST 52 (H) 04/12/2017    ALT 26 04/12/2017    ALT 26 04/12/2017    ALBUMIN 3.0 (L) 04/12/2017    ALBUMIN 3.0 (L) 04/12/2017    PROT 6.7 04/12/2017    PROT 6.7 04/12/2017    BILITOT 2.0 (H) 04/12/2017    BILITOT 2.0 (H) 04/12/2017    CHOL 103 (L) 03/11/2017    HDL 38 (L) 03/11/2017    LDLCALC 57.0 (L) 03/11/2017    TRIG 40 03/11/2017       Magnesium   Date Value Ref Range Status   04/12/2017 2.5 1.6 - 2.6 mg/dL Final   04/12/2017 2.5 1.6 - 2.6 mg/dL Final       Lab Results   Component Value Date    WBC 4.43 04/12/2017    WBC 4.43 04/12/2017    HGB 7.9 (L) 04/12/2017    HGB 7.9 (L) 04/12/2017    HCT 23.4 (L) 04/12/2017    HCT 23.4 (L) 04/12/2017    MCV 92 04/12/2017    MCV 92 04/12/2017     04/12/2017     04/12/2017       BNP   Date Value Ref Range Status   04/09/2017 693 (H) 0 - 99 pg/mL Final     Comment:     Values of less than 100 pg/ml are consistent with non-CHF populations.   04/04/2017 4304 (H) 0 - 99 pg/mL Final     Comment:     Values of less than 100 pg/ml are consistent with non-CHF populations.   03/10/2017 3140 (H) 0 - 99 pg/mL Final     Comment:     Values of less than 100 pg/ml are consistent with non-CHF populations.           Assessment:       1. Stage 4  chronic kidney disease    2. Acute on chronic systolic congestive heart failure    3. Chronic hepatitis C without hepatic coma         Plan:       1.  YINKA PCP -  DR Live Young  Will send him note so he can consider anemia workup (she does not appear to be iron deficient)      2.  CHF at worst mildly volume overloaded  As she is already on bidil optimization of CHF meds would require uptitration of metoprolol.    3.  Not on ACE / ARB due to progressive CKD (went from stage 3 to stage 4 CKD )  Would like to avoid dialysis  4. Followup in one month with (BMP / BNP  YINKA ZAPIEN to order_ after she is seen in mitral valve clinic for mitral clip

## 2017-04-20 NOTE — MR AVS SNAPSHOT
[facility]  [address]  [facility] [address]  Phone: [phone]                  Anai Sal   2017 11:30 AM   Office Visit    Description:  Female : 1945   Provider:  Christen Browning MD   Department:  [facility]           Reason for Visit     Congestive Heart Failure                To Do List           Future Appointments        Provider Department Dept Phone    2017 10:30 AM LAB, APPOINTMENT [place] [facility] [phone]    2017 10:35 AM SPECIMEN, [facility] [phone]    2017 9:05 AM LAB, APPOINTMENT [place] [facility] [phone]    2017 11:00 AM Kamila Hughes MD [facility] - Pulmonary Services [phone]    5/10/2017 11:40 AM Bud Benoit MD [facility] - Nephrology [phone]      Goals (5 Years of Data)     None      Follow-Up and Disposition     Return in about 5 weeks (around 2017).      [facility]     [facility] Nurse Care Line -  Assistance  Unless otherwise directed by your provider, please contact [facility], our nurse care line that is available for  assistance.     Registered nurses in the [facility] provide: appointment scheduling, clinical advisement, health education, and other advisory services.  Call: [phone] (toll free)               Medications           Message regarding Medications     Verify the changes and/or additions to your medication regime listed below are the same as discussed with your clinician today.  If any of these changes or additions are incorrect, please notify your healthcare provider.             Verify that the below list of medications is an accurate representation of the medications you are currently taking.  If none reported, the list may be blank. If incorrect, please contact your healthcare provider. Carry this list with you in case of emergency.          "  Current Medications     allopurinol (ZYLOPRIM) 100 MG tablet Take 100 mg by mouth 2 (two) times daily.     aspirin 81 MG Chew Take 1 tablet (81 mg total) by mouth once daily.    benztropine (COGENTIN) 0.5 MG tablet Take 1 tablet (0.5 mg total) by mouth nightly.    betamethasone valerate 0.1% (VALISONE) 0.1 % Lotn Apply topically once daily. 2 drops both ears once daily    diclofenac sodium 1 % Gel Apply 4 g topically 4 (four) times daily. Prn pain    fluticasone (FLONASE) 50 mcg/actuation nasal spray 2 sprays by Each Nare route once daily.    fluticasone-salmeterol 250-50 mcg/dose (ADVAIR) 250-50 mcg/dose diskus inhaler Inhale 1 puff into the lungs 2 (two) times daily. Controller    furosemide (LASIX) 40 MG tablet Take 0.5 tablets (20 mg total) by mouth 2 (two) times daily.    gabapentin (NEURONTIN) 300 MG capsule Take 300 mg by mouth 3 (three) times daily as needed (for nerve pain).     guaifenesin (MUCINEX) 600 mg 12 hr tablet Take 1 tablet (600 mg total) by mouth 2 (two) times daily.    isosorbide-hydrALAZINE 20-37.5 mg (BIDIL) 20-37.5 mg Tab Take 2 tablets by mouth 3 (three) times daily.    levothyroxine (SYNTHROID) 50 MCG tablet Take 1 tablet (50 mcg total) by mouth before breakfast.    melatonin 3 mg Tab Take by mouth every evening.    metoprolol succinate (TOPROL-XL) 50 MG 24 hr tablet Take 1 tablet (50 mg total) by mouth once daily.    sodium bicarbonate 650 MG tablet Take 2 tablets (1,300 mg total) by mouth 2 (two) times daily.    quetiapine (SEROQUEL) 200 MG Tab Take 1 tablet (200 mg total) by mouth nightly.           Clinical Reference Information           Your Vitals Were     BP Pulse Height Weight BMI    143/71 80 5' 6" (1.676 m) 77.7 kg (171 lb 4.8 oz) 27.65 kg/m2      Blood Pressure          Most Recent Value    BP  (!)  143/71      Allergies as of 4/20/2017     No Known Allergies      Immunizations Administered on Date of Encounter - 4/20/2017     None      [facility] Sign-Up     Activating your " [facility] account is as easy as 1-2-3!     1) Visit [email or web address], select Sign Up Now, enter this activation code and your date of birth, then select Next.  [email or web address]  Expires: 5/15/2017  6:44 AM      2) Create a username and password to use when you visit [facility] in the future and select a security question in case you lose your password and select Next.    3) Enter your e-mail address and click Sign Up!    Additional Information  If you have questions, please e-mail [email or web address] or call [phone] to talk to our [facility] staff. Remember, [facility] is NOT to be used for urgent needs. For medical emergencies, dial 911.         Language Assistance Services     ATTENTION: Language assistance services are available, free of charge. Please call [phone].      ATENCIÓN: Si habla delfino, tiene a mane disposición servicios gratuitos de asistencia lingüística. Llame al [phone].     CHÚ Ý: N?u b?n nói Ti?ng Vi?t, có các d?ch v? h? tr? ngôn ng? mi?n phí dành cho b?n. G?i s? [phone].         [facility] complies with applicable Federal civil rights laws and does not discriminate on the basis of race, color, national origin, age, disability, or sex.

## 2017-05-02 ENCOUNTER — HOSPITAL ENCOUNTER (OUTPATIENT)
Dept: RADIOLOGY | Facility: HOSPITAL | Age: 72
Discharge: HOME | End: 2017-05-02
Attending: INTERNAL MEDICINE
Payer: COMMERCIAL

## 2017-05-02 ENCOUNTER — OFFICE VISIT (OUTPATIENT)
Dept: PULMONOLOGY | Facility: CLINIC | Age: 72
End: 2017-05-02
Payer: COMMERCIAL

## 2017-05-02 VITALS
HEIGHT: 66 IN | DIASTOLIC BLOOD PRESSURE: 70 MMHG | HEART RATE: 66 BPM | OXYGEN SATURATION: 95 % | WEIGHT: 173.75 LBS | BODY MASS INDEX: 27.92 KG/M2 | SYSTOLIC BLOOD PRESSURE: 118 MMHG

## 2017-05-02 DIAGNOSIS — J44.9 CHRONIC OBSTRUCTIVE PULMONARY DISEASE, UNSPECIFIED COPD TYPE: ICD-10-CM

## 2017-05-02 DIAGNOSIS — J90 PLEURAL EFFUSION: Primary | ICD-10-CM

## 2017-05-02 DIAGNOSIS — J90 PLEURAL EFFUSION: ICD-10-CM

## 2017-05-02 PROCEDURE — 71020 XR CHEST PA AND LATERAL: CPT | Mod: 26,,, | Performed by: RADIOLOGY

## 2017-05-02 PROCEDURE — 71020 XR CHEST PA AND LATERAL: CPT | Mod: TC

## 2017-05-02 PROCEDURE — 99204 OFFICE O/P NEW MOD 45 MIN: CPT | Mod: S$GLB,,, | Performed by: INTERNAL MEDICINE

## 2017-05-02 PROCEDURE — 99999 PR PBB SHADOW E&M-EST. PATIENT-LVL IV: CPT | Mod: 25,PBBFAC,, | Performed by: INTERNAL MEDICINE

## 2017-05-02 RX ORDER — TIOTROPIUM BROMIDE 18 UG/1
18 CAPSULE ORAL; RESPIRATORY (INHALATION) DAILY
Qty: 90 CAPSULE | Refills: 3 | Status: SHIPPED | OUTPATIENT
Start: 2017-05-02

## 2017-05-02 NOTE — PROGRESS NOTES
Subjective:       Patient ID: Anai Sal is a 72 y.o. female.    Chief Complaint: No chief complaint on file.    HPI:   Anai Sal is a 72 y.o. female who presents with shortness of breath. She is accompanied today by her daughter.    Past medical history is significant for remote breast CA, CHF, HCV, and CKD.  Patient had a recent hospitalization for decompensated heart failure.      Severe COPD on PFTs from February 2017 (FEV1=39PPD)  Pleural effusion on chest xray from 4/7/2017  Former smoker:   Quit in the 1990's.  Started smoking 1.5ppd, teenager  Review of Systems   Constitutional: Positive for activity change. Negative for fever and chills.   HENT: Negative for postnasal drip, rhinorrhea, sinus pressure and congestion.    Respiratory: Positive for chest tightness (occasional), shortness of breath and dyspnea on extertion. Negative for cough and hemoptysis.    Cardiovascular: Positive for leg swelling. Negative for chest pain.   Genitourinary: Negative for difficulty urinating.   Endocrine: Negative for cold intolerance and heat intolerance.    Musculoskeletal: Negative for joint swelling and myalgias.   Gastrointestinal: Negative for nausea, vomiting and abdominal pain.   Neurological: Negative for headaches.   Hematological: Negative for adenopathy. No excessive bruising.   Psychiatric/Behavioral: Negative for confusion and sleep disturbance.         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     aspirin 81 MG Chew Take 1 tablet (81 mg total) by mouth once daily.    diclofenac sodium 1 % Gel Apply 4 g topically 4 (four) times daily. Prn pain    fluticasone (FLONASE) 50 mcg/actuation nasal spray 2 sprays by Each Nare route once daily.    fluticasone-salmeterol 250-50 mcg/dose (ADVAIR) 250-50 mcg/dose diskus inhaler Inhale 1 puff into the lungs 2 (two) times daily. Controller    furosemide (LASIX) 40 MG tablet Take 0.5 tablets (20 mg total) by mouth 2 (two) times daily.    gabapentin (NEURONTIN) 300 MG capsule Take 300 mg by mouth 3 (three) times daily as needed (for nerve pain).     guaifenesin (MUCINEX) 600 mg 12 hr tablet Take 1 tablet (600 mg total) by mouth 2 (two) times daily.    isosorbide-hydrALAZINE 20-37.5 mg (BIDIL) 20-37.5 mg Tab Take 2 tablets by mouth 3 (three) times daily.    levothyroxine (SYNTHROID) 50 MCG tablet Take 1 tablet (50 mcg total) by mouth before breakfast.    melatonin 3 mg Tab Take by mouth every evening.    metoprolol succinate (TOPROL-XL) 50 MG 24 hr tablet Take 1 tablet (50 mg total) by mouth once daily.    sodium bicarbonate 650 MG tablet Take 2 tablets (1,300 mg total) by mouth 2 (two) times daily.    benztropine (COGENTIN) 0.5 MG tablet Take 1 tablet (0.5 mg total) by mouth nightly.    betamethasone valerate 0.1% (VALISONE) 0.1 % Lotn Apply topically once daily. 2 drops both ears once daily    quetiapine (SEROQUEL) 200 MG Tab Take 1 tablet (200 mg total) by mouth nightly.     No current facility-administered medications on file prior to visit.        Objective:      Physical Exam   Constitutional: She is oriented to person, place, and time. She appears well-developed and well-nourished.    HENT:   Head: Normocephalic.   Mouth/Throat: Mallampati Score: IV.   Neck: Normal range of motion. Neck supple. No tracheal deviation present.   Cardiovascular: Normal rate and regular rhythm.    No murmur heard.  Pulmonary/Chest: Normal expansion, effort normal and breath sounds normal. No respiratory distress. She has no wheezes. She has no rhonchi. She has no rales.   Abdominal: Soft. Bowel sounds are normal. She exhibits no distension. There is no tenderness.   Musculoskeletal: Normal range of motion. She exhibits edema (2+ edema in bilateral lower extremities).   Neurological: She is alert and oriented to person, place, and time.   Skin: Skin is warm and dry.   Psychiatric: She has a normal mood and affect.   Vitals reviewed.    Personal Diagnostic Review    PFTs show severe obstructive lung disease  Chest xray with right pleural effusion.  Assessment:     Orders Placed This Encounter   Procedures    X-Ray Chest PA And Lateral     Standing Status:   Future     Number of Occurrences:   1     Standing Expiration Date:   5/2/2018     Order Specific Question:   Reason for Exam:     Answer:   EVALUATION OF PLEURAL EFFUSION     Order Specific Question:   May the Radiologist modify the order per protocol to meet the clinical needs of the patient?     Answer:   Yes     1. Pleural effusion    2. Chronic obstructive pulmonary disease, unspecified COPD type        Plan:       Ms. Sal has multiple medical problems that may be contributing to her dyspnea. I agree with work up and monitoring of her anemia.  Additionally, she needs to follow closely with cardiology for optimization of her heart failure.  She has evidence of severe obstructive lung disease on PFTs and would likely benefit from maximal inhaler therapy to optimize her respiratory function.    -continue advair  -adding spiriva  -continue proair as rescue inhaler  -inhaler frequency and technique reviewed with patient and her daughter    Recent CXR with new  right pleural effusion.  Suspect this is secondary to recent episode of decompensated HF, but need a repeat Chest xray for further assessment.  If this effusion is persistent I would consider diagnostic sampling given her history of Breast CA.

## 2017-05-02 NOTE — LETTER
May 2, 2017      Live Young MD  [address] [facility] [address]           [facility] - Pulmonary Services  [address]  [place] [address]  Phone: [phone]          Patient: Anai Sal   MR Number: [id number]   YOB: 1945   Date of Visit: 5/2/2017       Dear Dr. Live Young:    Thank you for referring Anai Sal to me for evaluation. Attached you will find relevant portions of my assessment and plan of care.    If you have questions, please do not hesitate to call me. I look forward to following Anai Sal along with you.    Sincerely,    Kamila Hughes MD    Enclosure  CC:  No Recipients    If you would like to receive this communication electronically, please contact [email or web address] or [phone] to request more information on [facility] access.    For providers and/or their staff who would like to refer a patient to [facility], please contact us through our one-stop-shop provider referral line, Jorge Luis Lanza, at [phone].    If you feel you have received this communication in error or would no longer like to receive these types of communications, please e-mail [email or web address]

## 2017-05-04 ENCOUNTER — DOCUMENTATION ONLY (OUTPATIENT)
Dept: TRANSPLANT | Facility: CLINIC | Age: 72
End: 2017-05-04

## 2017-05-04 ENCOUNTER — TELEPHONE (OUTPATIENT)
Dept: TRANSPLANT | Facility: CLINIC | Age: 72
End: 2017-05-04

## 2017-05-04 NOTE — PROGRESS NOTES
5/1/17  Received lab results from [facility] today with collection date of 4/28/17.  Creatinine 1.83   K+ 4.2  BNP 1547.  Not sure why labs were drawn this date. Reviewing Dr. Browning's 4/20/17 note he indicated he wanted labs- BMP and BNP in 1 month with her f/u visit with him. Asked Taras MASON to contact [facility] and confirm they have orders for BMP and BNP in 1 month-preferably 5/18 or 5/19.

## 2017-05-04 NOTE — TELEPHONE ENCOUNTER
Spoke to Alvaro about redrawn  labs on 5/18 for her doctor appt on May 23... The reason why the labs is getting redrawn they were done on 4/28 which is too early for HF appt.

## 2017-05-10 ENCOUNTER — OFFICE VISIT (OUTPATIENT)
Dept: NEPHROLOGY | Facility: CLINIC | Age: 72
End: 2017-05-10
Payer: COMMERCIAL

## 2017-05-10 VITALS
OXYGEN SATURATION: 100 % | BODY MASS INDEX: 28.53 KG/M2 | WEIGHT: 177.5 LBS | SYSTOLIC BLOOD PRESSURE: 112 MMHG | DIASTOLIC BLOOD PRESSURE: 70 MMHG | HEIGHT: 66 IN | HEART RATE: 69 BPM

## 2017-05-10 DIAGNOSIS — N18.4 STAGE 4 CHRONIC KIDNEY DISEASE: Primary | ICD-10-CM

## 2017-05-10 DIAGNOSIS — N18.4 ANEMIA OF CHRONIC RENAL FAILURE, STAGE 4 (SEVERE): ICD-10-CM

## 2017-05-10 DIAGNOSIS — D63.1 ANEMIA OF CHRONIC RENAL FAILURE, STAGE 4 (SEVERE): ICD-10-CM

## 2017-05-10 PROCEDURE — 99214 OFFICE O/P EST MOD 30 MIN: CPT | Mod: S$GLB,,, | Performed by: INTERNAL MEDICINE

## 2017-05-10 PROCEDURE — 99999 PR PBB SHADOW E&M-EST. PATIENT-LVL III: CPT | Mod: PBBFAC,,, | Performed by: INTERNAL MEDICINE

## 2017-05-10 NOTE — MR AVS SNAPSHOT
[facility] - Nephrology  [address]  [facility] [address]  Phone: [phone]  Fax: [phone]                  Anai Sal   5/10/2017 11:40 AM   Office Visit    Description:  Female : 1945   Provider:  Bud Benoit MD   Department:  [facility]           Diagnoses this Visit        Comments    Stage 4 chronic kidney disease    -  Primary            To Do List           Future Appointments        Provider Department Dept Phone    5/15/2017 9:30 AM ECHO, [facility] - Echo/Stress Lab [phone]    5/15/2017 10:40 AM Hussein Costa MD [facility]-Interventional Card [phone]    2017 8:30 AM Christen Browning MD [facility] [phone]      Goals (5 Years of Data)     None      Follow-Up and Disposition     Return in about 4 months (around 9/10/2017).      [facility]     [facility] Nurse Care Line -  Assistance  Unless otherwise directed by your provider, please contact [facility], our nurse care line that is available for  assistance.     Registered nurses in the [facility] provide: appointment scheduling, clinical advisement, health education, and other advisory services.  Call: [phone] (toll free)               Medications           Message regarding Medications     Verify the changes and/or additions to your medication regime listed below are the same as discussed with your clinician today.  If any of these changes or additions are incorrect, please notify your healthcare provider.             Verify that the below list of medications is an accurate representation of the medications you are currently taking.  If none reported, the list may be blank. If incorrect, please contact your healthcare provider. Carry this list with you in case of emergency.           Current Medications     allopurinol (ZYLOPRIM) 100 MG tablet Take 100 mg by mouth 2 (two) times daily.     aspirin 81 MG Chew Take 1 tablet  "(81 mg total) by mouth once daily.    diclofenac sodium 1 % Gel Apply 4 g topically 4 (four) times daily. Prn pain    fluticasone (FLONASE) 50 mcg/actuation nasal spray 2 sprays by Each Nare route once daily.    fluticasone-salmeterol 250-50 mcg/dose (ADVAIR) 250-50 mcg/dose diskus inhaler Inhale 1 puff into the lungs 2 (two) times daily. Controller    furosemide (LASIX) 40 MG tablet Take 0.5 tablets (20 mg total) by mouth 2 (two) times daily.    gabapentin (NEURONTIN) 300 MG capsule Take 300 mg by mouth 3 (three) times daily as needed (for nerve pain).     guaifenesin (MUCINEX) 600 mg 12 hr tablet Take 1 tablet (600 mg total) by mouth 2 (two) times daily.    isosorbide-hydrALAZINE 20-37.5 mg (BIDIL) 20-37.5 mg Tab Take 2 tablets by mouth 3 (three) times daily.    levothyroxine (SYNTHROID) 50 MCG tablet Take 1 tablet (50 mcg total) by mouth before breakfast.    melatonin 3 mg Tab Take by mouth every evening.    metoprolol succinate (TOPROL-XL) 50 MG 24 hr tablet Take 1 tablet (50 mg total) by mouth once daily.    sodium bicarbonate 650 MG tablet Take 2 tablets (1,300 mg total) by mouth 2 (two) times daily.    tiotropium (SPIRIVA) 18 mcg inhalation capsule Inhale 1 capsule (18 mcg total) into the lungs once daily.    benztropine (COGENTIN) 0.5 MG tablet Take 1 tablet (0.5 mg total) by mouth nightly.    betamethasone valerate 0.1% (VALISONE) 0.1 % Lotn Apply topically once daily. 2 drops both ears once daily    quetiapine (SEROQUEL) 200 MG Tab Take 1 tablet (200 mg total) by mouth nightly.           Clinical Reference Information           Your Vitals Were     BP Pulse Height Weight SpO2 BMI    112/70 69 5' 6" (1.676 m) 80.5 kg (177 lb 7.5 oz) 100% 28.64 kg/m2      Blood Pressure          Most Recent Value    BP  112/70      Allergies as of 5/10/2017     No Known Allergies      Immunizations Administered on Date of Encounter - 5/10/2017     None      Orders Placed During Today's Visit     Future Labs/Procedures " Expected by Expires    Basic metabolic panel  9/10/2017 (Approximate) 5/10/2018    Protein / creatinine ratio, urine  9/10/2017 (Approximate) 5/10/2018    Urinalysis  9/10/2017 (Approximate) 5/10/2018      [facility] Sign-Up     Activating your [facility] account is as easy as 1-2-3!     1) Visit [email or web address], select Sign Up Now, enter this activation code and your date of birth, then select Next.  [email or web address]  Expires: 5/15/2017  6:44 AM      2) Create a username and password to use when you visit [facility] in the future and select a security question in case you lose your password and select Next.    3) Enter your e-mail address and click Sign Up!    Additional Information  If you have questions, please e-mail [email or web address] or call [phone] to talk to our [facility] staff. Remember, [facility] is NOT to be used for urgent needs. For medical emergencies, dial 911.         Language Assistance Services     ATTENTION: Language assistance services are available, free of charge. Please call [phone].      ATENCIÓN: Si habla español, tiene a mane disposición servicios gratuitos de asistencia lingüística. Llame al [phone].     CHÚ Ý: N?u b?n nói Ti?ng Vi?t, có các d?ch v? h? tr? ngôn ng? mi?n phí dành cho b?n. G?i s? [phone].         [facility] complies with applicable Federal civil rights laws and does not discriminate on the basis of race, color, national origin, age, disability, or sex.

## 2017-05-10 NOTE — PROGRESS NOTES
Return Visit Report  Nephrology      Consult Requested By: PCP  Reason for Consult: Proteinuria    History of Present Illness:  Patient is a 72 y.o. female returns for evaluation of proteinuria and CKD. Three months ago, a routine UA showed large protein by dipstick. Her PMH is significant for HTN, diastolic heart failure, bipolar depression and insomnia. 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      Today, she arrives stating that she is her usual state of health. Denies loss of appetite, pain, emesis, edema, SOB, CP, rash, hematuria or foamy urine.  The patient denies taking NSAIDs or new antibiotics, recreational drugs, recent episode of dehydration, diarrhea, nausea or vomiting, acute illness, hospitalization or exposure to IV radiocontrast.     Past Medical History:   Diagnosis Date    Breast cancer     CHF (congestive heart failure)     Chronic hepatitis C virus infection 3/14/2017    Coronary artery disease     Hypertension     Hazel     Renal disorder     Thyroid disease    Bipolar depression  Hypertension  Proteinuria  Osteoarthritis  Diastolic heart failure    Current Outpatient Prescriptions:     allopurinol (ZYLOPRIM) 100 MG tablet, Take 100 mg by mouth 2 (two) times daily. , Disp: , Rfl:     aspirin 81 MG Chew, Take 1 tablet (81 mg total) by mouth once daily., Disp: , Rfl: 0    diclofenac sodium 1 % Gel, Apply 4 g topically 4 (four) times daily. Prn pain, Disp: , Rfl:     fluticasone (FLONASE) 50 mcg/actuation nasal spray, 2 sprays by Each Nare route once daily., Disp: 1 Bottle, Rfl: 3    fluticasone-salmeterol 250-50 mcg/dose (ADVAIR) 250-50 mcg/dose diskus inhaler, Inhale 1 puff into the lungs 2 (two) times daily. Controller, Disp: , Rfl: 0    furosemide (LASIX) 40 MG tablet, Take 0.5 tablets (20 mg total) by mouth 2 (two) times daily., Disp: 60 tablet, Rfl: 2    gabapentin (NEURONTIN) 300 MG capsule, Take 300 mg by mouth 3 (three) times daily as needed (for nerve  pain). , Disp: , Rfl:     guaifenesin (MUCINEX) 600 mg 12 hr tablet, Take 1 tablet (600 mg total) by mouth 2 (two) times daily., Disp: , Rfl:     isosorbide-hydrALAZINE 20-37.5 mg (BIDIL) 20-37.5 mg Tab, Take 2 tablets by mouth 3 (three) times daily., Disp: 180 tablet, Rfl: 2    levothyroxine (SYNTHROID) 50 MCG tablet, Take 1 tablet (50 mcg total) by mouth before breakfast., Disp: 30 tablet, Rfl: 11    melatonin 3 mg Tab, Take by mouth every evening., Disp: , Rfl:     metoprolol succinate (TOPROL-XL) 50 MG 24 hr tablet, Take 1 tablet (50 mg total) by mouth once daily., Disp: 30 tablet, Rfl: 2    sodium bicarbonate 650 MG tablet, Take 2 tablets (1,300 mg total) by mouth 2 (two) times daily., Disp: 360 tablet, Rfl: 3    tiotropium (SPIRIVA) 18 mcg inhalation capsule, Inhale 1 capsule (18 mcg total) into the lungs once daily., Disp: 90 capsule, Rfl: 3    benztropine (COGENTIN) 0.5 MG tablet, Take 1 tablet (0.5 mg total) by mouth nightly., Disp: 30 tablet, Rfl: 0    betamethasone valerate 0.1% (VALISONE) 0.1 % Lotn, Apply topically once daily. 2 drops both ears once daily, Disp: 1 Bottle, Rfl: 3    quetiapine (SEROQUEL) 200 MG Tab, Take 1 tablet (200 mg total) by mouth nightly., Disp: 30 tablet, Rfl: 0  Review of patient's allergies indicates:  No Known Allergies       Review of Systems   Constitutional: Negative.    Eyes: Negative.    Cardiovascular: Positive for leg swelling. Negative for chest pain, palpitations and orthopnea.   Gastrointestinal: Negative.    Genitourinary: Negative.    Musculoskeletal: Positive for joint pain.   Skin: Negative.    Neurological: Negative.    Psychiatric/Behavioral: Negative for depression. The patient is not nervous/anxious and does not have insomnia.    All other systems reviewed and are negative.      Vitals:    05/10/17 1125   BP: 112/70   Pulse: 69       PHYSICAL EXAMINATION:  General: no distress, well nourished  Skin: color, texture, turgor normal. No rash or  lesions  HEENT: HEyes: reactive pupils, normal conjunctiva. Oral mucosa moist, no ulcers. Throat: no erythema.  Neck: supple, symmetrical, trachea midline, no JVD, no carotid bruit  Lungs: clear to auscultation bilaterally and normal respiratory effort  Cardiovascular: Heart: regular rate and rhythm, S1, S2 normal, no murmur, rub or gallop. Pulses: 2+ and symmetric.  Abdomen: bowel sounds present, no abdominal bruit, soft, non-tender non-distented; no masses, organomegaly or ascites.   Musculoskeletal: no pitting edema in lower extremities, no clubbing or cyanosis  Lymph Nodes: No cervical or supraclavicular adenopathy  Neurologic: AAOx3, normal strength and tone. No focal deficit. No asterixis.       LABORATORY DATA: Reviewed. Of note:  Serum Cr 2.2 mg/dL  UPCR 0.3 g/g    IMAGING STUDIES  Kidney US: Reviewed, size 8.3 and 9.8 cm, multiple cysts, non-obstructing stones    IMPRESSION / RECOMMENDATIONS:     1. CKD (chronic kidney disease) stage 4, eGFR 25 ml/min  Unknown cause. No longer proteinuric. Not a diabetic.Possible chronic renal hypoperfusion from CRS. Kidney size and appearance precludes performing a biopsy. Proteinuria has improved, even without an ARB, so it was likely transient and not reflective of permanent glomerulopathy. Serology work up unrevealing. Moderate risk for progression to ESRD. SBP is well controlled. Edema improved after increasing furosemide (LASIX) 20 MG tablet to bid. Her metabolic acidosis is controlled with oral alkali. Will monitor MBD panel. Has severe anemia of CKD. Will  Send her to Anemia clinic for IV iron and/or KAMINI therapy       SUMMARY OF PLAN  1. Continue furosemide 20 mg bid + sodium bicarbonate 1300 mg bid  2. Low salt diet  3. Anemia clinic referral, iron studies  4. RTC in 2-3 months

## 2017-05-11 DIAGNOSIS — N18.9 ANEMIA IN CHRONIC KIDNEY DISEASE(285.21): Primary | ICD-10-CM

## 2017-05-11 DIAGNOSIS — D63.1 ANEMIA IN CHRONIC KIDNEY DISEASE(285.21): Primary | ICD-10-CM

## 2017-05-11 DIAGNOSIS — D63.1 ANEMIA IN STAGE 4 CHRONIC KIDNEY DISEASE: ICD-10-CM

## 2017-05-11 DIAGNOSIS — N18.4 ANEMIA IN STAGE 4 CHRONIC KIDNEY DISEASE: ICD-10-CM

## 2017-05-15 ENCOUNTER — INITIAL CONSULT (OUTPATIENT)
Dept: CARDIOLOGY | Facility: CLINIC | Age: 72
End: 2017-05-15
Payer: COMMERCIAL

## 2017-05-15 ENCOUNTER — HOSPITAL ENCOUNTER (OUTPATIENT)
Dept: CARDIOLOGY | Facility: CLINIC | Age: 72
Discharge: HOME | End: 2017-05-15
Payer: COMMERCIAL

## 2017-05-15 VITALS
OXYGEN SATURATION: 96 % | SYSTOLIC BLOOD PRESSURE: 152 MMHG | HEIGHT: 67 IN | HEART RATE: 67 BPM | BODY MASS INDEX: 27.89 KG/M2 | DIASTOLIC BLOOD PRESSURE: 92 MMHG | WEIGHT: 177.69 LBS

## 2017-05-15 DIAGNOSIS — J44.9 CHRONIC OBSTRUCTIVE PULMONARY DISEASE, UNSPECIFIED COPD TYPE: ICD-10-CM

## 2017-05-15 DIAGNOSIS — I34.0 MITRAL VALVE INSUFFICIENCY, UNSPECIFIED ETIOLOGY: ICD-10-CM

## 2017-05-15 DIAGNOSIS — E03.9 PRIMARY HYPOTHYROIDISM: ICD-10-CM

## 2017-05-15 DIAGNOSIS — I48.91 ATRIAL FIBRILLATION, UNSPECIFIED TYPE: ICD-10-CM

## 2017-05-15 DIAGNOSIS — Z85.3 HISTORY OF BREAST CANCER: Primary | ICD-10-CM

## 2017-05-15 DIAGNOSIS — I10 ESSENTIAL HYPERTENSION: ICD-10-CM

## 2017-05-15 DIAGNOSIS — N18.4 STAGE 4 CHRONIC KIDNEY DISEASE: ICD-10-CM

## 2017-05-15 DIAGNOSIS — M1A.9XX0 CHRONIC GOUT, UNSPECIFIED CAUSE, UNSPECIFIED SITE: ICD-10-CM

## 2017-05-15 DIAGNOSIS — B18.2 CHRONIC HEPATITIS C WITHOUT HEPATIC COMA: ICD-10-CM

## 2017-05-15 DIAGNOSIS — I50.42 CHRONIC COMBINED SYSTOLIC AND DIASTOLIC HEART FAILURE, NYHA CLASS 3: ICD-10-CM

## 2017-05-15 LAB
ESTIMATED PA SYSTOLIC PRESSURE: 52.45
MITRAL VALVE REGURGITATION: ABNORMAL
RETIRED EF AND QEF - SEE NOTES: 30 (ref 55–65)
TRICUSPID VALVE REGURGITATION: ABNORMAL

## 2017-05-15 PROCEDURE — 99214 OFFICE O/P EST MOD 30 MIN: CPT | Mod: S$GLB,,, | Performed by: INTERNAL MEDICINE

## 2017-05-15 PROCEDURE — 93306 TTE W/DOPPLER COMPLETE: CPT | Mod: S$GLB,,, | Performed by: INTERNAL MEDICINE

## 2017-05-15 PROCEDURE — 99999 PR PBB SHADOW E&M-EST. PATIENT-LVL IV: CPT | Mod: PBBFAC,,, | Performed by: INTERNAL MEDICINE

## 2017-05-15 NOTE — LETTER
May 15, 2017      Len Live MD  [address]  [facility] [address]           [facility]  [address]  [facility] [address]  Phone: [phone]          Patient: Anai Sal   MR Number: [id number]   YOB: 1945   Date of Visit: 5/15/2017       Dear Dr. Len Live:    Thank you for referring Anai Sal to me for evaluation. Attached you will find relevant portions of my assessment and plan of care.    If you have questions, please do not hesitate to call me. I look forward to following Anai Sal along with you.    Sincerely,    Hussein Costa MD    Enclosure  CC:  No Recipients    If you would like to receive this communication electronically, please contact [email or web address] or [phone] to request more information on [facility] [employer] access.    For providers and/or their staff who would like to refer a patient to [facility], please contact us through our one-stop-shop provider referral line, [facility] , at [phone].    If you feel you have received this communication in error or would no longer like to receive these types of communications, please e-mail [email or web address]

## 2017-05-15 NOTE — MR AVS SNAPSHOT
[facility]  [address]  [facility] [address]  Phone: [phone]                  Anai Sal   5/15/2017 10:40 AM   Initial consult    Description:  Female : 1945   Provider:  Hussein Costa MD   Department:  [facility]           Reason for Visit     Mitral Regurgitation                To Do List           Future Appointments        Provider Department Dept Phone    2017 8:30 AM Christen Browning MD [facility] [phone]    2017 10:45 AM EPO, INJECTION [facility] [phone]      Goals (5 Years of Data)     None      [facility]     [facility] Nurse Care Line -  Assistance  Unless otherwise directed by your provider, please contact [facility], our nurse care line that is available for  assistance.     Registered nurses in the [facility] Center provide: appointment scheduling, clinical advisement, health education, and other advisory services.  Call: [phone] (toll free)               Medications           Message regarding Medications     Verify the changes and/or additions to your medication regime listed below are the same as discussed with your clinician today.  If any of these changes or additions are incorrect, please notify your healthcare provider.             Verify that the below list of medications is an accurate representation of the medications you are currently taking.  If none reported, the list may be blank. If incorrect, please contact your healthcare provider. Carry this list with you in case of emergency.           Current Medications     allopurinol (ZYLOPRIM) 100 MG tablet Take 100 mg by mouth 2 (two) times daily.     aspirin 81 MG Chew Take 1 tablet (81 mg total) by mouth once daily.    benztropine (COGENTIN) 0.5 MG tablet Take 1 tablet (0.5 mg total) by mouth nightly.    betamethasone valerate 0.1% (VALISONE) 0.1 % Lotn Apply topically once daily. 2  "drops both ears once daily    diclofenac sodium 1 % Gel Apply 4 g topically 4 (four) times daily. Prn pain    fluticasone (FLONASE) 50 mcg/actuation nasal spray 2 sprays by Each Nare route once daily.    fluticasone-salmeterol 250-50 mcg/dose (ADVAIR) 250-50 mcg/dose diskus inhaler Inhale 1 puff into the lungs 2 (two) times daily. Controller    furosemide (LASIX) 40 MG tablet Take 0.5 tablets (20 mg total) by mouth 2 (two) times daily.    gabapentin (NEURONTIN) 300 MG capsule Take 300 mg by mouth 3 (three) times daily as needed (for nerve pain).     guaifenesin (MUCINEX) 600 mg 12 hr tablet Take 1 tablet (600 mg total) by mouth 2 (two) times daily.    isosorbide-hydrALAZINE 20-37.5 mg (BIDIL) 20-37.5 mg Tab Take 2 tablets by mouth 3 (three) times daily.    levothyroxine (SYNTHROID) 50 MCG tablet Take 1 tablet (50 mcg total) by mouth before breakfast.    melatonin 3 mg Tab Take by mouth every evening.    metoprolol succinate (TOPROL-XL) 50 MG 24 hr tablet Take 1 tablet (50 mg total) by mouth once daily.    quetiapine (SEROQUEL) 200 MG Tab Take 1 tablet (200 mg total) by mouth nightly.    sodium bicarbonate 650 MG tablet Take 2 tablets (1,300 mg total) by mouth 2 (two) times daily.    tiotropium (SPIRIVA) 18 mcg inhalation capsule Inhale 1 capsule (18 mcg total) into the lungs once daily.           Clinical Reference Information           Your Vitals Were     BP Pulse Height Weight SpO2 BMI    152/92 (BP Location: Left arm, Patient Position: Sitting, BP Method: Automatic) 67 5' 6.5" (1.689 m) 80.6 kg (177 lb 11.1 oz) 96% 28.25 kg/m2      Blood Pressure          Most Recent Value    Left Arm BP - Sitting  152/92    Reason for not completing BP on both arms  mastectomy    BP  -- [no b/p in right arm]      Allergies as of 5/15/2017     No Known Allergies      Immunizations Administered on Date of Encounter - 5/15/2017     None      [facility] Sign-Up     Activating your [facility] account is as easy as 1-2-3!     1) Visit " [email or web address], select Sign Up Now, enter this activation code and your date of birth, then select Next.  [email or web address]  Expires: 6/29/2017 12:49 PM      2) Create a username and password to use when you visit [facility] in the future and select a security question in case you lose your password and select Next.    3) Enter your e-mail address and click Sign Up!    Additional Information  If you have questions, please e-mail [email or web address] or call [phone] to talk to our [employer] staff. Remember, [facility] is NOT to be used for urgent needs. For medical emergencies, dial 911.         Language Assistance Services     ATTENTION: Language assistance services are available, free of charge. Please call [phone].      ATENCIÓN: Si habla español, tiene a mane disposición servicios gratuitos de asistencia lingüística. Llame al [phone].     CHÚ Ý: N?u b?n nói Ti?ng Vi?t, có các d?ch v? h? tr? ngôn ng? mi?n phí dành cho b?n. G?i s? [phone].         [facility] complies with applicable Federal civil rights laws and does not discriminate on the basis of race, color, national origin, age, disability, or sex.

## 2017-05-15 NOTE — PROGRESS NOTES
Progress Note  Interventional Cardiology  [facility]      Subjective:    Ms. Sal is referred by Dr. Browning/Toya, RN @ [place] [facility] for evaluation of severe functional MR. She was initially seen in the hospital in April when she was admitted with confusion and decompensated heart failure. She was seen by CTS and HTS at that time -- her medications were optimized by Dr. Browning and Dr. Waldrop felt she was inoperable due to her MELD score and comorbidities.      At baseline, she is able to perform most her ADLs independently. She lives with her daughter. She ambulates with a walker. She states she has GIBBONS when walking less than 50 feet. No CP, PND, or orthopnea.      STS= 9.0%   MELD= 18  Frailty: 2/4          Past Medical History:   Diagnosis Date    Breast cancer      CHF (congestive heart failure)      Chronic hepatitis C virus infection 3/14/2017    Coronary artery disease      Hypertension      Hazel      Renal disorder      Thyroid disease                 Past Surgical History:   Procedure Laterality Date    BREAST SURGERY        HYSTERECTOMY        MASTECTOMY             Review of patient's allergies indicates:  No Known Allergies       Current Outpatient Prescriptions:     allopurinol (ZYLOPRIM) 100 MG tablet, Take 100 mg by mouth 2 (two) times daily. , Disp: , Rfl:     aspirin 81 MG Chew, Take 1 tablet (81 mg total) by mouth once daily., Disp: , Rfl: 0    diclofenac sodium 1 % Gel, Apply 4 g topically 4 (four) times daily. Prn pain, Disp: , Rfl:     fluticasone (FLONASE) 50 mcg/actuation nasal spray, 2 sprays by Each Nare route once daily., Disp: 1 Bottle, Rfl: 3    fluticasone-salmeterol 250-50 mcg/dose (ADVAIR) 250-50 mcg/dose diskus inhaler, Inhale 1 puff into the lungs 2 (two) times daily. Controller, Disp: , Rfl: 0    furosemide (LASIX) 40 MG tablet, Take 0.5 tablets (20 mg total) by mouth 2 (two) times daily., Disp: 60 tablet, Rfl: 2    gabapentin (NEURONTIN) 300 MG capsule, Take 300 mg by  mouth 3 (three) times daily as needed (for nerve pain). , Disp: , Rfl:     guaifenesin (MUCINEX) 600 mg 12 hr tablet, Take 1 tablet (600 mg total) by mouth 2 (two) times daily., Disp: , Rfl:     isosorbide-hydrALAZINE 20-37.5 mg (BIDIL) 20-37.5 mg Tab, Take 2 tablets by mouth 3 (three) times daily., Disp: 180 tablet, Rfl: 2    levothyroxine (SYNTHROID) 50 MCG tablet, Take 1 tablet (50 mcg total) by mouth before breakfast., Disp: 30 tablet, Rfl: 11    melatonin 3 mg Tab, Take by mouth every evening., Disp: , Rfl:     metoprolol succinate (TOPROL-XL) 50 MG 24 hr tablet, Take 1 tablet (50 mg total) by mouth once daily., Disp: 30 tablet, Rfl: 2    sodium bicarbonate 650 MG tablet, Take 2 tablets (1,300 mg total) by mouth 2 (two) times daily., Disp: 360 tablet, Rfl: 3    tiotropium (SPIRIVA) 18 mcg inhalation capsule, Inhale 1 capsule (18 mcg total) into the lungs once daily., Disp: 90 capsule, Rfl: 3    benztropine (COGENTIN) 0.5 MG tablet, Take 1 tablet (0.5 mg total) by mouth nightly., Disp: 30 tablet, Rfl: 0    betamethasone valerate 0.1% (VALISONE) 0.1 % Lotn, Apply topically once daily. 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 C/O GIBBONS, orthopnea, LE edema  Gastrointestinal: No abdominal pain, nausea, vomiting, black or bloody stools  Genitourinary: No difficulty urinating or hematuria, no incontinence.   Musculoskeletal: No joint pain or muscle pain or weakness  Neurological: No focal weakness, numbness, tingling, or sensory changes.      Objective:      Vitals:    05/15/17 1051   BP: (!) 152/92   BP Location: Left arm   Patient Position: Sitting   BP Method: Automatic   Pulse: 67   SpO2: 96%   Weight: 80.6 kg (177 lb 11.1 oz)   Height: 5' 6.5" (1.689 m)   Body mass index is 28.25 kg/(m^2).       Physical Exam  General Appearance:  Alert, cooperative, no distress, appears stated age   Head:  Normocephalic, without obvious abnormality, atraumatic   Eyes:  PERRL, conjunctiva/corneas clear, EOM's intact, both eyes   Neck: Supple, symmetrical, trachea midline, no carotid  Bruit, + JVD   Lungs:  Decreased bibasilar BS with crackles, respirations unlabored   Chest Wall:  No tenderness or deformity   Heart:  Regular rate and rhythm, S1 and S2 normal, no rub or gallop,  III/VI SM at RSB   Abdomen:  Soft, non-tender, bowel sounds active all four quadrants,   no masses, no organomegaly   Extremities: Extremities normal, atraumatic, no cyanosis or edema   Pulses: 2+ and symmetric all extremities   Skin: Skin color, texture, turgor normal, no rashes or lesions   Neurologic: Grossly intact               Assessment and Plan:       Mitral regurgitation - severe, functional. Symptomatic (NYHA Class III sx).     Chronic diastolic heart failure - well compensated clinically at this time. Following with Dr. Browning.     Stage 4 chronic kidney disease - likely hypertensive nephrosclerosis. Cr stable at this time. Will monitor closely.     Essential hypertension - controlled on coreg and diovan.     COPD - severe by PFTs in Feb 2017,n ot on inhalers    Hypothyroidism - stable, on synthroid    Hx of breast CA - s/p right mastectomy and chemo " in 1979, KASSY per Dr. Herrera on 2/16/17    Chronic hepatitis C virus infection - Kenroy C Ab + in Jan 2017, MELD= 18 (5/15/17)    Encephalopathy, acute - improved. CT head negative         She is being screened for enrollment in [employer] (echo pending, but otherwise qualifies). She was given the consent forms and pamphlet. We will contact her when we get the results of her echo to offer her enrollment if she qualifies.         Elisa Ellis PA-C  Cardiology   [facility]    Staff:  I have personally taken the history and examined this patient and agree with the fellow's note as stated above and amended it accordingly :-) She is a [facility] patient.  Even though she appears to be a candidate for the Coapt Trial, her insurance may not allow her to be in this trial.  I will called the [facility] line to inquire if this is possible but had to leave a message and awaiting call back.

## 2017-05-18 ENCOUNTER — TELEPHONE (OUTPATIENT)
Dept: TRANSPLANT | Facility: CLINIC | Age: 72
End: 2017-05-18

## 2017-05-18 NOTE — TELEPHONE ENCOUNTER
Call [facility] to check to see if Ms. Sal show up to have labs drawn,Daria inform that she never show up. Call pt her daughter answer the phone and she wasn't not aware of her mother having a lab appt, and that she would call me back today to schedule her mom lab appt on the day she comes to see Dr. Browning.

## 2017-05-19 ENCOUNTER — TELEPHONE (OUTPATIENT)
Dept: TRANSPLANT | Facility: CLINIC | Age: 72
End: 2017-05-19

## 2017-05-19 NOTE — TELEPHONE ENCOUNTER
1115 am: F/U on nurse lab review from yesterday.   See note by ISABELLA Grajeda from yesterday.  Called pt at this time to determine if ok to schedule lab appt 5/23/17 here @ [facility] prior to appt with Dr. Browning.  LINDA HERNANDEZ explaining reason for call, day, date, time, this office contact phone number, my name, and asked she return my call to discuss where and when we can set up this lab appt.

## 2017-05-23 ENCOUNTER — INFUSION (OUTPATIENT)
Dept: INFECTIOUS DISEASES | Facility: HOSPITAL | Age: 72
End: 2017-05-23
Attending: INTERNAL MEDICINE
Payer: COMMERCIAL

## 2017-05-23 ENCOUNTER — OFFICE VISIT (OUTPATIENT)
Dept: TRANSPLANT | Facility: CLINIC | Age: 72
End: 2017-05-23
Payer: COMMERCIAL

## 2017-05-23 VITALS
DIASTOLIC BLOOD PRESSURE: 66 MMHG | SYSTOLIC BLOOD PRESSURE: 122 MMHG | BODY MASS INDEX: 28.23 KG/M2 | HEIGHT: 66 IN | WEIGHT: 175.69 LBS

## 2017-05-23 VITALS
HEART RATE: 65 BPM | SYSTOLIC BLOOD PRESSURE: 148 MMHG | HEIGHT: 66 IN | DIASTOLIC BLOOD PRESSURE: 74 MMHG | WEIGHT: 175.69 LBS | BODY MASS INDEX: 28.23 KG/M2

## 2017-05-23 DIAGNOSIS — N18.4 STAGE 4 CHRONIC KIDNEY DISEASE: ICD-10-CM

## 2017-05-23 DIAGNOSIS — I34.0 NON-RHEUMATIC MITRAL REGURGITATION: ICD-10-CM

## 2017-05-23 DIAGNOSIS — N18.30 CKD (CHRONIC KIDNEY DISEASE) STAGE 3, GFR 30-59 ML/MIN: ICD-10-CM

## 2017-05-23 DIAGNOSIS — D63.1 ANEMIA IN STAGE 4 CHRONIC KIDNEY DISEASE: ICD-10-CM

## 2017-05-23 DIAGNOSIS — N18.4 ANEMIA IN STAGE 4 CHRONIC KIDNEY DISEASE: ICD-10-CM

## 2017-05-23 DIAGNOSIS — R80.9 PROTEINURIA, UNSPECIFIED TYPE: ICD-10-CM

## 2017-05-23 DIAGNOSIS — N18.4 ANEMIA OF CHRONIC RENAL FAILURE, STAGE 4 (SEVERE): ICD-10-CM

## 2017-05-23 DIAGNOSIS — R60.9 EDEMA, UNSPECIFIED TYPE: ICD-10-CM

## 2017-05-23 DIAGNOSIS — N18.4 ANEMIA OF CHRONIC RENAL FAILURE, STAGE 4 (SEVERE): Primary | ICD-10-CM

## 2017-05-23 DIAGNOSIS — D63.1 ANEMIA OF CHRONIC RENAL FAILURE, STAGE 4 (SEVERE): Primary | ICD-10-CM

## 2017-05-23 DIAGNOSIS — D63.1 ANEMIA OF CHRONIC RENAL FAILURE, STAGE 4 (SEVERE): ICD-10-CM

## 2017-05-23 DIAGNOSIS — I50.42 CHRONIC COMBINED SYSTOLIC AND DIASTOLIC HEART FAILURE, NYHA CLASS 3: ICD-10-CM

## 2017-05-23 PROBLEM — N17.9 AKI (ACUTE KIDNEY INJURY): Status: RESOLVED | Noted: 2017-03-14 | Resolved: 2017-05-23

## 2017-05-23 PROCEDURE — 99999 PR PBB SHADOW E&M-EST. PATIENT-LVL III: CPT | Mod: PBBFAC,,, | Performed by: INTERNAL MEDICINE

## 2017-05-23 PROCEDURE — 63600175 PHARM REV CODE 636 W HCPCS: Performed by: INTERNAL MEDICINE

## 2017-05-23 PROCEDURE — 99214 OFFICE O/P EST MOD 30 MIN: CPT | Mod: S$GLB,,, | Performed by: INTERNAL MEDICINE

## 2017-05-23 PROCEDURE — 96372 THER/PROPH/DIAG INJ SC/IM: CPT

## 2017-05-23 RX ORDER — FUROSEMIDE 40 MG/1
TABLET ORAL
Qty: 90 TABLET | Refills: 2 | Status: SHIPPED | OUTPATIENT
Start: 2017-05-23 | End: 2017-05-24 | Stop reason: SDUPTHER

## 2017-05-23 RX ADMIN — DARBEPOETIN ALFA 40 MCG: 40 INJECTION, SOLUTION INTRAVENOUS; SUBCUTANEOUS at 10:05

## 2017-05-23 NOTE — PLAN OF CARE
Problem: Patient Care Overview (Adult)  Goal: Individualization & Mutuality  Outcome: Ongoing (interventions implemented as appropriate)  PATIENT EDUCATED ON HAND WASHING AND INFECTION CONTROL. PATIENT VERBALIZED UNDERSTANDING

## 2017-05-23 NOTE — ASSESSMENT & PLAN NOTE
May 19 Iron sat 6% Would benefit from iron replacement in addition to arascept Give info on food with iron [facility] PCP could try iron pills if interested

## 2017-05-23 NOTE — PATIENT INSTRUCTIONS
Food with Iron  · Wheat germ  · Dried fruits  · Nuts and seeds  · Whole grain and fortified breads and cereals  · Dried beans, lentils, and split peas  · Leafy, dark-green vegetables  · Eggs

## 2017-05-23 NOTE — PROGRESS NOTES
"Subjective: class 3    Patient ID:  Anai Sal is a 72 y.o. female who presents for follow-up of Congestive Heart Failure (1mo visit)      HPI Nonischemic CHF , HTN, hx Breast CA, CAD, HCV who was admitted in early April 2017 for presumed CHF.  She is currently getting evaluated for mitral clip.  No edema today despite a two KG weight gain. Ambulation limited by deconditioning in addition to COPD. Can do ADL's with help.  She did not take bidil this am which may explain BP this am.  Has trouble taking bidil TID consistently.    Review of Systems   Constitution: Negative for decreased appetite, weight gain and weight loss.   Cardiovascular: Negative for chest pain, dyspnea on exertion, leg swelling, near-syncope, orthopnea and palpitations.   Respiratory: Negative for cough and shortness of breath.    Musculoskeletal: Negative for myalgias.   Gastrointestinal: Negative for jaundice.        Objective:    Physical Exam   Constitutional: She appears well-developed and well-nourished. No distress.   BP (!) 148/74   Pulse 65   Ht 5' 6" (1.676 m)   Wt 79.7 kg (175 lb 11.3 oz)   BMI 28.36 kg/m²      HENT:   Head: Normocephalic and atraumatic. Head is without abrasion and without contusion.   Right Ear: External ear normal.   Left Ear: External ear normal.   Nose: Nose normal. No epistaxis.   Mouth/Throat: Oropharynx is clear and moist. Mucous membranes are not cyanotic.   Eyes: Conjunctivae and EOM are normal. Pupils are equal, round, and reactive to light.   Neck: Normal range of motion. Neck supple. No tracheal deviation present.   Cardiovascular: Normal rate, regular rhythm and normal pulses.  Exam reveals no gallop.    Murmur heard.   Medium-pitched blowing holosystolic murmur is present with a grade of 3/6  at the apex  Pulmonary/Chest: Effort normal and breath sounds normal. No stridor. No respiratory distress. She has no wheezes.   Abdominal: Soft. Normal appearance, normal aorta and bowel sounds are normal. " She exhibits no distension. There is no tenderness.   Musculoskeletal: She exhibits no edema or tenderness.   Neurological: She is alert. She has normal strength and normal reflexes. She exhibits normal muscle tone.   Skin: Skin is warm. No rash noted. No erythema.   Psychiatric: She has a normal mood and affect. Her speech is normal and behavior is normal. Judgment and thought content normal. Cognition and memory are normal.         Assessment:       1. Non-rheumatic mitral regurgitation    2. Stage 4 chronic kidney disease    3. Proteinuria, unspecified type    4. Anemia of chronic renal failure, stage 4 (severe)         Plan:           1. Mitral valve insufficiency  Continue to be severe  Will ask CHF nurses / social work to help coordinate mitral clip options    Stage 4 chronic kidney disease  Stable - Outside labs from [facility] show CRT 1.95 on 5/19/17 with EGFR 25 Aggressive diabetes /CHF management could help    Proteinuria  Chronic problem.  - Recently seen by Nephrology in the outpt setting  - Continue ARB    Anemia of chronic renal failure, stage 4 (severe)  May 19 Iron sat 6% Would benefit from iron replacement in addition to arascept Give info on food with iron [facility] PCP could try iron pills if interested    Chronic combined systolic and diastolic heart failure, NYHA class 3  Current regiment optimal if bidil taken three times a day in addition Toprol xl 50 QD  No ACE / ARB / aldactone due to stage 4 CKD    Return if symptoms worsen or fail to improve.

## 2017-05-23 NOTE — PROGRESS NOTES
Hgb 7.9;Hct 23.4     1st dose Aranesp 40 mcg given  PER PROTOCOL FORM DROD-188. 2 week appt set. Calling [facility] to get labs scheduled prior to appt.    Gfr 25.1/stage 4

## 2017-05-23 NOTE — ASSESSMENT & PLAN NOTE
Current regiment optimal if bidil taken three times a day in addition Toprol xl 50 QD  No ACE / ARB / aldactone due to stage 4 CKD

## 2017-05-23 NOTE — ASSESSMENT & PLAN NOTE
Stable - Outside labs from [facility] show CRT 1.95 on 5/19/17 with EGFR 25 Aggressive diabetes /CHF management could help

## 2017-05-24 DIAGNOSIS — N18.30 CKD (CHRONIC KIDNEY DISEASE) STAGE 3, GFR 30-59 ML/MIN: ICD-10-CM

## 2017-05-24 DIAGNOSIS — R80.9 PROTEINURIA, UNSPECIFIED TYPE: ICD-10-CM

## 2017-05-24 DIAGNOSIS — R60.9 EDEMA, UNSPECIFIED TYPE: ICD-10-CM

## 2017-05-24 RX ORDER — FUROSEMIDE 40 MG/1
TABLET ORAL
Qty: 45 TABLET | Refills: 2 | Status: ON HOLD | OUTPATIENT
Start: 2017-05-24 | End: 2017-12-18

## 2017-05-30 ENCOUNTER — INFUSION (OUTPATIENT)
Dept: INFECTIOUS DISEASES | Facility: HOSPITAL | Age: 72
End: 2017-05-30
Attending: INTERNAL MEDICINE
Payer: COMMERCIAL

## 2017-05-30 VITALS
HEIGHT: 66 IN | WEIGHT: 175 LBS | HEART RATE: 63 BPM | BODY MASS INDEX: 28.12 KG/M2 | RESPIRATION RATE: 18 BRPM | TEMPERATURE: 98 F | SYSTOLIC BLOOD PRESSURE: 120 MMHG | OXYGEN SATURATION: 96 % | DIASTOLIC BLOOD PRESSURE: 72 MMHG

## 2017-05-30 DIAGNOSIS — N18.4 ANEMIA OF CHRONIC RENAL FAILURE, STAGE 4 (SEVERE): Primary | ICD-10-CM

## 2017-05-30 DIAGNOSIS — D63.1 ANEMIA OF CHRONIC RENAL FAILURE, STAGE 4 (SEVERE): Primary | ICD-10-CM

## 2017-05-30 PROCEDURE — 25000003 PHARM REV CODE 250: Performed by: INTERNAL MEDICINE

## 2017-05-30 PROCEDURE — 96365 THER/PROPH/DIAG IV INF INIT: CPT

## 2017-05-30 PROCEDURE — 63600175 PHARM REV CODE 636 W HCPCS: Performed by: INTERNAL MEDICINE

## 2017-05-30 RX ADMIN — IRON SUCROSE 100 MG: 20 INJECTION, SOLUTION INTRAVENOUS at 09:05

## 2017-05-30 NOTE — PROGRESS NOTES
Pt received first dose iv venofer.  Observed patient for 1 hour post infusion for possible reaction.  Pt tolerated well and left in NAD.

## 2017-06-06 ENCOUNTER — INFUSION (OUTPATIENT)
Dept: INFECTIOUS DISEASES | Facility: HOSPITAL | Age: 72
End: 2017-06-06
Attending: INTERNAL MEDICINE
Payer: COMMERCIAL

## 2017-06-06 VITALS
HEART RATE: 68 BPM | OXYGEN SATURATION: 97 % | WEIGHT: 175 LBS | BODY MASS INDEX: 28.12 KG/M2 | DIASTOLIC BLOOD PRESSURE: 94 MMHG | HEIGHT: 66 IN | SYSTOLIC BLOOD PRESSURE: 162 MMHG | TEMPERATURE: 99 F

## 2017-06-06 DIAGNOSIS — D63.1 ANEMIA OF CHRONIC RENAL FAILURE, STAGE 4 (SEVERE): Primary | ICD-10-CM

## 2017-06-06 DIAGNOSIS — N18.4 ANEMIA OF CHRONIC RENAL FAILURE, STAGE 4 (SEVERE): Primary | ICD-10-CM

## 2017-06-06 PROCEDURE — 96365 THER/PROPH/DIAG IV INF INIT: CPT

## 2017-06-06 PROCEDURE — 63600175 PHARM REV CODE 636 W HCPCS: Performed by: INTERNAL MEDICINE

## 2017-06-06 PROCEDURE — 25000003 PHARM REV CODE 250: Performed by: INTERNAL MEDICINE

## 2017-06-06 RX ADMIN — IRON SUCROSE 100 MG: 20 INJECTION, SOLUTION INTRAVENOUS at 10:06

## 2017-06-06 NOTE — PROGRESS NOTES
Pt received first dose iv venofer.  Observed patient for 1/2 hour post infusion for possible reaction.  Pt tolerated well and left in NAD.

## 2017-06-06 NOTE — PLAN OF CARE
Problem: Health Knowledge, Opportunity to Enhance (Adult,NICU,[place],Obstetrics,Pediatric)  Goal: Knowledgeable about Health Subject/Topic  Patient will demonstrate the desired outcomes by discharge/transition of care.  Outcome: Ongoing (interventions implemented as appropriate)  Pt educated on proper hand hygiene and infection risks. Pt verbalized understanding.

## 2017-06-13 ENCOUNTER — INFUSION (OUTPATIENT)
Dept: INFECTIOUS DISEASES | Facility: HOSPITAL | Age: 72
End: 2017-06-13
Attending: INTERNAL MEDICINE
Payer: COMMERCIAL

## 2017-06-13 VITALS
TEMPERATURE: 98 F | RESPIRATION RATE: 18 BRPM | BODY MASS INDEX: 28.12 KG/M2 | SYSTOLIC BLOOD PRESSURE: 131 MMHG | HEART RATE: 76 BPM | WEIGHT: 175 LBS | OXYGEN SATURATION: 98 % | DIASTOLIC BLOOD PRESSURE: 73 MMHG | HEIGHT: 66 IN

## 2017-06-13 VITALS — BODY MASS INDEX: 28.12 KG/M2 | WEIGHT: 175 LBS | HEIGHT: 66 IN

## 2017-06-13 DIAGNOSIS — N18.4 ANEMIA OF CHRONIC RENAL FAILURE, STAGE 4 (SEVERE): Primary | ICD-10-CM

## 2017-06-13 DIAGNOSIS — D63.1 ANEMIA OF CHRONIC RENAL FAILURE, STAGE 4 (SEVERE): Primary | ICD-10-CM

## 2017-06-13 PROCEDURE — 63600175 PHARM REV CODE 636 W HCPCS: Performed by: INTERNAL MEDICINE

## 2017-06-13 PROCEDURE — 96365 THER/PROPH/DIAG IV INF INIT: CPT

## 2017-06-13 PROCEDURE — 96372 THER/PROPH/DIAG INJ SC/IM: CPT

## 2017-06-13 PROCEDURE — 25000003 PHARM REV CODE 250: Performed by: INTERNAL MEDICINE

## 2017-06-13 RX ADMIN — IRON SUCROSE 100 MG: 20 INJECTION, SOLUTION INTRAVENOUS at 11:06

## 2017-06-13 RX ADMIN — DARBEPOETIN ALFA 40 MCG: 40 INJECTION, SOLUTION INTRAVENOUS; SUBCUTANEOUS at 12:06

## 2017-06-13 NOTE — PROGRESS NOTES
Hgb 9.5;Hct 28.3    2nd dose Aranesp 40 mcg given  PER PROTOCOL FORM DROD-188. 2 week appt set. Patient wants labs through [facility]. [facility] told. Labs set.  Gfr 25.1/stage 4

## 2017-06-21 ENCOUNTER — INFUSION (OUTPATIENT)
Dept: INFECTIOUS DISEASES | Facility: HOSPITAL | Age: 72
End: 2017-06-21
Attending: INTERNAL MEDICINE
Payer: COMMERCIAL

## 2017-06-21 VITALS
HEIGHT: 66 IN | OXYGEN SATURATION: 97 % | BODY MASS INDEX: 28.12 KG/M2 | HEART RATE: 60 BPM | TEMPERATURE: 99 F | RESPIRATION RATE: 22 BRPM | WEIGHT: 175 LBS | DIASTOLIC BLOOD PRESSURE: 79 MMHG | SYSTOLIC BLOOD PRESSURE: 140 MMHG

## 2017-06-21 DIAGNOSIS — N18.4 ANEMIA OF CHRONIC RENAL FAILURE, STAGE 4 (SEVERE): Primary | ICD-10-CM

## 2017-06-21 DIAGNOSIS — D63.1 ANEMIA OF CHRONIC RENAL FAILURE, STAGE 4 (SEVERE): Primary | ICD-10-CM

## 2017-06-21 PROCEDURE — 96365 THER/PROPH/DIAG IV INF INIT: CPT

## 2017-06-21 PROCEDURE — 63600175 PHARM REV CODE 636 W HCPCS: Performed by: INTERNAL MEDICINE

## 2017-06-21 PROCEDURE — 25000003 PHARM REV CODE 250: Performed by: INTERNAL MEDICINE

## 2017-06-21 RX ADMIN — IRON SUCROSE 100 MG: 20 INJECTION, SOLUTION INTRAVENOUS at 10:06

## 2017-06-21 NOTE — PROGRESS NOTES
Pt received iv venofer.  Observed patient for 1/2 hour post infusion for possible reaction.  Pt tolerated well and left in NAD.

## 2017-06-27 ENCOUNTER — INFUSION (OUTPATIENT)
Dept: INFECTIOUS DISEASES | Facility: HOSPITAL | Age: 72
End: 2017-06-27
Attending: INTERNAL MEDICINE
Payer: COMMERCIAL

## 2017-06-27 VITALS
DIASTOLIC BLOOD PRESSURE: 81 MMHG | SYSTOLIC BLOOD PRESSURE: 143 MMHG | WEIGHT: 175 LBS | HEIGHT: 66 IN | HEART RATE: 70 BPM | BODY MASS INDEX: 28.12 KG/M2

## 2017-06-27 VITALS
BODY MASS INDEX: 28.12 KG/M2 | SYSTOLIC BLOOD PRESSURE: 151 MMHG | HEIGHT: 66 IN | HEART RATE: 69 BPM | DIASTOLIC BLOOD PRESSURE: 83 MMHG | WEIGHT: 175 LBS | OXYGEN SATURATION: 97 %

## 2017-06-27 DIAGNOSIS — N18.4 ANEMIA OF CHRONIC RENAL FAILURE, STAGE 4 (SEVERE): Primary | ICD-10-CM

## 2017-06-27 DIAGNOSIS — D63.1 ANEMIA OF CHRONIC RENAL FAILURE, STAGE 4 (SEVERE): Primary | ICD-10-CM

## 2017-06-27 PROCEDURE — 25000003 PHARM REV CODE 250: Performed by: INTERNAL MEDICINE

## 2017-06-27 PROCEDURE — 63600175 PHARM REV CODE 636 W HCPCS: Performed by: INTERNAL MEDICINE

## 2017-06-27 PROCEDURE — 96372 THER/PROPH/DIAG INJ SC/IM: CPT

## 2017-06-27 PROCEDURE — 96365 THER/PROPH/DIAG IV INF INIT: CPT

## 2017-06-27 RX ADMIN — DARBEPOETIN ALFA 40 MCG: 40 INJECTION, SOLUTION INTRAVENOUS; SUBCUTANEOUS at 10:06

## 2017-06-27 RX ADMIN — IRON SUCROSE 100 MG: 20 INJECTION, SOLUTION INTRAVENOUS at 11:06

## 2017-06-27 NOTE — PROGRESS NOTES
Hgb 9.5;Hct 28.3    3rd dose Aranesp 40 mcg given  PER PROTOCOL FORM DROD-188. 2 week appt set. Patient wants labs through [facility]. [facility] told. Labs set.  Gfr 25.1/stage 4

## 2017-07-11 ENCOUNTER — INFUSION (OUTPATIENT)
Dept: INFECTIOUS DISEASES | Facility: HOSPITAL | Age: 72
End: 2017-07-11
Attending: INTERNAL MEDICINE
Payer: COMMERCIAL

## 2017-07-11 VITALS
HEART RATE: 71 BPM | WEIGHT: 175 LBS | WEIGHT: 175 LBS | OXYGEN SATURATION: 99 % | TEMPERATURE: 98 F | SYSTOLIC BLOOD PRESSURE: 151 MMHG | DIASTOLIC BLOOD PRESSURE: 81 MMHG | HEIGHT: 66 IN | RESPIRATION RATE: 17 BRPM | SYSTOLIC BLOOD PRESSURE: 151 MMHG | DIASTOLIC BLOOD PRESSURE: 81 MMHG | HEIGHT: 66 IN | BODY MASS INDEX: 28.12 KG/M2 | BODY MASS INDEX: 28.12 KG/M2

## 2017-07-11 DIAGNOSIS — D63.1 ANEMIA OF CHRONIC RENAL FAILURE, STAGE 4 (SEVERE): Primary | ICD-10-CM

## 2017-07-11 DIAGNOSIS — N18.4 ANEMIA OF CHRONIC RENAL FAILURE, STAGE 4 (SEVERE): Primary | ICD-10-CM

## 2017-07-11 PROCEDURE — 96372 THER/PROPH/DIAG INJ SC/IM: CPT

## 2017-07-11 PROCEDURE — 63600175 PHARM REV CODE 636 W HCPCS: Performed by: INTERNAL MEDICINE

## 2017-07-11 RX ADMIN — DARBEPOETIN ALFA 40 MCG: 40 INJECTION, SOLUTION INTRAVENOUS; SUBCUTANEOUS at 12:07

## 2017-07-11 NOTE — PROGRESS NOTES
Pt came in today for venofer infusion.  Unable to establish IV access.  Doc of the Day Dr. Fletcher notified.  Iron infusion not given today, pt will return next week.  Pt verbalized understanding.  Also sent Dr. Benoit a message in epic.

## 2017-07-11 NOTE — PROGRESS NOTES
Hgb 10.7;Hct 31.3    No dose change PER PROTOCOL FORM DROD-188. Aranesp 40 mcg given and  2 week appt set. Patient wants labs through [facility]. [facility] told. Labs set.  Gfr 25.1/stage 4

## 2017-07-18 ENCOUNTER — INFUSION (OUTPATIENT)
Dept: INFECTIOUS DISEASES | Facility: HOSPITAL | Age: 72
End: 2017-07-18
Attending: INTERNAL MEDICINE
Payer: COMMERCIAL

## 2017-07-18 VITALS — WEIGHT: 175 LBS | HEIGHT: 66 IN | BODY MASS INDEX: 28.12 KG/M2

## 2017-07-18 DIAGNOSIS — N18.4 ANEMIA OF CHRONIC RENAL FAILURE, STAGE 4 (SEVERE): Primary | ICD-10-CM

## 2017-07-18 DIAGNOSIS — D63.1 ANEMIA OF CHRONIC RENAL FAILURE, STAGE 4 (SEVERE): Primary | ICD-10-CM

## 2017-07-18 PROCEDURE — 96365 THER/PROPH/DIAG IV INF INIT: CPT

## 2017-07-18 PROCEDURE — 63600175 PHARM REV CODE 636 W HCPCS: Performed by: INTERNAL MEDICINE

## 2017-07-18 PROCEDURE — 25000003 PHARM REV CODE 250: Performed by: INTERNAL MEDICINE

## 2017-07-18 RX ADMIN — IRON SUCROSE 100 MG: 20 INJECTION, SOLUTION INTRAVENOUS at 10:07

## 2017-07-25 ENCOUNTER — INFUSION (OUTPATIENT)
Dept: INFECTIOUS DISEASES | Facility: HOSPITAL | Age: 72
End: 2017-07-25
Attending: INTERNAL MEDICINE
Payer: COMMERCIAL

## 2017-07-25 VITALS
SYSTOLIC BLOOD PRESSURE: 139 MMHG | HEIGHT: 66 IN | WEIGHT: 175 LBS | DIASTOLIC BLOOD PRESSURE: 76 MMHG | BODY MASS INDEX: 28.12 KG/M2

## 2017-07-25 DIAGNOSIS — N18.4 ANEMIA OF CHRONIC RENAL FAILURE, STAGE 4 (SEVERE): Primary | ICD-10-CM

## 2017-07-25 DIAGNOSIS — D63.1 ANEMIA OF CHRONIC RENAL FAILURE, STAGE 4 (SEVERE): Primary | ICD-10-CM

## 2017-07-25 PROCEDURE — 96372 THER/PROPH/DIAG INJ SC/IM: CPT

## 2017-07-25 PROCEDURE — 63600175 PHARM REV CODE 636 W HCPCS: Mod: EC | Performed by: INTERNAL MEDICINE

## 2017-07-25 RX ADMIN — DARBEPOETIN ALFA 40 MCG: 40 INJECTION, SOLUTION INTRAVENOUS; SUBCUTANEOUS at 12:07

## 2017-07-25 NOTE — PROGRESS NOTES
Hgb 10.1;Hct 28.9    No dose change PER PROTOCOL FORM DROD-188. Aranesp 40 mcg given and  2 week appt set. Patient wants labs through [facility]. [facility] told. Labs set.  Gfr 25.1/stage 4

## 2017-08-24 ENCOUNTER — LAB VISIT (OUTPATIENT)
Dept: LAB | Facility: HOSPITAL | Age: 72
End: 2017-08-24
Attending: INTERNAL MEDICINE
Payer: COMMERCIAL

## 2017-08-24 ENCOUNTER — INFUSION (OUTPATIENT)
Dept: INFECTIOUS DISEASES | Facility: HOSPITAL | Age: 72
End: 2017-08-24
Attending: INTERNAL MEDICINE
Payer: COMMERCIAL

## 2017-08-24 VITALS
SYSTOLIC BLOOD PRESSURE: 133 MMHG | WEIGHT: 175 LBS | HEIGHT: 66 IN | DIASTOLIC BLOOD PRESSURE: 88 MMHG | BODY MASS INDEX: 28.12 KG/M2

## 2017-08-24 DIAGNOSIS — D63.1 ANEMIA OF CHRONIC RENAL FAILURE, STAGE 4 (SEVERE): Primary | ICD-10-CM

## 2017-08-24 DIAGNOSIS — N18.4 ANEMIA OF CHRONIC RENAL FAILURE, STAGE 4 (SEVERE): Primary | ICD-10-CM

## 2017-08-24 DIAGNOSIS — N18.4 ANEMIA OF CHRONIC RENAL FAILURE, STAGE 4 (SEVERE): ICD-10-CM

## 2017-08-24 DIAGNOSIS — D63.1 ANEMIA OF CHRONIC RENAL FAILURE, STAGE 4 (SEVERE): ICD-10-CM

## 2017-08-24 LAB
HCT VFR BLD AUTO: 30.5 %
HGB BLD-MCNC: 10.7 G/DL
IRON SERPL-MCNC: 47 UG/DL
SATURATED IRON: 11 %
TOTAL IRON BINDING CAPACITY: 428 UG/DL
TRANSFERRIN SERPL-MCNC: 289 MG/DL

## 2017-08-24 PROCEDURE — 83540 ASSAY OF IRON: CPT

## 2017-08-24 PROCEDURE — 85014 HEMATOCRIT: CPT

## 2017-08-24 PROCEDURE — 85018 HEMOGLOBIN: CPT

## 2017-08-24 PROCEDURE — 63600175 PHARM REV CODE 636 W HCPCS: Performed by: INTERNAL MEDICINE

## 2017-08-24 PROCEDURE — 96372 THER/PROPH/DIAG INJ SC/IM: CPT

## 2017-08-24 PROCEDURE — 36415 COLL VENOUS BLD VENIPUNCTURE: CPT

## 2017-08-24 RX ADMIN — DARBEPOETIN ALFA 40 MCG: 40 INJECTION, SOLUTION INTRAVENOUS; SUBCUTANEOUS at 11:08

## 2017-08-24 NOTE — PROGRESS NOTES
Hgb 10.7;Hct 30.5    No dose change PER PROTOCOL FORM DROD-188. Aranesp 40 mcg given and  2 week appt set. Patient wants labs through [facility]. [facility] told. Labs set.  Gfr 25.1/stage 4

## 2017-09-01 DIAGNOSIS — Z85.3 HX OF BREAST CANCER: Primary | ICD-10-CM

## 2017-09-07 ENCOUNTER — INFUSION (OUTPATIENT)
Dept: INFECTIOUS DISEASES | Facility: HOSPITAL | Age: 72
End: 2017-09-07
Attending: INTERNAL MEDICINE
Payer: COMMERCIAL

## 2017-09-07 ENCOUNTER — LAB VISIT (OUTPATIENT)
Dept: LAB | Facility: HOSPITAL | Age: 72
End: 2017-09-07
Attending: INTERNAL MEDICINE
Payer: COMMERCIAL

## 2017-09-07 VITALS
SYSTOLIC BLOOD PRESSURE: 134 MMHG | DIASTOLIC BLOOD PRESSURE: 85 MMHG | HEIGHT: 66 IN | BODY MASS INDEX: 28.12 KG/M2 | WEIGHT: 175 LBS

## 2017-09-07 DIAGNOSIS — D63.1 ANEMIA OF CHRONIC RENAL FAILURE, STAGE 4 (SEVERE): ICD-10-CM

## 2017-09-07 DIAGNOSIS — D63.1 ANEMIA IN STAGE 4 CHRONIC KIDNEY DISEASE: Primary | ICD-10-CM

## 2017-09-07 DIAGNOSIS — N18.4 ANEMIA OF CHRONIC RENAL FAILURE, STAGE 4 (SEVERE): ICD-10-CM

## 2017-09-07 DIAGNOSIS — N18.4 ANEMIA IN STAGE 4 CHRONIC KIDNEY DISEASE: Primary | ICD-10-CM

## 2017-09-07 LAB
IRON SERPL-MCNC: 54 UG/DL
SATURATED IRON: 13 %
TOTAL IRON BINDING CAPACITY: 429 UG/DL
TRANSFERRIN SERPL-MCNC: 290 MG/DL

## 2017-09-07 PROCEDURE — 36415 COLL VENOUS BLD VENIPUNCTURE: CPT

## 2017-09-07 PROCEDURE — 83540 ASSAY OF IRON: CPT

## 2017-09-07 PROCEDURE — 63600175 PHARM REV CODE 636 W HCPCS: Mod: EC | Performed by: INTERNAL MEDICINE

## 2017-09-07 PROCEDURE — 96372 THER/PROPH/DIAG INJ SC/IM: CPT

## 2017-09-07 RX ADMIN — DARBEPOETIN ALFA 40 MCG: 40 INJECTION, SOLUTION INTRAVENOUS; SUBCUTANEOUS at 02:09

## 2017-09-07 NOTE — PROGRESS NOTES
Hgb 10.7;Hct 30.6    No dose change PER PROTOCOL FORM DROD-188. Aranesp 40 mcg given and  2 week appt set. Patient wants labs through [facility]. [facility] told. Labs set.    Gfr 25.1/stage 4

## 2017-09-21 ENCOUNTER — TELEPHONE (OUTPATIENT)
Dept: INFECTIOUS DISEASES | Facility: HOSPITAL | Age: 72
End: 2017-09-21

## 2017-09-21 NOTE — TELEPHONE ENCOUNTER
[facility] has blocked patient from scheduling I left message regarding missed appt and rescheduling. Asked patient to contact [facility] regarding blocked schedule.

## 2017-09-26 ENCOUNTER — INFUSION (OUTPATIENT)
Dept: INFECTIOUS DISEASES | Facility: HOSPITAL | Age: 72
End: 2017-09-26
Attending: INTERNAL MEDICINE
Payer: COMMERCIAL

## 2017-09-26 VITALS
BODY MASS INDEX: 28.12 KG/M2 | HEIGHT: 66 IN | WEIGHT: 175 LBS | SYSTOLIC BLOOD PRESSURE: 133 MMHG | DIASTOLIC BLOOD PRESSURE: 67 MMHG

## 2017-09-26 DIAGNOSIS — D63.1 ANEMIA OF CHRONIC RENAL FAILURE, STAGE 4 (SEVERE): Primary | ICD-10-CM

## 2017-09-26 DIAGNOSIS — N18.4 ANEMIA OF CHRONIC RENAL FAILURE, STAGE 4 (SEVERE): Primary | ICD-10-CM

## 2017-09-26 PROCEDURE — 96372 THER/PROPH/DIAG INJ SC/IM: CPT

## 2017-09-26 PROCEDURE — 63600175 PHARM REV CODE 636 W HCPCS: Performed by: INTERNAL MEDICINE

## 2017-09-26 RX ADMIN — DARBEPOETIN ALFA 60 MCG: 60 INJECTION, SOLUTION INTRAVENOUS; SUBCUTANEOUS at 11:09

## 2017-09-26 NOTE — PROGRESS NOTES
Hgb 9.7;Hct 28.3    Dose increased PER PROTOCOL FORM DROD-188 from Aranesp 40 mcg to Aranesp 60 mcg. Aranesp 60 mcg given and  2 week appt set. Patient wants labs through [facility]. [facility] told. Labs set. Dr Benoit notified.    Gfr 25.1/stage 4

## 2017-09-29 ENCOUNTER — HOSPITAL ENCOUNTER (OUTPATIENT)
Dept: RADIOLOGY | Facility: HOSPITAL | Age: 72
Discharge: HOME | End: 2017-09-29
Attending: FAMILY MEDICINE
Payer: COMMERCIAL

## 2017-09-29 DIAGNOSIS — Z85.3 HX OF BREAST CANCER: ICD-10-CM

## 2017-09-29 PROCEDURE — 77065 DX MAMMO INCL CAD UNI: CPT | Mod: 26,LT,, | Performed by: RADIOLOGY

## 2017-09-29 PROCEDURE — 76642 ULTRASOUND BREAST LIMITED: CPT | Mod: 26,LT,, | Performed by: RADIOLOGY

## 2017-09-29 PROCEDURE — 76642 ULTRASOUND BREAST LIMITED: CPT | Mod: TC,LT

## 2017-09-29 PROCEDURE — 77065 DX MAMMO INCL CAD UNI: CPT | Mod: TC,LT

## 2017-10-03 ENCOUNTER — TELEPHONE (OUTPATIENT)
Dept: NEPHROLOGY | Facility: CLINIC | Age: 72
End: 2017-10-03

## 2017-10-04 ENCOUNTER — OFFICE VISIT (OUTPATIENT)
Dept: NEPHROLOGY | Facility: CLINIC | Age: 72
End: 2017-10-04
Payer: COMMERCIAL

## 2017-10-04 VITALS
OXYGEN SATURATION: 98 % | SYSTOLIC BLOOD PRESSURE: 122 MMHG | DIASTOLIC BLOOD PRESSURE: 70 MMHG | HEIGHT: 66 IN | WEIGHT: 165.38 LBS | HEART RATE: 72 BPM | BODY MASS INDEX: 26.58 KG/M2

## 2017-10-04 DIAGNOSIS — N18.4 STAGE 4 CHRONIC KIDNEY DISEASE: Primary | ICD-10-CM

## 2017-10-04 PROCEDURE — 99999 PR PBB SHADOW E&M-EST. PATIENT-LVL III: CPT | Mod: PBBFAC,,, | Performed by: INTERNAL MEDICINE

## 2017-10-04 PROCEDURE — 99213 OFFICE O/P EST LOW 20 MIN: CPT | Mod: S$GLB,,, | Performed by: INTERNAL MEDICINE

## 2017-10-05 NOTE — PROGRESS NOTES
Return Visit Report  Nephrology      Consult Requested By: PCP  Reason for Consult: Proteinuria    History of Present Illness:  Patient is a 72 y.o. female returns for evaluation of proteinuria and CKD. Three months ago, a routine UA showed large protein by dipstick. Her PMH is significant for HTN, diastolic heart failure, bipolar depression and insomnia. 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 Today, she arrives stating that she is her usual state of health. Denies loss of appetite, pain, emesis, edema, SOB, CP, rash, hematuria or foamy urine.  The patient denies taking NSAIDs or new antibiotics, recreational drugs, recent episode of dehydration, diarrhea, nausea or vomiting, acute illness, hospitalization or exposure to IV radiocontrast.     Past Medical History:   Diagnosis Date    Breast cancer 1980    right    CHF (congestive heart failure)     Chronic hepatitis C virus infection 3/14/2017    Coronary artery disease     Hypertension     Hazel     Renal disorder     Thyroid disease    Bipolar depression  Hypertension  Proteinuria  Osteoarthritis  Diastolic heart failure    Current Outpatient Prescriptions:     allopurinol (ZYLOPRIM) 100 MG tablet, Take 100 mg by mouth 2 (two) times daily. , Disp: , Rfl:     aspirin 81 MG Chew, Take 1 tablet (81 mg total) by mouth once daily., Disp: , Rfl: 0    benztropine (COGENTIN) 0.5 MG tablet, Take 1 tablet (0.5 mg total) by mouth nightly., Disp: 30 tablet, Rfl: 0    diclofenac sodium 1 % Gel, Apply 4 g topically 4 (four) times daily. Prn pain, Disp: , Rfl:     fluticasone (FLONASE) 50 mcg/actuation nasal spray, 2 sprays by Each Nare route once daily., Disp: 1 Bottle, Rfl: 3    fluticasone-salmeterol 250-50 mcg/dose (ADVAIR) 250-50 mcg/dose diskus inhaler, Inhale 1 puff into the lungs 2 (two) times daily. Controller, Disp: , Rfl: 0    furosemide (LASIX) 40 MG tablet, Take 1 tab in am and 1/2 tab at night, Disp: 45 tablet, Rfl: 2     gabapentin (NEURONTIN) 300 MG capsule, Take 300 mg by mouth 3 (three) times daily as needed (for nerve pain). , Disp: , Rfl:     isosorbide-hydrALAZINE 20-37.5 mg (BIDIL) 20-37.5 mg Tab, Take 2 tablets by mouth 3 (three) times daily., Disp: 180 tablet, Rfl: 2    levothyroxine (SYNTHROID) 50 MCG tablet, Take 1 tablet (50 mcg total) by mouth before breakfast., Disp: 30 tablet, Rfl: 11    melatonin 3 mg Tab, Take by mouth every evening., Disp: , Rfl:     metoprolol succinate (TOPROL-XL) 50 MG 24 hr tablet, Take 1 tablet (50 mg total) by mouth once daily., Disp: 30 tablet, Rfl: 2    sodium bicarbonate 650 MG tablet, Take 2 tablets (1,300 mg total) by mouth 2 (two) times daily., Disp: 360 tablet, Rfl: 3    tiotropium (SPIRIVA) 18 mcg inhalation capsule, Inhale 1 capsule (18 mcg total) into the lungs once daily., Disp: 90 capsule, Rfl: 3    betamethasone valerate 0.1% (VALISONE) 0.1 % Lotn, Apply topically once daily. 2 drops both ears once daily, Disp: 1 Bottle, Rfl: 3    guaifenesin (MUCINEX) 600 mg 12 hr tablet, Take 1 tablet (600 mg total) by mouth 2 (two) times daily., Disp: , Rfl:     quetiapine (SEROQUEL) 200 MG Tab, Take 1 tablet (200 mg total) by mouth nightly., Disp: 30 tablet, Rfl: 0  Review of patient's allergies indicates:  No Known Allergies       Review of Systems   Constitutional: Negative.    Eyes: Negative.    Cardiovascular: Negative for chest pain, palpitations, orthopnea and leg swelling.   Gastrointestinal: Negative.    Genitourinary: Negative.    Musculoskeletal: Positive for joint pain.   Skin: Negative.    Neurological: Negative.    Psychiatric/Behavioral: Negative for depression. The patient is not nervous/anxious and does not have insomnia.    All other systems reviewed and are negative.      Vitals:    10/04/17 1117   BP: 122/70   Pulse: 72       PHYSICAL EXAMINATION:  General: no distress, well nourished  Skin: color, texture, turgor normal. No rash or lesions  HEENT: Eyes: reactive  pupils, normal conjunctiva. Oral mucosa moist, no ulcers. Throat: no erythema.  Neck: supple, symmetrical, trachea midline, no JVD, no carotid bruit  Lungs: clear to auscultation bilaterally and normal respiratory effort  Cardiovascular: Heart: regular rate and rhythm, S1, S2 normal, no murmur, rub or gallop. Pulses: 2+ and symmetric.  Abdomen: bowel sounds present, no abdominal bruit, soft, non-tender non-distented; no masses, organomegaly or ascites.   Musculoskeletal: no pitting edema in lower extremities, no clubbing or cyanosis  Lymph Nodes: No cervical or supraclavicular adenopathy  Neurologic: AAOx3, normal strength and tone. No focal deficit. No asterixis.       LABORATORY DATA: Reviewed. Of note:  Serum Cr 1.8 (from 2.2 ) mg/dL  UPCR 0.3 g/g    IMAGING STUDIES  Kidney US: Reviewed, size 8.3 and 9.8 cm, multiple cysts, non-obstructing stones    IMPRESSION / RECOMMENDATIONS:     1. CKD (chronic kidney disease) stage 4, eGFR 25 ml/min  Unknown cause. No longer overly proteinuric. Not a diabetic.Possible chronic renal hypoperfusion from CRS. Kidney size and appearance precludes performing a biopsy. Proteinuria has improved, even without an ARB, so it was likely transient and not reflective of permanent glomerulopathy. Serology work up unrevealing. Moderate risk for progression to ESRD. Kidney function has plateaued over the last 6 mo. SBP is well controlled. Edema improved after increasing furosemide (LASIX) 20 MG tablet to bid. Her metabolic acidosis is controlled with oral alkali. Will monitor MBD panel. Has severe anemia of CKD. Will continue treatment at the [facility]emia clinic for IV iron and/or KAMINI therapy       SUMMARY OF PLAN  1. Continue furosemide 20 mg bid + sodium bicarbonate 1300 mg bid  2. Low salt diet  3. Cont Iv iron / KAMINI Rx  4. RTC in 6 months

## 2017-10-09 ENCOUNTER — TELEPHONE (OUTPATIENT)
Dept: NEPHROLOGY | Facility: CLINIC | Age: 72
End: 2017-10-09

## 2017-10-09 NOTE — TELEPHONE ENCOUNTER
Gave call no answer left vm----- Message from Karyna Marti sent at 10/9/2017  1:42 PM CDT -----  Contact: JANEL / [phone]  Janel is requesting a call at [phone] in regards to irregular lab results.

## 2017-10-10 ENCOUNTER — LAB VISIT (OUTPATIENT)
Dept: LAB | Facility: HOSPITAL | Age: 72
End: 2017-10-10
Attending: INTERNAL MEDICINE
Payer: COMMERCIAL

## 2017-10-10 ENCOUNTER — INFUSION (OUTPATIENT)
Dept: INFECTIOUS DISEASES | Facility: HOSPITAL | Age: 72
End: 2017-10-10
Attending: INTERNAL MEDICINE
Payer: COMMERCIAL

## 2017-10-10 VITALS
SYSTOLIC BLOOD PRESSURE: 135 MMHG | DIASTOLIC BLOOD PRESSURE: 81 MMHG | BODY MASS INDEX: 26.58 KG/M2 | WEIGHT: 165.38 LBS | HEIGHT: 66 IN

## 2017-10-10 DIAGNOSIS — D63.1 ANEMIA OF CHRONIC RENAL FAILURE, STAGE 4 (SEVERE): ICD-10-CM

## 2017-10-10 DIAGNOSIS — N18.4 ANEMIA OF CHRONIC RENAL FAILURE, STAGE 4 (SEVERE): Primary | ICD-10-CM

## 2017-10-10 DIAGNOSIS — N18.4 ANEMIA OF CHRONIC RENAL FAILURE, STAGE 4 (SEVERE): ICD-10-CM

## 2017-10-10 DIAGNOSIS — D63.1 ANEMIA OF CHRONIC RENAL FAILURE, STAGE 4 (SEVERE): Primary | ICD-10-CM

## 2017-10-10 LAB
FERRITIN SERPL-MCNC: 86 NG/ML
HCT VFR BLD AUTO: 29.4 %
HGB BLD-MCNC: 10 G/DL
IRON SERPL-MCNC: 55 UG/DL
SATURATED IRON: 12 %
TOTAL IRON BINDING CAPACITY: 456 UG/DL
TRANSFERRIN SERPL-MCNC: 308 MG/DL

## 2017-10-10 PROCEDURE — 63600175 PHARM REV CODE 636 W HCPCS: Performed by: INTERNAL MEDICINE

## 2017-10-10 PROCEDURE — 36415 COLL VENOUS BLD VENIPUNCTURE: CPT

## 2017-10-10 PROCEDURE — 83540 ASSAY OF IRON: CPT

## 2017-10-10 PROCEDURE — 96372 THER/PROPH/DIAG INJ SC/IM: CPT

## 2017-10-10 PROCEDURE — 85018 HEMOGLOBIN: CPT

## 2017-10-10 PROCEDURE — 85014 HEMATOCRIT: CPT

## 2017-10-10 PROCEDURE — 82728 ASSAY OF FERRITIN: CPT

## 2017-10-10 RX ADMIN — DARBEPOETIN ALFA 60 MCG: 60 INJECTION, SOLUTION INTRAVENOUS; SUBCUTANEOUS at 12:10

## 2017-10-10 NOTE — PROGRESS NOTES
Hgb 10.0;Hct 29.4    No dose change PER PROTOCOL FORM DROD-188. Aranesp 60 mcg given and  2 week appt set. Patient wants labs through [facility]. [facility] told. Labs set.    Gfr 25.1/stage 4

## 2017-10-24 ENCOUNTER — INFUSION (OUTPATIENT)
Dept: INFECTIOUS DISEASES | Facility: HOSPITAL | Age: 72
End: 2017-10-24
Attending: INTERNAL MEDICINE
Payer: COMMERCIAL

## 2017-10-24 VITALS
WEIGHT: 175 LBS | SYSTOLIC BLOOD PRESSURE: 129 MMHG | DIASTOLIC BLOOD PRESSURE: 76 MMHG | BODY MASS INDEX: 28.12 KG/M2 | HEIGHT: 66 IN

## 2017-10-24 DIAGNOSIS — N18.4 ANEMIA OF CHRONIC RENAL FAILURE, STAGE 4 (SEVERE): Primary | ICD-10-CM

## 2017-10-24 DIAGNOSIS — D63.1 ANEMIA OF CHRONIC RENAL FAILURE, STAGE 4 (SEVERE): Primary | ICD-10-CM

## 2017-10-24 PROCEDURE — 63600175 PHARM REV CODE 636 W HCPCS: Performed by: INTERNAL MEDICINE

## 2017-10-24 PROCEDURE — 96372 THER/PROPH/DIAG INJ SC/IM: CPT

## 2017-10-24 RX ADMIN — DARBEPOETIN ALFA 60 MCG: 60 INJECTION, SOLUTION INTRAVENOUS; SUBCUTANEOUS at 11:10

## 2017-10-24 NOTE — PROGRESS NOTES
Hgb 9.7;Hct 28.3    No dose increase  PER PROTOCOL FORM DROD-188. Due to being increased on 9/26/17.  Aranesp 60 mcg given and 2 week return appt set. Mrs Sal was very confused today. She said she came last week for injection and whatever she got made her hand turn real dry. She didn't see me last week she saw me on the 10th of Oct. 2 weeks ago.  Her hand looked fine today.  I printed appt slip for her and [facility] and also gave her my number to call me if her hand has a reaction.    Gfr 25.1/stage 4

## 2017-11-14 ENCOUNTER — TELEPHONE (OUTPATIENT)
Dept: INFECTIOUS DISEASES | Facility: HOSPITAL | Age: 72
End: 2017-11-14

## 2017-11-16 ENCOUNTER — INFUSION (OUTPATIENT)
Dept: INFECTIOUS DISEASES | Facility: HOSPITAL | Age: 72
End: 2017-11-16
Attending: INTERNAL MEDICINE
Payer: COMMERCIAL

## 2017-11-16 VITALS
DIASTOLIC BLOOD PRESSURE: 84 MMHG | SYSTOLIC BLOOD PRESSURE: 140 MMHG | BODY MASS INDEX: 28.12 KG/M2 | HEIGHT: 66 IN | WEIGHT: 175 LBS

## 2017-11-16 NOTE — PROGRESS NOTES
Hgb 10.0;Hct 29.5    Patient refused injection today.  She complained of arm and neck pain and skin changes she thinks is due to injections.  I spoke with Dr Benoit and he does not think that they are related and asked that she be seen with PCP for concerns.  I called Toya at [facility] and told her Mrs Sals concerns and complaints and Toya said when they returned to [facility] she would get the physician to see her.  I gave Mrs Sal my card and told her to give me a call when she would like to get her injection rescheduled.    Gfr 25.1/stage 4

## 2017-12-01 DIAGNOSIS — N18.4 ANEMIA IN STAGE 4 CHRONIC KIDNEY DISEASE: Primary | ICD-10-CM

## 2017-12-01 DIAGNOSIS — D63.1 ANEMIA IN STAGE 4 CHRONIC KIDNEY DISEASE: Primary | ICD-10-CM

## 2017-12-13 ENCOUNTER — LAB VISIT (OUTPATIENT)
Dept: LAB | Facility: HOSPITAL | Age: 72
End: 2017-12-13
Attending: INTERNAL MEDICINE
Payer: COMMERCIAL

## 2017-12-13 ENCOUNTER — INFUSION (OUTPATIENT)
Dept: INFECTIOUS DISEASES | Facility: HOSPITAL | Age: 72
End: 2017-12-13
Attending: INTERNAL MEDICINE
Payer: COMMERCIAL

## 2017-12-13 ENCOUNTER — HOSPITAL ENCOUNTER (INPATIENT)
Facility: HOSPITAL | Age: 72
LOS: 13 days | Discharge: HOME HEALTH | DRG: 280 | End: 2017-12-26
Admitting: INTERNAL MEDICINE
Payer: COMMERCIAL

## 2017-12-13 VITALS
BODY MASS INDEX: 28.12 KG/M2 | SYSTOLIC BLOOD PRESSURE: 146 MMHG | DIASTOLIC BLOOD PRESSURE: 84 MMHG | HEIGHT: 66 IN | WEIGHT: 175 LBS

## 2017-12-13 DIAGNOSIS — N18.4 ANEMIA IN STAGE 4 CHRONIC KIDNEY DISEASE: ICD-10-CM

## 2017-12-13 DIAGNOSIS — R06.02 SOB (SHORTNESS OF BREATH): ICD-10-CM

## 2017-12-13 DIAGNOSIS — D63.1 ANEMIA IN STAGE 4 CHRONIC KIDNEY DISEASE: ICD-10-CM

## 2017-12-13 DIAGNOSIS — N18.4 STAGE 4 CHRONIC KIDNEY DISEASE: ICD-10-CM

## 2017-12-13 DIAGNOSIS — I50.43 ACUTE ON CHRONIC COMBINED SYSTOLIC AND DIASTOLIC HEART FAILURE: Primary | ICD-10-CM

## 2017-12-13 DIAGNOSIS — E89.0 POSTABLATIVE HYPOTHYROIDISM: ICD-10-CM

## 2017-12-13 DIAGNOSIS — R06.02 SHORTNESS OF BREATH: ICD-10-CM

## 2017-12-13 DIAGNOSIS — R41.89 COGNITIVE IMPAIRMENT: ICD-10-CM

## 2017-12-13 DIAGNOSIS — R80.9 PROTEINURIA, UNSPECIFIED TYPE: ICD-10-CM

## 2017-12-13 DIAGNOSIS — R79.89 TROPONIN I ABOVE REFERENCE RANGE: ICD-10-CM

## 2017-12-13 DIAGNOSIS — N18.4 ANEMIA OF CHRONIC RENAL FAILURE, STAGE 4 (SEVERE): ICD-10-CM

## 2017-12-13 DIAGNOSIS — D63.1 ANEMIA OF CHRONIC RENAL FAILURE, STAGE 4 (SEVERE): Primary | ICD-10-CM

## 2017-12-13 DIAGNOSIS — N18.30 CKD (CHRONIC KIDNEY DISEASE) STAGE 3, GFR 30-59 ML/MIN: ICD-10-CM

## 2017-12-13 DIAGNOSIS — E03.9 PRIMARY HYPOTHYROIDISM: ICD-10-CM

## 2017-12-13 DIAGNOSIS — I10 ESSENTIAL HYPERTENSION: ICD-10-CM

## 2017-12-13 DIAGNOSIS — K92.1 MELENA: ICD-10-CM

## 2017-12-13 DIAGNOSIS — R60.9 EDEMA, UNSPECIFIED TYPE: ICD-10-CM

## 2017-12-13 DIAGNOSIS — F03.90 MAJOR NEUROCOGNITIVE DISORDER: ICD-10-CM

## 2017-12-13 DIAGNOSIS — K20.80 ESOPHAGITIS, LOS ANGELES GRADE C: ICD-10-CM

## 2017-12-13 DIAGNOSIS — D63.1 ANEMIA OF CHRONIC RENAL FAILURE, STAGE 4 (SEVERE): ICD-10-CM

## 2017-12-13 DIAGNOSIS — I50.9 ACUTE EXACERBATION OF CHF (CONGESTIVE HEART FAILURE): ICD-10-CM

## 2017-12-13 DIAGNOSIS — I48.0 PAROXYSMAL ATRIAL FIBRILLATION: ICD-10-CM

## 2017-12-13 DIAGNOSIS — J44.9 CHRONIC OBSTRUCTIVE PULMONARY DISEASE, UNSPECIFIED COPD TYPE: ICD-10-CM

## 2017-12-13 DIAGNOSIS — N18.4 ANEMIA OF CHRONIC RENAL FAILURE, STAGE 4 (SEVERE): Primary | ICD-10-CM

## 2017-12-13 DIAGNOSIS — J44.1 COPD EXACERBATION: ICD-10-CM

## 2017-12-13 DIAGNOSIS — F05 DELIRIUM DUE TO ANOTHER MEDICAL CONDITION: ICD-10-CM

## 2017-12-13 LAB
BASOPHILS # BLD AUTO: 0.01 K/UL
BASOPHILS NFR BLD: 0.2 %
BNP SERPL-MCNC: 4241 PG/ML
BUN SERPL-MCNC: 40 MG/DL (ref 6–30)
CHLORIDE SERPL-SCNC: 103 MMOL/L (ref 95–110)
CREAT SERPL-MCNC: 2.8 MG/DL (ref 0.5–1.4)
DIFFERENTIAL METHOD: ABNORMAL
EOSINOPHIL # BLD AUTO: 0 K/UL
EOSINOPHIL NFR BLD: 0 %
ERYTHROCYTE [DISTWIDTH] IN BLOOD BY AUTOMATED COUNT: 19.9 %
FLUAV AG SPEC QL IA: NEGATIVE
FLUBV AG SPEC QL IA: NEGATIVE
GLUCOSE SERPL-MCNC: 76 MG/DL (ref 70–110)
HCT VFR BLD AUTO: 30.3 %
HCT VFR BLD AUTO: 30.7 %
HCT VFR BLD CALC: 36 %PCV (ref 36–54)
HGB BLD-MCNC: 10.3 G/DL
HGB BLD-MCNC: 10.4 G/DL
IMM GRANULOCYTES # BLD AUTO: 0.02 K/UL
IMM GRANULOCYTES NFR BLD AUTO: 0.4 %
IRON SERPL-MCNC: 115 UG/DL
LYMPHOCYTES # BLD AUTO: 0.9 K/UL
LYMPHOCYTES NFR BLD: 20.9 %
MCH RBC QN AUTO: 34.6 PG
MCHC RBC AUTO-ENTMCNC: 34 G/DL
MCV RBC AUTO: 102 FL
MONOCYTES # BLD AUTO: 0.7 K/UL
MONOCYTES NFR BLD: 15.2 %
NEUTROPHILS # BLD AUTO: 2.8 K/UL
NEUTROPHILS NFR BLD: 63.3 %
NRBC BLD-RTO: 0 /100 WBC
PLATELET # BLD AUTO: 135 K/UL
PMV BLD AUTO: 13.8 FL
POC IONIZED CALCIUM: 1.08 MMOL/L (ref 1.06–1.42)
POC TCO2 (MEASURED): 29 MMOL/L (ref 23–29)
POTASSIUM BLD-SCNC: 4.3 MMOL/L (ref 3.5–5.1)
RBC # BLD AUTO: 2.98 M/UL
SAMPLE: ABNORMAL
SATURATED IRON: 26 %
SODIUM BLD-SCNC: 143 MMOL/L (ref 136–145)
SPECIMEN SOURCE: NORMAL
TOTAL IRON BINDING CAPACITY: 441 UG/DL
TRANSFERRIN SERPL-MCNC: 298 MG/DL
TROPONIN I SERPL DL<=0.01 NG/ML-MCNC: 0.06 NG/ML
WBC # BLD AUTO: 4.46 K/UL

## 2017-12-13 PROCEDURE — 12000002 HC ACUTE/MED SURGE SEMI-PRIVATE ROOM

## 2017-12-13 PROCEDURE — 93005 ELECTROCARDIOGRAM TRACING: CPT

## 2017-12-13 PROCEDURE — 25000003 PHARM REV CODE 250: Performed by: INTERNAL MEDICINE

## 2017-12-13 PROCEDURE — 36415 COLL VENOUS BLD VENIPUNCTURE: CPT

## 2017-12-13 PROCEDURE — 85014 HEMATOCRIT: CPT

## 2017-12-13 PROCEDURE — 85025 COMPLETE CBC W/AUTO DIFF WBC: CPT

## 2017-12-13 PROCEDURE — 11000001 HC ACUTE MED/SURG PRIVATE ROOM

## 2017-12-13 PROCEDURE — 93010 ELECTROCARDIOGRAM REPORT: CPT | Mod: ,,, | Performed by: INTERNAL MEDICINE

## 2017-12-13 PROCEDURE — 96374 THER/PROPH/DIAG INJ IV PUSH: CPT

## 2017-12-13 PROCEDURE — 87400 INFLUENZA A/B EACH AG IA: CPT

## 2017-12-13 PROCEDURE — 99223 1ST HOSP IP/OBS HIGH 75: CPT | Mod: ,,, | Performed by: INTERNAL MEDICINE

## 2017-12-13 PROCEDURE — 63600175 PHARM REV CODE 636 W HCPCS: Performed by: INTERNAL MEDICINE

## 2017-12-13 PROCEDURE — 85018 HEMOGLOBIN: CPT

## 2017-12-13 PROCEDURE — 83880 ASSAY OF NATRIURETIC PEPTIDE: CPT

## 2017-12-13 PROCEDURE — 99285 EMERGENCY DEPT VISIT HI MDM: CPT | Mod: 25

## 2017-12-13 PROCEDURE — 83540 ASSAY OF IRON: CPT

## 2017-12-13 PROCEDURE — 84484 ASSAY OF TROPONIN QUANT: CPT

## 2017-12-13 PROCEDURE — 99285 EMERGENCY DEPT VISIT HI MDM: CPT | Mod: ,,,

## 2017-12-13 PROCEDURE — 96372 THER/PROPH/DIAG INJ SC/IM: CPT

## 2017-12-13 RX ORDER — METOPROLOL SUCCINATE 50 MG/1
50 TABLET, EXTENDED RELEASE ORAL DAILY
Status: DISCONTINUED | OUTPATIENT
Start: 2017-12-14 | End: 2017-12-19

## 2017-12-13 RX ORDER — GLUCAGON 1 MG
1 KIT INJECTION
Status: DISCONTINUED | OUTPATIENT
Start: 2017-12-13 | End: 2017-12-26 | Stop reason: HOSPADM

## 2017-12-13 RX ORDER — IBUPROFEN 200 MG
24 TABLET ORAL
Status: DISCONTINUED | OUTPATIENT
Start: 2017-12-13 | End: 2017-12-26 | Stop reason: HOSPADM

## 2017-12-13 RX ORDER — GUAIFENESIN 600 MG/1
600 TABLET, EXTENDED RELEASE ORAL 2 TIMES DAILY
Status: DISCONTINUED | OUTPATIENT
Start: 2017-12-13 | End: 2017-12-18

## 2017-12-13 RX ORDER — LEVOTHYROXINE SODIUM 50 UG/1
50 TABLET ORAL
Status: DISCONTINUED | OUTPATIENT
Start: 2017-12-14 | End: 2017-12-15

## 2017-12-13 RX ORDER — FLUTICASONE FUROATE AND VILANTEROL 100; 25 UG/1; UG/1
1 POWDER RESPIRATORY (INHALATION) DAILY
Status: DISCONTINUED | OUTPATIENT
Start: 2017-12-14 | End: 2017-12-26 | Stop reason: HOSPADM

## 2017-12-13 RX ORDER — HYDRALAZINE HYDROCHLORIDE 50 MG/1
50 TABLET, FILM COATED ORAL EVERY 8 HOURS PRN
Status: DISCONTINUED | OUTPATIENT
Start: 2017-12-13 | End: 2017-12-26 | Stop reason: HOSPADM

## 2017-12-13 RX ORDER — IPRATROPIUM BROMIDE AND ALBUTEROL SULFATE 2.5; .5 MG/3ML; MG/3ML
3 SOLUTION RESPIRATORY (INHALATION) EVERY 6 HOURS
Status: DISCONTINUED | OUTPATIENT
Start: 2017-12-14 | End: 2017-12-14

## 2017-12-13 RX ORDER — IBUPROFEN 200 MG
16 TABLET ORAL
Status: DISCONTINUED | OUTPATIENT
Start: 2017-12-13 | End: 2017-12-26 | Stop reason: HOSPADM

## 2017-12-13 RX ORDER — GABAPENTIN 300 MG/1
300 CAPSULE ORAL 3 TIMES DAILY PRN
Status: DISCONTINUED | OUTPATIENT
Start: 2017-12-13 | End: 2017-12-26 | Stop reason: HOSPADM

## 2017-12-13 RX ORDER — ALLOPURINOL 100 MG/1
100 TABLET ORAL 2 TIMES DAILY
Status: DISCONTINUED | OUTPATIENT
Start: 2017-12-13 | End: 2017-12-26 | Stop reason: HOSPADM

## 2017-12-13 RX ORDER — ISOSORBIDE DINITRATE AND HYDRALAZINE HYDROCHLORIDE 37.5; 2 MG/1; MG/1
2 TABLET ORAL 3 TIMES DAILY
Status: DISCONTINUED | OUTPATIENT
Start: 2017-12-13 | End: 2017-12-26 | Stop reason: HOSPADM

## 2017-12-13 RX ORDER — NAPROXEN SODIUM 220 MG/1
81 TABLET, FILM COATED ORAL DAILY
Status: DISCONTINUED | OUTPATIENT
Start: 2017-12-14 | End: 2017-12-19

## 2017-12-13 RX ORDER — HEPARIN SODIUM 5000 [USP'U]/ML
5000 INJECTION, SOLUTION INTRAVENOUS; SUBCUTANEOUS EVERY 8 HOURS
Status: DISCONTINUED | OUTPATIENT
Start: 2017-12-13 | End: 2017-12-19

## 2017-12-13 RX ORDER — RAMELTEON 8 MG/1
8 TABLET ORAL NIGHTLY PRN
Status: DISCONTINUED | OUTPATIENT
Start: 2017-12-13 | End: 2017-12-26 | Stop reason: HOSPADM

## 2017-12-13 RX ORDER — DICLOFENAC SODIUM 10 MG/G
4 GEL TOPICAL 4 TIMES DAILY PRN
Status: DISCONTINUED | OUTPATIENT
Start: 2017-12-13 | End: 2017-12-26 | Stop reason: HOSPADM

## 2017-12-13 RX ORDER — FUROSEMIDE 10 MG/ML
40 INJECTION INTRAMUSCULAR; INTRAVENOUS 2 TIMES DAILY
Status: DISCONTINUED | OUTPATIENT
Start: 2017-12-13 | End: 2017-12-14

## 2017-12-13 RX ORDER — TIOTROPIUM BROMIDE 18 UG/1
18 CAPSULE ORAL; RESPIRATORY (INHALATION) DAILY
Status: DISCONTINUED | OUTPATIENT
Start: 2017-12-14 | End: 2017-12-13

## 2017-12-13 RX ORDER — SODIUM BICARBONATE 650 MG/1
1300 TABLET ORAL 2 TIMES DAILY
Status: DISCONTINUED | OUTPATIENT
Start: 2017-12-13 | End: 2017-12-17

## 2017-12-13 RX ORDER — QUETIAPINE FUMARATE 100 MG/1
200 TABLET, FILM COATED ORAL NIGHTLY
Status: DISCONTINUED | OUTPATIENT
Start: 2017-12-13 | End: 2017-12-16

## 2017-12-13 RX ORDER — SODIUM CHLORIDE 0.9 % (FLUSH) 0.9 %
5 SYRINGE (ML) INJECTION
Status: DISCONTINUED | OUTPATIENT
Start: 2017-12-13 | End: 2017-12-26 | Stop reason: HOSPADM

## 2017-12-13 RX ORDER — HYDRALAZINE HYDROCHLORIDE 25 MG/1
25 TABLET, FILM COATED ORAL EVERY 8 HOURS PRN
Status: DISCONTINUED | OUTPATIENT
Start: 2017-12-13 | End: 2017-12-13 | Stop reason: SDUPTHER

## 2017-12-13 RX ADMIN — SODIUM BICARBONATE 650 MG TABLET 1300 MG: at 09:12

## 2017-12-13 RX ADMIN — DARBEPOETIN ALFA 60 MCG: 60 INJECTION, SOLUTION INTRAVENOUS; SUBCUTANEOUS at 12:12

## 2017-12-13 RX ADMIN — GUAIFENESIN 600 MG: 600 TABLET, EXTENDED RELEASE ORAL at 09:12

## 2017-12-13 RX ADMIN — QUETIAPINE FUMARATE 200 MG: 100 TABLET, FILM COATED ORAL at 09:12

## 2017-12-13 RX ADMIN — HYDRALAZINE HYDROCHLORIDE AND ISOSORBIDE DINITRATE 2 TABLET: 37.5; 2 TABLET, FILM COATED ORAL at 09:12

## 2017-12-13 RX ADMIN — ALLOPURINOL 100 MG: 100 TABLET ORAL at 09:12

## 2017-12-13 RX ADMIN — HEPARIN SODIUM 5000 UNITS: 5000 INJECTION, SOLUTION INTRAVENOUS; SUBCUTANEOUS at 09:12

## 2017-12-13 RX ADMIN — FUROSEMIDE 40 MG: 10 INJECTION, SOLUTION INTRAMUSCULAR; INTRAVENOUS at 09:12

## 2017-12-13 NOTE — PROGRESS NOTES
72-year-old female with a history of CHF, stage IV chronic kidney disease presents with worsening dyspnea on exertion, cough, leg edema over the past 2 weeks with worsening symptoms over the past few days.  Patient's daughter is present and reports that the patient is dyspneic with minimal exertion.  Patient has a nonproductive cough over the past few days.      Patient is tachypneic.  Mucous membranes are dry.  O2 sats 96% on room air.  I will place on a cardiac monitor, obtain basic and cardiac labs and chest x-ray and will send to ED pod bed for further management and disposition.    I initially evaluated this patient and ordered workup while in intake.  The patient will receive a full evaluation in an ED pod when space is available.  All results from tests ordered in intake will not be followed by the intake team, including myself. All results will be followed by the ED Pod team.

## 2017-12-13 NOTE — PLAN OF CARE
Problem: Patient Care Overview  Goal: Individualization & Mutuality  Outcome: Ongoing (interventions implemented as appropriate)  PATIENT EDUCATED ON HAND WASHING AND INFECTION CONTROL. PATIENT VERBALIZED UNDERSTANDING

## 2017-12-13 NOTE — PROGRESS NOTES
Hgb 10.4;Hct 30.7    No dose change PER PROTOCOL FORM DROD-188. Aranesp 60 mcg given and  2 week appt set. Patient wants labs through [facility]. [facility] told. Labs set.    Gfr 25.1/stage 4

## 2017-12-13 NOTE — ED NOTES
Patient's daughter reports patient has CHF and is in fluid overload. Patient reports chronic SOB, but worsening since last week. BLE leg swelling x1 week. Patient denies home o2.

## 2017-12-13 NOTE — ED PROVIDER NOTES
"Encounter Date: 12/13/2017    SCRIBE #1 NOTE: I, Cherise Sharma, am scribing for, and in the presence of, Dr. Zeng.       History     Chief Complaint   Patient presents with    Shortness of Breath     hx chf and swelling     Time seen by provider: 5:05 PM    This is a 72 y.o. female with a history of hazel, HTN, renal disorder, thyroid disease, cardiomegaly, CAD, chronic Hep C virus infection, breast CA, CHF (with admission) and swelling who presents with complaint of dry cough for 4 days. She notes associated SOB for "a while" with unknown cause, dry mouth, LE edema, and 3 days cold symptoms, and she denies associated sore throat, CP, or HA. She indicates similarity in the current discomfort to that which is usually associated with her CHF exacerbations. The HPI is limited due to the condition of the patient.  She denies taking an pain medication.  She indicates taking Lasix.  The pt's daughter states that she has progressive dyspnea and inability to walk a few steps without getting short of breath.  She has recently seen her nephrologist who reports that her kidney dysfunction is stable.  The pt does not follow cardiac diet and has been drinking more water lately than recommended      The history is provided by the patient.     Review of patient's allergies indicates:  No Known Allergies  Past Medical History:   Diagnosis Date    Breast cancer 1980    right    CHF (congestive heart failure)     Chronic hepatitis C virus infection 3/14/2017    Coronary artery disease     Hypertension     Hazel     Renal disorder     Thyroid disease      Past Surgical History:   Procedure Laterality Date    HYSTERECTOMY      MASTECTOMY Right 1980     Family History   Problem Relation Age of Onset    Heart disease Mother     No Known Problems Father     Heart disease Sister     No Known Problems Brother     No Known Problems Maternal Aunt     No Known Problems Maternal Uncle     No Known Problems Paternal Aunt     No " "Known Problems Paternal Uncle     No Known Problems Maternal Grandmother     No Known Problems Maternal Grandfather     No Known Problems Paternal Grandmother     No Known Problems Paternal Grandfather     Anemia Neg Hx     Arrhythmia Neg Hx     Asthma Neg Hx     Clotting disorder Neg Hx     Fainting Neg Hx     Heart attack Neg Hx     Heart failure Neg Hx     Hyperlipidemia Neg Hx     Hypertension Neg Hx     Stroke Neg Hx     Atrial Septal Defect Neg Hx      Social History   Substance Use Topics    Smoking status: Former Smoker     Packs/day: 2.00     Years: 20.00     Types: Cigarettes     Quit date: 2/16/1995    Smokeless tobacco: Never Used    Alcohol use No      Comment: 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     Review of Systems   Unable to perform ROS: Other   Constitutional: Positive for activity change. Negative for fever.        Cold symptoms.   HENT: Negative for sore throat.         Dry mouth.   Eyes: Negative for discharge.   Respiratory: Positive for cough (Dry) and shortness of breath.    Cardiovascular: Positive for leg swelling. Negative for chest pain.   Gastrointestinal: Negative for constipation, diarrhea and nausea.   Genitourinary: Negative for decreased urine volume.   Musculoskeletal: Positive for neck pain. Negative for back pain.   Neurological: Negative for headaches.       Physical Exam     Initial Vitals [12/13/17 1311]   BP Pulse Resp Temp SpO2   (!) 175/98 66 (!) 24 98.8 °F (37.1 °C) 96 %      MAP       123.67         Vitals:    12/13/17 1311 12/13/17 1524 12/13/17 1719 12/13/17 2038   BP: (!) 175/98 (!) 168/98 (!) 176/105 (!) 189/97   BP Location:  Left arm Right arm Left arm   Patient Position:  Sitting Sitting Sitting   Pulse: 66 64 64 67   Resp: (!) 24 (!) 24  20   Temp: 98.8 °F (37.1 °C)      TempSrc: Oral Oral     SpO2: 96% 96% 97% 96%   Weight: 80.7 kg (178 lb)      Height: 5' 6" (1.676 m)          Physical Exam    Nursing note and vitals reviewed.  Constitutional: She appears " well-developed and well-nourished.   HENT:   Head: Normocephalic and atraumatic.   Eyes: EOM are normal. Pupils are equal, round, and reactive to light.   Neck: Normal range of motion.   Cardiovascular: Normal rate.   Pulmonary/Chest: She is in respiratory distress (Mild). She has rhonchi.   Coarse breath sounds.   Abdominal: Soft. Bowel sounds are normal.   Musculoskeletal: She exhibits edema (2+ pitting bilaterally).   Neurological: She is alert.   Skin: Skin is warm and dry.   Psychiatric:   Affect- flat         ED Course   Procedures  Labs Reviewed   CBC W/ AUTO DIFFERENTIAL - Abnormal; Notable for the following:        Result Value    RBC 2.98 (*)     Hemoglobin 10.3 (*)     Hematocrit 30.3 (*)      (*)     MCH 34.6 (*)     RDW 19.9 (*)     Platelets 135 (*)     MPV 13.8 (*)     Lymph # 0.9 (*)     Mono% 15.2 (*)     All other components within normal limits    Narrative:     LAVENDER SHARED   TROPONIN I - Abnormal; Notable for the following:     Troponin I 0.060 (*)     All other components within normal limits    Narrative:     LAVENDER SHARED   B-TYPE NATRIURETIC PEPTIDE - Abnormal; Notable for the following:     BNP 4,241 (*)     All other components within normal limits    Narrative:     LAVENDER SHARED   ISTAT PROCEDURE - Abnormal; Notable for the following:     POC BUN 40 (*)     POC Creatinine 2.8 (*)     All other components within normal limits   INFLUENZA A AND B ANTIGEN   TSH   T4   URINALYSIS   ISTAT CHEM8     EKG Readings: (Independently Interpreted)   Initial Reading: No STEMI. Rhythm: Normal Sinus Rhythm. Heart Rate: 67.     ECG Results          EKG 12-lead (Final result)  Result time 12/13/17 16:10:18    Final result by Interface, Lab In [facility] (12/13/17 16:10:18)                 Narrative:    Test Reason : R06.02  Blood Pressure :   Vent. Rate : 067 BPM     Atrial Rate : 067 BPM     P-R Int : 156 ms          QRS Dur : 084 ms      QT Int : 252 ms       P-R-T Axes : 073 025 186 degrees      QTc Int : 266 ms    Normal sinus rhythm  Possible Anterior infarct ,age undetermined  Abnormal ECG  When compared with ECG of 09-APR-2017 12:47,  Premature atrial complexes are no longer Present  Nonspecific T wave abnormality has replaced inverted T waves in Inferior  leads  Nonspecific T wave abnormality has replaced inverted T waves in   Anterior-lateral leads  QT has shortened  Confirmed by Shady Redman MD ([id number]) on 12/13/2017 4:10:12 PM    Referred By: [id number]   SELF           Confirmed By:Shady Redman MD                            X-Rays:   Independently Interpreted Readings:   Chest X-Ray: Cardiomegaly present.  Increased vascular markings consistent with CHF are present.     Medical Decision Making:   History:   Old Medical Records: I decided to obtain old medical records.  Initial Assessment:   72 y.o. Female history of CRF, CHF, and HTN who presents with progressive SOB and LE edema concerning for CHF exacerbation. CXR shows cardiomegaly with mild PE and BNP is greater than 4000. Pt continues with SOB after ED treatment. Will admit to hospital services for further evaluation and treatment.  Clinical Tests:   Lab Tests: Reviewed and Ordered  Radiological Study: Reviewed and Ordered  Medical Tests: Reviewed and Ordered            Scribe Attestation:   Scribe #1: I performed the above scribed service and the documentation accurately describes the services I performed. I attest to the accuracy of the note.    Attending Attestation:           Physician Attestation for Scribe:      Comments: I, Dr. Shannan Zeng, personally performed the services described in this documentation. All medical record entries made by the scribe were at my direction and in my presence.  I have reviewed the chart and agree that the record reflects my personal performance and is accurate and complete. Shannan Zeng MD.  12:25 PM 12/14/2017            ED Course      Clinical Impression:   Diagnoses of SOB (shortness of breath),  Shortness of breath, Acute exacerbation of CHF (congestive heart failure), and Troponin I above reference range were pertinent to this visit.                           Shannan Zeng MD  12/14/17 1220       Shannan Zeng MD  12/14/17 6060

## 2017-12-14 ENCOUNTER — DOCUMENTATION ONLY (OUTPATIENT)
Dept: CARDIOLOGY | Facility: CLINIC | Age: 72
End: 2017-12-14

## 2017-12-14 LAB
ALBUMIN SERPL BCP-MCNC: 3.2 G/DL
ALP SERPL-CCNC: 58 U/L
ALT SERPL W/O P-5'-P-CCNC: 21 U/L
ANION GAP SERPL CALC-SCNC: 13 MMOL/L
AST SERPL-CCNC: 44 U/L
BACTERIA #/AREA URNS AUTO: NORMAL /HPF
BASOPHILS # BLD AUTO: 0.02 K/UL
BASOPHILS NFR BLD: 0.5 %
BILIRUB SERPL-MCNC: 2.5 MG/DL
BILIRUB UR QL STRIP: NEGATIVE
BUN SERPL-MCNC: 43 MG/DL
CALCIUM SERPL-MCNC: 9.1 MG/DL
CHLORIDE SERPL-SCNC: 104 MMOL/L
CLARITY UR REFRACT.AUTO: CLEAR
CO2 SERPL-SCNC: 28 MMOL/L
COLOR UR AUTO: YELLOW
CREAT SERPL-MCNC: 2.9 MG/DL
DIASTOLIC DYSFUNCTION: YES
DIFFERENTIAL METHOD: ABNORMAL
EOSINOPHIL # BLD AUTO: 0 K/UL
EOSINOPHIL NFR BLD: 0.3 %
ERYTHROCYTE [DISTWIDTH] IN BLOOD BY AUTOMATED COUNT: 17.8 %
EST. GFR  (AFRICAN AMERICAN): 18 ML/MIN/1.73 M^2
EST. GFR  (NON AFRICAN AMERICAN): 15.6 ML/MIN/1.73 M^2
GLUCOSE SERPL-MCNC: 74 MG/DL
GLUCOSE UR QL STRIP: NEGATIVE
HCT VFR BLD AUTO: 27.2 %
HGB BLD-MCNC: 9.7 G/DL
HGB UR QL STRIP: NEGATIVE
HYALINE CASTS UR QL AUTO: 1 /LPF
IMM GRANULOCYTES # BLD AUTO: 0.08 K/UL
IMM GRANULOCYTES NFR BLD AUTO: 2.2 %
KETONES UR QL STRIP: NEGATIVE
LEUKOCYTE ESTERASE UR QL STRIP: NEGATIVE
LYMPHOCYTES # BLD AUTO: 0.7 K/UL
LYMPHOCYTES NFR BLD: 17.6 %
MCH RBC QN AUTO: 34.8 PG
MCHC RBC AUTO-ENTMCNC: 35.7 G/DL
MCV RBC AUTO: 98 FL
MICROSCOPIC COMMENT: NORMAL
MONOCYTES # BLD AUTO: 0.4 K/UL
MONOCYTES NFR BLD: 10.3 %
NEUTROPHILS # BLD AUTO: 2.6 K/UL
NEUTROPHILS NFR BLD: 69.1 %
NITRITE UR QL STRIP: NEGATIVE
NRBC BLD-RTO: 0 /100 WBC
PH UR STRIP: 7 [PH] (ref 5–8)
PLATELET # BLD AUTO: 91 K/UL
PMV BLD AUTO: 13 FL
POTASSIUM SERPL-SCNC: 3.9 MMOL/L
PROT SERPL-MCNC: 7.4 G/DL
PROT UR QL STRIP: ABNORMAL
RBC # BLD AUTO: 2.79 M/UL
RBC #/AREA URNS AUTO: 0 /HPF (ref 0–4)
RETIRED EF AND QEF - SEE NOTES: 30 (ref 55–65)
SODIUM SERPL-SCNC: 145 MMOL/L
SP GR UR STRIP: 1.01 (ref 1–1.03)
SQUAMOUS #/AREA URNS AUTO: 0 /HPF
TROPONIN I SERPL DL<=0.01 NG/ML-MCNC: 0.06 NG/ML
URN SPEC COLLECT METH UR: ABNORMAL
UROBILINOGEN UR STRIP-ACNC: 4 EU/DL
WBC # BLD AUTO: 3.7 K/UL
WBC #/AREA URNS AUTO: 0 /HPF (ref 0–5)

## 2017-12-14 PROCEDURE — 84484 ASSAY OF TROPONIN QUANT: CPT

## 2017-12-14 PROCEDURE — 94761 N-INVAS EAR/PLS OXIMETRY MLT: CPT

## 2017-12-14 PROCEDURE — 36415 COLL VENOUS BLD VENIPUNCTURE: CPT

## 2017-12-14 PROCEDURE — 85025 COMPLETE CBC W/AUTO DIFF WBC: CPT

## 2017-12-14 PROCEDURE — 93010 ELECTROCARDIOGRAM REPORT: CPT | Mod: ,,, | Performed by: INTERNAL MEDICINE

## 2017-12-14 PROCEDURE — 27000221 HC OXYGEN, UP TO 24 HOURS

## 2017-12-14 PROCEDURE — 96374 THER/PROPH/DIAG INJ IV PUSH: CPT

## 2017-12-14 PROCEDURE — 63600175 PHARM REV CODE 636 W HCPCS: Performed by: INTERNAL MEDICINE

## 2017-12-14 PROCEDURE — 93307 TTE W/O DOPPLER COMPLETE: CPT | Mod: 26,,, | Performed by: INTERNAL MEDICINE

## 2017-12-14 PROCEDURE — 25000003 PHARM REV CODE 250: Performed by: INTERNAL MEDICINE

## 2017-12-14 PROCEDURE — 81001 URINALYSIS AUTO W/SCOPE: CPT

## 2017-12-14 PROCEDURE — 94640 AIRWAY INHALATION TREATMENT: CPT

## 2017-12-14 PROCEDURE — 93005 ELECTROCARDIOGRAM TRACING: CPT

## 2017-12-14 PROCEDURE — 11000001 HC ACUTE MED/SURG PRIVATE ROOM

## 2017-12-14 PROCEDURE — 25000242 PHARM REV CODE 250 ALT 637 W/ HCPCS: Performed by: INTERNAL MEDICINE

## 2017-12-14 PROCEDURE — 99232 SBSQ HOSP IP/OBS MODERATE 35: CPT | Mod: ,,, | Performed by: INTERNAL MEDICINE

## 2017-12-14 PROCEDURE — 80053 COMPREHEN METABOLIC PANEL: CPT

## 2017-12-14 PROCEDURE — 93307 TTE W/O DOPPLER COMPLETE: CPT

## 2017-12-14 RX ORDER — IPRATROPIUM BROMIDE AND ALBUTEROL SULFATE 2.5; .5 MG/3ML; MG/3ML
3 SOLUTION RESPIRATORY (INHALATION)
Status: DISCONTINUED | OUTPATIENT
Start: 2017-12-14 | End: 2017-12-26 | Stop reason: HOSPADM

## 2017-12-14 RX ADMIN — GUAIFENESIN 600 MG: 600 TABLET, EXTENDED RELEASE ORAL at 09:12

## 2017-12-14 RX ADMIN — SODIUM BICARBONATE 650 MG TABLET 1300 MG: at 09:12

## 2017-12-14 RX ADMIN — IPRATROPIUM BROMIDE AND ALBUTEROL SULFATE 3 ML: .5; 3 SOLUTION RESPIRATORY (INHALATION) at 12:12

## 2017-12-14 RX ADMIN — HYDRALAZINE HYDROCHLORIDE AND ISOSORBIDE DINITRATE 2 TABLET: 37.5; 2 TABLET, FILM COATED ORAL at 06:12

## 2017-12-14 RX ADMIN — METOPROLOL SUCCINATE 50 MG: 50 TABLET, EXTENDED RELEASE ORAL at 11:12

## 2017-12-14 RX ADMIN — FLUTICASONE FUROATE AND VILANTEROL TRIFENATATE 1 PUFF: 100; 25 POWDER RESPIRATORY (INHALATION) at 01:12

## 2017-12-14 RX ADMIN — ALLOPURINOL 100 MG: 100 TABLET ORAL at 11:12

## 2017-12-14 RX ADMIN — GUAIFENESIN 600 MG: 600 TABLET, EXTENDED RELEASE ORAL at 11:12

## 2017-12-14 RX ADMIN — HEPARIN SODIUM 5000 UNITS: 5000 INJECTION, SOLUTION INTRAVENOUS; SUBCUTANEOUS at 06:12

## 2017-12-14 RX ADMIN — FUROSEMIDE 40 MG: 10 INJECTION, SOLUTION INTRAMUSCULAR; INTRAVENOUS at 06:12

## 2017-12-14 RX ADMIN — ALLOPURINOL 100 MG: 100 TABLET ORAL at 09:12

## 2017-12-14 RX ADMIN — HEPARIN SODIUM 5000 UNITS: 5000 INJECTION, SOLUTION INTRAVENOUS; SUBCUTANEOUS at 01:12

## 2017-12-14 RX ADMIN — HYDRALAZINE HYDROCHLORIDE AND ISOSORBIDE DINITRATE 2 TABLET: 37.5; 2 TABLET, FILM COATED ORAL at 01:12

## 2017-12-14 RX ADMIN — QUETIAPINE FUMARATE 200 MG: 100 TABLET, FILM COATED ORAL at 09:12

## 2017-12-14 RX ADMIN — ASPIRIN 81 MG CHEWABLE TABLET 81 MG: 81 TABLET CHEWABLE at 11:12

## 2017-12-14 RX ADMIN — HYDRALAZINE HYDROCHLORIDE AND ISOSORBIDE DINITRATE 2 TABLET: 37.5; 2 TABLET, FILM COATED ORAL at 09:12

## 2017-12-14 RX ADMIN — IPRATROPIUM BROMIDE AND ALBUTEROL SULFATE 3 ML: .5; 3 SOLUTION RESPIRATORY (INHALATION) at 08:12

## 2017-12-14 RX ADMIN — LEVOTHYROXINE SODIUM 50 MCG: 50 TABLET ORAL at 06:12

## 2017-12-14 RX ADMIN — IPRATROPIUM BROMIDE AND ALBUTEROL SULFATE 3 ML: .5; 3 SOLUTION RESPIRATORY (INHALATION) at 01:12

## 2017-12-14 RX ADMIN — SODIUM BICARBONATE 650 MG TABLET 1300 MG: at 11:12

## 2017-12-14 RX ADMIN — FUROSEMIDE 40 MG: 10 INJECTION, SOLUTION INTRAMUSCULAR; INTRAVENOUS at 11:12

## 2017-12-14 RX ADMIN — RAMELTEON 8 MG: 8 TABLET, FILM COATED ORAL at 09:12

## 2017-12-14 NOTE — ASSESSMENT & PLAN NOTE
Patient with mild elevation in troponin, no recent chest pain, no significant EKG changes, likely type 2 NSTEMI in setting of CHF exacerbation and CKD impairing clearance  -Follow Q6H Troponin/EKG

## 2017-12-14 NOTE — ASSESSMENT & PLAN NOTE
Patient presenting with worsening LE edema, CXR with pulmonary edema, shortness of breath, elevated BNP likely acute on chronic systolic CHF (based on recent ECHO 5/17), likely precipitate increased fluid intake by patient   -Lasix 40mg IV BID  -Strict I/O  -Continue BB, ASA, appears no statin per prior h/o chronic hep C, will follow INR/transaminisases to assess liver fxn  -ECHO ordered

## 2017-12-14 NOTE — PROGRESS NOTES
Pt here for 2D Echo.  Pt extremely sleepy & lethargic, difficult to arouse, barely opening eyes to verbal & voice stimuli.  /82, RR 24, HR 56, %.  Pt groaning & flinching with sternal rub.  Pupils pinpoint & non-reactive.  Dr. Schafer notified & to assess pt.  Sternal rub performed a second time, pt responded w/ forceful productive coughing, becoming more alert- able to answer questions.  2D echo w/ Bubble study completed.  Pt transferred back to room via stretcher accompanied by transport in stable condition.

## 2017-12-14 NOTE — HPI
""72F h/o HFrEF (30%), Stage IV CKD, Hep C, h/o breast ca s/p mastectomy/chemo 1979, COPD, HTN, Bipolar d/o, anemia of CKD, presenting with worsening dyspnea for last few days with cough. Patient usually ambulatory with walker, over last few days exercise tolerance worsened to few steps before feeling short of breath, denies chest pain with dyspnea. Pt accompanied by daughter who reports worsening swelling of legs recently, ?orthopnea, reporting PND. Pt has been taking increased fluids lately as she has been feeling thirsty and daughter has difficult time controlling patient intake. Denies F/C/N/V, cough nonproductive. Has been taking Lasix 20mg BID, reports between 7-8 lb weight gain in last week."   "

## 2017-12-14 NOTE — ASSESSMENT & PLAN NOTE
Per family over last months issues with memory, forgetting to turn off appliances, getting lost, likely early signs of dementia, recommend outpatient f/u with neuropsych

## 2017-12-14 NOTE — PLAN OF CARE
"Problem: Fluid Volume Excess (Adult,Obstetrics,Pediatric)  Goal: Identify Related Risk Factors and Signs and Symptoms  Related risk factors and signs and symptoms are identified upon initiation of Human Response Clinical Practice Guideline (CPG)  Outcome: Ongoing (interventions implemented as appropriate)   12/14/17 0447   Fluid Volume Excess   Related Risk Factors (Fluid Volume Excess) disease process;fluid intake excessive;knowledge deficit   Signs and Symptoms (Fluid Volume Excess) activity intolerance;edema;pulmonary congestion/pleural effusion     Patient alert and oriented x 2-3. Patient denies pain.  Patient presents with CHF exacerbation and with c/o SOB and BLE swelling x one week.  Patient is presently on strict I& O's and with lasix regimen. Patient voided 700 cc of dark orange urine. Patient requires assistance due to general weakness. Patient is a high risk for falls, BSC placed at bedside and patient educated to call of  assistance.  Patient educated on fluid restrictions and the risk of developing fluid volume overload, CXR revealed cardiomegaly and worsening CHF.  Patients daughter reported, " I am unable to control what she drinks."  Patient verbalized an understanding of adhering to fluid restrictions, discharge date is TBD.        "

## 2017-12-14 NOTE — PLAN OF CARE
Problem: Fall Risk (Adult)  Goal: Absence of Falls  Patient will demonstrate the desired outcomes by discharge/transition of care.   Outcome: Ongoing (interventions implemented as appropriate)  Pt is free of falls and injuries per shift , fall precautions maintained , hourly round maintained .     Problem: Patient Care Overview  Goal: Plan of Care Review  Outcome: Ongoing (interventions implemented as appropriate)  Plan of care reviewed with pt . Pt is A, A,O x 2 . Stable V/s. No C/O pain during the day . Pt was sleepy and arouse to voice sometimes during the day , and awake and alert sometimes , daughter at the beside and she said that this is how her mother usually is . Pt ate her lunch and took her medication as scheduled . ECHO done today . Pt turned in bed frequently . Pt refused some of her labs today and MD notified .

## 2017-12-14 NOTE — H&P
[facility] Hospital Medicine  History & Physical    Patient Name: Anai Sal  MRN: [id number]  Admission Date: 12/13/2017  Attending Physician: Elsy Cantu MD   Primary Care Provider: Live Young MD (Inactive)    [facility] Medicine Team: [facility] MED D David Martins MD     Patient information was obtained from patient, relative(s) and ER records.     Subjective:     Principal Problem:<principal problem not specified>    Chief Complaint:   Chief Complaint   Patient presents with    Shortness of Breath     hx chf and swelling        HPI: Ms Sal is a 72F h/o HFrEF (30%), Stage IV CKD, Hep C, h/o breast ca s/p mastectomy/chemo 1979, COPD, HTN, Bipolar d/o, anemia of CKD, presenting with worsening dyspnea for last few days with cough. Patient usually ambulatory with walker, over last few days exercise tolerance worsened to few steps before feeling short of breath, denies chest pain with dyspnea. Pt accompanied by daughter who reports worsening swelling of legs recently, ?orthopnea, reporting PND. Pt has been taking increased fluids lately as she has been feeling thirsty and daughter has difficult time controlling patient intake. Denies F/C/N/V, cough nonproductive. Has been taking lasix 20mg BID, reports between 7-8 lb weight gain in last week.     Past Medical History:   Diagnosis Date    Breast cancer 1980    right    CHF (congestive heart failure)     Chronic hepatitis C virus infection 3/14/2017    Coronary artery disease     Hypertension     Hazel     Renal disorder     Thyroid disease        Past Surgical History:   Procedure Laterality Date    HYSTERECTOMY      MASTECTOMY Right 1980       Review of patient's allergies indicates:  No Known Allergies    Current Facility-Administered Medications on File Prior to Encounter   Medication    [COMPLETED] darbepoetin jaun-polysorbate injection 60 mcg     Current Outpatient Prescriptions on File Prior to Encounter   Medication  Sig    allopurinol (ZYLOPRIM) 100 MG tablet Take 100 mg by mouth 2 (two) times daily.     aspirin 81 MG Chew Take 1 tablet (81 mg total) by mouth once daily.    diclofenac sodium 1 % Gel Apply 4 g topically 4 (four) times daily. Prn pain    fluticasone-salmeterol 250-50 mcg/dose (ADVAIR) 250-50 mcg/dose diskus inhaler Inhale 1 puff into the lungs 2 (two) times daily. Controller    furosemide (LASIX) 40 MG tablet Take 1 tab in am and 1/2 tab at night    isosorbide-hydrALAZINE 20-37.5 mg (BIDIL) 20-37.5 mg Tab Take 2 tablets by mouth 3 (three) times daily.    levothyroxine (SYNTHROID) 50 MCG tablet Take 1 tablet (50 mcg total) by mouth before breakfast.    melatonin 3 mg Tab Take by mouth every evening.    metoprolol succinate (TOPROL-XL) 50 MG 24 hr tablet Take 1 tablet (50 mg total) by mouth once daily.    quetiapine (SEROQUEL) 200 MG Tab Take 1 tablet (200 mg total) by mouth nightly.    sodium bicarbonate 650 MG tablet Take 2 tablets (1,300 mg total) by mouth 2 (two) times daily.    tiotropium (SPIRIVA) 18 mcg inhalation capsule Inhale 1 capsule (18 mcg total) into the lungs once daily.    benztropine (COGENTIN) 0.5 MG tablet Take 1 tablet (0.5 mg total) by mouth nightly.    betamethasone valerate 0.1% (VALISONE) 0.1 % Lotn Apply topically once daily. 2 drops both ears once daily    fluticasone (FLONASE) 50 mcg/actuation nasal spray 2 sprays by Each Nare route once daily.    gabapentin (NEURONTIN) 300 MG capsule Take 300 mg by mouth 3 (three) times daily as needed (for nerve pain).     guaifenesin (MUCINEX) 600 mg 12 hr tablet Take 1 tablet (600 mg total) by mouth 2 (two) times daily.     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 Negative for appetite change, chills and fever.   HENT: Positive for facial swelling. Negative for congestion, sneezing and trouble swallowing.    Eyes: Negative for discharge.   Respiratory: Positive for cough, shortness of breath and wheezing. Negative for apnea.    Cardiovascular: Positive for chest pain and leg swelling. Negative for palpitations.   Gastrointestinal: Negative for abdominal distention, abdominal pain, constipation and diarrhea.   Genitourinary: Negative for difficulty urinating and dysuria.   Musculoskeletal: Negative for arthralgias.   Neurological: Negative for dizziness and headaches.   Psychiatric/Behavioral: Positive for confusion, decreased concentration and sleep disturbance.     Objective:     Vital Signs (Most Recent):  Temp: 98.8 °F (37.1 °C) (12/13/17 1311)  Pulse: 64 (12/13/17 1719)  Resp: (!) 24 (12/13/17 1524)  BP: (!) 176/105 (12/13/17 1719)  SpO2: 97 % (12/13/17 1719) Vital Signs (24h Range):  Temp:  [98.8 °F (37.1 °C)] 98.8 °F (37.1 °C)  Pulse:  [64-66] 64  Resp:  [24] 24  SpO2:  [96 %-97 %] 97 %  BP: (146-176)/() 176/105     Weight: 80.7 kg (178 lb)  Body mass index is 28.73 kg/m².    Physical Exam   Constitutional: She appears well-developed and well-nourished. No distress.   HENT:   Head: Normocephalic and atraumatic.   Nose: Nose normal.   Mouth/Throat: No oropharyngeal exudate.   Eyes: Conjunctivae and EOM are normal. Pupils are equal, round, and reactive to light. Right eye exhibits no discharge. Left eye exhibits no discharge. No scleral icterus.   Neck: Normal range of motion. Neck supple. No JVD present.   Cardiovascular: Normal  rate, regular rhythm, normal heart sounds and intact distal pulses.  Exam reveals no friction rub.    No murmur heard.  Pulmonary/Chest: Effort normal. No respiratory distress. She has wheezes. She has rales. She exhibits no tenderness.   Abdominal: Soft. Bowel sounds are normal. She exhibits no distension and no mass. There is no tenderness. There is no rebound and no guarding.   Musculoskeletal: Normal range of motion. She exhibits edema (bilateral pitting edema to knees). She exhibits no tenderness or deformity.   Lymphadenopathy:     She has no cervical adenopathy.   Neurological: She is alert. She has normal reflexes. No cranial nerve deficit.   Skin: Skin is warm and dry. No rash noted. She is not diaphoretic. No erythema.   Psychiatric: She has a normal mood and affect. Her behavior is normal.         CRANIAL NERVES     CN III, IV, VI   Pupils are equal, round, and reactive to light.  Extraocular motions are normal.        Significant Labs:   BMP: No results for input(s): GLU, NA, K, CL, CO2, BUN, CREATININE, CALCIUM, MG in the last 48 hours.  CBC:   Recent Labs  Lab 12/13/17  1105 12/13/17  1615 12/13/17  1938   WBC  --  4.46  --    HGB 10.4* 10.3*  --    HCT 30.7* 30.3* 36   PLT  --  135*  --        Significant Imaging: I have reviewed all pertinent imaging results/findings within the past 24 hours.    Assessment/Plan:     Acute exacerbation of CHF (congestive heart failure)    Patient presenting with worsening LE edema, CXR with pulmonary edema, shortness of breath, elevated BNP likely acute on chronic systolic CHF (based on recent ECHO 5/17), likely precipitate increased fluid intake by patient   -Lasix 40mg IV BID  -Strict I/O  -Continue BB, ASA, appears no statin per prior h/o chronic hep C, will follow INR/transaminisases to assess liver fxn  -ECHO ordered            Stage 4 chronic kidney disease    CANDI on CKD IV , patient with baseline 2.2, currently 2.8, likely prerenal, will check urinalysis,  follow BMP  Continuing Na Bicarb 1300 BID         Cognitive impairment    Per family over last months issues with memory, forgetting to turn off appliances, getting lost, likely early signs of dementia, recommend outpatient f/u with neuropsych          Anemia of chronic renal failure, stage 4 (severe)    Stable, f/u outpatient nephro          Chronic obstructive pulmonary disease    Per prior PFTs, mild obstructive component, on inhalers at home, no increase in sputum production unlikely COPD exacerbation,  -Duonebs Q6H  -Continue LABA/inhailed corticosteroid          Elevated troponin    Patient with mild elevation in troponin, no recent chest pain, no significant EKG changes, likely type 2 NSTEMI in setting of CHF exacerbation and CKD impairing clearance  -Follow Q6H Troponin/EKG          Primary hypothyroidism    Patient with h/o hypothyroidism, last TSH/T4 early 2017, TSH elevated T4 WNL  -Recheck TSH/T4        Insomnia    Patient with insomnia and ?h/o bipolar disorder per family, on seroquel 200mg QHS, reports no improvement  -PRN ramelteon for sleep              VTE Risk Mitigation         Ordered     heparin (porcine) injection 5,000 Units  Every 8 hours     Route:  Subcutaneous        12/13/17 2030     Medium Risk of VTE  Once      12/13/17 2030             David Martins MD  Department of Hospital Medicine   [facility]

## 2017-12-14 NOTE — ASSESSMENT & PLAN NOTE
CANDI on CKD IV , patient with baseline 2.2, currently 2.8, likely prerenal, will check urinalysis, follow BMP  Continuing Na Bicarb 1300 BID

## 2017-12-14 NOTE — PROGRESS NOTES
Progress Note  Hospital Medicine      Admit Date: 12/13/2017    SUBJECTIVE:     Follow-up For:  Acute exacerbation of CHF (congestive heart failure)    HPI/Interval history: Patient not cooperative with labs, but compliant with medications. Patient was found sleeping this afternoon. Per nurse, patient had rough night and was not sleeping.     Review of Systems: Gen: no fever, no chills, Heart: no chest pain, palpitations; Resp: no SOB, no cough    OBJECTIVE:     Vital Signs Range (Last 24H):  Temp:  [96.8 °F (36 °C)-97.8 °F (36.6 °C)]   Pulse:  [55-67]   Resp:  [12-24]   BP: (123-189)/()   SpO2:  [94 %-100 %]     Physical Exam:  General appearance: NAD, asleep  Neck: FROM, supple   Lungs: snoring, no accessory muscle use  CV: RRR, no heave  Abdomen: Soft, non-tender; no masses or HSM  Extremities: No peripheral edema or digital cyanosis  Skin: no rash, lesions or ulcers  Psych: somnolent      Recent Labs  Lab 12/13/17  1105 12/13/17  1615 12/13/17  1938   WBC  --  4.46  --    HGB 10.4* 10.3*  --    HCT 30.7* 30.3* 36   PLT  --  135*  --    GRAN  --  63.3  2.8  --    LYMPH  --  20.9  0.9*  --    MONO  --  15.2*  0.7  --    Results for OZIEL TEMPLE (MRN [id number]) as of 12/14/2017 14:43   Ref. Range 12/13/2017 19:38   POC Glucose Latest Ref Range: 70 - 110 mg/dL 76   POC Creatinine Latest Ref Range: 0.5 - 1.4 mg/dL 2.8 (H)   POC BUN Latest Ref Range: 6 - 30 mg/dL 40 (H)   POC Chloride Latest Ref Range: 95 - 110 mmol/L 103   POC Sodium Latest Ref Range: 136 - 145 mmol/L 143   POC Potassium Latest Ref Range: 3.5 - 5.1 mmol/L 4.3   POC Ionized Calcium Latest Ref Range: 1.06 - 1.42 mmol/L 1.08   POC Hematocrit Latest Ref Range: 36 - 54 %PCV 36   POC TCO2 (MEASURED) Latest Ref Range: 23 - 29 mmol/L 29     BMP pending  ASSESSMENT/PLAN:     Acute systolic and diastolic exacerbation of CHF (congestive heart failure)     Patient presenting with worsening LE edema, CXR with pulmonary edema, shortness of breath,  elevated BNP likely acute on chronic systolic CHF (based on recent ECHO 5/17), likely precipitate increased fluid intake by patient   -Lasix 40mg IV BID  -Strict I/O  -Continue BB, ASA, appears no statin per prior h/o chronic hep C, will follow INR/transaminisases to assess liver fxn  -ECHO 2D showed EF 30-35%, Grade 3 diastolic dysfunction, biatrial enlargement, depressed right sided dysfunction, increase right atrial pressure  -awaiting labs          Stage 4 chronic kidney disease     CANDI on CKD IV , patient with baseline 2.2, currently 2.8, likely prerenal, will check urinalysis, follow BMP  Continuing Na Bicarb 1300 BID          Cognitive impairment     Per family over last months issues with memory, forgetting to turn off appliances, getting lost, likely early signs of dementia, recommend outpatient f/u with neuropsych          Anemia of chronic renal failure, stage 4 (severe)     Stable, f/u outpatient nephro          Chronic obstructive pulmonary disease     Per prior PFTs, mild obstructive component, on inhalers at home, no increase in sputum production unlikely COPD exacerbation,  -Duonebs Q6H  -Continue LABA/inhailed corticosteroid          Elevated troponin     Patient with mild elevation in troponin, no recent chest pain, no significant EKG changes, likely type 2 NSTEMI in setting of CHF exacerbation and CKD impairing clearance  -Follow Q6H Troponin/EKG          Primary hypothyroidism     Patient with h/o hypothyroidism, last TSH/T4 early 2017, TSH elevated T4 WNL  -Recheck TSH/T4       Insomnia     Patient with insomnia and ?h/o bipolar disorder per family, on seroquel 200mg QHS, reports no improvement  -PRN ramelteon for sleep     FELICITAS - consider sleep study, but not sure if the effort would be worth it for patient at her age and co-morbidities

## 2017-12-14 NOTE — ASSESSMENT & PLAN NOTE
Per prior PFTs, mild obstructive component, on inhalers at home, no increase in sputum production unlikely COPD exacerbation,  -Duonebs Q6H  -Continue LABA/inhailed corticosteroid

## 2017-12-14 NOTE — PROGRESS NOTES
Pt left the floor for ECHO on stretcher with transporter . Pt arouse to voice . Stable V/s. Pt on tele and O2 .

## 2017-12-14 NOTE — PROGRESS NOTES
Patient follows with [facility] clinic and is enrolled in PACE Program ([facility]) for care management. Pt is not eligible for enrollment in [facility] HF Program.     Removed from HF list.

## 2017-12-14 NOTE — SUBJECTIVE & OBJECTIVE
Past Medical History:   Diagnosis Date    Breast cancer 1980    right    CHF (congestive heart failure)     Chronic hepatitis C virus infection 3/14/2017    Coronary artery disease     Hypertension     Hazel     Renal disorder     Thyroid disease        Past Surgical History:   Procedure Laterality Date    HYSTERECTOMY      MASTECTOMY Right 1980       Review of patient's allergies indicates:  No Known Allergies    Current Facility-Administered Medications on File Prior to Encounter   Medication    [COMPLETED] darbepoetin jaun-polysorbate injection 60 mcg     Current Outpatient Prescriptions on File Prior to Encounter   Medication Sig    allopurinol (ZYLOPRIM) 100 MG tablet Take 100 mg by mouth 2 (two) times daily.     aspirin 81 MG Chew Take 1 tablet (81 mg total) by mouth once daily.    diclofenac sodium 1 % Gel Apply 4 g topically 4 (four) times daily. Prn pain    fluticasone-salmeterol 250-50 mcg/dose (ADVAIR) 250-50 mcg/dose diskus inhaler Inhale 1 puff into the lungs 2 (two) times daily. Controller    furosemide (LASIX) 40 MG tablet Take 1 tab in am and 1/2 tab at night    isosorbide-hydrALAZINE 20-37.5 mg (BIDIL) 20-37.5 mg Tab Take 2 tablets by mouth 3 (three) times daily.    levothyroxine (SYNTHROID) 50 MCG tablet Take 1 tablet (50 mcg total) by mouth before breakfast.    melatonin 3 mg Tab Take by mouth every evening.    metoprolol succinate (TOPROL-XL) 50 MG 24 hr tablet Take 1 tablet (50 mg total) by mouth once daily.    quetiapine (SEROQUEL) 200 MG Tab Take 1 tablet (200 mg total) by mouth nightly.    sodium bicarbonate 650 MG tablet Take 2 tablets (1,300 mg total) by mouth 2 (two) times daily.    tiotropium (SPIRIVA) 18 mcg inhalation capsule Inhale 1 capsule (18 mcg total) into the lungs once daily.    benztropine (COGENTIN) 0.5 MG tablet Take 1 tablet (0.5 mg total) by mouth nightly.    betamethasone valerate 0.1% (VALISONE) 0.1 % Lotn Apply topically once daily. 2 drops both  ears once daily    fluticasone (FLONASE) 50 mcg/actuation nasal spray 2 sprays by Each Nare route once daily.    gabapentin (NEURONTIN) 300 MG capsule Take 300 mg by mouth 3 (three) times daily as needed (for nerve pain).     guaifenesin (MUCINEX) 600 mg 12 hr tablet Take 1 tablet (600 mg total) by mouth 2 (two) times daily.     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    Drug use: No    Sexual activity: No      Comment:  with 3 children     Review of Systems   Constitutional: Positive for activity change. Negative for appetite change, chills and fever.   HENT: Positive for facial swelling. Negative for congestion, sneezing and trouble swallowing.    Eyes: Negative for discharge.   Respiratory: Positive for cough, shortness of breath and wheezing. Negative for apnea.    Cardiovascular: Positive for chest pain and leg swelling. Negative for palpitations.   Gastrointestinal: Negative for abdominal distention, abdominal pain, constipation and diarrhea.   Genitourinary: Negative for difficulty urinating and dysuria.   Musculoskeletal: Negative for arthralgias.   Neurological: Negative for dizziness and headaches.   Psychiatric/Behavioral: Positive for confusion, decreased concentration and sleep disturbance.     Objective:     Vital Signs (Most Recent):  Temp: 98.8 °F (37.1 °C) (12/13/17 1311)  Pulse: 64 (12/13/17 1719)  Resp: (!) 24 (12/13/17 1524)  BP: (!) 176/105 (12/13/17 1719)  SpO2: 97 % (12/13/17 1719) Vital Signs (24h Range):  Temp:  [98.8 °F  (37.1 °C)] 98.8 °F (37.1 °C)  Pulse:  [64-66] 64  Resp:  [24] 24  SpO2:  [96 %-97 %] 97 %  BP: (146-176)/() 176/105     Weight: 80.7 kg (178 lb)  Body mass index is 28.73 kg/m².    Physical Exam   Constitutional: She appears well-developed and well-nourished. No distress.   HENT:   Head: Normocephalic and atraumatic.   Nose: Nose normal.   Mouth/Throat: No oropharyngeal exudate.   Eyes: Conjunctivae and EOM are normal. Pupils are equal, round, and reactive to light. Right eye exhibits no discharge. Left eye exhibits no discharge. No scleral icterus.   Neck: Normal range of motion. Neck supple. No JVD present.   Cardiovascular: Normal rate, regular rhythm, normal heart sounds and intact distal pulses.  Exam reveals no friction rub.    No murmur heard.  Pulmonary/Chest: Effort normal. No respiratory distress. She has wheezes. She has rales. She exhibits no tenderness.   Abdominal: Soft. Bowel sounds are normal. She exhibits no distension and no mass. There is no tenderness. There is no rebound and no guarding.   Musculoskeletal: Normal range of motion. She exhibits edema (bilateral pitting edema to knees). She exhibits no tenderness or deformity.   Lymphadenopathy:     She has no cervical adenopathy.   Neurological: She is alert. She has normal reflexes. No cranial nerve deficit.   Skin: Skin is warm and dry. No rash noted. She is not diaphoretic. No erythema.   Psychiatric: She has a normal mood and affect. Her behavior is normal.         CRANIAL NERVES     CN III, IV, VI   Pupils are equal, round, and reactive to light.  Extraocular motions are normal.        Significant Labs:   BMP: No results for input(s): GLU, NA, K, CL, CO2, BUN, CREATININE, CALCIUM, MG in the last 48 hours.  CBC:   Recent Labs  Lab 12/13/17  1105 12/13/17  1615 12/13/17  1938   WBC  --  4.46  --    HGB 10.4* 10.3*  --    HCT 30.7* 30.3* 36   PLT  --  135*  --        Significant Imaging: I have reviewed all pertinent imaging  results/findings within the past 24 hours.

## 2017-12-14 NOTE — ASSESSMENT & PLAN NOTE
Patient with insomnia and ?h/o bipolar disorder per family, on seroquel 200mg QHS, reports no improvement  -PRN ramelteon for sleep

## 2017-12-15 PROBLEM — F05 DELIRIUM DUE TO ANOTHER MEDICAL CONDITION: Status: ACTIVE | Noted: 2017-04-04

## 2017-12-15 LAB
ALBUMIN SERPL BCP-MCNC: 3 G/DL
ANION GAP SERPL CALC-SCNC: 9 MMOL/L
BASOPHILS # BLD AUTO: 0 K/UL
BASOPHILS NFR BLD: 0 %
BUN SERPL-MCNC: 45 MG/DL
CALCIUM SERPL-MCNC: 8.8 MG/DL
CHLORIDE SERPL-SCNC: 103 MMOL/L
CO2 SERPL-SCNC: 30 MMOL/L
CREAT SERPL-MCNC: 3 MG/DL
DIFFERENTIAL METHOD: ABNORMAL
EOSINOPHIL # BLD AUTO: 0 K/UL
EOSINOPHIL NFR BLD: 0.8 %
ERYTHROCYTE [DISTWIDTH] IN BLOOD BY AUTOMATED COUNT: 18.2 %
EST. GFR  (AFRICAN AMERICAN): 17.2 ML/MIN/1.73 M^2
EST. GFR  (NON AFRICAN AMERICAN): 14.9 ML/MIN/1.73 M^2
GLUCOSE SERPL-MCNC: 94 MG/DL
HCT VFR BLD AUTO: 26.3 %
HGB BLD-MCNC: 9.1 G/DL
IMM GRANULOCYTES # BLD AUTO: 0.01 K/UL
IMM GRANULOCYTES NFR BLD AUTO: 0.4 %
LYMPHOCYTES # BLD AUTO: 0.5 K/UL
LYMPHOCYTES NFR BLD: 18 %
MAGNESIUM SERPL-MCNC: 2.2 MG/DL
MCH RBC QN AUTO: 34.7 PG
MCHC RBC AUTO-ENTMCNC: 34.6 G/DL
MCV RBC AUTO: 100 FL
MONOCYTES # BLD AUTO: 0.3 K/UL
MONOCYTES NFR BLD: 11.7 %
NEUTROPHILS # BLD AUTO: 1.8 K/UL
NEUTROPHILS NFR BLD: 69.1 %
NRBC BLD-RTO: 0 /100 WBC
PHOSPHATE SERPL-MCNC: 3.7 MG/DL
PLATELET # BLD AUTO: 94 K/UL
PMV BLD AUTO: 12 FL
POCT GLUCOSE: 95 MG/DL (ref 70–110)
POTASSIUM SERPL-SCNC: 3.8 MMOL/L
RBC # BLD AUTO: 2.62 M/UL
SODIUM SERPL-SCNC: 142 MMOL/L
T4 FREE SERPL-MCNC: 0.5 NG/DL
T4 SERPL-MCNC: <3 UG/DL
TSH SERPL DL<=0.005 MIU/L-ACNC: 114.08 UIU/ML
WBC # BLD AUTO: 2.66 K/UL

## 2017-12-15 PROCEDURE — 84436 ASSAY OF TOTAL THYROXINE: CPT

## 2017-12-15 PROCEDURE — 94640 AIRWAY INHALATION TREATMENT: CPT

## 2017-12-15 PROCEDURE — 80069 RENAL FUNCTION PANEL: CPT

## 2017-12-15 PROCEDURE — 84443 ASSAY THYROID STIM HORMONE: CPT

## 2017-12-15 PROCEDURE — 84439 ASSAY OF FREE THYROXINE: CPT

## 2017-12-15 PROCEDURE — 63600175 PHARM REV CODE 636 W HCPCS: Performed by: INTERNAL MEDICINE

## 2017-12-15 PROCEDURE — 27000221 HC OXYGEN, UP TO 24 HOURS

## 2017-12-15 PROCEDURE — 25000003 PHARM REV CODE 250: Performed by: INTERNAL MEDICINE

## 2017-12-15 PROCEDURE — 94761 N-INVAS EAR/PLS OXIMETRY MLT: CPT

## 2017-12-15 PROCEDURE — 25000242 PHARM REV CODE 250 ALT 637 W/ HCPCS: Performed by: INTERNAL MEDICINE

## 2017-12-15 PROCEDURE — 99222 1ST HOSP IP/OBS MODERATE 55: CPT | Mod: ,,, | Performed by: INTERNAL MEDICINE

## 2017-12-15 PROCEDURE — 36415 COLL VENOUS BLD VENIPUNCTURE: CPT

## 2017-12-15 PROCEDURE — 85025 COMPLETE CBC W/AUTO DIFF WBC: CPT

## 2017-12-15 PROCEDURE — 99232 SBSQ HOSP IP/OBS MODERATE 35: CPT | Mod: ,,, | Performed by: INTERNAL MEDICINE

## 2017-12-15 PROCEDURE — 90792 PSYCH DIAG EVAL W/MED SRVCS: CPT | Mod: GC,,, | Performed by: PSYCHIATRY & NEUROLOGY

## 2017-12-15 PROCEDURE — 11000001 HC ACUTE MED/SURG PRIVATE ROOM

## 2017-12-15 PROCEDURE — 92610 EVALUATE SWALLOWING FUNCTION: CPT

## 2017-12-15 PROCEDURE — 99900035 HC TECH TIME PER 15 MIN (STAT)

## 2017-12-15 PROCEDURE — 83735 ASSAY OF MAGNESIUM: CPT

## 2017-12-15 PROCEDURE — 97161 PT EVAL LOW COMPLEX 20 MIN: CPT

## 2017-12-15 RX ORDER — FUROSEMIDE 10 MG/ML
80 INJECTION INTRAMUSCULAR; INTRAVENOUS ONCE
Status: COMPLETED | OUTPATIENT
Start: 2017-12-15 | End: 2017-12-15

## 2017-12-15 RX ORDER — FUROSEMIDE 10 MG/ML
80 INJECTION INTRAMUSCULAR; INTRAVENOUS 3 TIMES DAILY
Status: DISCONTINUED | OUTPATIENT
Start: 2017-12-15 | End: 2017-12-15

## 2017-12-15 RX ORDER — LIOTHYRONINE SODIUM 5 UG/1
10 TABLET ORAL 2 TIMES DAILY
Status: DISCONTINUED | OUTPATIENT
Start: 2017-12-15 | End: 2017-12-16

## 2017-12-15 RX ORDER — LEVOTHYROXINE SODIUM ANHYDROUS 100 UG/5ML
98 INJECTION, POWDER, LYOPHILIZED, FOR SOLUTION INTRAVENOUS DAILY
Status: DISCONTINUED | OUTPATIENT
Start: 2017-12-16 | End: 2017-12-16

## 2017-12-15 RX ORDER — FUROSEMIDE 10 MG/ML
80 INJECTION INTRAMUSCULAR; INTRAVENOUS 3 TIMES DAILY
Status: DISCONTINUED | OUTPATIENT
Start: 2017-12-15 | End: 2017-12-17

## 2017-12-15 RX ADMIN — GUAIFENESIN 600 MG: 600 TABLET, EXTENDED RELEASE ORAL at 09:12

## 2017-12-15 RX ADMIN — IPRATROPIUM BROMIDE AND ALBUTEROL SULFATE 3 ML: .5; 3 SOLUTION RESPIRATORY (INHALATION) at 09:12

## 2017-12-15 RX ADMIN — IPRATROPIUM BROMIDE AND ALBUTEROL SULFATE 3 ML: .5; 3 SOLUTION RESPIRATORY (INHALATION) at 12:12

## 2017-12-15 RX ADMIN — SODIUM BICARBONATE 650 MG TABLET 1300 MG: at 09:12

## 2017-12-15 RX ADMIN — LEVOTHYROXINE SODIUM 50 MCG: 50 TABLET ORAL at 05:12

## 2017-12-15 RX ADMIN — ASPIRIN 81 MG CHEWABLE TABLET 81 MG: 81 TABLET CHEWABLE at 09:12

## 2017-12-15 RX ADMIN — HEPARIN SODIUM 5000 UNITS: 5000 INJECTION, SOLUTION INTRAVENOUS; SUBCUTANEOUS at 02:12

## 2017-12-15 RX ADMIN — HYDRALAZINE HYDROCHLORIDE AND ISOSORBIDE DINITRATE 2 TABLET: 37.5; 2 TABLET, FILM COATED ORAL at 05:12

## 2017-12-15 RX ADMIN — ALLOPURINOL 100 MG: 100 TABLET ORAL at 09:12

## 2017-12-15 RX ADMIN — FLUTICASONE FUROATE AND VILANTEROL TRIFENATATE 1 PUFF: 100; 25 POWDER RESPIRATORY (INHALATION) at 09:12

## 2017-12-15 RX ADMIN — METOPROLOL SUCCINATE 50 MG: 50 TABLET, EXTENDED RELEASE ORAL at 09:12

## 2017-12-15 RX ADMIN — HEPARIN SODIUM 5000 UNITS: 5000 INJECTION, SOLUTION INTRAVENOUS; SUBCUTANEOUS at 05:12

## 2017-12-15 RX ADMIN — FUROSEMIDE 80 MG: 10 INJECTION, SOLUTION INTRAMUSCULAR; INTRAVENOUS at 10:12

## 2017-12-15 RX ADMIN — HYDRALAZINE HYDROCHLORIDE AND ISOSORBIDE DINITRATE 2 TABLET: 37.5; 2 TABLET, FILM COATED ORAL at 02:12

## 2017-12-15 NOTE — SUBJECTIVE & OBJECTIVE
Past Medical History:   Diagnosis Date    Breast cancer 1980    right    CHF (congestive heart failure)     Chronic hepatitis C virus infection 3/14/2017    Coronary artery disease     Hypertension     Hazel     Renal disorder     Thyroid disease        Past Surgical History:   Procedure Laterality Date    HYSTERECTOMY      MASTECTOMY Right 1980       Review of patient's allergies indicates:  No Known Allergies    No current facility-administered medications on file prior to encounter.      Current Outpatient Prescriptions on File Prior to Encounter   Medication Sig    allopurinol (ZYLOPRIM) 100 MG tablet Take 100 mg by mouth 2 (two) times daily.     aspirin 81 MG Chew Take 1 tablet (81 mg total) by mouth once daily.    diclofenac sodium 1 % Gel Apply 4 g topically 4 (four) times daily. Prn pain    fluticasone-salmeterol 250-50 mcg/dose (ADVAIR) 250-50 mcg/dose diskus inhaler Inhale 1 puff into the lungs 2 (two) times daily. Controller    furosemide (LASIX) 40 MG tablet Take 1 tab in am and 1/2 tab at night    isosorbide-hydrALAZINE 20-37.5 mg (BIDIL) 20-37.5 mg Tab Take 2 tablets by mouth 3 (three) times daily.    levothyroxine (SYNTHROID) 50 MCG tablet Take 1 tablet (50 mcg total) by mouth before breakfast.    melatonin 3 mg Tab Take by mouth every evening.    metoprolol succinate (TOPROL-XL) 50 MG 24 hr tablet Take 1 tablet (50 mg total) by mouth once daily.    quetiapine (SEROQUEL) 200 MG Tab Take 1 tablet (200 mg total) by mouth nightly.    sodium bicarbonate 650 MG tablet Take 2 tablets (1,300 mg total) by mouth 2 (two) times daily.    tiotropium (SPIRIVA) 18 mcg inhalation capsule Inhale 1 capsule (18 mcg total) into the lungs once daily.    benztropine (COGENTIN) 0.5 MG tablet Take 1 tablet (0.5 mg total) by mouth nightly.    betamethasone valerate 0.1% (VALISONE) 0.1 % Lotn Apply topically once daily. 2 drops both ears once daily    fluticasone (FLONASE) 50 mcg/actuation nasal spray  2 sprays by Each Nare route once daily.    gabapentin (NEURONTIN) 300 MG capsule Take 300 mg by mouth 3 (three) times daily as needed (for nerve pain).     guaifenesin (MUCINEX) 600 mg 12 hr tablet Take 1 tablet (600 mg total) by mouth 2 (two) times daily.     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   Cardiovascular: Positive for dyspnea on exertion, leg swelling, orthopnea and paroxysmal nocturnal dyspnea. Negative for chest pain, claudication and palpitations.   Respiratory: Positive for cough and shortness of breath.    Gastrointestinal: Negative for nausea and vomiting.   Neurological: Negative for dizziness and headaches.     Objective:     Vital Signs (Most Recent):  Temp: 97.4 °F (36.3 °C) (12/15/17 1135)  Pulse: 75 (12/15/17 1204)  Resp: 16 (12/15/17 1204)  BP: (!) 127/59 (12/15/17 1135)  SpO2: 96 % (12/15/17 1204) Vital Signs (24h Range):  Temp:  [97.3 °F (36.3 °C)-98 °F (36.7 °C)] 97.4 °F (36.3 °C)  Pulse:  [52-80] 75  Resp:  [15-18] 16  SpO2:  [91 %-100 %] 96 %  BP: (102-147)/(55-89) 127/59     Weight: 80 kg (176 lb 5.9 oz)  Body mass index is 28.47 kg/m².    SpO2: 96 %  O2 Device (Oxygen Therapy): room air      Intake/Output Summary (Last 24 hours) at 12/15/17 1319  Last data filed at 12/15/17 0600   Gross per 24 hour   Intake              610 ml   Output              600 ml   Net               10  ml       Lines/Drains/Airways     Peripheral Intravenous Line                 Peripheral IV - Single Lumen 12/13/17 2200 Right Wrist 1 day         Peripheral IV - Single Lumen 12/15/17 0950 Left Forearm less than 1 day                Physical Exam   Constitutional: She is oriented to person, place, and time. No distress.   Neck: JVD present.   Cardiovascular: Normal rate, regular rhythm, normal heart sounds and intact distal pulses.    Pulmonary/Chest: Effort normal. No respiratory distress. She has wheezes. She has no rales.   Abdominal: Soft. She exhibits no distension. There is no tenderness. There is no rebound.   Musculoskeletal: She exhibits edema (3+ BLE to mid shins).   Neurological: She is alert and oriented to person, place, and time.   Skin: She is not diaphoretic.       Significant Labs:   CMP   Recent Labs  Lab 12/14/17  1416 12/15/17  0747    142   K 3.9 3.8    103   CO2 28 30*   GLU 74 94   BUN 43* 45*   CREATININE 2.9* 3.0*   CALCIUM 9.1 8.8   PROT 7.4  --    ALBUMIN 3.2* 3.0*   BILITOT 2.5*  --    ALKPHOS 58  --    AST 44*  --    ALT 21  --    ANIONGAP 13 9   ESTGFRAFRICA 18.0* 17.2*   EGFRNONAA 15.6* 14.9*   , CBC   Recent Labs  Lab 12/13/17  1615  12/14/17  1416 12/15/17  0747   WBC 4.46  --  3.70* 2.66*   HGB 10.3*  --  9.7* 9.1*   HCT 30.3*  < > 27.2* 26.3*   *  --  91* 94*   < > = values in this interval not displayed., INR No results for input(s): INR, PROTIME in the last 48 hours., Lipid Panel No results for input(s): CHOL, HDL, LDLCALC, TRIG, CHOLHDL in the last 48 hours., Troponin   Recent Labs  Lab 12/13/17  1615 12/14/17  1417   TROPONINI 0.060* 0.058*    and All pertinent lab results from the last 24 hours have been reviewed.    Significant Imaging:   EKG 12/14/2017:  - NSR    Echo 12/14/2017:    1 - Moderately depressed left ventricular systolic function (EF 30-35%).     2 - Restrictive LV filling pattern, indicating markedly elevated LAP (grade 3 diastolic  dysfunction).     3 - Biatrial enlargement.     4 - Mildly to moderately depressed right ventricular systolic function .     5 - Increased central venous pressure.     6 - No evidence of intracardiac shunt.     7 - Consider full Doppler study.     CXR 12/13/2017:  Imaging Results          X-Ray Chest PA And Lateral (Final result)  Result time 12/13/17 18:39:59    Final result by Gildardo Abraham III, MD (12/13/17 18:39:59)                 Impression:     Worsening CHF.      Electronically signed by: GILDARDO ABRAHAM MD  Date:     12/13/17  Time:    18:39              Narrative:    2 views: There is cardiomegaly, edema, pleural fluid, aortic plaque, and DJD. Lungs appear slightly worse.

## 2017-12-15 NOTE — PLAN OF CARE
Problem: SLP Goal  Goal: SLP Goal  Pt seen for clinical swallow evaluation at the bedside. Pt appears safe for thin liquids and dental soft solids. No further skilled speech services warranted.     Amy Hoyos MS, CCC-SLP  Speech Language Pathologist  Pager: [phone]  Date 12/15/2017

## 2017-12-15 NOTE — HPI
73yo F with HFrEF (30%), NICM, severe mitral regurg, HTN, CKD4, Hep C, h/o breast ca s/p mastectomy/chemo 1979, COPD, bipolar disorder, anemia of CKD, admitted with acute decompensated heart failure 2/2 non-compliance with fluid restriction. Lives with daughter who has had difficult time controlling fluid intake. She reports worsening dyspnea for last few days prior to admission associated with non-productive cough. Patient usually ambulatory with walker, over last few days exercise tolerance worsened to few steps before feeling short of breath. Per notes, her daughter who manages her medications reports increased leg swelling and 7-8 lb weight gain at home despite furosemide 20mg BID. She also reports orthopnea and PND, but no chest/neck/jaw pain. No palpitations. No dizziness/light-headedness. No nausea/vomting/fever/chills.

## 2017-12-15 NOTE — PT/OT/SLP EVAL
"Speech Language Pathology Evaluation  Bedside Swallow    Patient Name:  Anai Sal   MRN:  [id number]  Admitting Diagnosis: Acute exacerbation of CHF (congestive heart failure)    Recommendations:                 General Recommendations:  Follow-up not indicated  Diet recommendations:  Dental Soft, Thin   Aspiration Precautions: Standard aspiration precautions   General Precautions: Standard,      History:     Past Medical History:   Diagnosis Date    Breast cancer 1980    right    CHF (congestive heart failure)     Chronic hepatitis C virus infection 3/14/2017    Coronary artery disease     Hypertension     Hazel     Renal disorder     Thyroid disease        Past Surgical History:   Procedure Laterality Date    HYSTERECTOMY      MASTECTOMY Right 1980       Social History: Patient lives with daughter     Prior Intubation HX:  N/a    Modified Barium Swallow: N/A    Chest X-Rays: N/A    Prior diet: regular solids and thin liquids       Subjective     "I've never had any eating problems"    Objective:     Oral Musculature Evaluation  · Oral Musculature: WFL  · Dentition: edentulous  · Mandibular Strength and Mobility: WFL  · Oral Labial Strength and Mobility: WFL  · Lingual Strength and Mobility: WFL  · Volitional Cough: strong   · Volitional Swallow: prompt; adeqaute hyolaryngeal rise to palpation   · Voice Prior to PO Intake: clear     Bedside Swallow Eval:   Consistencies Assessed:  · Thin liquids 4 oz via straw single and cyclic sips   · Puree 1/2 tsp across 2oz   · Solids x2     Oral Phase:   · WFL   · Mildly prolonged mastication patterns and oral scatter with edentulous state with hard solid specifically   · Mild oral residue present post solids  · Pt with adequate oral containment, control and A-P transfer with puree and thin liquids  · Pt would benefit from dental soft solids     Pharyngeal Phase:   · no overt clinical signs/symptoms of aspiration  · no overt clinical signs/symptoms of pharyngeal " dysphagia    Compensatory Strategies  · None    Treatment: Education provided to Pt re: role of SLP and diet recommendations. Pt demonstrated understanding white board updated accordingly. No further skilled speech services warranted     Assessment:     Anai Sal is a 72 y.o. female with an SLP diagnosis of essentially normal oral feeding and swallowing function. Given Pt edentulous state Pt would likely benefit from soft solids and can continue with regular thin liquids. No further skilled speech services warranted.        Goals:    SLP Goals        Problem: SLP Goal    Goal Priority Disciplines Outcome   SLP Goal     SLP                    Plan:     · Patient to be seen:      · Plan of Care expires:     · Plan of Care reviewed with:      · SLP Follow-Up:  No       Discharge recommendations:  home   Barriers to Discharge:  None    Time Tracking:     SLP Treatment Date:   12/15/17  Speech Start Time:  1500  Speech Stop Time:  1509     Speech Total Time (min):  9 min    Billable Minutes: Eval Swallow and Oral Function 9    Amy Hoyos CCC-SLP  12/15/2017

## 2017-12-15 NOTE — PT/OT/SLP EVAL
Physical Therapy Evaluation    Patient Name:  Anai Sal   MRN:  [id number]    Recommendations:     Discharge Recommendations:  home with home health   Discharge Equipment Recommendations: none   Barriers to discharge: None    Assessment:     Anai Sal is a 72 y.o. female admitted with a medical diagnosis of Acute exacerbation of CHF (congestive heart failure).  She presents with the following impairments/functional limitations:  weakness, impaired endurance, impaired functional mobilty, gait instability. Patient maintains overall good bed mobility and functional skills. Patient has trouble with visual tracking. Patient likely close to baseline and will require  24/7 assistance upon d/c, is expected to progress well with therapy and will be appropriate for HHPT.    Rehab Prognosis:  good; patient would benefit from acute skilled PT services to address these deficits and reach maximum level of function.      Recent Surgery: * No surgery found *      Plan:     During this hospitalization, patient to be seen 3 x/week to address the above listed problems via gait training, therapeutic activities, therapeutic exercises, neuromuscular re-education  · Plan of Care Expires:  01/14/18   Plan of Care Reviewed with: spouse    Subjective     Communicated with nurse prior to session.  Patient found supine upon PT entry to room, agreeable to evaluation.      Chief Complaint: decreased functional mobility   Patient comments/goals: to get better  Pain/Comfort:  · Pain Rating 1: 0/10    Patients cultural, spiritual, Methodist conflicts given the current situation: none stated    Living Environment:  Patient reports that she lives w/ her daughter in a two story house, [facility]. Patient's room is on the first floor. Patient's daughter goes to school occasionally, so patient is home alone sometimes.   Prior to admission, patients level of function was Mod I.  Patient has the following equipment: bath bench, grab bar, cane, quad,  walker, rolling.  DME owned (not currently used): rolling walker and shower chair.  Upon discharge, patient will have assistance from daughter.    Objective:     Patient found with:  (all lines intact)     General Precautions: Standard,     Orthopedic Precautions:    Braces:       Exams:  · Cognitive Exam:  Patient is oriented to Person, Place, Time and Situation and follows 100% of all commands   · Gross Motor Coordination:  WFL  · Postural Exam:  Patient presented with the following abnormalities:    · -       Rounded shoulders  · Sensation:    · -       Intact  · RLE ROM: WFL  · RLE Strength: WFL  · LLE ROM: WFL  · LLE Strength: WFL   · Patient's strength is grossly 4/5  Patient trouble w/ visual tracking    Functional Mobility:  · Bed Mobility:     · Scooting: contact guard assistance  · Supine to Sit: contact guard assistance  · Sit to Supine: contact guard assistance  · Transfers:     · Sit to Stand:  contact guard assistance with rolling walker  · Gait: 60' CGA w/ RW    AM-PAC 6 CLICK MOBILITY  Total Score:18       Therapeutic Activities and Exercises:   PT Eval completed     Patient left HOB elevated with all lines intact, call button in reach, bed alarm on and nurse notified.    GOALS:    Physical Therapy Goals        Problem: Physical Therapy Goal    Goal Priority Disciplines Outcome Goal Variances Interventions   Physical Therapy Goal     PT/OT, PT Ongoing (interventions implemented as appropriate)     Description:  Goals to be met by: 17    Patient will increase functional independence with mobility by performin. Supine to sit with Set-up Esmeralda  2. Sit to supine with Set-up Esmeralda  3. Sit to stand transfer with Supervision  4. Gait  x 100 feet with Stand-by Assistance using Rolling Walker.   5. Lower extremity exercise program x20 reps per handout, with independence                      History:     Past Medical History:   Diagnosis Date    Breast cancer     right    CHF  (congestive heart failure)     Chronic hepatitis C virus infection 3/14/2017    Coronary artery disease     Hypertension     Hazel     Renal disorder     Thyroid disease        Past Surgical History:   Procedure Laterality Date    HYSTERECTOMY      MASTECTOMY Right 1980       Clinical Decision Making:     History  Co-morbidities and personal factors that may impact the plan of care Examination  Body Structures and Functions, activity limitations and participation restrictions that may impact the plan of care Clinical Presentation   Decision Making/ Complexity Score   Co-morbidities:   [] Time since onset of injury / illness / exacerbation  [] Status of current condition  []Patient's cognitive status and safety concerns    [] Multiple Medical Problems (see med hx)  Personal Factors:   [] Patient's age  [] Prior Level of function   [] Patient's home situation (environment and family support)  [] Patient's level of motivation  [] Expected progression of patient      HISTORY:(criteria)    [] 19410 - no personal factors/history    [] 46254 - has 1-2 personal factor/comorbidity     [] 95871 - has >3 personal factor/comorbidity     Body Regions:  [] Objective examination findings  [] Head     []  Neck  [] Trunk   [] Upper Extremity  [] Lower Extremity    Body Systems:  [] For communication ability, affect, cognition, language, and learning style: the assessment of the ability to make needs known, consciousness, orientation (person, place, and time), expected emotional /behavioral responses, and learning preferences (eg, learning barriers, education  needs)  [] For the neuromuscular system: a general assessment of gross coordinated movement (eg, balance, gait, locomotion, transfers, and transitions) and motor function  (motor control and motor learning)  [] For the musculoskeletal system: the assessment of gross symmetry, gross range of motion, gross strength, height, and weight  [] For the integumentary system: the  assessment of pliability(texture), presence of scar formation, skin color, and skin integrity  [] For cardiovascular/pulmonary system: the assessment of heart rate, respiratory rate, blood pressure, and edema     Activity limitations:    [] Patient's cognitive status and saf ety concerns          [] Status of current condition      [] Weight bearing restriction  [] Cardiopulmunary Restriction    Participation Restrictions:   [] Goals and goal agreement with the patient     [] Rehab potential (prognosis) and probable outcome      Examination of Body System: (criteria)    [] 90032 - addressing 1-2 elements    [] 42759 - addressing a total of 3 or more elements     [] 42012 -  Addressing a total of 4 or more elements         Clinical Presentation: (criteria)  Unstable - 53882     On examination of body system using standardized tests and measures patient presents with 1-2 elements from any of the following: body structures and functions, activity limitations, and/or participation restrictions.  Leading to a clinical presentation that is considered stable and/or uncomplicated                              Clinical Decision Making  (Eval Complexity):  Low- 97928     Time Tracking:     PT Received On: 12/15/17  PT Start Time: 0950     PT Stop Time: 1005  PT Total Time (min): 15 min     Billable Minutes: Evaluation 15 Min      Rinku Orr, PT  12/15/2017

## 2017-12-15 NOTE — PLAN OF CARE
Problem: Fall Risk (Adult)  Goal: Absence of Falls  Patient will demonstrate the desired outcomes by discharge/transition of care.   Outcome: Ongoing (interventions implemented as appropriate)  Absence of falls noted at present. SR up x's3. Door ajar at all times. Fall risk protocol IP. Pt reminded to call for assistance prior to getting OOB. Verbalized understanding. Call bell within reach. Will cont to monitor.

## 2017-12-15 NOTE — ASSESSMENT & PLAN NOTE
71yo F with acute decompensated HFrEF (30%) 2/2 hypothyroidism and non-compliance with fluid restriction.   - NYHA class 3 at baseline  - non-ischemic cardiomyopathy, -ve PET stress March 2017  - appears volume overloaded but well-perfused  -   - CANDI on CKD4    Recommend:  - daily weights, strict I&Os  - continue diuresis with furosemide 80mg IV TID  - correction of hypothyroidism as per primary team & Endocrine  - continue Bidil, Toprol and ASA  - consider Nephrology consult     Thank you for the consult, we will sign off. Please contact us with any questions or concerns.

## 2017-12-15 NOTE — CONSULTS
[facility]  Cardiology  Consult Note    Patient Name: Anai Sal  MRN: [id number]  Admission Date: 12/13/2017  Hospital Length of Stay: 2 days  Code Status: DNR   Attending Provider: Isaías Melchor MD   Consulting Provider: Michelle La MD  Primary Care Physician: Live Young MD (Inactive)  Principal Problem:Acute exacerbation of CHF (congestive heart failure)    Patient information was obtained from patient, past medical records and ER records.     Inpatient consult to Cardiology  Consult performed by: SOHAIL FRANKLIN  Consult ordered by: ISAÍAS MELCHOR  Reason for consult: Heart failure        Subjective:     Chief Complaint:  Shortness of breath    HPI:   71yo F with HFrEF (30%), NICM, severe mitral regurg, HTN, CKD4, Hep C, h/o breast ca s/p mastectomy/chemo 1979, COPD, bipolar disorder, anemia of CKD, admitted with acute decompensated heart failure 2/2 non-compliance with fluid restriction. Lives with daughter who has had difficult time controlling fluid intake. She reports worsening dyspnea for last few days prior to admission associated with non-productive cough. Patient usually ambulatory with walker, over last few days exercise tolerance worsened to few steps before feeling short of breath. Per notes, her daughter who manages her medications reports increased leg swelling and 7-8 lb weight gain at home despite furosemide 20mg BID. She also reports orthopnea and PND, but no chest/neck/jaw pain. No palpitations. No dizziness/light-headedness. No nausea/vomting/fever/chills.     Past Medical History:   Diagnosis Date    Breast cancer 1980    right    CHF (congestive heart failure)     Chronic hepatitis C virus infection 3/14/2017    Coronary artery disease     Hypertension     Hazel     Renal disorder     Thyroid disease        Past Surgical History:   Procedure Laterality Date    HYSTERECTOMY      MASTECTOMY Right 1980       Review of patient's allergies indicates:  No  Known Allergies    No current facility-administered medications on file prior to encounter.      Current Outpatient Prescriptions on File Prior to Encounter   Medication Sig    allopurinol (ZYLOPRIM) 100 MG tablet Take 100 mg by mouth 2 (two) times daily.     aspirin 81 MG Chew Take 1 tablet (81 mg total) by mouth once daily.    diclofenac sodium 1 % Gel Apply 4 g topically 4 (four) times daily. Prn pain    fluticasone-salmeterol 250-50 mcg/dose (ADVAIR) 250-50 mcg/dose diskus inhaler Inhale 1 puff into the lungs 2 (two) times daily. Controller    furosemide (LASIX) 40 MG tablet Take 1 tab in am and 1/2 tab at night    isosorbide-hydrALAZINE 20-37.5 mg (BIDIL) 20-37.5 mg Tab Take 2 tablets by mouth 3 (three) times daily.    levothyroxine (SYNTHROID) 50 MCG tablet Take 1 tablet (50 mcg total) by mouth before breakfast.    melatonin 3 mg Tab Take by mouth every evening.    metoprolol succinate (TOPROL-XL) 50 MG 24 hr tablet Take 1 tablet (50 mg total) by mouth once daily.    quetiapine (SEROQUEL) 200 MG Tab Take 1 tablet (200 mg total) by mouth nightly.    sodium bicarbonate 650 MG tablet Take 2 tablets (1,300 mg total) by mouth 2 (two) times daily.    tiotropium (SPIRIVA) 18 mcg inhalation capsule Inhale 1 capsule (18 mcg total) into the lungs once daily.    benztropine (COGENTIN) 0.5 MG tablet Take 1 tablet (0.5 mg total) by mouth nightly.    betamethasone valerate 0.1% (VALISONE) 0.1 % Lotn Apply topically once daily. 2 drops both ears once daily    fluticasone (FLONASE) 50 mcg/actuation nasal spray 2 sprays by Each Nare route once daily.    gabapentin (NEURONTIN) 300 MG capsule Take 300 mg by mouth 3 (three) times daily as needed (for nerve pain).     guaifenesin (MUCINEX) 600 mg 12 hr tablet Take 1 tablet (600 mg total) by mouth 2 (two) times daily.     Family History     Problem Relation (Age of Onset)    Heart disease Mother, Sister    No Known Problems Father, Brother, Maternal Aunt, Maternal  Uncle, Paternal Aunt, Paternal Uncle, Maternal Grandmother, Maternal Grandfather, Paternal Grandmother, Paternal Grandfather        Social History Main Topics    Smoking status: Former Smoker     Packs/day: 2.00     Years: 20.00     Types: Cigarettes     Quit date: 2/16/1995    Smokeless tobacco: Never Used    Alcohol use No      Comment: 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    Drug use: No    Sexual activity: No      Comment:  with 3 children     Review of Systems   Constitution: Negative for chills and fever.   Cardiovascular: Positive for dyspnea on exertion, leg swelling, orthopnea and paroxysmal nocturnal dyspnea. Negative for chest pain, claudication and palpitations.   Respiratory: Positive for cough and shortness of breath.    Gastrointestinal: Negative for nausea and vomiting.   Neurological: Negative for dizziness and headaches.     Objective:     Vital Signs (Most Recent):  Temp: 97.4 °F (36.3 °C) (12/15/17 1135)  Pulse: 75 (12/15/17 1204)  Resp: 16 (12/15/17 1204)  BP: (!) 127/59 (12/15/17 1135)  SpO2: 96 % (12/15/17 1204) Vital Signs (24h Range):  Temp:  [97.3 °F (36.3 °C)-98 °F (36.7 °C)] 97.4 °F (36.3 °C)  Pulse:  [52-80] 75  Resp:  [15-18] 16  SpO2:  [91 %-100 %] 96 %  BP: (102-147)/(55-89) 127/59     Weight: 80 kg (176 lb 5.9 oz)  Body mass index is 28.47 kg/m².    SpO2: 96 %  O2 Device (Oxygen Therapy): room air      Intake/Output Summary (Last 24 hours) at 12/15/17 1319  Last data filed at 12/15/17 0600   Gross per 24 hour   Intake              610 ml   Output              600 ml   Net               10 ml       Lines/Drains/Airways     Peripheral Intravenous Line                 Peripheral IV - Single Lumen 12/13/17 2200 Right Wrist 1 day         Peripheral IV - Single Lumen 12/15/17 0950 Left Forearm less than 1 day                Physical Exam   Constitutional: She is oriented to person, place, and time. No distress.   Neck: JVD present.   Cardiovascular: Normal rate, regular rhythm, normal heart  sounds and intact distal pulses.    Pulmonary/Chest: Effort normal. No respiratory distress. She has wheezes. She has no rales.   Abdominal: Soft. She exhibits no distension. There is no tenderness. There is no rebound.   Musculoskeletal: She exhibits edema (3+ BLE to mid shins).   Neurological: She is alert and oriented to person, place, and time.   Skin: She is not diaphoretic.       Significant Labs:   CMP   Recent Labs  Lab 12/14/17  1416 12/15/17  0747    142   K 3.9 3.8    103   CO2 28 30*   GLU 74 94   BUN 43* 45*   CREATININE 2.9* 3.0*   CALCIUM 9.1 8.8   PROT 7.4  --    ALBUMIN 3.2* 3.0*   BILITOT 2.5*  --    ALKPHOS 58  --    AST 44*  --    ALT 21  --    ANIONGAP 13 9   ESTGFRAFRICA 18.0* 17.2*   EGFRNONAA 15.6* 14.9*   , CBC   Recent Labs  Lab 12/13/17  1615  12/14/17  1416 12/15/17  0747   WBC 4.46  --  3.70* 2.66*   HGB 10.3*  --  9.7* 9.1*   HCT 30.3*  < > 27.2* 26.3*   *  --  91* 94*   < > = values in this interval not displayed., INR No results for input(s): INR, PROTIME in the last 48 hours., Lipid Panel No results for input(s): CHOL, HDL, LDLCALC, TRIG, CHOLHDL in the last 48 hours., Troponin   Recent Labs  Lab 12/13/17  1615 12/14/17  1417   TROPONINI 0.060* 0.058*    and All pertinent lab results from the last 24 hours have been reviewed.    Significant Imaging:   EKG 12/14/2017:  - NSR    Echo 12/14/2017:    1 - Moderately depressed left ventricular systolic function (EF 30-35%).     2 - Restrictive LV filling pattern, indicating markedly elevated LAP (grade 3 diastolic dysfunction).     3 - Biatrial enlargement.     4 - Mildly to moderately depressed right ventricular systolic function .     5 - Increased central venous pressure.     6 - No evidence of intracardiac shunt.     7 - Consider full Doppler study.     CXR 12/13/2017:  Imaging Results          X-Ray Chest PA And Lateral (Final result)  Result time 12/13/17 18:39:59    Final result by Gildardo Stratton III, MD  (12/13/17 18:39:59)                 Impression:     Worsening CHF.      Electronically signed by: SABRINA ABRAHAM MD  Date:     12/13/17  Time:    18:39              Narrative:    2 views: There is cardiomegaly, edema, pleural fluid, aortic plaque, and DJD. Lungs appear slightly worse.                                Assessment and Plan:     * Acute exacerbation of CHF (congestive heart failure)    71yo F with acute decompensated HFrEF (30%) 2/2 hypothyroidism and non-compliance with fluid restriction.   - NYHA class 3 at baseline  - non-ischemic cardiomyopathy, -ve PET stress March 2017  - appears volume overloaded but well-perfused  -   - CANDI on CKD4    Recommend:  - daily weights, strict I&Os  - continue diuresis with furosemide 80mg IV TID  - correction of hypothyroidism as per primary team & Endocrine  - continue Bidil, Toprol and ASA  - consider Nephrology consult     Thank you for the consult, we will sign off. Please contact us with any questions or concerns.             VTE Risk Mitigation         Ordered     heparin (porcine) injection 5,000 Units  Every 8 hours     Route:  Subcutaneous        12/13/17 2030     Medium Risk of VTE  Once      12/13/17 2030          Michelle La MD  Cardiology   [facility]

## 2017-12-15 NOTE — ASSESSMENT & PLAN NOTE
Signs of delirium with waxing and waning quality to mental status. Risk factors: dementia, multiple medical co-morbidities, current admission for CHF exacerbation. QTc 406.     - Would recommend continuing seroquel 200mg nightly at this time. If concerned for insomnia or agitation, can consider giving an additional dose of 50mg at night.      Will continue to follow.

## 2017-12-15 NOTE — SUBJECTIVE & OBJECTIVE
Patient History           Medical as of 12/15/2017     Past Medical History     Diagnosis Date Comments Source    Breast cancer 1980 right Provider    CHF (congestive heart failure) -- -- Provider    Chronic hepatitis C virus infection 3/14/2017 -- Provider    Coronary artery disease -- -- Provider    Hypertension -- -- Provider    Hazel -- -- Provider    Renal disorder -- -- Provider    Thyroid disease -- -- Provider          Pertinent Negatives     Diagnosis Date Noted Comments Source    Diabetes mellitus 12/16/2016 -- Provider    Stroke 12/16/2016 -- Provider                  Surgical as of 12/15/2017     Past Surgical History     Procedure Laterality Date Comments Source    HYSTERECTOMY -- -- -- Provider    MASTECTOMY Right 1980 -- Provider                  Family as of 12/15/2017     Problem Relation Name Age of Onset Comments Source    Heart disease Mother -- -- -- Provider    No Known Problems Father -- -- -- Provider    Heart disease Sister 1 -- -- Provider    No Known Problems Brother 2 -- -- Provider    No Known Problems Maternal Aunt -- -- -- Provider    No Known Problems Maternal Uncle -- -- -- Provider    No Known Problems Paternal Aunt -- -- -- Provider    No Known Problems Paternal Uncle -- -- -- Provider    No Known Problems Maternal Grandmother -- -- -- Provider    No Known Problems Maternal Grandfather -- -- -- Provider    No Known Problems Paternal Grandmother -- -- -- Provider    No Known Problems Paternal Grandfather -- -- -- Provider    Anemia Neg Hx -- -- -- Provider    Arrhythmia Neg Hx -- -- -- Provider    Asthma Neg Hx -- -- -- Provider    Clotting disorder Neg Hx -- -- -- Provider    Fainting Neg Hx -- -- -- Provider    Heart attack Neg Hx -- -- -- Provider    Heart failure Neg Hx -- -- -- Provider    Hyperlipidemia Neg Hx -- -- -- Provider    Hypertension Neg Hx -- -- -- Provider    Stroke Neg Hx -- -- -- Provider    Atrial Septal Defect Neg Hx -- -- -- Provider            Tobacco  Use as of 12/15/2017     Smoking Status Smoking Start Date Smoking Quit Date Packs/day Years Used    Former Smoker -- 2/16/1995 2.00 20.00    Types Comments Smokeless Tobacco Status Smokeless Tobacco Quit Date Source     Cigarettes -- Never Used -- Provider            Alcohol Use as of 12/15/2017     Alcohol Use Drinks/Week Alcohol/Week Comments Source    No -- -- 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 Provider            Drug Use as of 12/15/2017     Drug Use Types Frequency Comments Source    No -- -- -- Provider            Sexual Activity as of 12/15/2017     Sexually Active Birth Control Partners Comments Source    No -- --  with 3 children Provider            Activities of Daily Living as of 12/15/2017     Activities of Daily Living Question Response Comments Source    Patient feels they ought to cut down on drinking/drug use Not Asked -- Provider    Patient annoyed by others criticizing their drinking/drug use Not Asked -- Provider    Patient has felt bad or guilty about drinking/drug use Not Asked -- Provider    Patient has had a drink/used drugs as an eye opener in the AM Not Asked -- Provider            Social Documentation as of 12/15/2017    Goes to [employer].  Used to work as a house keeper.   with 3 children  Source: Provider           Occupational as of 12/15/2017    **None**           Socioeconomic as of 12/15/2017     Marital Status Spouse Name Number of Children Years Education Preferred Language Ethnicity Race Source    Single -- -- -- English /Black Black or  Provider         Pertinent History Q A Comments    as of 12/15/2017 Lives with family     Place in Birth Order      Lives in home     Number of Siblings      Raised by      Legal Involvement      Childhood Trauma uneventful     Criminal History of      Financial Status disabled     Highest Level of Education unfinished highschool     Does patient have access to a firearm? No      Service      Primary  Leisure Activity time with family     Spirituality actively participates in organized [employer]ational      Past Medical History:   Diagnosis Date    Breast cancer 1980    right    CHF (congestive heart failure)     Chronic hepatitis C virus infection 3/14/2017    Coronary artery disease     Hypertension     Hazel     Renal disorder     Thyroid disease      Past Surgical History:   Procedure Laterality Date    HYSTERECTOMY      MASTECTOMY Right 1980     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    Drug use: No    Sexual activity: No      Comment:  with 3 children     Review of patient's allergies indicates:  No Known Allergies    No current facility-administered medications on file prior to encounter.      Current Outpatient Prescriptions on File Prior to Encounter   Medication Sig    allopurinol (ZYLOPRIM) 100 MG tablet Take 100 mg by mouth 2 (two) times daily.     aspirin 81 MG Chew Take 1 tablet (81 mg total) by mouth once daily.    diclofenac sodium 1 % Gel Apply 4 g topically 4 (four) times daily. Prn pain    fluticasone-salmeterol 250-50 mcg/dose (ADVAIR) 250-50 mcg/dose diskus inhaler Inhale 1 puff into the lungs 2 (two) times daily. Controller    furosemide (LASIX) 40 MG tablet Take 1 tab in am and 1/2 tab at night    isosorbide-hydrALAZINE 20-37.5 mg (BIDIL) 20-37.5 mg Tab Take 2 tablets by mouth 3 (three) times daily.    levothyroxine (SYNTHROID) 50 MCG tablet Take 1 tablet (50 mcg total) by mouth before breakfast.    melatonin 3  mg Tab Take by mouth every evening.    metoprolol succinate (TOPROL-XL) 50 MG 24 hr tablet Take 1 tablet (50 mg total) by mouth once daily.    quetiapine (SEROQUEL) 200 MG Tab Take 1 tablet (200 mg total) by mouth nightly.    sodium bicarbonate 650 MG tablet Take 2 tablets (1,300 mg total) by mouth 2 (two) times daily.    tiotropium (SPIRIVA) 18 mcg inhalation capsule Inhale 1 capsule (18 mcg total) into the lungs once daily.    benztropine (COGENTIN) 0.5 MG tablet Take 1 tablet (0.5 mg total) by mouth nightly.    betamethasone valerate 0.1% (VALISONE) 0.1 % Lotn Apply topically once daily. 2 drops both ears once daily    fluticasone (FLONASE) 50 mcg/actuation nasal spray 2 sprays by Each Nare route once daily.    gabapentin (NEURONTIN) 300 MG capsule Take 300 mg by mouth 3 (three) times daily as needed (for nerve pain).     guaifenesin (MUCINEX) 600 mg 12 hr tablet Take 1 tablet (600 mg total) by mouth 2 (two) times daily.     Psychotherapeutics     Start     Stop Route Frequency Ordered    12/13/17 2126  ramelteon tablet 8 mg      -- Oral Nightly PRN 12/13/17 2030 12/13/17 2100  QUEtiapine tablet 200 mg      -- Oral Nightly 12/13/17 2030        Review of Systems   Constitutional: Negative for fatigue.   Eyes: Negative for visual disturbance.   Respiratory: Positive for shortness of breath.    Cardiovascular: Negative for chest pain.   Psychiatric/Behavioral: Positive for decreased concentration and sleep disturbance. Negative for agitation.     Strengths and Liabilities: Liability: Patient has possible cognitive impairment.    Objective:     Vital Signs (Most Recent):  Temp: 97.4 °F (36.3 °C) (12/15/17 1135)  Pulse: (!) 50 (12/15/17 1532)  Resp: 16 (12/15/17 1532)  BP: 129/70 (12/15/17 1532)  SpO2: (!) 94 % (12/15/17 1532) Vital Signs (24h Range):  Temp:  [97.3 °F (36.3 °C)-98 °F (36.7 °C)] 97.4 °F (36.3 °C)  Pulse:  [50-80] 50  Resp:  [15-18] 16  SpO2:  [91 %-99 %] 94 %  BP: (102-147)/(55-74) 129/70  "    Height: 5' 6" (167.6 cm)  Weight: 80 kg (176 lb 5.9 oz)  Body mass index is 28.47 kg/m².      Intake/Output Summary (Last 24 hours) at 12/15/17 1732  Last data filed at 12/15/17 1300   Gross per 24 hour   Intake              720 ml   Output             1250 ml   Net             -530 ml       Physical Exam   Psychiatric:   Alert, oriented to person, place and month only.   Did know who the President was with prompting  Appearance: in bed, eating ice, distractible  Speech: mild dysarthria  Attention: decreased, when asked to spell WORLD forward, stated, " I dont know"  Mood: I am ok  Affect: congruent  Thought content: denies paranoid, delusional thoughts, SI or HI. Denies AVH  Insight: poor  Judgment: when asked what she would do with a stamped envelope, stated, "nothing"  Cognition: impaired        Significant Labs:   Last 24 Hours:   Recent Lab Results       12/15/17  0821 12/15/17  0747      Immature Granulocytes  0.4     Immature Grans (Abs)  0.01     Albumin  3.0(L)     Anion Gap  9     Baso #  0.00     Basophil%  0.0     BUN, Bld  45(H)     Calcium  8.8     Chloride  103     CO2  30(H)     Creatinine  3.0(H)     Differential Method  Automated     eGFR if   17.2(A)     eGFR if non   14.9  Comment:  Calculation used to obtain the estimated glomerular filtration  rate (eGFR) is the CKD-EPI equation.   (A)     Eos #  0.0     Eosinophil%  0.8     Free T4  0.50(L)     Glucose  94     Gran #  1.8     Gran%  69.1     Hematocrit  26.3(L)     Hemoglobin  9.1(L)     Lymph #  0.5(L)     Lymph%  18.0     Magnesium  2.2     MCH  34.7(H)     MCHC  34.6     MCV  100(H)     Mono #  0.3     Mono%  11.7     MPV  12.0     nRBC  0     Phosphorus  3.7     Platelets  94(L)     POCT Glucose 95      Potassium  3.8     RBC  2.62(L)     RDW  18.2(H)     Sodium  142     T4 Total  <3.0(L)     TSH  114.080(H)     WBC  2.66(L)           Significant Imaging:   CT head without contrast (4/4/17)  No evidence " for acute hemorrhage or major vascular distribution infarct.  Mild chronic microvascular ischemic changes.  Remote right medial orbital wall fracture.

## 2017-12-15 NOTE — PROGRESS NOTES
Pt did not urinate since this morning pt encouraged to void but  she refused to use the bedside commode or the bedpan . Dr. Cantu paged to inform .

## 2017-12-15 NOTE — PLAN OF CARE
Problem: Physical Therapy Goal  Goal: Physical Therapy Goal  Goals to be met by: 17    Patient will increase functional independence with mobility by performin. Supine to sit with Set-up [place]  2. Sit to supine with Set-up [place]  3. Sit to stand transfer with Supervision  4. Gait  x 100 feet with Stand-by Assistance using Rolling Walker.   5. Lower extremity exercise program x20 reps per handout, with independence    Outcome: Ongoing (interventions implemented as appropriate)  PT Goals established following initial eval    Rinku Orr, PT  12/15/2017

## 2017-12-15 NOTE — CONSULTS
"[facility]  Psychiatry  Consult Note    Patient Name: Anai Sal  MRN: [id number]   Code Status: DNR  Admission Date: 12/13/2017  Hospital Length of Stay: 2 days  Attending Physician: Elsy Cantu MD  Primary Care Provider: Live Young MD (Inactive)    Current Legal Status: N/A    Patient information was obtained from past medical records and ER records.   Consults  Subjective:     Principal Problem:Acute exacerbation of CHF (congestive heart failure)    Chief Complaint:  "need for seroquel"     HPI: 72 y.o. female  PMHx of CHF, CKD IV, HCV, h/o breast cancer s/p mastectomy/chemo 1979, COPD, HTN, Bipolar d/o admitted for acute on chronic dyspnea, concerning for acute CHF exacerbation. Patient is currently enrolled in the [facility] program and currently lives with daughter. There has been concerns of memory loss and intermittent agitation per primary team and chart review. Per chart review - there has also been intermittent reported paranoia and delusional thoughts.   "Sometimes has a bad episode of getting angry and physically attacking her. Daughter reports that pt rarely has these and usually her triggers are when daughter is helping her with bills and finances as pt is starting to become forgetful. Daughter reports that pt is at times paranoid that daughter is stealing her money and becomes angry when daughter is trying to help." No history of psychiatric hospitalizations.  She has seen Dr. Martinez in clinic on 09/30/16 for depression and also had complaints of trouble falling asleep. Dose of seroquel was increased to 200mg qhs at the time. Psychiatry is consulted given concern that medication is has not been helping much with sleep and agitation. Cogentin was also started that office visit for concerns of tardive dyskinesia which was then subsequently discontinued.   Was unable to obtain collateral information from patient's daughter today as she was unavailable after multiple attempts to " contact her on the phone.    Hospital Course: No notes on file         Patient History           Medical as of 12/15/2017     Past Medical History     Diagnosis Date Comments Source    Breast cancer 1980 right Provider    CHF (congestive heart failure) -- -- Provider    Chronic hepatitis C virus infection 3/14/2017 -- Provider    Coronary artery disease -- -- Provider    Hypertension -- -- Provider    Hazel -- -- Provider    Renal disorder -- -- Provider    Thyroid disease -- -- Provider          Pertinent Negatives     Diagnosis Date Noted Comments Source    Diabetes mellitus 12/16/2016 -- Provider    Stroke 12/16/2016 -- Provider                  Surgical as of 12/15/2017     Past Surgical History     Procedure Laterality Date Comments Source    HYSTERECTOMY -- -- -- Provider    MASTECTOMY Right 1980 -- Provider                  Family as of 12/15/2017     Problem Relation Name Age of Onset Comments Source    Heart disease Mother -- -- -- Provider    No Known Problems Father -- -- -- Provider    Heart disease Sister 1 -- -- Provider    No Known Problems Brother 2 -- -- Provider    No Known Problems Maternal Aunt -- -- -- Provider    No Known Problems Maternal Uncle -- -- -- Provider    No Known Problems Paternal Aunt -- -- -- Provider    No Known Problems Paternal Uncle -- -- -- Provider    No Known Problems Maternal Grandmother -- -- -- Provider    No Known Problems Maternal Grandfather -- -- -- Provider    No Known Problems Paternal Grandmother -- -- -- Provider    No Known Problems Paternal Grandfather -- -- -- Provider    Anemia Neg Hx -- -- -- Provider    Arrhythmia Neg Hx -- -- -- Provider    Asthma Neg Hx -- -- -- Provider    Clotting disorder Neg Hx -- -- -- Provider    Fainting Neg Hx -- -- -- Provider    Heart attack Neg Hx -- -- -- Provider    Heart failure Neg Hx -- -- -- Provider    Hyperlipidemia Neg Hx -- -- -- Provider    Hypertension Neg Hx -- -- -- Provider    Stroke Neg Hx -- -- -- Provider     Atrial Septal Defect Neg Hx -- -- -- Provider            Tobacco Use as of 12/15/2017     Smoking Status Smoking Start Date Smoking Quit Date Packs/day Years Used    Former Smoker -- 2/16/1995 2.00 20.00    Types Comments Smokeless Tobacco Status Smokeless Tobacco Quit Date Source     Cigarettes -- Never Used -- Provider            Alcohol Use as of 12/15/2017     Alcohol Use Drinks/Week Alcohol/Week Comments Source    No -- -- 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 Provider            Drug Use as of 12/15/2017     Drug Use Types Frequency Comments Source    No -- -- -- Provider            Sexual Activity as of 12/15/2017     Sexually Active Birth Control Partners Comments Source    No -- --  with 3 children Provider            Activities of Daily Living as of 12/15/2017     Activities of Daily Living Question Response Comments Source    Patient feels they ought to cut down on drinking/drug use Not Asked -- Provider    Patient annoyed by others criticizing their drinking/drug use Not Asked -- Provider    Patient has felt bad or guilty about drinking/drug use Not Asked -- Provider    Patient has had a drink/used drugs as an eye opener in the AM Not Asked -- Provider            Social Documentation as of 12/15/2017    Goes to [employer] [facility].  Used to work as a house keeper.   with 3 children  Source: Provider           Occupational as of 12/15/2017    **None**           Socioeconomic as of 12/15/2017     Marital Status Spouse Name Number of Children Years Education Preferred Language Ethnicity Race Source    Single -- -- -- English /Black Black or  Provider         Pertinent History Q A Comments    as of 12/15/2017 Lives with family     Place in Birth Order      Lives in home     Number of Siblings      Raised by      Legal Involvement      Childhood Trauma uneventful     Criminal History of      Financial Status disabled     Highest Level of Education unfinished highschool     Does patient  have access to a firearm? No      Service      Primary Leisure Activity time with family     Spirituality actively participates in organized [employer]      Past Medical History:   Diagnosis Date    Breast cancer 1980    right    CHF (congestive heart failure)     Chronic hepatitis C virus infection 3/14/2017    Coronary artery disease     Hypertension     Hazel     Renal disorder     Thyroid disease      Past Surgical History:   Procedure Laterality Date    HYSTERECTOMY      MASTECTOMY Right 1980     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    Drug use: No    Sexual activity: No      Comment:  with 3 children     Review of patient's allergies indicates:  No Known Allergies    No current facility-administered medications on file prior to encounter.      Current Outpatient Prescriptions on File Prior to Encounter   Medication Sig    allopurinol (ZYLOPRIM) 100 MG tablet Take 100 mg by mouth 2 (two) times daily.     aspirin 81 MG Chew Take 1 tablet (81 mg total) by mouth once daily.    diclofenac sodium 1 % Gel Apply 4 g topically 4 (four) times daily. Prn pain    fluticasone-salmeterol 250-50 mcg/dose (ADVAIR) 250-50 mcg/dose diskus inhaler Inhale 1 puff into the lungs 2 (two) times daily. Controller    furosemide (LASIX) 40 MG tablet Take 1 tab in am and 1/2 tab at night    isosorbide-hydrALAZINE 20-37.5 mg (BIDIL) 20-37.5 mg Tab Take 2 tablets by mouth 3 (three) times daily.    levothyroxine (SYNTHROID) 50 MCG tablet Take 1  tablet (50 mcg total) by mouth before breakfast.    melatonin 3 mg Tab Take by mouth every evening.    metoprolol succinate (TOPROL-XL) 50 MG 24 hr tablet Take 1 tablet (50 mg total) by mouth once daily.    quetiapine (SEROQUEL) 200 MG Tab Take 1 tablet (200 mg total) by mouth nightly.    sodium bicarbonate 650 MG tablet Take 2 tablets (1,300 mg total) by mouth 2 (two) times daily.    tiotropium (SPIRIVA) 18 mcg inhalation capsule Inhale 1 capsule (18 mcg total) into the lungs once daily.    benztropine (COGENTIN) 0.5 MG tablet Take 1 tablet (0.5 mg total) by mouth nightly.    betamethasone valerate 0.1% (VALISONE) 0.1 % Lotn Apply topically once daily. 2 drops both ears once daily    fluticasone (FLONASE) 50 mcg/actuation nasal spray 2 sprays by Each Nare route once daily.    gabapentin (NEURONTIN) 300 MG capsule Take 300 mg by mouth 3 (three) times daily as needed (for nerve pain).     guaifenesin (MUCINEX) 600 mg 12 hr tablet Take 1 tablet (600 mg total) by mouth 2 (two) times daily.     Psychotherapeutics     Start     Stop Route Frequency Ordered    12/13/17 2126  ramelteon tablet 8 mg      -- Oral Nightly PRN 12/13/17 2030 12/13/17 2100  QUEtiapine tablet 200 mg      -- Oral Nightly 12/13/17 2030        Review of Systems   Constitutional: Negative for fatigue.   Eyes: Negative for visual disturbance.   Respiratory: Positive for shortness of breath.    Cardiovascular: Negative for chest pain.   Psychiatric/Behavioral: Positive for decreased concentration and sleep disturbance. Negative for agitation.     Strengths and Liabilities: Liability: Patient has possible cognitive impairment.    Objective:     Vital Signs (Most Recent):  Temp: 97.4 °F (36.3 °C) (12/15/17 1135)  Pulse: (!) 50 (12/15/17 1532)  Resp: 16 (12/15/17 1532)  BP: 129/70 (12/15/17 1532)  SpO2: (!) 94 % (12/15/17 1532) Vital Signs (24h Range):  Temp:  [97.3 °F (36.3 °C)-98 °F (36.7 °C)] 97.4 °F (36.3 °C)  Pulse:  [50-80] 50  Resp:   "[15-18] 16  SpO2:  [91 %-99 %] 94 %  BP: (102-147)/(55-74) 129/70     Height: 5' 6" (167.6 cm)  Weight: 80 kg (176 lb 5.9 oz)  Body mass index is 28.47 kg/m².      Intake/Output Summary (Last 24 hours) at 12/15/17 1732  Last data filed at 12/15/17 1300   Gross per 24 hour   Intake              720 ml   Output             1250 ml   Net             -530 ml       Physical Exam   Psychiatric:   Alert, oriented to person, place and month only.   Did know who the President was with prompting  Appearance: in bed, eating ice, distractible  Speech: mild dysarthria  Attention: decreased, when asked to spell WORLD forward, stated, " I dont know"  Mood: I am ok  Affect: congruent  Thought content: denies paranoid, delusional thoughts, SI or HI. Denies AVH  Insight: poor  Judgment: when asked what she would do with a stamped envelope, stated, "nothing"  Cognition: impaired        Significant Labs:   Last 24 Hours:   Recent Lab Results       12/15/17  0821 12/15/17  0747      Immature Granulocytes  0.4     Immature Grans (Abs)  0.01     Albumin  3.0(L)     Anion Gap  9     Baso #  0.00     Basophil%  0.0     BUN, Bld  45(H)     Calcium  8.8     Chloride  103     CO2  30(H)     Creatinine  3.0(H)     Differential Method  Automated     eGFR if   17.2(A)     eGFR if non   14.9  Comment:  Calculation used to obtain the estimated glomerular filtration  rate (eGFR) is the CKD-EPI equation.   (A)     Eos #  0.0     Eosinophil%  0.8     Free T4  0.50(L)     Glucose  94     Gran #  1.8     Gran%  69.1     Hematocrit  26.3(L)     Hemoglobin  9.1(L)     Lymph #  0.5(L)     Lymph%  18.0     Magnesium  2.2     MCH  34.7(H)     MCHC  34.6     MCV  100(H)     Mono #  0.3     Mono%  11.7     MPV  12.0     nRBC  0     Phosphorus  3.7     Platelets  94(L)     POCT Glucose 95      Potassium  3.8     RBC  2.62(L)     RDW  18.2(H)     Sodium  142     T4 Total  <3.0(L)     TSH  114.080(H)     WBC  2.66(L)     "       Significant Imaging:   CT head without contrast (4/4/17)  No evidence for acute hemorrhage or major vascular distribution infarct.  Mild chronic microvascular ischemic changes.  Remote right medial orbital wall fracture.    Assessment/Plan:     Delirium due to another medical condition    Signs of delirium with waxing and waning quality to mental status. Risk factors: dementia, multiple medical co-morbidities, current admission for CHF exacerbation. QTc 406.     - Would recommend continuing seroquel 200mg nightly at this time. If concerned for insomnia or agitation, can consider giving an additional dose of 50mg at night.      Will continue to follow.                Carliots Amanda MD   Psychiatry  [facility]

## 2017-12-15 NOTE — NURSING
Pt noted sitting on the side of the bed screaming out to the hallway for her clothes. Pt noted with tele monitor on disconnected and on the floor; and, PIV with catheter intact noted lying on the floor. No harm noted nor does pt c/o any discomfort. Linens changed secondary to noted incontinence. Pt re orientated to place, time, and situation. Verbalized understanding. Call bell within reach. Door ajar at all times. Will cont to monitor.

## 2017-12-15 NOTE — HPI
"72 y.o. female PMHx of CHF, CKD IV, HCV, h/o breast cancer s/p mastectomy/chemo 1979, COPD, HTN, Bipolar d/o admitted for acute on chronic dyspnea, concerning for acute CHF exacerbation. Patient is currently enrolled in the [facility] program and currently lives with daughter. There has been concerns of memory loss and intermittent agitation per primary team and chart review. Per chart review - there has also been intermittent reported paranoia and delusional thoughts.   "Sometimes has a bad episode of getting angry and physically attacking her. Daughter reports that pt rarely has these and usually her triggers are when daughter is helping her with bills and finances as pt is starting to become forgetful. Daughter reports that pt is at times paranoid that daughter is stealing her money and becomes angry when daughter is trying to help." No history of psychiatric hospitalizations.  She has seen Dr. Martinez in clinic on 09/30/16 for depression and also had complaints of trouble falling asleep. Dose of seroquel was increased to 200mg qhs at the time. Psychiatry is consulted given concern that medication is has not been helping much with sleep and agitation. Cogentin was also started that office visit for concerns of tardive dyskinesia which was then subsequently discontinued.   Was unable to obtain collateral information from patient's daughter today as she was unavailable after multiple attempts to contact her on the phone.  "

## 2017-12-15 NOTE — PROGRESS NOTES
Progress Note  Hospital Medicine      Admit Date: 12/13/2017    SUBJECTIVE:     Follow-up For:  Acute exacerbation of CHF (congestive heart failure)    HPI/Interval history: Patient came out of room yelling for clothes in early morning. More cooperative and alert today.     Review of Systems: Gen: no fever, no chills, Heart: no chest pain, palpitations; Resp: no SOB, no cough    OBJECTIVE:     Vital Signs Range (Last 24H):  Temp:  [97.3 °F (36.3 °C)-98 °F (36.7 °C)]   Pulse:  [50-80]   Resp:  [15-18]   BP: (102-147)/(55-79)   SpO2:  [91 %-100 %]     Physical Exam:  General appearance: NAD, asleep  Neck: FROM, supple   Lungs: snoring, no accessory muscle use  CV: RRR, no heave; + JVD + hepatojugular reflex  Abdomen: Soft, non-tender; no masses or HSM  Extremities: 3+ BLE edema, no digital cyanosis  Skin: no rash, lesions or ulcers  Psych: alert and oriented x 3      Recent Labs  Lab 12/14/17  1416 12/15/17  0747    142   K 3.9 3.8    103   CO2 28 30*   BUN 43* 45*   CREATININE 2.9* 3.0*   GLU 74 94   CALCIUM 9.1 8.8   MG  --  2.2   PHOS  --  3.7       Recent Labs  Lab 12/14/17  1416 12/15/17  0747   ALKPHOS 58  --    ALT 21  --    AST 44*  --    ALBUMIN 3.2* 3.0*   PROT 7.4  --    BILITOT 2.5*  --          Recent Labs  Lab 12/13/17  1615 12/13/17  1938 12/14/17  1416 12/15/17  0747   WBC 4.46  --  3.70* 2.66*   HGB 10.3*  --  9.7* 9.1*   HCT 30.3* 36 27.2* 26.3*   *  --  91* 94*   GRAN 63.3  2.8  --  69.1  2.6 69.1  1.8   LYMPH 20.9  0.9*  --  17.6*  0.7* 18.0  0.5*   MONO 15.2*  0.7  --  10.3  0.4 11.7  0.3         Recent Labs  Lab 12/15/17  0821   POCTGLUCOSE 95       ASSESSMENT/PLAN:     Acute systolic and diastolic exacerbation of CHF (congestive heart failure)     Patient presenting with worsening LE edema, CXR with pulmonary edema, shortness of breath, elevated BNP likely acute on chronic systolic CHF (based on recent ECHO 5/17), likely precipitate increased fluid intake by patient  and severe hypothyroidism  -Strict I/O  -Continue BB, ASA, appears no statin per prior h/o chronic hep C, will follow INR/transaminisases to assess liver fxn  -ECHO 2D showed EF 30-35%, Grade 3 diastolic dysfunction, biatrial enlargement, depressed right sided dysfunction, increase right atrial pressure  -increased diuretics to furosemide IV 80 mg TID          Stage 4 chronic kidney disease     CANDI on CKD IV , patient with baseline 2.2, currently 2.8, likely prerenal, will check urinalysis, follow BMP  Continuing Na Bicarb 1300 BID   Consider nephrology consult if no improvement with diuresis         Dementia with behavioral disturbance     Per family over last months issues with memory, forgetting to turn off appliances, getting lost. Also with agitation (espcially at night) and non-compliance with care. Patient on seroquel 200 mg qhs and not effective for her current behaviors. Psychiatry consult for medication management.           Anemia of chronic renal failure, stage 4 (severe)     Stable, f/u outpatient nephro          Chronic obstructive pulmonary disease     Per prior PFTs, mild obstructive component, on inhalers at home, no increase in sputum production unlikely COPD exacerbation,  -Duonebs Q6H  -Continue LABA/inhailed corticosteroid          Elevated troponin     Patient with mild elevation in troponin, no recent chest pain, no significant EKG changes, likely type 2 NSTEMI in setting of CHF exacerbation and CKD impairing clearance  troponin levels stable          Primary hypothyroidism, uncontrolled     Patient with h/o hypothyroidism, last TSH/T4 early 2017, TSH elevated T4 WNL  -endocrine consulted - levothyroxine 90 mcg IV daily and cytomel 10 mcg BID. Hold oral levothyroxine. Highly suspect patient not taking her levothyroxine lately         Insomnia     Patient with insomnia and ?h/o bipolar disorder per family, on seroquel 200mg QHS, reports no improvement  -PRN ramelteon for sleep     FELICITAS -  consider sleep study, but not sure if the effort would be worth it for patient at her age and co-morbidities

## 2017-12-16 PROBLEM — F03.90 MAJOR NEUROCOGNITIVE DISORDER: Status: ACTIVE | Noted: 2017-12-16

## 2017-12-16 LAB
ALBUMIN SERPL BCP-MCNC: 3.1 G/DL
ANION GAP SERPL CALC-SCNC: 10 MMOL/L
ANISOCYTOSIS BLD QL SMEAR: SLIGHT
BASOPHILS # BLD AUTO: 0 K/UL
BASOPHILS NFR BLD: 0 %
BUN SERPL-MCNC: 42 MG/DL
CALCIUM SERPL-MCNC: 9 MG/DL
CHLORIDE SERPL-SCNC: 101 MMOL/L
CO2 SERPL-SCNC: 30 MMOL/L
CREAT SERPL-MCNC: 2.9 MG/DL
CREAT UR-MCNC: 24 MG/DL
DACRYOCYTES BLD QL SMEAR: ABNORMAL
DIFFERENTIAL METHOD: ABNORMAL
EOSINOPHIL # BLD AUTO: 0 K/UL
EOSINOPHIL NFR BLD: 0.5 %
ERYTHROCYTE [DISTWIDTH] IN BLOOD BY AUTOMATED COUNT: 18.2 %
EST. GFR  (AFRICAN AMERICAN): 18 ML/MIN/1.73 M^2
EST. GFR  (NON AFRICAN AMERICAN): 15.6 ML/MIN/1.73 M^2
GLUCOSE SERPL-MCNC: 94 MG/DL
HCT VFR BLD AUTO: 27.8 %
HGB BLD-MCNC: 9.5 G/DL
HYPOCHROMIA BLD QL SMEAR: ABNORMAL
IMM GRANULOCYTES # BLD AUTO: 0 K/UL
IMM GRANULOCYTES NFR BLD AUTO: 0 %
LYMPHOCYTES # BLD AUTO: 0.7 K/UL
LYMPHOCYTES NFR BLD: 30.7 %
MAGNESIUM SERPL-MCNC: 2.3 MG/DL
MCH RBC QN AUTO: 34.7 PG
MCHC RBC AUTO-ENTMCNC: 34.2 G/DL
MCV RBC AUTO: 102 FL
MONOCYTES # BLD AUTO: 0.2 K/UL
MONOCYTES NFR BLD: 10.4 %
NEUTROPHILS # BLD AUTO: 1.2 K/UL
NEUTROPHILS NFR BLD: 58.4 %
NRBC BLD-RTO: 0 /100 WBC
OVALOCYTES BLD QL SMEAR: ABNORMAL
PHOSPHATE SERPL-MCNC: 2.8 MG/DL
PLATELET # BLD AUTO: 90 K/UL
PMV BLD AUTO: ABNORMAL FL
POIKILOCYTOSIS BLD QL SMEAR: SLIGHT
POLYCHROMASIA BLD QL SMEAR: ABNORMAL
POTASSIUM SERPL-SCNC: 3.4 MMOL/L
RBC # BLD AUTO: 2.74 M/UL
SODIUM SERPL-SCNC: 141 MMOL/L
SODIUM UR-SCNC: 102 MMOL/L
WBC # BLD AUTO: 2.12 K/UL

## 2017-12-16 PROCEDURE — 94761 N-INVAS EAR/PLS OXIMETRY MLT: CPT

## 2017-12-16 PROCEDURE — 25000242 PHARM REV CODE 250 ALT 637 W/ HCPCS: Performed by: INTERNAL MEDICINE

## 2017-12-16 PROCEDURE — 84300 ASSAY OF URINE SODIUM: CPT

## 2017-12-16 PROCEDURE — 83735 ASSAY OF MAGNESIUM: CPT

## 2017-12-16 PROCEDURE — 25000003 PHARM REV CODE 250: Performed by: INTERNAL MEDICINE

## 2017-12-16 PROCEDURE — 99232 SBSQ HOSP IP/OBS MODERATE 35: CPT | Mod: ,,, | Performed by: INTERNAL MEDICINE

## 2017-12-16 PROCEDURE — 85025 COMPLETE CBC W/AUTO DIFF WBC: CPT

## 2017-12-16 PROCEDURE — 80069 RENAL FUNCTION PANEL: CPT

## 2017-12-16 PROCEDURE — 94640 AIRWAY INHALATION TREATMENT: CPT

## 2017-12-16 PROCEDURE — 82570 ASSAY OF URINE CREATININE: CPT

## 2017-12-16 PROCEDURE — 99222 1ST HOSP IP/OBS MODERATE 55: CPT | Mod: GC,,, | Performed by: INTERNAL MEDICINE

## 2017-12-16 PROCEDURE — 11000001 HC ACUTE MED/SURG PRIVATE ROOM

## 2017-12-16 PROCEDURE — 63600175 PHARM REV CODE 636 W HCPCS: Performed by: INTERNAL MEDICINE

## 2017-12-16 PROCEDURE — 36415 COLL VENOUS BLD VENIPUNCTURE: CPT

## 2017-12-16 RX ORDER — POTASSIUM CHLORIDE 750 MG/1
30 CAPSULE, EXTENDED RELEASE ORAL
Status: DISCONTINUED | OUTPATIENT
Start: 2017-12-17 | End: 2017-12-17

## 2017-12-16 RX ORDER — POTASSIUM CHLORIDE 750 MG/1
30 CAPSULE, EXTENDED RELEASE ORAL 2 TIMES DAILY WITH MEALS
Status: DISCONTINUED | OUTPATIENT
Start: 2017-12-16 | End: 2017-12-16

## 2017-12-16 RX ORDER — LIOTHYRONINE SODIUM 5 UG/1
5 TABLET ORAL 2 TIMES DAILY
Status: DISCONTINUED | OUTPATIENT
Start: 2017-12-16 | End: 2017-12-21

## 2017-12-16 RX ORDER — LEVOTHYROXINE SODIUM ANHYDROUS 100 UG/5ML
70 INJECTION, POWDER, LYOPHILIZED, FOR SOLUTION INTRAVENOUS DAILY
Status: DISCONTINUED | OUTPATIENT
Start: 2017-12-17 | End: 2017-12-21

## 2017-12-16 RX ADMIN — ALLOPURINOL 100 MG: 100 TABLET ORAL at 09:12

## 2017-12-16 RX ADMIN — SODIUM BICARBONATE 650 MG TABLET 1300 MG: at 10:12

## 2017-12-16 RX ADMIN — METOPROLOL SUCCINATE 50 MG: 50 TABLET, EXTENDED RELEASE ORAL at 09:12

## 2017-12-16 RX ADMIN — ASPIRIN 81 MG CHEWABLE TABLET 81 MG: 81 TABLET CHEWABLE at 09:12

## 2017-12-16 RX ADMIN — HYDRALAZINE HYDROCHLORIDE AND ISOSORBIDE DINITRATE 2 TABLET: 37.5; 2 TABLET, FILM COATED ORAL at 12:12

## 2017-12-16 RX ADMIN — IPRATROPIUM BROMIDE AND ALBUTEROL SULFATE 3 ML: .5; 3 SOLUTION RESPIRATORY (INHALATION) at 01:12

## 2017-12-16 RX ADMIN — SODIUM BICARBONATE 650 MG TABLET 1300 MG: at 12:12

## 2017-12-16 RX ADMIN — ALLOPURINOL 100 MG: 100 TABLET ORAL at 10:12

## 2017-12-16 RX ADMIN — HYDRALAZINE HYDROCHLORIDE AND ISOSORBIDE DINITRATE 2 TABLET: 37.5; 2 TABLET, FILM COATED ORAL at 02:12

## 2017-12-16 RX ADMIN — FUROSEMIDE 80 MG: 10 INJECTION, SOLUTION INTRAVENOUS at 12:12

## 2017-12-16 RX ADMIN — IPRATROPIUM BROMIDE AND ALBUTEROL SULFATE 3 ML: .5; 3 SOLUTION RESPIRATORY (INHALATION) at 07:12

## 2017-12-16 RX ADMIN — GUAIFENESIN 600 MG: 600 TABLET, EXTENDED RELEASE ORAL at 09:12

## 2017-12-16 RX ADMIN — LIOTHYRONINE SODIUM 10 MCG: 5 TABLET ORAL at 12:12

## 2017-12-16 RX ADMIN — LIOTHYRONINE SODIUM 10 MCG: 5 TABLET ORAL at 09:12

## 2017-12-16 RX ADMIN — HEPARIN SODIUM 5000 UNITS: 5000 INJECTION, SOLUTION INTRAVENOUS; SUBCUTANEOUS at 12:12

## 2017-12-16 RX ADMIN — FUROSEMIDE 80 MG: 10 INJECTION, SOLUTION INTRAVENOUS at 02:12

## 2017-12-16 RX ADMIN — ALLOPURINOL 100 MG: 100 TABLET ORAL at 12:12

## 2017-12-16 RX ADMIN — HYDRALAZINE HYDROCHLORIDE AND ISOSORBIDE DINITRATE 2 TABLET: 37.5; 2 TABLET, FILM COATED ORAL at 06:12

## 2017-12-16 RX ADMIN — FLUTICASONE FUROATE AND VILANTEROL TRIFENATATE 1 PUFF: 100; 25 POWDER RESPIRATORY (INHALATION) at 09:12

## 2017-12-16 RX ADMIN — HEPARIN SODIUM 5000 UNITS: 5000 INJECTION, SOLUTION INTRAVENOUS; SUBCUTANEOUS at 06:12

## 2017-12-16 RX ADMIN — SODIUM BICARBONATE 650 MG TABLET 1300 MG: at 09:12

## 2017-12-16 RX ADMIN — HYDRALAZINE HYDROCHLORIDE AND ISOSORBIDE DINITRATE 2 TABLET: 37.5; 2 TABLET, FILM COATED ORAL at 10:12

## 2017-12-16 RX ADMIN — LIOTHYRONINE SODIUM 5 MCG: 5 TABLET ORAL at 10:12

## 2017-12-16 RX ADMIN — HEPARIN SODIUM 5000 UNITS: 5000 INJECTION, SOLUTION INTRAVENOUS; SUBCUTANEOUS at 02:12

## 2017-12-16 RX ADMIN — FUROSEMIDE 80 MG: 10 INJECTION, SOLUTION INTRAVENOUS at 10:12

## 2017-12-16 RX ADMIN — FUROSEMIDE 80 MG: 10 INJECTION, SOLUTION INTRAVENOUS at 06:12

## 2017-12-16 RX ADMIN — GUAIFENESIN 600 MG: 600 TABLET, EXTENDED RELEASE ORAL at 12:12

## 2017-12-16 RX ADMIN — QUETIAPINE FUMARATE 250 MG: 25 TABLET, FILM COATED ORAL at 10:12

## 2017-12-16 RX ADMIN — QUETIAPINE FUMARATE 200 MG: 100 TABLET, FILM COATED ORAL at 12:12

## 2017-12-16 RX ADMIN — LEVOTHYROXINE SODIUM ANHYDROUS 98 MCG: 100 INJECTION, POWDER, LYOPHILIZED, FOR SOLUTION INTRAVENOUS at 09:12

## 2017-12-16 RX ADMIN — GUAIFENESIN 600 MG: 600 TABLET, EXTENDED RELEASE ORAL at 10:12

## 2017-12-16 RX ADMIN — HEPARIN SODIUM 5000 UNITS: 5000 INJECTION, SOLUTION INTRAVENOUS; SUBCUTANEOUS at 10:12

## 2017-12-16 NOTE — HPI
Reason for Consult: Hypothyroidism with heart failure    HPI:   Patient is a 72 y.o. female with a diagnosis of hypothyroidism, HFrEF (30%), Stage IV CKD, Hep C, h/o breast ca s/p mastectomy/chemo 1979, COPD, HTN, Bipolar d/o, anemia of CKD, presents with shortness of breath and leg swelling. She is being managed by hospital medicine for heart failure, and we have been consulted for management of hypothyroidism.    Patient reports she is currently taking levothyroxine 50 mcg daily. She takes it with her other medicines with food in the AM. She reports weight gain, feeling cold a lot, and slow heart rate. She denies depression, constipation, or mental fog (although she was noted to be altered yesterday and at several previous admissions from chart review of outside records). She is a rather poor historian, so history was obtained through review of old records and endocrine notes. She was diagnosed with Hashimoto thyroiditis sometime in 2015. She initially required methimazole, and later was put on levothyroxine 50 then 75 mcg. Due to labile fluctuations in TSH and FT4, she was given 10 mCi I-131 in May, 2016 at [facility]. She was previously followed by [facility] endocrine clinic, then came to [facility] in January to establish care with our clinic. At the January, 2017 visit, she was on synthroid 50 mcg daily. TSH at the time was 9.954, and increased to 16, 21, then 114 this admission. She is currently on IV levothyroxine 98 mcg daily and cytomel 10 mcg BID. She reports feeling better this AM, and confusion seems to have resolved (unclear if related to thyroid or not).

## 2017-12-16 NOTE — SUBJECTIVE & OBJECTIVE
PMH, PSH, FH, SH updated and reviewed     Review of Systems   Constitutional: Negative for unexpected weight change (Denies weight changes).   Eyes: Negative for visual disturbance.   Respiratory: Positive for shortness of breath.    Cardiovascular: Positive for leg swelling. Negative for chest pain.   Gastrointestinal: Negative for abdominal pain and constipation.   Endocrine: Positive for cold intolerance.   Genitourinary: Negative for urgency.   Musculoskeletal: Negative for arthralgias.   Skin: Negative for wound.   Neurological: Negative for headaches.   Hematological: Does not bruise/bleed easily.   Psychiatric/Behavioral: Negative for agitation (patient denies, but per chart she has been agitated), dysphoric mood and sleep disturbance.     PHYSICAL EXAMINATION:  Vitals:    12/16/17 0942   BP:    Pulse: (!) 54   Resp: 19   Temp:      Body mass index is 28.47 kg/m².    Physical Exam   Constitutional: She appears well-developed and well-nourished.   HENT:   Right Ear: External ear normal.   Left Ear: External ear normal.   Nose: Nose normal.   Hearing grossly normal  Dentition grossly normal   Cardiovascular:   No murmur heard.  Bradycardic   Pulmonary/Chest: Effort normal. She has wheezes (faint wheezing).   Abdominal: Soft. She exhibits no mass. There is no tenderness.   Musculoskeletal: She exhibits edema (1+ bilateral edema).   No digital clubbing or extremity cyanosis   Neurological: No cranial nerve deficit. Coordination normal.   Skin: No rash noted.   No subcutaneous nodules noted.   Psychiatric: She has a normal mood and affect.   Able to hold a conversation and provide some details on medical history   Nursing note and vitals reviewed.

## 2017-12-16 NOTE — CONSULTS
[facility]  Endocrinology  Consult Note    Consult Requested by: Elsy Cantu MD   Reason for admit: Acute exacerbation of CHF (congestive heart failure)    HISTORY OF PRESENT ILLNESS:  Reason for Consult: Hypothyroidism with heart failure    HPI:   Patient is a 72 y.o. female with a diagnosis of hypothyroidism, HFrEF (30%), Stage IV CKD, Hep C, h/o breast ca s/p mastectomy/chemo 1979, COPD, HTN, Bipolar d/o, anemia of CKD, presents with shortness of breath and leg swelling. She is being managed by hospital medicine for heart failure, and we have been consulted for management of hypothyroidism.    Patient reports she is currently taking levothyroxine 50 mcg daily. She takes it with her other medicines with food in the AM. She reports weight gain, feeling cold a lot, and slow heart rate. She denies depression, constipation, or mental fog (although she was noted to be altered yesterday and at several previous admissions from chart review of outside records). She is a rather poor historian, so history was obtained through review of old records and endocrine notes. She was diagnosed with Hashimoto thyroiditis sometime in 2015. She initially required methimazole, and later was put on levothyroxine 50 then 75 mcg. Due to labile fluctuations in TSH and FT4, she was given 10 mCi I-131 in May, 2016 at [facility]. She was previously followed by [facility] endocrine clinic, then came to [facility] in January to establish care with our clinic. At the January, 2017 visit, she was on synthroid 50 mcg daily. TSH at the time was 9.954, and increased to 16, 21, then 114 this admission. She is currently on IV levothyroxine 98 mcg daily and cytomel 10 mcg BID. She reports feeling better this AM, and confusion seems to have resolved (unclear if related to thyroid or not).          Medications and/or Treatments Impacting Glycemic Control:  Immunotherapy:    Immunosuppressants     None        Steroids:   Hormones     None         Pressors:    Autonomic Drugs     None          Prescriptions Prior to Admission   Medication Sig Dispense Refill Last Dose    allopurinol (ZYLOPRIM) 100 MG tablet Take 100 mg by mouth 2 (two) times daily.    12/13/2017    aspirin 81 MG Chew Take 1 tablet (81 mg total) by mouth once daily.  0 12/13/2017    diclofenac sodium 1 % Gel Apply 4 g topically 4 (four) times daily. Prn pain   Past Week    fluticasone-salmeterol 250-50 mcg/dose (ADVAIR) 250-50 mcg/dose diskus inhaler Inhale 1 puff into the lungs 2 (two) times daily. Controller  0 12/12/2017    furosemide (LASIX) 40 MG tablet Take 1 tab in am and 1/2 tab at night 45 tablet 2 12/13/2017    isosorbide-hydrALAZINE 20-37.5 mg (BIDIL) 20-37.5 mg Tab Take 2 tablets by mouth 3 (three) times daily. 180 tablet 2 12/13/2017    levothyroxine (SYNTHROID) 50 MCG tablet Take 1 tablet (50 mcg total) by mouth before breakfast. 30 tablet 11 Past Week    melatonin 3 mg Tab Take by mouth every evening.   12/12/2017    metoprolol succinate (TOPROL-XL) 50 MG 24 hr tablet Take 1 tablet (50 mg total) by mouth once daily. 30 tablet 2 12/13/2017    quetiapine (SEROQUEL) 200 MG Tab Take 1 tablet (200 mg total) by mouth nightly. 30 tablet 0 12/12/2017    sodium bicarbonate 650 MG tablet Take 2 tablets (1,300 mg total) by mouth 2 (two) times daily. 360 tablet 3 12/13/2017    tiotropium (SPIRIVA) 18 mcg inhalation capsule Inhale 1 capsule (18 mcg total) into the lungs once daily. 90 capsule 3 12/12/2017    benztropine (COGENTIN) 0.5 MG tablet Take 1 tablet (0.5 mg total) by mouth nightly. 30 tablet 0 Taking    betamethasone valerate 0.1% (VALISONE) 0.1 % Lotn Apply topically once daily. 2 drops both ears once daily 1 Bottle 3 Taking    fluticasone (FLONASE) 50 mcg/actuation nasal spray 2 sprays by Each Nare route once daily. 1 Bottle 3 Unknown    gabapentin (NEURONTIN) 300 MG capsule Take 300 mg by mouth 3 (three) times daily as needed (for nerve pain).    Unknown     guaifenesin (MUCINEX) 600 mg 12 hr tablet Take 1 tablet (600 mg total) by mouth 2 (two) times daily.   Unknown       Current Facility-Administered Medications   Medication Dose Route Frequency Provider Last Rate Last Dose    albuterol-ipratropium 2.5mg-0.5mg/3mL nebulizer solution 3 mL  3 mL Nebulization Q6H WAKE Elys Cantu MD   3 mL at 12/16/17 0744    allopurinol tablet 100 mg  100 mg Oral BID David Martins MD   100 mg at 12/16/17 0941    aspirin chewable tablet 81 mg  81 mg Oral Daily David Martins MD   81 mg at 12/16/17 0941    dextrose 50% injection 12.5 g  12.5 g Intravenous PRN David Martins MD        dextrose 50% injection 25 g  25 g Intravenous PRN David Martins MD        diclofenac sodium 1 % gel 4 g  4 g Topical QID PRN David Martins MD        fluticasone-vilanterol 100-25 mcg/dose diskus inhaler 1 puff  1 puff Inhalation Daily David Martins MD   1 puff at 12/16/17 0942    furosemide injection 80 mg  80 mg Intravenous TID Elsy Cantu MD   80 mg at 12/16/17 0626    gabapentin capsule 300 mg  300 mg Oral TID PRN David Martins MD        glucagon (human recombinant) injection 1 mg  1 mg Intramuscular PRN David Martins MD        glucose chewable tablet 16 g  16 g Oral PRN David Martins MD        glucose chewable tablet 24 g  24 g Oral PRN David Martins MD        guaiFENesin 12 hr tablet 600 mg  600 mg Oral BID David Martins MD   600 mg at 12/16/17 0944    heparin (porcine) injection 5,000 Units  5,000 Units Subcutaneous Q8H David Martins MD   5,000 Units at 12/16/17 0626    hydrALAZINE tablet 50 mg  50 mg Oral Q8H PRN David Martins MD        isosorbide-hydrALAZINE 20-37.5 mg per tablet 2 tablet  2 tablet Oral TID David Martins MD   2 tablet at 12/16/17 0626    levothyroxine injection 98 mcg  98 mcg Intravenous Daily Elsy Cantu MD   98 mcg at 12/16/17 0942    liothyronine tablet 5 mcg  5 mcg Oral BID Aurelio Vaughan MD        metoprolol  succinate (TOPROL-XL) 24 hr tablet 50 mg  50 mg Oral Daily David Martins MD   50 mg at 12/16/17 0948    [START ON 12/17/2017] potassium chloride CR capsule 30 mEq  30 mEq Oral Daily with breakfast Elsy Cantu MD        QUEtiapine tablet 200 mg  200 mg Oral Nightly David Martins MD   200 mg at 12/16/17 0007    ramelteon tablet 8 mg  8 mg Oral Nightly PRN David Martins MD   8 mg at 12/14/17 2112    sodium bicarbonate tablet 1,300 mg  1,300 mg Oral BID David Martins MD   1,300 mg at 12/16/17 0952    sodium chloride 0.9% flush 5 mL  5 mL Intravenous PRN David Martins MD             PMH, PSH, FH, SH updated and reviewed     Review of Systems   Constitutional: Negative for unexpected weight change (Denies weight changes).   Eyes: Negative for visual disturbance.   Respiratory: Positive for shortness of breath.    Cardiovascular: Positive for leg swelling. Negative for chest pain.   Gastrointestinal: Negative for abdominal pain and constipation.   Endocrine: Positive for cold intolerance.   Genitourinary: Negative for urgency.   Musculoskeletal: Negative for arthralgias.   Skin: Negative for wound.   Neurological: Negative for headaches.   Hematological: Does not bruise/bleed easily.   Psychiatric/Behavioral: Negative for agitation (patient denies, but per chart she has been agitated), dysphoric mood and sleep disturbance.     PHYSICAL EXAMINATION:  Vitals:    12/16/17 0942   BP:    Pulse: (!) 54   Resp: 19   Temp:      Body mass index is 28.47 kg/m².    Physical Exam   Constitutional: She appears well-developed and well-nourished.   HENT:   Right Ear: External ear normal.   Left Ear: External ear normal.   Nose: Nose normal.   Hearing grossly normal  Dentition grossly normal   Cardiovascular:   No murmur heard.  Bradycardic   Pulmonary/Chest: Effort normal. She has wheezes (faint wheezing).   Abdominal: Soft. She exhibits no mass. There is no tenderness.   Musculoskeletal: She exhibits edema (1+  bilateral edema).   No digital clubbing or extremity cyanosis   Neurological: No cranial nerve deficit. Coordination normal.   Skin: No rash noted.   No subcutaneous nodules noted.   Psychiatric: She has a normal mood and affect.   Able to hold a conversation and provide some details on medical history   Nursing note and vitals reviewed.    .     ASSESSMENT and PLAN:    * Acute exacerbation of CHF (congestive heart failure)    Likely further exacerbated by hypothyroidism.  Recommendations as above.        Primary hypothyroidism, autoimmune and post-ablative    S/p HARE ablation in May, 2016.     Clinically and biochemically hypothyroid. Do not suspect myxedema.    Agree with IV LT4 while diuresing.    With mental status improved, decrease levothyroxine to 70 mcg IV and liothyronine to 5 mcg BID.    At discharge would start:  Levothyroxine 75 mcg daily  Liothyronine 5 mcg daily x 1 week then stop    After Liothyronine course finished, increase Levothyroxine to 100 mcg    Repeat TFTs in 4 weeks.    Counseled on need to take levothyroxine first thing in the AM, without food or other medications and wait 30 minutes prior to taking other meds or eating.        Delirium due to another medical condition    May be related to hypothyroidism. Improved with IV LT4 and T3.            DISCHARGE NEEDS: will assess daily    Aurelio Vaughan MD  Endocrinology  [facility]

## 2017-12-16 NOTE — PROGRESS NOTES
Progress Note  Hospital Medicine      Admit Date: 12/13/2017    SUBJECTIVE:     Follow-up For:  Acute exacerbation of CHF (congestive heart failure)    HPI/Interval history: No events overnight  Review of Systems: Gen: no fever, no chills, Heart: no chest pain, palpitations; Resp: no SOB, no cough    OBJECTIVE:     Vital Signs Range (Last 24H):  Temp:  [97 °F (36.1 °C)-98.9 °F (37.2 °C)]   Pulse:  [48-54]   Resp:  [16-20]   BP: (129-162)/(69-90)   SpO2:  [93 %-100 %]     Physical Exam:  General appearance: NAD, asleep  Neck: FROM, supple   Lungs: snoring, no accessory muscle use  CV: RRR, no heave; + JVD + hepatojugular reflex  Abdomen: Soft, non-tender; no masses or HSM  Extremities: 1+ BLE edema, no digital cyanosis  Skin: no rash, lesions or ulcers  Psych: alert and oriented x 3      Recent Labs  Lab 12/14/17 1416 12/15/17  0747 12/16/17  0909    142 141   K 3.9 3.8 3.4*    103 101   CO2 28 30* 30*   BUN 43* 45* 42*   CREATININE 2.9* 3.0* 2.9*   GLU 74 94 94   CALCIUM 9.1 8.8 9.0   MG  --  2.2 2.3   PHOS  --  3.7 2.8       Recent Labs  Lab 12/14/17  1416 12/15/17  0747 12/16/17  0909   ALKPHOS 58  --   --    ALT 21  --   --    AST 44*  --   --    ALBUMIN 3.2* 3.0* 3.1*   PROT 7.4  --   --    BILITOT 2.5*  --   --          Recent Labs  Lab 12/14/17  1416 12/15/17  0747 12/16/17  0909   WBC 3.70* 2.66* 2.12*   HGB 9.7* 9.1* 9.5*   HCT 27.2* 26.3* 27.8*   PLT 91* 94* 90*   GRAN 69.1  2.6 69.1  1.8 58.4  1.2*   LYMPH 17.6*  0.7* 18.0  0.5* 30.7  0.7*   MONO 10.3  0.4 11.7  0.3 10.4  0.2*         Recent Labs  Lab 12/15/17  0821   POCTGLUCOSE 95       ASSESSMENT/PLAN:     Acute systolic and diastolic exacerbation of CHF (congestive heart failure)     Patient presenting with worsening LE edema, CXR with pulmonary edema, shortness of breath, elevated BNP likely acute on chronic systolic CHF (based on recent ECHO 5/17), likely precipitate increased fluid intake by patient and severe  hypothyroidism  -Strict I/O  -Continue BB, ASA, appears no statin per prior h/o chronic hep C, will follow INR/transaminisases to assess liver fxn  -ECHO 2D showed EF 30-35%, Grade 3 diastolic dysfunction, biatrial enlargement, depressed right sided dysfunction, increase right atrial pressure  -increased diuretics to furosemide IV 80 mg TID          Stage 4 chronic kidney disease     CANDI on CKD IV , patient with baseline 2.2, currently 2.8, likely prerenal, will check urinalysis, follow BMP  Continuing Na Bicarb 1300 BID   Consider nephrology consult if no improvement with diuresis         Dementia with behavioral disturbance     Per family over last months issues with memory, forgetting to turn off appliances, getting lost. Also with agitation (espcially at night) and non-compliance with care. Patient on seroquel 200 mg qhs and not effective for her current behaviors. Psychiatry consult for medication management. - will increase seroquel 250 mg qhs. Will discuss with daughter about use of depakote in past          Anemia of chronic renal failure, stage 4 (severe)     Stable, f/u outpatient nephro          Chronic obstructive pulmonary disease     Per prior PFTs, mild obstructive component, on inhalers at home, no increase in sputum production unlikely COPD exacerbation,  -Duonebs Q6H  -Continue LABA/inhailed corticosteroid          Elevated troponin     Patient with mild elevation in troponin, no recent chest pain, no significant EKG changes, likely type 2 NSTEMI in setting of CHF exacerbation and CKD impairing clearance  troponin levels stable          Primary hypothyroidism, uncontrolled     Patient with h/o hypothyroidism, last TSH/T4 early 2017, TSH elevated T4 WNL  -endocrine consulted - levothyroxine 70 mcg IV daily and cytomel 5 mcg BID for one week. Hold oral levothyroxine while diuresing. Highly suspect patient not taking her levothyroxine lately         Insomnia     Patient with insomnia and ?h/o bipolar  disorder per family, on seroquel 200mg QHS, reports no improvement  -PRN ramelteon for sleep     FELICITAS - consider sleep study, but not sure if the effort would be worth it for patient at her age and co-morbidities

## 2017-12-16 NOTE — NURSING
Patient resting in bed. No acute changes noted. Vs stable. Denies pain. Needs reorientation frequently to poc but is pleasant. poc reviewed with patient. Daughter given update

## 2017-12-16 NOTE — ASSESSMENT & PLAN NOTE
S/p HARE ablation in May, 2016.     Clinically and biochemically hypothyroid. Do not suspect myxedema.    Agree with IV LT4 while diuresing.    At discharge would start:  Levothyroxine 75 mcg daily  Liothyronine 5 mcg daily x 2 weeks then stop    After Liothyronine course finished, increase Levothyroxine to 100 mcg    Repeat TFTs in 4 weeks.    Counseled on need to take levothyroxine first thing in the AM, without food or other medications and wait 30 minutes prior to taking other meds or eating.

## 2017-12-16 NOTE — PLAN OF CARE
Problem: Patient Care Overview  Goal: Plan of Care Review  Outcome: Ongoing (interventions implemented as appropriate)  Plan of care reviewed with pt . Pt is A,A,O x 3 . Stable V/S. No C/O pain during the day . Pt slept between care . Pt is able to use the bedside commode with assist . Pt is free of falls and injuries per shift , fall precautions maintained . No significant changes during the day .

## 2017-12-17 LAB
ALBUMIN SERPL BCP-MCNC: 3 G/DL
ANION GAP SERPL CALC-SCNC: 9 MMOL/L
BASOPHILS # BLD AUTO: 0.01 K/UL
BASOPHILS NFR BLD: 0.4 %
BUN SERPL-MCNC: 39 MG/DL
CALCIUM SERPL-MCNC: 8.7 MG/DL
CHLORIDE SERPL-SCNC: 102 MMOL/L
CO2 SERPL-SCNC: 31 MMOL/L
CREAT SERPL-MCNC: 2.4 MG/DL
DIFFERENTIAL METHOD: ABNORMAL
EOSINOPHIL # BLD AUTO: 0 K/UL
EOSINOPHIL NFR BLD: 0.8 %
ERYTHROCYTE [DISTWIDTH] IN BLOOD BY AUTOMATED COUNT: 18 %
EST. GFR  (AFRICAN AMERICAN): 22.6 ML/MIN/1.73 M^2
EST. GFR  (NON AFRICAN AMERICAN): 19.6 ML/MIN/1.73 M^2
GLUCOSE SERPL-MCNC: 87 MG/DL
HCT VFR BLD AUTO: 27 %
HGB BLD-MCNC: 9.4 G/DL
IMM GRANULOCYTES # BLD AUTO: 0 K/UL
IMM GRANULOCYTES NFR BLD AUTO: 0 %
LYMPHOCYTES # BLD AUTO: 0.7 K/UL
LYMPHOCYTES NFR BLD: 28.4 %
MAGNESIUM SERPL-MCNC: 2 MG/DL
MCH RBC QN AUTO: 34.4 PG
MCHC RBC AUTO-ENTMCNC: 34.8 G/DL
MCV RBC AUTO: 99 FL
MONOCYTES # BLD AUTO: 0.3 K/UL
MONOCYTES NFR BLD: 10.6 %
NEUTROPHILS # BLD AUTO: 1.4 K/UL
NEUTROPHILS NFR BLD: 59.8 %
NRBC BLD-RTO: 0 /100 WBC
PHOSPHATE SERPL-MCNC: 2.4 MG/DL
PLATELET # BLD AUTO: 111 K/UL
PMV BLD AUTO: 12 FL
POTASSIUM SERPL-SCNC: 3 MMOL/L
RBC # BLD AUTO: 2.73 M/UL
SODIUM SERPL-SCNC: 142 MMOL/L
WBC # BLD AUTO: 2.36 K/UL

## 2017-12-17 PROCEDURE — 85025 COMPLETE CBC W/AUTO DIFF WBC: CPT

## 2017-12-17 PROCEDURE — 36415 COLL VENOUS BLD VENIPUNCTURE: CPT

## 2017-12-17 PROCEDURE — 99232 SBSQ HOSP IP/OBS MODERATE 35: CPT | Mod: ,,, | Performed by: INTERNAL MEDICINE

## 2017-12-17 PROCEDURE — 25000003 PHARM REV CODE 250: Performed by: INTERNAL MEDICINE

## 2017-12-17 PROCEDURE — 11000001 HC ACUTE MED/SURG PRIVATE ROOM

## 2017-12-17 PROCEDURE — 63600175 PHARM REV CODE 636 W HCPCS: Performed by: INTERNAL MEDICINE

## 2017-12-17 PROCEDURE — 94761 N-INVAS EAR/PLS OXIMETRY MLT: CPT

## 2017-12-17 PROCEDURE — 83735 ASSAY OF MAGNESIUM: CPT

## 2017-12-17 PROCEDURE — 80069 RENAL FUNCTION PANEL: CPT

## 2017-12-17 PROCEDURE — 94640 AIRWAY INHALATION TREATMENT: CPT

## 2017-12-17 PROCEDURE — 25000242 PHARM REV CODE 250 ALT 637 W/ HCPCS: Performed by: INTERNAL MEDICINE

## 2017-12-17 RX ORDER — POTASSIUM CHLORIDE 750 MG/1
30 CAPSULE, EXTENDED RELEASE ORAL
Status: DISCONTINUED | OUTPATIENT
Start: 2017-12-17 | End: 2017-12-18

## 2017-12-17 RX ORDER — SODIUM,POTASSIUM PHOSPHATES 280-250MG
2 POWDER IN PACKET (EA) ORAL
Status: DISCONTINUED | OUTPATIENT
Start: 2017-12-17 | End: 2017-12-20

## 2017-12-17 RX ORDER — FUROSEMIDE 40 MG/1
80 TABLET ORAL EVERY 8 HOURS
Status: DISCONTINUED | OUTPATIENT
Start: 2017-12-17 | End: 2017-12-18

## 2017-12-17 RX ADMIN — ALLOPURINOL 100 MG: 100 TABLET ORAL at 08:12

## 2017-12-17 RX ADMIN — GUAIFENESIN 600 MG: 600 TABLET, EXTENDED RELEASE ORAL at 09:12

## 2017-12-17 RX ADMIN — FUROSEMIDE 80 MG: 40 TABLET ORAL at 02:12

## 2017-12-17 RX ADMIN — GUAIFENESIN 600 MG: 600 TABLET, EXTENDED RELEASE ORAL at 08:12

## 2017-12-17 RX ADMIN — HEPARIN SODIUM 5000 UNITS: 5000 INJECTION, SOLUTION INTRAVENOUS; SUBCUTANEOUS at 02:12

## 2017-12-17 RX ADMIN — LEVOTHYROXINE SODIUM ANHYDROUS 70 MCG: 100 INJECTION, POWDER, LYOPHILIZED, FOR SOLUTION INTRAVENOUS at 08:12

## 2017-12-17 RX ADMIN — HYDRALAZINE HYDROCHLORIDE AND ISOSORBIDE DINITRATE 2 TABLET: 37.5; 2 TABLET, FILM COATED ORAL at 02:12

## 2017-12-17 RX ADMIN — SODIUM BICARBONATE 650 MG TABLET 1300 MG: at 08:12

## 2017-12-17 RX ADMIN — ALLOPURINOL 100 MG: 100 TABLET ORAL at 09:12

## 2017-12-17 RX ADMIN — FLUTICASONE FUROATE AND VILANTEROL TRIFENATATE 1 PUFF: 100; 25 POWDER RESPIRATORY (INHALATION) at 08:12

## 2017-12-17 RX ADMIN — POTASSIUM CHLORIDE 30 MEQ: 750 CAPSULE, EXTENDED RELEASE ORAL at 05:12

## 2017-12-17 RX ADMIN — RAMELTEON 8 MG: 8 TABLET, FILM COATED ORAL at 09:12

## 2017-12-17 RX ADMIN — QUETIAPINE FUMARATE 250 MG: 25 TABLET, FILM COATED ORAL at 09:12

## 2017-12-17 RX ADMIN — HYDRALAZINE HYDROCHLORIDE AND ISOSORBIDE DINITRATE 2 TABLET: 37.5; 2 TABLET, FILM COATED ORAL at 09:12

## 2017-12-17 RX ADMIN — POTASSIUM & SODIUM PHOSPHATES POWDER PACK 280-160-250 MG 2 PACKET: 280-160-250 PACK at 09:12

## 2017-12-17 RX ADMIN — POTASSIUM & SODIUM PHOSPHATES POWDER PACK 280-160-250 MG 2 PACKET: 280-160-250 PACK at 12:12

## 2017-12-17 RX ADMIN — FUROSEMIDE 80 MG: 40 TABLET ORAL at 09:12

## 2017-12-17 RX ADMIN — LIOTHYRONINE SODIUM 5 MCG: 5 TABLET ORAL at 08:12

## 2017-12-17 RX ADMIN — HEPARIN SODIUM 5000 UNITS: 5000 INJECTION, SOLUTION INTRAVENOUS; SUBCUTANEOUS at 09:12

## 2017-12-17 RX ADMIN — HEPARIN SODIUM 5000 UNITS: 5000 INJECTION, SOLUTION INTRAVENOUS; SUBCUTANEOUS at 06:12

## 2017-12-17 RX ADMIN — IPRATROPIUM BROMIDE AND ALBUTEROL SULFATE 3 ML: .5; 3 SOLUTION RESPIRATORY (INHALATION) at 12:12

## 2017-12-17 RX ADMIN — HYDRALAZINE HYDROCHLORIDE AND ISOSORBIDE DINITRATE 2 TABLET: 37.5; 2 TABLET, FILM COATED ORAL at 06:12

## 2017-12-17 RX ADMIN — IPRATROPIUM BROMIDE AND ALBUTEROL SULFATE 3 ML: .5; 3 SOLUTION RESPIRATORY (INHALATION) at 08:12

## 2017-12-17 RX ADMIN — ASPIRIN 81 MG CHEWABLE TABLET 81 MG: 81 TABLET CHEWABLE at 08:12

## 2017-12-17 RX ADMIN — POTASSIUM CHLORIDE 30 MEQ: 750 CAPSULE, EXTENDED RELEASE ORAL at 07:12

## 2017-12-17 RX ADMIN — LIOTHYRONINE SODIUM 5 MCG: 5 TABLET ORAL at 09:12

## 2017-12-17 RX ADMIN — IPRATROPIUM BROMIDE AND ALBUTEROL SULFATE 3 ML: .5; 3 SOLUTION RESPIRATORY (INHALATION) at 07:12

## 2017-12-17 RX ADMIN — METOPROLOL SUCCINATE 50 MG: 50 TABLET, EXTENDED RELEASE ORAL at 08:12

## 2017-12-17 RX ADMIN — POTASSIUM CHLORIDE 30 MEQ: 750 CAPSULE, EXTENDED RELEASE ORAL at 12:12

## 2017-12-17 RX ADMIN — POTASSIUM & SODIUM PHOSPHATES POWDER PACK 280-160-250 MG 2 PACKET: 280-160-250 PACK at 05:12

## 2017-12-17 RX ADMIN — FUROSEMIDE 80 MG: 10 INJECTION, SOLUTION INTRAVENOUS at 07:12

## 2017-12-17 NOTE — ASSESSMENT & PLAN NOTE
Signs of delirium with waxing and waning quality to mental status. Risk factors: dementia, multiple medical co-morbidities, current admission for CHF exacerbation. QTc 406.     - Continue seroquel 250 mg QHS.     Will continue to follow.

## 2017-12-17 NOTE — PROGRESS NOTES
"[facility]  Psychiatry  Progress Note    Patient Name: Anai Sal  MRN: [id number]   Code Status: DNR  Admission Date: 12/13/2017  Hospital Length of Stay: 4 days  Expected Discharge Date: 12/18/2017  Attending Physician: Elsy Cantu MD  Primary Care Provider: Live Young MD (Inactive)    Current Legal Status: N/A    Patient information was obtained from patient and ER records.     Subjective:     Principal Problem:Acute exacerbation of CHF (congestive heart failure)    Chief Complaint: Delirium    HPI: 72 y.o. female  PMHx of CHF, CKD IV, HCV, h/o breast cancer s/p mastectomy/chemo 1979, COPD, HTN, Bipolar d/o admitted for acute on chronic dyspnea, concerning for acute CHF exacerbation. Patient is currently enrolled in the [facility] program and currently lives with daughter. There has been concerns of memory loss and intermittent agitation per primary team and chart review. Per chart review - there has also been intermittent reported paranoia and delusional thoughts.   "Sometimes has a bad episode of getting angry and physically attacking her. Daughter reports that pt rarely has these and usually her triggers are when daughter is helping her with bills and finances as pt is starting to become forgetful. Daughter reports that pt is at times paranoid that daughter is stealing her money and becomes angry when daughter is trying to help." No history of psychiatric hospitalizations.  She has seen Dr. Martinez in clinic on 09/30/16 for depression and also had complaints of trouble falling asleep. Dose of seroquel was increased to 200mg qhs at the time. Psychiatry is consulted given concern that medication is has not been helping much with sleep and agitation. Cogentin was also started that office visit for concerns of tardive dyskinesia which was then subsequently discontinued.   Was unable to obtain collateral information from patient's daughter today as she was unavailable after multiple attempts " "to contact her on the phone.    Hospital Course: No notes on file    Interval History: Patient seen laying in bed this morning on my approach. She reports that she is feeling good this morning. She has been sleeping well. She denies any thoughts that the medical team wants to harm her. She reports that her family came to visit her yesterday and that they feel she is doing well. Patient denies SI/HI/AVH.    Per Nursing: Patient slept through the night and has not been a behavior problem.    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    Drug use: No    Sexual activity: No      Comment:  with 3 children     Psychotherapeutics     Start     Stop Route Frequency Ordered    12/16/17 2100  QUEtiapine tablet 250 mg      -- Oral Nightly 12/16/17 1225    12/13/17 2126  ramelteon tablet 8 mg      -- Oral Nightly PRN 12/13/17 2030           Review of Systems  Objective:     Vital Signs (Most Recent):  Temp: 97.7 °F (36.5 °C) (12/17/17 0730)  Pulse: 64 (12/17/17 0824)  Resp: 19 (12/17/17 0824)  BP: (!) 141/71 (12/17/17 0730)  SpO2: (!) 92 % (12/17/17 0740) Vital Signs (24h Range):  Temp:  [97.5 °F (36.4 °C)-98.9 °F (37.2 °C)] 97.7 °F (36.5 °C)  Pulse:  [50-64] 64  Resp:  [14-20] 19  SpO2:  [91 %-96 %] 92 %  BP: (131-179)/(58-90) 141/71     Height: 5' 6" (167.6 cm)  Weight: 80 kg (176 lb 5.9 oz)  Body mass index is 28.47 kg/m².      Intake/Output Summary (Last 24 hours) at 12/17/17 1017  Last data filed at 12/17/17 0800   Gross per 24 hour   Intake              750 " "ml   Output             1850 ml   Net            -1100 ml       Physical Exam   Mental Status Exam:  Appearance: unremarkable, age appropriate, overweight  Behavior/Cooperation:  normal, cooperative  Speech: appropriate rate, volume and tone  Language: uses words appropriately; NO aphasia or dysarthria  Mood: "I feel good"  Affect:  congruent with mood and appropriate to situation/content   Thought Process: normal and logical  Thought Content: normal, no suicidality, no homicidality, delusions, or paranoia  Level of Consciousness: Alert and Oriented x3  Memory:  Intact to recent events  Attention/concentration: appropriate for age/education.   Fund of Knowledge: appears adequate  Insight: Limited  Judgment: Limited     Significant Labs:   Last 24 Hours:   Recent Lab Results       12/17/17  0849      Immature Granulocytes 0.0     Immature Grans (Abs) 0.00     Albumin 3.0(L)     Anion Gap 9     Baso # 0.01     Basophil% 0.4     BUN, Bld 39(H)     Calcium 8.7     Chloride 102     CO2 31(H)     Creatinine 2.4(H)     Differential Method Automated     eGFR if  22.6(A)     eGFR if non  19.6  Comment:  Calculation used to obtain the estimated glomerular filtration  rate (eGFR) is the CKD-EPI equation.   (A)     Eos # 0.0     Eosinophil% 0.8     Glucose 87     Gran # 1.4(L)     Gran% 59.8     Hematocrit 27.0(L)     Hemoglobin 9.4(L)     Lymph # 0.7(L)     Lymph% 28.4     Magnesium 2.0     MCH 34.4(H)     MCHC 34.8     MCV 99(H)     Mono # 0.3     Mono% 10.6     MPV 12.0     nRBC 0     Phosphorus 2.4(L)     Platelets 111(L)     Potassium 3.0(L)     RBC 2.73(L)     RDW 18.0(H)     Sodium 142     WBC 2.36(L)           Significant Imaging: I have reviewed all pertinent imaging results/findings within the past 24 hours.    Assessment/Plan:     Delirium due to another medical condition    Signs of delirium with waxing and waning quality to mental status. Risk factors: dementia, multiple medical " co-morbidities, current admission for CHF exacerbation. QTc 406.     - Continue seroquel 250 mg QHS.     Will continue to follow.                Need for Continued Hospitalization:   No need for inpatient psychiatric hospitalization. Continue medical care as per the primary team.    Anticipated Disposition: Home or Self Care         Natan Florez, DO   Psychiatry  [facility]

## 2017-12-17 NOTE — PLAN OF CARE
Problem: Patient Care Overview  Goal: Plan of Care Review  Outcome: Ongoing (interventions implemented as appropriate)  Patient is alert and orientated X3, is pleasant and compliant with medical regime, uneventful night, IV is patent and intact.

## 2017-12-17 NOTE — SUBJECTIVE & OBJECTIVE
"Interval History: Patient seen laying in bed this morning on my approach. She reports that she is feeling good this morning. She has been sleeping well. She denies any thoughts that the medical team wants to harm her. She reports that her family came to visit her yesterday and that they feel she is doing well. Patient denies SI/HI/AVH.    Per Nursing: Patient slept through the night and has not been a behavior problem.    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    Drug use: No    Sexual activity: No      Comment:  with 3 children     Psychotherapeutics     Start     Stop Route Frequency Ordered    12/16/17 2100  QUEtiapine tablet 250 mg      -- Oral Nightly 12/16/17 1225    12/13/17 2126  ramelteon tablet 8 mg      -- Oral Nightly PRN 12/13/17 2030           Review of Systems  Objective:     Vital Signs (Most Recent):  Temp: 97.7 °F (36.5 °C) (12/17/17 0730)  Pulse: 64 (12/17/17 0824)  Resp: 19 (12/17/17 0824)  BP: (!) 141/71 (12/17/17 0730)  SpO2: (!) 92 % (12/17/17 0740) Vital Signs (24h Range):  Temp:  [97.5 °F (36.4 °C)-98.9 °F (37.2 °C)] 97.7 °F (36.5 °C)  Pulse:  [50-64] 64  Resp:  [14-20] 19  SpO2:  [91 %-96 %] 92 %  BP: (131-179)/(58-90) 141/71     Height: 5' 6" (167.6 cm)  Weight: 80 kg (176 lb 5.9 oz)  Body mass index is 28.47 kg/m².      Intake/Output Summary (Last 24 hours) at 12/17/17 1017  Last data filed at 12/17/17 0800   Gross per 24 hour   Intake              750 ml   Output             1850 ml   Net            -1100 ml " "      Physical Exam   Mental Status Exam:  Appearance: unremarkable, age appropriate, overweight  Behavior/Cooperation:  normal, cooperative  Speech: appropriate rate, volume and tone  Language: uses words appropriately; NO aphasia or dysarthria  Mood: "I feel good"  Affect:  congruent with mood and appropriate to situation/content   Thought Process: normal and logical  Thought Content: normal, no suicidality, no homicidality, delusions, or paranoia  Level of Consciousness: Alert and Oriented x3  Memory:  Intact to recent events  Attention/concentration: appropriate for age/education.   Fund of Knowledge: appears adequate  Insight: Limited  Judgment: Limited     Significant Labs:   Last 24 Hours:   Recent Lab Results       12/17/17  0849      Immature Granulocytes 0.0     Immature Grans (Abs) 0.00     Albumin 3.0(L)     Anion Gap 9     Baso # 0.01     Basophil% 0.4     BUN, Bld 39(H)     Calcium 8.7     Chloride 102     CO2 31(H)     Creatinine 2.4(H)     Differential Method Automated     eGFR if  22.6(A)     eGFR if non  19.6  Comment:  Calculation used to obtain the estimated glomerular filtration  rate (eGFR) is the CKD-EPI equation.   (A)     Eos # 0.0     Eosinophil% 0.8     Glucose 87     Gran # 1.4(L)     Gran% 59.8     Hematocrit 27.0(L)     Hemoglobin 9.4(L)     Lymph # 0.7(L)     Lymph% 28.4     Magnesium 2.0     MCH 34.4(H)     MCHC 34.8     MCV 99(H)     Mono # 0.3     Mono% 10.6     MPV 12.0     nRBC 0     Phosphorus 2.4(L)     Platelets 111(L)     Potassium 3.0(L)     RBC 2.73(L)     RDW 18.0(H)     Sodium 142     WBC 2.36(L)           Significant Imaging: I have reviewed all pertinent imaging results/findings within the past 24 hours.  "

## 2017-12-17 NOTE — PROGRESS NOTES
Progress Note  Hospital Medicine      Admit Date: 12/13/2017    SUBJECTIVE:     Follow-up For:  Acute exacerbation of CHF (congestive heart failure)    HPI/Interval history: Found in hallway looking for bathroom. Otherwise night was uneventful    Review of Systems: Gen: no fever, no chills, Heart: no chest pain, palpitations; Resp: no SOB, no cough    OBJECTIVE:     Vital Signs Range (Last 24H):  Temp:  [97.5 °F (36.4 °C)-98.9 °F (37.2 °C)]   Pulse:  [50-64]   Resp:  [14-20]   BP: (131-179)/(58-90)   SpO2:  [91 %-96 %]     Physical Exam:  General appearance: NAD, asleep  Neck: FROM, supple   Lungs: snoring, no accessory muscle use  CV: RRR, no heave; -JVD - hepatojugular reflex  Abdomen: Soft, non-tender; no masses or HSM  Extremities: trace + BLE edema, no digital cyanosis  Skin: no rash, lesions or ulcers  Psych: alert and oriented x 3      Recent Labs  Lab 12/15/17  0747 12/16/17  0909 12/17/17  0849    141 142   K 3.8 3.4* 3.0*    101 102   CO2 30* 30* 31*   BUN 45* 42* 39*   CREATININE 3.0* 2.9* 2.4*   GLU 94 94 87   CALCIUM 8.8 9.0 8.7   MG 2.2 2.3 2.0   PHOS 3.7 2.8 2.4*       Recent Labs  Lab 12/14/17  1416 12/15/17  0747 12/16/17  0909 12/17/17  0849   ALKPHOS 58  --   --   --    ALT 21  --   --   --    AST 44*  --   --   --    ALBUMIN 3.2* 3.0* 3.1* 3.0*   PROT 7.4  --   --   --    BILITOT 2.5*  --   --   --          Recent Labs  Lab 12/15/17  0747 12/16/17  0909 12/17/17  0849   WBC 2.66* 2.12* 2.36*   HGB 9.1* 9.5* 9.4*   HCT 26.3* 27.8* 27.0*   PLT 94* 90* 111*   GRAN 69.1  1.8 58.4  1.2* 59.8  1.4*   LYMPH 18.0  0.5* 30.7  0.7* 28.4  0.7*   MONO 11.7  0.3 10.4  0.2* 10.6  0.3         Recent Labs  Lab 12/15/17  0821   POCTGLUCOSE 95       ASSESSMENT/PLAN:     Acute systolic and diastolic exacerbation of CHF (congestive heart failure)     Patient presenting with worsening LE edema, CXR with pulmonary edema, shortness of breath, elevated BNP likely acute on chronic systolic CHF (based  on recent ECHO 5/17), likely precipitate increased fluid intake by patient and severe hypothyroidism  -Strict I/O, standing weights  -Continue BB, ASA, appears no statin per prior h/o chronic hep C, will follow INR/transaminisases to assess liver fxn  -ECHO 2D showed EF 30-35%, Grade 3 diastolic dysfunction, biatrial enlargement, depressed right sided dysfunction, increase right atrial pressure  -increased diuretics to furosemide IV 80 mg TID  -cardiology follow up as outpatient          Stage 4 chronic kidney disease  CANDI on CKD IV, cardiogenic     CANDI on CKD IV , patient with baseline 2.2, currently 2.8, likely prerenal, will check urinalysis, follow BMP  Alkalotic - stop Na Bicarb 1300  Nephrology follow up as outpatient         Dementia with behavioral disturbance     Per family over last months issues with memory, forgetting to turn off appliances, getting lost. Also with agitation (espcially at night) and non-compliance with care. Patient on seroquel 200 mg qhs and not effective for her current behaviors. Psychiatry consult for medication management. - will increase seroquel 250 mg qhs. Will discuss with daughter about use of depakote in past  -psychiatry and memory disorder clinic follow up as outpatient          Anemia of chronic renal failure, stage 4 (severe)     Stable, f/u outpatient nephro          Chronic obstructive pulmonary disease     Per prior PFTs, mild obstructive component, on inhalers at home, no increase in sputum production unlikely COPD exacerbation,  -Duonebs Q6H  -Continue LABA/inhailed corticosteroid          Elevated troponin     Patient with mild elevation in troponin, no recent chest pain, no significant EKG changes, likely type 2 NSTEMI in setting of CHF exacerbation and CKD impairing clearance  troponin levels stable          Primary hypothyroidism, uncontrolled     Patient with h/o hypothyroidism, last TSH/T4 early 2017, TSH elevated T4 WNL  -endocrine consulted - levothyroxine 70  mcg IV daily and cytomel 5 mcg BID for one week. Hold oral levothyroxine while diuresing.          Insomnia     Patient with insomnia and ?h/o bipolar disorder per family, on seroquel 200mg QHS, reports no improvement  -PRN ramelteon for sleep     FELICITAS - Apneic breathing during sleep witnessed. consider sleep study, but not sure if the effort would be worth it for patient at her age and co-morbidities. Apneic breathing improved after diuresis and IV levothryoxine started.

## 2017-12-17 NOTE — PLAN OF CARE
Problem: Fall Risk (Adult)  Goal: Absence of Falls  Patient will demonstrate the desired outcomes by discharge/transition of care.   Outcome: Ongoing (interventions implemented as appropriate)  Pt is free of falls and injuries per shift , fall precautions maintained , bed alarm on and hourly round maintained .     Problem: Patient Care Overview  Goal: Plan of Care Review  Outcome: Ongoing (interventions implemented as appropriate)  Plan of care reviewed with pt . Pt is A,A,O x 3 . Stable V/S. No C/O pain during the day  . Pt slept  Between care . Pt is able to ambulate in the room with assist and use the bathroom . Pt turned in bed frequently . P I & O documented in EPIC .

## 2017-12-18 LAB
ALBUMIN SERPL BCP-MCNC: 2.9 G/DL
ANION GAP SERPL CALC-SCNC: 9 MMOL/L
BASOPHILS # BLD AUTO: 0.01 K/UL
BASOPHILS # BLD AUTO: 0.02 K/UL
BASOPHILS NFR BLD: 0.3 %
BASOPHILS NFR BLD: 0.4 %
BUN SERPL-MCNC: 56 MG/DL
CALCIUM SERPL-MCNC: 8.9 MG/DL
CHLORIDE SERPL-SCNC: 105 MMOL/L
CO2 SERPL-SCNC: 29 MMOL/L
CREAT SERPL-MCNC: 2.4 MG/DL
DIFFERENTIAL METHOD: ABNORMAL
DIFFERENTIAL METHOD: ABNORMAL
EOSINOPHIL # BLD AUTO: 0 K/UL
EOSINOPHIL # BLD AUTO: 0 K/UL
EOSINOPHIL NFR BLD: 0.2 %
EOSINOPHIL NFR BLD: 0.3 %
ERYTHROCYTE [DISTWIDTH] IN BLOOD BY AUTOMATED COUNT: 18.3 %
ERYTHROCYTE [DISTWIDTH] IN BLOOD BY AUTOMATED COUNT: 18.6 %
EST. GFR  (AFRICAN AMERICAN): 22.6 ML/MIN/1.73 M^2
EST. GFR  (NON AFRICAN AMERICAN): 19.6 ML/MIN/1.73 M^2
GLUCOSE SERPL-MCNC: 79 MG/DL
HCT VFR BLD AUTO: 25.6 %
HCT VFR BLD AUTO: 26.1 %
HGB BLD-MCNC: 9 G/DL
HGB BLD-MCNC: 9 G/DL
IMM GRANULOCYTES # BLD AUTO: 0.01 K/UL
IMM GRANULOCYTES # BLD AUTO: 0.04 K/UL
IMM GRANULOCYTES NFR BLD AUTO: 0.3 %
IMM GRANULOCYTES NFR BLD AUTO: 0.7 %
LYMPHOCYTES # BLD AUTO: 1 K/UL
LYMPHOCYTES # BLD AUTO: 1.9 K/UL
LYMPHOCYTES NFR BLD: 33 %
LYMPHOCYTES NFR BLD: 35.5 %
MAGNESIUM SERPL-MCNC: 2 MG/DL
MCH RBC QN AUTO: 34.9 PG
MCH RBC QN AUTO: 35.3 PG
MCHC RBC AUTO-ENTMCNC: 34.5 G/DL
MCHC RBC AUTO-ENTMCNC: 35.2 G/DL
MCV RBC AUTO: 100 FL
MCV RBC AUTO: 101 FL
MONOCYTES # BLD AUTO: 0.5 K/UL
MONOCYTES # BLD AUTO: 0.9 K/UL
MONOCYTES NFR BLD: 15 %
MONOCYTES NFR BLD: 16.1 %
NEUTROPHILS # BLD AUTO: 1.6 K/UL
NEUTROPHILS # BLD AUTO: 2.6 K/UL
NEUTROPHILS NFR BLD: 47.1 %
NEUTROPHILS NFR BLD: 51.1 %
NRBC BLD-RTO: 0 /100 WBC
NRBC BLD-RTO: 0 /100 WBC
OB PNL STL: POSITIVE
PHOSPHATE SERPL-MCNC: 2.4 MG/DL
PLATELET # BLD AUTO: 111 K/UL
PLATELET # BLD AUTO: 120 K/UL
PMV BLD AUTO: 12 FL
PMV BLD AUTO: 12.3 FL
POTASSIUM SERPL-SCNC: 4.2 MMOL/L
RBC # BLD AUTO: 2.55 M/UL
RBC # BLD AUTO: 2.58 M/UL
SODIUM SERPL-SCNC: 143 MMOL/L
WBC # BLD AUTO: 3.06 K/UL
WBC # BLD AUTO: 5.46 K/UL

## 2017-12-18 PROCEDURE — 63600175 PHARM REV CODE 636 W HCPCS: Performed by: INTERNAL MEDICINE

## 2017-12-18 PROCEDURE — 82272 OCCULT BLD FECES 1-3 TESTS: CPT

## 2017-12-18 PROCEDURE — 36415 COLL VENOUS BLD VENIPUNCTURE: CPT

## 2017-12-18 PROCEDURE — 25000003 PHARM REV CODE 250: Performed by: INTERNAL MEDICINE

## 2017-12-18 PROCEDURE — 99232 SBSQ HOSP IP/OBS MODERATE 35: CPT | Mod: GC,,, | Performed by: PSYCHIATRY & NEUROLOGY

## 2017-12-18 PROCEDURE — 94640 AIRWAY INHALATION TREATMENT: CPT

## 2017-12-18 PROCEDURE — 83735 ASSAY OF MAGNESIUM: CPT

## 2017-12-18 PROCEDURE — 99232 SBSQ HOSP IP/OBS MODERATE 35: CPT | Mod: ,,, | Performed by: INTERNAL MEDICINE

## 2017-12-18 PROCEDURE — 80069 RENAL FUNCTION PANEL: CPT

## 2017-12-18 PROCEDURE — 25000242 PHARM REV CODE 250 ALT 637 W/ HCPCS: Performed by: INTERNAL MEDICINE

## 2017-12-18 PROCEDURE — 85025 COMPLETE CBC W/AUTO DIFF WBC: CPT | Mod: 91

## 2017-12-18 PROCEDURE — 11000001 HC ACUTE MED/SURG PRIVATE ROOM

## 2017-12-18 RX ORDER — LEVOTHYROXINE SODIUM 100 UG/1
100 TABLET ORAL
Qty: 30 TABLET | Refills: 11 | Status: ON HOLD | OUTPATIENT
Start: 2017-12-18 | End: 2018-04-23

## 2017-12-18 RX ORDER — BENZTROPINE MESYLATE 0.5 MG/1
0.5 TABLET ORAL NIGHTLY
Qty: 30 TABLET | Refills: 0
Start: 2017-12-18 | End: 2017-12-22 | Stop reason: HOSPADM

## 2017-12-18 RX ORDER — LIOTHYRONINE SODIUM 5 UG/1
5 TABLET ORAL 2 TIMES DAILY
Qty: 8 TABLET | Refills: 0 | Status: SHIPPED | OUTPATIENT
Start: 2017-12-18 | End: 2017-12-22 | Stop reason: HOSPADM

## 2017-12-18 RX ORDER — QUETIAPINE FUMARATE 200 MG/1
200 TABLET, FILM COATED ORAL NIGHTLY
Qty: 30 TABLET | Refills: 11 | Status: SHIPPED | OUTPATIENT
Start: 2017-12-18 | End: 2017-12-22

## 2017-12-18 RX ORDER — FUROSEMIDE 40 MG/1
80 TABLET ORAL 2 TIMES DAILY
Status: DISCONTINUED | OUTPATIENT
Start: 2017-12-19 | End: 2017-12-21

## 2017-12-18 RX ORDER — FUROSEMIDE 80 MG/1
80 TABLET ORAL 2 TIMES DAILY
Qty: 60 TABLET | Refills: 11 | Status: SHIPPED | OUTPATIENT
Start: 2017-12-18 | End: 2017-12-22

## 2017-12-18 RX ADMIN — POTASSIUM CHLORIDE 30 MEQ: 750 CAPSULE, EXTENDED RELEASE ORAL at 08:12

## 2017-12-18 RX ADMIN — POTASSIUM & SODIUM PHOSPHATES POWDER PACK 280-160-250 MG 2 PACKET: 280-160-250 PACK at 08:12

## 2017-12-18 RX ADMIN — HYDRALAZINE HYDROCHLORIDE AND ISOSORBIDE DINITRATE 2 TABLET: 37.5; 2 TABLET, FILM COATED ORAL at 03:12

## 2017-12-18 RX ADMIN — ASPIRIN 81 MG CHEWABLE TABLET 81 MG: 81 TABLET CHEWABLE at 08:12

## 2017-12-18 RX ADMIN — POTASSIUM CHLORIDE 30 MEQ: 750 CAPSULE, EXTENDED RELEASE ORAL at 12:12

## 2017-12-18 RX ADMIN — HYDRALAZINE HYDROCHLORIDE AND ISOSORBIDE DINITRATE 2 TABLET: 37.5; 2 TABLET, FILM COATED ORAL at 06:12

## 2017-12-18 RX ADMIN — GUAIFENESIN 600 MG: 600 TABLET, EXTENDED RELEASE ORAL at 08:12

## 2017-12-18 RX ADMIN — POTASSIUM & SODIUM PHOSPHATES POWDER PACK 280-160-250 MG 2 PACKET: 280-160-250 PACK at 10:12

## 2017-12-18 RX ADMIN — LIOTHYRONINE SODIUM 5 MCG: 5 TABLET ORAL at 10:12

## 2017-12-18 RX ADMIN — LEVOTHYROXINE SODIUM ANHYDROUS 70 MCG: 100 INJECTION, POWDER, LYOPHILIZED, FOR SOLUTION INTRAVENOUS at 09:12

## 2017-12-18 RX ADMIN — ALLOPURINOL 100 MG: 100 TABLET ORAL at 10:12

## 2017-12-18 RX ADMIN — LIOTHYRONINE SODIUM 5 MCG: 5 TABLET ORAL at 08:12

## 2017-12-18 RX ADMIN — METOPROLOL SUCCINATE 50 MG: 50 TABLET, EXTENDED RELEASE ORAL at 08:12

## 2017-12-18 RX ADMIN — POTASSIUM & SODIUM PHOSPHATES POWDER PACK 280-160-250 MG 2 PACKET: 280-160-250 PACK at 01:12

## 2017-12-18 RX ADMIN — FUROSEMIDE 80 MG: 40 TABLET ORAL at 06:12

## 2017-12-18 RX ADMIN — HYDRALAZINE HYDROCHLORIDE AND ISOSORBIDE DINITRATE 2 TABLET: 37.5; 2 TABLET, FILM COATED ORAL at 10:12

## 2017-12-18 RX ADMIN — QUETIAPINE FUMARATE 250 MG: 25 TABLET, FILM COATED ORAL at 09:12

## 2017-12-18 RX ADMIN — HEPARIN SODIUM 5000 UNITS: 5000 INJECTION, SOLUTION INTRAVENOUS; SUBCUTANEOUS at 06:12

## 2017-12-18 RX ADMIN — HEPARIN SODIUM 5000 UNITS: 5000 INJECTION, SOLUTION INTRAVENOUS; SUBCUTANEOUS at 03:12

## 2017-12-18 RX ADMIN — IPRATROPIUM BROMIDE AND ALBUTEROL SULFATE 3 ML: .5; 3 SOLUTION RESPIRATORY (INHALATION) at 01:12

## 2017-12-18 RX ADMIN — FLUTICASONE FUROATE AND VILANTEROL TRIFENATATE 1 PUFF: 100; 25 POWDER RESPIRATORY (INHALATION) at 08:12

## 2017-12-18 RX ADMIN — ALLOPURINOL 100 MG: 100 TABLET ORAL at 08:12

## 2017-12-18 RX ADMIN — FUROSEMIDE 80 MG: 40 TABLET ORAL at 03:12

## 2017-12-18 NOTE — PLAN OF CARE
[facility]    HOME HEALTH ORDERS  FACE TO FACE ENCOUNTER    Patient Name: Anai Sla  YOB: 1945    PCP: Live Young MD (Inactive)   PCP Address: [address] [place] [id number]  PCP Phone Number: [phone]  PCP Fax: [phone]    Encounter Date: 12/18/2017    Admit to Home Health    Diagnoses:  Active Hospital Problems    Diagnosis  POA    *Acute exacerbation of CHF (congestive heart failure) [I50.9]  Yes    Major neurocognitive disorder [F03.90]  Yes    Cognitive impairment [R41.89]  Unknown    Anemia of chronic renal failure, stage 4 (severe) [N18.4, D63.1]  Yes    Chronic obstructive pulmonary disease [J44.9]  Yes    Delirium due to another medical condition [F05]  Yes    Elevated troponin [R74.8]  Yes    Primary hypothyroidism [E03.9]  Yes    Stage 4 chronic kidney disease [N18.4]  Yes    Insomnia [G47.00]  Yes      Resolved Hospital Problems    Diagnosis Date Resolved POA   No resolved problems to display.       Future Appointments  Date Time Provider Department Center   12/27/2017 10:30 AM LAB, APPOINTMENT [place] [facility]   12/27/2017 11:30 AM EPO, INJECTION [facility] ID INF [facility]       I have seen and examined this patient face to face today. My clinical findings that support the need for the home health skilled services and home bound status are the following:  Weakness/numbness causing balance and gait disturbance due to Heart Failure and Weakness/Debility making it taxing to leave home.  Requiring assistive device to leave home due to unsteady gait caused by  Heart Failure and Weakness/Debility.  Patient with medication mismanagement issues requiring home bound status as evidenced by  Poor understanding of medication regimen/dosage, Poor adherence to medication regimen/dosage and poor adherence to fluid restriction..    Allergies:Review of patient's allergies indicates:  No Known Allergies    Diet: regular diet and  fluid restriction: 1500 mL (6 cups) per day    Activities: activity as tolerated    Nursing:   SN to complete comprehensive assessment including routine vital signs. Instruct on disease process and s/s of complications to report to MD. Review/verify medication list sent home with the patient at time of discharge  and instruct patient/caregiver as needed. Frequency may be adjusted depending on start of care date.    Notify MD if SBP > 160 or < 90; DBP > 90 or < 50; HR > 120 or < 50; Temp > 101    Please re-enforce fluid restriction    CONSULTS:    Physical Therapy to evaluate and treat. Evaluate for home safety and equipment needs; Establish/upgrade home exercise program. Perform / instruct on therapeutic exercises, gait training, transfer training, and Range of Motion.  Occupational Therapy to evaluate and treat. Evaluate home environment for safety and equipment needs. Perform/Instruct on transfers, ADL training, ROM, and therapeutic exercises.    MISCELLANEOUS CARE:  Heart Failure:      SN to instruct on the following:    Instruct on the definition of CHF.   Instruct on the signs/sympoms of CHF to be reported.   Instruct on and monitor daily weights.   Instruct on factors that cause exacerbation.   Instruct on action, dose, schedule, and side effects of medications.   Instruct on diet as prescribed.   Instruct on activity allowed.   Instruct on life-style modifications for life long management of CHF   SN to assess compliance with daily weights, diet, medications, fluid retention,    safety precautions, activities permitted and life-style modifications.   Additional 1-2 SN visits per week as needed for signs and symptoms     of CHF exacerbation.      For Weight Gain > 2-3 lbs in 1 day or 4-6 lbs over 1 week notify PCP:  Increase furosemide (oral diuretic) dose to 120 mg BID for 5 days temporarily  Obtain BMP lab test in 3 days  If weight does not decrease by 3 lbs after 5 days of increased diuretic usage: Notify  PCP.    WOUND CARE ORDERS  n/a      Medications: Review discharge medications with patient and family and provide education.      Current Discharge Medication List      START taking these medications    Details   liothyronine (CYTOMEL) 5 MCG Tab Take 1 tablet (5 mcg total) by mouth 2 (two) times daily. Finish entire bottle  Qty: 8 tablet, Refills: 0         CONTINUE these medications which have CHANGED    Details   furosemide (LASIX) 80 MG tablet Take 1 tablet (80 mg total) by mouth 2 (two) times daily. Take one in the morning at before breakfast and the other at 6 PM  Qty: 60 tablet, Refills: 11    Associated Diagnoses: CKD (chronic kidney disease) stage 3, GFR 30-59 ml/min; Proteinuria, unspecified type; Edema, unspecified type      levothyroxine (SYNTHROID) 100 MCG tablet Take 1 tablet (100 mcg total) by mouth before breakfast.  Qty: 30 tablet, Refills: 11      QUEtiapine (SEROQUEL) 200 MG Tab Take 1 tablet (200 mg total) by mouth nightly.  Qty: 30 tablet, Refills: 11         CONTINUE these medications which have NOT CHANGED    Details   allopurinol (ZYLOPRIM) 100 MG tablet Take 100 mg by mouth 2 (two) times daily.       aspirin 81 MG Chew Take 1 tablet (81 mg total) by mouth once daily.  Refills: 0      diclofenac sodium 1 % Gel Apply 4 g topically 4 (four) times daily. Prn pain      fluticasone-salmeterol 250-50 mcg/dose (ADVAIR) 250-50 mcg/dose diskus inhaler Inhale 1 puff into the lungs 2 (two) times daily. Controller  Refills: 0      isosorbide-hydrALAZINE 20-37.5 mg (BIDIL) 20-37.5 mg Tab Take 2 tablets by mouth 3 (three) times daily.  Qty: 180 tablet, Refills: 2      melatonin 3 mg Tab Take by mouth every evening.      metoprolol succinate (TOPROL-XL) 50 MG 24 hr tablet Take 1 tablet (50 mg total) by mouth once daily.  Qty: 30 tablet, Refills: 2      tiotropium (SPIRIVA) 18 mcg inhalation capsule Inhale 1 capsule (18 mcg total) into the lungs once daily.  Qty: 90 capsule, Refills: 3      fluticasone  (FLONASE) 50 mcg/actuation nasal spray 2 sprays by Each Nare route once daily.  Qty: 1 Bottle, Refills: 3      gabapentin (NEURONTIN) 300 MG capsule Take 300 mg by mouth 3 (three) times daily as needed (for nerve pain).          STOP taking these medications       sodium bicarbonate 650 MG tablet Comments:   Reason for Stopping:         benztropine (COGENTIN) 0.5 MG tablet Comments:   Reason for Stopping:         betamethasone valerate 0.1% (VALISONE) 0.1 % Lotn Comments:   Reason for Stopping:         guaifenesin (MUCINEX) 600 mg 12 hr tablet Comments:   Reason for Stopping:               I certify that this patient is confined to her home and needs intermittent skilled nursing care, physical therapy and occupational therapy.

## 2017-12-18 NOTE — PROGRESS NOTES
"[facility]  Psychiatry  Progress Note    Patient Name: Anai Sal  MRN: [id number]   Code Status: DNR  Admission Date: 12/13/2017  Hospital Length of Stay: 5 days  Expected Discharge Date: 12/18/2017  Attending Physician: Elsy Cantu MD  Primary Care Provider: Live Young MD (Inactive)    Current Legal Status: N/A    Patient information was obtained from patient.     Subjective:     Principal Problem:Acute exacerbation of CHF (congestive heart failure)    Chief Complaint: "i'm doing just fine - thank you for asking"    HPI: 72 y.o. female  PMHx of CHF, CKD IV, HCV, h/o breast cancer s/p mastectomy/chemo 1979, COPD, HTN, Bipolar d/o admitted for acute on chronic dyspnea, concerning for acute CHF exacerbation. Patient is currently enrolled in the [facility] program and currently lives with daughter. There has been concerns of memory loss and intermittent agitation per primary team and chart review. Per chart review - there has also been intermittent reported paranoia and delusional thoughts.   "Sometimes has a bad episode of getting angry and physically attacking her. Daughter reports that pt rarely has these and usually her triggers are when daughter is helping her with bills and finances as pt is starting to become forgetful. Daughter reports that pt is at times paranoid that daughter is stealing her money and becomes angry when daughter is trying to help." No history of psychiatric hospitalizations.  She has seen Dr. Martinez in clinic on 09/30/16 for depression and also had complaints of trouble falling asleep. Dose of seroquel was increased to 200mg qhs at the time. Psychiatry is consulted given concern that medication is has not been helping much with sleep and agitation. Cogentin was also started that office visit for concerns of tardive dyskinesia which was then subsequently discontinued.   Was unable to obtain collateral information from patient's daughter today as she was unavailable " after multiple attempts to contact her on the phone.    Hospital Course: No notes on file    Interval History: On interview, the patient states that she is doing well. Denies any physical complaints. Denies any feelings of confusion. Denies AVH, paranoia, IOR, SI/HI. Believes she slept well last night and that mood is good. Denies depression, anhedonia, irritability, euphoria. Gave permission for us to speak with her daughter.    Daughter, Treshone Turner, was reached by phone. She states that the patient has been diagnosed with bipolar disorder, but she does not believe that is correct. The patient does not have prolonged mood episodes, but she actually just gets paranoid at certain times. She also has some memory difficulties (and does not usually remember what day it is), but has been significantly worse in the past few weeks (including mistaking the pantry for the bathroom). She continues to take quetiapine for insomnia, but it does not seem to work like it used to. She has been using 300mg qhs mostly, but still staying up through the entire night. The patient is also restless, and is only able to explain that she feels the need to move. She wonders if some medication change could be made. The patient has never taken depakote as far as she knows. She also asks if we can arrange testing for the patient to determine if she has dementia.    Per nurse and chart review, the patient did not have any problems or agitation overnight.    Family History     Problem Relation (Age of Onset)    Heart disease Mother, Sister    No Known Problems Father, Brother, Maternal Aunt, Maternal Uncle, Paternal Aunt, Paternal Uncle, Maternal Grandmother, Maternal Grandfather, Paternal Grandmother, Paternal Grandfather        Social History Main Topics    Smoking status: Former Smoker     Packs/day: 2.00     Years: 20.00     Types: Cigarettes     Quit date: 2/16/1995    Smokeless tobacco: Never Used    Alcohol use No      Comment:  "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    Drug use: No    Sexual activity: No      Comment:  with 3 children     Psychotherapeutics     Start     Stop Route Frequency Ordered    12/16/17 2100  QUEtiapine tablet 250 mg      -- Oral Nightly 12/16/17 1225    12/13/17 2126  ramelteon tablet 8 mg      -- Oral Nightly PRN 12/13/17 2030           Review of Systems  Objective:     Vital Signs (Most Recent):  Temp: 98.9 °F (37.2 °C) (12/18/17 0729)  Pulse: 67 (12/18/17 0926)  Resp: 20 (12/18/17 0838)  BP: 130/63 (12/18/17 0729)  SpO2: 96 % (12/18/17 0729) Vital Signs (24h Range):  Temp:  [98.2 °F (36.8 °C)-98.9 °F (37.2 °C)] 98.9 °F (37.2 °C)  Pulse:  [61-83] 67  Resp:  [16-20] 20  SpO2:  [92 %-97 %] 96 %  BP: (130-176)/(63-91) 130/63     Height: 5' 6" (167.6 cm)  Weight: 80 kg (176 lb 5.9 oz)  Body mass index is 28.47 kg/m².      Intake/Output Summary (Last 24 hours) at 12/18/17 1235  Last data filed at 12/18/17 0300   Gross per 24 hour   Intake              250 ml   Output             1250 ml   Net            -1000 ml       Physical Exam   Psychiatric:   Appearance: overweight black female, appears stated age, hair shaved short, lying comfortably in bed wearing hospital gown  Behavior: calm, cooperative, fair eye contact  Speech: normal rate, volume, and tone  Mood: "good"  Affect: euthymic, blunted, congruent to stated mood, not labile  Thought processes: somewhat disorganized, concrete, paucity of thought  Thought content: no SI/HI, no AVH  Insight: limited  Judgment: fair  Cognition: alert, oriented only to self and place. Disoriented to day of week, date, month, year, situation. Recent and remote memory impaired. Attention good.    CAM-ICU positive        Significant Labs: All pertinent labs within the past 24 hours have been reviewed.    Significant Imaging: None    Assessment/Plan:     Delirium due to another medical condition    Signs of delirium with waxing and waning quality to mental status. Risk factors: dementia, multiple " medical co-morbidities, current admission for CHF exacerbation. QTc 406.     - Continue seroquel 250 mg QHS for now. Per daughter, this medication has not really been working for sleep recently, but the patient has been sleeping much better in the hospital (primary team attributes this to identification/treatment of thyroid disease). She is also not displaying any indications of akathisia, and denies subjective feelings of this as well. There is therefore no indication to make a change at this point.    - underlying bipolar disorder questionable, with clinical picture more suggestive of a neurocognitive disorder with psychosis or just a primary psychotic disorder   - can refer for neuropsychologic testing to characterize cognitive deficits   - should also follow-up with outpatient psychiatrist. I am working to get her into my own clinic - we ca hopefully have an appointment for her on January 9.               Need for Continued Hospitalization:   No need for inpatient psychiatric hospitalization. Continue medical care as per the primary team.    Anticipated Disposition: Home or Self Care     Total time:  25 with greater than 50% of this time spent in counseling and/or coordination of care.     Thank you for this consult. Will continue to follow.    Owen Urias MD   Psychiatry  [facility]

## 2017-12-18 NOTE — ASSESSMENT & PLAN NOTE
Signs of delirium with waxing and waning quality to mental status. Risk factors: dementia, multiple medical co-morbidities, current admission for CHF exacerbation. QTc 406.     - Continue seroquel 250 mg QHS for now. Per daughter, this medication has not really been working for sleep recently, but the patient has been sleeping much better in the hospital (primary team attributes this to identification/treatment of thyroid disease). She is also not displaying any indications of akathisia, and denies subjective feelings of this as well. There is therefore no indication to make a change at this point.    - underlying bipolar disorder questionable, with clinical picture more suggestive of a neurocognitive disorder with psychosis or just a primary psychotic disorder   - can refer for neuropsychologic testing to characterize cognitive deficits   - should also follow-up with outpatient psychiatrist. I am working to get her into my own clinic - we ca hopefully have an appointment for her on January 9.

## 2017-12-18 NOTE — SUBJECTIVE & OBJECTIVE
Interval History: On interview, the patient states that she is doing well. Denies any physical complaints. Denies any feelings of confusion. Denies AVH, paranoia, IOR, SI/HI. Believes she slept well last night and that mood is good. Denies depression, anhedonia, irritability, euphoria. Gave permission for us to speak with her daughter.    Daughter, Treshone Turner, was reached by phone. She states that the patient has been diagnosed with bipolar disorder, but she does not believe that is correct. The patient does not have prolonged mood episodes, but she actually just gets paranoid at certain times. She also has some memory difficulties (and does not usually remember what day it is), but has been significantly worse in the past few weeks (including mistaking the pantry for the bathroom). She continues to take quetiapine for insomnia, but it does not seem to work like it used to. She has been using 300mg qhs mostly, but still staying up through the entire night. The patient is also restless, and is only able to explain that she feels the need to move. She wonders if some medication change could be made. The patient has never taken depakote as far as she knows. She also asks if we can arrange testing for the patient to determine if she has dementia.    Per nurse and chart review, the patient did not have any problems or agitation overnight.    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    Drug use: No    Sexual activity: No      Comment:  with 3  "children     Psychotherapeutics     Start     Stop Route Frequency Ordered    12/16/17 2100  QUEtiapine tablet 250 mg      -- Oral Nightly 12/16/17 1225    12/13/17 2126  ramelteon tablet 8 mg      -- Oral Nightly PRN 12/13/17 2030           Review of Systems  Objective:     Vital Signs (Most Recent):  Temp: 98.9 °F (37.2 °C) (12/18/17 0729)  Pulse: 67 (12/18/17 0926)  Resp: 20 (12/18/17 0838)  BP: 130/63 (12/18/17 0729)  SpO2: 96 % (12/18/17 0729) Vital Signs (24h Range):  Temp:  [98.2 °F (36.8 °C)-98.9 °F (37.2 °C)] 98.9 °F (37.2 °C)  Pulse:  [61-83] 67  Resp:  [16-20] 20  SpO2:  [92 %-97 %] 96 %  BP: (130-176)/(63-91) 130/63     Height: 5' 6" (167.6 cm)  Weight: 80 kg (176 lb 5.9 oz)  Body mass index is 28.47 kg/m².      Intake/Output Summary (Last 24 hours) at 12/18/17 1235  Last data filed at 12/18/17 0300   Gross per 24 hour   Intake              250 ml   Output             1250 ml   Net            -1000 ml       Physical Exam   Psychiatric:   Appearance: overweight black female, appears stated age, hair shaved short, lying comfortably in bed wearing hospital gown  Behavior: calm, cooperative, fair eye contact  Speech: normal rate, volume, and tone  Mood: "good"  Affect: euthymic, blunted, congruent to stated mood, not labile  Thought processes: somewhat disorganized, concrete, paucity of thought  Thought content: no SI/HI, no AVH  Insight: limited  Judgment: fair  Cognition: alert, oriented only to self and place. Disoriented to day of week, date, month, year, situation. Recent and remote memory impaired. Attention good.    CAM-ICU positive        Significant Labs: All pertinent labs within the past 24 hours have been reviewed.    Significant Imaging: None  "

## 2017-12-18 NOTE — PLAN OF CARE
Problem: Patient Care Overview  Goal: Individualization & Mutuality  Outcome: Ongoing (interventions implemented as appropriate)  No acute events. VSS.  Continue with diuresis.  Up to BSC to void. Multiple attempts to reach daughter.  Patient has been discharged. Will continue to monitor.

## 2017-12-18 NOTE — PLAN OF CARE
"Problem: Fluid Volume Excess (Adult,Obstetrics,Pediatric)  Goal: Identify Related Risk Factors and Signs and Symptoms  Related risk factors and signs and symptoms are identified upon initiation of Human Response Clinical Practice Guideline (CPG)   Outcome: Ongoing (interventions implemented as appropriate)   12/18/17 0057   Fluid Volume Excess   Related Risk Factors (Fluid Volume Excess) cardiac changes;disease process;fluid intake excessive;knowledge deficit;psychosis   Signs and Symptoms (Fluid Volume Excess) activity intolerance;edema     Patient alert and oriented to self and place.  Patient denies pain.  Patient presents with a dx of CHF exacerbation.  Patient vital signs are stable.  Patient requires reinforcement of 1500  fluid restrictions, patient and daughter educated and daughter verbalized an understanding.  Patient assisted to bed and as the nurse was pulling up her blanket, " now go and get me a cup of ice."  A note to inform staff that patient is on a 1500 fluid restriction.  Patient remains on room air, O 2 SATs 97 %.  HOB elevated. Patient is in no acute distress. Discharge is TBD.         "

## 2017-12-18 NOTE — PLAN OF CARE
CM contacted pt's daughter, informed her MD is ready to d/c pt today.  Treshone states she is concerned about pt's breathing when she is sleeping, informed her MD will be contacting her to discuss and answer any questions she may have.   Also informed her MD has been attempting to contact her via telephone with no luck.  Physician did reach out to Treshone again after our conversation but no answer.  CM again tried to contact her with no answer.  Will attempt to try again.      1600  Attemped to reach daughter Kyleigh again at number in chart with no success.    1622  CM attempted to contact daughter again with no answer, no voicemail set up.  Pt is cleared to d/c on today if daughter shows up.  [facility] will arrange her home health as she is current with their agency.      Della Pineda RN  Case Management  [phone]

## 2017-12-19 ENCOUNTER — ANESTHESIA (OUTPATIENT)
Dept: ENDOSCOPY | Facility: HOSPITAL | Age: 72
DRG: 280 | End: 2017-12-19
Payer: COMMERCIAL

## 2017-12-19 ENCOUNTER — SURGERY (OUTPATIENT)
Age: 72
End: 2017-12-19

## 2017-12-19 ENCOUNTER — ANESTHESIA EVENT (OUTPATIENT)
Dept: ENDOSCOPY | Facility: HOSPITAL | Age: 72
DRG: 280 | End: 2017-12-19
Payer: COMMERCIAL

## 2017-12-19 PROBLEM — Z51.5 PALLIATIVE CARE ENCOUNTER: Status: ACTIVE | Noted: 2017-12-19

## 2017-12-19 PROBLEM — K92.1 MELENA: Status: ACTIVE | Noted: 2017-12-19

## 2017-12-19 LAB
ABO + RH BLD: NORMAL
ALBUMIN SERPL BCP-MCNC: 2.7 G/DL
ANION GAP SERPL CALC-SCNC: 12 MMOL/L
APTT BLDCRRT: 36.2 SEC
BASOPHILS # BLD AUTO: 0 K/UL
BASOPHILS # BLD AUTO: 0.01 K/UL
BASOPHILS NFR BLD: 0 %
BASOPHILS NFR BLD: 0.2 %
BLD GP AB SCN CELLS X3 SERPL QL: NORMAL
BLD PROD TYP BPU: NORMAL
BLD PROD TYP BPU: NORMAL
BLOOD UNIT EXPIRATION DATE: NORMAL
BLOOD UNIT EXPIRATION DATE: NORMAL
BLOOD UNIT TYPE CODE: 6200
BLOOD UNIT TYPE CODE: 6200
BLOOD UNIT TYPE: NORMAL
BLOOD UNIT TYPE: NORMAL
BNP SERPL-MCNC: 1585 PG/ML
BUN SERPL-MCNC: 105 MG/DL
CALCIUM SERPL-MCNC: 9 MG/DL
CHLORIDE SERPL-SCNC: 108 MMOL/L
CO2 SERPL-SCNC: 27 MMOL/L
CODING SYSTEM: NORMAL
CODING SYSTEM: NORMAL
CREAT SERPL-MCNC: 2.5 MG/DL
DIFFERENTIAL METHOD: ABNORMAL
DIFFERENTIAL METHOD: ABNORMAL
DISPENSE STATUS: NORMAL
DISPENSE STATUS: NORMAL
EOSINOPHIL # BLD AUTO: 0 K/UL
EOSINOPHIL # BLD AUTO: 0 K/UL
EOSINOPHIL NFR BLD: 0 %
EOSINOPHIL NFR BLD: 0.2 %
ERYTHROCYTE [DISTWIDTH] IN BLOOD BY AUTOMATED COUNT: 18.2 %
ERYTHROCYTE [DISTWIDTH] IN BLOOD BY AUTOMATED COUNT: 18.2 %
EST. GFR  (AFRICAN AMERICAN): 21.5 ML/MIN/1.73 M^2
EST. GFR  (NON AFRICAN AMERICAN): 18.6 ML/MIN/1.73 M^2
GLUCOSE SERPL-MCNC: 86 MG/DL
HCT VFR BLD AUTO: 19.9 %
HCT VFR BLD AUTO: 21.7 %
HGB BLD-MCNC: 6.9 G/DL
HGB BLD-MCNC: 7.3 G/DL
IMM GRANULOCYTES # BLD AUTO: 0.02 K/UL
IMM GRANULOCYTES # BLD AUTO: 0.02 K/UL
IMM GRANULOCYTES NFR BLD AUTO: 0.4 %
IMM GRANULOCYTES NFR BLD AUTO: 0.5 %
INR PPP: 1.4
LYMPHOCYTES # BLD AUTO: 1 K/UL
LYMPHOCYTES # BLD AUTO: 1.2 K/UL
LYMPHOCYTES NFR BLD: 26.8 %
LYMPHOCYTES NFR BLD: 26.8 %
MAGNESIUM SERPL-MCNC: 1.8 MG/DL
MCH RBC QN AUTO: 34.6 PG
MCH RBC QN AUTO: 35 PG
MCHC RBC AUTO-ENTMCNC: 33.6 G/DL
MCHC RBC AUTO-ENTMCNC: 34.7 G/DL
MCV RBC AUTO: 101 FL
MCV RBC AUTO: 103 FL
MONOCYTES # BLD AUTO: 0.4 K/UL
MONOCYTES # BLD AUTO: 0.6 K/UL
MONOCYTES NFR BLD: 10.8 %
MONOCYTES NFR BLD: 14.1 %
NEUTROPHILS # BLD AUTO: 2.4 K/UL
NEUTROPHILS # BLD AUTO: 2.6 K/UL
NEUTROPHILS NFR BLD: 58.3 %
NEUTROPHILS NFR BLD: 61.9 %
NRBC BLD-RTO: 0 /100 WBC
NRBC BLD-RTO: 0 /100 WBC
PHOSPHATE SERPL-MCNC: 2 MG/DL
PLATELET # BLD AUTO: 100 K/UL
PLATELET # BLD AUTO: 101 K/UL
PMV BLD AUTO: 11.3 FL
PMV BLD AUTO: 12.2 FL
POTASSIUM SERPL-SCNC: 4.3 MMOL/L
PROTHROMBIN TIME: 14.4 SEC
RBC # BLD AUTO: 1.97 M/UL
RBC # BLD AUTO: 2.11 M/UL
SODIUM SERPL-SCNC: 147 MMOL/L
TRANS ERYTHROCYTES VOL PATIENT: NORMAL ML
TRANS ERYTHROCYTES VOL PATIENT: NORMAL ML
WBC # BLD AUTO: 3.81 K/UL
WBC # BLD AUTO: 4.47 K/UL

## 2017-12-19 PROCEDURE — 80069 RENAL FUNCTION PANEL: CPT

## 2017-12-19 PROCEDURE — P9021 RED BLOOD CELLS UNIT: HCPCS

## 2017-12-19 PROCEDURE — 99232 SBSQ HOSP IP/OBS MODERATE 35: CPT | Mod: ,,, | Performed by: INTERNAL MEDICINE

## 2017-12-19 PROCEDURE — 85610 PROTHROMBIN TIME: CPT

## 2017-12-19 PROCEDURE — 83735 ASSAY OF MAGNESIUM: CPT

## 2017-12-19 PROCEDURE — 86901 BLOOD TYPING SEROLOGIC RH(D): CPT

## 2017-12-19 PROCEDURE — 85730 THROMBOPLASTIN TIME PARTIAL: CPT

## 2017-12-19 PROCEDURE — 94761 N-INVAS EAR/PLS OXIMETRY MLT: CPT

## 2017-12-19 PROCEDURE — 25000242 PHARM REV CODE 250 ALT 637 W/ HCPCS: Performed by: INTERNAL MEDICINE

## 2017-12-19 PROCEDURE — 83880 ASSAY OF NATRIURETIC PEPTIDE: CPT

## 2017-12-19 PROCEDURE — C9113 INJ PANTOPRAZOLE SODIUM, VIA: HCPCS | Performed by: NURSE PRACTITIONER

## 2017-12-19 PROCEDURE — 27000221 HC OXYGEN, UP TO 24 HOURS

## 2017-12-19 PROCEDURE — 94640 AIRWAY INHALATION TREATMENT: CPT

## 2017-12-19 PROCEDURE — 43235 EGD DIAGNOSTIC BRUSH WASH: CPT | Performed by: INTERNAL MEDICINE

## 2017-12-19 PROCEDURE — 37000009 HC ANESTHESIA EA ADD 15 MINS: Performed by: INTERNAL MEDICINE

## 2017-12-19 PROCEDURE — 99232 SBSQ HOSP IP/OBS MODERATE 35: CPT | Mod: 25,GC,, | Performed by: INTERNAL MEDICINE

## 2017-12-19 PROCEDURE — 25000003 PHARM REV CODE 250: Performed by: NURSE ANESTHETIST, CERTIFIED REGISTERED

## 2017-12-19 PROCEDURE — 37000008 HC ANESTHESIA 1ST 15 MINUTES: Performed by: INTERNAL MEDICINE

## 2017-12-19 PROCEDURE — 43235 EGD DIAGNOSTIC BRUSH WASH: CPT | Mod: ,,, | Performed by: INTERNAL MEDICINE

## 2017-12-19 PROCEDURE — 86900 BLOOD TYPING SEROLOGIC ABO: CPT

## 2017-12-19 PROCEDURE — 11000001 HC ACUTE MED/SURG PRIVATE ROOM

## 2017-12-19 PROCEDURE — 36415 COLL VENOUS BLD VENIPUNCTURE: CPT

## 2017-12-19 PROCEDURE — 63600175 PHARM REV CODE 636 W HCPCS: Performed by: NURSE ANESTHETIST, CERTIFIED REGISTERED

## 2017-12-19 PROCEDURE — D9220A PRA ANESTHESIA: Mod: CRNA,,, | Performed by: NURSE ANESTHETIST, CERTIFIED REGISTERED

## 2017-12-19 PROCEDURE — 25000003 PHARM REV CODE 250: Performed by: INTERNAL MEDICINE

## 2017-12-19 PROCEDURE — 99232 SBSQ HOSP IP/OBS MODERATE 35: CPT | Mod: GC,,, | Performed by: PSYCHIATRY & NEUROLOGY

## 2017-12-19 PROCEDURE — 85025 COMPLETE CBC W/AUTO DIFF WBC: CPT

## 2017-12-19 PROCEDURE — 99900035 HC TECH TIME PER 15 MIN (STAT)

## 2017-12-19 PROCEDURE — 36430 TRANSFUSION BLD/BLD COMPNT: CPT

## 2017-12-19 PROCEDURE — 63600175 PHARM REV CODE 636 W HCPCS: Performed by: NURSE PRACTITIONER

## 2017-12-19 PROCEDURE — D9220A PRA ANESTHESIA: Mod: ANES,,, | Performed by: ANESTHESIOLOGY

## 2017-12-19 PROCEDURE — 0DJ08ZZ INSPECTION OF UPPER INTESTINAL TRACT, VIA NATURAL OR ARTIFICIAL OPENING ENDOSCOPIC: ICD-10-PCS | Performed by: INTERNAL MEDICINE

## 2017-12-19 PROCEDURE — 86920 COMPATIBILITY TEST SPIN: CPT

## 2017-12-19 RX ORDER — FENTANYL CITRATE 50 UG/ML
INJECTION, SOLUTION INTRAMUSCULAR; INTRAVENOUS
Status: DISCONTINUED | OUTPATIENT
Start: 2017-12-19 | End: 2017-12-19

## 2017-12-19 RX ORDER — ETOMIDATE 2 MG/ML
INJECTION INTRAVENOUS
Status: DISCONTINUED | OUTPATIENT
Start: 2017-12-19 | End: 2017-12-19

## 2017-12-19 RX ORDER — PROPOFOL 10 MG/ML
VIAL (ML) INTRAVENOUS CONTINUOUS PRN
Status: DISCONTINUED | OUTPATIENT
Start: 2017-12-19 | End: 2017-12-19

## 2017-12-19 RX ORDER — PANTOPRAZOLE SODIUM 40 MG/10ML
40 INJECTION, POWDER, LYOPHILIZED, FOR SOLUTION INTRAVENOUS 2 TIMES DAILY
Status: DISCONTINUED | OUTPATIENT
Start: 2017-12-19 | End: 2017-12-20

## 2017-12-19 RX ORDER — HYDROCODONE BITARTRATE AND ACETAMINOPHEN 500; 5 MG/1; MG/1
TABLET ORAL
Status: DISCONTINUED | OUTPATIENT
Start: 2017-12-19 | End: 2017-12-26 | Stop reason: HOSPADM

## 2017-12-19 RX ORDER — SODIUM CHLORIDE 9 MG/ML
INJECTION, SOLUTION INTRAVENOUS CONTINUOUS PRN
Status: DISCONTINUED | OUTPATIENT
Start: 2017-12-19 | End: 2017-12-19

## 2017-12-19 RX ADMIN — PANTOPRAZOLE SODIUM 40 MG: 40 INJECTION, POWDER, FOR SOLUTION INTRAVENOUS at 10:12

## 2017-12-19 RX ADMIN — PANTOPRAZOLE SODIUM 40 MG: 40 INJECTION, POWDER, FOR SOLUTION INTRAVENOUS at 09:12

## 2017-12-19 RX ADMIN — HYDRALAZINE HYDROCHLORIDE AND ISOSORBIDE DINITRATE 2 TABLET: 37.5; 2 TABLET, FILM COATED ORAL at 09:12

## 2017-12-19 RX ADMIN — IPRATROPIUM BROMIDE AND ALBUTEROL SULFATE 3 ML: .5; 3 SOLUTION RESPIRATORY (INHALATION) at 08:12

## 2017-12-19 RX ADMIN — FUROSEMIDE 80 MG: 40 TABLET ORAL at 10:12

## 2017-12-19 RX ADMIN — POTASSIUM & SODIUM PHOSPHATES POWDER PACK 280-160-250 MG 2 PACKET: 280-160-250 PACK at 09:12

## 2017-12-19 RX ADMIN — FUROSEMIDE 80 MG: 40 TABLET ORAL at 07:12

## 2017-12-19 RX ADMIN — PANTOPRAZOLE SODIUM 40 MG: 40 INJECTION, POWDER, FOR SOLUTION INTRAVENOUS at 02:12

## 2017-12-19 RX ADMIN — PROPOFOL 25 MCG/KG/MIN: 10 INJECTION, EMULSION INTRAVENOUS at 02:12

## 2017-12-19 RX ADMIN — FLUTICASONE FUROATE AND VILANTEROL TRIFENATATE 1 PUFF: 100; 25 POWDER RESPIRATORY (INHALATION) at 10:12

## 2017-12-19 RX ADMIN — ETOMIDATE 2 MG: 2 INJECTION, SOLUTION INTRAVENOUS at 02:12

## 2017-12-19 RX ADMIN — ETOMIDATE 10 MG: 2 INJECTION, SOLUTION INTRAVENOUS at 02:12

## 2017-12-19 RX ADMIN — LIOTHYRONINE SODIUM 5 MCG: 5 TABLET ORAL at 10:12

## 2017-12-19 RX ADMIN — POTASSIUM & SODIUM PHOSPHATES POWDER PACK 280-160-250 MG 2 PACKET: 280-160-250 PACK at 10:12

## 2017-12-19 RX ADMIN — SODIUM CHLORIDE: 0.9 INJECTION, SOLUTION INTRAVENOUS at 02:12

## 2017-12-19 RX ADMIN — ALLOPURINOL 100 MG: 100 TABLET ORAL at 10:12

## 2017-12-19 RX ADMIN — ALLOPURINOL 100 MG: 100 TABLET ORAL at 09:12

## 2017-12-19 RX ADMIN — FENTANYL CITRATE 25 MCG: 50 INJECTION, SOLUTION INTRAMUSCULAR; INTRAVENOUS at 02:12

## 2017-12-19 RX ADMIN — LEVOTHYROXINE SODIUM ANHYDROUS 70 MCG: 100 INJECTION, POWDER, LYOPHILIZED, FOR SOLUTION INTRAVENOUS at 10:12

## 2017-12-19 RX ADMIN — QUETIAPINE FUMARATE 250 MG: 25 TABLET, FILM COATED ORAL at 09:12

## 2017-12-19 RX ADMIN — LIOTHYRONINE SODIUM 5 MCG: 5 TABLET ORAL at 09:12

## 2017-12-19 NOTE — NURSING
Patient was sitting on bedside commode and observed with a large, black stool x2  Stool has an odor.  And patient with SOB on exertion.  Patient assisted to bed and medicine team D notified and new orders given to obtain a STAT CBC and collect a stool for OCB.  [refer to nursing flow sheet for VS]    CBC reveals a H/H of 9.0/26.1.  Stool for OCB tested +.        12/19/17  0235    New orders to administer pantoprazole 40 mg in Ns, type and screen and draw a PT/INR in am.  Dc'd metoprolol, ASA 81 mg, and heparin 5000 units. And NPO.  Orders noted.

## 2017-12-19 NOTE — PROGRESS NOTES
Progress Note  Hospital Medicine      Admit Date: 12/13/2017    SUBJECTIVE:     Follow-up For:  Acute exacerbation of CHF (congestive heart failure)    HPI/Interval history: No new events overnight    Review of Systems: Gen: no fever, no chills, Heart: no chest pain, palpitations; Resp: no SOB, no cough    OBJECTIVE:     Vital Signs Range (Last 24H):  Temp:  [97 °F (36.1 °C)-98.9 °F (37.2 °C)]   Pulse:  [64-83]   Resp:  [16-20]   BP: (128-139)/(63-82)   SpO2:  [92 %-97 %]     Physical Exam:  General appearance: NAD, asleep  Neck: FROM, supple   Lungs: snoring, no accessory muscle use  CV: RRR, no heave; -JVD - hepatojugular reflex  Abdomen: Soft, non-tender; no masses or HSM  Extremities: trace + BLE edema, no digital cyanosis  Skin: no rash, lesions or ulcers  Psych: alert and oriented x 3      Recent Labs  Lab 12/16/17  0909 12/17/17  0849 12/18/17  0807    142 143   K 3.4* 3.0* 4.2    102 105   CO2 30* 31* 29   BUN 42* 39* 56*   CREATININE 2.9* 2.4* 2.4*   GLU 94 87 79   CALCIUM 9.0 8.7 8.9   MG 2.3 2.0 2.0   PHOS 2.8 2.4* 2.4*       Recent Labs  Lab 12/14/17  1416  12/16/17  0909 12/17/17  0849 12/18/17  0807   ALKPHOS 58  --   --   --   --    ALT 21  --   --   --   --    AST 44*  --   --   --   --    ALBUMIN 3.2*  < > 3.1* 3.0* 2.9*   PROT 7.4  --   --   --   --    BILITOT 2.5*  --   --   --   --    < > = values in this interval not displayed.      Recent Labs  Lab 12/16/17  0909 12/17/17  0849 12/18/17  0807   WBC 2.12* 2.36* 3.06*   HGB 9.5* 9.4* 9.0*   HCT 27.8* 27.0* 25.6*   PLT 90* 111* 120*   GRAN 58.4  1.2* 59.8  1.4* 51.1  1.6*   LYMPH 30.7  0.7* 28.4  0.7* 33.0  1.0   MONO 10.4  0.2* 10.6  0.3 15.0  0.5         Recent Labs  Lab 12/15/17  0821   POCTGLUCOSE 95       ASSESSMENT/PLAN:     Acute systolic and diastolic exacerbation of CHF (congestive heart failure)     Patient presenting with worsening LE edema, CXR with pulmonary edema, shortness of breath, elevated BNP likely acute on  chronic systolic CHF (based on recent ECHO 5/17), likely precipitate increased fluid intake by patient and severe hypothyroidism  -Strict I/O, standing weights  -Continue BB, ASA, appears no statin per prior h/o chronic hep C, will follow INR/transaminisases to assess liver fxn  -ECHO 2D showed EF 30-35%, Grade 3 diastolic dysfunction, biatrial enlargement, depressed right sided dysfunction, increase right atrial pressure  -increased diuretics to furosemide IV 80 mg TID - transitioned to oral furosemide 80 mg TID. Will be discharged on furosemide 80 mg BID  -cardiology follow up as outpatient          Stage 4 chronic kidney disease  CANDI on CKD IV, cardiogenic     CANDI on CKD IV , patient with baseline 2.2, currently 2.8, likely prerenal, will check urinalysis, follow BMP  Alkalotic - stop Na Bicarb 1300  Nephrology follow up as outpatient         Dementia with behavioral disturbance     Per family over last months issues with memory, forgetting to turn off appliances, getting lost. Also with agitation (espcially at night) and non-compliance with care. Patient on seroquel 200 mg qhs and not effective for her current behaviors. Psychiatry consult for medication management. - will increase seroquel 250 mg qhs. Will discuss with daughter about use of depakote in past  -psychiatry and memory disorder clinic follow up as outpatient. Psychiatry resident to try to place patient in his clinic for Jan 9th 200 pm.   -Will discharge back on 200 mg qhs and cogentin for now. Her sleep has improved after IV levothyroxine started, but still wakes up confused. Further adjustment will be as per psychiatrist  -neuropsych testing as outpatient as ordered          Anemia of chronic renal failure, stage 4 (severe)     Stable, f/u outpatient nephro          Chronic obstructive pulmonary disease     Per prior PFTs, mild obstructive component, on inhalers at home, no increase in sputum production unlikely COPD exacerbation,  -Eloy  Q6H  -Continue LABA/inhailed corticosteroid          Elevated troponin     Patient with mild elevation in troponin, no recent chest pain, no significant EKG changes, likely type 2 NSTEMI in setting of CHF exacerbation and CKD impairing clearance  troponin levels stable          Primary hypothyroidism, uncontrolled     Patient with h/o hypothyroidism, last TSH/T4 early 2017, TSH elevated T4 WNL  -endocrine consulted - levothyroxine 70 mcg IV daily and cytomel 5 mcg BID for one week. Hold oral levothyroxine while diuresing. Patient will be discharged on levothyroxine 100 mcg daily and a few more days of cytomel 5 mcg BID          Insomnia     Patient with insomnia and ?h/o bipolar disorder per family, on seroquel 200mg QHS, reports no improvement  -PRN ramelteon for sleep     FELICITAS - Apneic breathing during sleep witnessed. Apneic breathing improved after diuresis and IV levothryoxine started, but daughter still concerned. Will refer patient to sleep medicine clinic for appropriateness of sleep study as I am not sure if she will be compliant with CPAP even it was prescribed.     Disposition - discharged today, but patient's daughter objected to discharge due to heavy breathing while sleeping at night with phone call by . This has actually improved since discharge, and she is not hypoxic at night. Unable to relay this daughter as she has not answered any calls from our facility (, floor nurse, me (x 5 times) ) today.

## 2017-12-19 NOTE — DISCHARGE INSTRUCTIONS
Drink no more than 1500 mL (6 cups) of fluid, including ice, per day.    Take your levothyroxine on an empty stomach and 30 minutes before eating.

## 2017-12-19 NOTE — CONSULTS
[facility]  Gastroenterology  Consult Note    Patient Name: Anai Sal  MRN: [id number]  Admission Date: 12/13/2017  Hospital Length of Stay: 6 days  Code Status: DNR   Attending Provider: Meghann Wolf MD   Consulting Provider: Berhane Foster MD  Primary Care Physician: Live Young MD (Inactive)  Principal Problem:Acute exacerbation of CHF (congestive heart failure)    Inpatient consult to Gastroenterology  Consult performed by: BERHANE FOSTER  Consult ordered by: MEGHANN WOLF        Subjective:     HPI:  This is a 73 yo F with CHF, CKD IV, HCV, h/o breast cancer s/p mastectomy/chemo 1979, COPD, HTN, Bipolar d/o admitted on 12/13 for acute on chronic dyspnea, concerning for acute CHF exacerbation.   Patient was getting ready to be discharged when yesterday evening she was noted by nursing to have a large, black bowel movement. Her BP dropped to 80/60 and she had a drop in her Hg from 9 to 7.3 to 6.9 today. Patient has neurocognitive disorder and has been seen by psychiatry as well as palliative care. Difficult to obtain history from patient and family not at bedside during my interview. She has no reported history of GI bleed and has never been scoped before.       Past Medical History:   Diagnosis Date    Breast cancer 1980    right    CHF (congestive heart failure)     Chronic hepatitis C virus infection 3/14/2017    Coronary artery disease     Hypertension     Hazel     Renal disorder     Thyroid disease        Past Surgical History:   Procedure Laterality Date    HYSTERECTOMY      MASTECTOMY Right 1980       Review of patient's allergies indicates:  No Known Allergies  Family History     Problem Relation (Age of Onset)    Heart disease Mother, Sister    No Known Problems Father, Brother, Maternal Aunt, Maternal Uncle, Paternal Aunt, Paternal Uncle, Maternal Grandmother, Maternal Grandfather, Paternal Grandmother, Paternal Grandfather        Social History Main  Topics    Smoking status: Former Smoker     Packs/day: 2.00     Years: 20.00     Types: Cigarettes     Quit date: 2/16/1995    Smokeless tobacco: Never Used    Alcohol use No      Comment: 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    Drug use: No    Sexual activity: No      Comment:  with 3 children     Review of Systems   Reason unable to perform ROS: Unable to perform full of ROS given neurocognitive disorder.   Respiratory: Negative for cough and shortness of breath.    Cardiovascular: Negative for chest pain.   Gastrointestinal: Positive for constipation. Negative for abdominal distention, abdominal pain, blood in stool, diarrhea, nausea and vomiting.     Objective:     Vital Signs (Most Recent):  Temp: 98.7 °F (37.1 °C) (12/19/17 1324)  Pulse: 78 (12/19/17 1324)  Resp: 18 (12/19/17 1324)  BP: (!) 145/79 (12/19/17 1324)  SpO2: 100 % (12/19/17 1324) Vital Signs (24h Range):  Temp:  [97 °F (36.1 °C)-98.7 °F (37.1 °C)] 98.7 °F (37.1 °C)  Pulse:  [68-88] 78  Resp:  [16-19] 18  SpO2:  [92 %-100 %] 100 %  BP: ()/(51-79) 145/79     Weight: 80.1 kg (176 lb 9.4 oz) (12/19/17 0400)  Body mass index is 28.5 kg/m².      Intake/Output Summary (Last 24 hours) at 12/19/17 1350  Last data filed at 12/19/17 1219   Gross per 24 hour   Intake              950 ml   Output              800 ml   Net              150 ml       Lines/Drains/Airways     Peripheral Intravenous Line                 Peripheral IV - Single Lumen 12/19/17 0940 Left Forearm less than 1 day                Physical Exam   Constitutional: She is oriented to person, place, and time. She appears well-developed and well-nourished. No distress.   HENT:   Mouth/Throat: Oropharynx is clear and moist.   Eyes: No scleral icterus.   Cardiovascular: Normal rate and regular rhythm.    Pulmonary/Chest: Effort normal and breath sounds normal. No respiratory distress.   Abdominal: Soft. Bowel sounds are normal. She exhibits no distension and no mass. There is no tenderness. There  is no guarding.   Lymphadenopathy:     She has no cervical adenopathy.   Neurological: She is alert and oriented to person, place, and time.   Skin: Skin is warm and dry.   Psychiatric:   guarded   Vitals reviewed.      Significant Labs:  CBC:   Recent Labs  Lab 12/18/17  2101 12/19/17  0115 12/19/17  0740   WBC 5.46 4.47 3.81*   HGB 9.0* 7.3* 6.9*   HCT 26.1* 21.7* 19.9*   * 100* 101*     CMP:   Recent Labs  Lab 12/19/17  0740   GLU 86   CALCIUM 9.0   ALBUMIN 2.7*   *   K 4.3   CO2 27      *   CREATININE 2.5*     Coagulation:   Recent Labs  Lab 12/19/17  0740   INR 1.4*   APTT 36.2*       Significant Imaging:  Imaging results within the past 24 hours have been reviewed.    Assessment/Plan:     Melena    73 yo F admitted for CHF exacerbation with neurocognitive disorder who was ready to be discharged yesterday but then developed one episode of melena associated with hypotension and drop in H&H, now hemodynamically stable.    Recommendations:  - Keep NPO  - PPI IV BID  - Transfuse to keep Hg>7  - Plan for EGD today            Thank you for your consult. I will follow-up with patient. Please contact us if you have any additional questions.    Berhane Chen MD PGY-IV  Gastroenterology Fellow  [facility]  P [phone]

## 2017-12-19 NOTE — CONSULTS
[facility]  Palliative Medicine  Consult Note    Patient Name: Anai Sal  MRN: [id number]  Admission Date: 12/13/2017  Hospital Length of Stay: 6 days  Code Status: DNR   Attending Provider: Meghann Wolf MD  Consulting Provider: Lauri Lin MD  Primary Care Physician: Live Young MD (Inactive)  Principal Problem:Acute exacerbation of CHF (congestive heart failure)    Patient information was obtained from relative(s) and ER records.      Inpatient consult to Palliative Care  Consult performed by: LAURI LIN  Consult ordered by: MEGHANN WOLF        Assessment/Plan:     Palliative care encounter    Consult received. Mrs. Sal is a 73 yo woman enrolled in [place]. She was admitted with CHF exacerbation and now has a GI bleed.  Her daughter is currently coordinating her care. I discussed Mrs. Sal's immediate situation and needs and her daughter would like us to try to evaluate the source of her bleeding and fix what we can. I also discussed the need for blood transfusion. Once she has been stabilized, I will address the patient's long term goals of care with her family. In my medical opinion, she is a candidate for hospice but this can be addressed at a later time. Full consult to follow. Discussed with Dr. Wolf. Thanks, CB            Thank you for your consult. I will follow-up with patient. Please contact us if you have any additional questions.      Lauri Lin MD  Chief, Palliative Medicine  [facility]

## 2017-12-19 NOTE — INTERVAL H&P NOTE
Pre-Procedure H and P Addendum    Patient seen and examined.  History and exam unchanged from prior history and physical.      Procedure: EGD  Indication: GI bleeding with melena  ASA Class: per anesthesiology  Airway: normal  Neck Mobility: full range of motion  Mallampatti score: per anesthesia  History of anesthesia problems: no  Family history of anesthesia problems: no  Anesthesia Plan: MAC    Anesthesia/Surgery risks, benefits and alternative options discussed and understood by patient/family.          Active Hospital Problems    Diagnosis  POA    *Acute exacerbation of CHF (congestive heart failure) [I50.9]  Yes    Palliative care encounter [Z51.5]  Not Applicable    Melena [K92.1]  Unknown    Major neurocognitive disorder [F03.90]  Yes    Cognitive impairment [R41.89]  Unknown    Anemia of chronic renal failure, stage 4 (severe) [N18.4, D63.1]  Yes    Chronic obstructive pulmonary disease [J44.9]  Yes    Delirium due to another medical condition [F05]  Yes    Elevated troponin [R74.8]  Yes    Primary hypothyroidism [E03.9]  Yes    Stage 4 chronic kidney disease [N18.4]  Yes    Insomnia [G47.00]  Yes      Resolved Hospital Problems    Diagnosis Date Resolved POA   No resolved problems to display.

## 2017-12-19 NOTE — SUBJECTIVE & OBJECTIVE
"Interval History: On interview, the patient states that she is tired but is otherwise doing well. Denies pain. Denies confusion, AVH, paranoia, SI/HI. States that mood is good.    Daughter, present at bedside, states that the patient seems weak but is otherwise doing well. Chart review indicates the patient was weak, disoriented, and agitated last night. She was found to be hypotensive and is FOBT positive.    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    Drug use: No    Sexual activity: No      Comment:  with 3 children     Psychotherapeutics     Start     Stop Route Frequency Ordered    12/16/17 2100  QUEtiapine tablet 250 mg      -- Oral Nightly 12/16/17 1225    12/13/17 2126  ramelteon tablet 8 mg      -- Oral Nightly PRN 12/13/17 2030           Review of Systems  Objective:     Vital Signs (Most Recent):  Temp: 98.7 °F (37.1 °C) (12/19/17 1324)  Pulse: 69 (12/19/17 1515)  Resp: 18 (12/19/17 1515)  BP: 133/64 (12/19/17 1515)  SpO2: 100 % (12/19/17 1515) Vital Signs (24h Range):  Temp:  [97 °F (36.1 °C)-98.7 °F (37.1 °C)] 98.7 °F (37.1 °C)  Pulse:  [68-88] 69  Resp:  [16-19] 18  SpO2:  [92 %-100 %] 100 %  BP: ()/(51-79) 133/64     Height: 5' 6" (167.6 cm)  Weight: 80.1 kg (176 lb 9.4 oz)  Body mass index is 28.5 kg/m².      Intake/Output Summary (Last 24 hours) at 12/19/17 1545  Last data filed at 12/19/17 1457   Gross per 24 hour   Intake              760 ml   Output              800 ml   Net              -40 ml       Physical Exam " "  Psychiatric:   Appearance: overweight black female, appears stated age, hair shaved short, lying comfortably in bed wearing hospital gown in right lateral decubitus position  Behavior: calm, cooperative, limited eye contact  Speech: quiet, but normal rate and tone  Mood: "I don't know"  Affect: euthymic, blunted, congruent to stated mood, not labile  Thought processes: somewhat disorganized, concrete, paucity of thought  Thought content: no SI/HI, no AVH  Insight: limited  Judgment: fair  Cognition: alert, oriented only to self and place. Disoriented to day of week, date, month, year, situation. Recent and remote memory impaired. Attention good.        Significant Labs: All pertinent labs within the past 24 hours have been reviewed.    Significant Imaging: None  "

## 2017-12-19 NOTE — NURSING TRANSFER
Nursing Transfer Note      12/19/2017     Transfer To: 921    Transfer via stretcher    Transported by 2 RNs    Medicines sent: NaCL on pump    Chart send with patient: Yes    Notified: daughter    Patient reassessed at: 12/19/2017    Upon arrival to floor: patient oriented to room, call bell in reach and bed in lowest position

## 2017-12-19 NOTE — CARE UPDATE
RN Proactive Rounding Note  Time of Visit: 20    Admit Date: 2017  LOS: 6  Code Status: DNR   Date of Visit: 2017  : 1945  Age: 72 y.o.  Sex: female  Bed: [address] [place] [address] A:   MRN: [id number]  Was the patient discharged from an ICU this admission? no   Was the patient discharged from a PACU within last 24 hours? no  Did the patient receive conscious sedation/general anesthesia in last 24 hours? no  Was the patient in the ED within the past 24 hours? no  Was the patient started on NIPPV within the past 24 hours? no  Attending Physician: Elsy Cantu MD  Primary Service: [facility]      ASSESSMENT:     Modified Early Warning Score (MEWS): 2  Abnormal Vital Signs: BP 93/57  Clinical Issues: Circulatory     INTERVENTIONS/ RECOMMENDATIONS:     Bedside RN called RRT RN with concern of pt with hypotension.  Primary team called and made aware of vitals.  NS bolus ordered and infusing.  Labs sent.  Upon BP re-check, SBP>100.        Discussed plan of care with RN: Tonya DELATORRE ESCALATION:     Yes/No yes    Orders received and case discussed with Dr Matias     Disposition: remain on floor    FOLLOW-UP/CONTINGENCY:       Call back the Rapid Response Nurse at [phone]  for additional questions or concerns

## 2017-12-19 NOTE — NURSING
Patient was found in bed with her gown lying on the eugenia and IV tubing tangled in her incontinent brief.  Patient is more confused and dif cult to redirect.  Patient presents with poor safety awareness and will inform Medicine team D that patient is refusing morning labs x 3 times , even after agreeing to have labs drawn and the rationalization for labs to be collected.  Patient IV to L AC is out and PICC consulted for an PICC line due to poor venous access.  Patient has only the Left arm to use, since patients has a PMH of r breast mastectomy.  Medicine Team D made aware.

## 2017-12-19 NOTE — H&P (VIEW-ONLY)
[facility]  Gastroenterology  Consult Note    Patient Name: Anai Sal  MRN: [id number]  Admission Date: 12/13/2017  Hospital Length of Stay: 6 days  Code Status: DNR   Attending Provider: Meghann Wolf MD   Consulting Provider: Berhane Foster MD  Primary Care Physician: Live Young MD (Inactive)  Principal Problem:Acute exacerbation of CHF (congestive heart failure)    Inpatient consult to Gastroenterology  Consult performed by: BERHANE FOSTER  Consult ordered by: MEGHANN WOLF        Subjective:     HPI:  This is a 73 yo F with CHF, CKD IV, HCV, h/o breast cancer s/p mastectomy/chemo 1979, COPD, HTN, Bipolar d/o admitted on 12/13 for acute on chronic dyspnea, concerning for acute CHF exacerbation.   Patient was getting ready to be discharged when yesterday evening she was noted by nursing to have a large, black bowel movement. Her BP dropped to 80/60 and she had a drop in her Hg from 9 to 7.3 to 6.9 today. Patient has neurocognitive disorder and has been seen by psychiatry as well as palliative care. Difficult to obtain history from patient and family not at bedside during my interview. She has no reported history of GI bleed and has never been scoped before.       Past Medical History:   Diagnosis Date    Breast cancer 1980    right    CHF (congestive heart failure)     Chronic hepatitis C virus infection 3/14/2017    Coronary artery disease     Hypertension     Hazel     Renal disorder     Thyroid disease        Past Surgical History:   Procedure Laterality Date    HYSTERECTOMY      MASTECTOMY Right 1980       Review of patient's allergies indicates:  No Known Allergies  Family History     Problem Relation (Age of Onset)    Heart disease Mother, Sister    No Known Problems Father, Brother, Maternal Aunt, Maternal Uncle, Paternal Aunt, Paternal Uncle, Maternal Grandmother, Maternal Grandfather, Paternal Grandmother, Paternal Grandfather        Social History Main  Topics    Smoking status: Former Smoker     Packs/day: 2.00     Years: 20.00     Types: Cigarettes     Quit date: 2/16/1995    Smokeless tobacco: Never Used    Alcohol use No      Comment: 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    Drug use: No    Sexual activity: No      Comment:  with 3 children     Review of Systems   Reason unable to perform ROS: Unable to perform full of ROS given neurocognitive disorder.   Respiratory: Negative for cough and shortness of breath.    Cardiovascular: Negative for chest pain.   Gastrointestinal: Positive for constipation. Negative for abdominal distention, abdominal pain, blood in stool, diarrhea, nausea and vomiting.     Objective:     Vital Signs (Most Recent):  Temp: 98.7 °F (37.1 °C) (12/19/17 1324)  Pulse: 78 (12/19/17 1324)  Resp: 18 (12/19/17 1324)  BP: (!) 145/79 (12/19/17 1324)  SpO2: 100 % (12/19/17 1324) Vital Signs (24h Range):  Temp:  [97 °F (36.1 °C)-98.7 °F (37.1 °C)] 98.7 °F (37.1 °C)  Pulse:  [68-88] 78  Resp:  [16-19] 18  SpO2:  [92 %-100 %] 100 %  BP: ()/(51-79) 145/79     Weight: 80.1 kg (176 lb 9.4 oz) (12/19/17 0400)  Body mass index is 28.5 kg/m².      Intake/Output Summary (Last 24 hours) at 12/19/17 1350  Last data filed at 12/19/17 1219   Gross per 24 hour   Intake              950 ml   Output              800 ml   Net              150 ml       Lines/Drains/Airways     Peripheral Intravenous Line                 Peripheral IV - Single Lumen 12/19/17 0940 Left Forearm less than 1 day                Physical Exam   Constitutional: She is oriented to person, place, and time. She appears well-developed and well-nourished. No distress.   HENT:   Mouth/Throat: Oropharynx is clear and moist.   Eyes: No scleral icterus.   Cardiovascular: Normal rate and regular rhythm.    Pulmonary/Chest: Effort normal and breath sounds normal. No respiratory distress.   Abdominal: Soft. Bowel sounds are normal. She exhibits no distension and no mass. There is no tenderness. There  is no guarding.   Lymphadenopathy:     She has no cervical adenopathy.   Neurological: She is alert and oriented to person, place, and time.   Skin: Skin is warm and dry.   Psychiatric:   guarded   Vitals reviewed.      Significant Labs:  CBC:   Recent Labs  Lab 12/18/17  2101 12/19/17  0115 12/19/17  0740   WBC 5.46 4.47 3.81*   HGB 9.0* 7.3* 6.9*   HCT 26.1* 21.7* 19.9*   * 100* 101*     CMP:   Recent Labs  Lab 12/19/17  0740   GLU 86   CALCIUM 9.0   ALBUMIN 2.7*   *   K 4.3   CO2 27      *   CREATININE 2.5*     Coagulation:   Recent Labs  Lab 12/19/17  0740   INR 1.4*   APTT 36.2*       Significant Imaging:  Imaging results within the past 24 hours have been reviewed.    Assessment/Plan:     Melena    71 yo F admitted for CHF exacerbation with neurocognitive disorder who was ready to be discharged yesterday but then developed one episode of melena associated with hypotension and drop in H&H, now hemodynamically stable.    Recommendations:  - Keep NPO  - PPI IV BID  - Transfuse to keep Hg>7  - Plan for EGD today            Thank you for your consult. I will follow-up with patient. Please contact us if you have any additional questions.    Berhane Chen MD PGY-IV  Gastroenterology Fellow  [facility]  P [phone]

## 2017-12-19 NOTE — ASSESSMENT & PLAN NOTE
Consult received. Mrs. Sal is a 71 yo woman enrolled in [facility]. She was admitted with CHF exacerbation and now has a GI bleed.  Her daughter is currently coordinating her care. I discussed Mrs. Sal's immediate situation and needs and her daughter would like us to try to evaluate the source of her bleeding and fix what we can. I also discussed the need for blood transfusion. Once she has been stabilized, I will address the patient's long term goals of care with her family. In my medical opinion, she is a candidate for hospice but this can be addressed at a later time. Full consult to follow. Discussed with Dr. Wolf. Thanks, CB

## 2017-12-19 NOTE — NURSING
"Nurse called to room and patient BP was low on automatic machine, BP checked manually and revealed 80/60.  Patient is alert and reports, ' I feel dizzy".  Patient bed placed in trendelenburg.  Patient previous BP was 147/69  71.  Patient placed on O2 at 2.5L with humidification.  Medicine Team D paged .  Patient BP checked manually and reveals a result of 117/57  HR 76 O2 97 %.  Patient remains in trendelenburg.      1203  Medicine Team D paged again. Stool for OCB tested +    1211  Medicine Team D called and telephone orders given to administer  ml. Bolus x one.  Rapid Response nurse arrived to evaluate and will make recommendations    "

## 2017-12-19 NOTE — NURSING
Patients daughter called and given an update on her mother's condition.  Patients daughter will come and sit with the patient.  Patient is resting and easily aroused to calling of name.

## 2017-12-19 NOTE — ANESTHESIA PREPROCEDURE EVALUATION
12/19/2017  Anai Sal is a 72 y.o., female.    Anesthesia Evaluation    I have reviewed the Patient Summary Reports.    I have reviewed the Nursing Notes.   I have reviewed the Medications.     Review of Systems  Anesthesia Hx:  No problems with previous Anesthesia    Hematology/Oncology:  Hematology Normal   Oncology Normal     EENT/Dental:EENT/Dental Normal   Cardiovascular:   Hypertension CAD   Angina CHF CONCLUSIONS     1 - Eccentric LVH with moderately depressed left ventricular systolic function (EF 30-35%).     2 - Severe mitral regurgitation.     3 - Moderate tricuspid regurgitation.     4 - Pulmonary hypertension. The estimated PA systolic pressure is 52 mmHg.     5 - Mildly depressed right ventricular systolic function .     6 - Biatrial enlargement.    Pulmonary:   COPD, moderate Shortness of breath Sleep Apnea Probably FELICITAS as witnessed by primary team   Renal/:   Chronic Renal Disease, CRI    Hepatic/GI:   Liver Disease, Hepatitis, C    Endocrine:   Hypothyroidism    Psych:   dementia         Physical Exam   Airway/Jaw/Neck:  Airway Findings: Mouth Opening: Normal Tongue: Normal  General Airway Assessment: Adult, Good  TM Distance: Normal, at least 6 cm       Chest/Lungs:  Chest/Lungs Findings: Normal Respiratory Rate         Mental Status:  Mental Status Findings:  Lethargic, Confusion         Anesthesia Plan  Type of Anesthesia, risks & benefits discussed:  Anesthesia Type:  general  Patient's Preference: General  Intra-op Monitoring Plan: standard ASA monitors  Intra-op Monitoring Plan Comments:   Post Op Pain Control Plan: per primary service following discharge from PACU  Post Op Pain Control Plan Comments: Per primary service  Induction:   IV  Beta Blocker:  Patient is not currently on a Beta-Blocker (No further documentation required).       Informed Consent: Patient understands  "risks and agrees with Anesthesia plan.  Questions answered. Anesthesia consent signed with patient.  ASA Score: 4     Day of Surgery Review of History & Physical:    H&P update referred to the surgeon.     Anesthesia Plan Notes: Currently a DNR. DNR will be rescinded during anesthetic. Discussed with daughter who understands.    Currently receiving 1 unit PRBC for symptomatic anemia.    No N/V or Gerd. Ok for natural airway during procedure.     Per Primary team note:  "Elevated troponin:   Patient with mild elevation in troponin, no recent chest pain, no significant EKG changes, likely type 2 NSTEMI in setting of CHF exacerbation and CKD impairing clearance troponin levels stable"     Discussed risk of procedure due to current state but ok to proceed. Difficult to further optimize for procedure. Need to find source of GI bleed.        Ready For Surgery From Anesthesia Perspective.       "

## 2017-12-19 NOTE — ASSESSMENT & PLAN NOTE
71 yo F admitted for CHF exacerbation with neurocognitive disorder who was ready to be discharged yesterday but then developed one episode of melena associated with hypotension and drop in H&H, now hemodynamically stable.    Recommendations:  - Keep NPO  - PPI IV BID  - Transfuse to keep Hg>7  - Plan for EGD today

## 2017-12-19 NOTE — HPI
This is a 73 yo F with CHF, CKD IV, HCV, h/o breast cancer s/p mastectomy/chemo 1979, COPD, HTN, Bipolar d/o admitted on 12/13 for acute on chronic dyspnea, concerning for acute CHF exacerbation.   Patient was getting ready to be discharged when yesterday evening she was noted by nursing to have a large, black bowel movement. Her BP dropped to 80/60 and she had a drop in her Hg from 9 to 7.3 to 6.9 today. Patient has neurocognitive disorder and has been seen by psychiatry as well as palliative care. Difficult to obtain history from patient and family not at bedside during my interview. She has no reported history of GI bleed and has never been scoped before.

## 2017-12-19 NOTE — NURSING
"Patient was yelling and attempting to ambulate without assistance despite nurse reorienting that resident needs assistance.  Patient was OOB and gown fell off as nurse was trying to asisit. Patient stated, " I need to go to the bathroom now," Patient swung at nurse and reported I  Can get up: Patient assisted to li Patient linen  down as PCT provided perineal care.  Bed linen changed and call light is within reach and bed in lowest position.  Patient continues with NS infusing at 100 cc/hr,  "

## 2017-12-19 NOTE — PLAN OF CARE
"Problem: Gastrointestinal Bleeding (Adult)  Goal: Signs and Symptoms of Listed Potential Problems Will be Absent, Minimized or Managed (Gastrointestinal Bleeding)  Signs and symptoms of listed potential problems will be absent, minimized or managed by discharge/transition of care (reference Gastrointestinal Bleeding (Adult) CPG).  Outcome: Ongoing (interventions implemented as appropriate)   12/19/17 0044   Gastrointestinal Bleeding   Problems Assessed (GI Bleeding) fluid imbalance   Problems Present (GI Bleeding) situational response;other (see comments)  (OCB stool +)       Patient alert to self and place.  Patient able to report the Month but not the year.  Patient denies pain.  Patient scheduled for discharge today and CM unable to contact the daughter to .  Patient VSS, patient observed with large, black foul smelling stools.  No mucous noted.  Patient remains on 1500 fluid restriction and reinforcement is needed.  Patient present with SOB on exertion, fatigue and reports, " I feel a little tired."  Medicine Team D provided orders for Stat CBC and stool for OCB.  Lab reveals H/H: 9.0-26.1, stool tested +.  Patient had an hypotensive episode, BP checked per automatic and 60/50, BP checked manually per nurse 80/60.  Patient placed in [facility] and BP increased to 117/57  71.  Patient reported, " I am not as dizzy as I was before"  New orders to infuse  ml  And BP read 93/52  HR 74.   Daughter not at bedside       "

## 2017-12-19 NOTE — SUBJECTIVE & OBJECTIVE
Past Medical History:   Diagnosis Date    Breast cancer 1980    right    CHF (congestive heart failure)     Chronic hepatitis C virus infection 3/14/2017    Coronary artery disease     Hypertension     Hazel     Renal disorder     Thyroid disease        Past Surgical History:   Procedure Laterality Date    HYSTERECTOMY      MASTECTOMY Right 1980       Review of patient's allergies indicates:  No Known Allergies  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    Drug use: No    Sexual activity: No      Comment:  with 3 children     Review of Systems   Reason unable to perform ROS: Unable to perform full of ROS given neurocognitive disorder.   Respiratory: Negative for cough and shortness of breath.    Cardiovascular: Negative for chest pain.   Gastrointestinal: Positive for constipation. Negative for abdominal distention, abdominal pain, blood in stool, diarrhea, nausea and vomiting.     Objective:     Vital Signs (Most Recent):  Temp: 98.7 °F (37.1 °C) (12/19/17 1324)  Pulse: 78 (12/19/17 1324)  Resp: 18 (12/19/17 1324)  BP: (!) 145/79 (12/19/17 1324)  SpO2: 100 % (12/19/17 1324) Vital Signs (24h Range):  Temp:  [97 °F (36.1 °C)-98.7 °F (37.1 °C)] 98.7 °F (37.1 °C)  Pulse:  [68-88] 78  Resp:  [16-19] 18  SpO2:  [92 %-100 %] 100 %  BP: ()/(51-79) 145/79     Weight: 80.1 kg (176 lb 9.4 oz) (12/19/17 0400)  Body mass index is 28.5 kg/m².      Intake/Output Summary (Last 24 hours) at 12/19/17 1350  Last data filed at 12/19/17 1219   Gross per  24 hour   Intake              950 ml   Output              800 ml   Net              150 ml       Lines/Drains/Airways     Peripheral Intravenous Line                 Peripheral IV - Single Lumen 12/19/17 0940 Left Forearm less than 1 day                Physical Exam   Constitutional: She is oriented to person, place, and time. She appears well-developed and well-nourished. No distress.   HENT:   Mouth/Throat: Oropharynx is clear and moist.   Eyes: No scleral icterus.   Cardiovascular: Normal rate and regular rhythm.    Pulmonary/Chest: Effort normal and breath sounds normal. No respiratory distress.   Abdominal: Soft. Bowel sounds are normal. She exhibits no distension and no mass. There is no tenderness. There is no guarding.   Lymphadenopathy:     She has no cervical adenopathy.   Neurological: She is alert and oriented to person, place, and time.   Skin: Skin is warm and dry.   Psychiatric:   guarded   Vitals reviewed.      Significant Labs:  CBC:   Recent Labs  Lab 12/18/17  2101 12/19/17  0115 12/19/17  0740   WBC 5.46 4.47 3.81*   HGB 9.0* 7.3* 6.9*   HCT 26.1* 21.7* 19.9*   * 100* 101*     CMP:   Recent Labs  Lab 12/19/17  0740   GLU 86   CALCIUM 9.0   ALBUMIN 2.7*   *   K 4.3   CO2 27      *   CREATININE 2.5*     Coagulation:   Recent Labs  Lab 12/19/17  0740   INR 1.4*   APTT 36.2*       Significant Imaging:  Imaging results within the past 24 hours have been reviewed.

## 2017-12-19 NOTE — PROGRESS NOTES
"[facility]  Psychiatry  Progress Note    Patient Name: Anai Sal  MRN: [id number]   Code Status: DNR  Admission Date: 12/13/2017  Hospital Length of Stay: 6 days  Expected Discharge Date: 12/22/2017  Attending Physician: Lauren Wolf MD  Primary Care Provider: Live Young MD (Inactive)    Current Legal Status: N/A    Patient information was obtained from patient and relative(s).     Subjective:     Principal Problem:Acute exacerbation of CHF (congestive heart failure)    Chief Complaint: "i'm tired"    HPI: 72 y.o. female  PMHx of CHF, CKD IV, HCV, h/o breast cancer s/p mastectomy/chemo 1979, COPD, HTN, Bipolar d/o admitted for acute on chronic dyspnea, concerning for acute CHF exacerbation. [facility] and currently lives with daughter. There has been concerns of memory loss and intermittent agitation per primary team and chart review. Per chart review - there has also been intermittent reported paranoia and delusional thoughts.   "Sometimes has a bad episode of getting angry and physically attacking her. Daughter reports that pt rarely has these and usually her triggers are when daughter is helping her with bills and finances as pt is starting to become forgetful. Daughter reports that pt is at times paranoid that daughter is stealing her money and becomes angry when daughter is trying to help." No history of psychiatric hospitalizations.  She has seen Dr. Martinez in clinic on 09/30/16 for depression and also had complaints of trouble falling asleep. Dose of seroquel was increased to 200mg qhs at the time. Psychiatry is consulted given concern that medication is has not been helping much with sleep and agitation. Cogentin was also started that office visit for concerns of tardive dyskinesia which was then subsequently discontinued.   Was unable to obtain collateral information from patient's daughter today as she was unavailable after multiple " "attempts to contact her on the phone.    Hospital Course: No notes on file    Interval History: On interview, the patient states that she is tired but is otherwise doing well. Denies pain. Denies confusion, AVH, paranoia, SI/HI. States that mood is good.    Daughter, present at bedside, states that the patient seems weak but is otherwise doing well. Chart review indicates the patient was weak, disoriented, and agitated last night. She was found to be hypotensive and is FOBT positive.    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    Drug use: No    Sexual activity: No      Comment:  with 3 children     Psychotherapeutics     Start     Stop Route Frequency Ordered    12/16/17 2100  QUEtiapine tablet 250 mg      -- Oral Nightly 12/16/17 1225    12/13/17 2126  ramelteon tablet 8 mg      -- Oral Nightly PRN 12/13/17 2030           Review of Systems  Objective:     Vital Signs (Most Recent):  Temp: 98.7 °F (37.1 °C) (12/19/17 1324)  Pulse: 69 (12/19/17 1515)  Resp: 18 (12/19/17 1515)  BP: 133/64 (12/19/17 1515)  SpO2: 100 % (12/19/17 1515) Vital Signs (24h Range):  Temp:  [97 °F (36.1 °C)-98.7 °F (37.1 °C)] 98.7 °F (37.1 °C)  Pulse:  [68-88] 69  Resp:  [16-19] 18  SpO2:  [92 %-100 %] 100 %  BP: ()/(51-79) 133/64     Height: 5' 6" (167.6 cm)  Weight: 80.1 kg (176 lb 9.4 oz)  Body mass index is 28.5 kg/m².      Intake/Output Summary (Last 24 hours) at 12/19/17 1542  Last data filed at 12/19/17 1457   Gross per 24 hour   Intake              760 ml " "  Output              800 ml   Net              -40 ml       Physical Exam   Psychiatric:   Appearance: overweight black female, appears stated age, hair shaved short, lying comfortably in bed wearing hospital gown in right lateral decubitus position  Behavior: calm, cooperative, limited eye contact  Speech: quiet, but normal rate and tone  Mood: "I don't know"  Affect: euthymic, blunted, congruent to stated mood, not labile  Thought processes: somewhat disorganized, concrete, paucity of thought  Thought content: no SI/HI, no AVH  Insight: limited  Judgment: fair  Cognition: alert, oriented only to self and place. Disoriented to day of week, date, month, year, situation. Recent and remote memory impaired. Attention good.        Significant Labs: All pertinent labs within the past 24 hours have been reviewed.    Significant Imaging: None    Assessment/Plan:     Delirium due to another medical condition    Signs of delirium with waxing and waning quality to mental status. Risk factors: dementia, multiple medical co-morbidities, current admission for CHF exacerbation. QTc 406.     - Continue seroquel 250 mg QHS for now. Per daughter, this medication has not really been working for sleep recently, but the patient has been sleeping much better in the hospital (primary team attributes this to identification/treatment of thyroid disease). She is also not displaying any indications of akathisia, and denies subjective feelings of this as well. There is therefore no indication to make a change at this point.    - underlying bipolar disorder questionable, with clinical picture more suggestive of a neurocognitive disorder with psychosis or just a primary psychotic disorder   - can refer for neuropsychologic testing to characterize cognitive deficits   - should also follow-up with outpatient psychiatrist. I am working to get her into my own clinic - we can hopefully have an appointment for her on January 9.               Need " for Continued Hospitalization:   No need for inpatient psychiatric hospitalization. Continue medical care as per the primary team.    Anticipated Disposition: Still a Patient     Total time:  25 with greater than 50% of this time spent in counseling and/or coordination of care.       Owen Urias MD   Psychiatry  [facility]

## 2017-12-19 NOTE — ANESTHESIA RELEASE NOTE
"Anesthesia Release from PACU Note    Patient: Anai Sal    Procedure(s) Performed: Procedure(s) (LRB):  ESOPHAGOGASTRODUODENOSCOPY (EGD) (N/A)    Anesthesia type: general    Post pain: Adequate analgesia    Post assessment: no apparent anesthetic complications    Last Vitals:   Visit Vitals  /64   Pulse 69   Temp 37.1 °C (98.7 °F) (Temporal)   Resp 18   Ht 5' 6" (1.676 m)   Wt 80.1 kg (176 lb 9.4 oz)   LMP  (LMP Unknown)   SpO2 100%   Breastfeeding? No   BMI 28.50 kg/m²       Post vital signs: stable    Level of consciousness: awake    Nausea/Vomiting: no nausea/no vomiting    Complications: none    Airway Patency: patent    Respiratory: unassisted    Cardiovascular: stable and blood pressure at baseline    Hydration: euvolemic  "

## 2017-12-20 PROBLEM — K20.80 ESOPHAGITIS, LOS ANGELES GRADE C: Status: ACTIVE | Noted: 2017-12-20

## 2017-12-20 LAB
ALBUMIN SERPL BCP-MCNC: 2.9 G/DL
ANION GAP SERPL CALC-SCNC: 13 MMOL/L
BASOPHILS # BLD AUTO: 0.03 K/UL
BASOPHILS NFR BLD: 0.6 %
BUN SERPL-MCNC: 106 MG/DL
CALCIUM SERPL-MCNC: 9.1 MG/DL
CHLORIDE SERPL-SCNC: 108 MMOL/L
CO2 SERPL-SCNC: 24 MMOL/L
CREAT SERPL-MCNC: 3 MG/DL
DIFFERENTIAL METHOD: ABNORMAL
EOSINOPHIL # BLD AUTO: 0 K/UL
EOSINOPHIL NFR BLD: 0.4 %
ERYTHROCYTE [DISTWIDTH] IN BLOOD BY AUTOMATED COUNT: 18 %
EST. GFR  (AFRICAN AMERICAN): 17.2 ML/MIN/1.73 M^2
EST. GFR  (NON AFRICAN AMERICAN): 14.9 ML/MIN/1.73 M^2
GLUCOSE SERPL-MCNC: 88 MG/DL
HCT VFR BLD AUTO: 26.1 %
HGB BLD-MCNC: 9 G/DL
IMM GRANULOCYTES # BLD AUTO: 0.02 K/UL
IMM GRANULOCYTES NFR BLD AUTO: 0.4 %
LYMPHOCYTES # BLD AUTO: 1.5 K/UL
LYMPHOCYTES NFR BLD: 29.6 %
MAGNESIUM SERPL-MCNC: 2.2 MG/DL
MCH RBC QN AUTO: 34 PG
MCHC RBC AUTO-ENTMCNC: 34.5 G/DL
MCV RBC AUTO: 99 FL
MONOCYTES # BLD AUTO: 0.5 K/UL
MONOCYTES NFR BLD: 9.9 %
NEUTROPHILS # BLD AUTO: 3 K/UL
NEUTROPHILS NFR BLD: 59.1 %
NRBC BLD-RTO: 1 /100 WBC
PHOSPHATE SERPL-MCNC: 4.2 MG/DL
PLATELET # BLD AUTO: 103 K/UL
PMV BLD AUTO: 12 FL
POTASSIUM SERPL-SCNC: 3.5 MMOL/L
RBC # BLD AUTO: 2.65 M/UL
SODIUM SERPL-SCNC: 145 MMOL/L
WBC # BLD AUTO: 5.07 K/UL

## 2017-12-20 PROCEDURE — 80069 RENAL FUNCTION PANEL: CPT

## 2017-12-20 PROCEDURE — 25000003 PHARM REV CODE 250: Performed by: INTERNAL MEDICINE

## 2017-12-20 PROCEDURE — 85025 COMPLETE CBC W/AUTO DIFF WBC: CPT

## 2017-12-20 PROCEDURE — 27000221 HC OXYGEN, UP TO 24 HOURS

## 2017-12-20 PROCEDURE — 36415 COLL VENOUS BLD VENIPUNCTURE: CPT

## 2017-12-20 PROCEDURE — 97530 THERAPEUTIC ACTIVITIES: CPT

## 2017-12-20 PROCEDURE — 94640 AIRWAY INHALATION TREATMENT: CPT

## 2017-12-20 PROCEDURE — 99232 SBSQ HOSP IP/OBS MODERATE 35: CPT | Mod: ,,, | Performed by: INTERNAL MEDICINE

## 2017-12-20 PROCEDURE — 94761 N-INVAS EAR/PLS OXIMETRY MLT: CPT

## 2017-12-20 PROCEDURE — 83735 ASSAY OF MAGNESIUM: CPT

## 2017-12-20 PROCEDURE — 25000242 PHARM REV CODE 250 ALT 637 W/ HCPCS: Performed by: INTERNAL MEDICINE

## 2017-12-20 PROCEDURE — 11000001 HC ACUTE MED/SURG PRIVATE ROOM

## 2017-12-20 PROCEDURE — 99900035 HC TECH TIME PER 15 MIN (STAT)

## 2017-12-20 RX ORDER — PANTOPRAZOLE SODIUM 40 MG/1
40 TABLET, DELAYED RELEASE ORAL DAILY
Status: DISCONTINUED | OUTPATIENT
Start: 2017-12-20 | End: 2017-12-26 | Stop reason: HOSPADM

## 2017-12-20 RX ADMIN — LIOTHYRONINE SODIUM 5 MCG: 5 TABLET ORAL at 09:12

## 2017-12-20 RX ADMIN — IPRATROPIUM BROMIDE AND ALBUTEROL SULFATE 3 ML: .5; 3 SOLUTION RESPIRATORY (INHALATION) at 08:12

## 2017-12-20 RX ADMIN — FUROSEMIDE 80 MG: 40 TABLET ORAL at 09:12

## 2017-12-20 RX ADMIN — POTASSIUM & SODIUM PHOSPHATES POWDER PACK 280-160-250 MG 2 PACKET: 280-160-250 PACK at 09:12

## 2017-12-20 RX ADMIN — IPRATROPIUM BROMIDE AND ALBUTEROL SULFATE 3 ML: .5; 3 SOLUTION RESPIRATORY (INHALATION) at 02:12

## 2017-12-20 RX ADMIN — POTASSIUM & SODIUM PHOSPHATES POWDER PACK 280-160-250 MG 2 PACKET: 280-160-250 PACK at 12:12

## 2017-12-20 RX ADMIN — ALLOPURINOL 100 MG: 100 TABLET ORAL at 09:12

## 2017-12-20 RX ADMIN — POTASSIUM & SODIUM PHOSPHATES POWDER PACK 280-160-250 MG 2 PACKET: 280-160-250 PACK at 05:12

## 2017-12-20 RX ADMIN — HYDRALAZINE HYDROCHLORIDE AND ISOSORBIDE DINITRATE 2 TABLET: 37.5; 2 TABLET, FILM COATED ORAL at 02:12

## 2017-12-20 RX ADMIN — PANTOPRAZOLE SODIUM 40 MG: 40 TABLET, DELAYED RELEASE ORAL at 09:12

## 2017-12-20 RX ADMIN — QUETIAPINE FUMARATE 250 MG: 25 TABLET, FILM COATED ORAL at 09:12

## 2017-12-20 RX ADMIN — FLUTICASONE FUROATE AND VILANTEROL TRIFENATATE 1 PUFF: 100; 25 POWDER RESPIRATORY (INHALATION) at 09:12

## 2017-12-20 RX ADMIN — LEVOTHYROXINE SODIUM ANHYDROUS 70 MCG: 100 INJECTION, POWDER, LYOPHILIZED, FOR SOLUTION INTRAVENOUS at 09:12

## 2017-12-20 RX ADMIN — FUROSEMIDE 80 MG: 40 TABLET ORAL at 05:12

## 2017-12-20 RX ADMIN — HYDRALAZINE HYDROCHLORIDE AND ISOSORBIDE DINITRATE 2 TABLET: 37.5; 2 TABLET, FILM COATED ORAL at 09:12

## 2017-12-20 RX ADMIN — HYDRALAZINE HYDROCHLORIDE AND ISOSORBIDE DINITRATE 2 TABLET: 37.5; 2 TABLET, FILM COATED ORAL at 06:12

## 2017-12-20 NOTE — PLAN OF CARE
Visit with daughter at bedside.  States plans at d/c are to return home with either home health [facility].  Daughter is aware [facility] determines which services pt receives, agency will review orders at time of d/c and arrange with pt and daughter.      Della Pineda RN  Case Management  [id number]

## 2017-12-20 NOTE — PROGRESS NOTES
"[facility] Hospital Medicine  Progress Note    Patient Name: Anai Sal  MRN: [id number]  Patient Class: IP- Inpatient   Admission Date: 12/13/2017  Length of Stay: 6 days  Attending Physician: Lauren Wolf MD  Primary Care Provider: Live Young MD (Inactive)    [facility] Medicine Team: [facility] D Lauren Wolf MD    Subjective:     Principal Problem:Acute exacerbation of CHF (congestive heart failure)    HPI:  "72F h/o HFrEF (30%), Stage IV CKD, Hep C, h/o breast CA s/p mastectomy/chemo 1979, COPD, HTN, Bipolar d/o, anemia of CKD, presenting with worsening dyspnea for last few days with cough. Patient usually ambulatory with walker, over last few days exercise tolerance worsened to few steps before feeling short of breath, denies chest pain with dyspnea. Pt accompanied by daughter who reports worsening swelling of legs recently, ?orthopnea, reporting PND. Pt has been taking increased fluids lately as she has been feeling thirsty and daughter has difficult time controlling patient intake. Denies F/C/N/V, cough nonproductive. Has been taking lasix 20mg BID, reports between 7-8 lb weight gain in last week."    Hospital Course:  No notes on file    Interval History: Patient with melena overnight, no complaints. H&H decreased.  Data Review: Results recorded below were reviewed 12/19/2017.    Review of Systems   Respiratory: Negative for shortness of breath.    Gastrointestinal: Positive for blood in stool.   Psychiatric/Behavioral: Positive for confusion and decreased concentration.     Objective:     Vital Signs (Most Recent):  Temp: 98 °F (36.7 °C) (12/19/17 1956)  Pulse: 72 (12/19/17 1956)  Resp: 18 (12/19/17 1609)  BP: (!) 155/78 (12/19/17 1956)  SpO2: 97 % (12/19/17 1956) Vital Signs (24h Range):  Temp:  [97.1 °F (36.2 °C)-98.7 °F (37.1 °C)] 98 °F (36.7 °C)  Pulse:  [67-88] 72  Resp:  [16-19] 18  SpO2:  [92 %-100 %] 97 %  BP: ()/(51-79) 155/78     Weight: 80.1 kg (176 lb " 9.4 oz)  Body mass index is 28.5 kg/m².    Intake/Output Summary (Last 24 hours) at 12/19/17 2107  Last data filed at 12/19/17 1956   Gross per 24 hour   Intake           704.17 ml   Output                0 ml   Net           704.17 ml      Physical Exam   Constitutional: She appears well-developed.   HENT:   Head: Normocephalic.   Mouth/Throat: Mucous membranes are not cyanotic.   Eyes: Conjunctivae and lids are normal.   Neck: Neck supple.   Cardiovascular: S1 normal and S2 normal.    Pulmonary/Chest: Effort normal and breath sounds normal.   Abdominal: Soft. Bowel sounds are normal. There is no tenderness.   Musculoskeletal: She exhibits no edema.   Neurological: She is alert. She is disoriented.   Skin: She is not diaphoretic. No cyanosis. No pallor. Nails show no clubbing.   Psychiatric: Her affect is blunt. Cognition and memory are impaired.       Significant Labs:     CBC:   Recent Labs  Lab 12/18/17  2101 12/19/17  0115 12/19/17  0740   WBC 5.46 4.47 3.81*   HGB 9.0* 7.3* 6.9*   HCT 26.1* 21.7* 19.9*   * 100* 101*     CMP:   Recent Labs  Lab 12/18/17  0807 12/19/17  0740    147*   K 4.2 4.3    108   CO2 29 27   GLU 79 86   BUN 56* 105*   CREATININE 2.4* 2.5*   CALCIUM 8.9 9.0   ALBUMIN 2.9* 2.7*   ANIONGAP 9 12   EGFRNONAA 19.6* 18.6*     Cardiac Markers:   Recent Labs  Lab 12/19/17  0740   BNP 1,585*     Coagulation:   Recent Labs  Lab 12/19/17  0740   INR 1.4*   APTT 36.2*     Magnesium:   Recent Labs  Lab 12/18/17  0807 12/19/17  0740   MG 2.0 1.8     TSH:   Recent Labs  Lab 12/15/17  0747   .080*     Assessment/Plan:      Current Hospital Problem List:    Active Hospital Problems    Diagnosis  POA    *Acute exacerbation of CHF (congestive heart failure) [I50.9]  Yes     Priority: 1 - High    Primary hypothyroidism [E03.9]  Yes     Priority: 2     Stage 4 chronic kidney disease [N18.4]  Yes     Priority: 3     Melena [K92.1]  Unknown     Priority: 4     Anemia of chronic  renal failure, stage 4 (severe) [N18.4, D63.1]  Yes     Priority: 4     Palliative care encounter [Z51.5]  Not Applicable    Major neurocognitive disorder [F03.90]  Yes    Cognitive impairment [R41.89]  Yes    Chronic obstructive pulmonary disease [J44.9]  Yes    Delirium due to another medical condition [F05]  Yes    Elevated troponin [R74.8]  Yes    Insomnia [G47.00]  Yes      Resolved Hospital Problems    Diagnosis Date Resolved POA   No resolved problems to display.       Ordered Medications for management of current problems:    albuterol-ipratropium 2.5mg-0.5mg/3mL  3 mL Nebulization Q6H WAKE    allopurinol  100 mg Oral BID    fluticasone-vilanterol  1 puff Inhalation Daily    furosemide  80 mg Oral BID    isosorbide-hydrALAZINE 20-37.5 mg  2 tablet Oral TID    levothyroxine  70 mcg Intravenous Daily    liothyronine  5 mcg Oral BID    pantoprazole  40 mg Intravenous BID    potassium, sodium phosphates  2 packet Oral QID (WM & HS)    QUEtiapine  250 mg Oral Nightly       Anticipated Disposition: Home-Health Care [facility]    Assessment and Plan by Problem:    * Acute exacerbation of CHF (congestive heart failure)    Patient presenting with worsening LE edema, CXR with pulmonary edema, shortness of breath, elevated BNP likely acute on chronic systolic CHF (based on recent ECHO 5/17), likely precipitated by increased fluid intake by patient   -Lasix 40mg IV BID  - NYHA class 3 at baseline  - non-ischemic cardiomyopathy  ECHO 2D showed EF 30-35%, Grade 3 diastolic dysfunction, biatrial enlargement, depressed right sided dysfunction, increase right atrial pressure  -increased diuretics to furosemide IV 80 mg TID   - transitioned to oral furosemide 80 mg TID. Will be discharged on furosemide 80 mg BID  -Cardiology follow up as outpatient        Primary hypothyroidism    Patient with h/o hypothyroidism  TSH elevated T4 WNL  -Endocrine consulted - levothyroxine 70 mcg IV daily and cytomel 5 mcg BID for one  week. Hold oral levothyroxine while diuresing. Patient will be discharged on levothyroxine 100 mcg daily and a few more days of cytomel 5 mcg BID         Stage 4 chronic kidney disease    CANDI on CKD IV , patient with baseline creatinine 2.2  Treated with Na Bicarb 1300 BID initially; now discontinued.  Nephrology follow up as outpatient        Melena    Significant melena overnight 12/18/17. Baseline Hgb of 10.  Transfused 2 units PRBCs.  GI consulted. EGD 12/19/2017.        Anemia of chronic renal failure, stage 4 (severe)    Stable, f/u outpatient nephro        Cognitive impairment    Per family over last months has had issues with memory, forgetting to turn off appliances, getting lost, likely early signs of dementia. Also with agitation (espcially at night) and non-compliance with care.   Patient on seroquel 200 mg qhs and not effective for her current behaviors. Psychiatry consult for medication management. - increased seroquel 250 mg qhs.  -Plan for psychiatry and memory disorder clinic follow up as outpatient. Psychiatry resident to try to place patient in his clinic for Jan 9th 2:00 PM.   -Will discharge back on 200 mg qhs and cogentin for now. Her sleep has improved after IV levothyroxine started, but still wakes up confused. Further adjustment will be as per psychiatrist  -neuropsych testing as outpatient as ordered        Chronic obstructive pulmonary disease    Per prior PFTs, mild obstructive component, on inhalers at home, no increase in sputum production unlikely COPD exacerbation,  -Duonebs Q6H  -Continue LABA/inhailed corticosteroid        Elevated troponin    Patient with mild elevation in troponin, no recent chest pain, no significant EKG changes, likely type 2 NSTEMI in setting of CHF exacerbation and CKD impairing clearance  -Followed Troponin/EKG; stable.            VTE Risk Mitigation         Ordered     Medium Risk of VTE  Once      12/13/17 2030              Lauren Wolf MD  Department  [facility] Medicine   [facility]

## 2017-12-20 NOTE — PROVATION PATIENT INSTRUCTIONS
Discharge Summary/Instructions after an Endoscopic Procedure  Patient Name: Anai Sal  Patient MRN: [id number]  Patient YOB: 1945  Tuesday, December 19, 2017  Jose C Tovar MD  RESTRICTIONS:  During your procedure today, you received medications for sedation.  These   medications may affect your judgment, balance and coordination.  Therefore,   for 24 hours, you have the following restrictions:   - DO NOT drive a car, operate machinery, make legal/financial decisions,   sign important papers or drink alcohol.    ACTIVITY:  The following day: return to full activity including work, except no heavy   lifting, straining or running for 3 days if polyps were removed.  DIET:  Eat and drink normally unless instructed otherwise.     TREATMENT FOR COMMON SIDE EFFECTS:  - Mild abdominal pain, belching, bloating or excessive gas: rest, eat   lightly and use a heating pad.  - Sore Throat: treat with throat lozenges and/or gargle with warm salt   water.  SYMPTOMS TO WATCH FOR AND REPORT TO YOUR PHYSICIAN:  1. Abdominal pain or bloating, other than gas cramps.  2. Chest pain.  3. Back pain.  4. Chills or fever occurring within 24 hours after the procedure.  5. Rectal bleeding, which would show as bright red, maroon, or black stools.   (A tablespoon of blood from the rectum is not serious, especially if   hemorrhoids are present.)  6. Vomiting.  7. Weakness or dizziness.  8. Because air was used during the procedure, expelling large amounts of air   from your rectum or belching is normal.  9. If a bowel prep was taken, you may not have a bowel movement for 1-3   days.  This is normal.  GO DIRECTLY TO THE NEAREST EMERGENCY ROOM IF YOU HAVE ANY OF THE FOLLOWING:      Difficulty breathing  Chills and/or fever over 101 F   Persistent vomiting and/or vomiting blood   Severe abdominal pain   Severe chest pain   Black, tarry stools   Bleeding- more than one tablespoon   Any other symptom or condition that you may feel  needs urgent attention  Your doctor recommends these additional instructions:  If any biopsies were taken, your doctor s clinic will contact you in 1 to 2   weeks with any results.  Advance your diet as tolerated.   A proton pump inhibitor medication will be prescribed to be taken by mouth   once a day.   Return to your GI clinic.  For questions, problems or results please call your physician - Jose C Tovar MD at Work:  [phone].  [facility], EMERGENCY ROOM PHONE NUMBER: [phone]  IF A COMPLICATION OR EMERGENCY SITUATION ARISES AND YOU ARE UNABLE TO REACH   YOUR PHYSICIAN - GO DIRECTLY TO THE EMERGENCY ROOM.  Jose C Tovar MD  12/19/2017 11:50:42 PM  This report has been verified and signed electronically.

## 2017-12-20 NOTE — SUBJECTIVE & OBJECTIVE
Interval History: Patient with melena overnight, no complaints. H&H decreased.  Data Review: Results recorded below were reviewed 12/19/2017.    Review of Systems   Respiratory: Negative for shortness of breath.    Gastrointestinal: Positive for blood in stool.   Psychiatric/Behavioral: Positive for confusion and decreased concentration.     Objective:     Vital Signs (Most Recent):  Temp: 98 °F (36.7 °C) (12/19/17 1956)  Pulse: 72 (12/19/17 1956)  Resp: 18 (12/19/17 1609)  BP: (!) 155/78 (12/19/17 1956)  SpO2: 97 % (12/19/17 1956) Vital Signs (24h Range):  Temp:  [97.1 °F (36.2 °C)-98.7 °F (37.1 °C)] 98 °F (36.7 °C)  Pulse:  [67-88] 72  Resp:  [16-19] 18  SpO2:  [92 %-100 %] 97 %  BP: ()/(51-79) 155/78     Weight: 80.1 kg (176 lb 9.4 oz)  Body mass index is 28.5 kg/m².    Intake/Output Summary (Last 24 hours) at 12/19/17 2107  Last data filed at 12/19/17 1956   Gross per 24 hour   Intake           704.17 ml   Output                0 ml   Net           704.17 ml      Physical Exam   Constitutional: She appears well-developed.   HENT:   Head: Normocephalic.   Mouth/Throat: Mucous membranes are not cyanotic.   Eyes: Conjunctivae and lids are normal.   Neck: Neck supple.   Cardiovascular: S1 normal and S2 normal.    Pulmonary/Chest: Effort normal and breath sounds normal.   Abdominal: Soft. Bowel sounds are normal. There is no tenderness.   Musculoskeletal: She exhibits no edema.   Neurological: She is alert. She is disoriented.   Skin: She is not diaphoretic. No cyanosis. No pallor. Nails show no clubbing.   Psychiatric: Her affect is blunt. Cognition and memory are impaired.       Significant Labs:     CBC:   Recent Labs  Lab 12/18/17  2101 12/19/17  0115 12/19/17  0740   WBC 5.46 4.47 3.81*   HGB 9.0* 7.3* 6.9*   HCT 26.1* 21.7* 19.9*   * 100* 101*     CMP:   Recent Labs  Lab 12/18/17  0807 12/19/17  0740    147*   K 4.2 4.3    108   CO2 29 27   GLU 79 86   BUN 56* 105*   CREATININE 2.4*  2.5*   CALCIUM 8.9 9.0   ALBUMIN 2.9* 2.7*   ANIONGAP 9 12   EGFRNONAA 19.6* 18.6*     Cardiac Markers:   Recent Labs  Lab 12/19/17  0740   BNP 1,585*     Coagulation:   Recent Labs  Lab 12/19/17  0740   INR 1.4*   APTT 36.2*     Magnesium:   Recent Labs  Lab 12/18/17  0807 12/19/17  0740   MG 2.0 1.8     TSH:   Recent Labs  Lab 12/15/17  0747   .080*

## 2017-12-20 NOTE — PLAN OF CARE
Problem: Fall Risk (Adult)  Goal: Absence of Falls  Patient will demonstrate the desired outcomes by discharge/transition of care.   Outcome: Ongoing (interventions implemented as appropriate)  Absence of falls noted at present. SR up x's 2. Fall risk protocol IP. Pt reminded not to get OOB. Verbalize understanding. Bed alarm on at all times. Avays Camera IP. Call bell within reach Will cont to monitor.

## 2017-12-20 NOTE — SUBJECTIVE & OBJECTIVE
"Interval History: The patient states that she feels weak and tired, but is otherwise doing well. Denies physical complaints. Denies feelings of confusion, paranoia, AVH, IOR, SI/HI. Slept well last night. Denies adverse effects to quetiapine.    Per nurse, the patient had a quiet night. She was not agitated and seemed to sleep well.    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1 °C)  Pulse:  [67-78] 74  Resp:  [16-19] 16  SpO2:  [92 %-100 %] 95 %  BP: (103-155)/(55-79) 121/60     Height: 5' 6" (167.6 cm)  Weight: 80.1 kg (176 lb 9.4 oz)  Body mass index is 28.5 kg/m².      Intake/Output Summary (Last 24 hours) at 12/20/17 1133  Last data filed at 12/20/17 0600   Gross per 24 hour   Intake           944.17 ml   Output              600 ml   Net           344.17 ml       Physical Exam   Psychiatric:   Appearance: overweight black female, appears stated age, hair " "shaved short, lying comfortably in bed wearing hospital gown in right lateral decubitus position  Behavior: calm, cooperative, limited eye contact  Speech: quiet, but normal rate and tone  Mood: "fine"  Affect: euthymic, blunted, congruent to stated mood, not labile  Thought processes: somewhat disorganized, concrete, paucity of thought  Thought content: no SI/HI, no AVH  Insight: limited  Judgment: fair  Cognition: alert, oriented to self, place, month. Disoriented to day of week, date, month, year, situation. Recent and remote memory impaired. Attention impaired.         Significant Labs: All pertinent labs within the past 24 hours have been reviewed.    Significant Imaging: None  "

## 2017-12-20 NOTE — ASSESSMENT & PLAN NOTE
Patient presenting with worsening LE edema, CXR with pulmonary edema, shortness of breath, elevated BNP likely acute on chronic systolic CHF (based on recent ECHO 5/17), likely precipitated by increased fluid intake by patient   -Lasix 40mg IV BID  - NYHA class 3 at baseline  - non-ischemic cardiomyopathy

## 2017-12-20 NOTE — PHYSICIAN QUERY
PT Name: Anai Sal  MR #: [id number]     Physician Query Form - Documentation Clarification      CDS/: Jeannine Cheung               Contact information: [email or web address]      This form is a permanent document in the medical record.     Query Date: December 20, 2017    By submitting this query, we are merely seeking further clarification of documentation. Please utilize your independent clinical judgment when addressing the question(s) below.    The Medical record reflects the following:    Supporting Clinical Findings Location in Medical Record     * Acute exacerbation of CHF (congestive heart failure)    71yo F with acute decompensated HFrEF (30%) 2/2 hypothyroidism and non-compliance with fluid restriction.   - NYHA class 3 at baseline  - non-ischemic cardiomyopathy, -ve PET stress March 2017  - appears volume overloaded but well-perfused  -   - CANDI on CKD4           Hospital medicine Progress notes                                                                             Doctor, Please specify diagnosis or diagnoses associated with above clinical findings.    Provider Use Only      Please specify the type of Cardiomyopathy:    [     ] Dilated Cardiomyopathy  [     ] Congestive Cardiomyopathy  [     ] Other:_________________                                                                                                             [ xxx ] Clinically undetermined

## 2017-12-20 NOTE — PLAN OF CARE
Problem: Fall Risk (Adult)  Goal: Identify Related Risk Factors and Signs and Symptoms  Related risk factors and signs and symptoms are identified upon initiation of Human Response Clinical Practice Guideline (CPG)   Outcome: Ongoing (interventions implemented as appropriate)   12/20/17 1543   Fall Risk   Related Risk Factors (Fall Risk) age-related changes;confusion/agitation;fatigue/slow reaction;gait/mobility problems;polypharmacy;environment unfamiliar   Signs and Symptoms (Fall Risk) presence of risk factors     Goal: Absence of Falls  Patient will demonstrate the desired outcomes by discharge/transition of care.   Outcome: Ongoing (interventions implemented as appropriate)   12/20/17 8411   Fall Risk (Adult)   Absence of Falls making progress toward outcome

## 2017-12-20 NOTE — PT/OT/SLP PROGRESS
Physical Therapy Treatment    Patient Name:  Anai Sal   MRN:  [id number]    Recommendations:     Discharge Recommendations:  nursing facility, skilled, home with home health   Discharge Equipment Recommendations: bedside commode   Barriers to discharge: patient requiring significant physical assistance at this time    Assessment:     Anai Sal is a 72 y.o. female admitted with a medical diagnosis of Acute exacerbation of CHF (congestive heart failure).  She presents with the following impairments/functional limitations:  weakness, impaired endurance, impaired functional mobilty, gait instability, impaired cognition, decreased safety awareness, impaired self care skills, decreased lower extremity function, impaired balance. Patient with little motivation to participate with therapy this session. Oriented to person, place; however, not oriented to time or situation. Patient required Min/Mod Assist for all mobility and continuous verbal cues for safety throughout session. Significant functional decline from PT eval 12/15/17. To benefit from continue skilled intervention to address deficits. DC rec's for SNF vs Home Health pending family's ability to provide 24-hr care.    Rehab Prognosis:  Good; patient would benefit from acute skilled PT services to address these deficits and reach maximum level of function.      Recent Surgery: Procedure(s) (LRB):  ESOPHAGOGASTRODUODENOSCOPY (EGD) (N/A) 1 Day Post-Op    Plan:     During this hospitalization, patient to be seen 3 x/week to address the above listed problems via gait training, therapeutic activities, therapeutic exercises, neuromuscular re-education  · Plan of Care Expires:  01/14/18   Plan of Care Reviewed with: patient    Subjective     Communicated with RN prior to session.  Patient found supine upon PT entry to room, agreeable to treatment.      Chief Complaint: none stated  Patient comments/goals: none stated  Pain/Comfort:  · Pain Rating 1: 0/10  · Pain  Rating Post-Intervention 1: 0/10    Patients cultural, spiritual, Presybeterian conflicts given the current situation: none stated    Objective:     Patient found with:  (no lines; daughter present) Avays camera    General Precautions: Standard, fall   Orthopedic Precautions:N/A   Braces: N/A     Functional Mobility:  · Bed Mobility:     · Rolling Right: minimum assistance  · Scooting: moderate assistance  · Supine to Sit: moderate assistance  · Transfers:     · Sit to Stand:  moderate assistance with rolling walker  · Bed to Chair: minimum assistance with  rolling walker  using  Stand Pivot  · Gait: 40ftx1 Min Assist using rolling walker. Ambulates with short step length, forward flexed posture. Unable to demonstrate equal step length and upright posture despite continuous verbal cues.  · Balance: impaired dynamic balance with transfers and ambulation increasing fall risk      AM-PAC 6 CLICK MOBILITY  Turning over in bed (including adjusting bedclothes, sheets and blankets)?: 3  Sitting down on and standing up from a chair with arms (e.g., wheelchair, bedside commode, etc.): 2  Moving from lying on back to sitting on the side of the bed?: 2  Moving to and from a bed to a chair (including a wheelchair)?: 3  Need to walk in hospital room?: 3  Climbing 3-5 steps with a railing?: 2  Total Score: 15       Therapeutic Activities and Exercises:   Patient educated on:   - role of PT/POC    - safety with all functional mobility   - bed mobility training   - transfer training   - gait training with rolling walker   - importance of participation with therapy services   - safes to ambulate with rolling walker and 1 person assist at this time.    Patient and daughter verbalized understanding of all education provided. Patient needs reinforcement.     Additional education provided to patient and daughter regarding safety and having supervision at all times and assist as needed with mobility. Daughter verbalized  understanding.    Patient left seated EOB with all lines intact, call button in reach, RN notified, daughter present and camera in place..    GOALS:    Physical Therapy Goals        Problem: Physical Therapy Goal    Goal Priority Disciplines Outcome Goal Variances Interventions   Physical Therapy Goal     PT/OT, PT Ongoing (interventions implemented as appropriate)     Description:  Goals to be met by: 17    Patient will increase functional independence with mobility by performin. Supine to sit with Set-up [place]  2. Sit to supine with Set-up [place]  3. Sit to stand transfer with Supervision  4. Gait  x 100 feet with Stand-by Assistance using Rolling Walker.   5. Lower extremity exercise program x20 reps per handout, with independence                       Time Tracking:     PT Received On: 17  PT Start Time: 1455     PT Stop Time: 1511  PT Total Time (min): 16 min     Billable Minutes: Therapeutic Activity 16    Treatment Type: Treatment  PT/PTA: PT           Francisco Benitez III, PT  2017

## 2017-12-20 NOTE — PLAN OF CARE
Problem: Physical Therapy Goal  Goal: Physical Therapy Goal  Goals to be met by: 17    Patient will increase functional independence with mobility by performin. Supine to sit with Set-up [place]  2. Sit to supine with Set-up [place]  3. Sit to stand transfer with Supervision  4. Gait  x 100 feet with Stand-by Assistance using Rolling Walker.   5. Lower extremity exercise program x20 reps per handout, with independence     Outcome: Ongoing (interventions implemented as appropriate)  Goals remain appropriate to improve functional mobility.    DC rec's for SNF vs HH with 24-hr assist    Francisco Benitez III, DPT, PT  2017

## 2017-12-20 NOTE — ASSESSMENT & PLAN NOTE
Signs of delirium with waxing and waning quality to mental status. Risk factors: dementia, multiple medical co-morbidities, current admission for CHF exacerbation. QTc 406.     - Continue seroquel 250 mg QHS for now, and hold for hypotension. Per daughter, this medication has not really been working for sleep recently, but the patient has been sleeping much better in the hospital (primary team attributes this to identification/treatment of thyroid disease). She is also not displaying any indications of akathisia, and denies subjective feelings of this as well. There is therefore no indication to make a change at this point.    - underlying bipolar disorder questionable, with clinical picture more suggestive of a neurocognitive disorder with psychosis or just a primary psychotic disorder   - can refer for neuropsychologic testing to characterize cognitive deficits   - should also follow-up with outpatient psychiatrist. I am working to get her into my own clinic - we can hopefully have an appointment for her on January 9.

## 2017-12-20 NOTE — PROGRESS NOTES
"[facility]  Psychiatry  Progress Note    Patient Name: Anai Sal  MRN: [id number]   Code Status: DNR  Admission Date: 12/13/2017  Hospital Length of Stay: 7 days  Expected Discharge Date: 12/22/2017  Attending Physician: Lauren Wolf MD  Primary Care Provider: Live Young MD (Inactive)    Current Legal Status: N/A    Patient information was obtained from patient.     Subjective:     Principal Problem:Acute exacerbation of CHF (congestive heart failure)    Chief Complaint: "I feel OK"    HPI: 72 y.o. female  PMHx of CHF, CKD IV, HCV, h/o breast cancer s/p mastectomy/chemo 1979, COPD, HTN, Bipolar d/o admitted for acute on chronic dyspnea, concerning for acute CHF exacerbation. Patient [facility] and currently lives with daughter. There has been concerns of memory loss and intermittent agitation per primary team and chart review. Per chart review - there has also been intermittent reported paranoia and delusional thoughts.   "Sometimes has a bad episode of getting angry and physically attacking her. Daughter reports that pt rarely has these and usually her triggers are when daughter is helping her with bills and finances as pt is starting to become forgetful. Daughter reports that pt is at times paranoid that daughter is stealing her money and becomes angry when daughter is trying to help." No history of psychiatric hospitalizations.  She has seen Dr. Martinez in clinic on 09/30/16 for depression and also had complaints of trouble falling asleep. Dose of seroquel was increased to 200mg qhs at the time. Psychiatry is consulted given concern that medication is has not been helping much with sleep and agitation. Cogentin was also started that office visit for concerns of tardive dyskinesia which was then subsequently discontinued.   Was unable to obtain collateral information from patient's daughter today as she was unavailable after multiple attempts to contact " "her on the phone.    Hospital Course: No notes on file    Interval History: The patient states that she feels weak and tired, but is otherwise doing well. Denies physical complaints. Denies feelings of confusion, paranoia, AVH, IOR, SI/HI. Slept well last night. Denies adverse effects to quetiapine.    Per nurse, the patient had a quiet night. She was not agitated and seemed to sleep well.    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1 °C)  Pulse:  [67-78] 74  Resp:  [16-19] 16  SpO2:  [92 %-100 %] 95 %  BP: (103-155)/(55-79) 121/60     Height: 5' 6" (167.6 cm)  Weight: 80.1 kg (176 lb 9.4 oz)  Body mass index is 28.5 kg/m².      Intake/Output Summary (Last 24 hours) at 12/20/17 1133  Last data filed at 12/20/17 0600   Gross per 24 hour   Intake           944.17 ml   Output              600 ml   Net           344.17 ml       Physical Exam   Psychiatric: " "  Appearance: overweight black female, appears stated age, hair shaved short, lying comfortably in bed wearing hospital gown in right lateral decubitus position  Behavior: calm, cooperative, limited eye contact  Speech: quiet, but normal rate and tone  Mood: "fine"  Affect: euthymic, blunted, congruent to stated mood, not labile  Thought processes: somewhat disorganized, concrete, paucity of thought  Thought content: no SI/HI, no AVH  Insight: limited  Judgment: fair  Cognition: alert, oriented to self, place, month. Disoriented to day of week, date, month, year, situation. Recent and remote memory impaired. Attention impaired.         Significant Labs: All pertinent labs within the past 24 hours have been reviewed.    Significant Imaging: None    Assessment/Plan:     Delirium due to another medical condition    Signs of delirium with waxing and waning quality to mental status. Risk factors: dementia, multiple medical co-morbidities, current admission for CHF exacerbation. QTc 406.     - Continue seroquel 250 mg QHS for now, and hold for hypotension. Per daughter, this medication has not really been working for sleep recently, but the patient has been sleeping much better in the hospital (primary team attributes this to identification/treatment of thyroid disease). She is also not displaying any indications of akathisia, and denies subjective feelings of this as well. There is therefore no indication to make a change at this point.    - underlying bipolar disorder questionable, with clinical picture more suggestive of a neurocognitive disorder with psychosis or just a primary psychotic disorder   - can refer for neuropsychologic testing to characterize cognitive deficits   - should also follow-up with outpatient psychiatrist. I am working to get her into my own clinic - we can hopefully have an appointment for her on January 9.               Need for Continued Hospitalization:   No need for inpatient psychiatric " hospitalization. Continue medical care as per the primary team.    Anticipated Disposition: Still a Patient     Total time:  25 with greater than 50% of this time spent in counseling and/or coordination of care.       Owen Urias MD   Psychiatry  [facility]

## 2017-12-20 NOTE — PLAN OF CARE
Palliative Care Social Work   Assessment  Name: Anai Sal  MRN: [id number]  Date of Birth/Age:  1945  Sex: female  Ethnicity:     Primary Language:English   Needed: no    Attending Physician: Dr. Wolf  Reason for Referral: assistance with advance directives and assistance with clarification of goals of care  Consult Order Date: 17 0721  Primary CM/SW: Della Pineda, RN    Palliative Care Provider: Dr. Beltrán.    Present during Interview: pt's daughter Treshone Turner and this SW    Past & Current Medical Situation:   Diagnosis: Acute exacerbation of CHF (congestive heart failure)  PMH:   Past Medical History:   Diagnosis Date    Breast cancer 1980    right    CHF (congestive heart failure)     Chronic hepatitis C virus infection 3/14/2017    Coronary artery disease     Hypertension     Hazel     Renal disorder     Thyroid disease      Mental Health/Substance Use History: n/a  Non-traditional Health practices:     Understanding of diagnosis and prognosis: Dtr would benefit from continued support    Patients Mental Status: Asleep during visit.     Socio-Economic Factors/Resources:  Address: [address] [place] [address]   [place] [address]  Phone Number: [phone] (home)     Marital Status:   Household Composition: Dtr lives with pt in pt's home.  Children: 3 children  Relationships with Family: Dtr stated that she has lived with her mother all of her life. Dtr stated that despite having two other siblings she is the only one that takes care of the pt.  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    Dtr stated that she helped care for her grandmother when she was ill as well. Grandmother  at [facility].     Emergency Contacts:   Dtr: Treshone Turner: [phone];  Son: Fuentes Sal: [phone]    Activities of Daily Living: pt independent with adls. Needs supervision.  Support Systems-Family & Community (Home Health, HME etc): [facility]  program. Goes to  few times a week. Dtr stated that they were about to start receiving a sitter to assist with getting dressed in the morning.     Transportation:  [facility]    Work/Education History: Pt use to work as a .    History: Receives [facility] Pension from her late . 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     Financial Resources:[facility]      Advanced Care Planning & Legal Concerns:   Advanced Directives/Living Will: no   Planning:  no    Power of : no  Surrogate Decision Maker: Dtr stated that she is the main decision-maker.       Spirituality, Culture & Coping Mechanisms:  F- Kate and Belief: Latter-day  I - Importance: -  C - Community/Culture Values:   A - Address in Care: Spiritual Care to follow.      Strengths/Coping Strategies: Supportive Dtr. Articulate about pt's needs. Dtr trying to find all possible options.   Self-Care Activities/Hobbies: Enjoyed going to the casino.    Goals/Hopes/Expectations: Return home with [facility].  Fears/Anxiety/Concerns: When TIFFANIE introduced palliative care, dtr concerned that palliative care meant hospice. TIFFANIE educated on differences.         Preferences about EOL Environment: (own bed, family nearby, pets, music, etc)  home    Complicated Bereavement Risk Assessment Tool (CBRAT)  Reference:  [facility] Clinical Practice Group (May 2016). Bereavement Risk Screening and Management Guidelines.  Retrieved from: http://[email or web address].com.au/wp-content/uploads//PGLNY-Fowyfpgxbxr-Rshaihhgc-and-Management-Guideline-2016.pdf      Client Characteristics (Bereaved Client)  ? Under 18      no  ? Was a Twin   no  ? Young Spouse   no  ? Elderly Spouse    no  ? Isolated    no  ? Lacks Meaningful Social Support   no  ? Dissatisfied with help available during illness   no  ? New to Financial [place] no  ? New to Decision-Making   no   History of Loss (Bereaved  Client)  ? Cumulative Multiple Losses   no  ? Previous Mental Health Illnesses   no  ? Current Mental Health Illness   no  ? Other Significant Health Issues   no   ? Migrant/Refugee   no    Illness  ? Inherited Disorder   no  ? Stigmatized Disease in the   no  ?  Family/Community   yes  ? Lengthy/Burdensome   yes Relationship with   ? Profound Lifelong Partner   no  ? Highly Dependent    yes  ? Antagonistic   no  ? Ambivalent   no  ? Deeply Connected   no  ? Culturally Defined   no   Death  ? Sudden or Unexpected   yes  ? Traumatic Circumstances Associated with Death   no  ? Significant Cultural/Social Burdens as a result of Death   no   Risk Factors Scores  0-2  Low  3-5  Moderate  5+  High  All persons scoring moderate to high presume to be at risk**    (** It is acknowledged that protective factors and resilience may outweigh apparent risk factors.      Total Risk Factors Score:   Moderate    Pt has been the main caregiver for her grandmother and now her mother for a number of years. She receives no support from her additional siblings. Dtr to benefit from continued emotional support and bereavement care.       Discharge Planning Needs/Plan of Care:       SW met with pt's dtr at bedside. Pt asleep. SW introduced Palliative Care meaning, roles, and differences between Pal Care and Hospice Care. SW began establishing rapport and getting to know pt and her daughter.     Dtr asked what were her options for care going forward. SW explained that with [facility], she may have different options. Dtr asked SW to explain options without [facility]. SW explained different levels of care: Home Health vs [facility] vs Outpatient Pal Care, vs home hospice vs inpatient hospice.     SW explained that [facility] has certain contracts with certain agencies and she may not be able to utilize Outpatient Pal Care if they do not have a contract. Dtr open to as many resources as possible.    Dtr also stated that [facility]  [facility] the pt. Dtr stated that she was told that the [facility] may have additional supportive programs for her. SW confirmed that pt may be able to receive benefits such as the Aid and Attendance benefit through the [facility] but confirmed that pt would need to be seen by a VA physician. (Pt may also be able to receive Palliative Care through VA as well. This was not discussed with the dtr.)      SW spoke with Dtr about practicing healthy self-care for herself because of her own profession and schooling and caring for her mother without additional family help. SW spoke with her about burnout and the deterioration of her own health. Dtr acknowledged importance.       TIFFANIE sent message to Chiquis Leung with [facility] asking if their Palliative program has a contract with [employer]. Chiquis Leung declined stating that their "contractis not complete at this time."    TIFFANIE sent message to Vlad with [facility] and Yuli with [facility] asking if their Palliative program has a contract with [place]. Received message back from Yuli. They also do not have a contract with [place].      Informed CM Della Pineda RN, of conversation with dtr and recommendation for outpatient palliative care after discharge. TIFFANIE informed Della of information received by [facility].  Della to follow up with [facility]  to inform them of the recommendation.       TIFFANIE will follow up as appropriate.      Halima Livingston LCSW, ACHP-SW    "

## 2017-12-21 LAB
ALBUMIN SERPL BCP-MCNC: 3.2 G/DL
ALBUMIN SERPL BCP-MCNC: 3.2 G/DL
ANION GAP SERPL CALC-SCNC: 13 MMOL/L
ANION GAP SERPL CALC-SCNC: 14 MMOL/L
BASOPHILS # BLD AUTO: 0.02 K/UL
BASOPHILS NFR BLD: 0.3 %
BNP SERPL-MCNC: 330 PG/ML
BUN SERPL-MCNC: 91 MG/DL
BUN SERPL-MCNC: 92 MG/DL
CALCIUM SERPL-MCNC: 9.4 MG/DL
CALCIUM SERPL-MCNC: 9.5 MG/DL
CHLORIDE SERPL-SCNC: 106 MMOL/L
CHLORIDE SERPL-SCNC: 106 MMOL/L
CO2 SERPL-SCNC: 27 MMOL/L
CO2 SERPL-SCNC: 28 MMOL/L
CREAT SERPL-MCNC: 3.2 MG/DL
CREAT SERPL-MCNC: 3.3 MG/DL
DIFFERENTIAL METHOD: ABNORMAL
EOSINOPHIL # BLD AUTO: 0 K/UL
EOSINOPHIL NFR BLD: 0.3 %
ERYTHROCYTE [DISTWIDTH] IN BLOOD BY AUTOMATED COUNT: 17.5 %
EST. GFR  (AFRICAN AMERICAN): 15.4 ML/MIN/1.73 M^2
EST. GFR  (AFRICAN AMERICAN): 15.9 ML/MIN/1.73 M^2
EST. GFR  (NON AFRICAN AMERICAN): 13.3 ML/MIN/1.73 M^2
EST. GFR  (NON AFRICAN AMERICAN): 13.8 ML/MIN/1.73 M^2
GLUCOSE SERPL-MCNC: 117 MG/DL
GLUCOSE SERPL-MCNC: 117 MG/DL
HCT VFR BLD AUTO: 25.7 %
HGB BLD-MCNC: 9.1 G/DL
IMM GRANULOCYTES # BLD AUTO: 0.03 K/UL
IMM GRANULOCYTES NFR BLD AUTO: 0.5 %
LYMPHOCYTES # BLD AUTO: 1 K/UL
LYMPHOCYTES NFR BLD: 15.3 %
MAGNESIUM SERPL-MCNC: 2.1 MG/DL
MAGNESIUM SERPL-MCNC: 2.1 MG/DL
MCH RBC QN AUTO: 34.6 PG
MCHC RBC AUTO-ENTMCNC: 35.4 G/DL
MCV RBC AUTO: 98 FL
MONOCYTES # BLD AUTO: 0.6 K/UL
MONOCYTES NFR BLD: 9.5 %
NEUTROPHILS # BLD AUTO: 4.8 K/UL
NEUTROPHILS NFR BLD: 74.1 %
NRBC BLD-RTO: 1 /100 WBC
PHOSPHATE SERPL-MCNC: 4 MG/DL
PHOSPHATE SERPL-MCNC: 4.1 MG/DL
PLATELET # BLD AUTO: 101 K/UL
PMV BLD AUTO: 12.5 FL
POTASSIUM SERPL-SCNC: 3.2 MMOL/L
POTASSIUM SERPL-SCNC: 3.2 MMOL/L
RBC # BLD AUTO: 2.63 M/UL
SODIUM SERPL-SCNC: 147 MMOL/L
SODIUM SERPL-SCNC: 147 MMOL/L
WBC # BLD AUTO: 6.42 K/UL

## 2017-12-21 PROCEDURE — 99231 SBSQ HOSP IP/OBS SF/LOW 25: CPT | Mod: ,,, | Performed by: INTERNAL MEDICINE

## 2017-12-21 PROCEDURE — 80069 RENAL FUNCTION PANEL: CPT | Mod: 91

## 2017-12-21 PROCEDURE — 94640 AIRWAY INHALATION TREATMENT: CPT

## 2017-12-21 PROCEDURE — 25000003 PHARM REV CODE 250: Performed by: INTERNAL MEDICINE

## 2017-12-21 PROCEDURE — 83880 ASSAY OF NATRIURETIC PEPTIDE: CPT

## 2017-12-21 PROCEDURE — 36415 COLL VENOUS BLD VENIPUNCTURE: CPT

## 2017-12-21 PROCEDURE — 27000221 HC OXYGEN, UP TO 24 HOURS

## 2017-12-21 PROCEDURE — 25000242 PHARM REV CODE 250 ALT 637 W/ HCPCS: Performed by: INTERNAL MEDICINE

## 2017-12-21 PROCEDURE — 25000003 PHARM REV CODE 250: Performed by: PHYSICIAN ASSISTANT

## 2017-12-21 PROCEDURE — 63600175 PHARM REV CODE 636 W HCPCS: Performed by: INTERNAL MEDICINE

## 2017-12-21 PROCEDURE — 83735 ASSAY OF MAGNESIUM: CPT | Mod: 91

## 2017-12-21 PROCEDURE — 86580 TB INTRADERMAL TEST: CPT | Performed by: INTERNAL MEDICINE

## 2017-12-21 PROCEDURE — 11000001 HC ACUTE MED/SURG PRIVATE ROOM

## 2017-12-21 PROCEDURE — 94761 N-INVAS EAR/PLS OXIMETRY MLT: CPT

## 2017-12-21 PROCEDURE — 25000003 PHARM REV CODE 250: Performed by: PSYCHIATRY & NEUROLOGY

## 2017-12-21 PROCEDURE — 85025 COMPLETE CBC W/AUTO DIFF WBC: CPT

## 2017-12-21 RX ORDER — LEVOTHYROXINE SODIUM 100 UG/1
100 TABLET ORAL
Status: DISCONTINUED | OUTPATIENT
Start: 2017-12-22 | End: 2017-12-26 | Stop reason: HOSPADM

## 2017-12-21 RX ORDER — QUETIAPINE FUMARATE 25 MG/1
100 TABLET, FILM COATED ORAL NIGHTLY
Status: DISCONTINUED | OUTPATIENT
Start: 2017-12-21 | End: 2017-12-26 | Stop reason: HOSPADM

## 2017-12-21 RX ORDER — FUROSEMIDE 40 MG/1
40 TABLET ORAL DAILY
Status: DISCONTINUED | OUTPATIENT
Start: 2017-12-22 | End: 2017-12-23

## 2017-12-21 RX ORDER — METOPROLOL TARTRATE 1 MG/ML
5 INJECTION, SOLUTION INTRAVENOUS ONCE
Status: COMPLETED | OUTPATIENT
Start: 2017-12-21 | End: 2017-12-21

## 2017-12-21 RX ADMIN — IPRATROPIUM BROMIDE AND ALBUTEROL SULFATE 3 ML: .5; 3 SOLUTION RESPIRATORY (INHALATION) at 08:12

## 2017-12-21 RX ADMIN — HYDRALAZINE HYDROCHLORIDE AND ISOSORBIDE DINITRATE 2 TABLET: 37.5; 2 TABLET, FILM COATED ORAL at 09:12

## 2017-12-21 RX ADMIN — FLUTICASONE FUROATE AND VILANTEROL TRIFENATATE 1 PUFF: 100; 25 POWDER RESPIRATORY (INHALATION) at 09:12

## 2017-12-21 RX ADMIN — ALLOPURINOL 100 MG: 100 TABLET ORAL at 10:12

## 2017-12-21 RX ADMIN — QUETIAPINE FUMARATE 100 MG: 25 TABLET, FILM COATED ORAL at 08:12

## 2017-12-21 RX ADMIN — LEVOTHYROXINE SODIUM ANHYDROUS 70 MCG: 100 INJECTION, POWDER, LYOPHILIZED, FOR SOLUTION INTRAVENOUS at 10:12

## 2017-12-21 RX ADMIN — HYDRALAZINE HYDROCHLORIDE AND ISOSORBIDE DINITRATE 2 TABLET: 37.5; 2 TABLET, FILM COATED ORAL at 05:12

## 2017-12-21 RX ADMIN — PANTOPRAZOLE SODIUM 40 MG: 40 TABLET, DELAYED RELEASE ORAL at 10:12

## 2017-12-21 RX ADMIN — METOROPROLOL TARTRATE 5 MG: 5 INJECTION, SOLUTION INTRAVENOUS at 11:12

## 2017-12-21 RX ADMIN — LIOTHYRONINE SODIUM 5 MCG: 5 TABLET ORAL at 10:12

## 2017-12-21 RX ADMIN — FUROSEMIDE 80 MG: 40 TABLET ORAL at 10:12

## 2017-12-21 RX ADMIN — Medication 5 UNITS: at 03:12

## 2017-12-21 RX ADMIN — HYDRALAZINE HYDROCHLORIDE AND ISOSORBIDE DINITRATE 2 TABLET: 37.5; 2 TABLET, FILM COATED ORAL at 03:12

## 2017-12-21 RX ADMIN — IPRATROPIUM BROMIDE AND ALBUTEROL SULFATE 3 ML: .5; 3 SOLUTION RESPIRATORY (INHALATION) at 02:12

## 2017-12-21 RX ADMIN — ALLOPURINOL 100 MG: 100 TABLET ORAL at 08:12

## 2017-12-21 NOTE — PROGRESS NOTES
[facility] Hospital Medicine  Progress Note    Patient Name: Anai Sal  MRN: [id number]  Patient Class: IP- Inpatient   Admission Date: 12/13/2017  Length of Stay: 7 days  Attending Physician: Lauren Wolf MD  Primary Care Provider: Live Young MD (Inactive)    [facility] Medicine Team: [facility] HOSP MED D Lauren Wolf MD    Subjective:     Principal Problem:Acute exacerbation of CHF (congestive heart failure)    HPI:  Ms Sal is a 72F h/o HFrEF (30%), Stage IV CKD, Hep C, h/o breast ca s/p mastectomy/chemo 1979, COPD, HTN, Bipolar d/o, anemia of CKD, presenting with worsening dyspnea for last few days with cough. Patient usually ambulatory with walker, over last few days exercise tolerance worsened to few steps before feeling short of breath, denies chest pain with dyspnea. Pt accompanied by daughter who reports worsening swelling of legs recently, ?orthopnea, reporting PND. Pt has been taking increased fluids lately as she has been feeling thirsty and daughter has difficult time controlling patient intake. Denies F/C/N/V, cough nonproductive. Has been taking lasix 20mg BID, reports between 7-8 lb weight gain in last week.     Hospital Course:  No notes on file    Interval History: Patient with no events overnight, no complaints. H&H increased appropriately.  Data Review: Results recorded below were reviewed 12/20/2017.    Review of Systems   Respiratory: Negative for shortness of breath.    Psychiatric/Behavioral: Positive for confusion and decreased concentration.     Objective:     Vital Signs (Most Recent):  Temp: 99 °F (37.2 °C) (12/20/17 1529)  Pulse: 89 (12/20/17 1608)  Resp: 17 (12/20/17 1529)  BP: (!) 124/57 (12/20/17 1608)  SpO2: 96 % (12/20/17 1529) Vital Signs (24h Range):  Temp:  [97.7 °F (36.5 °C)-99 °F (37.2 °C)] 99 °F (37.2 °C)  Pulse:  [72-91] 89  Resp:  [16-19] 17  SpO2:  [95 %-100 %] 96 %  BP: ()/(53-78) 124/57     Weight: 80.1 kg (176 lb 9.4 oz)  Body mass  index is 28.5 kg/m².    Intake/Output Summary (Last 24 hours) at 12/20/17 1941  Last data filed at 12/20/17 1300   Gross per 24 hour   Intake           944.17 ml   Output             1450 ml   Net          -505.83 ml      Physical Exam   Constitutional: She appears well-developed.   HENT:   Head: Normocephalic.   Mouth/Throat: Mucous membranes are not cyanotic.   Eyes: Conjunctivae and lids are normal.   Neck: Neck supple.   Cardiovascular: S1 normal and S2 normal.    Pulmonary/Chest: Effort normal and breath sounds normal.   Abdominal: Soft. Bowel sounds are normal. There is no tenderness.   Musculoskeletal: She exhibits no edema.   Neurological: She is alert. She is disoriented.   Skin: She is not diaphoretic. No cyanosis. No pallor. Nails show no clubbing.   Psychiatric: Her affect is blunt. Cognition and memory are impaired.       Significant Labs:     CBC:     Recent Labs  Lab 12/19/17  0115 12/19/17  0740 12/20/17  0957   WBC 4.47 3.81* 5.07   HGB 7.3* 6.9* 9.0*   HCT 21.7* 19.9* 26.1*   * 101* 103*     CMP:     Recent Labs  Lab 12/19/17  0740 12/20/17  0832   * 145   K 4.3 3.5    108   CO2 27 24   GLU 86 88   * 106*   CREATININE 2.5* 3.0*   CALCIUM 9.0 9.1   ALBUMIN 2.7* 2.9*   ANIONGAP 12 13   EGFRNONAA 18.6* 14.9*     Cardiac Markers:     Recent Labs  Lab 12/19/17  0740   BNP 1,585*     Coagulation:     Recent Labs  Lab 12/19/17  0740   INR 1.4*   APTT 36.2*     Magnesium:     Recent Labs  Lab 12/19/17  0740 12/20/17  0832   MG 1.8 2.2     TSH:     Recent Labs  Lab 12/15/17  0747   .080*     Assessment/Plan:      Current Hospital Problem List:    Active Hospital Problems    Diagnosis  POA    *Acute exacerbation of CHF (congestive heart failure) [I50.9]  Yes     Priority: 1 - High    Primary hypothyroidism [E03.9]  Yes     Priority: 2     Stage 4 chronic kidney disease [N18.4]  Yes     Priority: 3     Esophagitis, Dawes grade C [K20.8]  Yes     Priority: 4      Melena [K92.1]  Clinically Undetermined     Priority: 4     Anemia of chronic renal failure, stage 4 (severe) [N18.4, D63.1]  Yes     Priority: 4     Palliative care encounter [Z51.5]  Not Applicable    Major neurocognitive disorder [F03.90]  Yes    Cognitive impairment [R41.89]  Yes    Chronic obstructive pulmonary disease [J44.9]  Yes    Delirium due to another medical condition [F05]  Yes    Elevated troponin [R74.8]  Yes    Insomnia [G47.00]  Yes      Resolved Hospital Problems    Diagnosis Date Resolved POA   No resolved problems to display.       Ordered Medications for management of current problems:    albuterol-ipratropium 2.5mg-0.5mg/3mL  3 mL Nebulization Q6H WAKE    allopurinol  100 mg Oral BID    fluticasone-vilanterol  1 puff Inhalation Daily    furosemide  80 mg Oral BID    isosorbide-hydrALAZINE 20-37.5 mg  2 tablet Oral TID    levothyroxine  70 mcg Intravenous Daily    liothyronine  5 mcg Oral BID    pantoprazole  40 mg Oral Daily    QUEtiapine  250 mg Oral Nightly       [place] [facility] [place] [facility]    Assessment and Plan by Problem:         * Acute exacerbation of CHF (congestive heart failure)     Patient presenting with worsening LE edema, CXR with pulmonary edema, shortness of breath, elevated BNP likely acute on chronic systolic CHF (based on recent ECHO 5/17), likely precipitated by increased fluid intake by patient   -Lasix 40mg IV BID  - NYHA class 3 at baseline  - non-ischemic cardiomyopathy  ECHO 2D showed EF 30-35%, Grade 3 diastolic dysfunction, biatrial enlargement, depressed right sided dysfunction, increase right atrial pressure  -increased diuretics to furosemide IV 80 mg TID   - transitioned to oral furosemide 80 mg TID.   - oral furosemide decreased to 80 mg BID  - monitoring creatinine.  -Cardiology follow up as outpatient       Primary hypothyroidism     Patient with h/o hypothyroidism  TSH elevated T4 WNL  -Endocrine consulted - levothyroxine 70  mcg IV daily and cytomel 5 mcg BID for one week. Held oral levothyroxine while diuresing.   Patient will be discharged on levothyroxine 100 mcg daily.   End date of cytomel 5 mcg BID is 12/23/17       Stage 4 chronic kidney disease     CANDI on CKD IV , patient with baseline creatinine 2.2  Treated with Na Bicarb 1300 BID initially; now discontinued.  Nephrology follow up as outpatient       Melena     Significant melena overnight 12/18/17. Baseline Hgb of 10.  Transfused 2 units PRBCs.  GI consulted. EGD 12/19/2017 revealed esophagitis as likely etiology of bleeding.  Monitoring H&H, appears stable.       Anemia of chronic renal failure, stage 4 (severe)     Stable, follow up outpatient Nephrology       Cognitive impairment     Per family over last months has had issues with memory, forgetting to turn off appliances, getting lost, likely early signs of dementia. Also with agitation (espcially at night) and non-compliance with care.   Patient on seroquel 200 mg qhs and not effective for her current behaviors. Psychiatry consult for medication management. - increased seroquel 250 mg qhs.  -Plan for psychiatry and memory disorder clinic follow up as outpatient. Psychiatry resident to try to place patient in his clinic for Jan 9th 2:00 PM.   -Consider discharge back on 200 mg qhs and cogentin for now.   Her sleep has improved after IV levothyroxine started, but still wakes up confused. Further adjustment will be as per psychiatrist  -Neuropsych testing as outpatient as ordered  -Continuing current management with Seroquel 250 mg QHS.          Chronic obstructive pulmonary disease     Per prior PFTs, mild obstructive component, on inhalers at home, no increase in sputum production unlikely COPD exacerbation,  -Duonebs Q6H  -Continue LABA/inhailed corticosteroid       Elevated troponin     Patient with mild elevation in troponin, no recent chest pain, no significant EKG changes, likely type 2 NSTEMI in setting of CHF  exacerbation and CKD impairing clearance  -Followed Troponin/EKG; stable.       VTE Risk Mitigation         Ordered     Medium Risk of VTE  Once      12/13/17 2030              Lauren Wofl MD  Department of Hospital Medicine   [facility]

## 2017-12-21 NOTE — SUBJECTIVE & OBJECTIVE
Interval History: Patient with melena overnight, no complaints. H&H increased appropriately.  Data Review: Results recorded below were reviewed 12/20/2017.    Review of Systems   Respiratory: Negative for shortness of breath.    Psychiatric/Behavioral: Positive for confusion and decreased concentration.     Objective:     Vital Signs (Most Recent):  Temp: 99 °F (37.2 °C) (12/20/17 1529)  Pulse: 89 (12/20/17 1608)  Resp: 17 (12/20/17 1529)  BP: (!) 124/57 (12/20/17 1608)  SpO2: 96 % (12/20/17 1529) Vital Signs (24h Range):  Temp:  [97.7 °F (36.5 °C)-99 °F (37.2 °C)] 99 °F (37.2 °C)  Pulse:  [72-91] 89  Resp:  [16-19] 17  SpO2:  [95 %-100 %] 96 %  BP: ()/(53-78) 124/57     Weight: 80.1 kg (176 lb 9.4 oz)  Body mass index is 28.5 kg/m².    Intake/Output Summary (Last 24 hours) at 12/20/17 1941  Last data filed at 12/20/17 1300   Gross per 24 hour   Intake           944.17 ml   Output             1450 ml   Net          -505.83 ml      Physical Exam   Constitutional: She appears well-developed.   HENT:   Head: Normocephalic.   Mouth/Throat: Mucous membranes are not cyanotic.   Eyes: Conjunctivae and lids are normal.   Neck: Neck supple.   Cardiovascular: S1 normal and S2 normal.    Pulmonary/Chest: Effort normal and breath sounds normal.   Abdominal: Soft. Bowel sounds are normal. There is no tenderness.   Musculoskeletal: She exhibits no edema.   Neurological: She is alert. She is disoriented.   Skin: She is not diaphoretic. No cyanosis. No pallor. Nails show no clubbing.   Psychiatric: Her affect is blunt. Cognition and memory are impaired.       Significant Labs:     CBC:     Recent Labs  Lab 12/19/17  0115 12/19/17  0740 12/20/17  0957   WBC 4.47 3.81* 5.07   HGB 7.3* 6.9* 9.0*   HCT 21.7* 19.9* 26.1*   * 101* 103*     CMP:     Recent Labs  Lab 12/19/17  0740 12/20/17  0832   * 145   K 4.3 3.5    108   CO2 27 24   GLU 86 88   * 106*   CREATININE 2.5* 3.0*   CALCIUM 9.0 9.1   ALBUMIN  2.7* 2.9*   ANIONGAP 12 13   EGFRNONAA 18.6* 14.9*     Cardiac Markers:     Recent Labs  Lab 12/19/17  0740   BNP 1,585*     Coagulation:     Recent Labs  Lab 12/19/17  0740   INR 1.4*   APTT 36.2*     Magnesium:     Recent Labs  Lab 12/19/17  0740 12/20/17  0832   MG 1.8 2.2     TSH:     Recent Labs  Lab 12/15/17  0747   .080*

## 2017-12-21 NOTE — PROGRESS NOTES
"[facility]  Psychiatry  Progress Note    Patient Name: Anai Sal  MRN: [id number]   Code Status: DNR  Admission Date: 12/13/2017  Hospital Length of Stay: 8 days  Expected Discharge Date: 12/22/2017  Attending Physician: Lauren Wolf MD  Primary Care Provider: Live Young MD (Inactive)    Current Legal Status: N/A    Patient information was obtained from patient and relative(s).     Subjective:     Principal Problem:Acute exacerbation of CHF (congestive heart failure)    Chief Complaint: "I feel OK"    HPI: 72 y.o. female  PMHx of CHF, CKD IV, HCV, h/o breast cancer s/p mastectomy/chemo 1979, COPD, HTN, Bipolar d/o admitted for acute on chronic dyspnea, concerning for acute CHF exacerbation. [facility] and currently lives with daughter. There has been concerns of memory loss and intermittent agitation per primary team and chart review. Per chart review - there has also been intermittent reported paranoia and delusional thoughts.   "Sometimes has a bad episode of getting angry and physically attacking her. Daughter reports that pt rarely has these and usually her triggers are when daughter is helping her with bills and finances as pt is starting to become forgetful. Daughter reports that pt is at times paranoid that daughter is stealing her money and becomes angry when daughter is trying to help." No history of psychiatric hospitalizations.  She has seen Dr. Martinez in clinic on 09/30/16 for depression and also had complaints of trouble falling asleep. Dose of seroquel was increased to 200mg qhs at the time. Psychiatry is consulted given concern that medication is has not been helping much with sleep and agitation. Cogentin was also started that office visit for concerns of tardive dyskinesia which was then subsequently discontinued.   Was unable to obtain collateral information from patient's daughter today as she was unavailable after multiple " "attempts to contact her on the phone.    Hospital Course: No notes on file    Interval History: The patient states that she is improving, but is still feeling weak and tired. Appetite low, sleeping well. Denies feelings of confusion, AVH, paranoia, SI/HI.     Daughter was also present at bedside. She agrees with the above and is concerned about patient's poor appetite. She also wonders if current dose of quetiapine is too high and is leaving her sedated the next day. She is not sleeping much at night, either.    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    Drug use: No    Sexual activity: No      Comment:  with 3 children     Psychotherapeutics     Start     Stop Route Frequency Ordered    12/21/17 2100  QUEtiapine tablet 100 mg      -- Oral Nightly 12/21/17 1457    12/13/17 2126  ramelteon tablet 8 mg      -- Oral Nightly PRN 12/13/17 2030           Review of Systems  Objective:     Vital Signs (Most Recent):  Temp: 98.2 °F (36.8 °C) (12/21/17 1140)  Pulse: 90 (12/21/17 1423)  Resp: 18 (12/21/17 1423)  BP: (!) 159/77 (12/21/17 1140)  SpO2: 99 % (12/21/17 1423) Vital Signs (24h Range):  Temp:  [97.8 °F (36.6 °C)-99 °F (37.2 °C)] 98.2 °F (36.8 °C)  Pulse:  [71-98] 90  Resp:  [16-20] 18  SpO2:  [88 %-100 %] 99 %  BP: ()/(53-77) 159/77     Height: 5' 6" (167.6 cm)  Weight: 79.8 kg (175 lb 14.8 oz)  Body mass index is 28.4 kg/m².      Intake/Output Summary (Last 24 hours) at 12/21/17 3114  Last data filed at 12/21/17 0600   Gross per 24 hour " "  Intake              360 ml   Output             2000 ml   Net            -1640 ml       Physical Exam   Psychiatric:   Appearance: overweight black female, appears stated age, hair shaved short, lying comfortably in bed wearing hospital gown in right lateral decubitus position  Behavior: calm, cooperative, limited eye contact. Repeatedly asking for juice, but refusing food  Speech: quiet, but normal rate and tone  Mood: "OK"  Affect: euthymic, blunted, congruent to stated mood, not labile  Thought processes: somewhat disorganized, concrete, paucity of thought  Thought content: no SI/HI, no AVH  Insight: limited  Judgment: fair  Cognition: alert, oriented to self, place, month. Disoriented to day of week, date, month, year, situation. Recent and remote memory impaired. Attention impaired.  Failed A test        Significant Labs: All pertinent labs within the past 24 hours have been reviewed.    Significant Imaging: None    Assessment/Plan:     Delirium due to another medical condition    Signs of delirium with waxing and waning quality to mental status. Risk factors: dementia, multiple medical co-morbidities, current admission for CHF exacerbation. QTc 406.     - decrease quetiapine to 100mg QHS for now, and hold for hypotension. Per daughter, this medication had not really been working well for sleep at home, but now the patient seems more tired and weak than usual. Quetiapine is not likely the primary reason for this, but may possibly be contributing    - underlying bipolar disorder questionable, with clinical picture more suggestive of a neurocognitive disorder with psychosis or just a primary psychotic disorder   - can refer for neuropsychologic testing to characterize cognitive deficits   - should also follow-up with outpatient psychiatrist. I am working to get her into my own clinic - we can hopefully have an appointment for her on January 9.               Need for Continued Hospitalization:   No need for " inpatient psychiatric hospitalization. Continue medical care as per the primary team.    Anticipated Disposition: Still a Patient     Total time:  15 with greater than 50% of this time spent in counseling and/or coordination of care.       Owen Urias MD   Psychiatry  [facility]

## 2017-12-21 NOTE — PLAN OF CARE
Problem: Fall Risk (Adult)  Goal: Absence of Falls  Patient will demonstrate the desired outcomes by discharge/transition of care.   Outcome: Ongoing (interventions implemented as appropriate)  Absence of falls noted at present. SR up x's3. Fall risk protocol IP. Bed alarm on at all times. [employer] camera IP. Family members at bedside. Pt reminded not to get OOB. Verbalized understanding. Call bell within reach. Will cont to monitor.

## 2017-12-21 NOTE — PLAN OF CARE
Problem: Fall Risk (Adult)  Goal: Identify Related Risk Factors and Signs and Symptoms  Related risk factors and signs and symptoms are identified upon initiation of Human Response Clinical Practice Guideline (CPG)   Outcome: Ongoing (interventions implemented as appropriate)   12/21/17 1442   Fall Risk   Related Risk Factors (Fall Risk) age-related changes;confusion/agitation       Problem: Pressure Ulcer Risk (Jhonathan Scale) (Adult,Obstetrics,Pediatric)  Goal: Identify Related Risk Factors and Signs and Symptoms  Related risk factors and signs and symptoms are identified upon initiation of Human Response Clinical Practice Guideline (CPG)   Outcome: Ongoing (interventions implemented as appropriate)   12/21/17 1442   Pressure Ulcer Risk (Jhonathan Scale)   Related Risk Factors (Pressure Ulcer Risk (Jhonathan Scale)) cognitive impairment

## 2017-12-21 NOTE — ASSESSMENT & PLAN NOTE
Signs of delirium with waxing and waning quality to mental status. Risk factors: dementia, multiple medical co-morbidities, current admission for CHF exacerbation. QTc 406.     - decrease quetiapine to 100mg QHS for now, and hold for hypotension. Per daughter, this medication had not really been working well for sleep at home, but now the patient seems more tired and weak than usual. Quetiapine is not likely the primary reason for this, but may possibly be contributing    - underlying bipolar disorder questionable, with clinical picture more suggestive of a neurocognitive disorder with psychosis or just a primary psychotic disorder   - can refer for neuropsychologic testing to characterize cognitive deficits   - should also follow-up with outpatient psychiatrist. I am working to get her into my own clinic - we can hopefully have an appointment for her on January 9.

## 2017-12-21 NOTE — PLAN OF CARE
CM aware pt has been denied by [facility] recommending home therapy.  CM spoke with daughter who is reluctant to place in NH.  Aware [facility] with [facility] as well as [facility].  Daughter is requesting reconsideration from [facility] as she only feels pt will need short term skilled and can return home with [facility] services.  Pt was ambulating well with walker prior to admission and feels extended stay is cause of decreased mobility.  CM contacted [facility], will discuss.      CM did leave message for TIFFANIE Bennett at [facility] at [phone] to further discuss.  Aware if skilled is needed, orders must be in EPIC.  Primary staff to place orders today.    [id number]  Received call From Sabrina at [facility] stating agency is aware of SNF recommendations and will discuss case during MDR tomorrow at 8:30.  Will contact this CM with decision regarding SNF.  States referrals can be placed to following facilities in event of authorization:    [facility], [facility], [facility], [facility], [facility], and [facility].  Discussed with daughter, states she would prefer [facility] with [facility] being second option, [facility] is 3rd.  She is aware this is all contingent upon approval after [facility] rounds on tomorrow.  Referrals placed in EPIC.    Della Pineda RN  Case Management  [phone]

## 2017-12-21 NOTE — SUBJECTIVE & OBJECTIVE
"Interval History: The patient states that she is improving, but is still feeling weak and tired. Appetite low, sleeping well. Denies feelings of confusion, AVH, paranoia, SI/HI.     Daughter was also present at bedside. She agrees with the above and is concerned about patient's poor appetite. She also wonders if current dose of quetiapine is too high and is leaving her sedated the next day. She is not sleeping much at night, either.    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    Drug use: No    Sexual activity: No      Comment:  with 3 children     Psychotherapeutics     Start     Stop Route Frequency Ordered    12/21/17 2100  QUEtiapine tablet 100 mg      -- Oral Nightly 12/21/17 1457    12/13/17 2126  ramelteon tablet 8 mg      -- Oral Nightly PRN 12/13/17 2030           Review of Systems  Objective:     Vital Signs (Most Recent):  Temp: 98.2 °F (36.8 °C) (12/21/17 1140)  Pulse: 90 (12/21/17 1423)  Resp: 18 (12/21/17 1423)  BP: (!) 159/77 (12/21/17 1140)  SpO2: 99 % (12/21/17 1423) Vital Signs (24h Range):  Temp:  [97.8 °F (36.6 °C)-99 °F (37.2 °C)] 98.2 °F (36.8 °C)  Pulse:  [71-98] 90  Resp:  [16-20] 18  SpO2:  [88 %-100 %] 99 %  BP: ()/(53-77) 159/77     Height: 5' 6" (167.6 cm)  Weight: 79.8 kg (175 lb 14.8 oz)  Body mass index is 28.4 kg/m².      Intake/Output Summary (Last 24 hours) at 12/21/17 1457  Last data filed at 12/21/17 0600   Gross per 24 hour   Intake              360 ml   Output             2000 ml   Net            -1640 ml " "      Physical Exam   Psychiatric:   Appearance: overweight black female, appears stated age, hair shaved short, lying comfortably in bed wearing hospital gown in right lateral decubitus position  Behavior: calm, cooperative, limited eye contact. Repeatedly asking for juice, but refusing food  Speech: quiet, but normal rate and tone  Mood: "OK"  Affect: euthymic, blunted, congruent to stated mood, not labile  Thought processes: somewhat disorganized, concrete, paucity of thought  Thought content: no SI/HI, no AVH  Insight: limited  Judgment: fair  Cognition: alert, oriented to self, place, month. Disoriented to day of week, date, month, year, situation. Recent and remote memory impaired. Attention impaired.  Failed A test        Significant Labs: All pertinent labs within the past 24 hours have been reviewed.    Significant Imaging: None  "

## 2017-12-22 PROBLEM — K20.80 ESOPHAGITIS, LOS ANGELES GRADE C: Status: ACTIVE | Noted: 2017-12-19

## 2017-12-22 LAB
ALBUMIN SERPL BCP-MCNC: 3.3 G/DL
ANION GAP SERPL CALC-SCNC: 12 MMOL/L
BASOPHILS # BLD AUTO: 0.02 K/UL
BASOPHILS NFR BLD: 0.4 %
BUN SERPL-MCNC: 78 MG/DL
CALCIUM SERPL-MCNC: 9.7 MG/DL
CHLORIDE SERPL-SCNC: 108 MMOL/L
CO2 SERPL-SCNC: 28 MMOL/L
CREAT SERPL-MCNC: 3.3 MG/DL
DIFFERENTIAL METHOD: ABNORMAL
EOSINOPHIL # BLD AUTO: 0.1 K/UL
EOSINOPHIL NFR BLD: 1.3 %
ERYTHROCYTE [DISTWIDTH] IN BLOOD BY AUTOMATED COUNT: 17.7 %
EST. GFR  (AFRICAN AMERICAN): 15.4 ML/MIN/1.73 M^2
EST. GFR  (NON AFRICAN AMERICAN): 13.3 ML/MIN/1.73 M^2
GLUCOSE SERPL-MCNC: 98 MG/DL
HCT VFR BLD AUTO: 26.9 %
HGB BLD-MCNC: 9.5 G/DL
IMM GRANULOCYTES # BLD AUTO: 0.02 K/UL
IMM GRANULOCYTES NFR BLD AUTO: 0.4 %
LYMPHOCYTES # BLD AUTO: 1.1 K/UL
LYMPHOCYTES NFR BLD: 23.3 %
MAGNESIUM SERPL-MCNC: 2.5 MG/DL
MCH RBC QN AUTO: 35.4 PG
MCHC RBC AUTO-ENTMCNC: 35.3 G/DL
MCV RBC AUTO: 100 FL
MONOCYTES # BLD AUTO: 0.6 K/UL
MONOCYTES NFR BLD: 13.3 %
NEUTROPHILS # BLD AUTO: 2.8 K/UL
NEUTROPHILS NFR BLD: 61.3 %
NRBC BLD-RTO: 1 /100 WBC
PHOSPHATE SERPL-MCNC: 3.3 MG/DL
PLATELET # BLD AUTO: 110 K/UL
PMV BLD AUTO: 12.2 FL
POTASSIUM SERPL-SCNC: 3.4 MMOL/L
RBC # BLD AUTO: 2.68 M/UL
SODIUM SERPL-SCNC: 148 MMOL/L
WBC # BLD AUTO: 4.5 K/UL

## 2017-12-22 PROCEDURE — 25000003 PHARM REV CODE 250: Performed by: PSYCHIATRY & NEUROLOGY

## 2017-12-22 PROCEDURE — 25000242 PHARM REV CODE 250 ALT 637 W/ HCPCS: Performed by: INTERNAL MEDICINE

## 2017-12-22 PROCEDURE — 11000001 HC ACUTE MED/SURG PRIVATE ROOM

## 2017-12-22 PROCEDURE — 94664 DEMO&/EVAL PT USE INHALER: CPT

## 2017-12-22 PROCEDURE — 99232 SBSQ HOSP IP/OBS MODERATE 35: CPT | Mod: ,,, | Performed by: INTERNAL MEDICINE

## 2017-12-22 PROCEDURE — 27000221 HC OXYGEN, UP TO 24 HOURS

## 2017-12-22 PROCEDURE — 83735 ASSAY OF MAGNESIUM: CPT

## 2017-12-22 PROCEDURE — 25000003 PHARM REV CODE 250: Performed by: INTERNAL MEDICINE

## 2017-12-22 PROCEDURE — 94761 N-INVAS EAR/PLS OXIMETRY MLT: CPT

## 2017-12-22 PROCEDURE — 85025 COMPLETE CBC W/AUTO DIFF WBC: CPT

## 2017-12-22 PROCEDURE — 27000173 HC ACAPELLA DEVICE DH OR DM

## 2017-12-22 PROCEDURE — 99232 SBSQ HOSP IP/OBS MODERATE 35: CPT | Mod: ,,, | Performed by: PSYCHIATRY & NEUROLOGY

## 2017-12-22 PROCEDURE — 80069 RENAL FUNCTION PANEL: CPT

## 2017-12-22 PROCEDURE — 36415 COLL VENOUS BLD VENIPUNCTURE: CPT

## 2017-12-22 PROCEDURE — 25000003 PHARM REV CODE 250: Performed by: PHYSICIAN ASSISTANT

## 2017-12-22 PROCEDURE — 94640 AIRWAY INHALATION TREATMENT: CPT

## 2017-12-22 RX ORDER — METOPROLOL SUCCINATE 25 MG/1
25 TABLET, EXTENDED RELEASE ORAL DAILY
Status: DISCONTINUED | OUTPATIENT
Start: 2017-12-22 | End: 2017-12-23

## 2017-12-22 RX ORDER — POTASSIUM CHLORIDE 750 MG/1
30 CAPSULE, EXTENDED RELEASE ORAL ONCE
Status: COMPLETED | OUTPATIENT
Start: 2017-12-22 | End: 2017-12-22

## 2017-12-22 RX ORDER — QUETIAPINE FUMARATE 100 MG/1
100 TABLET, FILM COATED ORAL NIGHTLY
Qty: 30 TABLET | Refills: 11 | Status: SHIPPED | OUTPATIENT
Start: 2017-12-22 | End: 2018-02-07

## 2017-12-22 RX ORDER — SPIRONOLACTONE 25 MG/1
12.5 TABLET ORAL DAILY
Qty: 15 TABLET | Refills: 11 | Status: SHIPPED | OUTPATIENT
Start: 2017-12-22 | End: 2017-12-25

## 2017-12-22 RX ORDER — PANTOPRAZOLE SODIUM 40 MG/1
40 TABLET, DELAYED RELEASE ORAL DAILY
Qty: 30 TABLET | Refills: 11 | Status: ON HOLD | OUTPATIENT
Start: 2017-12-23 | End: 2018-02-06 | Stop reason: HOSPADM

## 2017-12-22 RX ORDER — GABAPENTIN 100 MG/1
100 CAPSULE ORAL 3 TIMES DAILY PRN
Qty: 30 CAPSULE | Refills: 11 | Status: ON HOLD | OUTPATIENT
Start: 2017-12-22 | End: 2018-02-06 | Stop reason: HOSPADM

## 2017-12-22 RX ORDER — LORAZEPAM 0.5 MG/1
0.5 TABLET ORAL ONCE AS NEEDED
Status: COMPLETED | OUTPATIENT
Start: 2017-12-22 | End: 2017-12-22

## 2017-12-22 RX ORDER — METOPROLOL SUCCINATE 25 MG/1
25 TABLET, EXTENDED RELEASE ORAL DAILY
Qty: 30 TABLET | Refills: 2 | Status: SHIPPED | OUTPATIENT
Start: 2017-12-22 | End: 2017-12-25

## 2017-12-22 RX ORDER — FUROSEMIDE 40 MG/1
40 TABLET ORAL DAILY
Qty: 30 TABLET | Refills: 11 | Status: SHIPPED | OUTPATIENT
Start: 2017-12-22 | End: 2018-02-07

## 2017-12-22 RX ADMIN — ALLOPURINOL 100 MG: 100 TABLET ORAL at 09:12

## 2017-12-22 RX ADMIN — PANTOPRAZOLE SODIUM 40 MG: 40 TABLET, DELAYED RELEASE ORAL at 09:12

## 2017-12-22 RX ADMIN — HYDRALAZINE HYDROCHLORIDE AND ISOSORBIDE DINITRATE 2 TABLET: 37.5; 2 TABLET, FILM COATED ORAL at 09:12

## 2017-12-22 RX ADMIN — IPRATROPIUM BROMIDE AND ALBUTEROL SULFATE 3 ML: .5; 3 SOLUTION RESPIRATORY (INHALATION) at 07:12

## 2017-12-22 RX ADMIN — LORAZEPAM 0.5 MG: 0.5 TABLET ORAL at 09:12

## 2017-12-22 RX ADMIN — SPIRONOLACTONE 12.5 MG: 25 TABLET, FILM COATED ORAL at 11:12

## 2017-12-22 RX ADMIN — HYDRALAZINE HYDROCHLORIDE AND ISOSORBIDE DINITRATE 2 TABLET: 37.5; 2 TABLET, FILM COATED ORAL at 02:12

## 2017-12-22 RX ADMIN — FUROSEMIDE 40 MG: 40 TABLET ORAL at 09:12

## 2017-12-22 RX ADMIN — POTASSIUM CHLORIDE 30 MEQ: 750 CAPSULE, EXTENDED RELEASE ORAL at 11:12

## 2017-12-22 RX ADMIN — FLUTICASONE FUROATE AND VILANTEROL TRIFENATATE 1 PUFF: 100; 25 POWDER RESPIRATORY (INHALATION) at 09:12

## 2017-12-22 RX ADMIN — IPRATROPIUM BROMIDE AND ALBUTEROL SULFATE 3 ML: .5; 3 SOLUTION RESPIRATORY (INHALATION) at 12:12

## 2017-12-22 RX ADMIN — METOPROLOL SUCCINATE 25 MG: 25 TABLET, EXTENDED RELEASE ORAL at 11:12

## 2017-12-22 RX ADMIN — QUETIAPINE FUMARATE 100 MG: 25 TABLET, FILM COATED ORAL at 09:12

## 2017-12-22 NOTE — PROGRESS NOTES
"[facility]  Psychiatry  Progress Note    Patient Name: Anai Sal  MRN: [id number]   Code Status: DNR  Admission Date: 12/13/2017  Hospital Length of Stay: 9 days  Expected Discharge Date: 12/22/2017  Attending Physician: Lauren Wolf MD  Primary Care Provider: Live Young MD (Inactive)    Current Legal Status: N/A    Patient information was obtained from patient and relative(s).     Subjective:     Principal Problem:Acute exacerbation of CHF (congestive heart failure)    Chief Complaint: "I feel just fine"    HPI: 72 y.o. female  PMHx of CHF, CKD IV, HCV, h/o breast cancer s/p mastectomy/chemo 1979, COPD, HTN, Bipolar d/o admitted for acute on chronic dyspnea, concerning for acute CHF exacerbation. Patient is currently enrolled in the [facility] program and currently lives with daughter. There has been concerns of memory loss and intermittent agitation per primary team and chart review. Per chart review - there has also been intermittent reported paranoia and delusional thoughts.   "Sometimes has a bad episode of getting angry and physically attacking her. Daughter reports that pt rarely has these and usually her triggers are when daughter is helping her with bills and finances as pt is starting to become forgetful. Daughter reports that pt is at times paranoid that daughter is stealing her money and becomes angry when daughter is trying to help." No history of psychiatric hospitalizations.  She has seen Dr. Martinez in clinic on 09/30/16 for depression and also had complaints of trouble falling asleep. Dose of seroquel was increased to 200mg qhs at the time. Psychiatry is consulted given concern that medication is has not been helping much with sleep and agitation. Cogentin was also started that office visit for concerns of tardive dyskinesia which was then subsequently discontinued.   Was unable to obtain collateral information from patient's daughter today as she was unavailable after " "multiple attempts to contact her on the phone.    Hospital Course: No notes on file    Interval History: The patient states that she is doing well today. Continues to feel weak and tired, but otherwise has no complaints. Denies feelings of confusion, paranoia, AVH, SI/HI. Slept well overnight.    Daughter agrees with the above, but says patient continues to have baseline levels of confusion. Has not been agitated, appetite somewhat improved today. Patient slept well overnight and seems to be more energetic today. Thinks that decrease of quetiapine was beneficial.    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    Drug use: No    Sexual activity: No      Comment:  with 3 children     Psychotherapeutics     Start     Stop Route Frequency Ordered    12/21/17 2100  QUEtiapine tablet 100 mg      -- Oral Nightly 12/21/17 1457    12/13/17 2126  ramelteon tablet 8 mg      -- Oral Nightly PRN 12/13/17 2030           Review of Systems  Objective:     Vital Signs (Most Recent):  Temp: 98.6 °F (37 °C) (12/22/17 1223)  Pulse: 88 (12/22/17 1223)  Resp: 18 (12/22/17 1223)  BP: 124/67 (12/22/17 1223)  SpO2: 98 % (12/22/17 1227) Vital Signs (24h Range):  Temp:  [98.6 °F (37 °C)-99 °F (37.2 °C)] 98.6 °F (37 °C)  Pulse:  [] 88  Resp:  [16-20] 18  SpO2:  [96 %-99 %] 98 %  BP: (115-146)/(60-83) 124/67     Height: 5' 6" (167.6 cm)  Weight: 79.8 kg (175 lb 14.8 oz)  Body mass index is 28.4 kg/m².    No intake or output data in the 24 hours ending 12/22/17 " "1305    Physical Exam   Psychiatric:   Appearance: overweight black female, appears stated age, hair shaved short, lying comfortably in bed wearing hospital gown in right lateral decubitus position  Behavior: calm, cooperative, limited eye contact. Repeatedly asking for juice, but refusing food  Speech: quiet, but normal rate and tone  Mood: "OK"  Affect: euthymic, blunted, congruent to stated mood, not labile  Thought processes: somewhat disorganized, concrete, paucity of thought  Thought content: no SI/HI, no AVH  Insight: limited  Judgment: fair  Cognition: alert, oriented to self, place, month. Disoriented to day of week, date, month, year, situation. Recent and remote memory impaired. Attention impaired.  Failed A test         Significant Labs: All pertinent labs within the past 24 hours have been reviewed.    Significant Imaging: None    Assessment/Plan:     Delirium due to another medical condition    Signs of delirium with waxing and waning quality to mental status. Risk factors: dementia, multiple medical co-morbidities, current admission for CHF exacerbation. QTc 406.     - continue quetiapine 100mg PO qhs, and hold for hypotension. Per daughter, this medication had not really been working well for sleep at home, but now the patient seems more tired and weak than usual. Quetiapine is not likely the primary reason for this, but may possibly be contributing    - does not seem to have an underlying bipolar disorder, with clinical picture more suggestive of a neurocognitive disorder with psychosis or just a primary psychotic disorder   - can refer for neuropsychologic testing to characterize cognitive deficits   - should also follow-up with outpatient psychiatrist             Need for Continued Hospitalization:   No need for inpatient psychiatric hospitalization. Continue medical care as per the primary team.    Anticipated Disposition: Still a Patient     Total time:  15 with greater than 50% of this time " spent in counseling and/or coordination of care.     Will sign off. Please call back with any further questions or concerns.    Owen Urias MD   Psychiatry  [facility]      Staff note : I, Lance Baum MD have personally seen and evaluated the patient on 12-22-17  , and reviewed the above history and exam. I agree and concur with the above assessment and plan.

## 2017-12-22 NOTE — SUBJECTIVE & OBJECTIVE
Interval History: Patient with melena overnight, no complaints. H&H stable.  Data Review: Results recorded below were reviewed 12/21/2017.    Review of Systems   Constitutional: Negative for fever.   Respiratory: Negative for shortness of breath.      Objective:     Vital Signs (Most Recent):  Temp: 98.2 °F (36.8 °C) (12/21/17 1140)  Pulse: 90 (12/21/17 1423)  Resp: 18 (12/21/17 1423)  BP: (!) 159/77 (12/21/17 1140)  SpO2: 99 % (12/21/17 1423) Vital Signs (24h Range):  Temp:  [97.8 °F (36.6 °C)-98.3 °F (36.8 °C)] 98.2 °F (36.8 °C)  Pulse:  [71-98] 90  Resp:  [16-20] 18  SpO2:  [88 %-100 %] 99 %  BP: (130-160)/(63-77) 159/77     Weight: 79.8 kg (175 lb 14.8 oz)  Body mass index is 28.4 kg/m².    Intake/Output Summary (Last 24 hours) at 12/21/17 1804  Last data filed at 12/21/17 0600   Gross per 24 hour   Intake              360 ml   Output             2000 ml   Net            -1640 ml      Physical Exam   Constitutional: She appears well-developed. She is sleeping.   HENT:   Head: Normocephalic.   Mouth/Throat: Mucous membranes are not cyanotic.   Eyes: Conjunctivae and lids are normal.   Neck: Neck supple.   Cardiovascular: S1 normal and S2 normal.    Pulmonary/Chest: Effort normal and breath sounds normal.   Abdominal: Soft. Bowel sounds are normal. There is no tenderness.   Musculoskeletal: She exhibits no edema.   Skin: She is not diaphoretic. No cyanosis. No pallor. Nails show no clubbing.   Psychiatric: Cognition and memory are impaired.       Significant Labs:     CBC:     Recent Labs  Lab 12/20/17  0957 12/21/17  0820   WBC 5.07 6.42   HGB 9.0* 9.1*   HCT 26.1* 25.7*   * 101*     CMP:     Recent Labs  Lab 12/20/17  0832 12/21/17  0820    147*  147*   K 3.5 3.2*  3.2*    106  106   CO2 24 28  27   GLU 88 117*  117*   * 91*  92*   CREATININE 3.0* 3.2*  3.3*   CALCIUM 9.1 9.5  9.4   ALBUMIN 2.9* 3.2*  3.2*   ANIONGAP 13 13  14   EGFRNONAA 14.9* 13.8*  13.3*     Cardiac  Markers:     Recent Labs  Lab 12/21/17  0445   *     Magnesium:     Recent Labs  Lab 12/20/17  0832 12/21/17  0820   MG 2.2 2.1  2.1     TSH:     Recent Labs  Lab 12/15/17  0747   .080*

## 2017-12-22 NOTE — CHAPLAIN
visited patient in order to respond to palliative care consult.   provided a compassionate presence, prayer support and reflective listening for patient and family.

## 2017-12-22 NOTE — NURSING
"Pt 's daughter in [place] by elevators crying "states my brother  in a Hospital in [place] on the  .Emotional support given and Della Sanchez in Case Management notified.Will continue to monitor .PT in room in no acute distress .  "

## 2017-12-22 NOTE — SUBJECTIVE & OBJECTIVE
"Interval History: The patient states that she is doing well today. Continues to feel weak and tired, but otherwise has no complaints. Denies feelings of confusion, paranoia, AVH, SI/HI. Slept well overnight.    Daughter agrees with the above, but says patient continues to have baseline levels of confusion. Has not been agitated, appetite somewhat improved today. Patient slept well overnight and seems to be more energetic today. Thinks that decrease of quetiapine was beneficial.    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    Drug use: No    Sexual activity: No      Comment:  with 3 children     Psychotherapeutics     Start     Stop Route Frequency Ordered    12/21/17 2100  QUEtiapine tablet 100 mg      -- Oral Nightly 12/21/17 1457    12/13/17 2126  ramelteon tablet 8 mg      -- Oral Nightly PRN 12/13/17 2030           Review of Systems  Objective:     Vital Signs (Most Recent):  Temp: 98.6 °F (37 °C) (12/22/17 1223)  Pulse: 88 (12/22/17 1223)  Resp: 18 (12/22/17 1223)  BP: 124/67 (12/22/17 1223)  SpO2: 98 % (12/22/17 1227) Vital Signs (24h Range):  Temp:  [98.6 °F (37 °C)-99 °F (37.2 °C)] 98.6 °F (37 °C)  Pulse:  [] 88  Resp:  [16-20] 18  SpO2:  [96 %-99 %] 98 %  BP: (115-146)/(60-83) 124/67     Height: 5' 6" (167.6 cm)  Weight: 79.8 kg (175 lb 14.8 oz)  Body mass index is 28.4 kg/m².    No intake or output data in the 24 hours ending 12/22/17 1305    Physical Exam   Psychiatric:   Appearance: overweight black female, appears stated age, hair " "shaved short, lying comfortably in bed wearing hospital gown in right lateral decubitus position  Behavior: calm, cooperative, limited eye contact. Repeatedly asking for juice, but refusing food  Speech: quiet, but normal rate and tone  Mood: "OK"  Affect: euthymic, blunted, congruent to stated mood, not labile  Thought processes: somewhat disorganized, concrete, paucity of thought  Thought content: no SI/HI, no AVH  Insight: limited  Judgment: fair  Cognition: alert, oriented to self, place, month. Disoriented to day of week, date, month, year, situation. Recent and remote memory impaired. Attention impaired.  Failed A test         Significant Labs: All pertinent labs within the past 24 hours have been reviewed.    Significant Imaging: None  "

## 2017-12-22 NOTE — PLAN OF CARE
Almaz did complete locet and faxed pasrr to [facility] for 142, Sw to follow with [facility] and [facility] auth. Almaz uploaded SNF orders, 142 and pasrr to [facility] via [facility]. Sw to follow with transport once approved. [place] [phone], spoke anel Godinez transport setup for earliest 5pm. Packet on the floor with Pt to transport once daughter completes paperwork.

## 2017-12-22 NOTE — PLAN OF CARE
"Call from [facility] stating they are reviewing case but therapy notes do not specifically states "what pt is able to do".  CM paged Rinku, PT to discuss.  Will await return call for clarification.    CM reviewed note with admission at [facility] and able to point out specifics as stated on both eval and treatment.  Lauren in admission will discuss with admission and contact CM with decision.    1300  Per Lauren at [facility], having difficulty with right care.  142, PASRR, PPD, ID, Ins Card copy faxed to [phone].  Notified [facility] of acceptance, working on authorization for admission today.  Once received Lauren will contact  with report information.    Della Pineda RN  Case Management  [phone]  "

## 2017-12-22 NOTE — PROGRESS NOTES
"[facility] Hospital Medicine  Progress Note    Patient Name: Anai Sal  MRN: [id number]  Patient Class: IP- Inpatient   Admission Date: 12/13/2017  Length of Stay: 8 days  Attending Physician: Lauren Wolf MD  Primary Care Provider: Live Young MD (Inactive)    [facility] Medicine Team: [facility] HOSP MED D Lauren Wolf MD    Subjective:     Principal Problem:Acute exacerbation of CHF (congestive heart failure)    HPI:  "72F h/o HFrEF (30%), Stage IV CKD, Hep C, h/o breast ca s/p mastectomy/chemo 1979, COPD, HTN, Bipolar d/o, anemia of CKD, presenting with worsening dyspnea for last few days with cough. Patient usually ambulatory with walker, over last few days exercise tolerance worsened to few steps before feeling short of breath, denies chest pain with dyspnea. Pt accompanied by daughter who reports worsening swelling of legs recently, ?orthopnea, reporting PND. Pt has been taking increased fluids lately as she has been feeling thirsty and daughter has difficult time controlling patient intake. Denies F/C/N/V, cough nonproductive. Has been taking Lasix 20mg BID, reports between 7-8 lb weight gain in last week."     Hospital Course:  No notes on file    Interval History: Patient no events melena overnight, no complaints. H&H stable.  Data Review: Results recorded below were reviewed 12/21/2017.    Review of Systems   Constitutional: Negative for fever.   Respiratory: Negative for shortness of breath.      Objective:     Vital Signs (Most Recent):  Temp: 98.2 °F (36.8 °C) (12/21/17 1140)  Pulse: 90 (12/21/17 1423)  Resp: 18 (12/21/17 1423)  BP: (!) 159/77 (12/21/17 1140)  SpO2: 99 % (12/21/17 1423) Vital Signs (24h Range):  Temp:  [97.8 °F (36.6 °C)-98.3 °F (36.8 °C)] 98.2 °F (36.8 °C)  Pulse:  [71-98] 90  Resp:  [16-20] 18  SpO2:  [88 %-100 %] 99 %  BP: (130-160)/(63-77) 159/77     Weight: 79.8 kg (175 lb 14.8 oz)  Body mass index is 28.4 kg/m².    Intake/Output Summary (Last 24 " hours) at 12/21/17 1804  Last data filed at 12/21/17 0600   Gross per 24 hour   Intake              360 ml   Output             2000 ml   Net            -1640 ml      Physical Exam   Constitutional: She appears well-developed. She is sleeping.   HENT:   Head: Normocephalic.   Mouth/Throat: Mucous membranes are not cyanotic.   Eyes: Conjunctivae and lids are normal.   Neck: Neck supple.   Cardiovascular: S1 normal and S2 normal.    Pulmonary/Chest: Effort normal and breath sounds normal.   Abdominal: Soft. Bowel sounds are normal. There is no tenderness.   Musculoskeletal: She exhibits no edema.   Skin: She is not diaphoretic. No cyanosis. No pallor. Nails show no clubbing.   Psychiatric: Cognition and memory are impaired.       Significant Labs:     CBC:     Recent Labs  Lab 12/20/17  0957 12/21/17  0820   WBC 5.07 6.42   HGB 9.0* 9.1*   HCT 26.1* 25.7*   * 101*     CMP:     Recent Labs  Lab 12/20/17  0832 12/21/17  0820    147*  147*   K 3.5 3.2*  3.2*    106  106   CO2 24 28  27   GLU 88 117*  117*   * 91*  92*   CREATININE 3.0* 3.2*  3.3*   CALCIUM 9.1 9.5  9.4   ALBUMIN 2.9* 3.2*  3.2*   ANIONGAP 13 13  14   EGFRNONAA 14.9* 13.8*  13.3*     Cardiac Markers:     Recent Labs  Lab 12/21/17  0445   *     Magnesium:     Recent Labs  Lab 12/20/17  0832 12/21/17  0820   MG 2.2 2.1  2.1     TSH:     Recent Labs  Lab 12/15/17  0747   .080*     Assessment/Plan:      Current Hospital Problem List:    Active Hospital Problems    Diagnosis  POA    *Acute exacerbation of CHF (congestive heart failure) [I50.9]  Yes     Priority: 1 - High    Primary hypothyroidism [E03.9]  Yes     Priority: 2     Stage 4 chronic kidney disease [N18.4]  Yes     Priority: 3     Esophagitis, [place] grade C [K20.8]  Yes     Priority: 4     Melena [K92.1]  Clinically Undetermined     Priority: 4     Anemia of chronic renal failure, stage 4 (severe) [N18.4, D63.1]  Yes     Priority: 4      Palliative care encounter [Z51.5]  Not Applicable    Major neurocognitive disorder [F03.90]  Yes    Cognitive impairment [R41.89]  Yes    Chronic obstructive pulmonary disease [J44.9]  Yes    Delirium due to another medical condition [F05]  Yes    Elevated troponin [R74.8]  Yes    Insomnia [G47.00]  Yes      Resolved Hospital Problems    Diagnosis Date Resolved POA   No resolved problems to display.       Ordered Medications for management of current problems:    albuterol-ipratropium 2.5mg-0.5mg/3mL  3 mL Nebulization Q6H WAKE    allopurinol  100 mg Oral BID    fluticasone-vilanterol  1 puff Inhalation Daily    [START ON 12/22/2017] furosemide  40 mg Oral Daily    isosorbide-hydrALAZINE 20-37.5 mg  2 tablet Oral TID    [START ON 12/22/2017] levothyroxine  100 mcg Oral Before breakfast    pantoprazole  40 mg Oral Daily    QUEtiapine  100 mg Oral Nightly       Anticipated Disposition: Skilled Nursing Facility    Assessment and Plan by Problem:    * Acute exacerbation of CHF (congestive heart failure)     Patient presenting with worsening LE edema, CXR with pulmonary edema, shortness of breath, elevated BNP likely acute on chronic systolic CHF (based on recent ECHO 5/17), likely precipitated by increased fluid intake by patient   -Lasix 40mg IV BID  - NYHA class 3 at baseline  - non-ischemic cardiomyopathy  ECHO 2D showed EF 30-35%, Grade 3 diastolic dysfunction, biatrial enlargement, depressed right sided dysfunction, increase right atrial pressure  -increased diuretics to furosemide IV 80 mg TID   - transitioned to oral furosemide 80 mg TID.   - oral furosemide decreased to 80 mg BID  - monitoring creatinine.  - creatinine increasing; BNP significantly improved. Decreased Lasix to 40 mg daily 12/21/2017.  -Cardiology follow up as outpatient.       Primary hypothyroidism     Patient with h/o hypothyroidism  TSH elevated T4 WNL  -Endocrine consulted - levothyroxine 70 mcg IV daily and cytomel 5 mcg  BID for one week. Held oral levothyroxine while diuresing.   Patient will be discharged on levothyroxine 100 mcg daily.  Changed IV levothyroxine to oral 12/21/2017 (75 mg per endocrinology recommendations while cytomel course completed)  End date of cytomel 5 mcg BID is 12/23/17       Stage 4 chronic kidney disease     CANDI on CKD IV , patient with baseline creatinine 2.2  Treated with Na Bicarb 1300 BID initially; now discontinued.  Nephrology follow up as outpatient       Melena     Significant melena overnight 12/18/17. Baseline Hgb of 10.  Transfused 2 units PRBCs.  GI consulted. EGD 12/19/2017 revealed esophagitis as likely etiology of bleeding.  Monitoring H&H, appears stable.       Anemia of chronic renal failure, stage 4 (severe)     Stable, follow up outpatient Nephrology       Cognitive impairment     Per family over last months has had issues with memory, forgetting to turn off appliances, getting lost, likely early signs of dementia. Also with agitation (espcially at night) and non-compliance with care.   Patient on seroquel 200 mg qhs and not effective for her current behaviors. Psychiatry consult for medication management. - increased seroquel 250 mg qhs.  -Plan for psychiatry and memory disorder clinic follow up as outpatient. Psychiatry resident to try to place patient in his clinic for Jan 9th 2:00 PM.   -Consider discharge back on 200 mg qhs and cogentin for now.   Her sleep has improved after IV levothyroxine started, but still wakes up confused. Further adjustment will be as per psychiatrist  -Neuropsych testing as outpatient as ordered  -Continuing current management with Seroquel 250 mg QHS.       Chronic obstructive pulmonary disease     Per prior PFTs, mild obstructive component, on inhalers at home, no increase in sputum production unlikely COPD exacerbation,  -Duonebs Q6H  -Continue LABA/inhailed corticosteroid       Elevated troponin     Patient with mild elevation in troponin, no recent  chest pain, no significant EKG changes, likely type 2 NSTEMI in setting of CHF exacerbation and CKD impairing clearance  -Followed Troponin/EKG; stable.         VTE Risk Mitigation         Ordered     Medium Risk of VTE  Once      12/13/17 2030              Lauren Wolf MD  Department of Hospital Medicine   [facility]

## 2017-12-22 NOTE — PLAN OF CARE
CM informed by pt's daughter of death in family.   [facility] team, primary team, CM do not feel it is in pt's best interest to transfer to a new facility for skilled nursing.  Pt hx of dementia, family would not be able to stay overnight with pt.  Will remain inpatient for continued therapy and inpatient care.  Daughter is in agreement with plan.    Della Pineda, RN  Case Management  [phone]

## 2017-12-22 NOTE — PLAN OF CARE
[facility]     Department of Hospital Medicine     [address] [place] [address]     [phone] after hours  [phone] fax       NURSING HOME ORDERS    12/22/2017    Admit to Nursing Home:     Skilled Bed                                                  Diagnoses:  Active Hospital Problems    Diagnosis  POA    *Acute exacerbation of CHF (congestive heart failure) [I50.9]  Yes     Priority: 1 - High    Primary hypothyroidism [E03.9]  Yes     Priority: 2     Stage 4 chronic kidney disease [N18.4]  Yes     Priority: 3     Esophagitis, [place] grade C [K20.8]  Yes     Priority: 4     Anemia of chronic renal failure, stage 4 (severe) [N18.4, D63.1]  Yes     Priority: 4     Palliative care encounter [Z51.5]  Not Applicable    Major neurocognitive disorder [F03.90]  Yes    Cognitive impairment [R41.89]  Yes    Atrial fibrillation [I48.91]  Yes    Chronic obstructive pulmonary disease [J44.9]  Yes    Delirium due to another medical condition [F05]  Yes    Elevated troponin [R74.8]  Yes    Insomnia [G47.00]  Yes      Resolved Hospital Problems    Diagnosis Date Resolved POA   No resolved problems to display.       Patient is homebound due to:  Acute exacerbation of CHF (congestive heart failure)    Allergies:Review of patient's allergies indicates:  No Known Allergies    Vitals:       Every shift (Skilled Nursing patients)    Diet: Renal, Cardiac, low sodium, 1500 mL fluid restriction.    Acitivities:    - Up in a chair each morning as tolerated   - Ambulate with assistance to bathroom   - May use walker    LABS:  Per facility protocol    Nursing:   - Acapella respiratory treatments Q 4 hours while awake   - Aspiration precautions:             - Total assistance with meals            -  Upright 90 degrees before during and after meals     - Fall precautions per nursing home protocol   - Decubitus precautions:        -  for positioning   -  Pressure reducing foam mattress   - Turn patient every two hours. Use wedge pillows to anchor patient    CONSULTS:        Physical Therapy to evaluate and treat     Occupational Therapy to evaluate and treat     Speech Therapy  to evaluate and treat     Nutrition to evaluate and recommend diet     Psychiatry to evaluate and follow     Follow-up Information     [facility].    Specialty:  Psychiatry  Contact information:  [facility]  [facility] [address] [phone]  Additional information:  [facility] - 4th Floor               MISCELLANEOUS CARE:      Routine Skin Care for Patients:  Apply moisture barrier cream to all    skin folds and wet areas in perineal area daily and after baths and                           all bowel movements.     For [facility] Patients Only:    [facility]  prior to discharge.  Phone # [phone]            # [phone] after hours    Fax discharge orders to [facility]: Fax # [phone]   Fax discharge orders to [facility]: Fax # [phone]                    Medications: Discontinue all previous medication orders, if any. See new list below.     Anai Sal   Home Medication Instructions MARY:[id number]    Printed on:12/22/17 1055   Medication Information                      allopurinol (ZYLOPRIM) 100 MG tablet  Take 100 mg by mouth 2 (two) times daily.              aspirin 81 MG Chew  Take 1 tablet (81 mg total) by mouth once daily.             diclofenac sodium 1 % Gel  Apply 4 g topically 4 (four) times daily. Prn pain             fluticasone (FLONASE) 50 mcg/actuation nasal spray  2 sprays by Each Nare route once daily.             fluticasone-salmeterol 250-50 mcg/dose (ADVAIR) 250-50 mcg/dose diskus inhaler  Inhale 1 puff into the lungs 2 (two) times daily. Controller             furosemide (LASIX) 40 MG tablet  Take 1 tablet (40 mg total) by mouth once daily.             gabapentin (NEURONTIN) 100 MG capsule  Take 1 capsule (100 mg total)  by mouth 3 (three) times daily as needed (for nerve pain).             isosorbide-hydrALAZINE 20-37.5 mg (BIDIL) 20-37.5 mg Tab  Take 2 tablets by mouth 3 (three) times daily.             levothyroxine (SYNTHROID) 100 MCG tablet  Take 1 tablet (100 mcg total) by mouth before breakfast.             metoprolol succinate (TOPROL-XL) 25 MG 24 hr tablet  Take 1 tablet (25 mg total) by mouth once daily.             pantoprazole (PROTONIX) 40 MG tablet  Take 1 tablet (40 mg total) by mouth once daily.             QUEtiapine (SEROQUEL) 100 MG Tab  Take 1 tablet (100 mg total) by mouth nightly.             spironolactone (ALDACTONE) 25 MG tablet  Take 0.5 tablets (12.5 mg total) by mouth once daily.             tiotropium (SPIRIVA) 18 mcg inhalation capsule  Inhale 1 capsule (18 mcg total) into the lungs once daily.                 _________________________________  Lauren Wolf MD  12/22/2017

## 2017-12-22 NOTE — ASSESSMENT & PLAN NOTE
Signs of delirium with waxing and waning quality to mental status. Risk factors: dementia, multiple medical co-morbidities, current admission for CHF exacerbation. QTc 406.     - continue quetiapine 100mg PO qhs, and hold for hypotension. Per daughter, this medication had not really been working well for sleep at home, but now the patient seems more tired and weak than usual. Quetiapine is not likely the primary reason for this, but may possibly be contributing    - does not seem to have an underlying bipolar disorder, with clinical picture more suggestive of a neurocognitive disorder with psychosis or just a primary psychotic disorder   - can refer for neuropsychologic testing to characterize cognitive deficits   - should also follow-up with outpatient psychiatrist

## 2017-12-22 NOTE — PLAN OF CARE
Pt has been approved for admission at [facility] today, per Lauren in admissions.  Nurse to call report to [phone] to David.  CM attempting to contact daughter to have her sign admission papers at facility today.  Nurse will assist with notifying Treshone to call this CM upon return to room.  Message left on Treshone's voicemail as well.    Della Pineda, RN  Case Management  [phone]

## 2017-12-22 NOTE — ASSESSMENT & PLAN NOTE
Patient presenting with worsening LE edema, CXR with pulmonary edema, shortness of breath, elevated BNP likely acute on chronic systolic CHF (based on recent ECHO 5/17), likely precipitated by increased fluid intake by patient   - Lasix 40mg IV BID  - NYHA class 3 at baseline  - non-ischemic cardiomyopathy

## 2017-12-22 NOTE — PLAN OF CARE
SNF orders uploaded to [facility], message to facility with Dr Ferreira as PCP who will follow at [phone].

## 2017-12-23 LAB
ALBUMIN SERPL BCP-MCNC: 3.2 G/DL
ANION GAP SERPL CALC-SCNC: 11 MMOL/L
BASOPHILS # BLD AUTO: 0.02 K/UL
BASOPHILS NFR BLD: 0.3 %
BNP SERPL-MCNC: 670 PG/ML
BUN SERPL-MCNC: 63 MG/DL
CALCIUM SERPL-MCNC: 9.6 MG/DL
CHLORIDE SERPL-SCNC: 110 MMOL/L
CO2 SERPL-SCNC: 27 MMOL/L
CREAT SERPL-MCNC: 3.4 MG/DL
DIFFERENTIAL METHOD: ABNORMAL
EOSINOPHIL # BLD AUTO: 0.1 K/UL
EOSINOPHIL NFR BLD: 1.3 %
ERYTHROCYTE [DISTWIDTH] IN BLOOD BY AUTOMATED COUNT: 18.1 %
EST. GFR  (AFRICAN AMERICAN): 14.8 ML/MIN/1.73 M^2
EST. GFR  (NON AFRICAN AMERICAN): 12.8 ML/MIN/1.73 M^2
GLUCOSE SERPL-MCNC: 105 MG/DL
HCT VFR BLD AUTO: 28.6 %
HGB BLD-MCNC: 9.9 G/DL
IMM GRANULOCYTES # BLD AUTO: 0.04 K/UL
IMM GRANULOCYTES NFR BLD AUTO: 0.7 %
LYMPHOCYTES # BLD AUTO: 1.2 K/UL
LYMPHOCYTES NFR BLD: 20 %
MAGNESIUM SERPL-MCNC: 2.3 MG/DL
MCH RBC QN AUTO: 35.7 PG
MCHC RBC AUTO-ENTMCNC: 34.6 G/DL
MCV RBC AUTO: 103 FL
MONOCYTES # BLD AUTO: 0.8 K/UL
MONOCYTES NFR BLD: 13.4 %
NEUTROPHILS # BLD AUTO: 3.9 K/UL
NEUTROPHILS NFR BLD: 64.3 %
NRBC BLD-RTO: 1 /100 WBC
PHOSPHATE SERPL-MCNC: 2.3 MG/DL
PLATELET # BLD AUTO: 127 K/UL
PMV BLD AUTO: 12.5 FL
POTASSIUM SERPL-SCNC: 3.3 MMOL/L
RBC # BLD AUTO: 2.77 M/UL
SODIUM SERPL-SCNC: 148 MMOL/L
WBC # BLD AUTO: 6.05 K/UL

## 2017-12-23 PROCEDURE — 94761 N-INVAS EAR/PLS OXIMETRY MLT: CPT

## 2017-12-23 PROCEDURE — 36415 COLL VENOUS BLD VENIPUNCTURE: CPT

## 2017-12-23 PROCEDURE — 25000003 PHARM REV CODE 250: Performed by: INTERNAL MEDICINE

## 2017-12-23 PROCEDURE — 93005 ELECTROCARDIOGRAM TRACING: CPT

## 2017-12-23 PROCEDURE — 94640 AIRWAY INHALATION TREATMENT: CPT

## 2017-12-23 PROCEDURE — 83735 ASSAY OF MAGNESIUM: CPT

## 2017-12-23 PROCEDURE — 25000242 PHARM REV CODE 250 ALT 637 W/ HCPCS: Performed by: INTERNAL MEDICINE

## 2017-12-23 PROCEDURE — 80069 RENAL FUNCTION PANEL: CPT

## 2017-12-23 PROCEDURE — 93010 ELECTROCARDIOGRAM REPORT: CPT | Mod: ,,, | Performed by: INTERNAL MEDICINE

## 2017-12-23 PROCEDURE — 99900035 HC TECH TIME PER 15 MIN (STAT)

## 2017-12-23 PROCEDURE — 83880 ASSAY OF NATRIURETIC PEPTIDE: CPT

## 2017-12-23 PROCEDURE — 11000001 HC ACUTE MED/SURG PRIVATE ROOM

## 2017-12-23 PROCEDURE — 25000003 PHARM REV CODE 250: Performed by: PSYCHIATRY & NEUROLOGY

## 2017-12-23 PROCEDURE — 85025 COMPLETE CBC W/AUTO DIFF WBC: CPT

## 2017-12-23 PROCEDURE — 99231 SBSQ HOSP IP/OBS SF/LOW 25: CPT | Mod: ,,, | Performed by: INTERNAL MEDICINE

## 2017-12-23 RX ORDER — POLYETHYLENE GLYCOL 3350 17 G/17G
17 POWDER, FOR SOLUTION ORAL 2 TIMES DAILY
Status: DISCONTINUED | OUTPATIENT
Start: 2017-12-23 | End: 2017-12-26 | Stop reason: HOSPADM

## 2017-12-23 RX ORDER — FUROSEMIDE 40 MG/1
40 TABLET ORAL DAILY
Status: DISCONTINUED | OUTPATIENT
Start: 2017-12-24 | End: 2017-12-23

## 2017-12-23 RX ORDER — POLYETHYLENE GLYCOL 3350 17 G/17G
17 POWDER, FOR SOLUTION ORAL DAILY
Status: DISCONTINUED | OUTPATIENT
Start: 2017-12-23 | End: 2017-12-23

## 2017-12-23 RX ORDER — FUROSEMIDE 40 MG/1
40 TABLET ORAL DAILY
Status: DISCONTINUED | OUTPATIENT
Start: 2017-12-24 | End: 2017-12-26 | Stop reason: HOSPADM

## 2017-12-23 RX ORDER — POTASSIUM CHLORIDE 20 MEQ/1
40 TABLET, EXTENDED RELEASE ORAL DAILY
Status: DISCONTINUED | OUTPATIENT
Start: 2017-12-24 | End: 2017-12-25

## 2017-12-23 RX ORDER — METOPROLOL SUCCINATE 50 MG/1
50 TABLET, EXTENDED RELEASE ORAL DAILY
Status: DISCONTINUED | OUTPATIENT
Start: 2017-12-24 | End: 2017-12-26 | Stop reason: HOSPADM

## 2017-12-23 RX ADMIN — SPIRONOLACTONE 12.5 MG: 25 TABLET, FILM COATED ORAL at 08:12

## 2017-12-23 RX ADMIN — FLUTICASONE FUROATE AND VILANTEROL TRIFENATATE 1 PUFF: 100; 25 POWDER RESPIRATORY (INHALATION) at 08:12

## 2017-12-23 RX ADMIN — ALLOPURINOL 100 MG: 100 TABLET ORAL at 09:12

## 2017-12-23 RX ADMIN — QUETIAPINE FUMARATE 100 MG: 25 TABLET, FILM COATED ORAL at 09:12

## 2017-12-23 RX ADMIN — POLYETHYLENE GLYCOL 3350 17 G: 17 POWDER, FOR SOLUTION ORAL at 03:12

## 2017-12-23 RX ADMIN — IPRATROPIUM BROMIDE AND ALBUTEROL SULFATE 3 ML: .5; 3 SOLUTION RESPIRATORY (INHALATION) at 09:12

## 2017-12-23 RX ADMIN — PANTOPRAZOLE SODIUM 40 MG: 40 TABLET, DELAYED RELEASE ORAL at 08:12

## 2017-12-23 RX ADMIN — ALLOPURINOL 100 MG: 100 TABLET ORAL at 08:12

## 2017-12-23 RX ADMIN — HYDRALAZINE HYDROCHLORIDE AND ISOSORBIDE DINITRATE 2 TABLET: 37.5; 2 TABLET, FILM COATED ORAL at 09:12

## 2017-12-23 RX ADMIN — HYDRALAZINE HYDROCHLORIDE AND ISOSORBIDE DINITRATE 2 TABLET: 37.5; 2 TABLET, FILM COATED ORAL at 06:12

## 2017-12-23 RX ADMIN — IPRATROPIUM BROMIDE AND ALBUTEROL SULFATE 3 ML: .5; 3 SOLUTION RESPIRATORY (INHALATION) at 08:12

## 2017-12-23 RX ADMIN — IPRATROPIUM BROMIDE AND ALBUTEROL SULFATE 3 ML: .5; 3 SOLUTION RESPIRATORY (INHALATION) at 02:12

## 2017-12-23 RX ADMIN — LEVOTHYROXINE SODIUM 100 MCG: 100 TABLET ORAL at 06:12

## 2017-12-23 RX ADMIN — HYDRALAZINE HYDROCHLORIDE AND ISOSORBIDE DINITRATE 2 TABLET: 37.5; 2 TABLET, FILM COATED ORAL at 01:12

## 2017-12-23 RX ADMIN — METOPROLOL SUCCINATE 25 MG: 25 TABLET, EXTENDED RELEASE ORAL at 08:12

## 2017-12-23 NOTE — PROGRESS NOTES
Pt has been crying since informed about the passing of her son .  HR ranging from 110's -130's since she was told will cont to monitor patient and her vital signs. Daughter has been present at bedside.

## 2017-12-23 NOTE — PLAN OF CARE
Problem: Patient Care Overview  Goal: Plan of Care Review  Patient and daughter both went to sleep after midnight and has slept for the past 5 hours. VSS improved once patient went to sleep. SHe has remain calm with crying after she was told initially. Monitor patient frequently this shift. Denies any complaint

## 2017-12-23 NOTE — SUBJECTIVE & OBJECTIVE
Interval History: Patient with melena overnight, no complaints. H&H stable. Less somnolent with decreased Seroquel.  Data Review: Results recorded below were reviewed 12/22/2017.    Review of Systems   Constitutional: Negative for fever.   Respiratory: Negative for shortness of breath.      Objective:     Vital Signs (Most Recent):  Temp: 98.9 °F (37.2 °C) (12/22/17 1944)  Pulse: 102 (12/22/17 1944)  Resp: 20 (12/22/17 1944)  BP: 115/65 (12/22/17 1944)  SpO2: 99 % (12/22/17 1944) Vital Signs (24h Range):  Temp:  [97.8 °F (36.6 °C)-99 °F (37.2 °C)] 98.9 °F (37.2 °C)  Pulse:  [] 102  Resp:  [16-20] 20  SpO2:  [92 %-99 %] 99 %  BP: (115-146)/(60-88) 115/65     Weight: 79.8 kg (175 lb 14.8 oz)  Body mass index is 28.4 kg/m².    Intake/Output Summary (Last 24 hours) at 12/22/17 2245  Last data filed at 12/22/17 1700   Gross per 24 hour   Intake              500 ml   Output                0 ml   Net              500 ml      Physical Exam   Constitutional: She appears well-developed.   HENT:   Head: Normocephalic.   Mouth/Throat: Mucous membranes are not cyanotic.   Eyes: Conjunctivae and lids are normal.   Neck: Neck supple.   Cardiovascular: S1 normal and S2 normal.    Pulmonary/Chest: Effort normal and breath sounds normal.   Abdominal: Soft. Bowel sounds are normal. There is no tenderness.   Musculoskeletal: She exhibits no edema.   Neurological: She is alert.   Skin: She is not diaphoretic. No cyanosis. No pallor. Nails show no clubbing.   Psychiatric: She has a normal mood and affect. Cognition and memory are impaired.       Significant Labs:     CBC:     Recent Labs  Lab 12/21/17  0820 12/22/17  0457   WBC 6.42 4.50   HGB 9.1* 9.5*   HCT 25.7* 26.9*   * 110*     CMP:     Recent Labs  Lab 12/21/17 0820 12/22/17  0829   *  147* 148*   K 3.2*  3.2* 3.4*     106 108   CO2 28  27 28   *  117* 98   BUN 91*  92* 78*   CREATININE 3.2*  3.3* 3.3*   CALCIUM 9.5  9.4 9.7   ALBUMIN 3.2*   3.2* 3.3*   ANIONGAP 13  14 12   EGFRNONAA 13.8*  13.3* 13.3*     Cardiac Markers:     Recent Labs  Lab 12/21/17  0445   *     Magnesium:     Recent Labs  Lab 12/21/17  0820 12/22/17  0829   MG 2.1  2.1 2.5     TSH:     Recent Labs  Lab 12/15/17  0747   .080*

## 2017-12-23 NOTE — PROGRESS NOTES
William Daughter request to talk to me about pt recently lost her son who  in a hospital in [place], [place] and she along with several other family and friend will like to inform her while they all are together tonight. .Daughter requested pt received something for her nerve in addition to her current medicine schedule . On call Jaylene SILVERIO paged and new order for one time ativan 0.5mg was ordered and given.

## 2017-12-23 NOTE — PROGRESS NOTES
"[facility] Hospital Medicine  Progress Note    Patient Name: Anai Sal  MRN: [id number]  Patient Class: IP- Inpatient   Admission Date: 12/13/2017  Length of Stay: 9 days  Attending Physician: Lauren Wolf MD  Primary Care Provider: Live Young MD (Inactive)    [facility] Medicine Team: [facility] HOSP MED D Lauren Wolf MD    Subjective:     Principal Problem:Acute exacerbation of CHF (congestive heart failure)    HPI:  "72F h/o HFrEF (30%), Stage IV CKD, Hep C, h/o breast ca s/p mastectomy/chemo 1979, COPD, HTN, Bipolar d/o, anemia of CKD, presenting with worsening dyspnea for last few days with cough. Patient usually ambulatory with walker, over last few days exercise tolerance worsened to few steps before feeling short of breath, denies chest pain with dyspnea. Pt accompanied by daughter who reports worsening swelling of legs recently, ?orthopnea, reporting PND. Pt has been taking increased fluids lately as she has been feeling thirsty and daughter has difficult time controlling patient intake. Denies F/C/N/V, cough nonproductive. Has been taking Lasix 20mg BID, reports between 7-8 lb weight gain in last week."     Hospital Course:  No notes on file    Interval History: Patient upset overnight, no new complaints. H&H stable. Less somnolent with decreased Seroquel.  Data Review: Results recorded below were reviewed 12/22/2017.    Review of Systems   Constitutional: Negative for fever.   Respiratory: Negative for shortness of breath.      Objective:     Vital Signs (Most Recent):  Temp: 98.9 °F (37.2 °C) (12/22/17 1944)  Pulse: 102 (12/22/17 1944)  Resp: 20 (12/22/17 1944)  BP: 115/65 (12/22/17 1944)  SpO2: 99 % (12/22/17 1944) Vital Signs (24h Range):  Temp:  [97.8 °F (36.6 °C)-99 °F (37.2 °C)] 98.9 °F (37.2 °C)  Pulse:  [] 102  Resp:  [16-20] 20  SpO2:  [92 %-99 %] 99 %  BP: (115-146)/(60-88) 115/65     Weight: 79.8 kg (175 lb 14.8 oz)  Body mass index is 28.4 " kg/m².    Intake/Output Summary (Last 24 hours) at 12/22/17 2245  Last data filed at 12/22/17 1700   Gross per 24 hour   Intake              500 ml   Output                0 ml   Net              500 ml      Physical Exam   Constitutional: She appears well-developed.   HENT:   Head: Normocephalic.   Mouth/Throat: Mucous membranes are not cyanotic.   Eyes: Conjunctivae and lids are normal.   Neck: Neck supple.   Cardiovascular: S1 normal and S2 normal.    Pulmonary/Chest: Effort normal and breath sounds normal.   Abdominal: Soft. Bowel sounds are normal. There is no tenderness.   Musculoskeletal: She exhibits no edema.   Neurological: She is alert.   Skin: She is not diaphoretic. No cyanosis. No pallor. Nails show no clubbing.   Psychiatric: She has a normal mood and affect. Cognition and memory are impaired.       Significant Labs:     CBC:     Recent Labs  Lab 12/21/17  0820 12/22/17  0457   WBC 6.42 4.50   HGB 9.1* 9.5*   HCT 25.7* 26.9*   * 110*     CMP:     Recent Labs  Lab 12/21/17  0820 12/22/17  0829   *  147* 148*   K 3.2*  3.2* 3.4*     106 108   CO2 28  27 28   *  117* 98   BUN 91*  92* 78*   CREATININE 3.2*  3.3* 3.3*   CALCIUM 9.5  9.4 9.7   ALBUMIN 3.2*  3.2* 3.3*   ANIONGAP 13  14 12   EGFRNONAA 13.8*  13.3* 13.3*     Cardiac Markers:     Recent Labs  Lab 12/21/17  0445   *     Magnesium:     Recent Labs  Lab 12/21/17  0820 12/22/17  0829   MG 2.1  2.1 2.5     TSH:     Recent Labs  Lab 12/15/17  0747   .080*     Assessment/Plan:      Current Hospital Problem List:    Active Hospital Problems    Diagnosis  POA    *Acute exacerbation of CHF (congestive heart failure) [I50.9]  Yes     Priority: 1 - High    Primary hypothyroidism [E03.9]  Yes     Priority: 2     Stage 4 chronic kidney disease [N18.4]  Yes     Priority: 3     Esophagitis, [place] grade C [K20.8]  Yes     Priority: 4     Anemia of chronic renal failure, stage 4 (severe) [N18.4,  D63.1]  Yes     Priority: 4     Palliative care encounter [Z51.5]  Not Applicable    Major neurocognitive disorder [F03.90]  Yes    Cognitive impairment [R41.89]  Yes    Atrial fibrillation [I48.91]  Yes    Chronic obstructive pulmonary disease [J44.9]  Yes    Delirium due to another medical condition [F05]  Yes    Elevated troponin [R74.8]  Yes    Insomnia [G47.00]  Yes      Resolved Hospital Problems    Diagnosis Date Resolved POA   No resolved problems to display.       Ordered Medications for management of current problems:    albuterol-ipratropium 2.5mg-0.5mg/3mL  3 mL Nebulization Q6H WAKE    allopurinol  100 mg Oral BID    fluticasone-vilanterol  1 puff Inhalation Daily    furosemide  40 mg Oral Daily    isosorbide-hydrALAZINE 20-37.5 mg  2 tablet Oral TID    levothyroxine  100 mcg Oral Before breakfast    metoprolol succinate  25 mg Oral Daily    pantoprazole  40 mg Oral Daily    QUEtiapine  100 mg Oral Nightly    spironolactone  12.5 mg Oral Daily       Anticipated Disposition: Skilled Nursing Facility    Assessment and Plan by Problem:    Acute exacerbation of CHF (congestive heart failure)     Patient presenting with worsening LE edema, CXR with pulmonary edema, shortness of breath, elevated BNP likely acute on chronic systolic CHF (based on recent ECHO 5/17), likely precipitated by increased fluid intake by patient   -Lasix 40mg IV BID  - NYHA class 3 at baseline  - non-ischemic cardiomyopathy  ECHO 2D showed EF 30-35%, Grade 3 diastolic dysfunction, biatrial enlargement, depressed right sided dysfunction, increase right atrial pressure  -increased diuretics to furosemide IV 80 mg TID   - transitioned to oral furosemide 80 mg TID.   - oral furosemide decreased to 80 mg BID  - monitoring creatinine.  - creatinine increasing; BNP significantly improved.   - Decreased Lasix to 40 mg daily 12/21/2017.  - Creatinine stable but above baseline, and K+ low; adding low dose aldactone.  -  Cardiology follow up as outpatient.       Primary hypothyroidism     Patient with h/o hypothyroidism  TSH elevated T4 WNL  -Endocrine consulted - levothyroxine 70 mcg IV daily and cytomel 5 mcg BID for one week. Held oral levothyroxine while diuresing.   Patient will be discharged on levothyroxine 100 mcg daily.  Changed IV levothyroxine to oral 12/21/2017 (75 mg per endocrinology recommendations while cytomel course completed)  End date of cytomel 5 mcg BID was 12/23/17; course completed.  Started levothyroxine 100 mcg daily       Stage 4 chronic kidney disease     CANDI on CKD IV , patient with baseline creatinine 2.2  Treated with Na Bicarb 1300 BID initially; now discontinued.  Nephrology follow up as outpatient       Esophagitis [place] 3     Significant melena overnight 12/18/17. Baseline Hgb of 10.  Transfused 2 units PRBCs.  GI consulted. EGD 12/19/2017 revealed esophagitis (POA) as likely etiology of bleeding.  Monitoring H&H, appears stable.       Anemia of chronic renal failure, stage 4 (severe)     Stable, follow up outpatient Nephrology       Cognitive impairment     Per family over last months has had issues with memory, forgetting to turn off appliances, getting lost, likely early signs of dementia. Also with agitation (espcially at night) and non-compliance with care.   Patient on seroquel 200 mg qhs and not effective for her current behaviors. Psychiatry consult for medication management. - increased seroquel 250 mg qhs.  -Plan for psychiatry and memory disorder clinic follow up as outpatient. Psychiatry resident to try to place patient in his clinic for Jan 9th 2:00 PM.   -Consider discharge back on 200 mg qhs and cogentin for now.   Her sleep has improved after IV levothyroxine started, but still wakes up confused. Further adjustment will be as per psychiatrist  -Neuropsych testing as outpatient as ordered  -Continued current management with Seroquel 250 mg QHS which improved delirium.  - Seroquel  decreased to 100 mg QHS due to excessive daytime somnolence.       Chronic obstructive pulmonary disease     Per prior PFTs, mild obstructive component, on inhalers at home, no increase in sputum production unlikely COPD exacerbation,  -Duonebs Q6H  -Continue LABA/inhailed corticosteroid       Elevated troponin     Patient with mild elevation in troponin, no recent chest pain, no significant EKG changes, likely type 2 NSTEMI in setting of CHF exacerbation and CKD impairing clearance  -Followed Troponin/EKG; stable.         VTE Risk Mitigation         Ordered     Medium Risk of VTE  Once      12/13/17 2030              Lauren Wolf MD  Department of Hospital Medicine   [facility]

## 2017-12-24 PROBLEM — I50.43 ACUTE ON CHRONIC COMBINED SYSTOLIC AND DIASTOLIC CONGESTIVE HEART FAILURE: Status: RESOLVED | Noted: 2017-04-04 | Resolved: 2017-12-24

## 2017-12-24 LAB
ALBUMIN SERPL BCP-MCNC: 3.1 G/DL
ANION GAP SERPL CALC-SCNC: 10 MMOL/L
BASOPHILS # BLD AUTO: 0.02 K/UL
BASOPHILS NFR BLD: 0.4 %
BUN SERPL-MCNC: 56 MG/DL
CALCIUM SERPL-MCNC: 8.8 MG/DL
CHLORIDE SERPL-SCNC: 106 MMOL/L
CO2 SERPL-SCNC: 27 MMOL/L
CREAT SERPL-MCNC: 3.3 MG/DL
DIFFERENTIAL METHOD: ABNORMAL
EOSINOPHIL # BLD AUTO: 0.1 K/UL
EOSINOPHIL NFR BLD: 1.9 %
ERYTHROCYTE [DISTWIDTH] IN BLOOD BY AUTOMATED COUNT: 18 %
EST. GFR  (AFRICAN AMERICAN): 15.4 ML/MIN/1.73 M^2
EST. GFR  (NON AFRICAN AMERICAN): 13.3 ML/MIN/1.73 M^2
GLUCOSE SERPL-MCNC: 97 MG/DL
HCT VFR BLD AUTO: 27 %
HGB BLD-MCNC: 9.2 G/DL
IMM GRANULOCYTES # BLD AUTO: 0.03 K/UL
IMM GRANULOCYTES NFR BLD AUTO: 0.6 %
LYMPHOCYTES # BLD AUTO: 1.5 K/UL
LYMPHOCYTES NFR BLD: 27.6 %
MAGNESIUM SERPL-MCNC: 2.2 MG/DL
MCH RBC QN AUTO: 35.1 PG
MCHC RBC AUTO-ENTMCNC: 34.1 G/DL
MCV RBC AUTO: 103 FL
MONOCYTES # BLD AUTO: 0.8 K/UL
MONOCYTES NFR BLD: 14.9 %
NEUTROPHILS # BLD AUTO: 2.9 K/UL
NEUTROPHILS NFR BLD: 54.6 %
NRBC BLD-RTO: 0 /100 WBC
PHOSPHATE SERPL-MCNC: 2.9 MG/DL
PLATELET # BLD AUTO: 125 K/UL
PMV BLD AUTO: 12.2 FL
POTASSIUM SERPL-SCNC: 3.5 MMOL/L
RBC # BLD AUTO: 2.62 M/UL
SODIUM SERPL-SCNC: 143 MMOL/L
T4 FREE SERPL-MCNC: 0.75 NG/DL
T4 SERPL-MCNC: 4.9 UG/DL
TSH SERPL DL<=0.005 MIU/L-ACNC: 37.12 UIU/ML
WBC # BLD AUTO: 5.37 K/UL

## 2017-12-24 PROCEDURE — 25000003 PHARM REV CODE 250: Performed by: INTERNAL MEDICINE

## 2017-12-24 PROCEDURE — 25000003 PHARM REV CODE 250: Performed by: PHYSICIAN ASSISTANT

## 2017-12-24 PROCEDURE — 84436 ASSAY OF TOTAL THYROXINE: CPT

## 2017-12-24 PROCEDURE — 80069 RENAL FUNCTION PANEL: CPT

## 2017-12-24 PROCEDURE — 93005 ELECTROCARDIOGRAM TRACING: CPT

## 2017-12-24 PROCEDURE — 94640 AIRWAY INHALATION TREATMENT: CPT

## 2017-12-24 PROCEDURE — 25000003 PHARM REV CODE 250: Performed by: PSYCHIATRY & NEUROLOGY

## 2017-12-24 PROCEDURE — 93010 ELECTROCARDIOGRAM REPORT: CPT | Mod: ,,, | Performed by: INTERNAL MEDICINE

## 2017-12-24 PROCEDURE — 25000242 PHARM REV CODE 250 ALT 637 W/ HCPCS: Performed by: INTERNAL MEDICINE

## 2017-12-24 PROCEDURE — 36415 COLL VENOUS BLD VENIPUNCTURE: CPT

## 2017-12-24 PROCEDURE — 83735 ASSAY OF MAGNESIUM: CPT

## 2017-12-24 PROCEDURE — 84439 ASSAY OF FREE THYROXINE: CPT

## 2017-12-24 PROCEDURE — 94761 N-INVAS EAR/PLS OXIMETRY MLT: CPT

## 2017-12-24 PROCEDURE — 99232 SBSQ HOSP IP/OBS MODERATE 35: CPT | Mod: ,,, | Performed by: INTERNAL MEDICINE

## 2017-12-24 PROCEDURE — 11000001 HC ACUTE MED/SURG PRIVATE ROOM

## 2017-12-24 PROCEDURE — 84443 ASSAY THYROID STIM HORMONE: CPT

## 2017-12-24 PROCEDURE — 85025 COMPLETE CBC W/AUTO DIFF WBC: CPT

## 2017-12-24 RX ORDER — AMOXICILLIN 250 MG
1 CAPSULE ORAL 2 TIMES DAILY PRN
Status: DISCONTINUED | OUTPATIENT
Start: 2017-12-24 | End: 2017-12-26 | Stop reason: HOSPADM

## 2017-12-24 RX ORDER — SYRING-NEEDL,DISP,INSUL,0.3 ML 29 G X1/2"
296 SYRINGE, EMPTY DISPOSABLE MISCELLANEOUS ONCE
Status: COMPLETED | OUTPATIENT
Start: 2017-12-24 | End: 2017-12-24

## 2017-12-24 RX ORDER — PSEUDOEPHEDRINE/ACETAMINOPHEN 30MG-500MG
100 TABLET ORAL ONCE
Status: COMPLETED | OUTPATIENT
Start: 2017-12-24 | End: 2017-12-24

## 2017-12-24 RX ADMIN — HYDRALAZINE HYDROCHLORIDE AND ISOSORBIDE DINITRATE 2 TABLET: 37.5; 2 TABLET, FILM COATED ORAL at 09:12

## 2017-12-24 RX ADMIN — SODIUM CHLORIDE 500 ML: 0.9 INJECTION, SOLUTION INTRAVENOUS at 01:12

## 2017-12-24 RX ADMIN — Medication 100 ML: at 05:12

## 2017-12-24 RX ADMIN — IPRATROPIUM BROMIDE AND ALBUTEROL SULFATE 3 ML: .5; 3 SOLUTION RESPIRATORY (INHALATION) at 07:12

## 2017-12-24 RX ADMIN — ALLOPURINOL 100 MG: 100 TABLET ORAL at 09:12

## 2017-12-24 RX ADMIN — POLYETHYLENE GLYCOL 3350 17 G: 17 POWDER, FOR SOLUTION ORAL at 09:12

## 2017-12-24 RX ADMIN — METOPROLOL SUCCINATE 50 MG: 50 TABLET, EXTENDED RELEASE ORAL at 08:12

## 2017-12-24 RX ADMIN — POLYETHYLENE GLYCOL 3350 17 G: 17 POWDER, FOR SOLUTION ORAL at 08:12

## 2017-12-24 RX ADMIN — ALLOPURINOL 100 MG: 100 TABLET ORAL at 08:12

## 2017-12-24 RX ADMIN — SPIRONOLACTONE 12.5 MG: 25 TABLET, FILM COATED ORAL at 08:12

## 2017-12-24 RX ADMIN — IPRATROPIUM BROMIDE AND ALBUTEROL SULFATE 3 ML: .5; 3 SOLUTION RESPIRATORY (INHALATION) at 02:12

## 2017-12-24 RX ADMIN — FUROSEMIDE 40 MG: 40 TABLET ORAL at 08:12

## 2017-12-24 RX ADMIN — HYDRALAZINE HYDROCHLORIDE AND ISOSORBIDE DINITRATE 2 TABLET: 37.5; 2 TABLET, FILM COATED ORAL at 03:12

## 2017-12-24 RX ADMIN — QUETIAPINE FUMARATE 100 MG: 25 TABLET, FILM COATED ORAL at 09:12

## 2017-12-24 RX ADMIN — MAGESIUM CITRATE 296 ML: 1.75 LIQUID ORAL at 05:12

## 2017-12-24 RX ADMIN — LEVOTHYROXINE SODIUM 100 MCG: 100 TABLET ORAL at 05:12

## 2017-12-24 RX ADMIN — STANDARDIZED SENNA CONCENTRATE AND DOCUSATE SODIUM 1 TABLET: 8.6; 5 TABLET, FILM COATED ORAL at 09:12

## 2017-12-24 RX ADMIN — HYDRALAZINE HYDROCHLORIDE AND ISOSORBIDE DINITRATE 2 TABLET: 37.5; 2 TABLET, FILM COATED ORAL at 05:12

## 2017-12-24 RX ADMIN — FLUTICASONE FUROATE AND VILANTEROL TRIFENATATE 1 PUFF: 100; 25 POWDER RESPIRATORY (INHALATION) at 08:12

## 2017-12-24 RX ADMIN — PANTOPRAZOLE SODIUM 40 MG: 40 TABLET, DELAYED RELEASE ORAL at 08:12

## 2017-12-24 RX ADMIN — IPRATROPIUM BROMIDE AND ALBUTEROL SULFATE 3 ML: .5; 3 SOLUTION RESPIRATORY (INHALATION) at 08:12

## 2017-12-24 RX ADMIN — POTASSIUM CHLORIDE 40 MEQ: 1500 TABLET, EXTENDED RELEASE ORAL at 08:12

## 2017-12-24 NOTE — PROGRESS NOTES
"[facility] Hospital Medicine  Progress Note    Patient Name: Anai Sal  MRN: [id number]  Patient Class: IP- Inpatient   Admission Date: 12/13/2017  Length of Stay: 10 days  Attending Physician: Lauren Wolf MD  Primary Care Provider: Live Young MD (Inactive)    [facility] Medicine Team: [facility]OS[facility] MED D Lauren Wolf MD    Subjective:     Principal Problem:Acute exacerbation of CHF (congestive heart failure)    HPI:  "72F h/o HFrEF (30%), Stage IV CKD, Hep C, h/o breast ca s/p mastectomy/chemo 1979, COPD, HTN, Bipolar d/o, anemia of CKD, presenting with worsening dyspnea for last few days with cough. Patient usually ambulatory with walker, over last few days exercise tolerance worsened to few steps before feeling short of breath, denies chest pain with dyspnea. Pt accompanied by daughter who reports worsening swelling of legs recently, ?orthopnea, reporting PND. Pt has been taking increased fluids lately as she has been feeling thirsty and daughter has difficult time controlling patient intake. Denies F/C/N/V, cough nonproductive. Has been taking Lasix 20mg BID, reports between 7-8 lb weight gain in last week."     Hospital Course:  No notes on file    Interval History: Patient with no events overnight, no complaints.   Data Review: Results recorded below were reviewed 12/23/2017.    Review of Systems   Constitutional: Negative for fever.   Respiratory: Negative for shortness of breath.    Psychiatric/Behavioral: Positive for dysphoric mood.     Objective:     Vital Signs (Most Recent):  Temp: 98.7 °F (37.1 °C) (12/23/17 1939)  Pulse: 110 (12/23/17 1939)  Resp: 18 (12/23/17 1939)  BP: 109/72 (12/23/17 1939)  SpO2: 96 % (12/23/17 1939) Vital Signs (24h Range):  Temp:  [98.3 °F (36.8 °C)-99.3 °F (37.4 °C)] 98.7 °F (37.1 °C)  Pulse:  [] 110  Resp:  [15-20] 18  SpO2:  [94 %-99 %] 96 %  BP: (109-150)/(70-81) 109/72     Weight: 79.8 kg (175 lb 14.8 oz)  Body mass index is 28.4 " kg/m².    Intake/Output Summary (Last 24 hours) at 12/23/17 2007  Last data filed at 12/23/17 1500   Gross per 24 hour   Intake              700 ml   Output                0 ml   Net              700 ml      Physical Exam   Constitutional: She appears well-developed.   HENT:   Head: Normocephalic.   Mouth/Throat: Mucous membranes are not cyanotic.   Eyes: Conjunctivae and lids are normal.   Neck: Neck supple.   Cardiovascular: S1 normal and S2 normal.    Pulmonary/Chest: Effort normal and breath sounds normal.   Abdominal: Soft. Bowel sounds are normal. There is no tenderness.   Musculoskeletal: She exhibits no edema.   Neurological: She is alert.   Skin: She is not diaphoretic. No cyanosis. No pallor. Nails show no clubbing.   Psychiatric: She has a normal mood and affect. Cognition and memory are impaired.       Significant Labs:     CBC:     Recent Labs  Lab 12/22/17  0457 12/23/17  0356   WBC 4.50 6.05   HGB 9.5* 9.9*   HCT 26.9* 28.6*   * 127*     CMP:     Recent Labs  Lab 12/22/17  0829 12/23/17  0826   * 148*   K 3.4* 3.3*    110   CO2 28 27   GLU 98 105   BUN 78* 63*   CREATININE 3.3* 3.4*   CALCIUM 9.7 9.6   ALBUMIN 3.3* 3.2*   ANIONGAP 12 11   EGFRNONAA 13.3* 12.8*     Cardiac Markers:     Recent Labs  Lab 12/23/17  0356   *     Magnesium:     Recent Labs  Lab 12/22/17  0829 12/23/17  0826   MG 2.5 2.3     TSH:     Recent Labs  Lab 12/15/17  0747   .080*     Assessment/Plan:      Current Hospital Problem List:    Active Hospital Problems    Diagnosis  POA    *Acute exacerbation of CHF (congestive heart failure) [I50.9]  Yes     Priority: 1 - High    Primary hypothyroidism [E03.9]  Yes     Priority: 2     Stage 4 chronic kidney disease [N18.4]  Yes     Priority: 3     Esophagitis, [place] grade C [K20.8]  Yes     Priority: 4     Anemia of chronic renal failure, stage 4 (severe) [N18.4, D63.1]  Yes     Priority: 4     Palliative care encounter [Z51.5]  Not  Applicable    Major neurocognitive disorder [F03.90]  Yes    Cognitive impairment [R41.89]  Yes    Atrial fibrillation [I48.91]  Yes    Chronic obstructive pulmonary disease [J44.9]  Yes    Delirium due to another medical condition [F05]  Yes    Elevated troponin [R74.8]  Yes    Insomnia [G47.00]  Yes      Resolved Hospital Problems    Diagnosis Date Resolved POA   No resolved problems to display.       Ordered Medications for management of current problems:    albuterol-ipratropium 2.5mg-0.5mg/3mL  3 mL Nebulization Q6H WAKE    allopurinol  100 mg Oral BID    fluticasone-vilanterol  1 puff Inhalation Daily    [START ON 12/24/2017] furosemide  40 mg Oral Daily    isosorbide-hydrALAZINE 20-37.5 mg  2 tablet Oral TID    levothyroxine  100 mcg Oral Before breakfast    [START ON 12/24/2017] metoprolol succinate  50 mg Oral Daily    pantoprazole  40 mg Oral Daily    polyethylene glycol  17 g Oral BID    [START ON 12/24/2017] potassium chloride  40 mEq Oral Daily    QUEtiapine  100 mg Oral Nightly    spironolactone  12.5 mg Oral Daily       Anticipated Disposition: [facility] [place]    Assessment and Plan by Problem:    Acute exacerbation of CHF (congestive heart failure)     Patient presenting with worsening LE edema, CXR with pulmonary edema, shortness of breath, elevated BNP likely acute on chronic systolic CHF (based on recent ECHO 5/17), likely precipitated by increased fluid intake by patient   -Lasix 40mg IV BID  - NYHA class 3 at baseline  - non-ischemic cardiomyopathy  ECHO 2D showed EF 30-35%, Grade 3 diastolic dysfunction, biatrial enlargement, depressed right sided dysfunction, increase right atrial pressure  -increased diuretics to furosemide IV 80 mg TID   - transitioned to oral furosemide 80 mg TID.   - oral furosemide decreased to 80 mg BID  - monitoring creatinine.  - creatinine increasing; BNP significantly improved.   - Decreased Lasix to 40 mg daily 12/21/2017.  - Creatinine  stable but above baseline, and K+ low; adding low dose aldactone.  - Continuing current management and increasing metoprolol due to tachycardia; checking EKG.  - Cardiology follow up as outpatient.       Primary hypothyroidism     Patient with h/o hypothyroidism  TSH elevated T4 WNL  -Endocrine consulted - levothyroxine 70 mcg IV daily and cytomel 5 mcg BID for one week. Held oral levothyroxine while diuresing.   Patient will be discharged on levothyroxine 100 mcg daily.  Changed IV levothyroxine to oral 12/21/2017 (75 mg per endocrinology recommendations while cytomel course completed)  End date of cytomel 5 mcg BID was 12/23/17; course completed.  Started levothyroxine 100 mcg daily       Stage 4 chronic kidney disease     CANDI on CKD IV , patient with baseline creatinine 2.2  Treated with Na Bicarb 1300 BID initially; now discontinued.  Nephrology follow up as outpatient       Esophagitis LA 3     Significant melena overnight 12/18/17. Baseline Hgb of 10.  Transfused 2 units PRBCs.  GI consulted. EGD 12/19/2017 revealed esophagitis (POA) as likely etiology of bleeding.  Monitoring H&H, appears stable.       Anemia of chronic renal failure, stage 4 (severe)     Stable, follow up outpatient Nephrology       Cognitive impairment     Per family over last months has had issues with memory, forgetting to turn off appliances, getting lost, likely early signs of dementia. Also with agitation (espcially at night) and non-compliance with care.   Patient on seroquel 200 mg qhs and not effective for her current behaviors. Psychiatry consult for medication management. - increased seroquel 250 mg qhs.  -Plan for psychiatry and memory disorder clinic follow up as outpatient. Psychiatry resident to try to place patient in his clinic for Jan 9th 2:00 PM.   -Consider discharge back on 200 mg qhs and cogentin for now.   Her sleep has improved after IV levothyroxine started, but still wakes up confused. Further adjustment will be as  per psychiatrist  -Neuropsych testing as outpatient as ordered  -Continued current management with Seroquel 250 mg QHS which improved delirium.  - Seroquel decreased to 100 mg QHS due to excessive daytime somnolence.       Chronic obstructive pulmonary disease     Per prior PFTs, mild obstructive component, on inhalers at home, no increase in sputum production unlikely COPD exacerbation,  -Duonebs Q6H  -Continue LABA/inhailed corticosteroid       Elevated troponin     Patient with mild elevation in troponin, no recent chest pain, no significant EKG changes, likely type 2 NSTEMI in setting of CHF exacerbation and CKD impairing clearance  -Followed Troponin/EKG; stable.         VTE Risk Mitigation         Ordered     Medium Risk of VTE  Once      12/13/17 2030              Lauren Wolf MD  Department of Hospital Medicine   [facility]

## 2017-12-24 NOTE — ASSESSMENT & PLAN NOTE
Patient with mild elevation in troponin on admission, no recent chest pain, no significant EKG changes, likely type 2 NSTEMI in setting of CHF exacerbation and CKD impairing clearance.

## 2017-12-24 NOTE — NURSING
Pt aaox3. Remains Sinus tach,with afib and asymptomatic. Pt last HR 90. Trace edema noted to BLE. No complaints of SOB. Vital signs stable, bed in lowest position, call bell in reach. Daughter and Son stayed overnight and remain at bedside.

## 2017-12-24 NOTE — SUBJECTIVE & OBJECTIVE
Interval History: Patient with melena overnight, no complaints.   Data Review: Results recorded below were reviewed 12/23/2017.    Review of Systems   Constitutional: Negative for fever.   Respiratory: Negative for shortness of breath.    Psychiatric/Behavioral: Positive for dysphoric mood.     Objective:     Vital Signs (Most Recent):  Temp: 98.7 °F (37.1 °C) (12/23/17 1939)  Pulse: 110 (12/23/17 1939)  Resp: 18 (12/23/17 1939)  BP: 109/72 (12/23/17 1939)  SpO2: 96 % (12/23/17 1939) Vital Signs (24h Range):  Temp:  [98.3 °F (36.8 °C)-99.3 °F (37.4 °C)] 98.7 °F (37.1 °C)  Pulse:  [] 110  Resp:  [15-20] 18  SpO2:  [94 %-99 %] 96 %  BP: (109-150)/(70-81) 109/72     Weight: 79.8 kg (175 lb 14.8 oz)  Body mass index is 28.4 kg/m².    Intake/Output Summary (Last 24 hours) at 12/23/17 2007  Last data filed at 12/23/17 1500   Gross per 24 hour   Intake              700 ml   Output                0 ml   Net              700 ml      Physical Exam   Constitutional: She appears well-developed.   HENT:   Head: Normocephalic.   Mouth/Throat: Mucous membranes are not cyanotic.   Eyes: Conjunctivae and lids are normal.   Neck: Neck supple.   Cardiovascular: S1 normal and S2 normal.    Pulmonary/Chest: Effort normal and breath sounds normal.   Abdominal: Soft. Bowel sounds are normal. There is no tenderness.   Musculoskeletal: She exhibits no edema.   Neurological: She is alert.   Skin: She is not diaphoretic. No cyanosis. No pallor. Nails show no clubbing.   Psychiatric: She has a normal mood and affect. Cognition and memory are impaired.       Significant Labs:     CBC:     Recent Labs  Lab 12/22/17  0457 12/23/17  0356   WBC 4.50 6.05   HGB 9.5* 9.9*   HCT 26.9* 28.6*   * 127*     CMP:     Recent Labs  Lab 12/22/17  0829 12/23/17  0826   * 148*   K 3.4* 3.3*    110   CO2 28 27   GLU 98 105   BUN 78* 63*   CREATININE 3.3* 3.4*   CALCIUM 9.7 9.6   ALBUMIN 3.3* 3.2*   ANIONGAP 12 11   EGFRNONAA 13.3* 12.8*      Cardiac Markers:     Recent Labs  Lab 12/23/17  0356   *     Magnesium:     Recent Labs  Lab 12/22/17  0829 12/23/17  0826   MG 2.5 2.3     TSH:     Recent Labs  Lab 12/15/17  0747   .080*

## 2017-12-25 ENCOUNTER — TELEPHONE (OUTPATIENT)
Dept: ENDOCRINOLOGY | Facility: CLINIC | Age: 72
End: 2017-12-25

## 2017-12-25 DIAGNOSIS — E89.0 POSTABLATIVE HYPOTHYROIDISM: Primary | ICD-10-CM

## 2017-12-25 LAB
ALBUMIN SERPL BCP-MCNC: 3.1 G/DL
ANION GAP SERPL CALC-SCNC: 10 MMOL/L
BASOPHILS # BLD AUTO: 0.01 K/UL
BASOPHILS NFR BLD: 0.2 %
BUN SERPL-MCNC: 53 MG/DL
CALCIUM SERPL-MCNC: 9.1 MG/DL
CHLORIDE SERPL-SCNC: 107 MMOL/L
CO2 SERPL-SCNC: 25 MMOL/L
CREAT SERPL-MCNC: 3.3 MG/DL
DIFFERENTIAL METHOD: ABNORMAL
EOSINOPHIL # BLD AUTO: 0.1 K/UL
EOSINOPHIL NFR BLD: 1.3 %
ERYTHROCYTE [DISTWIDTH] IN BLOOD BY AUTOMATED COUNT: 18 %
EST. GFR  (AFRICAN AMERICAN): 15.4 ML/MIN/1.73 M^2
EST. GFR  (NON AFRICAN AMERICAN): 13.3 ML/MIN/1.73 M^2
GLUCOSE SERPL-MCNC: 126 MG/DL
HCT VFR BLD AUTO: 25.6 %
HGB BLD-MCNC: 9 G/DL
IMM GRANULOCYTES # BLD AUTO: 0.08 K/UL
IMM GRANULOCYTES NFR BLD AUTO: 1.3 %
LYMPHOCYTES # BLD AUTO: 1.3 K/UL
LYMPHOCYTES NFR BLD: 21.1 %
MAGNESIUM SERPL-MCNC: 2.2 MG/DL
MCH RBC QN AUTO: 35.4 PG
MCHC RBC AUTO-ENTMCNC: 35.2 G/DL
MCV RBC AUTO: 101 FL
MONOCYTES # BLD AUTO: 0.8 K/UL
MONOCYTES NFR BLD: 12.5 %
NEUTROPHILS # BLD AUTO: 4 K/UL
NEUTROPHILS NFR BLD: 63.6 %
NRBC BLD-RTO: 0 /100 WBC
PHOSPHATE SERPL-MCNC: 3 MG/DL
PLATELET # BLD AUTO: 122 K/UL
PMV BLD AUTO: 11.8 FL
POTASSIUM SERPL-SCNC: 3.4 MMOL/L
RBC # BLD AUTO: 2.54 M/UL
SODIUM SERPL-SCNC: 142 MMOL/L
WBC # BLD AUTO: 6.22 K/UL

## 2017-12-25 PROCEDURE — 99900035 HC TECH TIME PER 15 MIN (STAT)

## 2017-12-25 PROCEDURE — 83735 ASSAY OF MAGNESIUM: CPT

## 2017-12-25 PROCEDURE — 25000003 PHARM REV CODE 250: Performed by: INTERNAL MEDICINE

## 2017-12-25 PROCEDURE — 94761 N-INVAS EAR/PLS OXIMETRY MLT: CPT

## 2017-12-25 PROCEDURE — 99231 SBSQ HOSP IP/OBS SF/LOW 25: CPT | Mod: ,,, | Performed by: INTERNAL MEDICINE

## 2017-12-25 PROCEDURE — 80069 RENAL FUNCTION PANEL: CPT

## 2017-12-25 PROCEDURE — 85025 COMPLETE CBC W/AUTO DIFF WBC: CPT

## 2017-12-25 PROCEDURE — 94640 AIRWAY INHALATION TREATMENT: CPT

## 2017-12-25 PROCEDURE — 36415 COLL VENOUS BLD VENIPUNCTURE: CPT

## 2017-12-25 PROCEDURE — 25000242 PHARM REV CODE 250 ALT 637 W/ HCPCS: Performed by: INTERNAL MEDICINE

## 2017-12-25 PROCEDURE — 25000003 PHARM REV CODE 250: Performed by: PSYCHIATRY & NEUROLOGY

## 2017-12-25 PROCEDURE — 11000001 HC ACUTE MED/SURG PRIVATE ROOM

## 2017-12-25 PROCEDURE — 94664 DEMO&/EVAL PT USE INHALER: CPT

## 2017-12-25 RX ORDER — POLYETHYLENE GLYCOL 3350 17 G/17G
17 POWDER, FOR SOLUTION ORAL DAILY
Qty: 238 G | Refills: 11 | COMMUNITY
Start: 2017-12-25 | End: 2018-02-07

## 2017-12-25 RX ORDER — SPIRONOLACTONE 25 MG/1
25 TABLET ORAL DAILY
Status: DISCONTINUED | OUTPATIENT
Start: 2017-12-26 | End: 2017-12-26 | Stop reason: HOSPADM

## 2017-12-25 RX ORDER — PSEUDOEPHEDRINE/ACETAMINOPHEN 30MG-500MG
100 TABLET ORAL ONCE
Status: COMPLETED | OUTPATIENT
Start: 2017-12-25 | End: 2017-12-25

## 2017-12-25 RX ORDER — METOPROLOL SUCCINATE 50 MG/1
50 TABLET, EXTENDED RELEASE ORAL DAILY
Qty: 30 TABLET | Refills: 2 | Status: ON HOLD | OUTPATIENT
Start: 2017-12-25 | End: 2018-02-06

## 2017-12-25 RX ORDER — LACTULOSE 10 G/15ML
30 SOLUTION ORAL ONCE
Status: COMPLETED | OUTPATIENT
Start: 2017-12-25 | End: 2017-12-25

## 2017-12-25 RX ORDER — AMOXICILLIN 250 MG
1 CAPSULE ORAL DAILY
Status: ON HOLD | COMMUNITY
Start: 2017-12-25 | End: 2018-04-23

## 2017-12-25 RX ORDER — SPIRONOLACTONE 25 MG/1
25 TABLET ORAL DAILY
Qty: 30 TABLET | Refills: 3 | Status: ON HOLD | OUTPATIENT
Start: 2017-12-25 | End: 2018-02-21 | Stop reason: HOSPADM

## 2017-12-25 RX ORDER — SYRING-NEEDL,DISP,INSUL,0.3 ML 29 G X1/2"
296 SYRINGE, EMPTY DISPOSABLE MISCELLANEOUS ONCE
Status: COMPLETED | OUTPATIENT
Start: 2017-12-25 | End: 2017-12-25

## 2017-12-25 RX ADMIN — ALLOPURINOL 100 MG: 100 TABLET ORAL at 09:12

## 2017-12-25 RX ADMIN — HYDRALAZINE HYDROCHLORIDE AND ISOSORBIDE DINITRATE 2 TABLET: 37.5; 2 TABLET, FILM COATED ORAL at 06:12

## 2017-12-25 RX ADMIN — SODIUM CHLORIDE 500 ML: 900 INJECTION, SOLUTION INTRAVENOUS at 09:12

## 2017-12-25 RX ADMIN — IPRATROPIUM BROMIDE AND ALBUTEROL SULFATE 3 ML: .5; 3 SOLUTION RESPIRATORY (INHALATION) at 09:12

## 2017-12-25 RX ADMIN — POTASSIUM CHLORIDE 40 MEQ: 1500 TABLET, EXTENDED RELEASE ORAL at 08:12

## 2017-12-25 RX ADMIN — ALLOPURINOL 100 MG: 100 TABLET ORAL at 08:12

## 2017-12-25 RX ADMIN — IPRATROPIUM BROMIDE AND ALBUTEROL SULFATE 3 ML: .5; 3 SOLUTION RESPIRATORY (INHALATION) at 08:12

## 2017-12-25 RX ADMIN — IPRATROPIUM BROMIDE AND ALBUTEROL SULFATE 3 ML: .5; 3 SOLUTION RESPIRATORY (INHALATION) at 01:12

## 2017-12-25 RX ADMIN — SPIRONOLACTONE 12.5 MG: 25 TABLET, FILM COATED ORAL at 08:12

## 2017-12-25 RX ADMIN — Medication 100 ML: at 09:12

## 2017-12-25 RX ADMIN — POLYETHYLENE GLYCOL 3350 17 G: 17 POWDER, FOR SOLUTION ORAL at 08:12

## 2017-12-25 RX ADMIN — HYDRALAZINE HYDROCHLORIDE AND ISOSORBIDE DINITRATE 2 TABLET: 37.5; 2 TABLET, FILM COATED ORAL at 02:12

## 2017-12-25 RX ADMIN — LEVOTHYROXINE SODIUM 100 MCG: 100 TABLET ORAL at 05:12

## 2017-12-25 RX ADMIN — LACTULOSE 30 G: 20 SOLUTION ORAL at 08:12

## 2017-12-25 RX ADMIN — PANTOPRAZOLE SODIUM 40 MG: 40 TABLET, DELAYED RELEASE ORAL at 08:12

## 2017-12-25 RX ADMIN — LACTULOSE 30 G: 20 SOLUTION ORAL at 05:12

## 2017-12-25 RX ADMIN — FLUTICASONE FUROATE AND VILANTEROL TRIFENATATE 1 PUFF: 100; 25 POWDER RESPIRATORY (INHALATION) at 08:12

## 2017-12-25 RX ADMIN — FUROSEMIDE 40 MG: 40 TABLET ORAL at 08:12

## 2017-12-25 RX ADMIN — QUETIAPINE FUMARATE 100 MG: 25 TABLET, FILM COATED ORAL at 09:12

## 2017-12-25 RX ADMIN — HYDRALAZINE HYDROCHLORIDE AND ISOSORBIDE DINITRATE 2 TABLET: 37.5; 2 TABLET, FILM COATED ORAL at 09:12

## 2017-12-25 RX ADMIN — MAGESIUM CITRATE 296 ML: 1.75 LIQUID ORAL at 09:12

## 2017-12-25 RX ADMIN — METOPROLOL SUCCINATE 50 MG: 50 TABLET, EXTENDED RELEASE ORAL at 08:12

## 2017-12-25 NOTE — SUBJECTIVE & OBJECTIVE
Interval History: Patient with no events overnight, no complaints.   Data Review: Results recorded below were reviewed 12/25/2017.    Review of Systems   Constitutional: Negative for fever.   Respiratory: Negative for shortness of breath.    Gastrointestinal: Positive for constipation.     Objective:     Vital Signs (Most Recent):  Temp: 98.3 °F (36.8 °C) (12/25/17 1216)  Pulse: 72 (12/25/17 1518)  Resp: 16 (12/25/17 1310)  BP: 136/65 (12/25/17 1216)  SpO2: 98 % (12/25/17 1310) Vital Signs (24h Range):  Temp:  [98.3 °F (36.8 °C)-99.3 °F (37.4 °C)] 98.3 °F (36.8 °C)  Pulse:  [70-84] 72  Resp:  [16-18] 16  SpO2:  [93 %-98 %] 98 %  BP: (115-143)/(56-80) 136/65     Weight: 79.8 kg (175 lb 14.8 oz)  Body mass index is 28.4 kg/m².    Intake/Output Summary (Last 24 hours) at 12/25/17 1631  Last data filed at 12/25/17 1457   Gross per 24 hour   Intake              800 ml   Output                2 ml   Net              798 ml      Physical Exam   Constitutional: She appears well-developed.   HENT:   Head: Normocephalic.   Mouth/Throat: Mucous membranes are not cyanotic.   Eyes: Conjunctivae and lids are normal.   Neck: Neck supple.   Cardiovascular: S1 normal and S2 normal.    Pulmonary/Chest: Effort normal and breath sounds normal.   Abdominal: Soft. Bowel sounds are normal. There is no tenderness.   Musculoskeletal: She exhibits no edema.   Neurological: She is alert.   Skin: She is not diaphoretic. No cyanosis. No pallor. Nails show no clubbing.   Psychiatric: She has a normal mood and affect. Cognition and memory are impaired.       Significant Labs:     CBC:     Recent Labs  Lab 12/24/17  0514 12/25/17  0413   WBC 5.37 6.22   HGB 9.2* 9.0*   HCT 27.0* 25.6*   * 122*     CMP:     Recent Labs  Lab 12/24/17  0829 12/25/17  0857    142   K 3.5 3.4*    107   CO2 27 25   GLU 97 126*   BUN 56* 53*   CREATININE 3.3* 3.3*   CALCIUM 8.8 9.1   ALBUMIN 3.1* 3.1*   ANIONGAP 10 10   EGFRNONAA 13.3* 13.3*      Magnesium:     Recent Labs  Lab 12/24/17  0829 12/25/17  0857   MG 2.2 2.2     TSH:     Recent Labs  Lab 12/24/17  0514   TSH 37.120*

## 2017-12-25 NOTE — SUBJECTIVE & OBJECTIVE
Interval History: Patient with no events overnight, no complaints.   Data Review: Results recorded below were reviewed 12/24/2017.    Review of Systems   Constitutional: Negative for fever.   Respiratory: Negative for shortness of breath.    Psychiatric/Behavioral: Positive for dysphoric mood.     Objective:     Vital Signs (Most Recent):  Temp: 99.3 °F (37.4 °C) (12/24/17 1920)  Pulse: 74 (12/24/17 1920)  Resp: 18 (12/24/17 1920)  BP: (!) 115/56 (12/24/17 1920)  SpO2: (!) 93 % (12/24/17 1920) Vital Signs (24h Range):  Temp:  [97.6 °F (36.4 °C)-99.7 °F (37.6 °C)] 99.3 °F (37.4 °C)  Pulse:  [] 74  Resp:  [14-20] 18  SpO2:  [91 %-97 %] 93 %  BP: (115-137)/(56-83) 115/56     Weight: 79.8 kg (175 lb 14.8 oz)  Body mass index is 28.4 kg/m².    Intake/Output Summary (Last 24 hours) at 12/24/17 2050  Last data filed at 12/24/17 1800   Gross per 24 hour   Intake              200 ml   Output                2 ml   Net              198 ml      Physical Exam   Constitutional: She appears well-developed.   HENT:   Head: Normocephalic.   Mouth/Throat: Mucous membranes are not cyanotic.   Eyes: Conjunctivae and lids are normal.   Neck: Neck supple.   Cardiovascular: S1 normal and S2 normal.    Pulmonary/Chest: Effort normal and breath sounds normal.   Abdominal: Soft. Bowel sounds are normal. There is no tenderness.   Musculoskeletal: She exhibits no edema.   Neurological: She is alert.   Skin: She is not diaphoretic. No cyanosis. No pallor. Nails show no clubbing.   Psychiatric: She has a normal mood and affect. Cognition and memory are impaired.       Significant Labs:     CBC:     Recent Labs  Lab 12/23/17  0356 12/24/17  0514   WBC 6.05 5.37   HGB 9.9* 9.2*   HCT 28.6* 27.0*   * 125*     CMP:     Recent Labs  Lab 12/23/17  0826 12/24/17  0829   * 143   K 3.3* 3.5    106   CO2 27 27    97   BUN 63* 56*   CREATININE 3.4* 3.3*   CALCIUM 9.6 8.8   ALBUMIN 3.2* 3.1*   ANIONGAP 11 10   EGFRNONAA 12.8*  13.3*     Cardiac Markers:     Recent Labs  Lab 12/23/17  0356   *     Magnesium:     Recent Labs  Lab 12/23/17  0826 12/24/17  0829   MG 2.3 2.2     TSH:     Recent Labs  Lab 12/24/17  0514   TSH 37.120*

## 2017-12-25 NOTE — PROGRESS NOTES
[facility]  Endocrinology  Progress Note    Admit Date: 2017     Reason for Consult: Hypothyroidism with heart failure    HPI:   Patient is a 72 y.o. female with a diagnosis of hypothyroidism, HFrEF (30%), Stage IV CKD, Hep C, h/o breast ca s/p mastectomy/chemo 1979, COPD, HTN, Bipolar d/o, anemia of CKD, presents with shortness of breath and leg swelling. She is being managed by hospital medicine for heart failure, and we have been consulted for management of hypothyroidism.    Patient reports she is currently taking levothyroxine 50 mcg daily. She takes it with her other medicines with food in the AM. She reports weight gain, feeling cold a lot, and slow heart rate. She denies depression, constipation, or mental fog (although she was noted to be altered yesterday and at several previous admissions from chart review of outside records). She is a rather poor historian, so history was obtained through review of old records and endocrine notes. She was diagnosed with Hashimoto thyroiditis sometime in . She initially required methimazole, and later was put on levothyroxine 50 then 75 mcg. Due to labile fluctuations in TSH and FT4, she was given 10 mCi I-131 in May, 2016 at [facility]. She was previously followed by [facility] endocrine clinic, then came to [facility] in January to establish care with our clinic. At the  visit, she was on synthroid 50 mcg daily. TSH at the time was 9.954, and increased to 16, 21, then 114 this admission. She is currently on IV levothyroxine 98 mcg daily and cytomel 10 mcg BID. She reports feeling better this AM, and confusion seems to have resolved (unclear if related to thyroid or not).          Interval HPI:   Overnight events: thyroid function improving  Eatin%  Nausea: No  Hypoglycemia and intervention: No  Fever: No  TPN and/or TF: No  If yes, type of TF/TPN and rate: n/a    BP (!) 143/80 (Patient Position: Lying)   Pulse 70   Temp 98.9 °F  "(37.2 °C) (Oral)   Resp 18   Ht 5' 6" (1.676 m)   Wt 79.8 kg (175 lb 14.8 oz)   LMP  (LMP Unknown)   SpO2 (!) 94%   Breastfeeding? No   BMI 28.40 kg/m²       Labs Reviewed and Include      Recent Labs  Lab 12/24/17  0829   GLU 97   CALCIUM 8.8   ALBUMIN 3.1*      K 3.5   CO2 27      BUN 56*   CREATININE 3.3*     Lab Results   Component Value Date    WBC 6.22 12/25/2017    HGB 9.0 (L) 12/25/2017    HCT 25.6 (L) 12/25/2017     (H) 12/25/2017     (L) 12/25/2017       Recent Labs  Lab 12/24/17  0514   TSH 37.120*   FREET4 0.75     Lab Results   Component Value Date    HGBA1C 4.2 (L) 01/11/2017       Nutritional status:   Body mass index is 28.4 kg/m².  Lab Results   Component Value Date    ALBUMIN 3.1 (L) 12/24/2017    ALBUMIN 3.2 (L) 12/23/2017    ALBUMIN 3.3 (L) 12/22/2017     No results found for: PREALBUMIN    Estimated Creatinine Clearance: 16.4 mL/min (based on SCr of 3.3 mg/dL (H)).    Accu-Checks  No results for input(s): POCTGLUCOSE in the last 72 hours.    Current Medications and/or Treatments Impacting Glycemic Control  Immunotherapy:  Immunosuppressants     None        Steroids:   Hormones     None        Pressors:    Autonomic Drugs     None        Hyperglycemia/Diabetes Medications: Antihyperglycemics     None          ASSESSMENT and PLAN    * Acute on chronic combined systolic and diastolic heart failure    Likely further exacerbated by hypothyroidism.  Recommendations as above.        Postablative hypothyroidism    S/p HARE ablation in May, 2016.     tft's improving on repeat 12/24 labs    Continue levothyroxine 100mcg at discharge  Will order repeat tft's in 4 weeks with outpatient endocrinology follow-up    Counseled on need to take levothyroxine first thing in the AM, without food or other medications and wait at least 30 minutes prior to taking other meds or eating.          Will arrange repeat thyroid labs and follow-up. Please call with further questions.    Jax" MD Oscar  Endocrinology  [facility] JONES, Concha Maguire MD,  have personally taken the history and examined the patient and agree with the resident's note as stated above.

## 2017-12-25 NOTE — ASSESSMENT & PLAN NOTE
Patient presenting with worsening LE edema, CXR with pulmonary edema, shortness of breath, elevated BNP likely acute on chronic systolic CHF (based on recent ECHO 5/17), likely precipitated by increased fluid intake by patient   - Lasix 40mg IV BID  - NYHA class 3 at baseline  - non-ischemic cardiomyopathy  - compensated

## 2017-12-25 NOTE — SUBJECTIVE & OBJECTIVE
"Interval HPI:   Overnight events: thyroid function improving  Eatin%  Nausea: No  Hypoglycemia and intervention: No  Fever: No  TPN and/or TF: No  If yes, type of TF/TPN and rate: n/a    BP (!) 143/80 (Patient Position: Lying)   Pulse 70   Temp 98.9 °F (37.2 °C) (Oral)   Resp 18   Ht 5' 6" (1.676 m)   Wt 79.8 kg (175 lb 14.8 oz)   LMP  (LMP Unknown)   SpO2 (!) 94%   Breastfeeding? No   BMI 28.40 kg/m²     Labs Reviewed and Include      Recent Labs  Lab 17  0829   GLU 97   CALCIUM 8.8   ALBUMIN 3.1*      K 3.5   CO2 27      BUN 56*   CREATININE 3.3*     Lab Results   Component Value Date    WBC 6.22 2017    HGB 9.0 (L) 2017    HCT 25.6 (L) 2017     (H) 2017     (L) 2017       Recent Labs  Lab 17  0514   TSH 37.120*   FREET4 0.75     Lab Results   Component Value Date    HGBA1C 4.2 (L) 2017       Nutritional status:   Body mass index is 28.4 kg/m².  Lab Results   Component Value Date    ALBUMIN 3.1 (L) 2017    ALBUMIN 3.2 (L) 2017    ALBUMIN 3.3 (L) 2017     No results found for: PREALBUMIN    Estimated Creatinine Clearance: 16.4 mL/min (based on SCr of 3.3 mg/dL (H)).    Accu-Checks  No results for input(s): POCTGLUCOSE in the last 72 hours.    Current Medications and/or Treatments Impacting Glycemic Control  Immunotherapy:  Immunosuppressants     None        Steroids:   Hormones     None        Pressors:    Autonomic Drugs     None        Hyperglycemia/Diabetes Medications: Antihyperglycemics     None        "

## 2017-12-25 NOTE — ASSESSMENT & PLAN NOTE
Patient with h/o hypothyroidism, last TSH/T4 early 2017, TSH elevated T4 WNL  -Rechecked TSH/T4 -- TSH improved, T4 normal.

## 2017-12-25 NOTE — PLAN OF CARE
Significant Labs:     BMP:   Recent Labs  Lab 12/25/17  0857   *      K 3.4*      CO2 25   BUN 53*   CREATININE 3.3*   CALCIUM 9.1   MG 2.2     CBC:   Recent Labs  Lab 12/24/17  0514 12/25/17  0413   WBC 5.37 6.22   HGB 9.2* 9.0*   HCT 27.0* 25.6*   * 122*     CMP:   Recent Labs  Lab 12/24/17  0829 12/25/17  0857    142   K 3.5 3.4*    107   CO2 27 25   GLU 97 126*   BUN 56* 53*   CREATININE 3.3* 3.3*   CALCIUM 8.8 9.1   ALBUMIN 3.1* 3.1*   ANIONGAP 10 10   EGFRNONAA 13.3* 13.3*      Ref. Range 1/25/2017 11:44 3/8/2017 19:59 3/9/2017 10:02 4/4/2017 10:29 4/10/2017 04:03 12/15/2017 07:47 12/24/2017 05:14   TSH Latest Ref Range: 0.400 - 4.000 uIU/mL 9.954 (H) 16.692 (H)  21.939 (H)  114.080 (H) 37.120 (H)   T4 Total Latest Ref Range: 4.5 - 11.5 ug/dL      <3.0 (L) 4.9   Free T4 Latest Ref Range: 0.71 - 1.51 ng/dL 1.06 1.10  0.92  0.50 (L) 0.75   PTH Latest Ref Range: 9.0 - 77.0 pg/mL     134.0 (H)        Ref. Range 10/10/2017 11:55 10/24/2017 10:55 11/16/2017 10:47 12/13/2017 11:05   Iron Latest Ref Range: 30 - 160 ug/dL 55 40 66 115   TIBC Latest Ref Range: 250 - 450 ug/dL 456 (H) 414 447 441   Saturated Iron Latest Ref Range: 20 - 50 % 12 (L) 10 (L) 15 (L) 26   Transferrin Latest Ref Range: 200 - 375 mg/dL 308 280 302 298   Ferritin Latest Ref Range: 20.0 - 300.0 ng/mL 86         Ref. Range 3/11/2017 04:12   Cholesterol Latest Ref Range: 120 - 199 mg/dL 103 (L)   HDL Latest Ref Range: 40 - 75 mg/dL 38 (L)   LDL Cholesterol Latest Ref Range: 63.0 - 159.0 mg/dL 57.0 (L)   Total Cholesterol/HDL Ratio Latest Ref Range: 2.0 - 5.0  2.7   Triglycerides Latest Ref Range: 30 - 150 mg/dL 40      Ref. Range 4/9/2017 05:15 12/13/2017 16:15 12/14/2017 14:17 12/19/2017 07:40 12/21/2017 04:45 12/23/2017 03:56   Troponin I Latest Ref Range: 0.000 - 0.026 ng/mL  0.060 (H) 0.058 (H)      BNP Latest Ref Range: 0 - 99 pg/mL 693 (H) 4,241 (H)  1,585 (H) 330 (H) 670 (H)      Ref. Range 4/11/2017  04:11   Vit D, 25-Hydroxy Latest Ref Range: 30 - 96 ng/mL 30      Ref. Range 1/11/2017 14:18   Hepatitis C Ab Unknown Positive (A)      Ref. Range 12/14/2017 04:41   Specimen UA Unknown Urine, Clean Catch   Color, UA Latest Ref Range: Yellow, Straw, Lisa  Yellow   pH, UA Latest Ref Range: 5.0 - 8.0  7.0   Specific Gravity, UA Latest Ref Range: 1.005 - 1.030  1.010   Appearance, UA Latest Ref Range: Clear  Clear   Protein, UA Latest Ref Range: Negative  2+ (A)   Glucose, UA Latest Ref Range: Negative  Negative   Ketones, UA Latest Ref Range: Negative  Negative   Occult Blood UA Latest Ref Range: Negative  Negative   Nitrite, UA Latest Ref Range: Negative  Negative   Urobilinogen, UA Latest Ref Range: <2.0 EU/dL 4.0   Bilirubin (UA) Latest Ref Range: Negative  Negative   Leukocytes, UA Latest Ref Range: Negative  Negative   RBC, UA Latest Ref Range: 0 - 4 /hpf 0   WBC, UA Latest Ref Range: 0 - 5 /hpf 0   Bacteria, UA Latest Ref Range: None-Occ /hpf None   Squam Epithel, UA Latest Units: /hpf 0   Hyaline Casts, UA Latest Ref Range: 0-1/lpf /lpf 1

## 2017-12-25 NOTE — NURSING
Remains aaox3. Vitals signs stable. Daughter remains at bedside. Daughter requested [employer] camera to be turned on privacy and stated she would inform staff if she leaves the room. Bed in lowest position, alarm on.

## 2017-12-25 NOTE — DISCHARGE SUMMARY
"[facility] Hospital Medicine  Discharge Summary      Patient Name: Anai Sal  MRN: [id number]  Admission Date: 12/13/2017  Hospital Length of Stay: 12 days  Discharge Date and Time: 12/26/2017  7:31 AM  Attending Physician: Lauren Wolf MD   Discharging Provider: Lauren Wolf MD  Primary Care Provider: Live Young MD (Inactive)  Hospital Medicine Team: [facility] HOSP MED D Lauren Wolf MD    HPI:   "72F h/o HFrEF (30%), Stage IV CKD, Hep C, h/o breast ca s/p mastectomy/chemo 1979, COPD, HTN, Bipolar d/o, anemia of CKD, presenting with worsening dyspnea for last few days with cough. Patient usually ambulatory with walker, over last few days exercise tolerance worsened to few steps before feeling short of breath, denies chest pain with dyspnea. Pt accompanied by daughter who reports worsening swelling of legs recently, ?orthopnea, reporting PND. Pt has been taking increased fluids lately as she has been feeling thirsty and daughter has difficult time controlling patient intake. Denies F/C/N/V, cough nonproductive. Has been taking Lasix 20mg BID, reports between 7-8 lb weight gain in last week."     Procedure(s) (LRB):  ESOPHAGOGASTRODUODENOSCOPY (EGD) (N/A)      Hospital Course:     Acute exacerbation of CHF (congestive heart failure)     Patient presenting with worsening LE edema, CXR with pulmonary edema, shortness of breath, elevated BNP likely acute on chronic systolic CHF (based on recent ECHO 5/17), likely precipitated by increased fluid intake by patient   - Lasix 40mg IV BID  - NYHA class 3 at baseline  - non-ischemic cardiomyopathy  ECHO 2D showed EF 30-35%, Grade 3 diastolic dysfunction, biatrial enlargement, depressed right sided dysfunction, increase right atrial pressure  -increased diuretics to furosemide IV 80 mg TID   - transitioned to oral furosemide 80 mg TID.   - oral furosemide decreased to 80 mg BID  - monitoring creatinine.  - creatinine increasing; BNP " significantly improved.   - Decreased Lasix to 40 mg daily 12/21/2017.  - Creatinine stable but above baseline, and K+ low; adding low dose aldactone.  - Continuing current management and increasing metoprolol due to tachycardia; checked EKG.  - patient in A-fib, rate control  - Continuing Lasix and aldactone; K+ persistently low. Changed renal diet restriction to low phos.  - Cardiology follow up as outpatient.       Paroxysmal atrial fibrilation      Currently not anticoagulated. DNR, dementia, recent GI bleed.     Rate control with metoprolol.       Primary hypothyroidism     Patient with h/o hypothyroidism  TSH elevated T4 WNL  -Endocrine consulted - levothyroxine 70 mcg IV daily and cytomel 5 mcg BID for one week. Held oral levothyroxine while diuresing.   Patient will be discharged on levothyroxine 100 mcg daily.  Changed IV levothyroxine to oral 12/21/2017 (75 mg per endocrinology recommendations while cytomel course completed)  End date of cytomel 5 mcg BID was 12/23/17; course completed.  Started levothyroxine 100 mcg daily  TSH acceptable and T4 normal  Follow up with Endocrinology       Stage 4 chronic kidney disease     CANDI on CKD IV , patient with baseline creatinine 2.2  Treated with Na Bicarb 1300 BID initially; now discontinued.  Nephrology follow up as outpatient       Esophagitis LA 3     Significant melena overnight 12/18/17. Baseline Hgb of 10.  Transfused 2 units PRBCs.  GI consulted. EGD 12/19/2017 revealed esophagitis (POA) as likely etiology of bleeding.  Monitoring H&H, appears stable.       Anemia of chronic renal failure, stage 4 (severe)     Stable, follow up outpatient Nephrology       Cognitive impairment     Per family over last months has had issues with memory, forgetting to turn off appliances, getting lost, likely early signs of dementia. Also with agitation (espcially at night) and non-compliance with care.   Patient on seroquel 200 mg qhs and not effective for her current  behaviors. Psychiatry consult for medication management. - increased seroquel 250 mg qhs.  -Plan for psychiatry and memory disorder clinic follow up as outpatient. Psychiatry resident to try to place patient in his clinic for Jan 9th 2:00 PM.   -Consider discharge back on 200 mg qhs and cogentin for now.   Her sleep has improved after IV levothyroxine started, but still wakes up confused. Further adjustment will be as per psychiatrist  -Neuropsych testing as outpatient as ordered  -Continued current management with Seroquel 250 mg QHS which improved delirium.  - Seroquel decreased to 100 mg QHS due to excessive daytime somnolence. Cogentin discontinued.       Chronic obstructive pulmonary disease     Per prior PFTs, mild obstructive component, on inhalers at home, no increase in sputum production unlikely COPD exacerbation,  -Duonebs Q6H  -Continue LABA/inhailed corticosteroid       Elevated troponin     Patient with mild elevation in troponin, no recent chest pain, no significant EKG changes, likely type 2 NSTEMI in setting of CHF exacerbation and CKD impairing clearance  -Followed Troponin/EKG; stable.     Consults:   Consults         Status Ordering Provider     Inpatient consult to Cardiology  Once     Provider:  (Not yet assigned)    Completed ISAÍAS MELCHOR     Inpatient consult to Endocrinology  Once     Provider:  (Not yet assigned)    Completed ISAÍAS MELCHOR     Inpatient consult to Gastroenterology  Once     Provider:  (Not yet assigned)    Completed MEGHANN GILBERT     Inpatient consult to Palliative Care  Once     Provider:  (Not yet assigned)    Completed MEGHANN GILBERT     Inpatient consult to Psychiatry  Once     Provider:  (Not yet assigned)    Completed ISAÍAS MELCHOR     Inpatient consult to [facility]  Once     Provider:  (Not yet assigned)    Completed MEGHANN GILBERT        Final Active Diagnoses:    Diagnosis Date Noted POA    PRINCIPAL PROBLEM:  Acute on chronic combined systolic and  diastolic heart failure [I50.43] 12/16/2016 Yes    Postablative hypothyroidism [E89.0] 12/16/2016 Yes    Stage 4 chronic kidney disease [N18.4] 11/11/2016 Yes    Esophagitis, [place] grade C [K20.8] 12/19/2017 Yes    Anemia of chronic renal failure, stage 4 (severe) [N18.4, D63.1] 05/10/2017 Yes    Paroxysmal atrial fibrillation [I48.0] 04/08/2017 Yes    Essential hypertension [I10] 12/16/2016 Yes    Chronic obstructive pulmonary disease [J44.9] 04/04/2017 Yes    Major neurocognitive disorder [F03.90] 12/16/2017 Yes    Cognitive impairment [R41.89] 12/13/2017 Yes    Delirium due to another medical condition [F05] 04/04/2017 Yes    Insomnia [G47.00] 09/30/2016 Yes    Elevated troponin [R74.8]  Yes    Palliative care encounter [Z51.5] 12/19/2017 Not Applicable    History of breast cancer [Z85.3] 02/16/2017 Not Applicable      Problems Resolved During this Admission:    Diagnosis Date Noted Date Resolved POA       Discharged Condition: stable    Disposition: Home-Health Care [facility] with PACE    Follow Up:  Follow-up Information     [facility] - Psychiatry. Schedule an appointment as soon as possible for a visit in 1 week.    Specialty:  Psychiatry  Why:  To establish care, As previously planned  Contact information:  Heydi Jolley  [facility] [address] [phone]  Additional information:  [facility] - 4th Floor           Call [facility].    Specialty:  Physical Therapy  Why:  For discharge from hospital follow up  Contact information:  [facility] [address]  [phone]             Schedule an appointment as soon as possible for a visit with [facility] CARDIOLOGY.    Specialty:  Cardiology  Why:  For discharge from hospital follow up  Contact information:  Heydi Jolley  [facility] [id number] [phone]           [facility] ENDOCRINOLOGY. Schedule an appointment as soon as possible for a visit in 1 month.    Specialty:   "Endocrinology  Why:  For discharge from hospital follow up  Contact information:  Heydi Jolley  [facility] [id number] [phone]           Schedule an appointment as soon as possible for a visit with [facility] NEPHROLOGY.    Specialty:  Nephrology  Why:  For discharge from hospital follow up  Contact information:  Heydi Jolley  [facility] [id number] [phone]           [facility] GASTROENTEROLOGY. Schedule an appointment as soon as possible for a visit in 2 months.    Specialty:  Gastroenterology  Why:  For discharge from hospital follow up  Contact information:  Heydi [address] cathi  [facility] [id number] [phone]               Patient Instructions:     COMMODE FOR HOME USE   Order Comments: Pt will d/c to home and will need bedside commode.  Pt has PACE.  Call Della [phone] please   Order Specific Question Answer Comments   Type: Standard    Height: 5' 6" (1.676 m)    Weight: 79.8 kg (175 lb 14.8 oz)    Does patient have medical equipment at home? bath bench    Does patient have medical equipment at home? grab bar    Does patient have medical equipment at home? cane, quad    Does patient have medical equipment at home? walker, rolling    Length of need (1-99 months): 99      Ambulatory consult to Sleep Disorders   Referral Priority: Routine Referral Type: Consultation   Requested Specialty: Sleep Medicine    Number of Visits Requested: 1      Ambulatory consult to Psychiatry   Referral Priority: Routine Referral Type: Psychiatric   Referral Reason: Specialty Services Required    Requested Specialty: Psychiatry    Number of Visits Requested: 1      Ambulatory consult to Neuropsychology   Referral Priority: Routine Referral Type: Psychiatric   Referral Reason: Specialty Services Required    Requested Specialty: Psychology    Number of Visits Requested: 1      Ambulatory Referral to Cardiology   Referral Priority: Routine Referral Type: Consultation   Referral Reason: Specialty " Services Required    Requested Specialty: Cardiology    Number of Visits Requested: 1      Ambulatory referral to Outpatient Case Management   Referral Priority: Routine Referral Type: Consultation   Referral Reason: Specialty Services Required    Number of Visits Requested: 1      Ambulatory Referral to Endocrinology   Referral Priority: Routine Referral Type: Consultation   Requested Specialty: Endocrinology    Number of Visits Requested: 1      Ambulatory Referral to Nephrology   Referral Priority: Routine Referral Type: Consultation   Referral Reason: Specialty Services Required    Requested Specialty: Nephrology    Number of Visits Requested: 1      Ambulatory Referral to Gastroenterology   Referral Priority: Routine Referral Type: Consultation   Referral Reason: Specialty Services Required    Requested Specialty: Gastroenterology    Number of Visits Requested: 1      Diet Adult Regular   Order Specific Question Answer Comments   Na restriction, if any: 2gNa    Fluid restriction: Fluid - 1500mL    Additional restrictions: Cardiac (Low Na/Chol)    Additional restrictions: Low Phosphorus      Activity as tolerated     Call MD for:  temperature >100.4     Call MD for:  persistent nausea and vomiting or diarrhea     Call MD for:  difficulty breathing or increased cough     Call MD for:  persistent dizziness, light-headedness, or visual disturbances     Call MD for:  increased confusion or weakness       Significant Diagnostic Studies:    EF   Date Value Ref Range Status   12/14/2017 30 (A) 55 - 65    05/15/2017 30 (A) 55 - 65      Echocardiogram: 2D echo with color flow doppler:   Results for orders placed or performed during the hospital encounter of 05/15/17   2D Echo w/ Color Flow Doppler   Result Value Ref Range    EF 30 (A) 55 - 65    Mitral Valve Regurgitation SEVERE (A)     Est. PA Systolic Pressure 52.45 (A)     Tricuspid Valve Regurgitation MODERATE (A)      CBC:   Recent Labs  Lab 12/25/17  0413   WBC  6.22   RBC 2.54*   HGB 9.0*   HCT 25.6*   *   *   MCH 35.4*   MCHC 35.2     CMP:   Recent Labs  Lab 12/25/17  0857   *   CALCIUM 9.1   ALBUMIN 3.1*      K 3.4*   CO2 25      BUN 53*   CREATININE 3.3*     Coagulation:   Recent Labs  Lab 12/19/17  0740   LABPROT 14.4*   INR 1.4*   APTT 36.2*     Imaging Results          X-Ray Chest PA And Lateral (Final result)  Result time 12/13/17 18:39:59    Final result by Gildardo Abraham III, MD (12/13/17 18:39:59)                 Impression:     Worsening CHF.      Electronically signed by: GILDARDO ABRAHAM MD  Date:     12/13/17  Time:    18:39              Narrative:    2 views: There is cardiomegaly, edema, pleural fluid, aortic plaque, and DJD. Lungs appear slightly worse.                            Pending Diagnostic Studies:     None         Medications:  Reconciled Home Medications:   Current Discharge Medication List      START taking these medications    Details   pantoprazole (PROTONIX) 40 MG tablet Take 1 tablet (40 mg total) by mouth once daily.  Qty: 30 tablet, Refills: 11      polyethylene glycol (GLYCOLAX) 17 gram/dose powder Take 17 g by mouth once daily.  Qty: 238 g, Refills: 11      senna-docusate 8.6-50 mg (PERICOLACE) 8.6-50 mg per tablet Take 1 tablet by mouth once daily.      spironolactone (ALDACTONE) 25 MG tablet Take 1 tablet (25 mg total) by mouth once daily.  Qty: 30 tablet, Refills: 3         CONTINUE these medications which have CHANGED    Details   furosemide (LASIX) 40 MG tablet Take 1 tablet (40 mg total) by mouth once daily.  Qty: 30 tablet, Refills: 11    Associated Diagnoses: CKD (chronic kidney disease) stage 3, GFR 30-59 ml/min; Proteinuria, unspecified type; Edema, unspecified type      gabapentin (NEURONTIN) 100 MG capsule Take 1 capsule (100 mg total) by mouth 3 (three) times daily as needed (for nerve pain).  Qty: 30 capsule, Refills: 11      levothyroxine (SYNTHROID) 100 MCG tablet Take 1 tablet (100 mcg  total) by mouth before breakfast.  Qty: 30 tablet, Refills: 11      metoprolol succinate (TOPROL-XL) 50 MG 24 hr tablet Take 1 tablet (50 mg total) by mouth once daily.  Qty: 30 tablet, Refills: 2      QUEtiapine (SEROQUEL) 100 MG Tab Take 1 tablet (100 mg total) by mouth nightly.  Qty: 30 tablet, Refills: 11         CONTINUE these medications which have NOT CHANGED    Details   allopurinol (ZYLOPRIM) 100 MG tablet Take 100 mg by mouth 2 (two) times daily.       aspirin 81 MG Chew Take 1 tablet (81 mg total) by mouth once daily.  Refills: 0      diclofenac sodium 1 % Gel Apply 4 g topically 4 (four) times daily. Prn pain      fluticasone-salmeterol 250-50 mcg/dose (ADVAIR) 250-50 mcg/dose diskus inhaler Inhale 1 puff into the lungs 2 (two) times daily. Controller  Refills: 0      isosorbide-hydrALAZINE 20-37.5 mg (BIDIL) 20-37.5 mg Tab Take 2 tablets by mouth 3 (three) times daily.  Qty: 180 tablet, Refills: 2      tiotropium (SPIRIVA) 18 mcg inhalation capsule Inhale 1 capsule (18 mcg total) into the lungs once daily.  Qty: 90 capsule, Refills: 3      fluticasone (FLONASE) 50 mcg/actuation nasal spray 2 sprays by Each Nare route once daily.  Qty: 1 Bottle, Refills: 3         STOP taking these medications       melatonin 3 mg Tab Comments:   Reason for Stopping:         sodium bicarbonate 650 MG tablet Comments:   Reason for Stopping:         benztropine (COGENTIN) 0.5 MG tablet Comments:   Reason for Stopping:         betamethasone valerate 0.1% (VALISONE) 0.1 % Lotn Comments:   Reason for Stopping:         guaifenesin (MUCINEX) 600 mg 12 hr tablet Comments:   Reason for Stopping:               Indwelling Lines/Drains at time of discharge:   Lines/Drains/Airways          No matching active lines, drains, or airways          Time spent on the discharge of patient: 50 minutes on 12/25/17.  Patient was seen and examined on 12/24/17 and determined to be suitable for discharge. Daughter was unable to take her home until  early AM of 12/25/2017.      Lauren Wolf MD  Department of Hospital Medicine  [facility]

## 2017-12-25 NOTE — PROGRESS NOTES
"[facility] Hospital Medicine  Progress Note    Patient Name: Anai Sal  MRN: [id number]  Patient Class: IP- Inpatient   Admission Date: 12/13/2017  Length of Stay: 12 days  Attending Physician: Lauren Wolf MD  Primary Care Provider: Live Young MD (Inactive)    [facility] Medicine Team: [facility] MED D Lauren Wolf MD    Subjective:     Principal Problem:Acute on chronic combined systolic and diastolic heart failure    HPI:  "72F h/o HFrEF (30%), Stage IV CKD, Hep C, h/o breast ca s/p mastectomy/chemo 1979, COPD, HTN, Bipolar d/o, anemia of CKD, presenting with worsening dyspnea for last few days with cough. Patient usually ambulatory with walker, over last few days exercise tolerance worsened to few steps before feeling short of breath, denies chest pain with dyspnea. Pt accompanied by daughter who reports worsening swelling of legs recently, ?orthopnea, reporting PND. Pt has been taking increased fluids lately as she has been feeling thirsty and daughter has difficult time controlling patient intake. Denies F/C/N/V, cough nonproductive. Has been taking Lasix 20mg BID, reports between 7-8 lb weight gain in last week."     Hospital Course:  No notes on file    Interval History: Patient with no events overnight, no complaints.   Data Review: Results recorded below were reviewed 12/25/2017.    Review of Systems   Constitutional: Negative for fever.   Respiratory: Negative for shortness of breath.    Gastrointestinal: Positive for constipation.     Objective:     Vital Signs (Most Recent):  Temp: 98.3 °F (36.8 °C) (12/25/17 1216)  Pulse: 72 (12/25/17 1518)  Resp: 16 (12/25/17 1310)  BP: 136/65 (12/25/17 1216)  SpO2: 98 % (12/25/17 1310) Vital Signs (24h Range):  Temp:  [98.3 °F (36.8 °C)-99.3 °F (37.4 °C)] 98.3 °F (36.8 °C)  Pulse:  [70-84] 72  Resp:  [16-18] 16  SpO2:  [93 %-98 %] 98 %  BP: (115-143)/(56-80) 136/65     Weight: 79.8 kg (175 lb 14.8 oz)  Body mass index is 28.4 " kg/m².    Intake/Output Summary (Last 24 hours) at 12/25/17 1631  Last data filed at 12/25/17 1457   Gross per 24 hour   Intake              800 ml   Output                2 ml   Net              798 ml      Physical Exam   Constitutional: She appears well-developed.   HENT:   Head: Normocephalic.   Mouth/Throat: Mucous membranes are not cyanotic.   Eyes: Conjunctivae and lids are normal.   Neck: Neck supple.   Cardiovascular: S1 normal and S2 normal.    Pulmonary/Chest: Effort normal and breath sounds normal.   Abdominal: Soft. Bowel sounds are normal. There is no tenderness.   Musculoskeletal: She exhibits no edema.   Neurological: She is alert.   Skin: She is not diaphoretic. No cyanosis. No pallor. Nails show no clubbing.   Psychiatric: She has a normal mood and affect. Cognition and memory are impaired.       Significant Labs:     CBC:     Recent Labs  Lab 12/24/17  0514 12/25/17  0413   WBC 5.37 6.22   HGB 9.2* 9.0*   HCT 27.0* 25.6*   * 122*     CMP:     Recent Labs  Lab 12/24/17  0829 12/25/17  0857    142   K 3.5 3.4*    107   CO2 27 25   GLU 97 126*   BUN 56* 53*   CREATININE 3.3* 3.3*   CALCIUM 8.8 9.1   ALBUMIN 3.1* 3.1*   ANIONGAP 10 10   EGFRNONAA 13.3* 13.3*     Magnesium:     Recent Labs  Lab 12/24/17  0829 12/25/17  0857   MG 2.2 2.2     TSH:     Recent Labs  Lab 12/24/17  0514   TSH 37.120*     Assessment/Plan:      Current Hospital Problem List:    Active Hospital Problems    Diagnosis  POA    *Acute on chronic combined systolic and diastolic heart failure [I50.43]  Yes     Priority: 1 - High    Postablative hypothyroidism [E89.0]  Yes     Priority: 2     Stage 4 chronic kidney disease [N18.4]  Yes     Priority: 3     Esophagitis, [place] grade C [K20.8]  Yes     Priority: 4     Anemia of chronic renal failure, stage 4 (severe) [N18.4, D63.1]  Yes     Priority: 4     Paroxysmal atrial fibrillation [I48.0]  Yes     Priority: 5     Essential hypertension [I10]  Yes      Priority: 6     Chronic obstructive pulmonary disease [J44.9]  Yes     Priority: 7     Major neurocognitive disorder [F03.90]  Yes     Priority: 8     Cognitive impairment [R41.89]  Yes     Priority: 8     Delirium due to another medical condition [F05]  Yes     Priority: 8     Insomnia [G47.00]  Yes     Priority: 8     Elevated troponin [R74.8]  Yes     Priority: 9     Palliative care encounter [Z51.5]  Not Applicable    History of breast cancer [Z85.3]  Not Applicable      Resolved Hospital Problems    Diagnosis Date Resolved POA   No resolved problems to display.       Ordered Medications for management of current problems:    albuterol-ipratropium 2.5mg-0.5mg/3mL  3 mL Nebulization Q6H WAKE    allopurinol  100 mg Oral BID    fluticasone-vilanterol  1 puff Inhalation Daily    furosemide  40 mg Oral Daily    isosorbide-hydrALAZINE 20-37.5 mg  2 tablet Oral TID    levothyroxine  100 mcg Oral Before breakfast    metoprolol succinate  50 mg Oral Daily    pantoprazole  40 mg Oral Daily    polyethylene glycol  17 g Oral BID    QUEtiapine  100 mg Oral Nightly    [START ON 12/26/2017] spironolactone  25 mg Oral Daily       Anticipated Disposition: [place]Health Care [facility]    Assessment and Plan by Problem:    Acute exacerbation of CHF (congestive heart failure)     Patient presenting with worsening LE edema, CXR with pulmonary edema, shortness of breath, elevated BNP likely acute on chronic systolic CHF (based on recent ECHO 5/17), likely precipitated by increased fluid intake by patient   - Lasix 40mg IV BID  - NYHA class 3 at baseline  - non-ischemic cardiomyopathy  ECHO 2D showed EF 30-35%, Grade 3 diastolic dysfunction, biatrial enlargement, depressed right sided dysfunction, increase right atrial pressure  -increased diuretics to furosemide IV 80 mg TID   - transitioned to oral furosemide 80 mg TID.   - oral furosemide decreased to 80 mg BID  - monitoring creatinine.  - creatinine increasing; BNP  significantly improved.   - Decreased Lasix to 40 mg daily 12/21/2017.  - Creatinine stable but above baseline, and K+ low; adding low dose aldactone.  - Continuing current management and increasing metoprolol due to tachycardia; checked EKG.  - patient in A-fib, rate control  - Continuing Lasix and aldactone; K+ persistently low. Changed renal diet restriction to low phos.  - Cardiology follow up as outpatient.       Paroxysmal atrial fibrilation      Currently not anticoagulated. DNR, dementia, recent GI bleed.     Rate control with metoprolol.       Primary hypothyroidism     Patient with h/o hypothyroidism  TSH elevated T4 WNL  -Endocrine consulted - levothyroxine 70 mcg IV daily and cytomel 5 mcg BID for one week. Held oral levothyroxine while diuresing.   Patient will be discharged on levothyroxine 100 mcg daily.  Changed IV levothyroxine to oral 12/21/2017 (75 mg per endocrinology recommendations while cytomel course completed)  End date of cytomel 5 mcg BID was 12/23/17; course completed.  Started levothyroxine 100 mcg daily  TSH acceptable and T4 normal  Follow up with Endocrinology       Stage 4 chronic kidney disease     CANDI on CKD IV , patient with baseline creatinine 2.2  Treated with Na Bicarb 1300 BID initially; now discontinued.  Nephrology follow up as outpatient       Esophagitis LA 3     Significant melena overnight 12/18/17. Baseline Hgb of 10.  Transfused 2 units PRBCs.  GI consulted. EGD 12/19/2017 revealed esophagitis (POA) as likely etiology of bleeding.  Monitoring H&H, appears stable.       Anemia of chronic renal failure, stage 4 (severe)     Stable, follow up outpatient Nephrology       Cognitive impairment     Per family over last months has had issues with memory, forgetting to turn off appliances, getting lost, likely early signs of dementia. Also with agitation (espcially at night) and non-compliance with care.   Patient on seroquel 200 mg qhs and not effective for her current  behaviors. Psychiatry consult for medication management. - increased seroquel 250 mg qhs.  -Plan for psychiatry and memory disorder clinic follow up as outpatient. Psychiatry resident to try to place patient in his clinic for Jan 9th 2:00 PM.   -Consider discharge back on 200 mg qhs and cogentin for now.   Her sleep has improved after IV levothyroxine started, but still wakes up confused. Further adjustment will be as per psychiatrist  -Neuropsych testing as outpatient as ordered  -Continued current management with Seroquel 250 mg QHS which improved delirium.  - Seroquel decreased to 100 mg QHS due to excessive daytime somnolence. Cogentin discontinued.       Chronic obstructive pulmonary disease     Per prior PFTs, mild obstructive component, on inhalers at home, no increase in sputum production unlikely COPD exacerbation,  -Duonebs Q6H  -Continue LABA/inhailed corticosteroid       Elevated troponin     Patient with mild elevation in troponin, no recent chest pain, no significant EKG changes, likely type 2 NSTEMI in setting of CHF exacerbation and CKD impairing clearance  -Followed Troponin/EKG; stable.        VTE Risk Mitigation         Ordered     Medium Risk of VTE  Once      12/13/17 2030              Lauren Wolf MD  Department of Hospital Medicine   [facility]

## 2017-12-25 NOTE — PLAN OF CARE
[facility]    HOME HEALTH ORDERS  FACE TO FACE ENCOUNTER    Patient Name: Anai Sal  YOB: 1945    PCP: Live Young MD (Inactive)   PCP Address: [address] [place] [id number]  PCP Phone Number: [phone]  PCP Fax: [phone]    Encounter Date: 12/25/2017    Admit to Home Health    Diagnoses:  Active Hospital Problems    Diagnosis  POA    *Acute on chronic combined systolic and diastolic heart failure [I50.43]  Yes     Priority: 1 - High    Postablative hypothyroidism [E89.0]  Yes     Priority: 2     Stage 4 chronic kidney disease [N18.4]  Yes     Priority: 3     Esophagitis, [place] grade C [K20.8]  Yes     Priority: 4     Anemia of chronic renal failure, stage 4 (severe) [N18.4, D63.1]  Yes     Priority: 4     Paroxysmal atrial fibrillation [I48.0]  Yes     Priority: 5     Essential hypertension [I10]  Yes     Priority: 6     Chronic obstructive pulmonary disease [J44.9]  Yes     Priority: 7     Major neurocognitive disorder [F03.90]  Yes     Priority: 8     Cognitive impairment [R41.89]  Yes     Priority: 8     Delirium due to another medical condition [F05]  Yes     Priority: 8     Insomnia [G47.00]  Yes     Priority: 8     Elevated troponin [R74.8]  Yes     Priority: 9     Palliative care encounter [Z51.5]  Not Applicable    History of breast cancer [Z85.3]  Not Applicable      Resolved Hospital Problems    Diagnosis Date Resolved POA   No resolved problems to display.       Future Appointments  Date Time Provider Department Center   12/27/2017 10:30 AM LAB, APPOINTMENT [facility] LAB VNP [facility]   12/27/2017 11:30 AM EPO, INJECTION [facility] ID INF [facility]     Follow-up Information     [facility] - Psychiatry. Schedule an appointment as soon as possible for a visit in 1 week.    Specialty:  Psychiatry  Why:  To establish care, As previously planned  Contact information:  Hey[address]olley  [facility]  [phone]  Additional information:  [facility] - 4th Floor           Call [place] [facility].    Specialty:  Physical Therapy  Why:  For discharge from hospital follow up  Contact information:  [address] [facility] [id number] [phone]             Schedule an appointment as soon as possible for a visit with [facility] CARDIOLOGY.    Specialty:  Cardiology  Why:  For discharge from hospital follow up  Contact information:  [address] [facility] [id number] [phone]           [facility] ENDOCRINOLOGY. Schedule an appointment as soon as possible for a visit in 1 month.    Specialty:  Endocrinology  Why:  For discharge from hospital follow up  Contact information:  [address] [facility] [id number] [phone]           Schedule an appointment as soon as possible for a visit with [facility] NEPHROLOGY.    Specialty:  Nephrology  Why:  For discharge from hospital follow up  Contact information:  [address] [facility] [id number] [phone]           [facility] GASTROENTEROLOGY. Schedule an appointment as soon as possible for a visit in 2 months.    Specialty:  Gastroenterology  Why:  For discharge from hospital follow up  Contact information:  [address] [facility] [id number] [phone]                 I have seen and examined this patient face to face today. My clinical findings that support the need for the home health skilled services and home bound status are the following:  Weakness/numbness causing balance and gait disturbance due to Heart Failure and Weakness/Debility making it taxing to leave home.  Mental confusion making it unsafe for patient to leave home alone due to  Dementia.    Allergies:Review of patient's allergies indicates:  No Known Allergies    Diet: cardiac diet, fluid restriction: 1500 mL, and 2 gram sodium, low phosphorus diet    Activities: activity as tolerated and ambulate in house with  assistance    Nursing:   SN to complete comprehensive assessment including routine vital signs. Instruct on disease process and s/s of complications to report to MD. Review/verify medication list sent home with the patient at time of discharge  and instruct patient/caregiver as needed. Frequency may be adjusted depending on start of care date.    Notify MD if SBP > 160 or < 90; DBP > 90 or < 50; HR > 120 or < 50; Temp > 101      CONSULTS:    Physical Therapy to evaluate and treat. Evaluate for home safety and equipment needs; Establish/upgrade home exercise program. Perform / instruct on therapeutic exercises, gait training, transfer training, and Range of Motion.  Occupational Therapy to evaluate and treat. Evaluate home environment for safety and equipment needs. Perform/Instruct on transfers, ADL training, ROM, and therapeutic exercises.   to evaluate for community resources/long-range planning.  Aide to provide assistance with personal care, ADLs, and vital signs.    MISCELLANEOUS CARE:  Heart Failure:  For Weight Gain > 2-3 lbs in 1 day or 4-6 lbs over 1 week notify PCP  SN to instruct on the following:    Instruct on the definition of CHF.   Instruct on the signs/sympoms of CHF to be reported.   Instruct on and monitor daily weights.   Instruct on factors that cause exacerbation.   Instruct on action, dose, schedule, and side effects of medications.   Instruct on diet as prescribed.   Instruct on activity allowed.   Instruct on life-style modifications for life long management of CHF   SN to assess compliance with daily weights, diet, medications, fluid retention,    safety precautions, activities permitted and life-style modifications.   Additional 1-2 SN visits per week as needed for signs and symptoms     of CHF exacerbation.    Medications: Review discharge medications with patient and family and provide education.      Current Discharge Medication List      START taking these medications     Details   pantoprazole (PROTONIX) 40 MG tablet Take 1 tablet (40 mg total) by mouth once daily.  Qty: 30 tablet, Refills: 11      polyethylene glycol (GLYCOLAX) 17 gram/dose powder Take 17 g by mouth once daily.  Qty: 238 g, Refills: 11      senna-docusate 8.6-50 mg (PERICOLACE) 8.6-50 mg per tablet Take 1 tablet by mouth once daily.      spironolactone (ALDACTONE) 25 MG tablet Take 1 tablet (25 mg total) by mouth once daily.  Qty: 30 tablet, Refills: 3         CONTINUE these medications which have CHANGED    Details   furosemide (LASIX) 40 MG tablet Take 1 tablet (40 mg total) by mouth once daily.  Qty: 30 tablet, Refills: 11    Associated Diagnoses: CKD (chronic kidney disease) stage 3, GFR 30-59 ml/min; Proteinuria, unspecified type; Edema, unspecified type      gabapentin (NEURONTIN) 100 MG capsule Take 1 capsule (100 mg total) by mouth 3 (three) times daily as needed (for nerve pain).  Qty: 30 capsule, Refills: 11      levothyroxine (SYNTHROID) 100 MCG tablet Take 1 tablet (100 mcg total) by mouth before breakfast.  Qty: 30 tablet, Refills: 11      metoprolol succinate (TOPROL-XL) 50 MG 24 hr tablet Take 1 tablet (50 mg total) by mouth once daily.  Qty: 30 tablet, Refills: 2      QUEtiapine (SEROQUEL) 100 MG Tab Take 1 tablet (100 mg total) by mouth nightly.  Qty: 30 tablet, Refills: 11         CONTINUE these medications which have NOT CHANGED    Details   allopurinol (ZYLOPRIM) 100 MG tablet Take 100 mg by mouth 2 (two) times daily.       aspirin 81 MG Chew Take 1 tablet (81 mg total) by mouth once daily.  Refills: 0      diclofenac sodium 1 % Gel Apply 4 g topically 4 (four) times daily. Prn pain      fluticasone-salmeterol 250-50 mcg/dose (ADVAIR) 250-50 mcg/dose diskus inhaler Inhale 1 puff into the lungs 2 (two) times daily. Controller  Refills: 0      isosorbide-hydrALAZINE 20-37.5 mg (BIDIL) 20-37.5 mg Tab Take 2 tablets by mouth 3 (three) times daily.  Qty: 180 tablet, Refills: 2      tiotropium  (SPIRIVA) 18 mcg inhalation capsule Inhale 1 capsule (18 mcg total) into the lungs once daily.  Qty: 90 capsule, Refills: 3      fluticasone (FLONASE) 50 mcg/actuation nasal spray 2 sprays by Each Nare route once daily.  Qty: 1 Bottle, Refills: 3         STOP taking these medications       melatonin 3 mg Tab Comments:   Reason for Stopping:         sodium bicarbonate 650 MG tablet Comments:   Reason for Stopping:         benztropine (COGENTIN) 0.5 MG tablet Comments:   Reason for Stopping:         betamethasone valerate 0.1% (VALISONE) 0.1 % Lotn Comments:   Reason for Stopping:         guaifenesin (MUCINEX) 600 mg 12 hr tablet Comments:   Reason for Stopping:               I certify that this patient is confined to her home and needs intermittent skilled nursing care, physical therapy and occupational therapy.      Lauren Wolf MD

## 2017-12-25 NOTE — ASSESSMENT & PLAN NOTE
S/p HARE ablation in May, 2016.     tft's improving on repeat 12/24 labs    Continue levothyroxine 100mcg at discharge  Will order repeat tft's in 4 weeks with outpatient endocrinology follow-up    Counseled on need to take levothyroxine first thing in the AM, without food or other medications and wait at least 30 minutes prior to taking other meds or eating.

## 2017-12-26 ENCOUNTER — OUTPATIENT CASE MANAGEMENT (OUTPATIENT)
Dept: ADMINISTRATIVE | Facility: OTHER | Age: 72
End: 2017-12-26

## 2017-12-26 VITALS
TEMPERATURE: 98 F | BODY MASS INDEX: 28.27 KG/M2 | HEIGHT: 66 IN | HEART RATE: 77 BPM | SYSTOLIC BLOOD PRESSURE: 128 MMHG | RESPIRATION RATE: 20 BRPM | DIASTOLIC BLOOD PRESSURE: 66 MMHG | OXYGEN SATURATION: 95 % | WEIGHT: 175.94 LBS

## 2017-12-26 PROCEDURE — 25000003 PHARM REV CODE 250: Performed by: INTERNAL MEDICINE

## 2017-12-26 RX ADMIN — LEVOTHYROXINE SODIUM 100 MCG: 100 TABLET ORAL at 04:12

## 2017-12-26 RX ADMIN — HYDRALAZINE HYDROCHLORIDE AND ISOSORBIDE DINITRATE 2 TABLET: 37.5; 2 TABLET, FILM COATED ORAL at 04:12

## 2017-12-26 NOTE — PLAN OF CARE
Problem: Patient Care Overview  Goal: Plan of Care Review  Outcome: Ongoing (interventions implemented as appropriate)  Patient is alert and oriented. Enema given this morning with  great result and patient tolerated well. Denies pain the whole shift. Printed MD's plan of care with significant labs and will give to the daughter when she comes tonight. No acute distress noted. Safety maintained. Bed alarm and camera on. Brother at bedside.

## 2017-12-26 NOTE — PT/OT/SLP DISCHARGE
Physical Therapy Discharge Summary    Name: Anai Sal  MRN: [id number]   Principal Problem: Acute on chronic combined systolic and diastolic heart failure     Patient Discharged from acute Physical Therapy on 17.  Please refer to prior PT noted date on 17 for functional status.     Assessment:     Patient has not met goals.    Objective:     GOALS:    Physical Therapy Goals        Problem: Physical Therapy Goal    Goal Priority Disciplines Outcome Goal Variances Interventions   Physical Therapy Goal     PT/OT, PT Ongoing (interventions implemented as appropriate)     Description:  Goals to be met by: 17    Patient will increase functional independence with mobility by performin. Supine to sit with Set-up [place]  2. Sit to supine with Set-up [place]  3. Sit to stand transfer with Supervision  4. Gait  x 100 feet with Stand-by Assistance using Rolling Walker.   5. Lower extremity exercise program x20 reps per handout, with independence                       Reasons for Discontinuation of Therapy Services  Transfer to alternate level of care.      Plan:     Patient Discharged to: Skilled Nursing Facility.    Francisco Benitez III, PT  2017

## 2017-12-26 NOTE — NURSING
Pt aaox 3. Discharge instructions reviewed with patient and daughter. Daughter verbalized understanding. Labs printed and [facility] to [facility]. Pt stable, vital signs stable. No s/s of distress, voices no complaints. IV removed with cath intact.

## 2017-12-28 ENCOUNTER — TELEPHONE (OUTPATIENT)
Dept: ADMINISTRATIVE | Facility: OTHER | Age: 72
End: 2017-12-28

## 2017-12-28 NOTE — TELEPHONE ENCOUNTER
I attempted to contact [facility] for the patient after O[facility] received a referral for this patient.    I left a message asking someone to contact me to inform them of the patients needs.  A referral was sent to the Outpatient care mgmt team forL  Acute on chronic combined systolic and diastolic heart failure [I50.43]  Essential hypertension [I10]  Postablative hypothyroidism [E89.0]  Paroxysmal atrial fibrillation [I48.0]    I will attempt to reach out again to give the [facility] program the information for the patients referral.      Lizzy Jaquez, [facility]  Outpatient Complex Care mgmt  Ext [phone]

## 2018-01-03 ENCOUNTER — TELEPHONE (OUTPATIENT)
Dept: ADMINISTRATIVE | Facility: OTHER | Age: 73
End: 2018-01-03

## 2018-01-03 ENCOUNTER — OUTPATIENT CASE MANAGEMENT (OUTPATIENT)
Dept: ADMINISTRATIVE | Facility: OTHER | Age: 73
End: 2018-01-03

## 2018-01-03 NOTE — TELEPHONE ENCOUNTER
I spoke with Mary from [facility]. I faxed over the referral and clinical documentation to refer the patient to her Case Mgmt team with [facility].    Lizzy Jaquez, [facility]  Ext [phone]

## 2018-01-03 NOTE — PROGRESS NOTES
Please note this patients information has been forwarded to the [facility] Case Mgmt team.  Mary ([phone]) from the  forwarded the information to the patients physician and case management team to consult after her recent hospitalization.    Please note the Media tab for additional information that was forwarded to the [facility] program.      Please contact Outpatient  Complex Care Mgmt at ext [phone] with questions.    Lizzy Jaquez, SSC

## 2018-01-08 DIAGNOSIS — N18.4 ANEMIA IN STAGE 4 CHRONIC KIDNEY DISEASE: Primary | ICD-10-CM

## 2018-01-08 DIAGNOSIS — D63.1 ANEMIA IN STAGE 4 CHRONIC KIDNEY DISEASE: Primary | ICD-10-CM

## 2018-01-09 ENCOUNTER — INFUSION (OUTPATIENT)
Dept: INFECTIOUS DISEASES | Facility: HOSPITAL | Age: 73
End: 2018-01-09
Attending: INTERNAL MEDICINE
Payer: COMMERCIAL

## 2018-01-09 VITALS
HEIGHT: 66 IN | DIASTOLIC BLOOD PRESSURE: 75 MMHG | SYSTOLIC BLOOD PRESSURE: 147 MMHG | BODY MASS INDEX: 28.12 KG/M2 | WEIGHT: 175 LBS

## 2018-01-09 DIAGNOSIS — N18.4 ANEMIA OF CHRONIC RENAL FAILURE, STAGE 4 (SEVERE): Primary | ICD-10-CM

## 2018-01-09 DIAGNOSIS — D63.1 ANEMIA OF CHRONIC RENAL FAILURE, STAGE 4 (SEVERE): Primary | ICD-10-CM

## 2018-01-09 PROCEDURE — 96372 THER/PROPH/DIAG INJ SC/IM: CPT

## 2018-01-09 PROCEDURE — 63600175 PHARM REV CODE 636 W HCPCS: Mod: JG | Performed by: INTERNAL MEDICINE

## 2018-01-09 RX ADMIN — DARBEPOETIN ALFA 100 MCG: 100 INJECTION, SOLUTION INTRAVENOUS; SUBCUTANEOUS at 02:01

## 2018-01-09 NOTE — PROGRESS NOTES
Hgb 8.6;Hct 24.9    Dose increased  PER PROTOCOL FORM DROD-188. From Aranesp 60 mcg to Aranesp 100 mcg. Aranesp 100 mcg given and 2 weeks return appt set. Dr Benoit notified.    Gfr 15.4/stage 4

## 2018-01-30 ENCOUNTER — HOSPITAL ENCOUNTER (INPATIENT)
Facility: HOSPITAL | Age: 73
LOS: 5 days | Discharge: HOSPICE | DRG: 291 | End: 2018-02-06
Attending: EMERGENCY MEDICINE | Admitting: FAMILY MEDICINE
Payer: COMMERCIAL

## 2018-01-30 DIAGNOSIS — F41.9 ANXIETY: ICD-10-CM

## 2018-01-30 DIAGNOSIS — Z51.5 PALLIATIVE CARE ENCOUNTER: ICD-10-CM

## 2018-01-30 DIAGNOSIS — R79.89 ELEVATED TROPONIN: ICD-10-CM

## 2018-01-30 DIAGNOSIS — N18.5 CKD (CHRONIC KIDNEY DISEASE), STAGE V: ICD-10-CM

## 2018-01-30 DIAGNOSIS — I50.9 CONGESTIVE HEART FAILURE, UNSPECIFIED CONGESTIVE HEART FAILURE CHRONICITY, UNSPECIFIED CONGESTIVE HEART FAILURE TYPE: ICD-10-CM

## 2018-01-30 DIAGNOSIS — I50.33 ACUTE ON CHRONIC DIASTOLIC CONGESTIVE HEART FAILURE: ICD-10-CM

## 2018-01-30 DIAGNOSIS — R93.1 DECREASED CARDIAC EJECTION FRACTION: ICD-10-CM

## 2018-01-30 DIAGNOSIS — R06.02 SOB (SHORTNESS OF BREATH): ICD-10-CM

## 2018-01-30 DIAGNOSIS — N18.4 STAGE 4 CHRONIC KIDNEY DISEASE: Primary | ICD-10-CM

## 2018-01-30 DIAGNOSIS — E87.5 HYPERKALEMIA: ICD-10-CM

## 2018-01-30 DIAGNOSIS — I50.43 ACUTE ON CHRONIC COMBINED SYSTOLIC AND DIASTOLIC HEART FAILURE: ICD-10-CM

## 2018-01-30 DIAGNOSIS — Z71.89 COUNSELING REGARDING ADVANCED CARE PLANNING AND GOALS OF CARE: ICD-10-CM

## 2018-01-30 DIAGNOSIS — N18.4 ANEMIA OF CHRONIC RENAL FAILURE, STAGE 4 (SEVERE): ICD-10-CM

## 2018-01-30 DIAGNOSIS — Z86.79 H/O ATRIAL FIBRILLATION WITHOUT CURRENT MEDICATION: ICD-10-CM

## 2018-01-30 DIAGNOSIS — I51.9 RIGHT VENTRICULAR DYSFUNCTION: ICD-10-CM

## 2018-01-30 DIAGNOSIS — I50.9 CONGESTIVE HEART FAILURE: ICD-10-CM

## 2018-01-30 DIAGNOSIS — R06.02 SHORTNESS OF BREATH: ICD-10-CM

## 2018-01-30 DIAGNOSIS — R07.9 CHEST PAIN: ICD-10-CM

## 2018-01-30 DIAGNOSIS — R00.1 BRADYCARDIA: ICD-10-CM

## 2018-01-30 DIAGNOSIS — I24.9 ACS (ACUTE CORONARY SYNDROME): ICD-10-CM

## 2018-01-30 DIAGNOSIS — D63.1 ANEMIA OF CHRONIC RENAL FAILURE, STAGE 4 (SEVERE): ICD-10-CM

## 2018-01-30 DIAGNOSIS — I50.9 ACUTE EXACERBATION OF CHF (CONGESTIVE HEART FAILURE): ICD-10-CM

## 2018-01-30 LAB
ALBUMIN SERPL BCP-MCNC: 3.8 G/DL
ALP SERPL-CCNC: 87 U/L
ALT SERPL W/O P-5'-P-CCNC: 21 U/L
ANION GAP SERPL CALC-SCNC: 14 MMOL/L
AST SERPL-CCNC: 31 U/L
BASOPHILS # BLD AUTO: 0.01 K/UL
BASOPHILS NFR BLD: 0.3 %
BILIRUB SERPL-MCNC: 0.9 MG/DL
BILIRUB UR QL STRIP: NEGATIVE
BNP SERPL-MCNC: >4900 PG/ML
BUN SERPL-MCNC: 83 MG/DL
CALCIUM SERPL-MCNC: 9.3 MG/DL
CHLORIDE SERPL-SCNC: 103 MMOL/L
CLARITY UR: CLEAR
CO2 SERPL-SCNC: 17 MMOL/L
COLOR UR: YELLOW
CREAT SERPL-MCNC: 3.7 MG/DL
DIFFERENTIAL METHOD: ABNORMAL
EOSINOPHIL # BLD AUTO: 0 K/UL
EOSINOPHIL NFR BLD: 0 %
ERYTHROCYTE [DISTWIDTH] IN BLOOD BY AUTOMATED COUNT: 16 %
EST. GFR  (AFRICAN AMERICAN): 13 ML/MIN/1.73 M^2
EST. GFR  (NON AFRICAN AMERICAN): 12 ML/MIN/1.73 M^2
GLUCOSE SERPL-MCNC: 109 MG/DL
GLUCOSE UR QL STRIP: NEGATIVE
HCT VFR BLD AUTO: 24.7 %
HGB BLD-MCNC: 8.1 G/DL
HGB UR QL STRIP: NEGATIVE
INR PPP: 1.1
KETONES UR QL STRIP: NEGATIVE
LACTATE SERPL-SCNC: 1.2 MMOL/L
LEUKOCYTE ESTERASE UR QL STRIP: NEGATIVE
LYMPHOCYTES # BLD AUTO: 0.3 K/UL
LYMPHOCYTES NFR BLD: 10.2 %
MCH RBC QN AUTO: 31.6 PG
MCHC RBC AUTO-ENTMCNC: 32.8 G/DL
MCV RBC AUTO: 97 FL
MONOCYTES # BLD AUTO: 0.5 K/UL
MONOCYTES NFR BLD: 16.9 %
NEUTROPHILS # BLD AUTO: 2.1 K/UL
NEUTROPHILS NFR BLD: 72.6 %
NITRITE UR QL STRIP: NEGATIVE
PH UR STRIP: 6 [PH] (ref 5–8)
PLATELET # BLD AUTO: 244 K/UL
PMV BLD AUTO: 10.3 FL
POTASSIUM SERPL-SCNC: 4.7 MMOL/L
PROT SERPL-MCNC: 8.7 G/DL
PROT UR QL STRIP: ABNORMAL
PROTHROMBIN TIME: 11.8 SEC
RBC # BLD AUTO: 2.56 M/UL
SODIUM SERPL-SCNC: 134 MMOL/L
SP GR UR STRIP: 1.01 (ref 1–1.03)
TROPONIN I SERPL DL<=0.01 NG/ML-MCNC: 0.02 NG/ML
TROPONIN I SERPL DL<=0.01 NG/ML-MCNC: 0.03 NG/ML
TROPONIN I SERPL DL<=0.01 NG/ML-MCNC: 0.03 NG/ML
URN SPEC COLLECT METH UR: ABNORMAL
UROBILINOGEN UR STRIP-ACNC: NEGATIVE EU/DL
WBC # BLD AUTO: 2.95 K/UL

## 2018-01-30 PROCEDURE — 80053 COMPREHEN METABOLIC PANEL: CPT

## 2018-01-30 PROCEDURE — 63600175 PHARM REV CODE 636 W HCPCS: Performed by: FAMILY MEDICINE

## 2018-01-30 PROCEDURE — 85610 PROTHROMBIN TIME: CPT

## 2018-01-30 PROCEDURE — 87086 URINE CULTURE/COLONY COUNT: CPT

## 2018-01-30 PROCEDURE — 85025 COMPLETE CBC W/AUTO DIFF WBC: CPT

## 2018-01-30 PROCEDURE — 84443 ASSAY THYROID STIM HORMONE: CPT

## 2018-01-30 PROCEDURE — 87040 BLOOD CULTURE FOR BACTERIA: CPT

## 2018-01-30 PROCEDURE — 84484 ASSAY OF TROPONIN QUANT: CPT | Mod: 91

## 2018-01-30 PROCEDURE — 99285 EMERGENCY DEPT VISIT HI MDM: CPT | Mod: 25

## 2018-01-30 PROCEDURE — 81003 URINALYSIS AUTO W/O SCOPE: CPT

## 2018-01-30 PROCEDURE — 96366 THER/PROPH/DIAG IV INF ADDON: CPT

## 2018-01-30 PROCEDURE — 84145 PROCALCITONIN (PCT): CPT

## 2018-01-30 PROCEDURE — 96376 TX/PRO/DX INJ SAME DRUG ADON: CPT

## 2018-01-30 PROCEDURE — 96365 THER/PROPH/DIAG IV INF INIT: CPT

## 2018-01-30 PROCEDURE — 11000001 HC ACUTE MED/SURG PRIVATE ROOM

## 2018-01-30 PROCEDURE — 96372 THER/PROPH/DIAG INJ SC/IM: CPT

## 2018-01-30 PROCEDURE — 83880 ASSAY OF NATRIURETIC PEPTIDE: CPT

## 2018-01-30 PROCEDURE — 93010 ELECTROCARDIOGRAM REPORT: CPT | Mod: ,,, | Performed by: INTERNAL MEDICINE

## 2018-01-30 PROCEDURE — G0378 HOSPITAL OBSERVATION PER HR: HCPCS

## 2018-01-30 PROCEDURE — 84439 ASSAY OF FREE THYROXINE: CPT

## 2018-01-30 PROCEDURE — 63600175 PHARM REV CODE 636 W HCPCS: Performed by: EMERGENCY MEDICINE

## 2018-01-30 PROCEDURE — 96367 TX/PROPH/DG ADDL SEQ IV INF: CPT

## 2018-01-30 PROCEDURE — 93005 ELECTROCARDIOGRAM TRACING: CPT

## 2018-01-30 PROCEDURE — 84484 ASSAY OF TROPONIN QUANT: CPT

## 2018-01-30 PROCEDURE — 83605 ASSAY OF LACTIC ACID: CPT

## 2018-01-30 PROCEDURE — 96368 THER/DIAG CONCURRENT INF: CPT

## 2018-01-30 PROCEDURE — 96375 TX/PRO/DX INJ NEW DRUG ADDON: CPT

## 2018-01-30 RX ORDER — FUROSEMIDE 10 MG/ML
40 INJECTION INTRAMUSCULAR; INTRAVENOUS DAILY
Status: DISCONTINUED | OUTPATIENT
Start: 2018-01-31 | End: 2018-01-31

## 2018-01-30 RX ORDER — TIOTROPIUM BROMIDE 18 UG/1
18 CAPSULE ORAL; RESPIRATORY (INHALATION) DAILY
Status: DISCONTINUED | OUTPATIENT
Start: 2018-01-31 | End: 2018-02-06 | Stop reason: HOSPADM

## 2018-01-30 RX ORDER — CIPROFLOXACIN 2 MG/ML
400 INJECTION, SOLUTION INTRAVENOUS
Status: DISCONTINUED | OUTPATIENT
Start: 2018-01-30 | End: 2018-01-30

## 2018-01-30 RX ORDER — HALOPERIDOL 1 MG/1
1 TABLET ORAL NIGHTLY
Status: ON HOLD | COMMUNITY
End: 2018-02-21 | Stop reason: HOSPADM

## 2018-01-30 RX ORDER — AMLODIPINE BESYLATE 10 MG/1
10 TABLET ORAL DAILY
Status: ON HOLD | COMMUNITY
End: 2018-02-21 | Stop reason: HOSPADM

## 2018-01-30 RX ORDER — ISOSORBIDE DINITRATE AND HYDRALAZINE HYDROCHLORIDE 37.5; 2 MG/1; MG/1
2 TABLET ORAL 3 TIMES DAILY
Status: DISCONTINUED | OUTPATIENT
Start: 2018-01-31 | End: 2018-02-06 | Stop reason: HOSPADM

## 2018-01-30 RX ORDER — ATORVASTATIN CALCIUM 10 MG/1
10 TABLET, FILM COATED ORAL DAILY
Status: DISCONTINUED | OUTPATIENT
Start: 2018-01-31 | End: 2018-02-06 | Stop reason: HOSPADM

## 2018-01-30 RX ORDER — FLUTICASONE FUROATE AND VILANTEROL 100; 25 UG/1; UG/1
1 POWDER RESPIRATORY (INHALATION) DAILY
Status: DISCONTINUED | OUTPATIENT
Start: 2018-01-31 | End: 2018-02-06 | Stop reason: HOSPADM

## 2018-01-30 RX ORDER — FUROSEMIDE 10 MG/ML
60 INJECTION INTRAMUSCULAR; INTRAVENOUS
Status: COMPLETED | OUTPATIENT
Start: 2018-01-30 | End: 2018-01-30

## 2018-01-30 RX ORDER — FLUTICASONE PROPIONATE 50 MCG
2 SPRAY, SUSPENSION (ML) NASAL DAILY
Status: DISCONTINUED | OUTPATIENT
Start: 2018-01-31 | End: 2018-02-06 | Stop reason: HOSPADM

## 2018-01-30 RX ORDER — PANTOPRAZOLE SODIUM 40 MG/1
40 TABLET, DELAYED RELEASE ORAL DAILY
Status: DISCONTINUED | OUTPATIENT
Start: 2018-01-31 | End: 2018-02-01

## 2018-01-30 RX ORDER — SODIUM CHLORIDE 0.9 % (FLUSH) 0.9 %
3 SYRINGE (ML) INJECTION EVERY 8 HOURS
Status: DISCONTINUED | OUTPATIENT
Start: 2018-01-31 | End: 2018-02-06 | Stop reason: HOSPADM

## 2018-01-30 RX ORDER — CIPROFLOXACIN 2 MG/ML
400 INJECTION, SOLUTION INTRAVENOUS
Status: DISCONTINUED | OUTPATIENT
Start: 2018-01-30 | End: 2018-02-01

## 2018-01-30 RX ORDER — MIRTAZAPINE 30 MG/1
30 TABLET, FILM COATED ORAL NIGHTLY
Status: ON HOLD | COMMUNITY
End: 2018-02-16

## 2018-01-30 RX ORDER — SPIRONOLACTONE 25 MG/1
25 TABLET ORAL DAILY
Status: DISCONTINUED | OUTPATIENT
Start: 2018-01-31 | End: 2018-01-31

## 2018-01-30 RX ORDER — FUROSEMIDE 10 MG/ML
40 INJECTION INTRAMUSCULAR; INTRAVENOUS ONCE
Status: COMPLETED | OUTPATIENT
Start: 2018-01-30 | End: 2018-01-30

## 2018-01-30 RX ORDER — HEPARIN SODIUM 5000 [USP'U]/ML
5000 INJECTION, SOLUTION INTRAVENOUS; SUBCUTANEOUS EVERY 8 HOURS
Status: DISCONTINUED | OUTPATIENT
Start: 2018-01-31 | End: 2018-02-05

## 2018-01-30 RX ORDER — AMOXICILLIN 250 MG
1 CAPSULE ORAL DAILY
Status: DISCONTINUED | OUTPATIENT
Start: 2018-01-31 | End: 2018-02-06 | Stop reason: HOSPADM

## 2018-01-30 RX ORDER — ALLOPURINOL 100 MG/1
100 TABLET ORAL 2 TIMES DAILY
Status: DISCONTINUED | OUTPATIENT
Start: 2018-01-31 | End: 2018-02-02

## 2018-01-30 RX ORDER — NAPROXEN SODIUM 220 MG/1
81 TABLET, FILM COATED ORAL DAILY
Status: DISCONTINUED | OUTPATIENT
Start: 2018-01-31 | End: 2018-02-06 | Stop reason: HOSPADM

## 2018-01-30 RX ADMIN — FUROSEMIDE 40 MG: 10 INJECTION, SOLUTION INTRAMUSCULAR; INTRAVENOUS at 10:01

## 2018-01-30 RX ADMIN — CIPROFLOXACIN 400 MG: 2 INJECTION, SOLUTION INTRAVENOUS at 11:01

## 2018-01-30 RX ADMIN — FUROSEMIDE 60 MG: 10 INJECTION, SOLUTION INTRAMUSCULAR; INTRAVENOUS at 05:01

## 2018-01-30 NOTE — ED PROVIDER NOTES
Encounter Date: 1/30/2018       History     Chief Complaint   Patient presents with    Shortness of Breath     with peripheral edema, denies chest pain. 12 lb wt gain in 3 days.;     CHIEF COMPLAINT: Patient presents with: shortness of breath and 12 lb weight gain   Shortness of Breath: with peripheral edema, denies chest pain. 12 lb wt gain in 3 days.;      HISTORY OF PRESENT ILLNESS: Anai Sal who is a 72 y.o. AAF with a h/o CHF,CAD, HTN, EF 30-35% who presents to the emergency department today with complaint of increasing SOB and 12 lb weight gain over the past 3 days. Pt is a member of the [employer] day program. They noticed that she did not seem herself today. Her daughter is at bedside, she lives with Anai Sal. She notes increasing SOB over the past few days. They have increased her Lasix to 80 mg daily for the past 2 days but do not see improvement. No chest pain, no diaphoresis. No cough, no sputum production.     REVIEW OF SYSTEMS:  Constitutional: No fever, no chills.  Eyes: No discharge. No pain.  HENT: No nasal drainage. No ear ache. No sore throat.   Cardiovascular: see above.   Respiratory: See above.  Gastrointestinal: No abdominal pain, no vomiting. No diarrhea  Genitourinary: No hematuria, dysuria, urgency.  Musculoskeletal: No back pain.  Skin: No rashes, no lesions.  Neurological: No headache, no focal weakness.    Otherwise remaining ROS negative     ALLERGIES REVIEWED  MEDICATIONS REVIEWED  PMH/PSH/SOC/FH REVIEWED     The history is provided by the patient.    Nursing/Ancillary staff note reviewed.                  Review of patient's allergies indicates:   Allergen Reactions    Seroquel [quetiapine]      Causes  profound sedation.     Past Medical History:   Diagnosis Date    Breast cancer 1980    right    CHF (congestive heart failure)     Chronic hepatitis C virus infection 3/14/2017    Coronary artery disease     Hypertension     Hazel     Renal disorder     Thyroid disease       Past Surgical History:   Procedure Laterality Date    HYSTERECTOMY      MASTECTOMY Right 1980     Family History   Problem Relation Age of Onset    Heart disease Mother     No Known Problems Father     Heart disease Sister     No Known Problems Brother     No Known Problems Maternal Aunt     No Known Problems Maternal Uncle     No Known Problems Paternal Aunt     No Known Problems Paternal Uncle     No Known Problems Maternal Grandmother     No Known Problems Maternal Grandfather     No Known Problems Paternal Grandmother     No Known Problems Paternal Grandfather     Anemia Neg Hx     Arrhythmia Neg Hx     Asthma Neg Hx     Clotting disorder Neg Hx     Fainting Neg Hx     Heart attack Neg Hx     Heart failure Neg Hx     Hyperlipidemia Neg Hx     Hypertension Neg Hx     Stroke Neg Hx     Atrial Septal Defect Neg Hx      Social History   Substance Use Topics    Smoking status: Former Smoker     Packs/day: 2.00     Years: 20.00     Types: Cigarettes     Quit date: 2/16/1995    Smokeless tobacco: Never Used    Alcohol use No      Comment: 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     Review of Systems   All other systems reviewed and are negative.      Physical Exam     Initial Vitals [01/30/18 1616]   BP Pulse Resp Temp SpO2   (!) 150/76 85 18 98.5 °F (36.9 °C) 98 %      MAP       100.67         Physical Exam    Nursing note and vitals reviewed.  Constitutional: She appears well-developed and well-nourished.   HENT:   Head: Normocephalic and atraumatic.   Right Ear: External ear normal.   Left Ear: External ear normal.   Nose: Nose normal.   Mouth/Throat: Oropharynx is clear and moist.   Eyes: Conjunctivae and EOM are normal. Pupils are equal, round, and reactive to light. No scleral icterus.   Neck: Normal range of motion. Neck supple. No JVD present.   Cardiovascular: Normal rate, regular rhythm, normal heart sounds and intact distal pulses. Exam reveals no gallop and no friction rub.    No murmur  heard.  Pulmonary/Chest: No stridor. No respiratory distress. She has no wheezes. She exhibits no tenderness.   Coarse lung sounds throughout all lung fields.     Right mastectomy.    Abdominal: Soft. Bowel sounds are normal. She exhibits no distension and no mass. There is no tenderness. There is no rebound and no guarding.   Musculoskeletal: Normal range of motion. She exhibits edema (2+pitting edema to the LE. Edema to both hands. ). She exhibits no tenderness.   Back is nontender to palpation.    Neurological: She is alert and oriented to person, place, and time. She has normal strength. No cranial nerve deficit.   Skin: Skin is warm and dry. Capillary refill takes less than 2 seconds. No rash noted. No pallor.   Psychiatric: She has a normal mood and affect. Thought content normal.         ED Course   Procedures  Labs Reviewed   CBC W/ AUTO DIFFERENTIAL   COMPREHENSIVE METABOLIC PANEL   PROTIME-INR   TROPONIN I   B-TYPE NATRIURETIC PEPTIDE             Medical Decision Making:   History:   Old Medical Records: I decided to obtain old medical records.  Old Records Summarized: records from clinic visits and records from previous admission(s).  Initial Assessment:    Anai Sal 72 y.o. presents to the ED today with shortness of breath and 12 lb weight gain, likely suffering from her CHF . Pt has various risk factors.  Will obtain labs, EKG, CXR to further evaluate. Treat symptoms appropriately and reassess.     Differential Diagnosis:   Pulmonary infectious process, COPD, asthma, pulmonary embolus and congestive heart failure.    Independently Interpreted Test(s):   I have ordered and independently interpreted EKG Reading(s) - see prior notes  Clinical Tests:   Lab Tests: Ordered and Reviewed  Radiological Study: Ordered and Reviewed  Medical Tests: Ordered and Reviewed  ED Management:  This is a 73 yo female with a h/o CHF who presents with increasing SOB, weight gain. She has signs of CHF exacerbation. She  "has received lasix IV here in the ED, not diuresing as yet, she will need to be admitted for further management. I spoke with [facility] re:the pts presentation and they will accept for admit.   Other:   I have discussed this case with another health care provider.       <> Summary of the Discussion: [facility] resident.                    ED Course    6:47 PM Paged [facility] for admit.       /81   Pulse (!) 45   Temp 98.5 °F (36.9 °C) (Oral)   Resp 17   Ht 5' 7" (1.702 m)   Wt 59 kg (130 lb)   LMP  (LMP Unknown)   SpO2 95%   BMI 20.36 kg/m²     7:30 PM Spoke with [facility] who will accept for admission.     Clinical Impression:   The primary encounter diagnosis was Congestive heart failure, unspecified congestive heart failure chronicity, unspecified congestive heart failure type. Diagnoses of SOB (shortness of breath) and Bradycardia were also pertinent to this visit.                           Tsering Marti MD  01/30/18 1933       Tsering Marti MD  01/30/18 1934       Tsering Marti MD  01/31/18 0110    "

## 2018-01-31 ENCOUNTER — ANESTHESIA EVENT (OUTPATIENT)
Dept: EMERGENCY MEDICINE | Facility: HOSPITAL | Age: 73
DRG: 291 | End: 2018-01-31
Payer: COMMERCIAL

## 2018-01-31 ENCOUNTER — TELEPHONE (OUTPATIENT)
Dept: CARDIOLOGY | Facility: CLINIC | Age: 73
End: 2018-01-31

## 2018-01-31 LAB
ALBUMIN SERPL BCP-MCNC: 3.6 G/DL
ALP SERPL-CCNC: 82 U/L
ALT SERPL W/O P-5'-P-CCNC: 21 U/L
ANION GAP SERPL CALC-SCNC: 14 MMOL/L
AST SERPL-CCNC: 31 U/L
BASOPHILS # BLD AUTO: 0.01 K/UL
BASOPHILS NFR BLD: 0.3 %
BILIRUB SERPL-MCNC: 0.8 MG/DL
BUN SERPL-MCNC: 83 MG/DL
CALCIUM SERPL-MCNC: 9.1 MG/DL
CHLORIDE SERPL-SCNC: 103 MMOL/L
CO2 SERPL-SCNC: 18 MMOL/L
CREAT SERPL-MCNC: 3.7 MG/DL
DIFFERENTIAL METHOD: ABNORMAL
EOSINOPHIL # BLD AUTO: 0 K/UL
EOSINOPHIL NFR BLD: 0.3 %
ERYTHROCYTE [DISTWIDTH] IN BLOOD BY AUTOMATED COUNT: 16.1 %
EST. GFR  (AFRICAN AMERICAN): 13 ML/MIN/1.73 M^2
EST. GFR  (NON AFRICAN AMERICAN): 12 ML/MIN/1.73 M^2
GLUCOSE SERPL-MCNC: 96 MG/DL
HCT VFR BLD AUTO: 22 %
HGB BLD-MCNC: 7.3 G/DL
LYMPHOCYTES # BLD AUTO: 0.3 K/UL
LYMPHOCYTES NFR BLD: 9.3 %
MAGNESIUM SERPL-MCNC: 2.4 MG/DL
MCH RBC QN AUTO: 31.1 PG
MCHC RBC AUTO-ENTMCNC: 33.2 G/DL
MCV RBC AUTO: 94 FL
MONOCYTES # BLD AUTO: 0.3 K/UL
MONOCYTES NFR BLD: 10.7 %
NEUTROPHILS # BLD AUTO: 2.3 K/UL
NEUTROPHILS NFR BLD: 79.4 %
PHOSPHATE SERPL-MCNC: 5.4 MG/DL
PLATELET # BLD AUTO: 236 K/UL
PMV BLD AUTO: 10.4 FL
POCT GLUCOSE: 143 MG/DL (ref 70–110)
POTASSIUM SERPL-SCNC: 4.8 MMOL/L
PROCALCITONIN SERPL IA-MCNC: 0.1 NG/ML
PROT SERPL-MCNC: 8.4 G/DL
RBC # BLD AUTO: 2.35 M/UL
SODIUM SERPL-SCNC: 135 MMOL/L
T4 FREE SERPL-MCNC: 1.22 NG/DL
TROPONIN I SERPL DL<=0.01 NG/ML-MCNC: 0.02 NG/ML
TSH SERPL DL<=0.005 MIU/L-ACNC: 19.14 UIU/ML
WBC # BLD AUTO: 2.91 K/UL

## 2018-01-31 PROCEDURE — 84484 ASSAY OF TROPONIN QUANT: CPT

## 2018-01-31 PROCEDURE — 63600175 PHARM REV CODE 636 W HCPCS: Performed by: INTERNAL MEDICINE

## 2018-01-31 PROCEDURE — 93010 ELECTROCARDIOGRAM REPORT: CPT | Mod: ,,, | Performed by: INTERNAL MEDICINE

## 2018-01-31 PROCEDURE — G0378 HOSPITAL OBSERVATION PER HR: HCPCS

## 2018-01-31 PROCEDURE — 25000003 PHARM REV CODE 250: Performed by: INTERNAL MEDICINE

## 2018-01-31 PROCEDURE — 93010 ELECTROCARDIOGRAM REPORT: CPT | Mod: 76,,, | Performed by: INTERNAL MEDICINE

## 2018-01-31 PROCEDURE — 63600175 PHARM REV CODE 636 W HCPCS: Performed by: FAMILY MEDICINE

## 2018-01-31 PROCEDURE — 25000242 PHARM REV CODE 250 ALT 637 W/ HCPCS: Performed by: FAMILY MEDICINE

## 2018-01-31 PROCEDURE — 36000 PLACE NEEDLE IN VEIN: CPT | Performed by: ANESTHESIOLOGY

## 2018-01-31 PROCEDURE — 84100 ASSAY OF PHOSPHORUS: CPT

## 2018-01-31 PROCEDURE — 99900037 HC PT THERAPY SCREENING (STAT)

## 2018-01-31 PROCEDURE — 99900038 HC OT GENERIC THERAPY SCREENING (STAT)

## 2018-01-31 PROCEDURE — 93005 ELECTROCARDIOGRAM TRACING: CPT

## 2018-01-31 PROCEDURE — 11000001 HC ACUTE MED/SURG PRIVATE ROOM

## 2018-01-31 PROCEDURE — A4216 STERILE WATER/SALINE, 10 ML: HCPCS | Performed by: FAMILY MEDICINE

## 2018-01-31 PROCEDURE — 25000003 PHARM REV CODE 250: Performed by: FAMILY MEDICINE

## 2018-01-31 PROCEDURE — 83735 ASSAY OF MAGNESIUM: CPT

## 2018-01-31 PROCEDURE — 94761 N-INVAS EAR/PLS OXIMETRY MLT: CPT

## 2018-01-31 PROCEDURE — 85025 COMPLETE CBC W/AUTO DIFF WBC: CPT

## 2018-01-31 PROCEDURE — 80053 COMPREHEN METABOLIC PANEL: CPT

## 2018-01-31 PROCEDURE — 94640 AIRWAY INHALATION TREATMENT: CPT

## 2018-01-31 RX ORDER — AMLODIPINE BESYLATE 5 MG/1
10 TABLET ORAL DAILY
Status: DISCONTINUED | OUTPATIENT
Start: 2018-01-31 | End: 2018-02-06 | Stop reason: HOSPADM

## 2018-01-31 RX ORDER — FUROSEMIDE 10 MG/ML
80 INJECTION INTRAMUSCULAR; INTRAVENOUS 2 TIMES DAILY
Status: DISCONTINUED | OUTPATIENT
Start: 2018-01-31 | End: 2018-02-01

## 2018-01-31 RX ORDER — MIRTAZAPINE 15 MG/1
30 TABLET, FILM COATED ORAL NIGHTLY
Status: DISCONTINUED | OUTPATIENT
Start: 2018-01-31 | End: 2018-02-06 | Stop reason: HOSPADM

## 2018-01-31 RX ORDER — SODIUM BICARBONATE 650 MG/1
1 TABLET ORAL 3 TIMES DAILY
Status: DISCONTINUED | OUTPATIENT
Start: 2018-01-31 | End: 2018-02-01

## 2018-01-31 RX ORDER — FUROSEMIDE 10 MG/ML
80 INJECTION INTRAMUSCULAR; INTRAVENOUS ONCE
Status: COMPLETED | OUTPATIENT
Start: 2018-01-31 | End: 2018-01-31

## 2018-01-31 RX ORDER — FUROSEMIDE 10 MG/ML
40 INJECTION INTRAMUSCULAR; INTRAVENOUS 2 TIMES DAILY
Status: DISCONTINUED | OUTPATIENT
Start: 2018-01-31 | End: 2018-01-31

## 2018-01-31 RX ADMIN — MIRTAZAPINE 30 MG: 15 TABLET, FILM COATED ORAL at 10:01

## 2018-01-31 RX ADMIN — SODIUM BICARBONATE 650 MG TABLET 650 MG: at 10:01

## 2018-01-31 RX ADMIN — TIOTROPIUM BROMIDE 18 MCG: 18 CAPSULE ORAL; RESPIRATORY (INHALATION) at 07:01

## 2018-01-31 RX ADMIN — HYDRALAZINE HYDROCHLORIDE AND ISOSORBIDE DINITRATE 2 TABLET: 37.5; 2 TABLET, FILM COATED ORAL at 07:01

## 2018-01-31 RX ADMIN — FUROSEMIDE 80 MG: 10 INJECTION, SOLUTION INTRAMUSCULAR; INTRAVENOUS at 04:01

## 2018-01-31 RX ADMIN — FUROSEMIDE 80 MG: 10 INJECTION, SOLUTION INTRAMUSCULAR; INTRAVENOUS at 07:01

## 2018-01-31 RX ADMIN — HEPARIN SODIUM 5000 UNITS: 5000 INJECTION, SOLUTION INTRAVENOUS; SUBCUTANEOUS at 06:01

## 2018-01-31 RX ADMIN — SODIUM CHLORIDE, PRESERVATIVE FREE 3 ML: 5 INJECTION INTRAVENOUS at 02:01

## 2018-01-31 RX ADMIN — ASPIRIN 81 MG 81 MG: 81 TABLET ORAL at 10:01

## 2018-01-31 RX ADMIN — SPIRONOLACTONE 25 MG: 25 TABLET, FILM COATED ORAL at 10:01

## 2018-01-31 RX ADMIN — ALLOPURINOL 100 MG: 100 TABLET ORAL at 10:01

## 2018-01-31 RX ADMIN — PIPERACILLIN AND TAZOBACTAM 4.5 G: 4; .5 INJECTION, POWDER, LYOPHILIZED, FOR SOLUTION INTRAVENOUS; PARENTERAL at 10:01

## 2018-01-31 RX ADMIN — LEVOTHYROXINE SODIUM 100 MCG: 25 TABLET ORAL at 06:01

## 2018-01-31 RX ADMIN — HYDRALAZINE HYDROCHLORIDE AND ISOSORBIDE DINITRATE 2 TABLET: 37.5; 2 TABLET, FILM COATED ORAL at 10:01

## 2018-01-31 RX ADMIN — FLUTICASONE FUROATE AND VILANTEROL TRIFENATATE 1 PUFF: 100; 25 POWDER RESPIRATORY (INHALATION) at 07:01

## 2018-01-31 RX ADMIN — MIRTAZAPINE 30 MG: 15 TABLET, FILM COATED ORAL at 01:01

## 2018-01-31 RX ADMIN — ALLOPURINOL 100 MG: 100 TABLET ORAL at 01:01

## 2018-01-31 RX ADMIN — PANTOPRAZOLE SODIUM 40 MG: 40 TABLET, DELAYED RELEASE ORAL at 10:01

## 2018-01-31 RX ADMIN — VANCOMYCIN HYDROCHLORIDE 1000 MG: 1 INJECTION, POWDER, LYOPHILIZED, FOR SOLUTION INTRAVENOUS at 03:01

## 2018-01-31 RX ADMIN — AMLODIPINE BESYLATE 10 MG: 5 TABLET ORAL at 10:01

## 2018-01-31 RX ADMIN — HEPARIN SODIUM 5000 UNITS: 5000 INJECTION, SOLUTION INTRAVENOUS; SUBCUTANEOUS at 10:01

## 2018-01-31 RX ADMIN — FUROSEMIDE 40 MG: 10 INJECTION, SOLUTION INTRAMUSCULAR; INTRAVENOUS at 10:01

## 2018-01-31 RX ADMIN — PIPERACILLIN AND TAZOBACTAM 4.5 G: 4; .5 INJECTION, POWDER, LYOPHILIZED, FOR SOLUTION INTRAVENOUS; PARENTERAL at 11:01

## 2018-01-31 RX ADMIN — HEPARIN SODIUM 5000 UNITS: 5000 INJECTION, SOLUTION INTRAVENOUS; SUBCUTANEOUS at 02:01

## 2018-01-31 RX ADMIN — PIPERACILLIN AND TAZOBACTAM 4.5 G: 4; .5 INJECTION, POWDER, LYOPHILIZED, FOR SOLUTION INTRAVENOUS; PARENTERAL at 06:01

## 2018-01-31 RX ADMIN — SODIUM CHLORIDE, PRESERVATIVE FREE 3 ML: 5 INJECTION INTRAVENOUS at 07:01

## 2018-01-31 RX ADMIN — SODIUM CHLORIDE, PRESERVATIVE FREE 3 ML: 5 INJECTION INTRAVENOUS at 10:01

## 2018-01-31 NOTE — NURSING
Report to SANDIP Dumas at this time. Pt to transfer to [facility]. VSS. Daughter at the bedside.

## 2018-01-31 NOTE — PT/OT/SLP PROGRESS
Occupational Therapy  Functional Screen/Eval attempt    Patient Name:  Anai Sal   MRN:  [id number]    Patient not seen today secondary to  bradycardia(47), /45. Will follow-up .    Daughter at bedside: Pt lives w/dtr in 2 story house 3 VITALIY w/ Rt HR; has flight of stairs inside.  Pt's bedroom on 1st floor, has to go upstairs for tub/shower.    Pt has TTB, RW, QC, BSC and was requiring assist for ADLs and mobility.      Pt was hospitalized in Dec 2017 with plan to DC to SNF--Pt's son passed away in [place]--family chose not to admit to SNF during this time.  Pt went home, daughter provided sitters as needed and pt attended [employer] [facility] 5 days/week (pt with 24 hour supervision since last discharge)     Prior to Dec admit, pt was home alone during the day Mod I. (Pt's daughter in nursing school-graduates in May)      Anirudh Clayton OT  1/31/2018

## 2018-01-31 NOTE — ED NOTES
Patient positioned on right side. Given another blanket and folded up blanket and put between patients knees

## 2018-01-31 NOTE — NURSING TRANSFER
Nursing Transfer Note      1/31/2018     Transfer To: 430    Transfer via stretcher    Transfer with cardiac monitoring    Transported by RN    Medicines sent: N/A    Chart send with patient: Yes    Notified: daughter present at the bedside for transfer    Patient reassessed at: 1/31/18 @ 1300     Upon arrival to floor: cardiac monitor applied, patient oriented to room, call bell in reach and bed in lowest position , and receiving RN at the bedside.

## 2018-01-31 NOTE — PT/OT/SLP PROGRESS
Physical Therapy Screen    Patient Name:  Anai Sal   MRN:  [id number]    Patient not seen today secondary to bradycardia and hypotension, will follow up as able. PT screen was performed and the following information was gathered from pt's daughter. Pt lives with her daughter in a 2SH with 3STE with RHR and a full flight of stairs inside the house. The pt's room and a half bath are down stairs with a tub/shower combo upstairs. Pt owns TTB, QC, W/C, and required assistance for all ADL, prior to December admit pt was mod I. Pt's daughter is in nursing school and pt attends [employer] 5x/week. Daughter reports if she is not at the house someone is with her mother providing 24/7 supervision. Daughter reports they were agreeable to SNF placement during December hospital stay, until the pt's son passed away. It was then decided to take pt home so she would not be alone after her son's passing. Daughter is currently agreeable to SNF placement at this time, if it is recommenced after PT/OT evaluations are complete.      Otto Mclean, PT, DPT

## 2018-01-31 NOTE — CONSULTS
NEPHROLOGY CONSULT NOTE    HPI & INTERVAL HISTORY:    Past Medical History:   Diagnosis Date    Breast cancer 1980    right    CHF (congestive heart failure)     Chronic hepatitis C virus infection 3/14/2017    Coronary artery disease     Hypertension     Hazel     Renal disorder     Thyroid disease       Past Surgical History:   Procedure Laterality Date    HYSTERECTOMY      MASTECTOMY Right 1980      Review of patient's allergies indicates:   Allergen Reactions    Seroquel [quetiapine]      Causes  profound sedation.      Prescriptions Prior to Admission   Medication Sig Dispense Refill Last Dose    amLODIPine (NORVASC) 10 MG tablet Take 10 mg by mouth once daily.       haloperidol (HALDOL) 1 MG tablet Take 1 mg by mouth 4 (four) times daily.       mirtazapine (REMERON) 30 MG tablet Take 30 mg by mouth every evening.       allopurinol (ZYLOPRIM) 100 MG tablet Take 100 mg by mouth 2 (two) times daily.    12/13/2017    aspirin 81 MG Chew Take 1 tablet (81 mg total) by mouth once daily.  0 12/13/2017    diclofenac sodium 1 % Gel Apply 4 g topically 4 (four) times daily. Prn pain   Past Week    fluticasone (FLONASE) 50 mcg/actuation nasal spray 2 sprays by Each Nare route once daily. 1 Bottle 3 Unknown    fluticasone-salmeterol 250-50 mcg/dose (ADVAIR) 250-50 mcg/dose diskus inhaler Inhale 1 puff into the lungs 2 (two) times daily. Controller  0 12/12/2017    furosemide (LASIX) 40 MG tablet Take 1 tablet (40 mg total) by mouth once daily. 30 tablet 11     gabapentin (NEURONTIN) 100 MG capsule Take 1 capsule (100 mg total) by mouth 3 (three) times daily as needed (for nerve pain). 30 capsule 11     isosorbide-hydrALAZINE 20-37.5 mg (BIDIL) 20-37.5 mg Tab Take 2 tablets by mouth 3 (three) times daily. 180 tablet 2 12/13/2017    levothyroxine (SYNTHROID) 100 MCG tablet Take 1 tablet (100 mcg total) by mouth before breakfast. 30 tablet 11     metoprolol succinate (TOPROL-XL) 50 MG 24 hr tablet Take  1 tablet (50 mg total) by mouth once daily. 30 tablet 2     pantoprazole (PROTONIX) 40 MG tablet Take 1 tablet (40 mg total) by mouth once daily. 30 tablet 11     polyethylene glycol (GLYCOLAX) 17 gram/dose powder Take 17 g by mouth once daily. 238 g 11     QUEtiapine (SEROQUEL) 100 MG Tab Take 1 tablet (100 mg total) by mouth nightly. 30 tablet 11     senna-docusate 8.6-50 mg (PERICOLACE) 8.6-50 mg per tablet Take 1 tablet by mouth once daily.       spironolactone (ALDACTONE) 25 MG tablet Take 1 tablet (25 mg total) by mouth once daily. 30 tablet 3     tiotropium (SPIRIVA) 18 mcg inhalation capsule Inhale 1 capsule (18 mcg total) into the lungs once daily. 90 capsule 3 12/12/2017       Social History     Social History    Marital status: Single     Spouse name: N/A    Number of children: N/A    Years of education: N/A     Occupational History    Not on file.     Social History Main Topics    Smoking status: Former Smoker     Packs/day: 2.00     Years: 20.00     Types: Cigarettes     Quit date: 2/16/1995    Smokeless tobacco: Never Used    Alcohol use No      Comment: 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    Drug use: No    Sexual activity: No      Comment:  with 3 children     Other Topics Concern    Not on file     Social History Narrative    Goes to [employer].    Used to work as a house keeper.     with 3 children        MEDS   allopurinol  100 mg Oral BID    amLODIPine  10 mg Oral Daily    aspirin  81 mg Oral Daily    atorvastatin  10 mg Oral Daily    ciprofloxacin  400 mg Intravenous Q24H    fluticasone  2 spray Each Nare Daily    fluticasone-vilanterol  1 puff Inhalation Daily    furosemide  80 mg Intravenous BID    heparin (porcine)  5,000 Units Subcutaneous Q8H    isosorbide-hydrALAZINE 20-37.5 mg  2 tablet Oral TID    levothyroxine  100 mcg Oral Before breakfast    mirtazapine  30 mg Oral QHS    pantoprazole  40 mg Oral Daily    piperacillin-tazobactam 4.5 g in dextrose 5 % 100  mL IVPB (ready to mix system)  4.5 g Intravenous Q12H    senna-docusate 8.6-50 mg  1 tablet Oral Daily    sodium chloride 0.9%  3 mL Intravenous Q8H    tiotropium  18 mcg Inhalation Daily             CONTINOUS INFUSIONS:      Intake/Output Summary (Last 24 hours) at 01/31/18 1728  Last data filed at 01/31/18 0743   Gross per 24 hour   Intake                0 ml   Output              275 ml   Net             -275 ml        HEMODYNAMICS:    Temp:  [93.7 °F (34.3 °C)-98.1 °F (36.7 °C)] 98.1 °F (36.7 °C)  Pulse:  [29-57] 55  Resp:  [11-28] 18  SpO2:  [91 %-99 %] 99 %  BP: ()/(44-81) 130/65   Gen: NAD  Cards: pulse 57  Pul:diminished breath sounds  Abdomen soft   Ext: edema   Skin: dry   Tremor  LABS   Lab Results   Component Value Date    WBC 2.91 (L) 01/31/2018    HGB 7.3 (L) 01/31/2018    HCT 22.0 (L) 01/31/2018    MCV 94 01/31/2018     01/31/2018          Recent Labs  Lab 01/31/18  0525   GLU 96   CALCIUM 9.1   ALBUMIN 3.6   PROT 8.4   *   K 4.8   CO2 18*      BUN 83*   CREATININE 3.7*   ALKPHOS 82   ALT 21   AST 31   BILITOT 0.8      Lab Results   Component Value Date    .0 (H) 04/10/2017    CALCIUM 9.1 01/31/2018    CAION 1.07 04/05/2017    PHOS 5.4 (H) 01/31/2018      Lab Results   Component Value Date    IRON 44 01/09/2018    TIBC 429 01/09/2018    FERRITIN 89 01/09/2018        ABG  No results for input(s): PH, PO2, PCO2, HCO3, BE in the last 168 hours.      IMAGING:  CXR    ASSESSMENT / PLAN  CKD 5  UA trace protein  US kidneys - chronic medical disease  Creatinine 3.7  BUN 83  Metabolic acidosis  Sodium bicarbonate 650 mg tid  Hyponatremia  Metabolic bone disease  Hyperphosphatemia 5.4  Binder  Anemia Hb 7.3  Saturated iron 10  Renal cap  Nutrition   Albumin 3.6  CXR - pulmonary edema  EF 30-35 %  Blood pressure 130/65  Lasix 80 mg BID IV  Weight daily  I and O  Renal, low salt diet  Suplena 1 can bid  Avoid nephrotoxic agents, hypotension

## 2018-01-31 NOTE — TELEPHONE ENCOUNTER
----- Message from Bree Guzman sent at 2018  2:08 PM CST -----  Contact:  [phone]/Yasmeen   CONSULTS:   Patient: Anai Sal   : 1945  Clinic#: [id number]  Room /bed number: Room 430  Referring MD: Dr Erickson    Diagnosis: CHF exacerbation with CKD  Person calling & phone number: [phone]/Yasmeen

## 2018-01-31 NOTE — ASSESSMENT & PLAN NOTE
-SBP 110s-140s since admission  -continue Norvasc, BiDil and Aldactone for now with holding of BB  -monitor BP and adjust meds prn

## 2018-01-31 NOTE — ASSESSMENT & PLAN NOTE
-recent echo with depressed LV function with EF 30-35% and mild to moderate RV dysfunction and severe MR  -CXR with pulmonary vascular congestion and BNP >4900  -decompensation ?related to noncompliance with low salt diet and fluid restriction  -agree with continuation of IV Lasix BID and would not escalate given risk of worsening renal function and lack of acute respiratory distress currently  -recommend strict I&Os along with daily weights  -continue BiDil and Aldactone; no ACEI/ARB due to renal function and BB on hold due to bradycardia upon admission; will hold Aldactone if creatinine rises further and will plan to resume BB depending on HR  -considered ischemic etiology given risk factors; cardiac PET stress test in 3/2017 with no evidence of ischemia  -will continue medical management for CHF

## 2018-01-31 NOTE — CONSULTS
"[facility]  Cardiology  Consult Note    Patient Name: Anai Sal  MRN: [id number]  Admission Date: 1/30/2018  Hospital Length of Stay: 0 days  Code Status: DNR   Attending Provider: Francisco Estrada MD   Consulting Provider: DERIK Angelo, ANP  Primary Care Physician: Live Young MD (Inactive)  Principal Problem:Congestive heart failure    Patient information was obtained from patient and past medical records.     Inpatient consult to [facility]  Consult performed by: CRYS CHAUDHRY  Consult ordered by: SCARLETT SANDERS  Reason for consult: ADHF         Subjective:     Chief Complaint:  ADHF     HPI:   71yo female with history of chronic combined systolic and diastolic heart failure, MR, COPD, postablation hypothyroidism, COPD and CKD stage IV who presented to the ER with complaints of SOB. Ms. Sal is accompanied by her daughter who provides most of her history. She goes to adult day care 3-5 times per day and reports feeling "sick" yesterday which she describes as SOB. She denies any chest pain, nausea, vomiting, dizziness or syncope. [facility] and was admitted to [facility] in December for similar symptoms. Her daughter reports the day care center she will drink fluids excessively while there and will occasionally eat foods high in salt when on outings.       Hospital Course:  1/30/2018 Presented to the ER with complaints of SOB and LE swelling. Temp 93.7 with HR 30s-40s. BNP >4900 and creatinine 3.7 (baseline 3.0-3.4 12/2017 and 2.2-2.9 prior to 12/2017). Initial troponin .024. EKG with SB and nonspecific STTWC. CXR with pulmonary vascular congestion. Given IV Lasix 60mg with repeat dose of 40mg. Given IV abx due to concern for possible infectious etiology. Admitted to [facility]  1/31/2018  Temp improved to 97.6 this AM with HR up to upper 50s. No urine output documented. On IV Lasix BID. Creatinine " unchanged at 3.7 this AM. Repeat troponin .028-.019. Repeat EKG with continued SB and nonspecific STTWC however improved HR from 40s to 59. BP stable as well as pulse ox. Cardiology consulted due to acute CHF exacerbation       Past Medical History:   Diagnosis Date    Breast cancer 1980    right    CHF (congestive heart failure)     Chronic hepatitis C virus infection 3/14/2017    Coronary artery disease     Hypertension     Hazel     Renal disorder     Thyroid disease        Past Surgical History:   Procedure Laterality Date    HYSTERECTOMY      MASTECTOMY Right 1980       Review of patient's allergies indicates:   Allergen Reactions    Seroquel [quetiapine]      Causes  profound sedation.       No current facility-administered medications on file prior to encounter.      Current Outpatient Prescriptions on File Prior to Encounter   Medication Sig    allopurinol (ZYLOPRIM) 100 MG tablet Take 100 mg by mouth 2 (two) times daily.     aspirin 81 MG Chew Take 1 tablet (81 mg total) by mouth once daily.    diclofenac sodium 1 % Gel Apply 4 g topically 4 (four) times daily. Prn pain    fluticasone (FLONASE) 50 mcg/actuation nasal spray 2 sprays by Each Nare route once daily.    fluticasone-salmeterol 250-50 mcg/dose (ADVAIR) 250-50 mcg/dose diskus inhaler Inhale 1 puff into the lungs 2 (two) times daily. Controller    furosemide (LASIX) 40 MG tablet Take 1 tablet (40 mg total) by mouth once daily.    gabapentin (NEURONTIN) 100 MG capsule Take 1 capsule (100 mg total) by mouth 3 (three) times daily as needed (for nerve pain).    isosorbide-hydrALAZINE 20-37.5 mg (BIDIL) 20-37.5 mg Tab Take 2 tablets by mouth 3 (three) times daily.    levothyroxine (SYNTHROID) 100 MCG tablet Take 1 tablet (100 mcg total) by mouth before breakfast.    metoprolol succinate (TOPROL-XL) 50 MG 24 hr tablet Take 1 tablet (50 mg total) by mouth once daily.    pantoprazole (PROTONIX) 40 MG tablet Take 1 tablet (40 mg total)  by mouth once daily.    polyethylene glycol (GLYCOLAX) 17 gram/dose powder Take 17 g by mouth once daily.    QUEtiapine (SEROQUEL) 100 MG Tab Take 1 tablet (100 mg total) by mouth nightly.    senna-docusate 8.6-50 mg (PERICOLACE) 8.6-50 mg per tablet Take 1 tablet by mouth once daily.    spironolactone (ALDACTONE) 25 MG tablet Take 1 tablet (25 mg total) by mouth once daily.    tiotropium (SPIRIVA) 18 mcg inhalation capsule Inhale 1 capsule (18 mcg total) into the lungs once daily.     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   Respiratory: Positive for shortness of breath.      Objective:     Vital Signs (Most Recent):  Temp: 97.6 °F (36.4 °C) (01/31/18 1120)  Pulse: (!) 53 (01/31/18 1220)  Resp: 14 (01/31/18 1220)  BP: (!) 110/59 (01/31/18 1220)  SpO2: 95 % (01/31/18 1220) Vital Signs (24h Range):  Temp:  [93.7 °F (34.3 °C)-98.5 °F (36.9 °C)] 97.6 °F (36.4 °C)  Pulse:  [29-85] 53  Resp:  [11-28] 14  SpO2:  [91 %-98 %] 95 %  BP: ()/(44-81) 110/59     Weight: 59 kg (130 lb)  Body mass index is 20.36 kg/m².    SpO2: 95 %  O2 Device (Oxygen Therapy): room air      Intake/Output Summary (Last 24 hours) at 01/31/18 1303  Last data filed at 01/31/18 0777   Gross per 24 hour   Intake                0 ml   Output              275 ml   Net             -275 ml       Lines/Drains/Airways      Peripheral Intravenous Line                 Peripheral IV - Single Lumen 01/31/18 0128 Left Upper Arm less than 1 day                Physical Exam   Constitutional: She has a sickly appearance. She appears ill.   Neck: JVD present.   Cardiovascular: Normal rate and regular rhythm.    Pulmonary/Chest: Effort normal. She has decreased breath sounds.   Abdominal: Soft. Bowel sounds are normal. She exhibits no distension. There is no tenderness.   Musculoskeletal: She exhibits edema.   Neurological: She is alert.   Responds appropriately to questions; unstimulated falls asleep easily    Skin: Skin is warm and dry.       Significant Labs:       Recent Labs  Lab 01/31/18  0525   *   K 4.8      CO2 18*   BUN 83*   CREATININE 3.7*   MG 2.4       Recent Labs  Lab 01/31/18  0525   WBC 2.91*   RBC 2.35*   HGB 7.3*   HCT 22.0*      MCV 94   MCH 31.1*   MCHC 33.2       Recent Labs  Lab 01/31/18  0525   TROPONINI 0.019       Significant Imaging: Echocardiogram:   2D echo with color flow doppler:   Results for orders placed or performed during the hospital encounter of 05/15/17   2D Echo w/ Color Flow Doppler   Result Value Ref Range    EF 30 (A) 55 - 65    Mitral Valve Regurgitation SEVERE (A)     Est. PA Systolic Pressure 52.45 (A)     Tricuspid Valve Regurgitation MODERATE (A)      Assessment and Plan:     Non-rheumatic mitral regurgitation    -recent echo with severe MR; all previous echos with moderate to severe MR likely related to functional etiology  -MR definitely contributing to CHF exacerbation  -will continue with good afterload reduction and volume removal with diuresis  -discussed with daughter etiology and contributing issues of MR         Postablative hypothyroidism    -on Levothyroxine at home  -TSH 19.143 with FT4 1.22  -recommend repeat TSH with AM labs given risk of abnormality with ADHF; if TSH remains elevated will defer adjustment to primary team         Acute on chronic combined  systolic and diastolic heart failure    -recent echo with depressed LV function with EF 30-35% and mild to moderate RV dysfunction and severe MR  -CXR with pulmonary vascular congestion and BNP >4900  -decompensation ?related to noncompliance with low salt diet and fluid restriction  -agree with continuation of IV Lasix BID and would not escalate given risk of worsening renal function and lack of acute respiratory distress currently  -recommend strict I&Os along with daily weights  -continue BiDil and Aldactone; no ACEI/ARB due to renal function and BB on hold due to bradycardia upon admission; will hold Aldactone if creatinine rises further and will plan to resume BB depending on HR  -considered ischemic etiology given risk factors; cardiac PET stress test in 3/2017 with no evidence of ischemia  -will continue medical management for CHF         Essential hypertension    -SBP 110s-140s since admission  -continue Norvasc, BiDil and Aldactone for now with holding of BB  -monitor BP and adjust meds prn         Stage 4 chronic kidney disease    -creatinine 3.7 with GFR 13  -baseline creatinine 3.0-3.4 in late December and 2.2-2.9 prior with GFR 15  -suspect cardiorenal etiology  -will monitor creatinine closely with diuresis  -Nephrology consulted             VTE Risk Mitigation         Ordered     heparin (porcine) injection 5,000 Units  Every 8 hours     Route:  Subcutaneous        01/30/18 2357     Medium Risk of VTE  Once      01/30/18 9964          Thank you for your consult. I will follow-up with patient. Please contact us if you have any additional questions.    DERIK Angelo, ANP  Cardiology   [facility]

## 2018-01-31 NOTE — ASSESSMENT & PLAN NOTE
-on Levothyroxine at home  -TSH 19.143 with FT4 1.22  -recommend repeat TSH with AM labs given risk of abnormality with ADHF; if TSH remains elevated will defer adjustment to primary team

## 2018-01-31 NOTE — HOSPITAL COURSE
1/30/2018 Presented to the ER with complaints of SOB and LE swelling. Temp 93.7 with HR 30s-40s. BNP >4900 and creatinine 3.7 (baseline 3.0-3.4 12/2017 and 2.2-2.9 prior to 12/2017). Initial troponin .024. EKG with SB and nonspecific STTWC. CXR with pulmonary vascular congestion. Given IV Lasix 60mg with repeat dose of 40mg. Given IV abx due to concern for possible infectious etiology. Admitted to [facility]  1/31/2018  Temp improved to 97.6 this AM with HR up to upper 50s. No urine output documented. On IV Lasix BID. Creatinine unchanged at 3.7 this AM. Repeat troponin .028-.019. Repeat EKG with continued SB and nonspecific STTWC however improved HR from 40s to 59. BP stable as well as pulse ox. Cardiology consulted due to acute CHF exacerbation

## 2018-01-31 NOTE — HPI
"71yo female with history of chronic combined systolic and diastolic heart failure, MR, COPD, postablation hypothyroidism, COPD and CKD stage IV who presented to the ER with complaints of SOB. Ms. Sal is accompanied by her daughter who provides most of her history. She goes to adult day care 3-5 times per day and reports feeling "sick" yesterday which she describes as SOB. She denies any chest pain, nausea, vomiting, dizziness or syncope. [facility] and was admitted to [facility] in December for similar symptoms. Her daughter reports the day care center she will drink fluids excessively while there and will occasionally eat foods high in salt when on outings.   "

## 2018-01-31 NOTE — ANESTHESIA PROCEDURE NOTES
Peripheral IV Insertion    Diagnosis: I99.8 Other disorder of circulatory system    Patient location during procedure: ED  Timeout: 1/31/2018 12:15 AM  Staffing  Anesthesiologist: CHRISTINA CAMPOS  Performed: anesthesiologist   Preanesthetic Checklist  Completed: patient identified, site marked, surgical consent, pre-op evaluation, timeout performed, IV checked, risks and benefits discussed, monitors and equipment checked and anesthesia consent givenPeripheral IV Insertion  Skin Prep: chlorhexidine gluconate  Orientation: left  Location: chest wall  Catheter Size: 22 G  Assessment  Dressing: secured with tape and tegaderm  Patient: Tolerated well  Line flushed easily.

## 2018-01-31 NOTE — ED NOTES
2nd nurse at bedside for IV insertion. Called admitting team in regards to possible IV infiltration from first dose of Lasix. Left hand was puffy.  Not sure if Lasix got into patients system and that is why she has not urinated yet . MD will put in for another dose

## 2018-01-31 NOTE — ANESTHESIA PROCEDURE NOTES
Peripheral IV Insertion    Diagnosis: I99.8 Other disorder of circulatory system    Patient location during procedure: ED  Timeout: 1/30/2018 7:49 PM  Staffing  Anesthesiologist: CHRISTINA CAMPOS  Performed: anesthesiologist   Preanesthetic Checklist  Completed: patient identified, site marked, surgical consent, pre-op evaluation, timeout performed, IV checked, risks and benefits discussed, monitors and equipment checked and anesthesia consent givenPeripheral IV Insertion  Skin Prep: chlorhexidine gluconate  Orientation: left  Location: forearm  Catheter Size: 20 G  Catheter placement by Ultrasound guidance. Heme positive aspiration all ports.Insertion Attempts: 1  Assessment  Dressing: secured with tape and tegaderm  Patient: Tolerated well  Line flushed easily.

## 2018-01-31 NOTE — PROGRESS NOTES
Progress Note  [facility] PRACT[facility]  Admit Date: 1/30/2018   LOS: 0 days   SUBJECTIVE:   Follow-up For:  CHF, CKD, sepsis    Patient seen and examined this AM.  Improved mental status, per daughter.  Patient still hypothermic.  Denies SOB, chest pain, n/v/d.     ROS  Denies fever, chills, n/v/d,   OBJECTIVE:   Vital Signs (Most Recent)  Temp: 98.1 °F (36.7 °C) (01/31/18 1618)  Pulse: (!) 55 (01/31/18 1618)  Resp: 18 (01/31/18 1618)  BP: 130/65 (01/31/18 1618)  SpO2: 99 % (01/31/18 1539)    I & O (Last 24H):  Intake/Output Summary (Last 24 hours) at 01/31/18 1739  Last data filed at 01/31/18 0743   Gross per 24 hour   Intake                0 ml   Output              275 ml   Net             -275 ml     Wt Readings from Last 3 Encounters:   01/30/18 59 kg (130 lb)   01/09/18 79.4 kg (175 lb)   12/21/17 79.8 kg (175 lb 14.8 oz)       Current Diet Order   Procedures    Diet Adult Regular Cardiac (Low Na/Chol)     Order Specific Question:   Additional restrictions:     Answer:   Cardiac (Low Na/Chol)        Physical Exam    GEN: NAD, sleeping in bed, AAO X 3  HEENT: MMM  CV: bradycardic, RR  PULM: Bilateral crackles lower lung  GI:S/NT/ND  EXT: 2+ edema bilaterally   Laboratory Data:  CBC    Recent Labs  Lab 01/30/18  1707 01/31/18  0525   WBC 2.95* 2.91*   RBC 2.56* 2.35*   HGB 8.1* 7.3*   HCT 24.7* 22.0*    236   MCV 97 94   MCH 31.6* 31.1*   MCHC 32.8 33.2     CMP    Recent Labs  Lab 01/30/18  1707 01/31/18  0525   CALCIUM 9.3 9.1   PROT 8.7* 8.4   * 135*   K 4.7 4.8   CO2 17* 18*    103   BUN 83* 83*   CREATININE 3.7* 3.7*   ALKPHOS 87 82   ALT 21 21   AST 31 31   BILITOT 0.9 0.8     POCT-Glucose  POCT Glucose   Date Value Ref Range Status   01/31/2018 143 (H) 70 - 110 mg/dL Final     COAGS    Recent Labs  Lab 01/30/18  1820   INR 1.1     UA    Recent Labs  Lab 01/30/18  2147   COLORU Yellow   SPECGRAV 1.015   PHUR 6.0   PROTEINUA Trace*     MICRO  Microbiology Results (last 7 days)     Procedure  Component Value Units Date/Time    Blood culture [[id number]] Collected:  01/30/18 2245    Order Status:  Completed Specimen:  Blood from Peripheral, Antecubital, Left Updated:  01/31/18 1315     Blood Culture, Routine No Growth to date    Narrative:       Please obtain from 2 different sites    Blood culture [[id number]] Collected:  01/30/18 2235    Order Status:  Completed Specimen:  Blood from Peripheral, Antecubital, Right Updated:  01/31/18 0915     Blood Culture, Routine No Growth to date    Urine culture [[id number]] Collected:  01/30/18 2150    Order Status:  Sent Specimen:  Urine from Urine, Catheterized Updated:  01/31/18 0138        LIPID PANEL  Lab Results   Component Value Date    CHOL 103 (L) 03/11/2017     Lab Results   Component Value Date    HDL 38 (L) 03/11/2017     Lab Results   Component Value Date    LDLCALC 57.0 (L) 03/11/2017     Lab Results   Component Value Date    TRIG 40 03/11/2017     Lab Results   Component Value Date    CHOLHDL 36.9 03/11/2017       Diagnostic Results:  Imaging in last 24 hours:  Reviewed   ASSESSMENT/PLAN:   congestive heart failure); Chronic hepatitis C virus infection (3/14/2017); Coronary artery disease; Hypertension; Hazel; Renal disorder; and Thyroid disease. here for acute on chronic CHF exacerbation     Acute on Chronic CHF exacerbation  -improved SOB, not requiring oxygen  -very poor diuresis over night with a total of 80 IV lasix so far only 270 CC  - Continue 40 mg IV daily.   - Strict I/Os  - Fluid restriction to 1000 cc/ 24 hr  -consulted [facility] cards     Essential HTN:   - Continue isosorbide-Hydralazine   - Hold Metoprolol as pt was bradycardic     ACS workup  -negative      Hx of A.fib:   - Pt is on metoprolol. EKG upon admission NSR. Holding BB as pt is bradycardic.   - Pt was not on anticoagulation as in outpt  - Heparin for prophylaxis started     CANDI on CKD:   - Pt with baseline of 3.2, now 3.7  - UA ordered   - [facility] consulted appreciate recs    - Will renally dose all meds and avoid nephrotoxic agents.      Sepsis   -could be a side effect of zyprexa  - still hypothermic   -not neutropenic   - Blood cultures, urine cultures, LA, procalc ordered  - CXR positive for possible interstitial PNA  - treat for Hospital  Acquired PNA as pt was recently discharged from a hospital  - Patti/zosyn/cipro.         Post ablation hypothyroidism:   - S/p ablation in 2016   - continue levothyroxine  - TSH elevated, T4 WNL     Hx of chronic hep C virus  - AST/ALT normal.            PPx: Heparin  Diet: Cardiac     Dispo:f/u cultures, f/u nephrology and cards     1/31/2018 Delgado Erickson MD  5:39 PM

## 2018-01-31 NOTE — H&P
History & Physical  \[facility] [place] [facility]\"" FAMILY PRACTICE      SUBJECTIVE:     History of Present Illness:  71 yo female with pmhx of CHF (las echo on 12/17, with EF ), CKD 4, hx of Breast cancer s/p mastectomy on remission, COPD, HTN presented to ED with LE swelling and dyspnea on exertion. She has noticed 12lb weight gain over the past 3 days and LE swelling getting worse. She also reports orthopnea and dyspnea on exertion getting worse. She was given a higher dose of lasix 80 mg daily for the past 2 days without much improvement. She denies any cough, fever, chills, myalgias, chest pain, or diaphoresis. She is compliant with her meds but not compliant with cardiac diet and uses salt in her daily diet. She has not been around anyone who was sick. She was in [facility] mid Dec 2017 for similar presentation for acute on chronic CHF exacerbation.       Review of patient's allergies indicates:   Allergen Reactions    Seroquel [quetiapine]      Causes  profound sedation.       Past Medical History:   Diagnosis Date    Breast cancer 1980    right    CHF (congestive heart failure)     Chronic hepatitis C virus infection 3/14/2017    Coronary artery disease     Hypertension     Hazel     Renal disorder     Thyroid disease      Past Surgical History:   Procedure Laterality Date    HYSTERECTOMY      MASTECTOMY Right 1980     Family History   Problem Relation Age of Onset    Heart disease Mother     No Known Problems Father     Heart disease Sister     No Known Problems Brother     No Known Problems Maternal Aunt     No Known Problems Maternal Uncle     No Known Problems Paternal Aunt     No Known Problems Paternal Uncle     No Known Problems Maternal Grandmother     No Known Problems Maternal Grandfather     No Known Problems Paternal Grandmother     No Known Problems Paternal Grandfather     Anemia Neg Hx     Arrhythmia Neg Hx     Asthma Neg Hx     Clotting disorder Neg Hx     Fainting Neg Hx     Heart attack  Neg Hx     Heart failure Neg Hx     Hyperlipidemia Neg Hx     Hypertension Neg Hx     Stroke Neg Hx     Atrial Septal Defect Neg Hx      Social History   Substance Use Topics    Smoking status: Former Smoker     Packs/day: 2.00     Years: 20.00     Types: Cigarettes     Quit date: 2/16/1995    Smokeless tobacco: Never Used    Alcohol use No      Comment: 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        Review of Systems:  As per Subjective  Constitutional: no fever or chills  Respiratory: positive for dyspnea on exertion  Cardiovascular: no chest pain or palpitations  Gastrointestinal: no nausea or vomiting, no abdominal pain or change in bowel habits  Genitourinary: no hematuria or dysuria  Musculoskeletal: no arthralgias or myalgias     OBJECTIVE:     Vital Signs (Most Recent)  Temp: (!) 93.7 °F (34.3 °C) (01/30/18 2053)  Pulse: (!) 43 (01/30/18 1948)  Resp: 15 (01/30/18 1948)  BP: 128/68 (01/30/18 1948)  SpO2: 95 % (01/30/18 1948)    Physical Exam:  Nursing note and vitals reviewed.  Constitutional: She appears well-developed and well-nourished.   Eyes: Conjunctivae and EOM are normal. Pupils are equal, round, and reactive to light. No scleral icterus.   Neck: Normal range of motion. Neck supple. No JVD present.   Cardiovascular: Normal rate, regular rhythm, normal heart sounds and intact distal pulses. Exam reveals no gallop and no friction rub.    No murmur heard.  Pulmonary/Chest: No stridor. No respiratory distress. She has no wheezes. She exhibits no tenderness.   Coarse lung sounds throughout all lung fields.    Abdominal: Soft. Bowel sounds are normal. She exhibits no distension and no mass. There is no tenderness. There is no rebound and no guarding.   Musculoskeletal: Normal range of motion. She exhibits edema (2+pitting edema to the LE. ).   Neurological: She is alert and oriented to person, place, and time. She has normal strength. No cranial nerve deficit.   Skin: Skin is warm and dry. Capillary refill takes less  than 2 seconds. No rash noted. No pallor.   Psychiatric: She has a normal mood and affect. Thought content normal.      LABS  CBC    Recent Labs  Lab 01/30/18  1707   WBC 2.95*   RBC 2.56*   HGB 8.1*   HCT 24.7*      MCV 97   MCH 31.6*   MCHC 32.8     BMP    Recent Labs  Lab 01/30/18  1707   *   K 4.7   CO2 17*      BUN 83*   CREATININE 3.7*       Recent Labs  Lab 01/30/18  1707   CALCIUM 9.3     LFT    Recent Labs  Lab 01/30/18  1707   PROT 8.7*   ALBUMIN 3.8   BILITOT 0.9   AST 31   ALKPHOS 87   ALT 21     Recent Labs  Lab 01/30/18  1820   INR 1.1     CE    Recent Labs  Lab 01/30/18  1707   TROPONINI 0.024     Recent Labs  Lab 01/30/18  1707   BNP >4,900*     LAST HbA1c  Lab Results   Component Value Date    HGBA1C 4.2 (L) 01/11/2017         Diagnostic Results:  Imaging Results          X-Ray Chest 1 View (Final result)  Result time 01/30/18 17:02:00    Final result by Chelsea Ambrocio MD (01/30/18 17:02:00)                 Impression:        Findings suggesting pulmonary edema/CHF pattern, with interstitial pneumonia not excluded.    Interval resolution of previous right-sided pleural effusion and right basilar atelectasis/airspace disease.      Electronically signed by: CHELSEA AMBROCIO MD, MD  Date:     01/30/18  Time:    17:02              Narrative:    COMPARISON: Chest radiograph 12/13/17    FINDINGS: Frontal chest radiograph. Monitoring leads overlie the chest. Patient is slightly rotated. Cardiac silhouette remains enlarged with prominence of the central pulmonary vasculature and bilateral diffuse interstitial coarsening suggesting pulmonary edema/CHF pattern. Previous right sided pleural effusion and basilar airspace disease has resolved. No large consolidation, pleural effusion or pneumothorax. Grossly stable mediastinal contours. No acute osseous process seen.   PA and lateral views can be obtained.                              ASSESSMENT/PLAN:   72 y.o.female has a past medical history of  Breast cancer (1980); CHF (congestive heart failure); Chronic hepatitis C virus infection (3/14/2017); Coronary artery disease; Hypertension; Hazel; Renal disorder; and Thyroid disease. here for acute on chronic CHF exacerbation    Acute on Chronic CHF exacerbation  - Pt presenting with LE edema, coarse BS, SOB and orthopnea  - CXR consistent with CHF exacerbation  - BNP >4900  - Lasix 80 IV given once in the ED  - Continue 40 mg IV daily.   - Strict I/Os  - Fluid restriction to 1000 cc/ 24 hr    Essential HTN:   - Continue isosorbide-Hydralazine   - Hold Metoprolol as pt was bradycardic    ACS workup  - Initial trop 0.024 normal will trend x 2  - Initial EKG negative no acute finding. Will trend x 2    Hx of A.fib:   - Pt is on metoprolol. EKG upon admission NSR. Holding BB as pt is bradycardic.   - Pt was not on anticoagulation as in outpt  - Heparin for prophylaxis started    CANDI on CKD:   - Pt with baseline of 3.2, now 3.7  - UA ordered   - Recently seen by Nephrology as in outpt.   - Will renally dose all meds and avoid nephrotoxic agents.     Leukopenia:   - Hx of breast cancer on remission now.   - Pt also became hypothermic.   - Blood cultures, urine cultures, LA, procalc ordered  - CXR positive for possible interstitial PNA  - Will start on Hospital  Acquired PNA as pt was recently discharged from a hospital  [facility]       Post ablation hypothyroidism:   - S/p ablation in 2016   - continue levothyroxine  - will order tSH.     Hx of chronic hep C virus  - AST/ALT normal. Will continue to monitor        PPx: Heparin  Diet: Cardiac    Dispo: f/u I/O's improvement in swelling and BS. F/u blood cultures, LA, procalc.       1/30/2018 June Rosa M.D.

## 2018-01-31 NOTE — PROGRESS NOTES
"Vancomycin Dosing and Monitoring Pharmacy Protocol    Anai Sal is a 72 y.o. female    Height: 5' 7" (1.702 m)   Wt Readings from Last 1 Encounters:   01/30/18 59 kg (130 lb)     Ideal body weight: 61.6 kg (135 lb 12.9 oz)    Temp Readings from Last 3 Encounters:   01/30/18 (!) 94.2 °F (34.6 °C) (Rectal)   12/26/17 97.9 °F (36.6 °C) (Oral)   07/11/17 98.2 °F (36.8 °C) (Oral)      Lab Results   Component Value Date/Time    WBC 2.95 (L) 01/30/2018 05:07 PM    WBC 6.22 12/25/2017 04:13 AM    WBC 5.37 12/24/2017 05:14 AM      Lab Results   Component Value Date/Time    CREATININE 3.7 (H) 01/30/2018 05:07 PM    CREATININE 3.3 (H) 12/25/2017 08:57 AM    CREATININE 3.3 (H) 12/24/2017 08:29 AM        Serum creatinine: 3.7 mg/dL High 01/30/18 1707  Estimated creatinine clearance: 12.8 mL/min    Antibiotics       Start     Stop Route Frequency Ordered    01/30/18 2230  ciprofloxacin (CIPRO)400mg/200ml D5W IVPB 400 mg      -- IV Every 24 hours (non-standard times) 01/30/18 2223    01/30/18 2230  vancomycin (VANCOCIN) 1,000 mg in sodium chloride 0.9% 250 mL IVPB  (Vancomycin IVPB with levels panel)      -- IV Once 01/30/18 2225 01/30/18 2215  piperacillin-tazobactam 4.5 g in dextrose 5 % 100 mL IVPB (ready to mix system)      -- IV Every 12 hours (non-standard times) 01/30/18 2213          Antifungals       None            Microbiology Results (last 7 days)       Procedure Component Value Units Date/Time    Blood culture [[id number]] Collected:  01/30/18 2235    Order Status:  Sent Specimen:  Blood from Peripheral, Antecubital, Right Updated:  01/31/18 0139    Urine culture [[id number]] Collected:  01/30/18 2150    Order Status:  Sent Specimen:  Urine from Urine, Catheterized Updated:  01/31/18 0138    Blood culture [[id number]] Collected:  01/30/18 2224    Order Status:  Sent Specimen:  Blood from Peripheral, Antecubital, Left Updated:  01/30/18 2225            Indication:   Bacteremia    Target Level: 15-20 " mcg/ml    Hemodialysis:   ...  on ...     Dosing Weight:   ABW--actual body weight  If ABW is greater than or equal to 30% over Ideal Body Weight, AdjBW will be used to calculate vancomycin dose.    Last Vancomycin dose: N/A   Date/Time given: N/A         Vancomycin level:  No results for input(s): VANCOMYCIN-TROUGH in the last 72 hours.  No results for input(s): VANCOMYCIN, RANDOM in the last 72 hours.    Per Protocol Initial/Adjustments Dosin. Initial/Adjustment Dose: Vancomycin dosage adjusted from 1000 mg q12h to 1000 mg X 1 dose, then subsequent pulse dosing based on results of vancomcyin levels.   2. Vancomycin Random Level will be drawn on AM 18 date/time    Pharmacy will continue to follow.    Please contact if you have any further questions. Thank you.    Slade Felix, PharmD  [phone]

## 2018-01-31 NOTE — SUBJECTIVE & OBJECTIVE
Past Medical History:   Diagnosis Date    Breast cancer 1980    right    CHF (congestive heart failure)     Chronic hepatitis C virus infection 3/14/2017    Coronary artery disease     Hypertension     Hazel     Renal disorder     Thyroid disease        Past Surgical History:   Procedure Laterality Date    HYSTERECTOMY      MASTECTOMY Right 1980       Review of patient's allergies indicates:   Allergen Reactions    Seroquel [quetiapine]      Causes  profound sedation.       No current facility-administered medications on file prior to encounter.      Current Outpatient Prescriptions on File Prior to Encounter   Medication Sig    allopurinol (ZYLOPRIM) 100 MG tablet Take 100 mg by mouth 2 (two) times daily.     aspirin 81 MG Chew Take 1 tablet (81 mg total) by mouth once daily.    diclofenac sodium 1 % Gel Apply 4 g topically 4 (four) times daily. Prn pain    fluticasone (FLONASE) 50 mcg/actuation nasal spray 2 sprays by Each Nare route once daily.    fluticasone-salmeterol 250-50 mcg/dose (ADVAIR) 250-50 mcg/dose diskus inhaler Inhale 1 puff into the lungs 2 (two) times daily. Controller    furosemide (LASIX) 40 MG tablet Take 1 tablet (40 mg total) by mouth once daily.    gabapentin (NEURONTIN) 100 MG capsule Take 1 capsule (100 mg total) by mouth 3 (three) times daily as needed (for nerve pain).    isosorbide-hydrALAZINE 20-37.5 mg (BIDIL) 20-37.5 mg Tab Take 2 tablets by mouth 3 (three) times daily.    levothyroxine (SYNTHROID) 100 MCG tablet Take 1 tablet (100 mcg total) by mouth before breakfast.    metoprolol succinate (TOPROL-XL) 50 MG 24 hr tablet Take 1 tablet (50 mg total) by mouth once daily.    pantoprazole (PROTONIX) 40 MG tablet Take 1 tablet (40 mg total) by mouth once daily.    polyethylene glycol (GLYCOLAX) 17 gram/dose powder Take 17 g by mouth once daily.    QUEtiapine (SEROQUEL) 100 MG Tab Take 1 tablet (100 mg total) by mouth nightly.    senna-docusate 8.6-50 mg  (PERICOLACE) 8.6-50 mg per tablet Take 1 tablet by mouth once daily.    spironolactone (ALDACTONE) 25 MG tablet Take 1 tablet (25 mg total) by mouth once daily.    tiotropium (SPIRIVA) 18 mcg inhalation capsule Inhale 1 capsule (18 mcg total) into the lungs once daily.     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   Respiratory: Positive for shortness of breath.      Objective:     Vital Signs (Most Recent):  Temp: 97.6 °F (36.4 °C) (01/31/18 1120)  Pulse: (!) 53 (01/31/18 1220)  Resp: 14 (01/31/18 1220)  BP: (!) 110/59 (01/31/18 1220)  SpO2: 95 % (01/31/18 1220) Vital Signs (24h Range):  Temp:  [93.7 °F (34.3 °C)-98.5 °F (36.9 °C)] 97.6 °F (36.4 °C)  Pulse:  [29-85] 53  Resp:  [11-28] 14  SpO2:  [91 %-98 %] 95 %  BP: ()/(44-81) 110/59     Weight: 59 kg (130 lb)  Body mass index is 20.36 kg/m².    SpO2: 95 %  O2 Device (Oxygen Therapy): room air      Intake/Output Summary (Last 24 hours) at 01/31/18 1303  Last data filed at 01/31/18 0743   Gross per 24 hour   Intake                0 ml   Output              275 ml   Net             -275 ml       Lines/Drains/Airways     Peripheral Intravenous Line                 Peripheral IV - Single Lumen 01/31/18 0128 Left Upper Arm less than 1 day                Physical Exam   Constitutional: She has a sickly appearance. She appears ill.   Neck:  JVD present.   Cardiovascular: Normal rate and regular rhythm.    Pulmonary/Chest: Effort normal. She has decreased breath sounds.   Abdominal: Soft. Bowel sounds are normal. She exhibits no distension. There is no tenderness.   Musculoskeletal: She exhibits edema.   Neurological: She is alert.   Responds appropriately to questions; unstimulated falls asleep easily    Skin: Skin is warm and dry.       Significant Labs:       Recent Labs  Lab 01/31/18  0525   *   K 4.8      CO2 18*   BUN 83*   CREATININE 3.7*   MG 2.4       Recent Labs  Lab 01/31/18  0525   WBC 2.91*   RBC 2.35*   HGB 7.3*   HCT 22.0*      MCV 94   MCH 31.1*   MCHC 33.2       Recent Labs  Lab 01/31/18  0525   TROPONINI 0.019       Significant Imaging: Echocardiogram:   2D echo with color flow doppler:   Results for orders placed or performed during the hospital encounter of 05/15/17   2D Echo w/ Color Flow Doppler   Result Value Ref Range    EF 30 (A) 55 - 65    Mitral Valve Regurgitation SEVERE (A)     Est. PA Systolic Pressure 52.45 (A)     Tricuspid Valve Regurgitation MODERATE (A)

## 2018-01-31 NOTE — PLAN OF CARE
Problem: Patient Care Overview  Goal: Plan of Care Review  Outcome: Ongoing (interventions implemented as appropriate)  Pt's SpO2 96% on room air. No adverse reactions to MDI. No respiratory distress noted. Continue to monitor SpO2.

## 2018-01-31 NOTE — ASSESSMENT & PLAN NOTE
-creatinine 3.7 with GFR 13  -baseline creatinine 3.0-3.4 in late December and 2.2-2.9 prior with GFR 15  -suspect cardiorenal etiology  -will monitor creatinine closely with diuresis  -Nephrology consulted

## 2018-01-31 NOTE — ED NOTES
Patient identifiers verified and correct for Anai Sal.  Pt was brought to the ER with c/o extremity swelling and a 12 lb weight gain.   LOC: The patient is awake, alert and aware of environment with an appropriate affect, the patient is oriented x 3 and speaking appropriately.  APPEARANCE: Patient resting comfortably and in no acute distress, patient is clean and well groomed, patient's clothing is properly fastened.  SKIN: The skin is warm and dry, color consistent with ethnicity, patient has normal skin turgor and moist mucus membranes, skin intact, no breakdown or bruising noted. Pitting edema to BLE and bilat hands.   MUSCULOSKELETAL: Patient moving all extremities spontaneously, no obvious swelling or deformities noted.  RESPIRATORY: Airway is open and patent, respirations are spontaneous, patient has a normal effort and rate, no accessory muscle use noted, bilateral breath sounds course.  CARDIAC: Patient has a normal rate and regular rhythm, no periphreal edema noted, capillary refill < 3 seconds.  ABDOMEN: Soft and non tender to palpation, no distention noted, normoactive bowel sounds present in all four quadrants.  NEUROLOGIC: eyes open spontaneously, behavior appropriate to situation, follows commands, facial expression symmetrical, bilateral hand grasp equal and even, purposeful motor response noted, normal sensation in all extremities when touched with a finger.

## 2018-01-31 NOTE — PROGRESS NOTES
Cipro dosage adjusted from 400 mg IV q12h to 400 mg IV q24h per hospital dosing protocol for est. CrCl of 12.8 ml/min (SCr 3.7 mg/dL).

## 2018-01-31 NOTE — ASSESSMENT & PLAN NOTE
-recent echo with severe MR; all previous echos with moderate to severe MR likely related to functional etiology  -MR definitely contributing to CHF exacerbation  -will continue with good afterload reduction and volume removal with diuresis  -discussed with daughter etiology and contributing issues of MR

## 2018-02-01 LAB
ALBUMIN SERPL BCP-MCNC: 3.5 G/DL
ALP SERPL-CCNC: 77 U/L
ALT SERPL W/O P-5'-P-CCNC: 20 U/L
ANION GAP SERPL CALC-SCNC: 16 MMOL/L
AST SERPL-CCNC: 30 U/L
BACTERIA UR CULT: NO GROWTH
BASOPHILS # BLD AUTO: 0.02 K/UL
BASOPHILS NFR BLD: 0.5 %
BILIRUB SERPL-MCNC: 0.8 MG/DL
BUN SERPL-MCNC: 86 MG/DL
CALCIUM SERPL-MCNC: 9 MG/DL
CHLORIDE SERPL-SCNC: 102 MMOL/L
CO2 SERPL-SCNC: 17 MMOL/L
CREAT SERPL-MCNC: 4.3 MG/DL
DIFFERENTIAL METHOD: ABNORMAL
EOSINOPHIL # BLD AUTO: 0 K/UL
EOSINOPHIL NFR BLD: 0.8 %
ERYTHROCYTE [DISTWIDTH] IN BLOOD BY AUTOMATED COUNT: 16.4 %
EST. GFR  (AFRICAN AMERICAN): 11 ML/MIN/1.73 M^2
EST. GFR  (NON AFRICAN AMERICAN): 10 ML/MIN/1.73 M^2
GLUCOSE SERPL-MCNC: 104 MG/DL
HCT VFR BLD AUTO: 22.4 %
HGB BLD-MCNC: 7.4 G/DL
LYMPHOCYTES # BLD AUTO: 0.3 K/UL
LYMPHOCYTES NFR BLD: 8.1 %
MAGNESIUM SERPL-MCNC: 2.4 MG/DL
MCH RBC QN AUTO: 31.4 PG
MCHC RBC AUTO-ENTMCNC: 33 G/DL
MCV RBC AUTO: 95 FL
MONOCYTES # BLD AUTO: 0.7 K/UL
MONOCYTES NFR BLD: 17.7 %
NEUTROPHILS # BLD AUTO: 2.9 K/UL
NEUTROPHILS NFR BLD: 72.6 %
PHOSPHATE SERPL-MCNC: 5 MG/DL
PLATELET # BLD AUTO: 243 K/UL
PMV BLD AUTO: 10.1 FL
POTASSIUM SERPL-SCNC: 5.1 MMOL/L
PROT SERPL-MCNC: 8.3 G/DL
RBC # BLD AUTO: 2.36 M/UL
SODIUM SERPL-SCNC: 135 MMOL/L
VANCOMYCIN SERPL-MCNC: 10 UG/ML
WBC # BLD AUTO: 3.95 K/UL

## 2018-02-01 PROCEDURE — 83735 ASSAY OF MAGNESIUM: CPT

## 2018-02-01 PROCEDURE — 25000003 PHARM REV CODE 250: Performed by: FAMILY MEDICINE

## 2018-02-01 PROCEDURE — 63600175 PHARM REV CODE 636 W HCPCS: Performed by: FAMILY MEDICINE

## 2018-02-01 PROCEDURE — G8980 MOBILITY D/C STATUS: HCPCS | Mod: CJ

## 2018-02-01 PROCEDURE — 94761 N-INVAS EAR/PLS OXIMETRY MLT: CPT

## 2018-02-01 PROCEDURE — 25000003 PHARM REV CODE 250: Performed by: STUDENT IN AN ORGANIZED HEALTH CARE EDUCATION/TRAINING PROGRAM

## 2018-02-01 PROCEDURE — 94640 AIRWAY INHALATION TREATMENT: CPT

## 2018-02-01 PROCEDURE — 80202 ASSAY OF VANCOMYCIN: CPT

## 2018-02-01 PROCEDURE — 93010 ELECTROCARDIOGRAM REPORT: CPT | Mod: 76,,, | Performed by: INTERNAL MEDICINE

## 2018-02-01 PROCEDURE — 93005 ELECTROCARDIOGRAM TRACING: CPT

## 2018-02-01 PROCEDURE — 84100 ASSAY OF PHOSPHORUS: CPT

## 2018-02-01 PROCEDURE — A4216 STERILE WATER/SALINE, 10 ML: HCPCS | Performed by: FAMILY MEDICINE

## 2018-02-01 PROCEDURE — G8978 MOBILITY CURRENT STATUS: HCPCS | Mod: CK

## 2018-02-01 PROCEDURE — 63600175 PHARM REV CODE 636 W HCPCS: Performed by: INTERNAL MEDICINE

## 2018-02-01 PROCEDURE — 97161 PT EVAL LOW COMPLEX 20 MIN: CPT

## 2018-02-01 PROCEDURE — 93010 ELECTROCARDIOGRAM REPORT: CPT | Mod: ,,, | Performed by: INTERNAL MEDICINE

## 2018-02-01 PROCEDURE — 85025 COMPLETE CBC W/AUTO DIFF WBC: CPT

## 2018-02-01 PROCEDURE — 80053 COMPREHEN METABOLIC PANEL: CPT

## 2018-02-01 PROCEDURE — 25000242 PHARM REV CODE 250 ALT 637 W/ HCPCS: Performed by: FAMILY MEDICINE

## 2018-02-01 PROCEDURE — 25000003 PHARM REV CODE 250: Performed by: INTERNAL MEDICINE

## 2018-02-01 PROCEDURE — 97535 SELF CARE MNGMENT TRAINING: CPT

## 2018-02-01 PROCEDURE — 36415 COLL VENOUS BLD VENIPUNCTURE: CPT

## 2018-02-01 PROCEDURE — 11000001 HC ACUTE MED/SURG PRIVATE ROOM

## 2018-02-01 PROCEDURE — 97165 OT EVAL LOW COMPLEX 30 MIN: CPT

## 2018-02-01 RX ORDER — FUROSEMIDE 10 MG/ML
40 INJECTION INTRAMUSCULAR; INTRAVENOUS 2 TIMES DAILY
Status: DISCONTINUED | OUTPATIENT
Start: 2018-02-01 | End: 2018-02-02

## 2018-02-01 RX ORDER — HALOPERIDOL 1 MG/1
1 TABLET ORAL NIGHTLY
Status: DISCONTINUED | OUTPATIENT
Start: 2018-02-01 | End: 2018-02-06 | Stop reason: HOSPADM

## 2018-02-01 RX ORDER — AZITHROMYCIN 250 MG/1
250 TABLET, FILM COATED ORAL DAILY
Status: COMPLETED | OUTPATIENT
Start: 2018-02-02 | End: 2018-02-05

## 2018-02-01 RX ORDER — SODIUM BICARBONATE 650 MG/1
2 TABLET ORAL 3 TIMES DAILY
Status: DISCONTINUED | OUTPATIENT
Start: 2018-02-01 | End: 2018-02-02

## 2018-02-01 RX ORDER — AZITHROMYCIN 250 MG/1
500 TABLET, FILM COATED ORAL DAILY
Status: COMPLETED | OUTPATIENT
Start: 2018-02-01 | End: 2018-02-01

## 2018-02-01 RX ORDER — FAMOTIDINE 10 MG/ML
20 INJECTION INTRAVENOUS DAILY
Status: DISCONTINUED | OUTPATIENT
Start: 2018-02-02 | End: 2018-02-03

## 2018-02-01 RX ADMIN — HEPARIN SODIUM 5000 UNITS: 5000 INJECTION, SOLUTION INTRAVENOUS; SUBCUTANEOUS at 09:02

## 2018-02-01 RX ADMIN — CIPROFLOXACIN 400 MG: 2 INJECTION, SOLUTION INTRAVENOUS at 02:02

## 2018-02-01 RX ADMIN — ATORVASTATIN CALCIUM 10 MG: 10 TABLET, FILM COATED ORAL at 08:02

## 2018-02-01 RX ADMIN — AMLODIPINE BESYLATE 10 MG: 5 TABLET ORAL at 08:02

## 2018-02-01 RX ADMIN — SODIUM CHLORIDE, PRESERVATIVE FREE 3 ML: 5 INJECTION INTRAVENOUS at 09:02

## 2018-02-01 RX ADMIN — Medication 1 CAPSULE: at 08:02

## 2018-02-01 RX ADMIN — SODIUM CHLORIDE, PRESERVATIVE FREE 3 ML: 5 INJECTION INTRAVENOUS at 03:02

## 2018-02-01 RX ADMIN — MIRTAZAPINE 30 MG: 15 TABLET, FILM COATED ORAL at 09:02

## 2018-02-01 RX ADMIN — ALLOPURINOL 100 MG: 100 TABLET ORAL at 09:02

## 2018-02-01 RX ADMIN — STANDARDIZED SENNA CONCENTRATE AND DOCUSATE SODIUM 1 TABLET: 8.6; 5 TABLET, FILM COATED ORAL at 08:02

## 2018-02-01 RX ADMIN — FLUTICASONE FUROATE AND VILANTEROL TRIFENATATE 1 PUFF: 100; 25 POWDER RESPIRATORY (INHALATION) at 08:02

## 2018-02-01 RX ADMIN — ASPIRIN 81 MG 81 MG: 81 TABLET ORAL at 08:02

## 2018-02-01 RX ADMIN — HALOPERIDOL 1 MG: 1 TABLET ORAL at 10:02

## 2018-02-01 RX ADMIN — VANCOMYCIN HYDROCHLORIDE 750 MG: 750 INJECTION, POWDER, LYOPHILIZED, FOR SOLUTION INTRAVENOUS at 05:02

## 2018-02-01 RX ADMIN — FUROSEMIDE 40 MG: 10 INJECTION, SOLUTION INTRAMUSCULAR; INTRAVENOUS at 05:02

## 2018-02-01 RX ADMIN — ALLOPURINOL 100 MG: 100 TABLET ORAL at 08:02

## 2018-02-01 RX ADMIN — LEVOTHYROXINE SODIUM 100 MCG: 25 TABLET ORAL at 05:02

## 2018-02-01 RX ADMIN — SODIUM BICARBONATE 650 MG TABLET 650 MG: at 05:02

## 2018-02-01 RX ADMIN — FLUTICASONE PROPIONATE 100 MCG: 50 SPRAY, METERED NASAL at 03:02

## 2018-02-01 RX ADMIN — HYDRALAZINE HYDROCHLORIDE AND ISOSORBIDE DINITRATE 2 TABLET: 37.5; 2 TABLET, FILM COATED ORAL at 03:02

## 2018-02-01 RX ADMIN — FUROSEMIDE 80 MG: 10 INJECTION, SOLUTION INTRAMUSCULAR; INTRAVENOUS at 08:02

## 2018-02-01 RX ADMIN — HEPARIN SODIUM 5000 UNITS: 5000 INJECTION, SOLUTION INTRAVENOUS; SUBCUTANEOUS at 05:02

## 2018-02-01 RX ADMIN — SODIUM BICARBONATE 650 MG TABLET 1300 MG: at 09:02

## 2018-02-01 RX ADMIN — PANTOPRAZOLE SODIUM 40 MG: 40 TABLET, DELAYED RELEASE ORAL at 08:02

## 2018-02-01 RX ADMIN — SODIUM CHLORIDE, PRESERVATIVE FREE 3 ML: 5 INJECTION INTRAVENOUS at 06:02

## 2018-02-01 RX ADMIN — HYDRALAZINE HYDROCHLORIDE AND ISOSORBIDE DINITRATE 2 TABLET: 37.5; 2 TABLET, FILM COATED ORAL at 07:02

## 2018-02-01 RX ADMIN — HYDRALAZINE HYDROCHLORIDE AND ISOSORBIDE DINITRATE 2 TABLET: 37.5; 2 TABLET, FILM COATED ORAL at 09:02

## 2018-02-01 RX ADMIN — HEPARIN SODIUM 5000 UNITS: 5000 INJECTION, SOLUTION INTRAVENOUS; SUBCUTANEOUS at 03:02

## 2018-02-01 RX ADMIN — TIOTROPIUM BROMIDE 18 MCG: 18 CAPSULE ORAL; RESPIRATORY (INHALATION) at 08:02

## 2018-02-01 RX ADMIN — AZITHROMYCIN 500 MG: 250 TABLET, FILM COATED ORAL at 03:02

## 2018-02-01 NOTE — PLAN OF CARE
Problem: Occupational Therapy Goal  Goal: Occupational Therapy Goal  Goals to be met by: 3/1     Patient will increase functional independence with ADLs by performing:    UE Dressing with Modified [place].  LE Dressing with Supervision.  Grooming while standing with Modified [place].  Toileting from toilet with Modified [place] for hygiene and clothing management.   Toilet transfer to toilet with Modified [place].    Outcome: Ongoing (interventions implemented as appropriate)  OT eval performed, report to follow    Pt has DME, will benefit from returning home and attending PACE program

## 2018-02-01 NOTE — PROGRESS NOTES
Progress Note  Nephrology      Consult Requested By: Francisco Estrada MD      SUBJECTIVE:     Overnight events  Patient is a 72 y.o. female     Patient Active Problem List   Diagnosis    Recurrent major depressive disorder, in partial remission    Insomnia    Stage 4 chronic kidney disease    Proteinuria    SOB (shortness of breath)    Essential hypertension    Acute on chronic combined systolic and diastolic heart failure    COPD exacerbation    Demand ischemia of myocardium    Postablative hypothyroidism    Chronic gout    Acute bronchitis due to infection    Chronic combined systolic and diastolic heart failure, NYHA class 3    History of breast cancer    Elevated troponin    Chronic hepatitis C virus infection    Unstable angina    Non-rheumatic mitral regurgitation    Chronic obstructive pulmonary disease    Leukopenia    Delirium due to another medical condition    Paroxysmal atrial fibrillation    Acute renal failure superimposed on stage 4 chronic kidney disease    Anemia of chronic renal failure, stage 4 (severe)    Cognitive impairment    Major neurocognitive disorder    Palliative care encounter    Esophagitis, [place] grade C    Congestive heart failure     Past Medical History:   Diagnosis Date    Breast cancer 1980    right    CHF (congestive heart failure)     Chronic hepatitis C virus infection 3/14/2017    Coronary artery disease     Hypertension     Hazel     Renal disorder     Thyroid disease               OBJECTIVE:     Vitals:    02/01/18 0855 02/01/18 1140 02/01/18 1200 02/01/18 1240   BP:  136/63     BP Location:       Patient Position:  Lying     Pulse: (!) 58 (!) 54 (!) 54    Resp: 16 19     Temp:  97 °F (36.1 °C)     TempSrc:  Oral     SpO2: 95%   98%   Weight:       Height:           Temp: 97 °F (36.1 °C) (02/01/18 1140)  Pulse: (!) 54 (02/01/18 1200)  Resp: 19 (02/01/18 1140)  BP: 136/63 (02/01/18 1140)  SpO2: 98 % (02/01/18 1240)      Date  02/01/18 0700 - 02/02/18 0659   Shift [id number] 3204-4804 4540-0659 24 Hour Total   I  N  T  A  K  E   P.O. 375   375    Shift Total  (mL/kg) 375  (6.4)   375  (6.4)   O  U  T  P  U  T   Shift Total  (mL/kg)       Weight (kg) 59 59 59 59             Medications:   allopurinol  100 mg Oral BID    amLODIPine  10 mg Oral Daily    aspirin  81 mg Oral Daily    atorvastatin  10 mg Oral Daily    [START ON 2/2/2018] azithromycin  250 mg Oral Daily    azithromycin  500 mg Oral Daily    fluticasone  2 spray Each Nare Daily    fluticasone-vilanterol  1 puff Inhalation Daily    furosemide  40 mg Intravenous BID    heparin (porcine)  5,000 Units Subcutaneous Q8H    isosorbide-hydrALAZINE 20-37.5 mg  2 tablet Oral TID    levothyroxine  100 mcg Oral Before breakfast    mirtazapine  30 mg Oral QHS    pantoprazole  40 mg Oral Daily    senna-docusate 8.6-50 mg  1 tablet Oral Daily    sodium bicarbonate  1 tablet Oral TID    sodium chloride 0.9%  3 mL Intravenous Q8H    tiotropium  18 mcg Inhalation Daily    vitamin renal formula (B-complex-vitamin c-folic acid)  1 capsule Oral Daily                 Physical Exam:  Feeling better  General appearance: NAD  Lungs: diminished breath sounds  Heart: pulse 54  /63  Abdomen: soft  Extremities: edema  Skin: dry  laboratory:  ABG  Labs reviewed  No results found for this or any previous visit (from the past 336 hour(s)).  Recent Results (from the past 336 hour(s))   CBC auto differential    Collection Time: 02/01/18 10:35 AM   Result Value Ref Range    WBC 3.95 3.90 - 12.70 K/uL    Hemoglobin 7.4 (L) 12.0 - 16.0 g/dL    Hematocrit 22.4 (L) 37.0 - 48.5 %    Platelets 243 150 - 350 K/uL   CBC auto differential    Collection Time: 01/31/18  5:25 AM   Result Value Ref Range    WBC 2.91 (L) 3.90 - 12.70 K/uL    Hemoglobin 7.3 (L) 12.0 - 16.0 g/dL    Hematocrit 22.0 (L) 37.0 - 48.5 %    Platelets 236 150 - 350 K/uL   CBC auto differential    Collection Time: 01/30/18  5:07  PM   Result Value Ref Range    WBC 2.95 (L) 3.90 - 12.70 K/uL    Hemoglobin 8.1 (L) 12.0 - 16.0 g/dL    Hematocrit 24.7 (L) 37.0 - 48.5 %    Platelets 244 150 - 350 K/uL     Urinalysis  No results for input(s): COLORU, CLARITYU, SPECGRAV, PHUR, PROTEINUA, GLUCOSEU, BILIRUBINCON, BLOODU, WBCU, RBCU, BACTERIA, MUCUS, NITRITE, LEUKOCYTESUR, UROBILINOGEN, HYALINECASTS in the last 24 hours.    Diagnostic Results:  X-Ray: Reviewed  US: Reviewed  Echo: Reviewed  ACCESS    ASSESSMENT/PLAN:   CKD 5  UA trace protein  US kidneys - chronic medical disease  Creatinine 4.3  BUN 86  Metabolic acidosis  Sodium bicarbonate 1300 mg tid  Hyponatremia  Metabolic bone disease  Hyperphosphatemia 5  Binder  Anemia Hb 7.4  Saturated iron 10  Renal cap  Nutrition   Albumin 3.5  Pulmonary edema  EF 30-35 %  Blood pressure 136/63  Lasix 40 mg BID IV  Weight daily  I and O  Renal, low salt, low K diet  Suplena 1 can bid  Avoid nephrotoxic agents, hypotension

## 2018-02-01 NOTE — PT/OT/SLP EVAL
"Physical Therapy Evaluation    Patient Name:  Anai Sal   MRN:  [id number]    Recommendations:     Discharge Recommendations:  Home, resume [facility]ACE program with PT/OT  Discharge Equipment Recommendations: none   Barriers to discharge: Inaccessible home    Assessment:     Anai Sal is a 72 y.o. female admitted with a medical diagnosis of Congestive heart failure.  She presents with the following impairments/functional limitations:  weakness, impaired endurance, gait instability, impaired functional mobilty, impaired self care skills, impaired balance, visual deficits, edema, decreased safety awareness, impaired joint extensibility, decreased ROM. Pt improved alertness and mobility today. Pt would benefit from continued acute care PT services as well as resumption of PACE day program with PT/OT upon hospital discharge.     Rehab Prognosis:  good; patient would benefit from acute skilled PT services to address these deficits and reach maximum level of function.      Recent Surgery: * No surgery found *      Plan:     During this hospitalization, patient to be seen 6 x/week to address the above listed problems via gait training, therapeutic activities, therapeutic exercises  · Plan of Care Expires:  03/04/18   Plan of Care Reviewed with: patient    Subjective     Communicated with SANDIP Lee prior to session.  Patient found L SL with BLE out of bed upon PT entry to room, agreeable to evaluation.      Chief Complaint: decreased strength  Patient comments/goals: "every day I get stronger so I can go home"  Pain/Comfort:  · Pain Rating 1: 0/10  · Pain Rating Post-Intervention 1: 0/10    Patients cultural, spiritual, Anabaptism conflicts given the current situation: None verbalized to PT    Living Environment:  Defer to Screen on 1/31/18 for living environment, PLOF, DME and assistance upon discharge.       Objective:     Patient found with: bed alarm, telemetry     General Precautions: Standard, fall   Orthopedic " Precautions:N/A   Braces: N/A     Exams:  · Cognitive Exam:  Patient is oriented to Person, Place, Time and Situation and follows 100% of simple commands   · Gross Motor Coordination:  impaired  · Postural Exam:  Patient presented with the following abnormalities:    · -       Rounded shoulders  · -       Forward head  · -       Kyphosis  · Sensation:    · -       Intact  · Skin Integrity/Edema:      · -       Edema: Mild BLE  · RLE ROM: WFL except DF  · RLE Strength: Grossly 4-/5  · LLE ROM: WFL except DF  · LLE Strength: Grossly 4-/5    Functional Mobility:  · Bed Mobility:     · Rolling Right: stand by assistance  · Scooting: stand by assistance  · Supine to Sit: stand by assistance  · Sit to Supine: stand by assistance  · Transfers:     · Sit to Stand:  contact guard assistance with rolling walker  · Gait: 50 feet, 25 feet with CGA and RW. See below for details  · Balance: Sitting: Good. Standing: Fair    AM-PAC 6 CLICK MOBILITY  Total Score:17       Therapeutic Activities and Exercises:   -Bed mobility as listed above.  -Pt ambulated 50 feet with RW and CGA/SBA. Pt required verbal cueing to stay within the Rw. Pt required total A to maneuver RW into bathroom.    -Refer to OT note for toileting.   -Pt ambulated ~15 feet to return to bed. Pt requires increased time and prompting to maneuver RW in right spaces.       Patient left HOB elevated with all lines intact, call button in reach and bed alarm on.    GOALS:    Physical Therapy Goals        Problem: Physical Therapy Goal    Goal Priority Disciplines Outcome Goal Variances Interventions   Physical Therapy Goal     PT/OT, PT Ongoing (interventions implemented as appropriate)     Description:  Goals to be met by: 3/4/18     Patient will increase functional independence with mobility by performin) sup<>sit mod I   2) sit <>stand mod I with RW   3) ambulate 200 feet with RW mod I   4) independent with LE therex.                     History:     Past Medical  History:   Diagnosis Date    Breast cancer 1980    right    CHF (congestive heart failure)     Chronic hepatitis C virus infection 3/14/2017    Coronary artery disease     Hypertension     Hazel     Renal disorder     Thyroid disease        Past Surgical History:   Procedure Laterality Date    HYSTERECTOMY      MASTECTOMY Right 1980       Clinical Decision Making:     History  Co-morbidities and personal factors that may impact the plan of care Examination  Body Structures and Functions, activity limitations and participation restrictions that may impact the plan of care Clinical Presentation   Decision Making/ Complexity Score   Co-morbidities:   [] Time since onset of injury / illness / exacerbation  [x] Status of current condition  []Patient's cognitive status and safety concerns    [] Multiple Medical Problems (see med hx)  Personal Factors:   [] Patient's age  [] Prior Level of function   [] Patient's home situation (environment and family support)  [] Patient's level of motivation  [] Expected progression of patient      HISTORY:(criteria)    [] 10141 - no personal factors/history    [x] 19132 - has 1-2 personal factor/comorbidity     [] 59540 - has >3 personal factor/comorbidity     Body Regions:  [] Objective examination findings  [] Head     []  Neck  [] Trunk   [] Upper Extremity  [] Lower Extremity    Body Systems:  [] For communication ability, affect, cognition, language, and learning style: the assessment of the ability to make needs known, consciousness, orientation (person, place, and time), expected emotional /behavioral responses, and learning preferences (eg, learning barriers, education  needs)  [] For the neuromuscular system: a general assessment of gross coordinated movement (eg, balance, gait, locomotion, transfers, and transitions) and motor function  (motor control and motor learning)  [] For the musculoskeletal system: the assessment of gross symmetry, gross range of motion,  gross strength, height, and weight  [x] For the integumentary system: the assessment of pliability(texture), presence of scar formation, skin color, and skin integrity  [] For cardiovascular/pulmonary system: the assessment of heart rate, respiratory rate, blood pressure, and edema     Activity limitations:    [] Patient's cognitive status and saf ety concerns          [] Status of current condition      [] Weight bearing restriction  [] Cardiopulmunary Restriction    Participation Restrictions:   [] Goals and goal agreement with the patient     [] Rehab potential (prognosis) and probable outcome      Examination of Body System: (criteria)    [x] 24238 - addressing 1-2 elements    [] 97832 - addressing a total of 3 or more elements     [] 52975 -  Addressing a total of 4 or more elements         Clinical Presentation: (criteria)  Stable - 14035     On examination of body system using standardized tests and measures patient presents with 1-2 elements from any of the following: body structures and functions, activity limitations, and/or participation restrictions.  Leading to a clinical presentation that is considered stable and/or uncomplicated                              Clinical Decision Making  (Eval Complexity):  Low- 56970     Time Tracking:     PT Received On: 02/01/18  PT Start Time: 1415     PT Stop Time: 1436  PT Total Time (min): 21 min     Billable Minutes: Evaluation 10      Otto Mclean PT, DPT  02/01/2018

## 2018-02-01 NOTE — PLAN OF CARE
Problem: Physical Therapy Goal  Goal: Physical Therapy Goal  Goals to be met by: 3/4/18     Patient will increase functional independence with mobility by performin) sup<>sit mod I   2) sit <>stand mod I with RW   3) ambulate 200 feet with RW mod I   4) independent with LE therex.   Outcome: Ongoing (interventions implemented as appropriate)  Initial evaluation complete, PT to follow. Rec home and return to PACE day program with PT/OT

## 2018-02-01 NOTE — PROGRESS NOTES
Progress Note    Admit Date: 1/30/2018   LOS: 0 days     SUBJECTIVE:     Patient feeling a lot better. Denies CP/SOB. Tolerating PO without N/V. Last BM a couple days ago, tolerating PO.     Scheduled Meds:   allopurinol  100 mg Oral BID    amLODIPine  10 mg Oral Daily    aspirin  81 mg Oral Daily    atorvastatin  10 mg Oral Daily    ciprofloxacin  400 mg Intravenous Q24H    fluticasone  2 spray Each Nare Daily    fluticasone-vilanterol  1 puff Inhalation Daily    furosemide  80 mg Intravenous BID    heparin (porcine)  5,000 Units Subcutaneous Q8H    isosorbide-hydrALAZINE 20-37.5 mg  2 tablet Oral TID    levothyroxine  100 mcg Oral Before breakfast    mirtazapine  30 mg Oral QHS    pantoprazole  40 mg Oral Daily    piperacillin-tazobactam 4.5 g in dextrose 5 % 100 mL IVPB (ready to mix system)  4.5 g Intravenous Q12H    senna-docusate 8.6-50 mg  1 tablet Oral Daily    sodium bicarbonate  1 tablet Oral TID    sodium chloride 0.9%  3 mL Intravenous Q8H    tiotropium  18 mcg Inhalation Daily    vitamin renal formula (B-complex-vitamin c-folic acid)  1 capsule Oral Daily     Continuous Infusions:  PRN Meds:    Review of patient's allergies indicates:   Allergen Reactions    Seroquel [quetiapine]      Causes  profound sedation.     Review of Systems:  Constitutional: no fever or chills  Eyes: no visual changes  Respiratory: no cough or shortness of breath  Cardiovascular: no chest pain   Gastrointestinal: no nausea or vomiting; no abdominal pain   Genitourinary: +urination     OBJECTIVE:     Vital Signs (Most Recent)  Temp: 98.3 °F (36.8 °C) (02/01/18 0737)  Pulse: (!) 58 (02/01/18 0855)  Resp: 16 (02/01/18 0855)  BP: (!) 150/79 (02/01/18 0737)  SpO2: 95 % (02/01/18 0855)    Vital Signs Range (Last 24H):  Temp:  [97.4 °F (36.3 °C)-98.3 °F (36.8 °C)]   Pulse:  [29-63]   Resp:  [14-22]   BP: (110-154)/(52-79)   SpO2:  [93 %-99 %]     I & O (Last 24H):  Intake/Output Summary (Last 24 hours) at 02/01/18  0958  Last data filed at 02/01/18 0830   Gross per 24 hour   Intake             1425 ml   Output              300 ml   Net             1125 ml     Physical Exam:  General: well developed, well nourished, poor dentition  Eyes: conjunctivae/corneas clear, asymmetric gaze  Lungs:  clear to auscultation bilaterally and normal respiratory effort  Cardiovascular: Heart: regular rate and rhythm, S1, S2 normal, no murmur, click, rub or gallop. Chest Wall: no tenderness.   Abdomen/Rectal: Abdomen: soft, non-tender non-distented; bowel sounds normal; no masses,  no organomegaly.    Musculoskeletal: no LE edema or calf tenderness  Neurologic: AO, no change in mental status  Psych/Behavioral:  Alert and oriented, appropriate affect.    Laboratory:  CBC:   Recent Labs  Lab 01/31/18  0525   WBC 2.91*   RBC 2.35*   HGB 7.3*   HCT 22.0*      MCV 94   MCH 31.1*   MCHC 33.2     CMP:   Recent Labs  Lab 01/31/18  0525   GLU 96   CALCIUM 9.1   ALBUMIN 3.6   PROT 8.4   *   K 4.8   CO2 18*      BUN 83*   CREATININE 3.7*   ALKPHOS 82   ALT 21   AST 31   BILITOT 0.8     Diagnostic Results:  CXR 01/30:   Findings suggesting pulmonary edema/CHF pattern, with interstitial pneumonia not excluded.    Interval resolution of previous right-sided pleural effusion and right basilar atelectasis/airspace disease.    ASSESSMENT/PLAN:   congestive heart failure; Chronic hepatitis C virus infection (3/14/2017); Coronary artery disease; Hypertension; Hazel; Renal disorder; and Thyroid disease. here for acute on chronic CHF exacerbation     Acute on Chronic CHF exacerbation  -improved SOB, not requiring oxygen  - increased lasix to 80 BID, urine output improving somewhat.   - Strict I/Os  - Fluid restriction to 1000 cc/ 24 hr, -525 yesterday.   -consulted [facility] cards     Essential HTN  - Continue isosorbide-Hydralazine   - Hold Metoprolol as pt was bradycardic     ACS workup  -negative      Hx of A.fib  - Pt is on metoprolol. EKG upon  admission NSR. Holding BB as pt is bradycardic.   - Pt was not on anticoagulation as in outpt  - Heparin for prophylaxis started     CANDI on CKD  - Pt with baseline of 3.2, now 3.7  - UA ordered   - [facility] nephro consulted appreciate recs   - Will renally dose all meds and avoid nephrotoxic agents.      Sepsis   -could be a side effect of zyprexa  - still hypothermic   -not neutropenic   - Blood cultures, urine cultures, LA, procalc ordered  - CXR positive for possible interstitial PNA  - treat for Hospital  Acquired PNA as pt was recently discharged from a hospital  - Vanc/zosyn/cipro.      Post ablation hypothyroidism  - S/p ablation in 2016   - continue levothyroxine  - TSH elevated, T4 WNL     Hx of chronic hep C virus  - AST/ALT normal.      PPx: Heparin  Diet: Cardiac     Dispo:CX's NGTD,  f/u nephrology and cards     2/1/2018 10:01 AM Jose C Caldera M.D.

## 2018-02-01 NOTE — PROGRESS NOTES
"Vancomycin Dosing and Monitoring Pharmacy Protocol    Anai Sal is a 72 y.o. female    Height: 5' 7" (1.702 m)   Wt Readings from Last 1 Encounters:   01/30/18 59 kg (130 lb)     Ideal body weight: 61.6 kg (135 lb 12.9 oz)    Temp Readings from Last 3 Encounters:   02/01/18 97 °F (36.1 °C) (Oral)   12/26/17 97.9 °F (36.6 °C) (Oral)   07/11/17 98.2 °F (36.8 °C) (Oral)      Lab Results   Component Value Date/Time    WBC 3.95 02/01/2018 10:35 AM    WBC 2.91 (L) 01/31/2018 05:25 AM    WBC 2.95 (L) 01/30/2018 05:07 PM      Lab Results   Component Value Date/Time    CREATININE 4.3 (H) 02/01/2018 10:35 AM    CREATININE 3.7 (H) 01/31/2018 05:25 AM    CREATININE 3.7 (H) 01/30/2018 05:07 PM        Serum creatinine: 4.3 mg/dL High 02/01/18 1035  Estimated creatinine clearance: 11 mL/min    Antibiotics       Start     Stop Route Frequency Ordered    02/02/18 0900  azithromycin tablet 250 mg      02/06 0859 Oral Daily 02/01/18 1203    02/01/18 1615  vancomycin (VANCOCIN) 750 mg in sodium chloride 0.9% 250 mL IVPB      -- IV Once 02/01/18 1510    02/01/18 1315  azithromycin tablet 500 mg      02/02 0859 Oral Daily 02/01/18 1203          Antifungals       None            Microbiology Results (last 7 days)       Procedure Component Value Units Date/Time    Blood culture [[id number]] Collected:  01/30/18 2245    Order Status:  Completed Specimen:  Blood from Peripheral, Antecubital, Left Updated:  02/01/18 0613     Blood Culture, Routine No Growth to date     Blood Culture, Routine No Growth to date    Narrative:       Please obtain from 2 different sites    Blood culture [[id number]] Collected:  01/30/18 2235    Order Status:  Completed Specimen:  Blood from Peripheral, Antecubital, Right Updated:  02/01/18 0613     Blood Culture, Routine No Growth to date     Blood Culture, Routine No Growth to date    Urine culture [[id number]] Collected:  01/30/18 2150    Order Status:  Completed Specimen:  Urine from Urine, Catheterized " Updated:  18     Urine Culture, Routine No growth            Indication:   Pneumonia    Target Level: 15-20 mcg/ml    Hemodialysis:   No on N/A    Dosing Weight:   ABW--actual body weight  If ABW is greater than or equal to 30% over Ideal Body Weight, AdjBW will be used to calculate vancomycin dose.    Last Vancomycin dose: 1000 mg   Date/Time given:  033          Vancomycin level:  No results for input(s): VANCOMYCIN-TROUGH in the last 72 hours.  Recent Labs   Lab Result Units  18   1035   Vancomycin, Random ug/mL  10.0       Per Protocol Initial/Adjustments Dosin. Initial/Adjustment Dose: vancomycin 750mg iv x 1   2. Vancomycin Random Level will be drawn on  1200date/time    Pharmacy will continue to follow.    Please contact if you have any further questions. Thank you.    Christiano Pressley, PharmD  [phone]

## 2018-02-01 NOTE — PLAN OF CARE
Problem: Patient Care Overview  Goal: Plan of Care Review  Outcome: Ongoing (interventions implemented as appropriate)  The proper method of use, as well as anticipated side effects, of this metered-dose inhaler are discussed and demonstrated to the patient.Pt on RA with sats of 98%. Will continue to monitor.

## 2018-02-01 NOTE — PLAN OF CARE
Problem: Patient Care Overview  Goal: Plan of Care Review  Outcome: Ongoing (interventions implemented as appropriate)  Patient is awake,alert and oriented. She is on heart monitoring running Sinus Bradycardia at 59bpm. Bruises noted on belly. No complaints of pain. Bed alarms on, non skid socks on and instructed to call for help. Ambulated to bathroom as needed.

## 2018-02-01 NOTE — PT/OT/SLP EVAL
Occupational Therapy   Evaluation    Name: Anai Sal  MRN: [id number]  Admitting Diagnosis:  Congestive heart failure      Recommendations:     Discharge Recommendations: home, other (see comments) (return to PACE program)  Discharge Equipment Recommendations:  none  Barriers to discharge:  Decreased caregiver support    History:     Occupational Profile:  Living Environment: Pt lives w/dtr in 2 story house 3 VITALIY w/ Rt HR; has flight of stairs inside.  Pt's bedroom on 1st floor, has to go upstairs for tub/shower.    Previous level of function: requiring assist for ADLs and mobility.    Roles and Routines:   Equipment Owned:  walker, rolling, bedside commode, bath bench, cane, quad  Assistance upon Discharge: daughter goes to school during the day; pt goes to [facility] M-F    Pt was hospitalized in Dec 2017 with plan to DC to SNF--Pt's son passed away in [place]--family chose not to admit to SNF during this time.  Pt went home, daughter provided sitters as needed and pt attended PACE program 5 days/week (pt with 24 hour supervision since last discharge)      Prior to Dec admit, pt was home alone during the day Mod I. (Pt's daughter in nursing school-graduates in May)    Past Medical History:   Diagnosis Date    Breast cancer 1980    right    CHF (congestive heart failure)     Chronic hepatitis C virus infection 3/14/2017    Coronary artery disease     Hypertension     Hazel     Renal disorder     Thyroid disease        Past Surgical History:   Procedure Laterality Date    HYSTERECTOMY      MASTECTOMY Right 1980       Subjective     Chief Complaint: weakness  Patient/Family stated goals: return home  Communicated with: nurse prior to session.  Pain/Comfort:  · Pain Rating 1: 0/10  · Pain Rating Post-Intervention 1: 0/10    Patients cultural, spiritual, Congregational conflicts given the current situation:      Objective:     Patient found with: peripheral IV    General Precautions: Standard, fall   Orthopedic  Precautions:    Braces:       Occupational Performance:    Bed Mobility:    · Patient completed Rolling/Turning to Left with  stand by assistance  · Patient completed Scooting/Bridging with stand by assistance  · Patient completed Supine to Sit with stand by assistance  · Patient completed Sit to Supine with stand by assistance    Functional Mobility/Transfers:  · Patient completed Sit <> Stand Transfer with stand by assistance and contact guard assistance  with  rolling walker   · Patient completed Toilet Transfer Step Transfer technique with stand by assistance and contact guard assistance with  rolling walker; mod A to rise from low surface with limited availability to use UE to push up  · Functional Mobility: CGA w/RW to/from bathroom; defer to PT for more info    Activities of Daily Living:  · LB Dressing: moderate assistance socks  · Toileting: minimum assistance      Cognitive/Visual Perceptual:  AO4, decreased safety awareness    Physical Exam:  BUE AROM limited at shoulder, elbow extension.  Strength grossly 2+/5, although functional for pt use  Good sit balance; fair+ stand balance    Patient left HOB elevated with all lines intact, call button in reach, bed alarm on and nurse notified    AMPA 6 Click:  AMPA Total Score: 19    Treatment & Education:  Pt educated on role of OT/POC, general safety precautions.  Performed ADLs and functional mobility as above  Education:    Assessment:     Anai Sal is a 72 y.o. female with a medical diagnosis of Congestive heart failure.  She presents with performance deficits affecting function are weakness, gait instability, impaired endurance, impaired balance, impaired functional mobilty, impaired self care skills, impaired cardiopulmonary response to activity, decreased safety awareness.      Rehab Prognosis:  good; patient would benefit from acute skilled OT services to address these deficits and reach maximum level of function.         Clinical Decision  "[place]akin.  OT Low:  "Pt evaluation falls under low complexity for evaluation coding due to performance deficits noted in 1-3 areas as stated above and 0 co-morbities affecting current functional status. Data obtained from problem focused assessments. No modifications or assistance was required for completion of evaluation. Only brief occupational profile and history review completed."     Plan:     Patient to be seen 5 x/week to address the above listed problems via self-care/home management, therapeutic activities, therapeutic exercises  · Plan of Care Expires: 18  · Plan of Care Reviewed with: patient    This Plan of care has been discussed with the patient who was involved in its development and understands and is in agreement with the identified goals and treatment plan    GOALS:    Occupational Therapy Goals        Problem: Occupational Therapy Goal    Goal Priority Disciplines Outcome Interventions   Occupational Therapy Goal     OT, PT/OT Ongoing (interventions implemented as appropriate)    Description:  Goals to be met by: 3/1     Patient will increase functional independence with ADLs by performing:    UE Dressing with Modified [place].  LE Dressing with Supervision.  Grooming while standing with Modified [place].  Toileting from toilet with Modified [place] for hygiene and clothing management.   Toilet transfer to toilet with Modified [place].                      Time Tracking:     OT Date of Treatment: 18  OT Start Time: 1415  OT Stop Time: 1436  OT Total Time (min): 21 min    Billable Minutes:Evaluation 13  Self Care/Home Management 8    Anirudh Clayton OT  2018    "

## 2018-02-01 NOTE — PLAN OF CARE
"TN went to meet with patient, no family present at bedside.  Patient lives with her daughter Treshone at home. She states her daughter is her primary caregiver. Patient has a rolling walker at home, as well as home health. She is unsure of the company name. TN will call daughter. She does not drive, but her daughter provides transport. Patient does not believe she needs any extra "help" at home. TN will continue to follow and see therapy recommendations.     Future Appointments  Date Time Provider Department Center   2/7/2018 11:00 AM Halima Isidro MD [facility] CARDIO [facility]   3/21/2018 11:20 AM Bud Benoit MD [facility] NEPHRO [facility]        02/01/18 1234   Discharge Assessment   Assessment Type Discharge Planning Assessment   Confirmed/corrected address and phone number on facesheet? Yes   Assessment information obtained from? Patient   Prior to hospitilization cognitive status: Alert/Oriented   Prior to hospitalization functional status: Assistive Equipment   Current cognitive status: Alert/Oriented   Current Functional Status: Assistive Equipment   Facility Arrived From: Home with Home Health   Lives With child(kylei), adult   Who are your caregiver(s) and their phone number(s)? Turner,Treshone Daughter [phone]    Readmission Within The Last 30 Days no previous admission in last 30 days   Equipment Currently Used at Home walker, rolling   Do you have any problems affording any of your prescribed medications? No   Is the patient taking medications as prescribed? yes   Does the patient have transportation home? Yes   Transportation Available family or friend will provide   Dialysis Name and Scheduled days N/A   Discharge Plan A Home with family;Home Health   Discharge Plan B Skilled Nursing Facility   Patient/Family In Agreement With Plan yes     Janelle Pal RN  Transition Navigator  [phone]  "

## 2018-02-02 LAB
25(OH)D3+25(OH)D2 SERPL-MCNC: 28 NG/ML
ABO + RH BLD: NORMAL
ALBUMIN SERPL BCP-MCNC: 3.3 G/DL
ALBUMIN SERPL BCP-MCNC: 3.5 G/DL
ALLENS TEST: ABNORMAL
ALP SERPL-CCNC: 66 U/L
ALP SERPL-CCNC: 75 U/L
ALT SERPL W/O P-5'-P-CCNC: 19 U/L
ALT SERPL W/O P-5'-P-CCNC: 19 U/L
ANION GAP SERPL CALC-SCNC: 15 MMOL/L
ANION GAP SERPL CALC-SCNC: 19 MMOL/L
ANISOCYTOSIS BLD QL SMEAR: ABNORMAL
AST SERPL-CCNC: 23 U/L
AST SERPL-CCNC: 37 U/L
BASOPHILS # BLD AUTO: 0.07 K/UL
BASOPHILS NFR BLD: 1.9 %
BILIRUB SERPL-MCNC: 0.7 MG/DL
BILIRUB SERPL-MCNC: 0.8 MG/DL
BILIRUB UR QL STRIP: NEGATIVE
BLD GP AB SCN CELLS X3 SERPL QL: NORMAL
BLD PROD TYP BPU: NORMAL
BLOOD UNIT EXPIRATION DATE: NORMAL
BLOOD UNIT TYPE CODE: 6200
BLOOD UNIT TYPE: NORMAL
BUN SERPL-MCNC: 84 MG/DL
BUN SERPL-MCNC: 91 MG/DL
CALCIUM SERPL-MCNC: 9.1 MG/DL
CALCIUM SERPL-MCNC: 9.4 MG/DL
CHLORIDE SERPL-SCNC: 104 MMOL/L
CHLORIDE SERPL-SCNC: 106 MMOL/L
CLARITY UR: CLEAR
CO2 SERPL-SCNC: 13 MMOL/L
CO2 SERPL-SCNC: 19 MMOL/L
CODING SYSTEM: NORMAL
COLOR UR: YELLOW
CREAT SERPL-MCNC: 4.2 MG/DL
CREAT SERPL-MCNC: 4.5 MG/DL
DELSYS: ABNORMAL
DIFFERENTIAL METHOD: ABNORMAL
DISPENSE STATUS: NORMAL
EOSINOPHIL # BLD AUTO: 0.2 K/UL
EOSINOPHIL NFR BLD: 4.6 %
ERYTHROCYTE [DISTWIDTH] IN BLOOD BY AUTOMATED COUNT: 16.7 %
EST. GFR  (AFRICAN AMERICAN): 11 ML/MIN/1.73 M^2
EST. GFR  (AFRICAN AMERICAN): 11 ML/MIN/1.73 M^2
EST. GFR  (NON AFRICAN AMERICAN): 10 ML/MIN/1.73 M^2
EST. GFR  (NON AFRICAN AMERICAN): 9 ML/MIN/1.73 M^2
GLUCOSE SERPL-MCNC: 100 MG/DL
GLUCOSE SERPL-MCNC: 84 MG/DL
GLUCOSE UR QL STRIP: NEGATIVE
HAPTOGLOB SERPL-MCNC: 41 MG/DL
HCO3 UR-SCNC: 19.9 MMOL/L (ref 24–28)
HCT VFR BLD AUTO: 20.9 %
HGB BLD-MCNC: 6.6 G/DL
HGB UR QL STRIP: NEGATIVE
HYPOCHROMIA BLD QL SMEAR: ABNORMAL
KETONES UR QL STRIP: NEGATIVE
LDH SERPL L TO P-CCNC: 233 U/L
LEUKOCYTE ESTERASE UR QL STRIP: NEGATIVE
LYMPHOCYTES # BLD AUTO: 0.7 K/UL
LYMPHOCYTES NFR BLD: 19.9 %
MAGNESIUM SERPL-MCNC: 2.9 MG/DL
MCH RBC QN AUTO: 31 PG
MCHC RBC AUTO-ENTMCNC: 31.6 G/DL
MCV RBC AUTO: 98 FL
MONOCYTES # BLD AUTO: 0.6 K/UL
MONOCYTES NFR BLD: 16.2 %
NEUTROPHILS # BLD AUTO: 2 K/UL
NEUTROPHILS NFR BLD: 57.4 %
NITRITE UR QL STRIP: NEGATIVE
OVALOCYTES BLD QL SMEAR: ABNORMAL
PCO2 BLDA: 27.8 MMHG (ref 35–45)
PH SMN: 7.46 [PH] (ref 7.35–7.45)
PH UR STRIP: 6 [PH] (ref 5–8)
PHOSPHATE SERPL-MCNC: 5.4 MG/DL
PLATELET # BLD AUTO: 165 K/UL
PLATELET BLD QL SMEAR: ABNORMAL
PMV BLD AUTO: 10.4 FL
PO2 BLDA: 51 MMHG (ref 80–100)
POC BE: -4 MMOL/L
POC SATURATED O2: 88 % (ref 95–100)
POC TCO2: 21 MMOL/L (ref 23–27)
POIKILOCYTOSIS BLD QL SMEAR: SLIGHT
POTASSIUM SERPL-SCNC: 3.9 MMOL/L
POTASSIUM SERPL-SCNC: 5.5 MMOL/L
PROT SERPL-MCNC: 7.8 G/DL
PROT SERPL-MCNC: 8 G/DL
PROT UR QL STRIP: ABNORMAL
PTH-INTACT SERPL-MCNC: 435.3 PG/ML
RBC # BLD AUTO: 2.13 M/UL
SAMPLE: ABNORMAL
SITE: ABNORMAL
SODIUM SERPL-SCNC: 138 MMOL/L
SODIUM SERPL-SCNC: 138 MMOL/L
SP GR UR STRIP: 1.01 (ref 1–1.03)
TRANS ERYTHROCYTES VOL PATIENT: NORMAL ML
URN SPEC COLLECT METH UR: ABNORMAL
UROBILINOGEN UR STRIP-ACNC: NEGATIVE EU/DL
WBC # BLD AUTO: 3.71 K/UL

## 2018-02-02 PROCEDURE — 80053 COMPREHEN METABOLIC PANEL: CPT | Mod: 91

## 2018-02-02 PROCEDURE — 85014 HEMATOCRIT: CPT

## 2018-02-02 PROCEDURE — 63600175 PHARM REV CODE 636 W HCPCS: Performed by: FAMILY MEDICINE

## 2018-02-02 PROCEDURE — 83615 LACTATE (LD) (LDH) ENZYME: CPT

## 2018-02-02 PROCEDURE — 25000003 PHARM REV CODE 250: Performed by: FAMILY MEDICINE

## 2018-02-02 PROCEDURE — 97110 THERAPEUTIC EXERCISES: CPT

## 2018-02-02 PROCEDURE — 27000221 HC OXYGEN, UP TO 24 HOURS

## 2018-02-02 PROCEDURE — 94761 N-INVAS EAR/PLS OXIMETRY MLT: CPT

## 2018-02-02 PROCEDURE — 25000003 PHARM REV CODE 250: Performed by: INTERNAL MEDICINE

## 2018-02-02 PROCEDURE — P9021 RED BLOOD CELLS UNIT: HCPCS

## 2018-02-02 PROCEDURE — 36600 WITHDRAWAL OF ARTERIAL BLOOD: CPT

## 2018-02-02 PROCEDURE — 80053 COMPREHEN METABOLIC PANEL: CPT

## 2018-02-02 PROCEDURE — 85025 COMPLETE CBC W/AUTO DIFF WBC: CPT

## 2018-02-02 PROCEDURE — S0028 INJECTION, FAMOTIDINE, 20 MG: HCPCS | Performed by: FAMILY MEDICINE

## 2018-02-02 PROCEDURE — 97530 THERAPEUTIC ACTIVITIES: CPT

## 2018-02-02 PROCEDURE — 81003 URINALYSIS AUTO W/O SCOPE: CPT

## 2018-02-02 PROCEDURE — 36430 TRANSFUSION BLD/BLD COMPNT: CPT

## 2018-02-02 PROCEDURE — 85018 HEMOGLOBIN: CPT

## 2018-02-02 PROCEDURE — 25000242 PHARM REV CODE 250 ALT 637 W/ HCPCS: Performed by: FAMILY MEDICINE

## 2018-02-02 PROCEDURE — 82306 VITAMIN D 25 HYDROXY: CPT

## 2018-02-02 PROCEDURE — 25000242 PHARM REV CODE 250 ALT 637 W/ HCPCS: Performed by: INTERNAL MEDICINE

## 2018-02-02 PROCEDURE — 63600175 PHARM REV CODE 636 W HCPCS: Performed by: INTERNAL MEDICINE

## 2018-02-02 PROCEDURE — 97116 GAIT TRAINING THERAPY: CPT

## 2018-02-02 PROCEDURE — A4216 STERILE WATER/SALINE, 10 ML: HCPCS | Performed by: FAMILY MEDICINE

## 2018-02-02 PROCEDURE — 25000003 PHARM REV CODE 250: Performed by: STUDENT IN AN ORGANIZED HEALTH CARE EDUCATION/TRAINING PROGRAM

## 2018-02-02 PROCEDURE — 83970 ASSAY OF PARATHORMONE: CPT

## 2018-02-02 PROCEDURE — 84100 ASSAY OF PHOSPHORUS: CPT

## 2018-02-02 PROCEDURE — 11000001 HC ACUTE MED/SURG PRIVATE ROOM

## 2018-02-02 PROCEDURE — 82803 BLOOD GASES ANY COMBINATION: CPT

## 2018-02-02 PROCEDURE — 25000003 PHARM REV CODE 250: Performed by: PSYCHIATRY & NEUROLOGY

## 2018-02-02 PROCEDURE — 36415 COLL VENOUS BLD VENIPUNCTURE: CPT

## 2018-02-02 PROCEDURE — 94640 AIRWAY INHALATION TREATMENT: CPT

## 2018-02-02 PROCEDURE — 86901 BLOOD TYPING SEROLOGIC RH(D): CPT

## 2018-02-02 PROCEDURE — 86920 COMPATIBILITY TEST SPIN: CPT

## 2018-02-02 PROCEDURE — 97535 SELF CARE MNGMENT TRAINING: CPT

## 2018-02-02 PROCEDURE — 83010 ASSAY OF HAPTOGLOBIN QUANT: CPT

## 2018-02-02 PROCEDURE — 83735 ASSAY OF MAGNESIUM: CPT

## 2018-02-02 RX ORDER — DIVALPROEX SODIUM 250 MG/1
250 TABLET, DELAYED RELEASE ORAL 2 TIMES DAILY
Status: COMPLETED | OUTPATIENT
Start: 2018-02-02 | End: 2018-02-02

## 2018-02-02 RX ORDER — DIVALPROEX SODIUM 250 MG/1
250 TABLET, DELAYED RELEASE ORAL DAILY
Status: DISCONTINUED | OUTPATIENT
Start: 2018-02-03 | End: 2018-02-06 | Stop reason: HOSPADM

## 2018-02-02 RX ORDER — DIVALPROEX SODIUM 500 MG/1
500 TABLET, DELAYED RELEASE ORAL NIGHTLY
Status: DISCONTINUED | OUTPATIENT
Start: 2018-02-03 | End: 2018-02-06 | Stop reason: HOSPADM

## 2018-02-02 RX ORDER — HYDROCODONE BITARTRATE AND ACETAMINOPHEN 500; 5 MG/1; MG/1
TABLET ORAL
Status: DISCONTINUED | OUTPATIENT
Start: 2018-02-02 | End: 2018-02-06 | Stop reason: HOSPADM

## 2018-02-02 RX ORDER — SODIUM BICARBONATE 650 MG/1
2 TABLET ORAL 4 TIMES DAILY
Status: DISCONTINUED | OUTPATIENT
Start: 2018-02-02 | End: 2018-02-02

## 2018-02-02 RX ORDER — SODIUM BICARBONATE 650 MG/1
2 TABLET ORAL 3 TIMES DAILY
Status: DISCONTINUED | OUTPATIENT
Start: 2018-02-02 | End: 2018-02-06 | Stop reason: HOSPADM

## 2018-02-02 RX ORDER — SEVELAMER CARBONATE 800 MG/1
800 TABLET, FILM COATED ORAL 2 TIMES DAILY WITH MEALS
Status: DISCONTINUED | OUTPATIENT
Start: 2018-02-02 | End: 2018-02-06 | Stop reason: HOSPADM

## 2018-02-02 RX ORDER — SEVELAMER CARBONATE 800 MG/1
800 TABLET, FILM COATED ORAL 2 TIMES DAILY WITH MEALS
Status: DISCONTINUED | OUTPATIENT
Start: 2018-02-02 | End: 2018-02-02

## 2018-02-02 RX ORDER — ALBUTEROL SULFATE 0.83 MG/ML
5 SOLUTION RESPIRATORY (INHALATION) ONCE
Status: COMPLETED | OUTPATIENT
Start: 2018-02-02 | End: 2018-02-02

## 2018-02-02 RX ORDER — ERGOCALCIFEROL 1.25 MG/1
50000 CAPSULE ORAL
Status: DISCONTINUED | OUTPATIENT
Start: 2018-02-03 | End: 2018-02-06 | Stop reason: HOSPADM

## 2018-02-02 RX ORDER — SODIUM CITRATE AND CITRIC ACID MONOHYDRATE 334; 500 MG/5ML; MG/5ML
30 SOLUTION ORAL ONCE
Status: COMPLETED | OUTPATIENT
Start: 2018-02-02 | End: 2018-02-02

## 2018-02-02 RX ADMIN — HEPARIN SODIUM 5000 UNITS: 5000 INJECTION, SOLUTION INTRAVENOUS; SUBCUTANEOUS at 02:02

## 2018-02-02 RX ADMIN — ALBUTEROL SULFATE 5 MG: 2.5 SOLUTION RESPIRATORY (INHALATION) at 04:02

## 2018-02-02 RX ADMIN — SEVELAMER CARBONATE 800 MG: 800 TABLET, FILM COATED ORAL at 09:02

## 2018-02-02 RX ADMIN — STANDARDIZED SENNA CONCENTRATE AND DOCUSATE SODIUM 1 TABLET: 8.6; 5 TABLET, FILM COATED ORAL at 09:02

## 2018-02-02 RX ADMIN — HYDRALAZINE HYDROCHLORIDE AND ISOSORBIDE DINITRATE 2 TABLET: 37.5; 2 TABLET, FILM COATED ORAL at 02:02

## 2018-02-02 RX ADMIN — SODIUM BICARBONATE 650 MG TABLET 1300 MG: at 09:02

## 2018-02-02 RX ADMIN — SODIUM CHLORIDE, PRESERVATIVE FREE 3 ML: 5 INJECTION INTRAVENOUS at 09:02

## 2018-02-02 RX ADMIN — MIRTAZAPINE 30 MG: 15 TABLET, FILM COATED ORAL at 09:02

## 2018-02-02 RX ADMIN — HYDRALAZINE HYDROCHLORIDE AND ISOSORBIDE DINITRATE 2 TABLET: 37.5; 2 TABLET, FILM COATED ORAL at 09:02

## 2018-02-02 RX ADMIN — AZITHROMYCIN 250 MG: 250 TABLET, FILM COATED ORAL at 09:02

## 2018-02-02 RX ADMIN — TIOTROPIUM BROMIDE 18 MCG: 18 CAPSULE ORAL; RESPIRATORY (INHALATION) at 08:02

## 2018-02-02 RX ADMIN — SODIUM CHLORIDE, PRESERVATIVE FREE 3 ML: 5 INJECTION INTRAVENOUS at 02:02

## 2018-02-02 RX ADMIN — FUROSEMIDE 40 MG: 10 INJECTION, SOLUTION INTRAMUSCULAR; INTRAVENOUS at 09:02

## 2018-02-02 RX ADMIN — FLUTICASONE PROPIONATE 100 MCG: 50 SPRAY, METERED NASAL at 09:02

## 2018-02-02 RX ADMIN — Medication 1 CAPSULE: at 09:02

## 2018-02-02 RX ADMIN — FLUTICASONE FUROATE AND VILANTEROL TRIFENATATE 1 PUFF: 100; 25 POWDER RESPIRATORY (INHALATION) at 08:02

## 2018-02-02 RX ADMIN — SODIUM BICARBONATE 650 MG TABLET 1300 MG: at 05:02

## 2018-02-02 RX ADMIN — HALOPERIDOL 1 MG: 1 TABLET ORAL at 09:02

## 2018-02-02 RX ADMIN — LEVOTHYROXINE SODIUM 100 MCG: 25 TABLET ORAL at 06:02

## 2018-02-02 RX ADMIN — DIVALPROEX SODIUM 250 MG: 250 TABLET, DELAYED RELEASE ORAL at 11:02

## 2018-02-02 RX ADMIN — ALLOPURINOL 100 MG: 100 TABLET ORAL at 09:02

## 2018-02-02 RX ADMIN — SODIUM CITRATE AND CITRIC ACID MONOHYDRATE 30 ML: 500; 334 SOLUTION ORAL at 03:02

## 2018-02-02 RX ADMIN — HYDRALAZINE HYDROCHLORIDE AND ISOSORBIDE DINITRATE 2 TABLET: 37.5; 2 TABLET, FILM COATED ORAL at 06:02

## 2018-02-02 RX ADMIN — AMLODIPINE BESYLATE 10 MG: 5 TABLET ORAL at 10:02

## 2018-02-02 RX ADMIN — SODIUM BICARBONATE 650 MG TABLET 1300 MG: at 06:02

## 2018-02-02 RX ADMIN — ATORVASTATIN CALCIUM 10 MG: 10 TABLET, FILM COATED ORAL at 09:02

## 2018-02-02 RX ADMIN — ASPIRIN 81 MG 81 MG: 81 TABLET ORAL at 09:02

## 2018-02-02 RX ADMIN — FAMOTIDINE 20 MG: 10 INJECTION, SOLUTION INTRAVENOUS at 09:02

## 2018-02-02 RX ADMIN — DIVALPROEX SODIUM 250 MG: 250 TABLET, DELAYED RELEASE ORAL at 09:02

## 2018-02-02 NOTE — PROGRESS NOTES
Progress Note    Admit Date: 1/30/2018   LOS: 1 day     SUBJECTIVE:     NAEON, patient reports feeling much better. Has been urinating without issue more than at home (4x, ~200cc's each since last night) but the hat hasn't been catching the urine per daughter. LE edema improved. Tolerating PO without N/V. +BM.     Scheduled Meds:   allopurinol  100 mg Oral BID    amLODIPine  10 mg Oral Daily    aspirin  81 mg Oral Daily    atorvastatin  10 mg Oral Daily    azithromycin  250 mg Oral Daily    famotidine (PF)  20 mg Intravenous Daily    fluticasone  2 spray Each Nare Daily    fluticasone-vilanterol  1 puff Inhalation Daily    furosemide  40 mg Intravenous BID    haloperidol  1 mg Oral QHS    heparin (porcine)  5,000 Units Subcutaneous Q8H    isosorbide-hydrALAZINE 20-37.5 mg  2 tablet Oral TID    levothyroxine  100 mcg Oral Before breakfast    mirtazapine  30 mg Oral QHS    senna-docusate 8.6-50 mg  1 tablet Oral Daily    sodium bicarbonate  2 tablet Oral TID    sodium chloride 0.9%  3 mL Intravenous Q8H    tiotropium  18 mcg Inhalation Daily    vitamin renal formula (B-complex-vitamin c-folic acid)  1 capsule Oral Daily     Continuous Infusions:  PRN Meds:    Review of patient's allergies indicates:   Allergen Reactions    Seroquel [quetiapine]      Causes  profound sedation.     Review of Systems:  Constitutional: no fever or chills  Eyes: no visual changes  Respiratory: no cough or shortness of breath  Cardiovascular: no chest pain   Gastrointestinal: no nausea or vomiting; no abdominal pain   Genitourinary: +urination    OBJECTIVE:     Vital Signs (Most Recent)  Temp: 98.6 °F (37 °C) (02/02/18 0804)  Pulse: 71 (02/02/18 0824)  Resp: 18 (02/02/18 0824)  BP: (!) 140/65 (02/02/18 0804)  SpO2: 97 % (02/02/18 0824)    Vital Signs Range (Last 24H):  Temp:  [97 °F (36.1 °C)-99.6 °F (37.6 °C)]   Pulse:  [54-72]   Resp:  [18-22]   BP: (124-154)/(58-68)   SpO2:  [95 %-98 %]     I & O (Last  24H):  Intake/Output Summary (Last 24 hours) at 02/02/18 0913  Last data filed at 02/02/18 0400   Gross per 24 hour   Intake              475 ml   Output                0 ml   Net              475 ml     Physical Exam:  General: well developed, well nourished  Eyes: conjunctivae/corneas clear.   Lungs:  clear to auscultation bilaterally and normal respiratory effort  Cardiovascular: Heart: regular rate and rhythm, S1, S2 normal, no murmur, click, rub or gallop. Chest Wall: no tenderness.   Abdomen/Rectal: Abdomen: soft, non-tender non-distented; bowel sounds normal; no masses,  no organomegaly.    Musculoskeletal: no LE edema or calf tenderness  Neurologic: AO, no change in mental status    Laboratory:  CBC:   Recent Labs  Lab 02/02/18 0312   WBC 3.71*   RBC 2.13*   HGB 6.6*   HCT 20.9*      MCV 98   MCH 31.0   MCHC 31.6*     CMP:   Recent Labs  Lab 02/02/18 0312   GLU 84   CALCIUM 9.1   ALBUMIN 3.3*   PROT 7.8      K 5.5*   CO2 13*      BUN 91*   CREATININE 4.5*   ALKPHOS 66   ALT 19   AST 37   BILITOT 0.7     Diagnostic Results:  No new images.     ASSESSMENT/PLAN:     72 year old female with Congestive heart failure; Chronic hepatitis C virus infection (3/14/2017); Coronary artery disease; Hypertension; Hazel; Renal disorder; and Thyroid disease. here for acute on chronic CHF exacerbation     Acute on Chronic CHF exacerbation  -improved SOB, not requiring oxygen  - increased lasix to 80 BID, urine output improving somewhat.   - Strict I/Os  - Fluid restriction to 1000 cc/ 24 hr, -525 yesterday.   -consulted [facility] cards, permissive hypertension for orthostatics; f/u echo     Essential HTN  - Continue isosorbide-Hydralazine   - holding metoprolol currently for bradycardia     ACS workup  -negative      Hx of A.fib  - Pt is on metoprolol. EKG upon admission NSR. Holding BB as pt is bradycardic.   - Pt was not on anticoagulation as in outpt  - Heparin for prophylaxis started     CANDI on CKD  -  Pt with baseline of 3.2, now 3.7  - UA ordered   - [facility] nephro consulted appreciate recs   - Will renally dose all meds and avoid nephrotoxic agents.      Sepsis   -could be a side effect of zyprexa  - still hypothermic   -not neutropenic   - Blood cultures, urine cultures, LA, procalc ordered  - CXR positive for possible interstitial PNA  - treat for Hospital  Acquired PNA as pt was recently discharged from a hospital  - transitioned to [facility] for atypical pna, day 2.      Post ablation hypothyroidism  - S/p ablation in 2016   - continue levothyroxine  - TSH elevated, T4 WNL     Hx of chronic hep C virus  - AST/ALT normal.      PPx: Heparin  Diet: Cardiac     Dispo:patient improved, f/u ismail for pressured speech/thoughts/anxiety and f/u SNF placement. Back at baseline with symptoms and edema.     2/2/2018 9:18 AM Jose C Caldera M.D.

## 2018-02-02 NOTE — PLAN OF CARE
Problem: Physical Therapy Goal  Goal: Physical Therapy Goal  Goals to be met by: 3/4/18     Patient will increase functional independence with mobility by performin) sup<>sit mod I   2) sit <>stand mod I with RW   3) ambulate 200 feet with RW mod I   4) independent with LE therex.    Outcome: Ongoing (interventions implemented as appropriate)  Pt tolerated treatment session well. PT will continue to follow, all goals remain appropriate. Recommend HHPT/OT

## 2018-02-02 NOTE — PLAN OF CARE
Problem: Fall Risk (Adult)  Goal: Identify Related Risk Factors and Signs and Symptoms  Related risk factors and signs and symptoms are identified upon initiation of Human Response Clinical Practice Guideline (CPG)   Outcome: Ongoing (interventions implemented as appropriate)  Bed in low position and locked. Alarms on and audible. Call light within reach. Education given on preventing falls and using call light. Undersatnding verbalized. Family member at bedside throughout shift

## 2018-02-02 NOTE — PT/OT/SLP PROGRESS
"Physical Therapy Treatment    Patient Name:  Anai Sal   MRN:  [id number]    Recommendations:     Discharge Recommendations:  home health OT, home health PT ([facility] to [facility])   Discharge Equipment Recommendations: none   Barriers to discharge: Inaccessible home and Decreased caregiver support    Assessment:     Anai Sal is a 72 y.o. female admitted with a medical diagnosis of Congestive heart failure.  She presents with the following impairments/functional limitations:  weakness, impaired endurance, impaired functional mobilty, impaired self care skills, impaired cardiopulmonary response to activity, decreased safety awareness. Pt with increased gait distance today. Pt reported anxiety; however, she was unable to state the cause of her anxiety stating "i'm just anxious and talk a lot". Pt would benefit from continued skilled PT services as well as home health PT and 24/7 supervision upon hospital discharge.     Rehab Prognosis:  good; patient would benefit from acute skilled PT services to address these deficits and reach maximum level of function.      Recent Surgery: * No surgery found *      Plan:     During this hospitalization, patient to be seen 6 x/week to address the above listed problems via gait training, therapeutic exercises, therapeutic activities  · Plan of Care Expires:  03/04/18   Plan of Care Reviewed with: patient    Subjective     Communicated with SANDIP Lee prior to session.  Patient found L SL on wedge sleeping  upon PT entry to room, agreeable to treatment.      Chief Complaint: "I feel anxious"  Patient comments/goals: to go home  Pain/Comfort:  · Pain Rating 1: 0/10  · Pain Rating Post-Intervention 1: 0/10    Patients cultural, spiritual, Christian conflicts given the current situation: None verbalized to PT    Objective:     Patient found with: bed alarm, telemetry     General Precautions: Standard, fall   Orthopedic Precautions:N/A   Braces: N/A     Functional " Mobility:  · Bed Mobility:     · Rolling Left:  supervision  · Rolling Right: supervision  · Scooting: supervision  · Bridging: supervision  · Supine to Sit: supervision  · Sit to Supine: supervision  · Transfers:     · Sit to Stand:  contact guard assistance with rolling walker  · Toilet Transfer: contact guard assistance with  rolling walker  using  ambulation  · Gait: ~70 feet  · Balance: Sitting: Normal. Standing: Fair +      AM-PAC 6 CLICK MOBILITY  Turning over in bed (including adjusting bedclothes, sheets and blankets)?: 4  Sitting down on and standing up from a chair with arms (e.g., wheelchair, bedside commode, etc.): 3  Moving from lying on back to sitting on the side of the bed?: 4  Moving to and from a bed to a chair (including a wheelchair)?: 3  Need to walk in hospital room?: 3  Climbing 3-5 steps with a railing?: 2  Total Score: 19       Therapeutic Activities and Exercises:   -Bed mobility as listed above.   -2x10 LE therex sitting EOB: Seated hip flexion, LAQ, and AP  -Pt ambulated ~70 feet, distance limited 2* needing to use the restroom. Pt demonstrated increased up right posture and increased christi during ambulation. Pt continues to require increased verbal cueing as well as min A to maneuver RW in tight spaces.   -Pt CGA for toileting and hygiene. Mod A to rise from low toilet.   -Pt ambulated ~18 feet to sink and stood to wash her hands. Pt rested on forearms while performing hand hygiene.   -1x10 BLE seated therex listed above.   -Pt returned supine with supervision.     Patient left right sidelying with all lines intact, call button in reach, bed alarm on and SANDIP Lee notified..    GOALS:    Physical Therapy Goals        Problem: Physical Therapy Goal    Goal Priority Disciplines Outcome Goal Variances Interventions   Physical Therapy Goal     PT/OT, PT Ongoing (interventions implemented as appropriate)     Description:  Goals to be met by: 3/4/18     Patient will increase functional  independence with mobility by performin) sup<>sit mod I   2) sit <>stand mod I with RW   3) ambulate 200 feet with RW mod I   4) independent with LE therex.                     Time Tracking:     PT Received On: 18  PT Start Time: 1108     PT Stop Time: 1136  PT Total Time (min): 28 min     Billable Minutes: Gait Training 10 and Therapeutic Activity 18    Treatment Type: Treatment  PT/PTA: PT     PTA Visit Number: 0     Otto Mclean PT, DPT  2018

## 2018-02-02 NOTE — PLAN OF CARE
TN called and notified Dr. Caldera therapy is recommending home with home health (with [facility]) on discharge. He stated the daughter had mentioned they were working on skilled already from another facility. TN will call daughter to clarify with her.     TN called patient's daughter. She stated patient was discharged from [facility] when they recommended SNF. However, patient's son  and they refused SNF at that time. TN also informed family our therapists are recommended home with home health. She stated she still wants her mom to do more aggreesive therapy. She stated a  was already working on it to place her from home.     Tracey MORENO called [facility] to confirm. They have not started the process yet.     TN called and spoke with patient's daughter. She is aware therapy is recommending home with home health, so unable to work on skilled if that is what therapy recommends. Insurance may not approve if that is not their recommendations. Daughter is aware and agreeable.     [facility] does not go to the house, she will have to go to the center and get therapy.    --TN notified Dr. Love about the above information.    --Update: TN spoke with Dr. Love. He stated patient will most likely not go home today. They are waiting to hear back from nephrology, and the patient will need blood. He stated most likely discharge this weekend. TN will send home health order to [facility]. --Sent to [facility] at [phone]    --Per Dr. Wright's note: Discharge pt to home when medically clear.  Follow up with [facility]    Follow-up With  Details  Why  Contact Info   [facility]  Go to   will send order for home health to [facility].  [address] [facility] [address]  [phone]   Live Young MD  Call   left message to schedule  [address] [facility] [address] [facility] [id number] [phone]   [facility]  Schedule an appointment as soon as possible for a visit  Please call  to schedule follow-up.  [address]  [address]  [place] [address]  [phone]         02/02/18 1055   Discharge Reassessment   Assessment Type Discharge Planning Reassessment   Provided patient/caregiver education on the expected discharge date and the discharge plan Yes   Discharge Plan A Home with family;Home Health  (with [facility])   Discharge Plan B Home with family   Patient choice form signed by patient/caregiver N/A   Describe the patient's ability to walk at the present time. Walks with the help of equipment     Janelle Pal RN  Transition Navigator  [phone]

## 2018-02-02 NOTE — PLAN OF CARE
Problem: Occupational Therapy Goal  Goal: Occupational Therapy Goal  Goals to be met by: 3/1     Patient will increase functional independence with ADLs by performing:    UE Dressing with Modified [place].  LE Dressing with Supervision.  Grooming while standing with Modified [place].  Toileting from toilet with Modified [place] for hygiene and clothing management.   Toilet transfer to toilet with Modified [place].     Outcome: Ongoing (interventions implemented as appropriate)  To benefit from continued OT services to increase independence in self-care & functional T/F.  Continue POC.

## 2018-02-02 NOTE — PLAN OF CARE
02/02/18 1302   Medicare Message   Important Message from Medicare regarding Discharge Appeal Rights Given to patient/caregiver;Explained to patient/caregiver;Signed/date by patient/caregiver   Date IMM was signed 02/02/18   Time IMM was signed 1302

## 2018-02-02 NOTE — PROGRESS NOTES
made phone contact with [place] program  Sabrina [phone]. She reported that patient has a PCA that works with patient 2 hours a day on MWF from 7:30a-9:30a. Sabrina reported that her daughter Zara is in nursing school is also very supportive with her mom. Sabrina requested that  faxes her the discharge summary to her at [phone].

## 2018-02-02 NOTE — PLAN OF CARE
Problem: Patient Care Overview  Goal: Plan of Care Review  Outcome: Ongoing (interventions implemented as appropriate)  Plan of care reviewed with patient. Patient verbalized complete understanding. Family at the bedside. Fall precautions maintained. Bed in lowest position, locked, call light within reach, and bed alarm on. Side rails up x's 2 with slip resistant socks on. Nurse instructed patient to notify staff for any assistance and pt verbalized complete understanding. Patient on telemetry throughout shift with no ectopy noted. Will continue to monitor.

## 2018-02-02 NOTE — PT/OT/SLP PROGRESS
Occupational Therapy   Treatment    Name: Anai Sal  MRN: [id number]  Admitting Diagnosis:  Congestive heart failure       Recommendations:     Discharge Recommendations: home health PT, home health OT ([facility] to [facility])  Discharge Equipment Recommendations:  none  Barriers to discharge:  Decreased caregiver support    Subjective     Communicated with: Nursing prior to session.  Pain/Comfort:  · Pain Rating 1: 0/10  · Pain Rating Post-Intervention 1: 0/10    Patients cultural, spiritual, Christianity conflicts given the current situation: None stated.    Objective:     Patient found with: telemetry    General Precautions: Standard, fall   Orthopedic Precautions:N/A   Braces: N/A     Occupational Performance:    Bed Mobility:    · Patient completed Supine to Sit with contact guard assistance  · Patient completed Sit to Supine with minimum assistance     Functional Mobility/Transfers:  · Patient completed Sit <> Stand Transfer with contact guard assistance  with  rolling walker   · Functional Mobility: Ambulated short household distance bed<->sink with RW with min v/c for safety.  Defer to PT.     Activities of Daily Living:  · Grooming: contact guard assistance standing at sink with assist for removing toothbrush from plastic    Patient left HOB elevated with all lines intact, call button in reach, nursing notified and family present    [facility] 6 Click:  [facility] Total Score: 19    Treatment & Education:  Supine->sit with CGA & sit->supine with Maria G for trunk management.  Sit->stand RW with min v/c for safe hand placement.  Ambulated short household distance bed<->sink with RW with min v/c for safety.  Grooming/hygiene in stance at sink for washing face & oral care with assist for opening packages & CGA for steadying while in stance 2* impaired balance.  Required assist with opening to toothbrush from plastic.  Washed face while supporting BUE forearm onto sink.  Required increased time for opening mouthwash.   Received fork/spoon, & more ice per request.  Pt. CGA for grooming/hygiene in stance this day.  To benefit from continued OT services to increase independence in self-care & functional T/F.  Continue POC.   Education:    Assessment:     Anai Sal is a 72 y.o. female with a medical diagnosis of Congestive heart failure.  She presents with below deficits.  Performance deficits affecting function are weakness, impaired endurance, impaired functional mobilty, impaired self care skills, impaired cardiopulmonary response to activity, decreased safety awareness, decreased upper extremity function. Pt. CGA for grooming/hygiene in stance this day.  To benefit from continued OT services to increase independence in self-care & functional T/F.  Continue POC.     Rehab Prognosis:  Good; patient would benefit from acute skilled OT services to address these deficits and reach maximum level of function.       Plan:     Patient to be seen 5 x/week to address the above listed problems via self-care/home management, therapeutic activities, therapeutic exercises  · Plan of Care Expires: 03/01/18  · Plan of Care Reviewed with: patient, other (see comments) (cousin)    This Plan of care has been discussed with the patient who was involved in its development and understands and is in agreement with the identified goals and treatment plan    GOALS:    Occupational Therapy Goals        Problem: Occupational Therapy Goal    Goal Priority Disciplines Outcome Interventions   Occupational Therapy Goal     OT, PT/OT Ongoing (interventions implemented as appropriate)    Description:  Goals to be met by: 3/1     Patient will increase functional independence with ADLs by performing:    UE Dressing with Modified [place].  LE Dressing with Supervision.  Grooming while standing with Modified [place].  Toileting from toilet with Modified [place] for hygiene and clothing management.   Toilet transfer to toilet with Modified  [place].                      Time Tracking:     OT Date of Treatment: 02/02/18  OT Start Time: 1455  OT Stop Time: 1518  OT Total Time (min): 23 min    Billable Minutes:Self Care/Home Management 23    Tanya Ramirez OT  2/2/2018

## 2018-02-02 NOTE — PLAN OF CARE
Problem: Patient Care Overview  Goal: Individualization & Mutuality  Outcome: Ongoing (interventions implemented as appropriate)  Plan of care reviewed with patient. Patient verbalized  Understanding. I am not sure if the patient understands the whole plan of care. H & H 6.6 20.9. An order to transfuse one unit has been put into the system. Fall precautions maintained. Bed in lowest position, locked, call light within reach, and bed alarm on. Side rails up x's 2 with slip resistant socks on. Nurse instructed patient to notify staff for any assistance and pt verbalized complete understanding. Patient on telemetry throughout shift with no ectopy noted. Will continue to monitor.

## 2018-02-03 LAB
ALBUMIN SERPL BCP-MCNC: 3.5 G/DL
ALP SERPL-CCNC: 72 U/L
ALT SERPL W/O P-5'-P-CCNC: 19 U/L
ANION GAP SERPL CALC-SCNC: 16 MMOL/L
AST SERPL-CCNC: 23 U/L
BASOPHILS # BLD AUTO: 0.02 K/UL
BASOPHILS NFR BLD: 0.4 %
BILIRUB SERPL-MCNC: 1.3 MG/DL
BUN SERPL-MCNC: 82 MG/DL
CALCIUM SERPL-MCNC: 9.2 MG/DL
CHLORIDE SERPL-SCNC: 104 MMOL/L
CO2 SERPL-SCNC: 20 MMOL/L
CREAT SERPL-MCNC: 3.8 MG/DL
CREAT SERPL-MCNC: 4 MG/DL
DIFFERENTIAL METHOD: ABNORMAL
EOSINOPHIL # BLD AUTO: 0.1 K/UL
EOSINOPHIL NFR BLD: 1.1 %
ERYTHROCYTE [DISTWIDTH] IN BLOOD BY AUTOMATED COUNT: 16.2 %
EST. GFR  (AFRICAN AMERICAN): 12 ML/MIN/1.73 M^2
EST. GFR  (AFRICAN AMERICAN): 13 ML/MIN/1.73 M^2
EST. GFR  (NON AFRICAN AMERICAN): 11 ML/MIN/1.73 M^2
EST. GFR  (NON AFRICAN AMERICAN): 11 ML/MIN/1.73 M^2
GLUCOSE SERPL-MCNC: 76 MG/DL
HCT VFR BLD AUTO: 23.7 %
HCT VFR BLD AUTO: 24.3 %
HGB BLD-MCNC: 7.9 G/DL
HGB BLD-MCNC: 8 G/DL
LYMPHOCYTES # BLD AUTO: 0.6 K/UL
LYMPHOCYTES NFR BLD: 12.3 %
MAGNESIUM SERPL-MCNC: 2.4 MG/DL
MCH RBC QN AUTO: 31.1 PG
MCHC RBC AUTO-ENTMCNC: 33.3 G/DL
MCV RBC AUTO: 93 FL
MONOCYTES # BLD AUTO: 0.9 K/UL
MONOCYTES NFR BLD: 20.1 %
NEUTROPHILS # BLD AUTO: 3.1 K/UL
NEUTROPHILS NFR BLD: 66.1 %
OB PNL STL: NEGATIVE
PHOSPHATE SERPL-MCNC: 4.8 MG/DL
PLATELET # BLD AUTO: 207 K/UL
PMV BLD AUTO: 9.8 FL
POTASSIUM SERPL-SCNC: 4.1 MMOL/L
PROT SERPL-MCNC: 8 G/DL
RBC # BLD AUTO: 2.54 M/UL
SODIUM SERPL-SCNC: 140 MMOL/L
WBC # BLD AUTO: 4.62 K/UL

## 2018-02-03 PROCEDURE — 97116 GAIT TRAINING THERAPY: CPT

## 2018-02-03 PROCEDURE — 83735 ASSAY OF MAGNESIUM: CPT

## 2018-02-03 PROCEDURE — 25000003 PHARM REV CODE 250: Performed by: INTERNAL MEDICINE

## 2018-02-03 PROCEDURE — 25000003 PHARM REV CODE 250: Performed by: PSYCHIATRY & NEUROLOGY

## 2018-02-03 PROCEDURE — 94640 AIRWAY INHALATION TREATMENT: CPT

## 2018-02-03 PROCEDURE — 63600175 PHARM REV CODE 636 W HCPCS: Mod: JG | Performed by: INTERNAL MEDICINE

## 2018-02-03 PROCEDURE — 84100 ASSAY OF PHOSPHORUS: CPT

## 2018-02-03 PROCEDURE — 80053 COMPREHEN METABOLIC PANEL: CPT

## 2018-02-03 PROCEDURE — A4216 STERILE WATER/SALINE, 10 ML: HCPCS | Performed by: FAMILY MEDICINE

## 2018-02-03 PROCEDURE — 25000003 PHARM REV CODE 250: Performed by: FAMILY MEDICINE

## 2018-02-03 PROCEDURE — 82272 OCCULT BLD FECES 1-3 TESTS: CPT

## 2018-02-03 PROCEDURE — C9113 INJ PANTOPRAZOLE SODIUM, VIA: HCPCS | Performed by: STUDENT IN AN ORGANIZED HEALTH CARE EDUCATION/TRAINING PROGRAM

## 2018-02-03 PROCEDURE — 25000003 PHARM REV CODE 250: Performed by: STUDENT IN AN ORGANIZED HEALTH CARE EDUCATION/TRAINING PROGRAM

## 2018-02-03 PROCEDURE — 11000001 HC ACUTE MED/SURG PRIVATE ROOM

## 2018-02-03 PROCEDURE — 82565 ASSAY OF CREATININE: CPT

## 2018-02-03 PROCEDURE — 85025 COMPLETE CBC W/AUTO DIFF WBC: CPT

## 2018-02-03 PROCEDURE — 25000242 PHARM REV CODE 250 ALT 637 W/ HCPCS: Performed by: FAMILY MEDICINE

## 2018-02-03 PROCEDURE — 97110 THERAPEUTIC EXERCISES: CPT

## 2018-02-03 PROCEDURE — 94761 N-INVAS EAR/PLS OXIMETRY MLT: CPT

## 2018-02-03 PROCEDURE — 36415 COLL VENOUS BLD VENIPUNCTURE: CPT

## 2018-02-03 PROCEDURE — 27000221 HC OXYGEN, UP TO 24 HOURS

## 2018-02-03 PROCEDURE — 63600175 PHARM REV CODE 636 W HCPCS: Performed by: FAMILY MEDICINE

## 2018-02-03 PROCEDURE — 63600175 PHARM REV CODE 636 W HCPCS: Performed by: STUDENT IN AN ORGANIZED HEALTH CARE EDUCATION/TRAINING PROGRAM

## 2018-02-03 RX ORDER — PANTOPRAZOLE SODIUM 40 MG/10ML
40 INJECTION, POWDER, LYOPHILIZED, FOR SOLUTION INTRAVENOUS DAILY
Status: DISCONTINUED | OUTPATIENT
Start: 2018-02-03 | End: 2018-02-05

## 2018-02-03 RX ORDER — FUROSEMIDE 40 MG/1
40 TABLET ORAL ONCE
Status: COMPLETED | OUTPATIENT
Start: 2018-02-03 | End: 2018-02-03

## 2018-02-03 RX ADMIN — HEPARIN SODIUM 5000 UNITS: 5000 INJECTION, SOLUTION INTRAVENOUS; SUBCUTANEOUS at 02:02

## 2018-02-03 RX ADMIN — ERGOCALCIFEROL 50000 UNITS: 1.25 CAPSULE ORAL at 09:02

## 2018-02-03 RX ADMIN — DIVALPROEX SODIUM 250 MG: 250 TABLET, DELAYED RELEASE ORAL at 09:02

## 2018-02-03 RX ADMIN — HYDRALAZINE HYDROCHLORIDE AND ISOSORBIDE DINITRATE 2 TABLET: 37.5; 2 TABLET, FILM COATED ORAL at 06:02

## 2018-02-03 RX ADMIN — SEVELAMER CARBONATE 800 MG: 800 TABLET, FILM COATED ORAL at 05:02

## 2018-02-03 RX ADMIN — HALOPERIDOL 1 MG: 1 TABLET ORAL at 09:02

## 2018-02-03 RX ADMIN — SODIUM BICARBONATE 650 MG TABLET 1300 MG: at 06:02

## 2018-02-03 RX ADMIN — STANDARDIZED SENNA CONCENTRATE AND DOCUSATE SODIUM 1 TABLET: 8.6; 5 TABLET, FILM COATED ORAL at 09:02

## 2018-02-03 RX ADMIN — PANTOPRAZOLE SODIUM 40 MG: 40 INJECTION, POWDER, FOR SOLUTION INTRAVENOUS at 09:02

## 2018-02-03 RX ADMIN — HYDRALAZINE HYDROCHLORIDE AND ISOSORBIDE DINITRATE 2 TABLET: 37.5; 2 TABLET, FILM COATED ORAL at 09:02

## 2018-02-03 RX ADMIN — ASPIRIN 81 MG 81 MG: 81 TABLET ORAL at 09:02

## 2018-02-03 RX ADMIN — SODIUM CHLORIDE, PRESERVATIVE FREE 3 ML: 5 INJECTION INTRAVENOUS at 02:02

## 2018-02-03 RX ADMIN — HYDRALAZINE HYDROCHLORIDE AND ISOSORBIDE DINITRATE 2 TABLET: 37.5; 2 TABLET, FILM COATED ORAL at 02:02

## 2018-02-03 RX ADMIN — HEPARIN SODIUM 5000 UNITS: 5000 INJECTION, SOLUTION INTRAVENOUS; SUBCUTANEOUS at 06:02

## 2018-02-03 RX ADMIN — LEVOTHYROXINE SODIUM 100 MCG: 25 TABLET ORAL at 06:02

## 2018-02-03 RX ADMIN — SEVELAMER CARBONATE 800 MG: 800 TABLET, FILM COATED ORAL at 09:02

## 2018-02-03 RX ADMIN — SODIUM BICARBONATE 650 MG TABLET 1300 MG: at 09:02

## 2018-02-03 RX ADMIN — FLUTICASONE FUROATE AND VILANTEROL TRIFENATATE 1 PUFF: 100; 25 POWDER RESPIRATORY (INHALATION) at 07:02

## 2018-02-03 RX ADMIN — SODIUM BICARBONATE 650 MG TABLET 1300 MG: at 02:02

## 2018-02-03 RX ADMIN — FUROSEMIDE 40 MG: 40 TABLET ORAL at 05:02

## 2018-02-03 RX ADMIN — Medication 1 CAPSULE: at 09:02

## 2018-02-03 RX ADMIN — ATORVASTATIN CALCIUM 10 MG: 10 TABLET, FILM COATED ORAL at 09:02

## 2018-02-03 RX ADMIN — ERYTHROPOIETIN 20000 UNITS: 20000 INJECTION, SOLUTION INTRAVENOUS; SUBCUTANEOUS at 09:02

## 2018-02-03 RX ADMIN — TIOTROPIUM BROMIDE 18 MCG: 18 CAPSULE ORAL; RESPIRATORY (INHALATION) at 08:02

## 2018-02-03 RX ADMIN — SODIUM CHLORIDE, PRESERVATIVE FREE 3 ML: 5 INJECTION INTRAVENOUS at 06:02

## 2018-02-03 RX ADMIN — SODIUM CHLORIDE, PRESERVATIVE FREE 3 ML: 5 INJECTION INTRAVENOUS at 09:02

## 2018-02-03 RX ADMIN — AZITHROMYCIN 250 MG: 250 TABLET, FILM COATED ORAL at 09:02

## 2018-02-03 RX ADMIN — MIRTAZAPINE 30 MG: 15 TABLET, FILM COATED ORAL at 09:02

## 2018-02-03 RX ADMIN — AMLODIPINE BESYLATE 10 MG: 5 TABLET ORAL at 09:02

## 2018-02-03 NOTE — PT/OT/SLP PROGRESS
Physical Therapy Treatment    Patient Name:  Anai Sal   MRN:  [id number]    Recommendations:     Discharge Recommendations:      Discharge Equipment Recommendations:     Barriers to discharge: Decreased caregiver support    Assessment:     Anai Sal is a 72 y.o. female admitted with a medical diagnosis of Congestive heart failure.  She presents with the following impairments/functional limitations:  weakness, impaired endurance, impaired functional mobilty, impaired self care skills, gait instability, decreased upper extremity function, decreased lower extremity function, decreased safety awareness. Pt slightly increased ambulation distance however, pt is unsafe and requires constant verbal/tactile cueing during ambulation for safety due to impaired positional awareness ( did not walk in a straight line and was unaware she was walking into walls with RW). SpO2 level take 5-10 seconds after ambulation and level at 93% on room air.Will continue PT to address all impairments listed above and increase her safety awareness while ambulating.    Rehab Prognosis:  fair; patient would benefit from acute skilled PT services to address these deficits and reach maximum level of function.      Recent Surgery: * No surgery found *      Plan:     During this hospitalization, patient to be seen 6 x/week to address the above listed problems via gait training, therapeutic activities, therapeutic exercises  · Plan of Care Expires:  03/04/18   Plan of Care Reviewed with: patient    Subjective     Communicated with nurse prior to session.  Patient found supine upon PT entry to room, agreeable to treatment on 2nd attempt.      Chief Complaint: None expressed  Patient comments/goals: None expressed  Pain/Comfort:  · Pain Rating 1: 0/10    Patients cultural, spiritual, Anglican conflicts given the current situation: None reported to PTA    Objective:     Patient found with: telemetry     General Precautions: Standard, fall    Orthopedic Precautions:N/A   Braces: N/A     Functional Mobility:  · Bed Mobility:     · Rolling Left:  supervision  · Scooting: stand by assistance and verbal cueing to continue with movement  · Supine to Sit: supervision and stand by assistance  · Sit to Supine: supervision  · Transfers:     · Sit to Stand:  contact guard assistance with rolling walker  · Gait: ~100 ft with RW and Min/CGA plus max and constant verbal/tactile cueing to stay within RW boundaries, RW maneuvering, and to decrease excessive forward trunk flexion.       AM-PAC 6 CLICK MOBILITY  Turning over in bed (including adjusting bedclothes, sheets and blankets)?: 4  Sitting down on and standing up from a chair with arms (e.g., wheelchair, bedside commode, etc.): 3  Moving from lying on back to sitting on the side of the bed?: 4  Moving to and from a bed to a chair (including a wheelchair)?: 3  Need to walk in hospital room?: 3  Climbing 3-5 steps with a railing?: 2  Total Score: 19       Therapeutic Activities and Exercises:   All transfers with assistance as documented above. Pt sat at EOB ~10-15 minutes. Performed seated therapeutic exercises 1 x 10 reps BLE's consisting of LAQ's, hip add/abd, and heel/toe raises. Required tactile cueing to ensure full ROM and constant verbal cueing to remain on task. Ambulation training ~100 ft with RW and Min/CGA plus max and constant verbal cueing to stay within RW boundaries, RW maneuvering (as pt running into walls with RW and not remaining on a straight path), and to decrease excessive trunk flexion. SpO2 level taken 5-10 seconds after ambulation on room air 93%.    Patient left supine with all lines intact, call button in reach, bed alarm on and nurse notified..    GOALS:    Physical Therapy Goals        Problem: Physical Therapy Goal    Goal Priority Disciplines Outcome Goal Variances Interventions   Physical Therapy Goal     PT/OT, PT Ongoing (interventions implemented as appropriate)     Description:   Goals to be met by: 3/4/18     Patient will increase functional independence with mobility by performin) sup<>sit mod I   2) sit <>stand mod I with RW   3) ambulate 200 feet with RW mod I   4) independent with LE therex.                     Time Tracking:     PT Received On: 18  PT Start Time: 1205     PT Stop Time: 1236  PT Total Time (min): 31 min     Billable Minutes: Gait Training  16 and Therapeutic Exercise  15    Treatment Type: Treatment  PT/PTA: PTA     PTA Visit Number: 1     Nisreen Corral, PTA  2018

## 2018-02-03 NOTE — PROGRESS NOTES
02/03/18 1526   Home Oxygen Qualification   Room Air SpO2 At Rest 96 %   Room Air SpO2 on Exertion 97 %

## 2018-02-03 NOTE — PLAN OF CARE
Problem: Patient Care Overview  Goal: Plan of Care Review  Pt on prn therapy and doesn't require respiratory tx at this time. Pt on RA SPO2 97%.  Pt with no apparent distress noted. Will continue to monitor.

## 2018-02-03 NOTE — NURSING
Day shift nurse Rosa passed in report that blood was started at 1900. It was scanned at 1830, however peripheral IV was lost which caused a delay of initial start. Nurse will stop blood by 2300.

## 2018-02-03 NOTE — PLAN OF CARE
Problem: Physical Therapy Goal  Goal: Physical Therapy Goal  Goals to be met by: 3/4/18     Patient will increase functional independence with mobility by performin) sup<>sit mod I   2) sit <>stand mod I with RW   3) ambulate 200 feet with RW mod I   4) independent with LE therex.    Outcome: Ongoing (interventions implemented as appropriate)      Pt continues to work and slowly progress toward established goals.

## 2018-02-03 NOTE — PROGRESS NOTES
Progress Note  Nephrology      Consult Requested By: Francisco Estrada MD      SUBJECTIVE:     Overnight events  Patient is a 72 y.o. female     Patient Active Problem List   Diagnosis    Recurrent major depressive disorder, in partial remission    Insomnia    Stage 4 chronic kidney disease    Proteinuria    SOB (shortness of breath)    Essential hypertension    Acute on chronic combined systolic and diastolic heart failure    COPD exacerbation    Demand ischemia of myocardium    Postablative hypothyroidism    Chronic gout    Acute bronchitis due to infection    Chronic combined systolic and diastolic heart failure, NYHA class 3    History of breast cancer    Elevated troponin    Chronic hepatitis C virus infection    Unstable angina    Non-rheumatic mitral regurgitation    Chronic obstructive pulmonary disease    Leukopenia    Delirium due to another medical condition    Paroxysmal atrial fibrillation    Acute renal failure superimposed on stage 4 chronic kidney disease    Anemia of chronic renal failure, stage 4 (severe)    Cognitive impairment    Major neurocognitive disorder    Palliative care encounter    Esophagitis, [place] grade C    Congestive heart failure     Past Medical History:   Diagnosis Date    Breast cancer 1980    right    CHF (congestive heart failure)     Chronic hepatitis C virus infection 3/14/2017    Coronary artery disease     Hypertension     Hazel     Renal disorder     Thyroid disease               OBJECTIVE:     Vitals:    02/02/18 1543 02/02/18 1555 02/02/18 1606 02/02/18 1830   BP:  (!) 152/67  (!) (P) 152/68   Patient Position:  Lying     Pulse: 70 69 70 (P) 71   Resp:  18 18 (P) 18   Temp:  97.5 °F (36.4 °C)  (P) 98.2 °F (36.8 °C)   TempSrc:  Oral  (P) Oral   SpO2:   99%    Weight:       Height:           Temp: (P) 98.2 °F (36.8 °C) (02/02/18 1830)  Pulse: (P) 71 (02/02/18 1830)  Resp: (P) 18 (02/02/18 1830)  BP: (!) (P) 152/68 (02/02/18  2830)  SpO2: 99 % (02/02/18 1606)      Date 02/02/18 0700 - 02/03/18 0659   Shift 8792-9088 1794-7188 [id number] 24 Hour Total   I  N  T  A  K  E   P.O.  250  250    Shift Total  (mL/kg)  250  (4.2)  250  (4.2)   O  U  T  P  U  T   Shift Total  (mL/kg)       Weight (kg) 59 59 59 59             Medications:   allopurinol  100 mg Oral BID    amLODIPine  10 mg Oral Daily    aspirin  81 mg Oral Daily    atorvastatin  10 mg Oral Daily    azithromycin  250 mg Oral Daily    divalproex  250 mg Oral BID    [START ON 2/3/2018] divalproex  250 mg Oral Daily    [START ON 2/3/2018] divalproex  500 mg Oral QHS    [START ON 2/3/2018] epoetin jaun (PROCRIT) injection  20,000 Units Subcutaneous Once    famotidine (PF)  20 mg Intravenous Daily    fluticasone  2 spray Each Nare Daily    fluticasone-vilanterol  1 puff Inhalation Daily    haloperidol  1 mg Oral QHS    heparin (porcine)  5,000 Units Subcutaneous Q8H    isosorbide-hydrALAZINE 20-37.5 mg  2 tablet Oral TID    levothyroxine  100 mcg Oral Before breakfast    mirtazapine  30 mg Oral QHS    senna-docusate 8.6-50 mg  1 tablet Oral Daily    sodium bicarbonate  2 tablet Oral QID    sodium chloride 0.9%  3 mL Intravenous Q8H    tiotropium  18 mcg Inhalation Daily    vitamin renal formula (B-complex-vitamin c-folic acid)  1 capsule Oral Daily                 Physical Exam:  General appearance: NAD  No SOB  No cough  No CP  Lungs: diminished breath sounds  Heart: pulse 70  /67  Abdomen: soft  Extremities: edema  Skin: dry  Asterixis negative      Laboratory:  ABG  Labs reviewed  No results found for this or any previous visit (from the past 336 hour(s)).  Recent Results (from the past 336 hour(s))   CBC auto differential    Collection Time: 02/02/18  3:12 AM   Result Value Ref Range    WBC 3.71 (L) 3.90 - 12.70 K/uL    Hemoglobin 6.6 (L) 12.0 - 16.0 g/dL    Hematocrit 20.9 (L) 37.0 - 48.5 %    Platelets 165 150 - 350 K/uL   CBC auto differential     Collection Time: 02/01/18 10:35 AM   Result Value Ref Range    WBC 3.95 3.90 - 12.70 K/uL    Hemoglobin 7.4 (L) 12.0 - 16.0 g/dL    Hematocrit 22.4 (L) 37.0 - 48.5 %    Platelets 243 150 - 350 K/uL   CBC auto differential    Collection Time: 01/31/18  5:25 AM   Result Value Ref Range    WBC 2.91 (L) 3.90 - 12.70 K/uL    Hemoglobin 7.3 (L) 12.0 - 16.0 g/dL    Hematocrit 22.0 (L) 37.0 - 48.5 %    Platelets 236 150 - 350 K/uL     Urinalysis  No results for input(s): COLORU, CLARITYU, SPECGRAV, PHUR, PROTEINUA, GLUCOSEU, BILIRUBINCON, BLOODU, WBCU, RBCU, BACTERIA, MUCUS, NITRITE, LEUKOCYTESUR, UROBILINOGEN, HYALINECASTS in the last 24 hours.    Diagnostic Results:  X-Ray: Reviewed  US: Reviewed  Echo: Reviewed  ACCESS    ASSESSMENT/PLAN:     CKD 5  UA trace protein  US kidneys - chronic medical disease  Creatinine 4.2  Azotemia  BUN 84  Metabolic acidosis  Sodium bicarbonate 1300 mg tid  K 3.9  Metabolic bone disease  Hyperphosphatemia 5.4  Binder  Vit D deficiency  Ergocalciferol  Anemia Hb 6.6  Saturated iron 10  Renal cap  Receiving 1 unit PRBC  Epogen 27714 units subq weekly  Nutrition   Albumin 3.3  EF 30-35 %  Blood pressure 152/67  Weight daily  I and O  Renal, low salt, low K diet  Suplena 1 can bid  Avoid nephrotoxic agents, hypotension  Hold lasix

## 2018-02-03 NOTE — NURSING
Pt O2 at rest at RA: 97%    Pt O2 while ambulating on RA: 97%    Pt O2 after ambulation on RA: 97%

## 2018-02-03 NOTE — NURSING
Patient received only 300 ml of the total 350 ml bag. MD instructed day nurse Rosa to run blood slow at a rate of 75 cc/hr. At 2300 nurse took stopped the blood as it had been running for four hours. 50 ml of blood was discarded. Dr. Spears notified of the occurrence. MD ordered  H&H to be drawn one hour after post transfusion.

## 2018-02-03 NOTE — PROGRESS NOTES
Progress Note  Nephrology      Consult Requested By: Francisco Estrada MD  Reason for Consult: CKD 5    SUBJECTIVE:     Review of Systems   Constitutional: Negative for chills and fever.   Respiratory: Negative for cough and shortness of breath.    Cardiovascular: Negative for chest pain and leg swelling.   Gastrointestinal: Negative for nausea.     Patient Active Problem List   Diagnosis    Recurrent major depressive disorder, in partial remission    Insomnia    Stage 4 chronic kidney disease    Proteinuria    SOB (shortness of breath)    Essential hypertension    Acute on chronic combined systolic and diastolic heart failure    COPD exacerbation    Demand ischemia of myocardium    Postablative hypothyroidism    Chronic gout    Acute bronchitis due to infection    Chronic combined systolic and diastolic heart failure, NYHA class 3    History of breast cancer    Elevated troponin    Chronic hepatitis C virus infection    Unstable angina    Non-rheumatic mitral regurgitation    Chronic obstructive pulmonary disease    Leukopenia    Delirium due to another medical condition    Paroxysmal atrial fibrillation    Acute renal failure superimposed on stage 4 chronic kidney disease    Anemia of chronic renal failure, stage 4 (severe)    Cognitive impairment    Major neurocognitive disorder    Palliative care encounter    Esophagitis, [place] grade C    Congestive heart failure       OBJECTIVE:     Medications:   allopurinol  50 mg Oral Daily    amLODIPine  10 mg Oral Daily    aspirin  81 mg Oral Daily    atorvastatin  10 mg Oral Daily    azithromycin  250 mg Oral Daily    divalproex  250 mg Oral Daily    divalproex  500 mg Oral QHS    ergocalciferol  50,000 Units Oral Q7 Days    fluticasone  2 spray Each Nare Daily    fluticasone-vilanterol  1 puff Inhalation Daily    haloperidol  1 mg Oral QHS    heparin (porcine)  5,000 Units Subcutaneous Q8H    isosorbide-hydrALAZINE 20-37.5  mg  2 tablet Oral TID    levothyroxine  100 mcg Oral Before breakfast    mirtazapine  30 mg Oral QHS    pantoprazole  40 mg Intravenous Daily    senna-docusate 8.6-50 mg  1 tablet Oral Daily    sevelamer carbonate  800 mg Oral BID WM    sodium bicarbonate  2 tablet Oral TID    sodium chloride 0.9%  3 mL Intravenous Q8H    tiotropium  18 mcg Inhalation Daily    vitamin renal formula (B-complex-vitamin c-folic acid)  1 capsule Oral Daily       Vitals:    02/03/18 1320   BP: (!) 166/88   Pulse: 75   Resp: 14   Temp: 97.3 °F (36.3 °C)     I/O last 3 completed shifts:  In: 650 [P.O.:350; Blood:300]  Out: -   Physical Exam   Constitutional: She is oriented to person, place, and time. She appears well-developed and well-nourished. No distress.   HENT:   Head: Normocephalic and atraumatic.   Mouth/Throat: Oropharynx is clear and moist.   Eyes: EOM are normal. No scleral icterus.   Neck: Normal range of motion.   Cardiovascular: Normal rate and regular rhythm.  Exam reveals no friction rub.    No murmur heard.  Pulmonary/Chest: Effort normal and breath sounds normal. She has no rales.   Abdominal: Soft. Bowel sounds are normal. She exhibits no distension. There is no tenderness.   Musculoskeletal: Normal range of motion. She exhibits edema (1+ pitting BLE).   Lymphadenopathy:     She has no cervical adenopathy.   Neurological: She is alert and oriented to person, place, and time.   Skin: Skin is warm and dry. No erythema.   Psychiatric: Thought content normal.     Laboratory:    Recent Labs  Lab 02/01/18  1035 02/02/18  0312 02/02/18  2327 02/03/18  0455   WBC 3.95 3.71*  --  4.62   HGB 7.4* 6.6* 8.0* 7.9*   HCT 22.4* 20.9* 24.3* 23.7*    165  --  207   MONO 17.7*  0.7 16.2*  0.6  --  20.1*  0.9       Recent Labs  Lab 02/01/18  1035 02/02/18  0312 02/02/18  1816 02/03/18  0454   * 138 138 140   K 5.1 5.5* 3.9 4.1    106 104 104   CO2 17* 13* 19* 20*   BUN 86* 91* 84* 82*   CREATININE 4.3* 4.5*  4.2* 4.0*   CALCIUM 9.0 9.1 9.4 9.2   PHOS 5.0* 5.4*  --  4.8*     Labs reviewed  Diagnostic Results:  X-Ray: Reviewed  US: Reviewed  Echo: Reviewed      ASSESSMENT/PLAN:   1. CKD 5 - stable  US kidneys - chronic medical disease   Avoid nephrotoxins, dose all meds ad for GFr < 30  No indication for urgent RRT. Monitor closely. Strict ins/outs, daily weight  2. Metabolic acidosis -Sodium bicarbonate 1300 mg tid  3. MBD/ 2HPTH - cont ergocalciferol and Sevelamer 800 TID  Lab Results   Component Value Date    .3 (H) 02/02/2018    CALCIUM 9.2 02/03/2018    CAION 1.07 04/05/2017    PHOS 4.8 (H) 02/03/2018       Recent Labs  Lab 02/01/18  1035 02/02/18  0312 02/03/18  0454   MG 2.4 2.9* 2.4     Lab Results   Component Value Date    [id number] 28 (L) 02/02/2018     Lab Results   Component Value Date    CO2 20 (L) 02/03/2018   4. Anemia of CKD 5 -   Epogen 05337 units subq weekly, SP 1 unit PRBC on 2/2/18, iron likely replaced by PRBC transfusion, recheck in 2 weeks    Recent Labs  Lab 02/01/18  1035 02/02/18  0312 02/02/18  2327 02/03/18  0455   WBC 3.95 3.71*  --  4.62   HGB 7.4* 6.6* 8.0* 7.9*   HCT 22.4* 20.9* 24.3* 23.7*    165  --  207       Lab Results   Component Value Date    IRON 44 01/09/2018    TIBC 429 01/09/2018    FERRITIN 89 01/09/2018       5. Acute on chronic SHF EF 30-35% - lasix held due to worsening azotemia - will give 1 dose today 40 PO        Thank you for allowing me to participate in the care of your patients  With any question please call [phone]  Keyanna Becker    [facility]  RHETT Campbell MD, FACP,   MUvaldo Juan MD,   MD SAIMA Pimentel, NP  [address]  [place], [id number]  [phone]

## 2018-02-03 NOTE — PROGRESS NOTES
Progress Note  [facility]  Admit Date: 1/30/2018   LOS: 2 days   SUBJECTIVE:     Patient seen and examined this AM. NAEON. Feels better.  Discovered yesterday I's/O's havent been accurate    Review of Systems   Constitutional: Negative for chills and fever.   Respiratory: Negative for shortness of breath.    Cardiovascular: Negative for chest pain and palpitations.   Genitourinary: Negative for urgency.   Neurological: Negative for dizziness.   Psychiatric/Behavioral: The patient is nervous/anxious.        OBJECTIVE:   Vital Signs (Most Recent)  Temp: 97 °F (36.1 °C) (02/03/18 1700)  Pulse: 77 (02/03/18 1700)  Resp: 16 (02/03/18 1700)  BP: (!) 167/77 (02/03/18 1700)  SpO2: 97 % (02/03/18 1526)    I & O (Last 24H):  Intake/Output Summary (Last 24 hours) at 02/03/18 1704  Last data filed at 02/03/18 0800   Gross per 24 hour   Intake              550 ml   Output                0 ml   Net              550 ml     Wt Readings from Last 3 Encounters:   01/30/18 59 kg (130 lb)   01/09/18 79.4 kg (175 lb)   12/21/17 79.8 kg (175 lb 14.8 oz)       Current Diet Order   Procedures    Diet Adult Regular Cardiac (Low Na/Chol), Low Potassium     Order Specific Question:   Additional restrictions:     Answer:   Cardiac (Low Na/Chol)     Order Specific Question:   Additional restrictions:     Answer:   Low Potassium        Physical Exam   Constitutional: She is oriented to person, place, and time and well-developed, well-nourished, and in no distress.   HENT:   Head: Normocephalic and atraumatic.   Eyes: Conjunctivae are normal.   Neck: Normal range of motion.   Cardiovascular: Normal rate, regular rhythm and normal heart sounds.    Pulmonary/Chest: Effort normal. No respiratory distress. She has no wheezes. She has rales.   Abdominal: Soft. Bowel sounds are normal. She exhibits no distension. There is no tenderness. There is no rebound.   Musculoskeletal: Normal range of motion. She exhibits no edema.   Neurological: She  is alert and oriented to person, place, and time.   Skin: Skin is warm and dry. She is not diaphoretic.   Psychiatric:   Slightly pressure speech, wide eyes   Nursing note and vitals reviewed.    Laboratory Data:  CBC    Recent Labs  Lab 02/01/18  1035 02/02/18  0312 02/02/18  2327 02/03/18  0455   WBC 3.95 3.71*  --  4.62   RBC 2.36* 2.13*  --  2.54*   HGB 7.4* 6.6* 8.0* 7.9*   HCT 22.4* 20.9* 24.3* 23.7*    165  --  207   MCV 95 98  --  93   MCH 31.4* 31.0  --  31.1*   MCHC 33.0 31.6*  --  33.3     CMP    Recent Labs  Lab 02/02/18  0312 02/02/18  1816 02/03/18  0454 02/03/18  1515   CALCIUM 9.1 9.4 9.2  --    PROT 7.8 8.0 8.0  --     138 140  --    K 5.5* 3.9 4.1  --    CO2 13* 19* 20*  --     104 104  --    BUN 91* 84* 82*  --    CREATININE 4.5* 4.2* 4.0* 3.8*   ALKPHOS 66 75 72  --    ALT 19 19 19  --    AST 37 23 23  --    BILITOT 0.7 0.8 1.3*  --      POCT-Glucose  No results found for: POCTGLUCOSE  COAGS    Recent Labs  Lab 01/30/18  1820   INR 1.1     UA    Recent Labs  Lab 02/02/18  1929   COLORU Yellow   SPECGRAV 1.010   PHUR 6.0   PROTEINUA Trace*     MICRO  Microbiology Results (last 7 days)     Procedure Component Value Units Date/Time    Blood culture [[id number]] Collected:  01/30/18 2245    Order Status:  Completed Specimen:  Blood from Peripheral, Antecubital, Left Updated:  02/03/18 0612     Blood Culture, Routine No Growth to date     Blood Culture, Routine No Growth to date     Blood Culture, Routine No Growth to date     Blood Culture, Routine No Growth to date    Narrative:       Please obtain from 2 different sites    Blood culture [[id number]] Collected:  01/30/18 2235    Order Status:  Completed Specimen:  Blood from Peripheral, Antecubital, Right Updated:  02/03/18 0612     Blood Culture, Routine No Growth to date     Blood Culture, Routine No Growth to date     Blood Culture, Routine No Growth to date     Blood Culture, Routine No Growth to date    Urine culture  [[id number]] Collected:  01/30/18 2150    Order Status:  Completed Specimen:  Urine from Urine, Catheterized Updated:  02/01/18 0223     Urine Culture, Routine No growth        LIPID PANEL  Lab Results   Component Value Date    CHOL 103 (L) 03/11/2017     Lab Results   Component Value Date    HDL 38 (L) 03/11/2017     Lab Results   Component Value Date    LDLCALC 57.0 (L) 03/11/2017     Lab Results   Component Value Date    TRIG 40 03/11/2017     Lab Results   Component Value Date    CHOLHDL 36.9 03/11/2017       Diagnostic Results:  Imaging in last 24 hours: None new    ASSESSMENT/PLAN:   Anai Sal is a 72 y.o. female    72 year old female with Congestive heart failure; Chronic hepatitis C virus infection (3/14/2017); Coronary artery disease; Hypertension; Hazel; Renal disorder; and Thyroid disease. Here for acute on chronic CHF exacerbation     Acute on Chronic CHF exacerbation  -improved SOB, not requiring oxygen  - lasix to 40 BID  - Strict I/Os  - +550 yesterday, urinating x 4    Essential HTN  - Continue isosorbide-Hydralazine   - holding metoprolol currently for bradycardia     ACS workup  -negative      Hx of A.fib  - Pt is on metoprolol at home. EKG upon admission NSR. Holding BB as pt is bradycardic.   - Pt was not on anticoagulation as in outpt  - Heparin for prophylaxis started     CANDI on CKD  - Pt with baseline of 3.2, now 3.8 (4.0 this AM)  - UA neg  - [facility] nephro consulted appreciate recs - refusing HD  - Will renally dose all meds and avoid nephrotoxic agents.      Sepsis   -could be a side effect of zyprexa  - not neutropenic   - Blood cultures, urine cultures NGTD  - CXR positive for possible interstitial PNA  - treat for Hospital  Acquired PNA as pt was recently discharged from a hospital  - transitioned to azithro for atypical pna, day 3.      Post ablation hypothyroidism  - S/p ablation in 2016   - continue levothyroxine  - TSH elevated, T4 WNL     Hx of chronic hep C virus  - AST/ALT  normal.      PPx: Heparin  Diet: Cardiac     Dispo:  Back home with [facility] care  Ambulatory O2 97%  Home tomorrow with home health    2/3/2018 Vlad Verdugo MD  5:04 PM

## 2018-02-04 LAB
ALBUMIN SERPL BCP-MCNC: 3.2 G/DL
ALP SERPL-CCNC: 67 U/L
ALT SERPL W/O P-5'-P-CCNC: 16 U/L
ANION GAP SERPL CALC-SCNC: 11 MMOL/L
AST SERPL-CCNC: 23 U/L
BASOPHILS # BLD AUTO: 0.02 K/UL
BASOPHILS NFR BLD: 0.6 %
BILIRUB SERPL-MCNC: 1 MG/DL
BUN SERPL-MCNC: 71 MG/DL
CALCIUM SERPL-MCNC: 9 MG/DL
CHLORIDE SERPL-SCNC: 104 MMOL/L
CO2 SERPL-SCNC: 22 MMOL/L
CREAT SERPL-MCNC: 3.6 MG/DL
DIFFERENTIAL METHOD: ABNORMAL
EOSINOPHIL # BLD AUTO: 0.1 K/UL
EOSINOPHIL NFR BLD: 2.2 %
ERYTHROCYTE [DISTWIDTH] IN BLOOD BY AUTOMATED COUNT: 16.2 %
EST. GFR  (AFRICAN AMERICAN): 14 ML/MIN/1.73 M^2
EST. GFR  (NON AFRICAN AMERICAN): 12 ML/MIN/1.73 M^2
GLUCOSE SERPL-MCNC: 79 MG/DL
HCT VFR BLD AUTO: 23.3 %
HGB BLD-MCNC: 7.8 G/DL
LYMPHOCYTES # BLD AUTO: 0.7 K/UL
LYMPHOCYTES NFR BLD: 19.6 %
MAGNESIUM SERPL-MCNC: 2.3 MG/DL
MCH RBC QN AUTO: 31.5 PG
MCHC RBC AUTO-ENTMCNC: 33.5 G/DL
MCV RBC AUTO: 94 FL
MONOCYTES # BLD AUTO: 0.7 K/UL
MONOCYTES NFR BLD: 19.1 %
NEUTROPHILS # BLD AUTO: 2.1 K/UL
NEUTROPHILS NFR BLD: 58.5 %
PHOSPHATE SERPL-MCNC: 4.2 MG/DL
PLATELET # BLD AUTO: 189 K/UL
PMV BLD AUTO: 9.7 FL
POCT GLUCOSE: 115 MG/DL (ref 70–110)
POTASSIUM SERPL-SCNC: 3.9 MMOL/L
PROT SERPL-MCNC: 7.6 G/DL
RBC # BLD AUTO: 2.48 M/UL
SODIUM SERPL-SCNC: 137 MMOL/L
TROPONIN I SERPL DL<=0.01 NG/ML-MCNC: 0.04 NG/ML
TROPONIN I SERPL DL<=0.01 NG/ML-MCNC: 0.09 NG/ML
WBC # BLD AUTO: 3.62 K/UL

## 2018-02-04 PROCEDURE — 63600175 PHARM REV CODE 636 W HCPCS: Performed by: FAMILY MEDICINE

## 2018-02-04 PROCEDURE — 94761 N-INVAS EAR/PLS OXIMETRY MLT: CPT

## 2018-02-04 PROCEDURE — 93005 ELECTROCARDIOGRAM TRACING: CPT

## 2018-02-04 PROCEDURE — 25000003 PHARM REV CODE 250: Performed by: STUDENT IN AN ORGANIZED HEALTH CARE EDUCATION/TRAINING PROGRAM

## 2018-02-04 PROCEDURE — 94640 AIRWAY INHALATION TREATMENT: CPT

## 2018-02-04 PROCEDURE — 36415 COLL VENOUS BLD VENIPUNCTURE: CPT

## 2018-02-04 PROCEDURE — 99233 SBSQ HOSP IP/OBS HIGH 50: CPT | Mod: ,,, | Performed by: INTERNAL MEDICINE

## 2018-02-04 PROCEDURE — 25000003 PHARM REV CODE 250: Performed by: FAMILY MEDICINE

## 2018-02-04 PROCEDURE — 63600175 PHARM REV CODE 636 W HCPCS: Performed by: STUDENT IN AN ORGANIZED HEALTH CARE EDUCATION/TRAINING PROGRAM

## 2018-02-04 PROCEDURE — A4216 STERILE WATER/SALINE, 10 ML: HCPCS | Performed by: FAMILY MEDICINE

## 2018-02-04 PROCEDURE — 11000001 HC ACUTE MED/SURG PRIVATE ROOM

## 2018-02-04 PROCEDURE — 85025 COMPLETE CBC W/AUTO DIFF WBC: CPT

## 2018-02-04 PROCEDURE — 25000003 PHARM REV CODE 250: Performed by: INTERNAL MEDICINE

## 2018-02-04 PROCEDURE — C9113 INJ PANTOPRAZOLE SODIUM, VIA: HCPCS | Performed by: STUDENT IN AN ORGANIZED HEALTH CARE EDUCATION/TRAINING PROGRAM

## 2018-02-04 PROCEDURE — 93010 ELECTROCARDIOGRAM REPORT: CPT | Mod: ,,, | Performed by: INTERNAL MEDICINE

## 2018-02-04 PROCEDURE — 25000003 PHARM REV CODE 250: Performed by: PSYCHIATRY & NEUROLOGY

## 2018-02-04 PROCEDURE — 84484 ASSAY OF TROPONIN QUANT: CPT

## 2018-02-04 PROCEDURE — 80053 COMPREHEN METABOLIC PANEL: CPT

## 2018-02-04 PROCEDURE — 25000242 PHARM REV CODE 250 ALT 637 W/ HCPCS: Performed by: FAMILY MEDICINE

## 2018-02-04 PROCEDURE — 84100 ASSAY OF PHOSPHORUS: CPT

## 2018-02-04 PROCEDURE — 83735 ASSAY OF MAGNESIUM: CPT

## 2018-02-04 RX ORDER — ONDANSETRON 2 MG/ML
4 INJECTION INTRAMUSCULAR; INTRAVENOUS EVERY 6 HOURS PRN
Status: DISCONTINUED | OUTPATIENT
Start: 2018-02-04 | End: 2018-02-06 | Stop reason: HOSPADM

## 2018-02-04 RX ORDER — MORPHINE SULFATE 4 MG/ML
6 INJECTION, SOLUTION INTRAMUSCULAR; INTRAVENOUS EVERY 4 HOURS PRN
Status: DISCONTINUED | OUTPATIENT
Start: 2018-02-04 | End: 2018-02-06 | Stop reason: HOSPADM

## 2018-02-04 RX ORDER — RAMELTEON 8 MG/1
8 TABLET ORAL NIGHTLY PRN
Status: DISCONTINUED | OUTPATIENT
Start: 2018-02-04 | End: 2018-02-06 | Stop reason: HOSPADM

## 2018-02-04 RX ORDER — NITROGLYCERIN 0.4 MG/1
0.4 TABLET SUBLINGUAL EVERY 5 MIN PRN
Status: DISCONTINUED | OUTPATIENT
Start: 2018-02-04 | End: 2018-02-06 | Stop reason: HOSPADM

## 2018-02-04 RX ORDER — METOPROLOL TARTRATE 25 MG/1
25 TABLET, FILM COATED ORAL 4 TIMES DAILY
Status: DISCONTINUED | OUTPATIENT
Start: 2018-02-04 | End: 2018-02-06 | Stop reason: HOSPADM

## 2018-02-04 RX ORDER — FUROSEMIDE 40 MG/1
40 TABLET ORAL 2 TIMES DAILY
Status: DISCONTINUED | OUTPATIENT
Start: 2018-02-04 | End: 2018-02-06 | Stop reason: HOSPADM

## 2018-02-04 RX ORDER — AMIODARONE HYDROCHLORIDE 200 MG/1
400 TABLET ORAL 2 TIMES DAILY
Status: DISCONTINUED | OUTPATIENT
Start: 2018-02-04 | End: 2018-02-06 | Stop reason: HOSPADM

## 2018-02-04 RX ORDER — MORPHINE SULFATE 4 MG/ML
4 INJECTION, SOLUTION INTRAMUSCULAR; INTRAVENOUS EVERY 4 HOURS PRN
Status: DISCONTINUED | OUTPATIENT
Start: 2018-02-04 | End: 2018-02-04

## 2018-02-04 RX ORDER — FUROSEMIDE 10 MG/ML
40 INJECTION INTRAMUSCULAR; INTRAVENOUS ONCE
Status: COMPLETED | OUTPATIENT
Start: 2018-02-04 | End: 2018-02-04

## 2018-02-04 RX ADMIN — SODIUM BICARBONATE 650 MG TABLET 1300 MG: at 09:02

## 2018-02-04 RX ADMIN — SEVELAMER CARBONATE 800 MG: 800 TABLET, FILM COATED ORAL at 09:02

## 2018-02-04 RX ADMIN — SODIUM BICARBONATE 650 MG TABLET 1300 MG: at 05:02

## 2018-02-04 RX ADMIN — FUROSEMIDE 40 MG: 10 INJECTION, SOLUTION INTRAMUSCULAR; INTRAVENOUS at 12:02

## 2018-02-04 RX ADMIN — PANTOPRAZOLE SODIUM 40 MG: 40 INJECTION, POWDER, FOR SOLUTION INTRAVENOUS at 09:02

## 2018-02-04 RX ADMIN — HYDRALAZINE HYDROCHLORIDE AND ISOSORBIDE DINITRATE 2 TABLET: 37.5; 2 TABLET, FILM COATED ORAL at 05:02

## 2018-02-04 RX ADMIN — MIRTAZAPINE 30 MG: 15 TABLET, FILM COATED ORAL at 09:02

## 2018-02-04 RX ADMIN — AZITHROMYCIN 250 MG: 250 TABLET, FILM COATED ORAL at 09:02

## 2018-02-04 RX ADMIN — AMLODIPINE BESYLATE 10 MG: 5 TABLET ORAL at 09:02

## 2018-02-04 RX ADMIN — HEPARIN SODIUM 5000 UNITS: 5000 INJECTION, SOLUTION INTRAVENOUS; SUBCUTANEOUS at 02:02

## 2018-02-04 RX ADMIN — SODIUM CHLORIDE, PRESERVATIVE FREE 3 ML: 5 INJECTION INTRAVENOUS at 10:02

## 2018-02-04 RX ADMIN — FLUTICASONE FUROATE AND VILANTEROL TRIFENATATE 1 PUFF: 100; 25 POWDER RESPIRATORY (INHALATION) at 08:02

## 2018-02-04 RX ADMIN — HYDRALAZINE HYDROCHLORIDE AND ISOSORBIDE DINITRATE 2 TABLET: 37.5; 2 TABLET, FILM COATED ORAL at 02:02

## 2018-02-04 RX ADMIN — ATORVASTATIN CALCIUM 10 MG: 10 TABLET, FILM COATED ORAL at 09:02

## 2018-02-04 RX ADMIN — FLUTICASONE PROPIONATE 100 MCG: 50 SPRAY, METERED NASAL at 12:02

## 2018-02-04 RX ADMIN — SODIUM CHLORIDE, PRESERVATIVE FREE 3 ML: 5 INJECTION INTRAVENOUS at 05:02

## 2018-02-04 RX ADMIN — RAMELTEON 8 MG: 8 TABLET, FILM COATED ORAL at 02:02

## 2018-02-04 RX ADMIN — NITROGLYCERIN 0.4 MG: 0.4 TABLET SUBLINGUAL at 04:02

## 2018-02-04 RX ADMIN — AMIODARONE HYDROCHLORIDE 400 MG: 200 TABLET ORAL at 09:02

## 2018-02-04 RX ADMIN — HALOPERIDOL 1 MG: 1 TABLET ORAL at 09:02

## 2018-02-04 RX ADMIN — DIVALPROEX SODIUM 250 MG: 250 TABLET, DELAYED RELEASE ORAL at 09:02

## 2018-02-04 RX ADMIN — METOPROLOL TARTRATE 25 MG: 25 TABLET ORAL at 11:02

## 2018-02-04 RX ADMIN — TIOTROPIUM BROMIDE 18 MCG: 18 CAPSULE ORAL; RESPIRATORY (INHALATION) at 08:02

## 2018-02-04 RX ADMIN — HYDRALAZINE HYDROCHLORIDE AND ISOSORBIDE DINITRATE 2 TABLET: 37.5; 2 TABLET, FILM COATED ORAL at 09:02

## 2018-02-04 RX ADMIN — FUROSEMIDE 40 MG: 40 TABLET ORAL at 06:02

## 2018-02-04 RX ADMIN — Medication 1 CAPSULE: at 09:02

## 2018-02-04 RX ADMIN — DIVALPROEX SODIUM 500 MG: 500 TABLET, DELAYED RELEASE ORAL at 09:02

## 2018-02-04 RX ADMIN — SODIUM BICARBONATE 650 MG TABLET 1300 MG: at 02:02

## 2018-02-04 RX ADMIN — HEPARIN SODIUM 5000 UNITS: 5000 INJECTION, SOLUTION INTRAVENOUS; SUBCUTANEOUS at 05:02

## 2018-02-04 RX ADMIN — ASPIRIN 81 MG 81 MG: 81 TABLET ORAL at 09:02

## 2018-02-04 RX ADMIN — MORPHINE SULFATE 4 MG: 4 INJECTION INTRAVENOUS at 05:02

## 2018-02-04 RX ADMIN — HEPARIN SODIUM 5000 UNITS: 5000 INJECTION, SOLUTION INTRAVENOUS; SUBCUTANEOUS at 09:02

## 2018-02-04 RX ADMIN — METOPROLOL TARTRATE 25 MG: 25 TABLET ORAL at 06:02

## 2018-02-04 RX ADMIN — SEVELAMER CARBONATE 800 MG: 800 TABLET, FILM COATED ORAL at 06:02

## 2018-02-04 RX ADMIN — LEVOTHYROXINE SODIUM 100 MCG: 25 TABLET ORAL at 05:02

## 2018-02-04 NOTE — NURSING
"Pt daughter states pt is not sleeping well and "has not had sleep for last two night." Pt daughter states "I will give her something myself." RN educated daughter on medications and policy. Dr. Burris notified, MD will put in orders for ramelteon to assist with sleep. Pt daughter states "that's not going to help her sleep."  RN stated will try ramelteon first and will re-evaluate need for further medications. Pt agrees with trying ramelteon to assist with sleep.   "

## 2018-02-04 NOTE — PLAN OF CARE
Problem: Patient Care Overview  Goal: Plan of Care Review  Outcome: Ongoing (interventions implemented as appropriate)  Plan of care reviewed with patient. Patient verbalized complete understanding. Fall/safety precautions maintained. Slip resistant socks on. Bed in lowest position, locked, call light within reach. Bed alarm on, Side rails up x's 2. Nurse instructed patient to notify staff for any assistance and pt verbalized complete understanding. Pt currently denies SOB, chest pain, or N/V. Pt on RA with O2 sats of 96-97%. L arm and hand edema noted. Pt given ramelteon to assist with sleep. Pt daughter at bedside. No acute distress, will continue to monitor.    Pt on telemetry, no ectopy or true red alarms noted. SR, HR 70's

## 2018-02-04 NOTE — NURSING
Pt still complaints of pressure or chest pain that is not relieved after the second administration of nitro SL. About to give the 3rd dose of nitro SL when pt vomited. Notify Dr. Caldera and MD order to push IV morphine. VS continuous monitoring and documented in flowsheet.

## 2018-02-04 NOTE — PROGRESS NOTES
Progress Note    Admit Date: 1/30/2018   LOS: 3 days     SUBJECTIVE:     NAEON, patient reports feeling well. Denies N/V/abd pain. Urinating without issue. Last BM yesterday.     Scheduled Meds:   allopurinol  50 mg Oral Daily    amLODIPine  10 mg Oral Daily    aspirin  81 mg Oral Daily    atorvastatin  10 mg Oral Daily    azithromycin  250 mg Oral Daily    divalproex  250 mg Oral Daily    divalproex  500 mg Oral QHS    ergocalciferol  50,000 Units Oral Q7 Days    fluticasone  2 spray Each Nare Daily    fluticasone-vilanterol  1 puff Inhalation Daily    furosemide  40 mg Oral BID    haloperidol  1 mg Oral QHS    heparin (porcine)  5,000 Units Subcutaneous Q8H    isosorbide-hydrALAZINE 20-37.5 mg  2 tablet Oral TID    levothyroxine  100 mcg Oral Before breakfast    mirtazapine  30 mg Oral QHS    pantoprazole  40 mg Intravenous Daily    senna-docusate 8.6-50 mg  1 tablet Oral Daily    sevelamer carbonate  800 mg Oral BID WM    sodium bicarbonate  2 tablet Oral TID    sodium chloride 0.9%  3 mL Intravenous Q8H    tiotropium  18 mcg Inhalation Daily    vitamin renal formula (B-complex-vitamin c-folic acid)  1 capsule Oral Daily     Continuous Infusions:  PRN Meds:sodium chloride, sodium chloride, ramelteon    Review of patient's allergies indicates:   Allergen Reactions    Seroquel [quetiapine]      Causes  profound sedation.     Review of Systems:  Constitutional: no fever or chills  Eyes: no visual changes  Cardiovascular: no chest pain   Gastrointestinal: no nausea or vomiting; no abdominal pain   Genitourinary: +urination     OBJECTIVE:     Vital Signs (Most Recent)  Temp: 99 °F (37.2 °C) (02/04/18 1216)  Pulse: 82 (02/04/18 1214)  Resp: 16 (02/04/18 0831)  BP: (!) 157/74 (02/04/18 1214)  SpO2: 98 % (02/04/18 0831)    Vital Signs Range (Last 24H):  Temp:  [97 °F (36.1 °C)-99.4 °F (37.4 °C)]   Pulse:  [72-84]   Resp:  [14-18]   BP: (149-167)/(67-88)   SpO2:  [94 %-98 %]     I & O (Last  24H):  Intake/Output Summary (Last 24 hours) at 02/04/18 1317  Last data filed at 02/04/18 0600   Gross per 24 hour   Intake               60 ml   Output              300 ml   Net             -240 ml     Physical Exam:  General: well developed, well nourished, hard of hearing   Eyes: conjunctivae/corneas clear.   Lungs:  clear to auscultation bilaterally and normal respiratory effort  Cardiovascular: Heart: regular rate and rhythm, S1, S2 normal, no murmur, click, rub or gallop. Chest Wall: no tenderness.   Abdomen/Rectal: Abdomen: soft, non-tender non-distented; bowel sounds normal  Psych: improved symptoms on depakote.     Laboratory:  CBC:   Recent Labs  Lab 02/04/18  0621   WBC 3.62*   RBC 2.48*   HGB 7.8*   HCT 23.3*      MCV 94   MCH 31.5*   MCHC 33.5     CMP:   Recent Labs  Lab 02/04/18  0621   GLU 79   CALCIUM 9.0   ALBUMIN 3.2*   PROT 7.6      K 3.9   CO2 22*      BUN 71*   CREATININE 3.6*   ALKPHOS 67   ALT 16   AST 23   BILITOT 1.0     Cardiac markers:   Recent Labs  Lab 01/31/18  0525   TROPONINI 0.019     Diagnostic Results:  No new images.     ASSESSMENT/PLAN:     72 year old female with Congestive heart failure; Chronic hepatitis C virus infection (3/14/2017); Coronary artery disease; Hypertension; Hazel; Renal disorder; and Thyroid disease. Here for acute on chronic CHF exacerbation     Acute on Chronic CHF exacerbation  -improved SOB, not requiring oxygen  - lasix to 40 BID  - Strict I/Os, Severe MR, combined CHF with very dilated LV.   - +550 yesterday, urinating x 4  - cardiology recs-->    Overall poor prognosis  In the setting CHF with bi-ventricular failure with combined reduced + diastolic HF, inoperable mitral valve disease with severely enlarged LV, Consider palliative care consult      Essential HTN  - Continue isosorbide-Hydralazine   - holding metoprolol currently for bradycardia     ACS workup  -negative      Hx of A.fib  - Pt is on metoprolol at home. EKG upon  admission NSR. Holding BB as pt is bradycardic.   - Pt was not on anticoagulation as in outpt  - Heparin for prophylaxis started     CANDI on CKD  - Pt with baseline of 3.2, now 3.8 (4.0 this AM)  - UA neg  - [facility]ro consulted appreciate recs - refusing HD  - Will renally dose all meds and avoid nephrotoxic agents.      Sepsis   -could be a side effect of zyprexa  - not neutropenic   - Blood cultures, urine cultures NGTD  - CXR positive for possible interstitial PNA  - treat for Hospital  Acquired PNA as pt was recently discharged from a hospital  - transitioned to azithro for atypical pna, day 3.      Post ablation hypothyroidism  - S/p ablation in 2016   - continue levothyroxine  - TSH elevated, T4 WNL     Hx of chronic hep C virus  - AST/ALT normal.      PPx: Heparin  Diet: Cardiac    Slightly volume overloaded, one dose of IV lasix in hospital. Continue PO lasix as outpatient     Dispo:  Back home with [facility] care  Ambulatory O2 97%  Home tomorrow with home health     2/4/2018 1:20 PM Jose C Caldera M.D.

## 2018-02-04 NOTE — NURSING
Pt daughter asking if RN is in right pt chart. Pt chart verified by RN. Pt states RN is giving pt another pt's medications. RN verified pt medications with MD and MAR chart.

## 2018-02-04 NOTE — PROGRESS NOTES
Seen this am      No acute issues   She was originally seen 1/31/2018        Vitals:    02/04/18 0521 02/04/18 0825 02/04/18 0829 02/04/18 0831   BP: (!) 153/81 (!) 157/74     Pulse: 77 80 83 84   Resp: 16 18 16 16   Temp: 98.6 °F (37 °C) 99.4 °F (37.4 °C)     TempSrc: Oral Oral     SpO2:   98% 98%   Weight:       Height:               LABS  CBC    Recent Labs  Lab 02/02/18  0312 02/02/18  2327 02/03/18  0455 02/04/18  0621   WBC 3.71*  --  4.62 3.62*   RBC 2.13*  --  2.54* 2.48*   HGB 6.6* 8.0* 7.9* 7.8*   HCT 20.9* 24.3* 23.7* 23.3*     --  207 189   MCV 98  --  93 94   MCH 31.0  --  31.1* 31.5*   MCHC 31.6*  --  33.3 33.5     BMP    Recent Labs  Lab 02/02/18 1816 02/03/18 0454 02/03/18  1515 02/04/18  0621    140  --  137   K 3.9 4.1  --  3.9   CO2 19* 20*  --  22*    104  --  104   BUN 84* 82*  --  71*   CREATININE 4.2* 4.0* 3.8* 3.6*    76  --  79       POCT-Glucose  No results found for: POCTGLUCOSE      Recent Labs  Lab 02/02/18  0312 02/02/18 1816 02/03/18  0454 02/04/18  0621   CALCIUM 9.1 9.4 9.2 9.0   MG 2.9*  --  2.4 2.3   PHOS 5.4*  --  4.8* 4.2     LFT    Recent Labs  Lab 02/02/18 1816 02/03/18  0454 02/04/18  0621   PROT 8.0 8.0 7.6   ALBUMIN 3.5 3.5 3.2*   BILITOT 0.8 1.3* 1.0   AST 23 23 23   ALKPHOS 75 72 67   ALT 19 19 16     GFR     COAGS    Recent Labs  Lab 01/30/18  1820   INR 1.1     CE    Recent Labs  Lab 01/30/18  1707 01/30/18  2223 01/31/18  0525   TROPONINI 0.024 0.028*  0.028* 0.019     BNP    Recent Labs  Lab 01/30/18  1707   BNP >4,900*       LAST HbA1c  Lab Results   Component Value Date    HGBA1C 4.2 (L) 01/11/2017       Lipid panel  Lab Results   Component Value Date    CHOL 103 (L) 03/11/2017    CHOL 176 01/11/2017     Lab Results   Component Value Date    HDL 38 (L) 03/11/2017    HDL 63 01/11/2017     Lab Results   Component Value Date    LDLCALC 57.0 (L) 03/11/2017    LDLCALC 76.8 01/11/2017     Lab Results   Component Value Date    TRIG 40  03/11/2017    TRIG 181 (H) 01/11/2017     Lab Results   Component Value Date    CHOLHDL 36.9 03/11/2017    CHOLHDL 35.8 01/11/2017              Imp        CHF with EF 30-35%  Severe MR   Dilated LV with LVEDD 6.2 cm   Severe LA enlargement    Right atrial enlargement    Mildly repressed RV function    Moderate TR  PH with PASP 52 mmHg      BNP >4900     HTN  CKD refusing HD  Bipolar   COPD  Anemia  DNR          Rec:        Titrate diuretics as tolerated  Titrate HTN agent to BP goal <130/80  Strict I/Os  Daily weight        Patient refused HD which would have an excellent way to control her volume        Overall poor prognosis  In the setting CHF with bi-ventricular failure with combined reduced + diastolic HF, inoperable mitral valve disease with severely enlarged LV        Consider palliative care consult

## 2018-02-04 NOTE — PLAN OF CARE
Problem: Patient Care Overview  Goal: Plan of Care Review  Plan of care reviewed with the patient. Verbalized clear understanding. Bed alarm set. Bed in lowest position. Pt remain afebrile and free of fall. Call light within reach. Instructed pt to call when getting out of bed. Denies any pain or discomfort. NSR HR between 60's-80's on telemetry monitor. No report of SOB or lightheadedness. Will continue to monitor.

## 2018-02-04 NOTE — NURSING
Called laboratory to verify phlebotomist is drawing morning labs for 0430 due to labs not yet collected on order screen. Verified with lab that phlebotomist is on way.

## 2018-02-04 NOTE — NURSING
"Pt complaints of tightness and pressure that sitting on her chest. She reports " feeling tired and thirsty all the time." Reported SOB. /82, , RR 18, 98% on RA. . Breath sounds clear equally. Notify Dr. Caldera and will put order for EKG, troponin.    "

## 2018-02-04 NOTE — PROGRESS NOTES
Progress Note  Nephrology      Consult Requested By: Francisco Estrada MD  Reason for Consult: CKD 5    SUBJECTIVE:     Review of Systems   Constitutional: Negative for chills and fever.   Respiratory: Negative for cough and shortness of breath.    Cardiovascular: Negative for chest pain and leg swelling.   Gastrointestinal: Negative for nausea.     Patient Active Problem List   Diagnosis    Recurrent major depressive disorder, in partial remission    Insomnia    Stage 4 chronic kidney disease    Proteinuria    SOB (shortness of breath)    Essential hypertension    Acute on chronic combined systolic and diastolic heart failure    COPD exacerbation    Demand ischemia of myocardium    Postablative hypothyroidism    Chronic gout    Acute bronchitis due to infection    Chronic combined systolic and diastolic heart failure, NYHA class 3    History of breast cancer    Elevated troponin    Chronic hepatitis C virus infection    Unstable angina    Non-rheumatic mitral regurgitation    Chronic obstructive pulmonary disease    Leukopenia    Delirium due to another medical condition    Paroxysmal atrial fibrillation    Acute renal failure superimposed on stage 4 chronic kidney disease    Anemia of chronic renal failure, stage 4 (severe)    Cognitive impairment    Major neurocognitive disorder    Palliative care encounter    Esophagitis, [place] grade C    Congestive heart failure       OBJECTIVE:     Medications:   allopurinol  50 mg Oral Daily    amLODIPine  10 mg Oral Daily    aspirin  81 mg Oral Daily    atorvastatin  10 mg Oral Daily    azithromycin  250 mg Oral Daily    divalproex  250 mg Oral Daily    divalproex  500 mg Oral QHS    ergocalciferol  50,000 Units Oral Q7 Days    fluticasone  2 spray Each Nare Daily    fluticasone-vilanterol  1 puff Inhalation Daily    furosemide  40 mg Oral BID    haloperidol  1 mg Oral QHS    heparin (porcine)  5,000 Units Subcutaneous Q8H     isosorbide-hydrALAZINE 20-37.5 mg  2 tablet Oral TID    levothyroxine  100 mcg Oral Before breakfast    mirtazapine  30 mg Oral QHS    pantoprazole  40 mg Intravenous Daily    senna-docusate 8.6-50 mg  1 tablet Oral Daily    sevelamer carbonate  800 mg Oral BID WM    sodium bicarbonate  2 tablet Oral TID    sodium chloride 0.9%  3 mL Intravenous Q8H    tiotropium  18 mcg Inhalation Daily    vitamin renal formula (B-complex-vitamin c-folic acid)  1 capsule Oral Daily       Vitals:    02/04/18 0831   BP:    Pulse: 84   Resp: 16   Temp:      I/O last 3 completed shifts:  In: 860 [P.O.:560; Blood:300]  Out: 500 [Urine:500]  Physical Exam   Constitutional: She is oriented to person, place, and time. She appears well-developed and well-nourished. No distress.   HENT:   Head: Normocephalic and atraumatic.   Mouth/Throat: Oropharynx is clear and moist.   Eyes: EOM are normal. No scleral icterus.   Neck: Normal range of motion.   Cardiovascular: Normal rate and regular rhythm.  Exam reveals no friction rub.    No murmur heard.  Pulmonary/Chest: Effort normal and breath sounds normal. She has no rales.   Abdominal: Soft. Bowel sounds are normal. She exhibits no distension. There is no tenderness.   Musculoskeletal: Normal range of motion. She exhibits edema (1+ pitting BLE).   Lymphadenopathy:     She has no cervical adenopathy.   Neurological: She is alert and oriented to person, place, and time.   Skin: Skin is warm and dry. No erythema.   Psychiatric: Thought content normal.     Laboratory:    Recent Labs  Lab 02/02/18  0312 02/02/18  2327 02/03/18  0455 02/04/18  0621   WBC 3.71*  --  4.62 3.62*   HGB 6.6* 8.0* 7.9* 7.8*   HCT 20.9* 24.3* 23.7* 23.3*     --  207 189   MONO 16.2*  0.6  --  20.1*  0.9 19.1*  0.7       Recent Labs  Lab 02/02/18  0312 02/02/18  1816 02/03/18  0454 02/03/18  1515 02/04/18  0621    138 140  --  137   K 5.5* 3.9 4.1  --  3.9    104 104  --  104   CO2 13* 19* 20*   --  22*   BUN 91* 84* 82*  --  71*   CREATININE 4.5* 4.2* 4.0* 3.8* 3.6*   CALCIUM 9.1 9.4 9.2  --  9.0   PHOS 5.4*  --  4.8*  --  4.2     Labs reviewed  Diagnostic Results:  X-Ray: Reviewed  US: Reviewed  Echo: Reviewed      ASSESSMENT/PLAN:   1. CKD 5 - stable  US kidneys - chronic medical disease   Avoid nephrotoxins, dose all meds ad for GFr < 30  No indication for urgent RRT. Monitor closely. Strict ins/outs, daily weight  2. Metabolic acidosis -improving on Sodium bicarbonate 1300 mg tid  3. MBD/ 2HPTH - cont ergocalciferol and Sevelamer 800 TID  Lab Results   Component Value Date    .3 (H) 02/02/2018    CALCIUM 9.0 02/04/2018    CAION 1.07 04/05/2017    PHOS 4.2 02/04/2018       Recent Labs  Lab 02/02/18  0312 02/03/18  0454 02/04/18  0621   MG 2.9* 2.4 2.3     Lab Results   Component Value Date    DOKLORIU24GR 28 (L) 02/02/2018     Lab Results   Component Value Date    CO2 22 (L) 02/04/2018   4. Anemia of CKD 5 -   Epogen 05148 units subq weekly, SP 1 unit PRBC on 2/2/18, iron likely replaced by PRBC transfusion, recheck in 2 weeks    Recent Labs  Lab 02/02/18  0312 02/02/18  2327 02/03/18  0455 02/04/18  0621   WBC 3.71*  --  4.62 3.62*   HGB 6.6* 8.0* 7.9* 7.8*   HCT 20.9* 24.3* 23.7* 23.3*     --  207 189       Lab Results   Component Value Date    IRON 44 01/09/2018    TIBC 429 01/09/2018    FERRITIN 89 01/09/2018       5. Acute on chronic SHF EF 30-35% - increase Lasix to 40 BID today        Thank you for allowing me to participate in the care of your patients  With any question please call [phone]  Keyanna Becker    [facility]  RHETT Campbell MD, FACPELIAS MD,   MD SAIMA Pimentel, NP  [address]  [place], [address] [phone]

## 2018-02-04 NOTE — NURSING
"Dr. Burris notified of pt's daughter asking about Depakote ordered by Dr. Wright. Pt daughter refusing medications and states she "does not have any seizure issues." Pt & family educated on other uses for medication. Use for medication verified with Dr. Burris. MD stated he will visit pt bedside when he rounds.    "

## 2018-02-04 NOTE — NURSING
"Pt daughter at bedside, pt daughter questioned "why there was coke at pt bedside?." Pt daughter questions who gave the pt a coke. Coke was on pt table at shift change. Pt stated to daughter " The coke has been there, I bought it, don't be mad at me." Pt and family educated on pt 500 cc fluid restriction, CHF, and physiology of fluid retention. Pt and family verbalized understanding.   "

## 2018-02-05 PROBLEM — Z71.89 COUNSELING REGARDING ADVANCED CARE PLANNING AND GOALS OF CARE: Status: ACTIVE | Noted: 2018-02-05

## 2018-02-05 LAB
ALBUMIN SERPL BCP-MCNC: 3 G/DL
ALP SERPL-CCNC: 63 U/L
ALT SERPL W/O P-5'-P-CCNC: 17 U/L
ANION GAP SERPL CALC-SCNC: 10 MMOL/L
AST SERPL-CCNC: 26 U/L
BACTERIA BLD CULT: NORMAL
BACTERIA BLD CULT: NORMAL
BASOPHILS # BLD AUTO: 0.02 K/UL
BASOPHILS NFR BLD: 0.5 %
BILIRUB SERPL-MCNC: 0.8 MG/DL
BUN SERPL-MCNC: 64 MG/DL
CALCIUM SERPL-MCNC: 8.8 MG/DL
CHLORIDE SERPL-SCNC: 104 MMOL/L
CO2 SERPL-SCNC: 25 MMOL/L
CREAT SERPL-MCNC: 3.3 MG/DL
DIFFERENTIAL METHOD: ABNORMAL
EOSINOPHIL # BLD AUTO: 0 K/UL
EOSINOPHIL NFR BLD: 0.8 %
ERYTHROCYTE [DISTWIDTH] IN BLOOD BY AUTOMATED COUNT: 16.5 %
EST. GFR  (AFRICAN AMERICAN): 15 ML/MIN/1.73 M^2
EST. GFR  (NON AFRICAN AMERICAN): 13 ML/MIN/1.73 M^2
GLUCOSE SERPL-MCNC: 101 MG/DL
HCT VFR BLD AUTO: 24.1 %
HGB BLD-MCNC: 7.9 G/DL
LYMPHOCYTES # BLD AUTO: 0.8 K/UL
LYMPHOCYTES NFR BLD: 20.4 %
MAGNESIUM SERPL-MCNC: 2.4 MG/DL
MCH RBC QN AUTO: 31.1 PG
MCHC RBC AUTO-ENTMCNC: 32.8 G/DL
MCV RBC AUTO: 95 FL
MONOCYTES # BLD AUTO: 0.5 K/UL
MONOCYTES NFR BLD: 13 %
NEUTROPHILS # BLD AUTO: 2.4 K/UL
NEUTROPHILS NFR BLD: 65.3 %
PHOSPHATE SERPL-MCNC: 4.1 MG/DL
PLATELET # BLD AUTO: 173 K/UL
PMV BLD AUTO: 9.8 FL
POCT GLUCOSE: 119 MG/DL (ref 70–110)
POCT GLUCOSE: 120 MG/DL (ref 70–110)
POCT GLUCOSE: 82 MG/DL (ref 70–110)
POTASSIUM SERPL-SCNC: 3.6 MMOL/L
PROT SERPL-MCNC: 7.3 G/DL
RBC # BLD AUTO: 2.54 M/UL
SODIUM SERPL-SCNC: 139 MMOL/L
WBC # BLD AUTO: 3.68 K/UL

## 2018-02-05 PROCEDURE — 84100 ASSAY OF PHOSPHORUS: CPT

## 2018-02-05 PROCEDURE — 25000003 PHARM REV CODE 250: Performed by: STUDENT IN AN ORGANIZED HEALTH CARE EDUCATION/TRAINING PROGRAM

## 2018-02-05 PROCEDURE — 25000003 PHARM REV CODE 250: Performed by: PSYCHIATRY & NEUROLOGY

## 2018-02-05 PROCEDURE — 97110 THERAPEUTIC EXERCISES: CPT

## 2018-02-05 PROCEDURE — 94761 N-INVAS EAR/PLS OXIMETRY MLT: CPT

## 2018-02-05 PROCEDURE — 11000001 HC ACUTE MED/SURG PRIVATE ROOM

## 2018-02-05 PROCEDURE — 94640 AIRWAY INHALATION TREATMENT: CPT

## 2018-02-05 PROCEDURE — 63600175 PHARM REV CODE 636 W HCPCS: Performed by: INTERNAL MEDICINE

## 2018-02-05 PROCEDURE — 25000003 PHARM REV CODE 250: Performed by: FAMILY MEDICINE

## 2018-02-05 PROCEDURE — 85025 COMPLETE CBC W/AUTO DIFF WBC: CPT

## 2018-02-05 PROCEDURE — 25000003 PHARM REV CODE 250: Performed by: INTERNAL MEDICINE

## 2018-02-05 PROCEDURE — 83735 ASSAY OF MAGNESIUM: CPT

## 2018-02-05 PROCEDURE — 36415 COLL VENOUS BLD VENIPUNCTURE: CPT

## 2018-02-05 PROCEDURE — 25000242 PHARM REV CODE 250 ALT 637 W/ HCPCS: Performed by: FAMILY MEDICINE

## 2018-02-05 PROCEDURE — A4216 STERILE WATER/SALINE, 10 ML: HCPCS | Performed by: FAMILY MEDICINE

## 2018-02-05 PROCEDURE — C9113 INJ PANTOPRAZOLE SODIUM, VIA: HCPCS | Performed by: STUDENT IN AN ORGANIZED HEALTH CARE EDUCATION/TRAINING PROGRAM

## 2018-02-05 PROCEDURE — 63600175 PHARM REV CODE 636 W HCPCS: Performed by: STUDENT IN AN ORGANIZED HEALTH CARE EDUCATION/TRAINING PROGRAM

## 2018-02-05 PROCEDURE — 99223 1ST HOSP IP/OBS HIGH 75: CPT | Mod: ,,, | Performed by: FAMILY MEDICINE

## 2018-02-05 PROCEDURE — 80053 COMPREHEN METABOLIC PANEL: CPT

## 2018-02-05 PROCEDURE — 97116 GAIT TRAINING THERAPY: CPT

## 2018-02-05 RX ORDER — HEPARIN SODIUM,PORCINE/D5W 25000/250
16 INTRAVENOUS SOLUTION INTRAVENOUS CONTINUOUS
Status: DISCONTINUED | OUTPATIENT
Start: 2018-02-05 | End: 2018-02-05

## 2018-02-05 RX ORDER — POTASSIUM CHLORIDE 20 MEQ/1
40 TABLET, EXTENDED RELEASE ORAL ONCE
Status: COMPLETED | OUTPATIENT
Start: 2018-02-05 | End: 2018-02-05

## 2018-02-05 RX ORDER — PANTOPRAZOLE SODIUM 40 MG/1
40 TABLET, DELAYED RELEASE ORAL DAILY
Status: DISCONTINUED | OUTPATIENT
Start: 2018-02-06 | End: 2018-02-06 | Stop reason: HOSPADM

## 2018-02-05 RX ADMIN — SODIUM BICARBONATE 650 MG TABLET 1300 MG: at 01:02

## 2018-02-05 RX ADMIN — DIVALPROEX SODIUM 250 MG: 250 TABLET, DELAYED RELEASE ORAL at 08:02

## 2018-02-05 RX ADMIN — ATORVASTATIN CALCIUM 10 MG: 10 TABLET, FILM COATED ORAL at 08:02

## 2018-02-05 RX ADMIN — METOPROLOL TARTRATE 25 MG: 25 TABLET ORAL at 06:02

## 2018-02-05 RX ADMIN — AMLODIPINE BESYLATE 10 MG: 5 TABLET ORAL at 08:02

## 2018-02-05 RX ADMIN — STANDARDIZED SENNA CONCENTRATE AND DOCUSATE SODIUM 1 TABLET: 8.6; 5 TABLET, FILM COATED ORAL at 08:02

## 2018-02-05 RX ADMIN — IRON SUCROSE 100 MG: 20 INJECTION, SOLUTION INTRAVENOUS at 03:02

## 2018-02-05 RX ADMIN — SODIUM BICARBONATE 650 MG TABLET 1300 MG: at 05:02

## 2018-02-05 RX ADMIN — AMIODARONE HYDROCHLORIDE 400 MG: 200 TABLET ORAL at 08:02

## 2018-02-05 RX ADMIN — SODIUM CHLORIDE, PRESERVATIVE FREE 3 ML: 5 INJECTION INTRAVENOUS at 05:02

## 2018-02-05 RX ADMIN — MIRTAZAPINE 30 MG: 15 TABLET, FILM COATED ORAL at 08:02

## 2018-02-05 RX ADMIN — FLUTICASONE FUROATE AND VILANTEROL TRIFENATATE 1 PUFF: 100; 25 POWDER RESPIRATORY (INHALATION) at 07:02

## 2018-02-05 RX ADMIN — HYDRALAZINE HYDROCHLORIDE AND ISOSORBIDE DINITRATE 2 TABLET: 37.5; 2 TABLET, FILM COATED ORAL at 01:02

## 2018-02-05 RX ADMIN — HYDRALAZINE HYDROCHLORIDE AND ISOSORBIDE DINITRATE 2 TABLET: 37.5; 2 TABLET, FILM COATED ORAL at 08:02

## 2018-02-05 RX ADMIN — TIOTROPIUM BROMIDE 18 MCG: 18 CAPSULE ORAL; RESPIRATORY (INHALATION) at 09:02

## 2018-02-05 RX ADMIN — POTASSIUM CHLORIDE 40 MEQ: 20 TABLET, EXTENDED RELEASE ORAL at 01:02

## 2018-02-05 RX ADMIN — DIVALPROEX SODIUM 500 MG: 500 TABLET, DELAYED RELEASE ORAL at 08:02

## 2018-02-05 RX ADMIN — AZITHROMYCIN 250 MG: 250 TABLET, FILM COATED ORAL at 08:02

## 2018-02-05 RX ADMIN — METOPROLOL TARTRATE 25 MG: 25 TABLET ORAL at 01:02

## 2018-02-05 RX ADMIN — SODIUM CHLORIDE, PRESERVATIVE FREE 3 ML: 5 INJECTION INTRAVENOUS at 08:02

## 2018-02-05 RX ADMIN — SODIUM CHLORIDE, PRESERVATIVE FREE 3 ML: 5 INJECTION INTRAVENOUS at 02:02

## 2018-02-05 RX ADMIN — LEVOTHYROXINE SODIUM 100 MCG: 25 TABLET ORAL at 05:02

## 2018-02-05 RX ADMIN — METOPROLOL TARTRATE 25 MG: 25 TABLET ORAL at 11:02

## 2018-02-05 RX ADMIN — RAMELTEON 8 MG: 8 TABLET, FILM COATED ORAL at 09:02

## 2018-02-05 RX ADMIN — PANTOPRAZOLE SODIUM 40 MG: 40 INJECTION, POWDER, FOR SOLUTION INTRAVENOUS at 08:02

## 2018-02-05 RX ADMIN — ASPIRIN 81 MG 81 MG: 81 TABLET ORAL at 08:02

## 2018-02-05 RX ADMIN — FUROSEMIDE 40 MG: 40 TABLET ORAL at 08:02

## 2018-02-05 RX ADMIN — FUROSEMIDE 40 MG: 40 TABLET ORAL at 06:02

## 2018-02-05 RX ADMIN — SEVELAMER CARBONATE 800 MG: 800 TABLET, FILM COATED ORAL at 08:02

## 2018-02-05 RX ADMIN — HALOPERIDOL 1 MG: 1 TABLET ORAL at 08:02

## 2018-02-05 RX ADMIN — SODIUM BICARBONATE 650 MG TABLET 1300 MG: at 08:02

## 2018-02-05 RX ADMIN — METHYLDOPA 50 MG: 500 TABLET ORAL at 08:02

## 2018-02-05 RX ADMIN — Medication 1 CAPSULE: at 08:02

## 2018-02-05 NOTE — PROGRESS NOTES
Pharmacist Intervention IV to PO Note    Anai Sal is a 72 y.o. female being treated with IV medication pantoprazole    Patient Data:    Vital Signs (Most Recent):  Temp: 98 °F (36.7 °C) (02/05/18 1538)  Pulse: (!) 56 (02/05/18 1538)  Resp: 18 (02/05/18 1538)  BP: (!) 107/58 (02/05/18 1538)  SpO2: 97 % (02/05/18 1541)   Vital Signs (72h Range):  Temp:  [96.8 °F (36 °C)-99.4 °F (37.4 °C)]   Pulse:  []   Resp:  [14-20]   BP: (107-169)/(58-92)   SpO2:  [94 %-100 %]      CBC:    Recent Labs     Lab 02/03/18  0455 02/04/18  0621 02/05/18  0329   WBC 4.62 3.62* 3.68*   RBC 2.54* 2.48* 2.54*   HGB 7.9* 7.8* 7.9*   HCT 23.7* 23.3* 24.1*    189 173   MCV 93 94 95   MCH 31.1* 31.5* 31.1*   MCHC 33.3 33.5 32.8     CMP:     Recent Labs     Lab 02/03/18  0454 02/03/18  1515 02/04/18  0621 02/05/18  0329   GLU 76 --  79 101   CALCIUM 9.2 --  9.0 8.8   ALBUMIN 3.5 --  3.2* 3.0*   PROT 8.0 --  7.6 7.3    --  137 139   K 4.1 --  3.9 3.6   CO2 20* --  22* 25    --  104 104   BUN 82* --  71* 64*   CREATININE 4.0* 3.8* 3.6* 3.3*   ALKPHOS 72 --  67 63   ALT 19 --  16 17   AST 23 --  23 26   BILITOT 1.3* --  1.0 0.8       Dietary Orders:  Diet Orders            Diet Adult Regular Cardiac (Low Na/Chol), Low Potassium: Regular starting at 02/01 1238            Based on the following criteria, this patient qualifies for intravenous to oral conversion:  [x] The patients gastrointestinal tract is functioning (tolerating medications via oral or enteral route for 24 hours and tolerating food or enteral feeds for 24 hours).  [x] The patient is hemodynamically stable for 24 hours (heart rate <100 beats per minute, systolic blood pressure >99 mm Hg, and respiratory rate <20 breaths per minute).  [x] The patient shows clinical improvement (afebrile for at least 24 hours and white blood cell count downtrending or normalized). Additionally, the patient must be non-neutropenic (absolute neutrophil count >500  cells/mm3).  [x] For antimicrobials, the patient has received IV therapy for at least 24 hours.    IV medication pantoprazole will be changed to oral medication pantoprazole 40mg po daily    Pharmacist's Name: Deena Davis  Pharmacist's Extension: [phone]

## 2018-02-05 NOTE — NURSING
Pt reported no relieve after the nitro and IV morphine administered. Informed Dr. Caldera to come see the pt.

## 2018-02-05 NOTE — NURSING
Pt still complaints of chest pain from rated 9/10 to 7/10 on pain scale but this time it is moving on her R side shoulder. No reported nausea. Recent EKG resulted as NSR with frequent PVC's. Informed Dr. Degroot and MD is aware.

## 2018-02-05 NOTE — PROGRESS NOTES
Progress Note    Admit Date: 1/30/2018   LOS: 4 days     SUBJECTIVE:     NAEON, patient reports improvement in chest pain. Slept well overnight with morphine. Has been urinating well. Denies SOB at this time. Tolerating Po without N/V.     Scheduled Meds:   allopurinol  50 mg Oral Daily    amiodarone  400 mg Oral BID    amLODIPine  10 mg Oral Daily    aspirin  81 mg Oral Daily    atorvastatin  10 mg Oral Daily    divalproex  250 mg Oral Daily    divalproex  500 mg Oral QHS    ergocalciferol  50,000 Units Oral Q7 Days    fluticasone  2 spray Each Nare Daily    fluticasone-vilanterol  1 puff Inhalation Daily    furosemide  40 mg Oral BID    haloperidol  1 mg Oral QHS    isosorbide-hydrALAZINE 20-37.5 mg  2 tablet Oral TID    levothyroxine  100 mcg Oral Before breakfast    metoprolol tartrate  25 mg Oral QID    mirtazapine  30 mg Oral QHS    pantoprazole  40 mg Intravenous Daily    senna-docusate 8.6-50 mg  1 tablet Oral Daily    sevelamer carbonate  800 mg Oral BID WM    sodium bicarbonate  2 tablet Oral TID    sodium chloride 0.9%  3 mL Intravenous Q8H    tiotropium  18 mcg Inhalation Daily    vitamin renal formula (B-complex-vitamin c-folic acid)  1 capsule Oral Daily     Continuous Infusions:   heparin (porcine) in D5W       PRN Meds:sodium chloride, sodium chloride, heparin (PORCINE), heparin (PORCINE), morphine, nitroGLYCERIN, ondansetron, ramelteon    Review of patient's allergies indicates:   Allergen Reactions    Seroquel [quetiapine]      Causes  profound sedation.     Review of Systems:  Constitutional: no fever or chills  Eyes: no visual changes  Respiratory: no cough or shortness of breath  Cardiovascular: no chest pain   Genitourinary: +urination     OBJECTIVE:     Vital Signs (Most Recent)  Temp: 98.2 °F (36.8 °C) (02/05/18 0750)  Pulse: (!) 58 (02/05/18 0802)  Resp: 16 (02/05/18 0802)  BP: 122/64 (02/05/18 0750)  SpO2: 98 % (02/05/18 0800)    Vital Signs Range (Last 24H):  Temp:   [96.8 °F (36 °C)-99 °F (37.2 °C)]   Pulse:  []   Resp:  [14-20]   BP: (122-166)/(64-92)   SpO2:  [96 %-98 %]     I & O (Last 24H):  Intake/Output Summary (Last 24 hours) at 02/05/18 0832  Last data filed at 02/04/18 2130   Gross per 24 hour   Intake                0 ml   Output              600 ml   Net             -600 ml     Physical Exam:  General: well developed, well nourished  Eyes: conjunctivae/corneas clear.   Lungs:  clear to auscultation bilaterally and normal respiratory effort  Cardiovascular: Heart: regular rate and rhythm, S1, S2 normal, no murmur, click, rub or gallop. Chest Wall: no tenderness.   Abdomen/Rectal: Abdomen: soft, non-tender non-distented; bowel sounds normal; no masses,  no organomegaly.   Musculoskeletal: 1+ edema, improving.     Laboratory:  CBC:   Recent Labs  Lab 02/05/18  0329   WBC 3.68*   RBC 2.54*   HGB 7.9*   HCT 24.1*      MCV 95   MCH 31.1*   MCHC 32.8     CMP:   Recent Labs  Lab 02/05/18  0329      CALCIUM 8.8   ALBUMIN 3.0*   PROT 7.3      K 3.6   CO2 25      BUN 64*   CREATININE 3.3*   ALKPHOS 63   ALT 17   AST 26   BILITOT 0.8     Coagulation:   Recent Labs  Lab 01/30/18  1820   LABPROT 11.8   INR 1.1     Diagnostic Results:  No new images.     ASSESSMENT/PLAN:     72 year old female with Congestive heart failure; Chronic hepatitis C virus infection (3/14/2017); Coronary artery disease; Hypertension; Hazel; Renal disorder; and Thyroid disease. Here for acute on chronic CHF exacerbation     Acute on Chronic CHF exacerbation  -improved SOB, not requiring oxygen  - lasix to 40 BID  - Strict I/Os, Severe MR, combined CHF with very dilated LV.   - +550 yesterday, urinating x 4  - cardiology recs-->    Overall poor prognosis  In the setting CHF with bi-ventricular failure with combined reduced + diastolic HF, inoperable mitral valve disease with severely enlarged LV, Consider palliative care consult      Essential HTN  - Continue  isosorbide-Hydralazine   - holding metoprolol currently for bradycardia     ACS workup  -CP yesterday, restarted ACS workup  -improved with afib resolution and morphine  - continue morphine PRN.      Hx of A.fib  - Pt is on metoprolol at home. EKG upon admission NSR. Holding BB as pt is bradycardic.   - Pt was not on anticoagulation as in outpt  - Heparin for prophylaxis started  - patient back into afib yesterday, started amiodarone and lopressor, no longer in afib.     CANDI on CKD  - Pt with baseline of 3.2, now 3.8 (4.0 this AM)  - UA neg  - [facility] neph[facility] consulted appreciate recs - refusing HD  - Will renally dose all meds and avoid nephrotoxic agents.      Sepsis   -could be a side effect of zyprexa  - not neutropenic   - Blood cultures, urine cultures NGTD  - CXR positive for possible interstitial PNA  - treat for Hospital  Acquired PNA as pt was recently discharged from a hospital  - transitioned to [facility] for atypical pna, day 3.      Post ablation hypothyroidism  - S/p ablation in 2016   - continue levothyroxine  - TSH elevated, T4 WNL     Hx of chronic hep C virus  - AST/ALT normal.      PPx: Heparin  Diet: Cardiac     Dispo: Poor prognosis discussed. Discussed hospice/palliative care with patient & daughter given desire for no dialysis/angiongram. Consulted palliative care, recs appreciated. Consider discharge today vs. Tomorrow.     2/5/2018 8:35 AM Jose C Caldera M.D.

## 2018-02-05 NOTE — PSYCH
"IDENTIFICATION DATA:  This is a 72-year-old  -American female who   was brought to ER due to shortness of breath.  This consult is requested by Dr. Estrada for psych evaluation.  The patient is not on a PEC status.    CHIEF COMPLAINT:  "My daughter states I am talking too much and I was loud."    HISTORY OF PRESENT ILLNESS:  According to H and P, the patient had exacerbation   of congestive heart failure and fluids due to retention.  The staff has found   her loud with racing thoughts.  The patient states that she goes to day program   and they said that you have problem with breathing and better go to the hospital   that is why she is here.  The patient has history of schizophrenia  The patient states   that she always has trouble with sleep and she is not sleeping good here.  She   is hyperverbal, loud and admitted racing thoughts.  The patient reports that she is   hyper and loud when she is depressed.  The patient states that she has been   depressed most of her life.  Her son  about a month ago and    they are waiting for his autopsy report.  The patient states that she is   sad, isolated, withdrawn, irritable and feels helpless.  She does not get   suicidal or homicidal thoughts.  The patient states that she used to drink   alcohol, but she quit about 27 years ago.  The patient does not do drugs.  She   claimed to be compliant with medications.  [facility].  She states that she sometimes feels anxious and nervous and   anxiety runs in her family.  She does not get any panic attacks.  She believes   that she has been anxious all of her life.  She denies hearing voices or seeing   things.  She denies any paranoia.    PAST PSYCHIATRIC HISTORY:  The patient states that she had been to [facility] 3 times.  Her last admission was more than 5 years ago and she does not   remember the name of that place.  She denies history of suicide or " homicide.    She has never been in alcohol and drug rehab programs.    SOCIAL AND FAMILY HISTORY:  The patient was born and raised in [place].  She   described her childhood as okay.  She has no history of physical, mental or   sexual abuse.  The patient was , but her  . She has 10th grade   education.  She has noted that she is now forgetting things.  She had 3 kids,   but the good one  and cause of death is not known yet.  She misses him, but   she is handling it well.  The patient states that anxiety runs in her family.    MEDICAL HISTORY:  The patient has congestive heart failure, hypertension, gout,   hepatitis C, atrial fibrillation, and chronic kidney disease.  She has   hypothyroidism and leukopenia.  She has coronary artery disease.  Her BNP was   4900 upon arrival.  She is on allopurinol, Norvasc, her metoprolol was   discontinued due to bradycardia.  Her WBC is 3.7, hemoglobin is 6.6, hematocrit   20, blood glucose 84.  Sodium 138, potassium 5.5, BUN 91, creatinine of 0.5,   bilirubin 0.7.  AST 37, ALT 19.    ALLERGIES:  SHE IS ALLERGIC TO SEROQUEL.    Her blood pressure is 140/65 with 71 pulse.    MEDICATIONS:  She is on vancomycin, Zithromax.  Chart indicates that she is on   Haldol 1 mg.    MENTAL STATUS EXAMINATION:  This is a 72-year-old slightly overweight   -American female who looks about her stated age.  She is alert,   cooperative and oriented to day, date, month and year.  Mood is depressed with   sad affect.  Her tone is loud and speech is increased in amount, rate and tone.    She does  looks tired and still has good energy.  She stated that she had   rage last week, but she feels better.  She has depressed mood with sad affect.    She admitted that her memory is so-so.  She was not able to tell day, date,   month and year.  She thought that today is Wednesday and month is January and   was not able to tell the year.  The patient knows that she is in [facility].  She thought that she is in her 60s instead of 72.  She is able to   recall 3 objects out of 3 immediately, but only 1 object out of 3 after 5   minutes.  She is attentive and organized.  She denies thoughts of harm to self   or others.  No hallucinations, delusions or paranoia noted.  Insight and   judgment are fair.  She is of average intelligence person.    PSYCHIATRIC DIAGNOSES:  AXIS I:  Bipolar disorder, mixed without psychotic features, neurocognitive   disorder, probably vascular type without behavioral disturbance.  AXIS II:  No diagnosis.  AXIS III:  Hypothyroidism, chronic kidney disease, hepatitis C, congestive heart   failure, coronary artery disease, gout, history of CA breast, atrial   fibrillation, leukopenia.  AXIS IV:  Chronic mental illness, partial response to psych medications, medical   problems, missing her son, declining memory.  AXIS V:  35.    RECOMMENDATIONS:  We will start this patient on Depakote 250 mg p.o. b.i.d. x1 day, then 250   morning and 500 at nighttime.  The patient's Depakote should be checked in 5   days on an outpatient basis.  We will refer this patient to [facility] where she goes for her medications and followups.  The   patient is not dangerous to self or others.   She will be discharged when   medically stable.    ASSETS:  The patient is verbal, cooperative, compliant with medicine and has   supportive daughter and has supportive family at home.      CALE/LYNN  dd: 02/02/2018 10:43:34 (CST)  td: 02/02/2018 21:51:57 (CST)  Doc ID   #[id number]  Job ID #[id number]    CC:

## 2018-02-05 NOTE — PROGRESS NOTES
.Pharmacy New Medication Education    Patient accepted medication education.    Pharmacy educated patient on name and purpose of medications and possible side effects, using the teach-back method.     D/C Current Inpatient Medication Orders   D/C 0.9% NaCl infusion (for blood administration)   D/C 0.9% NaCl infusion (for blood administration)   D/C allopurinol split tablet 50 mg   D/C amiodarone tablet 400 mg   D/C amLODIPine tablet 10 mg   D/C aspirin chewable tablet 81 mg   D/C atorvastatin tablet 10 mg   D/C azithromycin tablet 250 mg   D/C divalproex EC tablet 250 mg   D/C divalproex EC tablet 500 mg   D/C ergocalciferol capsule 50,000 Units   D/C fluticasone 50 mcg/actuation nasal spray 100 mcg   D/C fluticasone-vilanterol 100-25 mcg/dose diskus inhaler 1 puff   D/C furosemide tablet 40 mg   D/C haloperidol tablet 1 mg   D/C heparin 25,000 units in dextrose 5% 250 mL (100 units/mL) bolus from bag; PRN BOLUS   D/C heparin 25,000 units in dextrose 5% 250 mL (100 units/mL) bolus from bag; PRN BOLUS   D/C heparin 25,000 units in dextrose 5% 250 mL (100 units/mL) infusion; FEMALE   D/C isosorbide-hydrALAZINE 20-37.5 mg per tablet 2 tablet   D/C levothyroxine tablet 100 mcg   D/C metoprolol tartrate (LOPRESSOR) tablet 25 mg   D/C mirtazapine tablet 30 mg   D/C morphine injection 6 mg   D/C nitroGLYCERIN SL tablet 0.4 mg   D/C ondansetron injection 4 mg   D/C pantoprazole injection 40 mg   D/C ramelteon tablet 8 mg   D/C senna-docusate 8.6-50 mg per tablet 1 tablet   D/C sevelamer carbonate tablet 800 mg   D/C sodium bicarbonate tablet 1,300 mg   D/C sodium chloride 0.9% flush 3 mL   D/C tiotropium inhalation capsule 18 mcg   D/C vitamin renal formula (B-complex-vitamin c-folic acid) 1 mg per capsule 1 capsule       Learners of pharmacy medication education included:  Patient    Patient +/- learner response:  Verbalized Understanding, Teachback

## 2018-02-05 NOTE — PLAN OF CARE
Problem: Physical Therapy Goal  Goal: Physical Therapy Goal  Goals to be met by: 3/4/18     Patient will increase functional independence with mobility by performin) sup<>sit mod I   2) sit <>stand mod I with RW   3) ambulate 200 feet with RW mod I   4) independent with LE therex.    Outcome: Ongoing (interventions implemented as appropriate)  Goals ongoing

## 2018-02-05 NOTE — PT/OT/SLP PROGRESS
Physical Therapy Treatment    Patient Name:  Anai Sal   MRN:  [id number]    Recommendations:     Discharge Recommendations:  home health PT   Discharge Equipment Recommendations: none   Barriers to discharge: None    Assessment:     Anai Sal is a 72 y.o. female admitted with a medical diagnosis of Congestive heart failure.  She presents with the following impairments/functional limitations:  weakness, impaired endurance, impaired functional mobilty, gait instability, impaired balance, decreased upper extremity function, impaired coordination good participation,pt with improving mobility and endurance requiring SBA with gait using RW and will benefit from  services upon discharge.    Rehab Prognosis:  Good; patient would benefit from acute skilled PT services to address these deficits and reach maximum level of function.      Recent Surgery: * No surgery found *      Plan:     During this hospitalization, patient to be seen 6 x/week to address the above listed problems via gait training, therapeutic activities, therapeutic exercises  · Plan of Care Expires:  03/04/18   Plan of Care Reviewed with: patient    Subjective     Communicated with nsg prior to session.  Patient found supine upon PT entry to room, agreeable to treatment.      Chief Complaint: n/a  Patient comments/goals: pt wants to get stronger and to go home  Pain/Comfort:  · Pain Rating 1: 0/10    Patients cultural, spiritual, Cheondoism conflicts given the current situation: None reported to PTA    Objective:     Patient found with: telemetry     General Precautions: Standard, fall   Orthopedic Precautions:N/A   Braces: N/A     Functional Mobility:  · Bed Mobility:     · Supine to Sit: supervision and stand by assistance  · Transfers:     · Sit to Stand:  stand by assistance and contact guard assistance with rolling walker  · Bed to Chair: stand by assistance with  rolling walker  using  ambulation  · Gait: amb ~130' X 1 with RW and  SBA  · Balance: good sitting balance      AM-PAC 6 CLICK MOBILITY  Turning over in bed (including adjusting bedclothes, sheets and blankets)?: 4  Sitting down on and standing up from a chair with arms (e.g., wheelchair, bedside commode, etc.): 3  Moving from lying on back to sitting on the side of the bed?: 3  Moving to and from a bed to a chair (including a wheelchair)?: 3  Need to walk in hospital room?: 3  Climbing 3-5 steps with a railing?: 2  Total Score: 18       Therapeutic Activities and Exercises: le seated ex's X 10-12 reps inc: ap,laq,hip flex,abd/add       Patient left up in chair with all lines intact, call button in reach, nsg notified and daughter present..    GOALS: see general POC   Physical Therapy Goals        Problem: Physical Therapy Goal    Goal Priority Disciplines Outcome Goal Variances Interventions   Physical Therapy Goal     PT/OT, PT Ongoing (interventions implemented as appropriate)     Description:  Goals to be met by: 3/4/18     Patient will increase functional independence with mobility by performin) sup<>sit mod I   2) sit <>stand mod I with RW   3) ambulate 200 feet with RW mod I   4) independent with LE therex.                     Time Tracking:     PT Received On: 18  PT Start Time: 938     PT Stop Time: 1004  PT Total Time (min): 26 min     Billable Minutes: Gait Training 16 and Therapeutic Exercise 10    Treatment Type: Treatment  PT/PTA: PTA     PTA Visit Number: 2     Marvin Leary, PTA  2018

## 2018-02-05 NOTE — CONSULTS
Consult Note  Palliative Care  Thank you for opportunity to participate in Ms. Sal's care.    Consult Requested By: Francisco Estrada MD  Reason for Consult: Goals of care discussion/advance care planning    SUBJECTIVE:     History of Present Illness:  Disease Process: End Stage Renal Disease, Advanced Cardiac Disease    Palliative care goals of care discussion/advance care planning    Palliative care met with patient and family today to establish rapport, provide education about palliative care (communication, coordination of care, symptom management, goals of care) and assess patient and family understanding of current clinical condition and its relationship to goals of care. Patient and daughter Treshone (MPOA) has very clear understanding of that patient has heart disease and it is progressive. Also noted patient kidneys are not doing ok and may need dialysis in future. Patient stated she does not want dialysis, she wants to continue with no invasive treatment and ready to be made comfort and die to meet her God. Patient stated her mother had CHF and was made comfortable. We discussed code status, patient and Treshone re-affirm code status as DNR. Patient noted she recently lost her son last month , quite emotional. Emotional comfort provided. Hospice education was provided and Treshone stated the [facility] with [facility]  and would like to explore the option for hospice with [facility].         Past Medical History:   Diagnosis Date    Breast cancer 1980    right    CHF (congestive heart failure)     Chronic hepatitis C virus infection 3/14/2017    Coronary artery disease     Hypertension     Hazel     Renal disorder     Thyroid disease      Past Surgical History:   Procedure Laterality Date    HYSTERECTOMY      MASTECTOMY Right 1980     Family History   Problem Relation Age of Onset    Heart disease Mother     No Known Problems Father     Heart disease Sister     No  Known Problems Brother     No Known Problems Maternal Aunt     No Known Problems Maternal Uncle     No Known Problems Paternal Aunt     No Known Problems Paternal Uncle     No Known Problems Maternal Grandmother     No Known Problems Maternal Grandfather     No Known Problems Paternal Grandmother     No Known Problems Paternal Grandfather     Anemia Neg Hx     Arrhythmia Neg Hx     Asthma Neg Hx     Clotting disorder Neg Hx     Fainting Neg Hx     Heart attack Neg Hx     Heart failure Neg Hx     Hyperlipidemia Neg Hx     Hypertension Neg Hx     Stroke Neg Hx     Atrial Septal Defect Neg Hx      Social History   Substance Use Topics    Smoking status: Former Smoker     Packs/day: 2.00     Years: 20.00     Types: Cigarettes     Quit date: 2/16/1995    Smokeless tobacco: Never Used    Alcohol use No      Comment: 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       Mental Status: Oriented x3    ECOG Performance Status Grade: 3 - Confined to bed or chair 50% of waking hours    Review of Systems:  Constitutional: no fever or chills  Respiratory: no cough or shortness of breath  Cardiovascular: no chest pain or palpitations  Gastrointestinal: no nausea or vomiting, no abdominal pain or change in bowel habits  Genitourinary: no hematuria or dysuria  Musculoskeletal: no arthralgias or myalgias    PHYSICAL EXAM:  General: well developed, well nourished  Head: Normocephalic, atraumatic  Neck: supple, no thyromegaly  Lungs: CTA B , normal breath sounds, no wheezes or rales  Cardiovascular: RRR, S1, S2 normal, no murmur, gallops or rubs  Abdomen: soft, non-tender non-distended; bowel sounds normal; no masses, no organomegaly.  .   Extremities: no cyanosis, + edema. Pulses: 2+ and symmetric.  Skin: warm and dry  Neurology: awake and alert; oriented to person, place and time.           OBJECTIVE:     Pain Assessment: No pain reported at this time    Decision-Making Capacity: Patient answered questions, Family answered  "questions    Advanced Directives:  Living Will: No  Do Not Resuscitate Status: Yes  Medical Power of : Yes. Agent's Name: Treshone  Agent's Contact Number:   Registered Organ Donor: No    Living Arrangements: Lives with family    Psychosocial, Spiritual, Cultural:  Patient's most important priorities:  Want to have ice cream and not suffer to die    Patient's biggest concerns/fears:  " ready to die"    Previous death/end of life care history:  recent lost her son last month suddenly.     Patient's goals/hopes:  To be made comfrotable    ASSESSMENT/PLAN:     Recommendations:  Continue medical treatment  Code status: DNR  Discharge option will be to home with possible home hospice with [facility]   Palliative care will continue to assist family with goals of care as needed.   Palliative care will continue to provide family with emotional support                Thank you for this consult and the opportunity to participate in Ms. Sal's care.    Claudette Laura MD, MPH  Palliative Care Team   [phone]  [facility]       > 50% of 90 min visit spent in chart review, face to face discussion of goals of care with family, symptom assessment, coordination of care and emotional support.  "

## 2018-02-05 NOTE — PLAN OF CARE
Problem: Patient Care Overview  Goal: Plan of Care Review  Outcome: Ongoing (interventions implemented as appropriate)  NAD. No c/o CP. Patient resting comfortably in bed. No true red alarms noted. Sinus rhythm-Sinus art (50s-70s) Dr. Degroot notified. AM BP medications held. Plan of care reviewed with Patient's daughter. Patient and family verbalized understanding the plan of care. Fall precautions maintained. Bed alarm refused, bed in low and locked position, side rails up x 2, call light within reach. Report given to Eloisa to continue the patient care.

## 2018-02-05 NOTE — PROGRESS NOTES
Progress Note  Nephrology      Consult Requested By: Francisco Estrada MD  Reason for Consult: CKD 5    SUBJECTIVE:     Review of Systems   Constitutional: Negative for chills and fever.   Respiratory: Negative for cough and shortness of breath.    Cardiovascular: Negative for chest pain and leg swelling.   Gastrointestinal: Negative for nausea.     Patient Active Problem List   Diagnosis    Recurrent major depressive disorder, in partial remission    Insomnia    Stage 4 chronic kidney disease    Proteinuria    SOB (shortness of breath)    Essential hypertension    Acute on chronic combined systolic and diastolic heart failure    COPD exacerbation    Demand ischemia of myocardium    Postablative hypothyroidism    Chronic gout    Acute bronchitis due to infection    Chronic combined systolic and diastolic heart failure, NYHA class 3    History of breast cancer    Elevated troponin    Chronic hepatitis C virus infection    Unstable angina    Non-rheumatic mitral regurgitation    Chronic obstructive pulmonary disease    Leukopenia    Delirium due to another medical condition    Paroxysmal atrial fibrillation    Acute renal failure superimposed on stage 4 chronic kidney disease    Anemia of chronic renal failure, stage 4 (severe)    Cognitive impairment    Major neurocognitive disorder    Palliative care encounter    Esophagitis, Rainbow grade C    Congestive heart failure    Anxiety    Decreased cardiac ejection fraction    Right ventricular dysfunction       OBJECTIVE:     Medications:   allopurinol  50 mg Oral Daily    amiodarone  400 mg Oral BID    amLODIPine  10 mg Oral Daily    aspirin  81 mg Oral Daily    atorvastatin  10 mg Oral Daily    divalproex  250 mg Oral Daily    divalproex  500 mg Oral QHS    ergocalciferol  50,000 Units Oral Q7 Days    fluticasone  2 spray Each Nare Daily    fluticasone-vilanterol  1 puff Inhalation Daily    furosemide  40 mg Oral BID     haloperidol  1 mg Oral QHS    isosorbide-hydrALAZINE 20-37.5 mg  2 tablet Oral TID    levothyroxine  100 mcg Oral Before breakfast    metoprolol tartrate  25 mg Oral QID    mirtazapine  30 mg Oral QHS    pantoprazole  40 mg Intravenous Daily    senna-docusate 8.6-50 mg  1 tablet Oral Daily    sevelamer carbonate  800 mg Oral BID WM    sodium bicarbonate  2 tablet Oral TID    sodium chloride 0.9%  3 mL Intravenous Q8H    tiotropium  18 mcg Inhalation Daily    vitamin renal formula (B-complex-vitamin c-folic acid)  1 capsule Oral Daily       Vitals:    02/05/18 1204   BP: 134/70   Pulse: (!) 59   Resp: 20   Temp: 97.9 °F (36.6 °C)     I/O last 3 completed shifts:  In: 60 [P.O.:60]  Out: 900 [Urine:900]  Physical Exam   Constitutional: She is oriented to person, place, and time. She appears well-developed and well-nourished. No distress.   HENT:   Head: Normocephalic and atraumatic.   Mouth/Throat: Oropharynx is clear and moist.   Eyes: EOM are normal. No scleral icterus.   Neck: Normal range of motion.   Cardiovascular: Normal rate and regular rhythm.  Exam reveals no friction rub.    No murmur heard.  Pulmonary/Chest: Effort normal and breath sounds normal. She has no rales.   Abdominal: Soft. Bowel sounds are normal. She exhibits no distension. There is no tenderness.   Musculoskeletal: Normal range of motion. She exhibits edema (1+ pitting BLE).   Lymphadenopathy:     She has no cervical adenopathy.   Neurological: She is alert and oriented to person, place, and time.   Skin: Skin is warm and dry. No erythema.   Psychiatric: Thought content normal.     Laboratory:    Recent Labs  Lab 02/03/18  0455 02/04/18 0621 02/05/18  0329   WBC 4.62 3.62* 3.68*   HGB 7.9* 7.8* 7.9*   HCT 23.7* 23.3* 24.1*    189 173   MONO 20.1*  0.9 19.1*  0.7 13.0  0.5       Recent Labs  Lab 02/03/18  0454 02/03/18  1515 02/04/18  0621 02/05/18  0329     --  137 139   K 4.1  --  3.9 3.6     --  104 104   CO2  20*  --  22* 25   BUN 82*  --  71* 64*   CREATININE 4.0* 3.8* 3.6* 3.3*   CALCIUM 9.2  --  9.0 8.8   PHOS 4.8*  --  4.2 4.1     Labs reviewed  Diagnostic Results:  X-Ray: Reviewed  US: Reviewed  Echo: Reviewed      ASSESSMENT/PLAN:   1. CKD 5 - stable  US kidneys - chronic medical disease   Avoid nephrotoxins, dose all meds ad for GFr < 30  No indication for urgent RRT. Monitor closely. Strict ins/outs, daily weight  2. Metabolic acidosis -improving on Sodium bicarbonate 1300 mg tid  3. MBD/ 2HPTH - cont ergocalciferol and Sevelamer 800 TID  Lab Results   Component Value Date    .3 (H) 02/02/2018    CALCIUM 8.8 02/05/2018    CAION 1.07 04/05/2017    PHOS 4.1 02/05/2018       Recent Labs  Lab 02/03/18  0454 02/04/18  0621 02/05/18  0329   MG 2.4 2.3 2.4     Lab Results   Component Value Date    [id number] 28 (L) 02/02/2018     Lab Results   Component Value Date    CO2 25 02/05/2018   4. Anemia of CKD 5 -   Epogen 34368 units subq weekly, SP 1 unit PRBC on 2/2/18, iron likely replaced by PRBC transfusion, recheck in 2 weeks    Recent Labs  Lab 02/03/18  0455 02/04/18  0621 02/05/18  0329   WBC 4.62 3.62* 3.68*   HGB 7.9* 7.8* 7.9*   HCT 23.7* 23.3* 24.1*    189 173       Lab Results   Component Value Date    IRON 44 01/09/2018    TIBC 429 01/09/2018    FERRITIN 89 01/09/2018       5. Acute on chronic SHF EF 30-35% - cont Lasix to 40 BID today. Need daily weight        Thank you for allowing me to participate in the care of your patients  With any question please call [phone]  Keyanna Becker    [facility]  RHETT Campbell MD, FACP,   MUvaldo Juan MD,   JENNY Becker MD  EUvaldo Delgado, NP  [address]  [place], [address]  [phone]

## 2018-02-05 NOTE — PT/OT/SLP PROGRESS
Occupational Therapy      Patient Name:  Anai Sal   MRN:  [id number]    Patient not seen today secondary to  AM: pt refused secondary to just finishing with PT, PM: Pt refused secondary to just returned to bed and wants to rest. Will follow-up at later time.    SIMA Alan  2/5/2018

## 2018-02-06 VITALS
OXYGEN SATURATION: 97 % | RESPIRATION RATE: 16 BRPM | DIASTOLIC BLOOD PRESSURE: 66 MMHG | TEMPERATURE: 98 F | SYSTOLIC BLOOD PRESSURE: 128 MMHG | HEIGHT: 67 IN | WEIGHT: 180.75 LBS | BODY MASS INDEX: 28.37 KG/M2 | HEART RATE: 48 BPM

## 2018-02-06 PROBLEM — N18.5 CKD (CHRONIC KIDNEY DISEASE), STAGE V: Status: ACTIVE | Noted: 2018-02-06

## 2018-02-06 LAB
ALBUMIN SERPL BCP-MCNC: 3.1 G/DL
ALP SERPL-CCNC: 63 U/L
ALT SERPL W/O P-5'-P-CCNC: 16 U/L
ANION GAP SERPL CALC-SCNC: 11 MMOL/L
ANISOCYTOSIS BLD QL SMEAR: SLIGHT
AST SERPL-CCNC: 27 U/L
BASOPHILS # BLD AUTO: 0.01 K/UL
BASOPHILS NFR BLD: 0.3 %
BILIRUB SERPL-MCNC: 0.7 MG/DL
BUN SERPL-MCNC: 70 MG/DL
CALCIUM SERPL-MCNC: 8.9 MG/DL
CHLORIDE SERPL-SCNC: 104 MMOL/L
CO2 SERPL-SCNC: 24 MMOL/L
CREAT SERPL-MCNC: 3.6 MG/DL
DIFFERENTIAL METHOD: ABNORMAL
EOSINOPHIL # BLD AUTO: 0.1 K/UL
EOSINOPHIL NFR BLD: 2.3 %
ERYTHROCYTE [DISTWIDTH] IN BLOOD BY AUTOMATED COUNT: 16.7 %
EST. GFR  (AFRICAN AMERICAN): 14 ML/MIN/1.73 M^2
EST. GFR  (NON AFRICAN AMERICAN): 12 ML/MIN/1.73 M^2
GLUCOSE SERPL-MCNC: 71 MG/DL
HCT VFR BLD AUTO: 24.8 %
HGB BLD-MCNC: 8.1 G/DL
HYPOCHROMIA BLD QL SMEAR: ABNORMAL
LYMPHOCYTES # BLD AUTO: 0.7 K/UL
LYMPHOCYTES NFR BLD: 16.6 %
MAGNESIUM SERPL-MCNC: 2.2 MG/DL
MCH RBC QN AUTO: 31.5 PG
MCHC RBC AUTO-ENTMCNC: 32.7 G/DL
MCV RBC AUTO: 97 FL
MONOCYTES # BLD AUTO: 0.5 K/UL
MONOCYTES NFR BLD: 13.6 %
NEUTROPHILS # BLD AUTO: 2.7 K/UL
NEUTROPHILS NFR BLD: 67.2 %
PHOSPHATE SERPL-MCNC: 4 MG/DL
PLATELET # BLD AUTO: 161 K/UL
PLATELET BLD QL SMEAR: ABNORMAL
PMV BLD AUTO: 10.8 FL
POCT GLUCOSE: 80 MG/DL (ref 70–110)
POTASSIUM SERPL-SCNC: 4.2 MMOL/L
PROT SERPL-MCNC: 7.3 G/DL
RBC # BLD AUTO: 2.57 M/UL
SODIUM SERPL-SCNC: 139 MMOL/L
WBC # BLD AUTO: 3.98 K/UL

## 2018-02-06 PROCEDURE — 25000003 PHARM REV CODE 250: Performed by: FAMILY MEDICINE

## 2018-02-06 PROCEDURE — 25000003 PHARM REV CODE 250: Performed by: INTERNAL MEDICINE

## 2018-02-06 PROCEDURE — 83735 ASSAY OF MAGNESIUM: CPT

## 2018-02-06 PROCEDURE — 94761 N-INVAS EAR/PLS OXIMETRY MLT: CPT

## 2018-02-06 PROCEDURE — 36415 COLL VENOUS BLD VENIPUNCTURE: CPT

## 2018-02-06 PROCEDURE — 25000242 PHARM REV CODE 250 ALT 637 W/ HCPCS: Performed by: FAMILY MEDICINE

## 2018-02-06 PROCEDURE — 85025 COMPLETE CBC W/AUTO DIFF WBC: CPT

## 2018-02-06 PROCEDURE — 80053 COMPREHEN METABOLIC PANEL: CPT

## 2018-02-06 PROCEDURE — A4216 STERILE WATER/SALINE, 10 ML: HCPCS | Performed by: FAMILY MEDICINE

## 2018-02-06 PROCEDURE — 25000003 PHARM REV CODE 250: Performed by: PSYCHIATRY & NEUROLOGY

## 2018-02-06 PROCEDURE — 94640 AIRWAY INHALATION TREATMENT: CPT

## 2018-02-06 PROCEDURE — 84100 ASSAY OF PHOSPHORUS: CPT

## 2018-02-06 RX ORDER — SODIUM BICARBONATE 650 MG/1
1300 TABLET ORAL 3 TIMES DAILY
Qty: 180 TABLET | Refills: 11 | COMMUNITY
Start: 2018-02-06 | End: 2019-02-06

## 2018-02-06 RX ORDER — AMIODARONE HYDROCHLORIDE 400 MG/1
400 TABLET ORAL DAILY
Qty: 90 TABLET | Refills: 1 | Status: ON HOLD | OUTPATIENT
Start: 2018-02-06 | End: 2018-02-16

## 2018-02-06 RX ORDER — RAMELTEON 8 MG/1
8 TABLET ORAL NIGHTLY
Qty: 30 TABLET | Refills: 11 | Status: ON HOLD | OUTPATIENT
Start: 2018-02-06 | End: 2018-04-23

## 2018-02-06 RX ORDER — ISOSORBIDE DINITRATE AND HYDRALAZINE HYDROCHLORIDE 37.5; 2 MG/1; MG/1
2 TABLET ORAL 3 TIMES DAILY
Qty: 90 TABLET | Refills: 11 | Status: ON HOLD | OUTPATIENT
Start: 2018-02-06 | End: 2018-02-21 | Stop reason: HOSPADM

## 2018-02-06 RX ORDER — DIVALPROEX SODIUM 500 MG/1
500 TABLET, DELAYED RELEASE ORAL NIGHTLY
Qty: 30 TABLET | Refills: 11 | Status: ON HOLD | OUTPATIENT
Start: 2018-02-06 | End: 2018-02-16

## 2018-02-06 RX ORDER — DIVALPROEX SODIUM 250 MG/1
250 TABLET, DELAYED RELEASE ORAL DAILY
Qty: 30 TABLET | Refills: 11 | Status: ON HOLD | OUTPATIENT
Start: 2018-02-06 | End: 2018-02-16

## 2018-02-06 RX ORDER — SEVELAMER CARBONATE 800 MG/1
800 TABLET, FILM COATED ORAL 2 TIMES DAILY WITH MEALS
Qty: 30 TABLET | Refills: 0 | Status: ON HOLD | OUTPATIENT
Start: 2018-02-06 | End: 2018-04-23

## 2018-02-06 RX ORDER — METOPROLOL SUCCINATE 50 MG/1
100 TABLET, EXTENDED RELEASE ORAL DAILY
Qty: 90 TABLET | Refills: 1 | Status: SHIPPED | OUTPATIENT
Start: 2018-02-06 | End: 2018-02-06

## 2018-02-06 RX ORDER — ALLOPURINOL 100 MG/1
50 TABLET ORAL DAILY
Qty: 90 TABLET | Refills: 1 | Status: ON HOLD | OUTPATIENT
Start: 2018-02-06 | End: 2018-04-23

## 2018-02-06 RX ORDER — METOPROLOL SUCCINATE 50 MG/1
50 TABLET, EXTENDED RELEASE ORAL DAILY
Qty: 90 TABLET | Refills: 1 | Status: ON HOLD | OUTPATIENT
Start: 2018-02-06 | End: 2018-02-21 | Stop reason: HOSPADM

## 2018-02-06 RX ADMIN — AMLODIPINE BESYLATE 10 MG: 5 TABLET ORAL at 08:02

## 2018-02-06 RX ADMIN — AMIODARONE HYDROCHLORIDE 400 MG: 200 TABLET ORAL at 08:02

## 2018-02-06 RX ADMIN — HYDRALAZINE HYDROCHLORIDE AND ISOSORBIDE DINITRATE 2 TABLET: 37.5; 2 TABLET, FILM COATED ORAL at 05:02

## 2018-02-06 RX ADMIN — TIOTROPIUM BROMIDE 18 MCG: 18 CAPSULE ORAL; RESPIRATORY (INHALATION) at 07:02

## 2018-02-06 RX ADMIN — SEVELAMER CARBONATE 800 MG: 800 TABLET, FILM COATED ORAL at 08:02

## 2018-02-06 RX ADMIN — PANTOPRAZOLE SODIUM 40 MG: 40 TABLET, DELAYED RELEASE ORAL at 08:02

## 2018-02-06 RX ADMIN — METHYLDOPA 50 MG: 500 TABLET ORAL at 08:02

## 2018-02-06 RX ADMIN — SODIUM CHLORIDE, PRESERVATIVE FREE 3 ML: 5 INJECTION INTRAVENOUS at 05:02

## 2018-02-06 RX ADMIN — SODIUM BICARBONATE 650 MG TABLET 1300 MG: at 05:02

## 2018-02-06 RX ADMIN — ATORVASTATIN CALCIUM 10 MG: 10 TABLET, FILM COATED ORAL at 08:02

## 2018-02-06 RX ADMIN — FLUTICASONE FUROATE AND VILANTEROL TRIFENATATE 1 PUFF: 100; 25 POWDER RESPIRATORY (INHALATION) at 07:02

## 2018-02-06 RX ADMIN — LEVOTHYROXINE SODIUM 100 MCG: 25 TABLET ORAL at 05:02

## 2018-02-06 RX ADMIN — DIVALPROEX SODIUM 250 MG: 250 TABLET, DELAYED RELEASE ORAL at 08:02

## 2018-02-06 RX ADMIN — Medication 1 CAPSULE: at 08:02

## 2018-02-06 RX ADMIN — ASPIRIN 81 MG 81 MG: 81 TABLET ORAL at 08:02

## 2018-02-06 RX ADMIN — STANDARDIZED SENNA CONCENTRATE AND DOCUSATE SODIUM 1 TABLET: 8.6; 5 TABLET, FILM COATED ORAL at 08:02

## 2018-02-06 RX ADMIN — METOPROLOL TARTRATE 25 MG: 25 TABLET ORAL at 05:02

## 2018-02-06 RX ADMIN — FUROSEMIDE 40 MG: 40 TABLET ORAL at 08:02

## 2018-02-06 NOTE — PLAN OF CARE
Problem: Fall Risk (Adult)  Goal: Absence of Falls  Patient will demonstrate the desired outcomes by discharge/transition of care.   PT shall not fall during shift.   02/05/18 2200   Fall Risk (Adult)   Absence of Falls achieves outcome

## 2018-02-06 NOTE — DISCHARGE SUMMARY
Discharge Summary      Admit Date: 1/30/2018    Discharge Date and Time:2/6/2018     Attending Physician: Dr. Singh    Discharge Physician: Jose C Caldera    Principal Diagnoses: SOB (shortness of breath)  The primary encounter diagnosis was Stage 4 chronic kidney disease. Diagnoses of SOB (shortness of breath), Congestive heart failure, unspecified congestive heart failure chronicity, unspecified congestive heart failure type, Bradycardia, Acute exacerbation of CHF (congestive heart failure), Anxiety, Anemia of chronic renal failure, stage 4 (severe), Hyperkalemia, Congestive heart failure, Acute on chronic combined systolic and diastolic heart failure, Decreased cardiac ejection fraction, Right ventricular dysfunction, Chest pain, ACS (acute coronary syndrome), Elevated troponin, Counseling regarding advanced care planning and goals of care, and Palliative care encounter were also pertinent to this visit.    Discharged Condition: Stable     Hospital Course: Anai Sal is a 72 y.o. female with pmh  has a past medical history of Breast cancer (1980); CHF (congestive heart failure); Chronic hepatitis C virus infection (3/14/2017); Coronary artery disease; Hypertension; Hzael; Renal disorder; and Thyroid disease. who presented who presented on 1/30/2018 for CHF exacerbation. On presentation patient was hypothermic and tachycardic, and CXR demonstrated possible interstitial PNA vs CHF. Patient was placed on Vanc/Zosyn/Cipro for hospital acquired PNA as patient was recently discharged from hospital. Patient's CHF was treated with  IV Lasix and fluid restriction. After a few days of normal temperature, normal WBC, and negative cultures, antibiotics were de-escalated to azithromycin for possible atypical PNA. Throughout hospitalization, patient's kidney function worsened (CKD5) yet she declined dialysis. As patient was in the process of being discharged, patient developed chest pain. Troponins were mildly  elevated by EKG showed no acute abnormalities. Given patient's poor cardiac functioning and poor renal function, as well as patient declining angiography, peritoneal dialysis, and other invasive procedures, patient likely has a poor prognosis. Palliative care was consulted which stated that patient and family demonstrated clear understanding of her disease progression, and ultimately recommended hospice. Patient was discharged from hospital to home hospice.     Consults: IP CONSULT TO CARDIOLOGY-[facility]  IP CONSULT TO NEPHROLOGY-[facility]  IP CONSULT TO PALLIATIVE CARE  IP CONSULT TO PALLIATIVE CARE    Disposition: Hospice/Home    Patient Instructions:   Discharge Medication List as of 2/6/2018  8:20 AM      START taking these medications    Details   amiodarone (PACERONE) 400 MG tablet Take 1 tablet (400 mg total) by mouth once daily., Starting Tue 2/6/2018, Until Wed 2/6/2019, Normal      !! divalproex (DEPAKOTE) 250 MG EC tablet Take 1 tablet (250 mg total) by mouth once daily., Starting Tue 2/6/2018, Until Wed 2/6/2019, Normal      !! divalproex (DEPAKOTE) 500 MG TbEC Take 1 tablet (500 mg total) by mouth every evening., Starting Tue 2/6/2018, Until Wed 2/6/2019, Normal      ramelteon (ROZEREM) 8 mg tablet Take 1 tablet (8 mg total) by mouth every evening., Starting Tue 2/6/2018, Normal      sevelamer carbonate (RENVELA) 800 mg Tab Take 1 tablet (800 mg total) by mouth 2 (two) times daily with meals., Starting Tue 2/6/2018, Until Wed 2/6/2019, Normal      sodium bicarbonate 650 MG tablet Take 2 tablets (1,300 mg total) by mouth 3 (three) times daily., Starting Tue 2/6/2018, Until Wed 2/6/2019, OTC      vitamin renal formula, B-complex-vitamin c-folic acid, (NEPHROCAP) 1 mg Cap Take 1 capsule by mouth once daily., Starting Tue 2/6/2018, Normal       !! - Potential duplicate medications found. Please discuss with provider.      CONTINUE these medications which have CHANGED    Details   allopurinol  (ZYLOPRIM) 100 MG tablet Take 0.5 tablets (50 mg total) by mouth once daily., Starting Tue 2/6/2018, Normal      isosorbide-hydrALAZINE 20-37.5 mg (BIDIL) 20-37.5 mg Tab Take 2 tablets by mouth 3 (three) times daily., Starting Tue 2/6/2018, Until Wed 2/6/2019, Normal      metoprolol succinate (TOPROL-XL) 50 MG 24 hr tablet Take 2 tablets (100 mg total) by mouth once daily., Starting Tue 2/6/2018, Until Wed 2/6/2019, Normal         CONTINUE these medications which have NOT CHANGED    Details   amLODIPine (NORVASC) 10 MG tablet Take 10 mg by mouth once daily., Historical Med      haloperidol (HALDOL) 1 MG tablet Take 1 mg by mouth 4 (four) times daily., Historical Med      mirtazapine (REMERON) 30 MG tablet Take 30 mg by mouth every evening., Historical Med      aspirin 81 MG Chew Take 1 tablet (81 mg total) by mouth once daily., Starting 12/19/2016, Until Tue 12/19/17, OTC      diclofenac sodium 1 % Gel Apply 4 g topically 4 (four) times daily. Prn pain, Until Discontinued, Historical Med      fluticasone (FLONASE) 50 mcg/actuation nasal spray 2 sprays by Each Nare route once daily., Starting 12/20/2016, Until Discontinued, Normal      fluticasone-salmeterol 250-50 mcg/dose (ADVAIR) 250-50 mcg/dose diskus inhaler Inhale 1 puff into the lungs 2 (two) times daily. Controller, Starting 4/7/2017, Until Sat 4/7/18, No Print      furosemide (LASIX) 40 MG tablet Take 1 tablet (40 mg total) by mouth once daily., Starting Fri 12/22/2017, Until Sat 12/22/2018, Normal      levothyroxine (SYNTHROID) 100 MCG tablet Take 1 tablet (100 mcg total) by mouth before breakfast., Starting Mon 12/18/2017, Until Tue 12/18/2018, Normal      polyethylene glycol (GLYCOLAX) 17 gram/dose powder Take 17 g by mouth once daily., Starting Mon 12/25/2017, OTC      QUEtiapine (SEROQUEL) 100 MG Tab Take 1 tablet (100 mg total) by mouth nightly., Starting Fri 12/22/2017, Normal      senna-docusate 8.6-50 mg (PERICOLACE) 8.6-50 mg per tablet Take 1  tablet by mouth once daily., Starting Mon 12/25/2017, OTC      spironolactone (ALDACTONE) 25 MG tablet Take 1 tablet (25 mg total) by mouth once daily., Starting Mon 12/25/2017, Until Tue 12/25/2018, Normal      tiotropium (SPIRIVA) 18 mcg inhalation capsule Inhale 1 capsule (18 mcg total) into the lungs once daily., Starting 5/2/2017, Until Discontinued, Normal         STOP taking these medications       gabapentin (NEURONTIN) 100 MG capsule Comments:   Reason for Stopping:         pantoprazole (PROTONIX) 40 MG tablet Comments:   Reason for Stopping:               Discharge Procedure Orders  Ambulatory referral to Home Health   Referral Priority: Routine Referral Type: Home Health   Referral Reason: Specialty Services Required    Requested Specialty: Home Health Services    Number of Visits Requested: 1      Diet renal     Diet Cardiac   Order Comments: Fluid restrict <1L, avoid salt.     Activity as tolerated     Notify your health care provider if you experience any of the following:  persistent nausea and vomiting or diarrhea     Notify your health care provider if you experience any of the following:  temperature >100.4     Notify your health care provider if you experience any of the following:  severe uncontrolled pain     Notify your health care provider if you experience any of the following:  persistent dizziness, light-headedness, or visual disturbances     Notify your health care provider if you experience any of the following:  severe persistent headache     Notify your health care provider if you experience any of the following:  difficulty breathing or increased cough       More then 30 mins were spent in discharging this patient   Jose C Caldera  02/06/2018  2:06 PM

## 2018-02-06 NOTE — PROGRESS NOTES
Progress Note    Admit Date: 1/30/2018   LOS: 5 days     SUBJECTIVE:     NAEON, patient bathed this AM by daughter without issue. Denies CP/SOB. Tolerating PO without N/V. +urination.     Scheduled Meds:   allopurinol  50 mg Oral Daily    amiodarone  400 mg Oral BID    amLODIPine  10 mg Oral Daily    aspirin  81 mg Oral Daily    atorvastatin  10 mg Oral Daily    divalproex  250 mg Oral Daily    divalproex  500 mg Oral QHS    ergocalciferol  50,000 Units Oral Q7 Days    fluticasone  2 spray Each Nare Daily    fluticasone-vilanterol  1 puff Inhalation Daily    furosemide  40 mg Oral BID    haloperidol  1 mg Oral QHS    iron sucrose  100 mg Intravenous Daily    isosorbide-hydrALAZINE 20-37.5 mg  2 tablet Oral TID    levothyroxine  100 mcg Oral Before breakfast    metoprolol tartrate  25 mg Oral QID    mirtazapine  30 mg Oral QHS    pantoprazole  40 mg Oral Daily    senna-docusate 8.6-50 mg  1 tablet Oral Daily    sevelamer carbonate  800 mg Oral BID WM    sodium bicarbonate  2 tablet Oral TID    sodium chloride 0.9%  3 mL Intravenous Q8H    tiotropium  18 mcg Inhalation Daily    vitamin renal formula (B-complex-vitamin c-folic acid)  1 capsule Oral Daily     Continuous Infusions:  PRN Meds:sodium chloride, sodium chloride, morphine, nitroGLYCERIN, ondansetron, ramelteon    Review of patient's allergies indicates:   Allergen Reactions    Seroquel [quetiapine]      Causes  profound sedation.     Review of Systems:  Constitutional: no fever or chills  Eyes: no visual changes  Respiratory: no cough or shortness of breath  Cardiovascular: no chest pain   Genitourinary: +urination     OBJECTIVE:     Vital Signs (Most Recent)  Temp: 97.6 °F (36.4 °C) (02/06/18 0541)  Pulse: (!) 48 (02/06/18 0735)  Resp: 16 (02/06/18 0735)  BP: 128/66 (02/06/18 0541)  SpO2: 97 % (02/06/18 0735)    Vital Signs Range (Last 24H):  Temp:  [97.2 °F (36.2 °C)-98.5 °F (36.9 °C)]   Pulse:  [48-59]   Resp:  [14-20]   BP:  (107-140)/(58-70)   SpO2:  [97 %-98 %]     I & O (Last 24H):  Intake/Output Summary (Last 24 hours) at 02/06/18 0828  Last data filed at 02/05/18 2359   Gross per 24 hour   Intake              570 ml   Output              500 ml   Net               70 ml     Physical Exam:  General: well developed, well nourished  Eyes: conjunctivae/corneas clear.   Lungs:  clear to auscultation bilaterally and normal respiratory effort  Cardiovascular: Heart: regular rate and rhythm, S1, S2 normal, no murmur, click, rub or gallop. Chest Wall: no tenderness.   Abdomen/Rectal: Abdomen: soft, non-tender non-distented; bowel sounds normal; no masses,  no organomegaly.    Musculoskeletal: !+ pitting edema, slightly improved.     Laboratory:  CBC:   Recent Labs  Lab 02/06/18  0520   WBC 3.98   RBC 2.57*   HGB 8.1*   HCT 24.8*      MCV 97   MCH 31.5*   MCHC 32.7     CMP:   Recent Labs  Lab 02/06/18  0520   GLU 71   CALCIUM 8.9   ALBUMIN 3.1*   PROT 7.3      K 4.2   CO2 24      BUN 70*   CREATININE 3.6*   ALKPHOS 63   ALT 16   AST 27   BILITOT 0.7     Cardiac markers:   Recent Labs  Lab 02/04/18  2259   TROPONINI 0.088*     Diagnostic Results:  No new images.     ASSESSMENT/PLAN:     72 year old female with Congestive heart failure; Chronic hepatitis C virus infection (3/14/2017); Coronary artery disease; Hypertension; Hazel; Renal disorder; and Thyroid disease. Here for acute on chronic CHF exacerbation     Acute on Chronic CHF exacerbation  -improved SOB, not requiring oxygen  - lasix to 40 BID  - Strict I/Os, Severe MR, combined CHF with very dilated LV.   - +550 yesterday, urinating x 4  - cardiology recs-->    Overall poor prognosis  In the setting CHF with bi-ventricular failure with combined reduced + diastolic HF, inoperable mitral valve disease with severely enlarged LV, Consider palliative care consult      Essential HTN  - Continue isosorbide-Hydralazine   - BP controlled     ACS workup  -CP yesterday,  restarted ACS workup  -improved with afib resolution and morphine     Hx of A.fib  - Pt is on metoprolol at home. EKG upon admission NSR. Holding BB as pt is bradycardic.   - Pt was not on anticoagulation as in outpt  - Heparin for prophylaxis started  - patient back into afib yesterday, started amiodarone and lopressor, no longer in afib.     CANDI on CKD  - Pt with baseline of 3.2, now 3.8 (4.0 this AM)  - UA neg  - [facility] appreciate recs - refusing HD  - Will renally dose all meds and avoid nephrotoxic agents.      Sepsis   -could be a side effect of zyprexa  - not neutropenic   - Blood cultures, urine cultures NGTD  - CXR positive for possible interstitial PNA  - treat for Hospital  Acquired PNA as pt was recently discharged from a hospital  - transitioned to [facility] for atypical pna, resolved and finished treatment.      Post ablation hypothyroidism  - S/p ablation in 2016   - continue levothyroxine  - TSH elevated, T4 WNL     Hx of chronic hep C virus  - AST/ALT normal.      PPx: Heparin  Diet: Cardiac     Dispo: Decreased metoprolol from 100 to 50 given bradycardia. Will D/C home with hospice care and continued discussion as outpatient.     2/6/2018 8:33 AM Jose C Caldera M.D.

## 2018-02-06 NOTE — PLAN OF CARE
TN went to meet with patient and daughter at bedside. Daughter stated they would like home hospice. TN will call TIFFANIE Bennett [phone] at [facility], to inform her of this.    TN left message for Sabrina, awaiting call back.    --Daughter reports has been here since 5 am, patient wanting to go home. TN informed daughter will speak with YINKA MORENO today, and will update them regarding hospice with [facility]. TN provided number to daughter.    --UPDATE: TN spoke with Sabrina at [facility]. She was informed patient discharged today. Patient will need home hospice. She stated they (YINKA) will set up home hospice. They contract with [facility]. Sabrina stated they do have EPIC access. TN informed her will send patient's information and reach out to them to provide assistance. TN will update Sabrina once that is done.    TN spoke with Kelesa at [facility]. She was aware family is interested in home hospice. She also has [facility]. TN also informed her SW at [facility] will set it up (phone number provided). TN faxed updated information via [facility] to [facility] as well.     TN called and informed Sabrina. Sabrina also stated their director needs to approve hospice as well. Sabrina contacted daughter.    TN contacted daughter Vicky and updated her as well.     Future Appointments  Date Time Provider Department Center   2/7/2018 11:00 AM Halima Isidro MD [facility] CARDIO [facility]   3/21/2018 11:20 AM Bud Benoit MD [facility] NEPHRO [facility]     Follow-up With  Details  Why  Contact Info   [facility]  Go to  HOME HOSPICE  [address] [address]  [phone]   Live Young MD  Call   left message to schedule  [address] [address]  [phone]   [facility]  Schedule an appointment as soon as possible for a visit  Please call to schedule follow-up.  [address]  [address]  [place] [address]  [phone]   [facility]     As needed, If symptoms worsen  [address] [place] [address]  [phone]         02/06/18 0933   Final Note   Assessment Type Final Discharge Note   Discharge Disposition HospiceHome   What phone number can be called within the next 1-3 days to see how you are doing after discharge? [phone]   Hospital Follow Up  Appt(s) scheduled? Yes   [facility] Referral Info   Post Acute Recommendation Other   Referral Type HOME HOSPICE WITH [facility]     Janelle Pal RN  Transition Navigator  [phone]

## 2018-02-06 NOTE — PLAN OF CARE
Problem: Patient Care Overview  Goal: Plan of Care Review  Pt on RA SPO2 98%.  No apparent distress.  Will continue to monitor.

## 2018-02-06 NOTE — PHYSICIAN QUERY
PT Name: Anai Sal  MR #: [id number]  Physician Query Form - Renal Clarification     CDS/: Brooklyn Portillo               Contact information: [email or web address]   This form is a permanent document in the medical record.     QueryDate: February 6, 2018    By submitting this query, we are merely seeking further clarification of documentation. Please utilize your independent clinical judgment when addressing the question(s) below.    The Medical record contains the following:   Indicator Supporting Clinical Findings Location in Medical Record    Kidney (Renal) Insufficiency     X Kidney (Renal) Failure / Injury CKD 5    CKD stage IV     CANDI on CKD- Pt with baseline of 3.2,    End Stage Renal Disease Nephrology consult 1/31    Cardiology consult 1/31    Hospital PN 2/6    Palliative care consult 2/5    Nephrotoxic Agents     X BUN/Creatinine GFR Cr 3.7-4.5-3.6  BUN 83-91-64-70  GFR  13-11-15-14     Labs 1/30-2/6    Urine: Casts         Eosinophils      Dehydration      Nausea/Vomiting      Dialysis/CRRT      Treatment:      Other:          Provider, please specify the diagnosis or diagnoses associated with above clinical findings.    [ ] Acute on Chronic Renal Failure stage 4    [x ] Acute on Chronic Renal Failure stage 5     [ ] Acute on ESRD      *[employer] Definitions:   Stage Description      eGFR (mL/min)    I  Slight kidney damage with normal or increased filtration  90+    II  Mildly reduced kidney function     60-89    III  Moderately reduced kidney function    30-59    IV  Severly reduced kidney function     15-29    V  Kidney failure, requiring transplant or dialysis   < 15    [ ] Other (please specify): _______________________________    [ ] Clinically Undetermined    Please document in your progress notes daily for the duration of treatment, until resolved, and include in your discharge summary.

## 2018-02-07 ENCOUNTER — TELEPHONE (OUTPATIENT)
Dept: PHARMACY | Facility: CLINIC | Age: 73
End: 2018-02-07

## 2018-02-07 ENCOUNTER — OFFICE VISIT (OUTPATIENT)
Dept: CARDIOLOGY | Facility: CLINIC | Age: 73
End: 2018-02-07
Payer: COMMERCIAL

## 2018-02-07 VITALS
SYSTOLIC BLOOD PRESSURE: 150 MMHG | BODY MASS INDEX: 27.49 KG/M2 | DIASTOLIC BLOOD PRESSURE: 65 MMHG | WEIGHT: 175.5 LBS | HEART RATE: 57 BPM

## 2018-02-07 DIAGNOSIS — I34.0 NON-RHEUMATIC MITRAL REGURGITATION: ICD-10-CM

## 2018-02-07 DIAGNOSIS — I48.0 PAROXYSMAL ATRIAL FIBRILLATION: ICD-10-CM

## 2018-02-07 DIAGNOSIS — J44.9 CHRONIC OBSTRUCTIVE PULMONARY DISEASE, UNSPECIFIED COPD TYPE: ICD-10-CM

## 2018-02-07 DIAGNOSIS — I10 ESSENTIAL HYPERTENSION: ICD-10-CM

## 2018-02-07 DIAGNOSIS — I50.42 CHRONIC COMBINED SYSTOLIC AND DIASTOLIC HEART FAILURE, NYHA CLASS 3: Primary | ICD-10-CM

## 2018-02-07 DIAGNOSIS — N18.5 CKD (CHRONIC KIDNEY DISEASE), STAGE V: ICD-10-CM

## 2018-02-07 PROCEDURE — 1159F MED LIST DOCD IN RCRD: CPT | Mod: S$GLB,,, | Performed by: INTERNAL MEDICINE

## 2018-02-07 PROCEDURE — 99999 PR PBB SHADOW E&M-EST. PATIENT-LVL III: CPT | Mod: PBBFAC,,, | Performed by: INTERNAL MEDICINE

## 2018-02-07 PROCEDURE — 99214 OFFICE O/P EST MOD 30 MIN: CPT | Mod: S$GLB,,, | Performed by: INTERNAL MEDICINE

## 2018-02-07 PROCEDURE — 3008F BODY MASS INDEX DOCD: CPT | Mod: S$GLB,,, | Performed by: INTERNAL MEDICINE

## 2018-02-07 PROCEDURE — 1126F AMNT PAIN NOTED NONE PRSNT: CPT | Mod: S$GLB,,, | Performed by: INTERNAL MEDICINE

## 2018-02-07 RX ORDER — BUMETANIDE 2 MG/1
2 TABLET ORAL 2 TIMES DAILY
Qty: 60 TABLET | Refills: 11 | Status: ON HOLD | OUTPATIENT
Start: 2018-02-07 | End: 2018-02-21

## 2018-02-07 NOTE — PROGRESS NOTES
Chart has been dictated using voice recognition software.  It is not been reviewed carefully for any transcriptional errors due to this technology.   Subjective:   Patient ID:  Anai Sal is a 72 y.o. female who presents for evaluation of Acute on chronic combined systolic and diastolic heart failu and Essential hypertension      HPI: Patient with probable nonischemic cardiomyopathy , h/o atrial fibrillation, CKD-4 , severe functional MR, and COPD.  Patient recently discharged from [facility] after week stay (30-Jan-06-Feb-2018) for exacerbation of CHF.  While there, patient had consult with Palliative care and expressed wish that she does not want to start renal dialysis. Prior evaluation has shown that patient has EF 30% and severe MR.  Patient has not been placed on ACEI/ARB due to concern for making the CKD worse. Patient still has significant symptoms with limited exercise ability.  She can lie flat on her side but has orthopnea on her back.  Patient denies any palpitations, lightheadedness, or syncope. Patient denies any chest discomfort on exertion or at rest.        Past Medical History:   Diagnosis Date    Breast cancer 1980    right    CHF (congestive heart failure)     Chronic hepatitis C virus infection 3/14/2017    Coronary artery disease     Hypertension     Hazel     Renal disorder     Thyroid disease        Past Surgical History:   Procedure Laterality Date    HYSTERECTOMY      MASTECTOMY Right 1980       Social History   Substance Use Topics    Smoking status: Former Smoker     Packs/day: 2.00     Years: 20.00     Types: Cigarettes     Quit date: 2/16/1995    Smokeless tobacco: Never Used    Alcohol use No      Comment: 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       Outpatient Medications Prior to Visit   Medication Sig Dispense Refill    allopurinol (ZYLOPRIM) 100 MG tablet Take 0.5 tablets (50 mg total) by mouth once daily. 90 tablet 1    amiodarone (PACERONE) 400 MG tablet Take 1 tablet (400  mg total) by mouth once daily. 90 tablet 1    amLODIPine (NORVASC) 10 MG tablet Take 10 mg by mouth once daily.      aspirin 81 MG Chew Take 1 tablet (81 mg total) by mouth once daily.  0    diclofenac sodium 1 % Gel Apply 4 g topically 4 (four) times daily. Prn pain      fluticasone (FLONASE) 50 mcg/actuation nasal spray 2 sprays by Each Nare route once daily. (Patient taking differently: 2 sprays by Each Nare route daily as needed. ) 1 Bottle 3    fluticasone-salmeterol 250-50 mcg/dose (ADVAIR) 250-50 mcg/dose diskus inhaler Inhale 1 puff into the lungs 2 (two) times daily. Controller  0    haloperidol (HALDOL) 1 MG tablet Take 1 mg by mouth 4 (four) times daily.      isosorbide-hydrALAZINE 20-37.5 mg (BIDIL) 20-37.5 mg Tab Take 2 tablets by mouth 3 (three) times daily. 90 tablet 11    levothyroxine (SYNTHROID) 100 MCG tablet Take 1 tablet (100 mcg total) by mouth before breakfast. 30 tablet 11    metoprolol succinate (TOPROL-XL) 50 MG 24 hr tablet Take 1 tablet (50 mg total) by mouth once daily. 90 tablet 1    mirtazapine (REMERON) 30 MG tablet Take 30 mg by mouth every evening.      ramelteon (ROZEREM) 8 mg tablet Take 1 tablet (8 mg total) by mouth every evening. 30 tablet 11    senna-docusate 8.6-50 mg (PERICOLACE) 8.6-50 mg per tablet Take 1 tablet by mouth once daily.      sevelamer carbonate (RENVELA) 800 mg Tab Take 1 tablet (800 mg total) by mouth 2 (two) times daily with meals. 30 tablet 0    sodium bicarbonate 650 MG tablet Take 2 tablets (1,300 mg total) by mouth 3 (three) times daily. 180 tablet 11    spironolactone (ALDACTONE) 25 MG tablet Take 1 tablet (25 mg total) by mouth once daily. 30 tablet 3    tiotropium (SPIRIVA) 18 mcg inhalation capsule Inhale 1 capsule (18 mcg total) into the lungs once daily. 90 capsule 3    vitamin renal formula, B-complex-vitamin c-folic acid, (NEPHROCAP) 1 mg Cap Take 1 capsule by mouth once daily. 90 capsule 1    furosemide (LASIX) 40 MG tablet  Take 1 tablet (40 mg total) by mouth once daily. 30 tablet 11    divalproex (DEPAKOTE) 250 MG EC tablet Take 1 tablet (250 mg total) by mouth once daily. 30 tablet 11    divalproex (DEPAKOTE) 500 MG TbEC Take 1 tablet (500 mg total) by mouth every evening. 30 tablet 11    polyethylene glycol (GLYCOLAX) 17 gram/dose powder Take 17 g by mouth once daily. 238 g 11    QUEtiapine (SEROQUEL) 100 MG Tab Take 1 tablet (100 mg total) by mouth nightly. 30 tablet 11     No facility-administered medications prior to visit.        Review of patient's allergies indicates:   Allergen Reactions    Seroquel [quetiapine]      Causes  profound sedation.       ROS -  Not done as time spent on extensive review of recent hospitalization  Objective:   Physical Exam   Constitutional: She is oriented to person, place, and time. She appears well-developed and well-nourished.   BP (!) 150/65 (BP Location: Left arm, Patient Position: Sitting, BP Method: Large (Automatic))   Pulse (!) 57   Wt 79.6 kg (175 lb 7.8 oz)   LMP  (LMP Unknown)   BMI 27.49 kg/m²      Neck: Hepatojugular reflux (2/3 up neck) and JVD present. Carotid bruit is not present.   Cardiovascular: Normal rate, regular rhythm, S1 normal and S2 normal.   Occasional extrasystoles are present. PMI is displaced.  Exam reveals gallop and S4. Exam reveals no S3.    Murmur heard.  High-pitched blowing holosystolic murmur is present with a grade of 2/6  at the apex  Pulses:       Carotid pulses are 2+ on the right side, and 2+ on the left side.       Radial pulses are 2+ on the right side, and 2+ on the left side.   Lower extremity pulses could not be palpated due to edema   Pulmonary/Chest: Tachypnea (mild) noted. No respiratory distress. She has decreased breath sounds. She has no wheezes. She has no rhonchi. She has no rales.   Abdominal: Soft. She exhibits no abdominal bruit. There is hepatomegaly. There is no tenderness.   Musculoskeletal: She exhibits edema (2-3+ bilateral  pretibial edema to knees; 1+ presacral edema).   Neurological: She is alert and oriented to person, place, and time.       Lab Results   Component Value Date    WBC 3.98 02/06/2018    HGB 8.1 (L) 02/06/2018    HCT 24.8 (L) 02/06/2018    MCV 97 02/06/2018     02/06/2018       Lab Results   Component Value Date     02/06/2018    K 4.2 02/06/2018    BUN 70 (H) 02/06/2018    CREATININE 3.6 (H) 02/06/2018    GLU 71 02/06/2018    HGBA1C 4.2 (L) 01/11/2017    CHOL 103 (L) 03/11/2017    HDL 38 (L) 03/11/2017    LDLCALC 57.0 (L) 03/11/2017    TRIG 40 03/11/2017    CHOLHDL 36.9 03/11/2017    HGB 8.1 (L) 02/06/2018    HCT 24.8 (L) 02/06/2018    HCT 36 12/13/2017     02/06/2018    INR 1.1 01/30/2018       Assessment:     1. Chronic combined systolic and diastolic heart failure, NYHA class 3    2. Essential hypertension    3. Paroxysmal atrial fibrillation    4. Non-rheumatic mitral regurgitation    5. Chronic obstructive pulmonary disease, unspecified COPD type    6. CKD (chronic kidney disease), stage V       The patient has significant heart failure with severe mitral regurgitation and a depressed ejection fraction.  She shows signs of significant right sided heart failure.  Her therapeutic options are limited by her stage V kidney disease.  However, an attempt at increased diuresis by using bumetanide will be made.  The patient was also advised to restart spironolactone.  Patient will be maintained on amiodarone due to the transient atrial fibrillation seen during her hospitalization.  However, she'll not be anticoagulated at this time.  Patient will also be continued on BiDil and amlodipine.  Patient should return in one week for reevaluation.  If her blood pressure is not reduced and there is not a significant improvement in her symptoms, therapy with an angiotensin receptor blocker should be tried as improved for flow with afterload reduction may actually help protect her kidneys.    Aggressive care of  this patient is difficult given her desire not to go onto dialysis and her desire not to be resuscitated.  Further evaluations and recommendations will be made following evaluation of the patient's response to the change in her medications when she returns in one week.    Plan:     Anai was seen today for acute on chronic combined systolic and diastolic heart failu and essential hypertension.    Diagnoses and all orders for this visit:    Chronic combined systolic and diastolic heart failure, NYHA class 3  -     bumetanide (BUMEX) 2 MG tablet; Take 1 tablet (2 mg total) by mouth 2 (two) times daily.  -     Basic metabolic panel; Future; Expected date: 02/14/2018  -     Brain natriuretic peptide; Future; Expected date: 02/14/2018    Essential hypertension    Paroxysmal atrial fibrillation    Non-rheumatic mitral regurgitation    Chronic obstructive pulmonary disease, unspecified COPD type    CKD (chronic kidney disease), stage V          Keven Miller MD  Consultative Cardiology

## 2018-02-07 NOTE — LETTER
February 7, 2018      Lauren Wolf MD  [address]  [place] [address]           [facility] - Cardiology  [address]  [facility] [address]  Phone: [phone]          Patient: Anai Sal   MR Number: [id number]   YOB: 1945   Date of Visit: 2/7/2018       Dear Dr. Lauren Wolf:    Thank you for referring Anai Sal to me for evaluation. Attached you will find relevant portions of my assessment and plan of care.    If you have questions, please do not hesitate to call me. I look forward to following Anai Sal along with you.    Sincerely,    Keven Miller MD    Enclosure  CC:  No Recipients    If you would like to receive this communication electronically, please contact [email or web address] or [phone] to request more information on [employer] Link access.    For providers and/or their staff who would like to refer a patient to [facility], please contact us through our one-stop-shop provider referral line, [facility] , at [phone].    If you feel you have received this communication in error or would no longer like to receive these types of communications, please e-mail [email or web address]

## 2018-02-07 NOTE — PT/OT/SLP DISCHARGE
Physical Therapy Discharge Summary    Name: Anai Sal  MRN: [id number]   Principal Problem: SOB (shortness of breath)     Patient Discharged from acute Physical Therapy on 2018.  Please refer to prior PT noted date on 2018 for functional status.     Assessment:     Patient appropriate for care in another setting.    Objective:     GOALS:    Physical Therapy Goals     Not on file          Multidisciplinary Problems (Resolved)        Problem: Physical Therapy Goal    Goal Priority Disciplines Outcome Goal Variances Interventions   Physical Therapy Goal   (Resolved)     PT/OT, PT Outcome(s) achieved     Description:  Goals to be met by: 3/4/18     Patient will increase functional independence with mobility by performin) sup<>sit mod I   2) sit <>stand mod I with RW   3) ambulate 200 feet with RW mod I   4) independent with LE therex.                     Reasons for Discontinuation of Therapy Services  Transfer to alternate level of care.      Plan:     Patient Discharged to: Palliative Care/Hospice Home Health recommended.    Aurelio Herrera, PT  2018

## 2018-02-07 NOTE — TELEPHONE ENCOUNTER
PATIENT REFERRED BY RETAIL PHARMACY FOR ASSISTANCE WITH HER DISCHARGE MEDICATIONS. ALTHOUGH PATIENT MEETS THE INCOME GUDLINES  SHE DOES NOT QUALIFY FOR NOMINAL PRICING BECAUSE SHE HAS MEDICARE, MEDICAID AND RECEIVES EXTRA HELP FROM LOW INCOME SUBSIDY

## 2018-02-08 ENCOUNTER — TELEPHONE (OUTPATIENT)
Dept: ENDOCRINOLOGY | Facility: CLINIC | Age: 73
End: 2018-02-08

## 2018-02-08 NOTE — TELEPHONE ENCOUNTER
----- Message from Virgilio Guajardo sent at 2/8/2018 10:11 AM CST -----  Contact: Toya parmar/YINKA  Consult for hospital follow up  for Hypothyroidism contact Toya HONEYCUTT for scheduling sooner than first available April Toya can be reached at [phone]

## 2018-02-12 NOTE — PT/OT/SLP DISCHARGE
Occupational Therapy Discharge Summary    Anai Sal  MRN: [id number]   Principal Problem: SOB (shortness of breath)      Patient Discharged from acute Occupational Therapy on 2/6.  Please refer to prior OT note dated 2/2 for functional status.    Assessment:      Patient appropriate for care in another setting.    Objective:     GOALS:    Occupational Therapy Goals     Not on file          Multidisciplinary Problems (Resolved)        Problem: Occupational Therapy Goal    Goal Priority Disciplines Outcome Interventions   Occupational Therapy Goal   (Resolved)     OT, PT/OT Outcome(s) achieved    Description:  Goals to be met by: 3/1     Patient will increase functional independence with ADLs by performing:    UE Dressing with Modified [place].  LE Dressing with Supervision.  Grooming while standing with Modified [place].  Toileting from toilet with Modified [place] for hygiene and clothing management.   Toilet transfer to toilet with Modified [place].                      Reasons for Discontinuation of Therapy Services  Transfer to alternate level of care.      Plan:     Patient Discharged to: Palliative Care/Hospice    Anirudh Clayton OT  2/12/2018

## 2018-02-15 ENCOUNTER — HOSPITAL ENCOUNTER (INPATIENT)
Facility: HOSPITAL | Age: 73
LOS: 6 days | Discharge: HOME HEALTH | DRG: 291 | End: 2018-02-21
Attending: EMERGENCY MEDICINE | Admitting: HOSPITALIST
Payer: COMMERCIAL

## 2018-02-15 ENCOUNTER — TELEPHONE (OUTPATIENT)
Dept: NEPHROLOGY | Facility: CLINIC | Age: 73
End: 2018-02-15

## 2018-02-15 DIAGNOSIS — N18.5 ACUTE RENAL FAILURE SUPERIMPOSED ON STAGE 5 CHRONIC KIDNEY DISEASE, NOT ON CHRONIC DIALYSIS, UNSPECIFIED ACUTE RENAL FAILURE TYPE: ICD-10-CM

## 2018-02-15 DIAGNOSIS — F03.90 MAJOR NEUROCOGNITIVE DISORDER: ICD-10-CM

## 2018-02-15 DIAGNOSIS — I50.42 CHRONIC COMBINED SYSTOLIC AND DIASTOLIC HEART FAILURE, NYHA CLASS 3: ICD-10-CM

## 2018-02-15 DIAGNOSIS — I10 ESSENTIAL HYPERTENSION: ICD-10-CM

## 2018-02-15 DIAGNOSIS — I50.41 ACUTE COMBINED SYSTOLIC AND DIASTOLIC HEART FAILURE: Primary | ICD-10-CM

## 2018-02-15 DIAGNOSIS — Z71.89 COUNSELING REGARDING ADVANCED CARE PLANNING AND GOALS OF CARE: ICD-10-CM

## 2018-02-15 DIAGNOSIS — N17.9 ACUTE RENAL FAILURE SUPERIMPOSED ON STAGE 5 CHRONIC KIDNEY DISEASE, NOT ON CHRONIC DIALYSIS, UNSPECIFIED ACUTE RENAL FAILURE TYPE: ICD-10-CM

## 2018-02-15 DIAGNOSIS — R06.02 SHORTNESS OF BREATH: ICD-10-CM

## 2018-02-15 LAB
ALBUMIN SERPL BCP-MCNC: 3.5 G/DL
ALP SERPL-CCNC: 92 U/L
ALT SERPL W/O P-5'-P-CCNC: 24 U/L
ANION GAP SERPL CALC-SCNC: 11 MMOL/L
AST SERPL-CCNC: 37 U/L
BASOPHILS # BLD AUTO: 0.01 K/UL
BASOPHILS NFR BLD: 0.2 %
BILIRUB SERPL-MCNC: 1 MG/DL
BILIRUB UR QL STRIP: NEGATIVE
BNP SERPL-MCNC: 4621 PG/ML
BUN SERPL-MCNC: 83 MG/DL
CALCIUM SERPL-MCNC: 9.3 MG/DL
CHLORIDE SERPL-SCNC: 103 MMOL/L
CLARITY UR REFRACT.AUTO: CLEAR
CO2 SERPL-SCNC: 27 MMOL/L
COLOR UR AUTO: YELLOW
CREAT SERPL-MCNC: 4.2 MG/DL
DIFFERENTIAL METHOD: ABNORMAL
EOSINOPHIL # BLD AUTO: 0 K/UL
EOSINOPHIL NFR BLD: 0.9 %
ERYTHROCYTE [DISTWIDTH] IN BLOOD BY AUTOMATED COUNT: 19.2 %
EST. GFR  (AFRICAN AMERICAN): 11.5 ML/MIN/1.73 M^2
EST. GFR  (NON AFRICAN AMERICAN): 10 ML/MIN/1.73 M^2
GLUCOSE SERPL-MCNC: 95 MG/DL
GLUCOSE UR QL STRIP: NEGATIVE
HCT VFR BLD AUTO: 23.6 %
HGB BLD-MCNC: 7.8 G/DL
HGB UR QL STRIP: NEGATIVE
IMM GRANULOCYTES # BLD AUTO: 0.01 K/UL
IMM GRANULOCYTES NFR BLD AUTO: 0.2 %
KETONES UR QL STRIP: NEGATIVE
LACTATE SERPL-SCNC: 0.8 MMOL/L
LEUKOCYTE ESTERASE UR QL STRIP: NEGATIVE
LYMPHOCYTES # BLD AUTO: 0.6 K/UL
LYMPHOCYTES NFR BLD: 14.8 %
MAGNESIUM SERPL-MCNC: 2.5 MG/DL
MCH RBC QN AUTO: 31.8 PG
MCHC RBC AUTO-ENTMCNC: 33.1 G/DL
MCV RBC AUTO: 96 FL
MONOCYTES # BLD AUTO: 0.6 K/UL
MONOCYTES NFR BLD: 13.4 %
NEUTROPHILS # BLD AUTO: 3.1 K/UL
NEUTROPHILS NFR BLD: 70.5 %
NITRITE UR QL STRIP: NEGATIVE
NRBC BLD-RTO: 0 /100 WBC
PH UR STRIP: 7 [PH] (ref 5–8)
PHOSPHATE SERPL-MCNC: 3.8 MG/DL
PLATELET # BLD AUTO: 121 K/UL
PMV BLD AUTO: 11.5 FL
POTASSIUM SERPL-SCNC: 4.5 MMOL/L
PROT SERPL-MCNC: 8.4 G/DL
PROT UR QL STRIP: NEGATIVE
RBC # BLD AUTO: 2.45 M/UL
SODIUM SERPL-SCNC: 141 MMOL/L
SP GR UR STRIP: 1.01 (ref 1–1.03)
TROPONIN I SERPL DL<=0.01 NG/ML-MCNC: 0.03 NG/ML
TROPONIN I SERPL DL<=0.01 NG/ML-MCNC: 0.04 NG/ML
URN SPEC COLLECT METH UR: NORMAL
UROBILINOGEN UR STRIP-ACNC: NEGATIVE EU/DL
WBC # BLD AUTO: 4.33 K/UL

## 2018-02-15 PROCEDURE — 84100 ASSAY OF PHOSPHORUS: CPT

## 2018-02-15 PROCEDURE — 85025 COMPLETE CBC W/AUTO DIFF WBC: CPT

## 2018-02-15 PROCEDURE — 80053 COMPREHEN METABOLIC PANEL: CPT

## 2018-02-15 PROCEDURE — 63600175 PHARM REV CODE 636 W HCPCS: Performed by: PHYSICIAN ASSISTANT

## 2018-02-15 PROCEDURE — 63600175 PHARM REV CODE 636 W HCPCS: Performed by: STUDENT IN AN ORGANIZED HEALTH CARE EDUCATION/TRAINING PROGRAM

## 2018-02-15 PROCEDURE — 83735 ASSAY OF MAGNESIUM: CPT

## 2018-02-15 PROCEDURE — 93005 ELECTROCARDIOGRAM TRACING: CPT | Mod: 59

## 2018-02-15 PROCEDURE — 84484 ASSAY OF TROPONIN QUANT: CPT | Mod: 91

## 2018-02-15 PROCEDURE — 84484 ASSAY OF TROPONIN QUANT: CPT

## 2018-02-15 PROCEDURE — 99285 EMERGENCY DEPT VISIT HI MDM: CPT | Mod: 25

## 2018-02-15 PROCEDURE — 36415 COLL VENOUS BLD VENIPUNCTURE: CPT

## 2018-02-15 PROCEDURE — 81003 URINALYSIS AUTO W/O SCOPE: CPT

## 2018-02-15 PROCEDURE — 96374 THER/PROPH/DIAG INJ IV PUSH: CPT

## 2018-02-15 PROCEDURE — 99285 EMERGENCY DEPT VISIT HI MDM: CPT | Mod: ,,, | Performed by: PHYSICIAN ASSISTANT

## 2018-02-15 PROCEDURE — 83605 ASSAY OF LACTIC ACID: CPT

## 2018-02-15 PROCEDURE — 11000001 HC ACUTE MED/SURG PRIVATE ROOM

## 2018-02-15 PROCEDURE — 83880 ASSAY OF NATRIURETIC PEPTIDE: CPT

## 2018-02-15 PROCEDURE — 25000003 PHARM REV CODE 250: Performed by: STUDENT IN AN ORGANIZED HEALTH CARE EDUCATION/TRAINING PROGRAM

## 2018-02-15 PROCEDURE — 93010 ELECTROCARDIOGRAM REPORT: CPT | Mod: ,,, | Performed by: INTERNAL MEDICINE

## 2018-02-15 RX ORDER — FUROSEMIDE 10 MG/ML
80 INJECTION INTRAMUSCULAR; INTRAVENOUS
Status: COMPLETED | OUTPATIENT
Start: 2018-02-15 | End: 2018-02-15

## 2018-02-15 RX ORDER — LORAZEPAM 0.5 MG/1
0.25 TABLET ORAL EVERY 6 HOURS PRN
Status: ON HOLD | COMMUNITY
End: 2018-02-21 | Stop reason: HOSPADM

## 2018-02-15 RX ORDER — AMOXICILLIN 250 MG
1 CAPSULE ORAL DAILY
Status: DISCONTINUED | OUTPATIENT
Start: 2018-02-15 | End: 2018-02-21 | Stop reason: HOSPADM

## 2018-02-15 RX ORDER — HEPARIN SODIUM 5000 [USP'U]/ML
5000 INJECTION, SOLUTION INTRAVENOUS; SUBCUTANEOUS EVERY 8 HOURS
Status: DISCONTINUED | OUTPATIENT
Start: 2018-02-15 | End: 2018-02-21 | Stop reason: HOSPADM

## 2018-02-15 RX ORDER — FLUTICASONE PROPIONATE 50 MCG
2 SPRAY, SUSPENSION (ML) NASAL DAILY
Status: DISCONTINUED | OUTPATIENT
Start: 2018-02-16 | End: 2018-02-21 | Stop reason: HOSPADM

## 2018-02-15 RX ORDER — DIVALPROEX SODIUM 250 MG/1
250 TABLET, DELAYED RELEASE ORAL DAILY
Status: DISCONTINUED | OUTPATIENT
Start: 2018-02-15 | End: 2018-02-21 | Stop reason: HOSPADM

## 2018-02-15 RX ORDER — NAPROXEN SODIUM 220 MG/1
81 TABLET, FILM COATED ORAL DAILY
Status: DISCONTINUED | OUTPATIENT
Start: 2018-02-15 | End: 2018-02-21 | Stop reason: HOSPADM

## 2018-02-15 RX ORDER — SEVELAMER CARBONATE 800 MG/1
800 TABLET, FILM COATED ORAL 2 TIMES DAILY WITH MEALS
Status: DISCONTINUED | OUTPATIENT
Start: 2018-02-15 | End: 2018-02-21 | Stop reason: HOSPADM

## 2018-02-15 RX ORDER — RAMELTEON 8 MG/1
8 TABLET ORAL NIGHTLY PRN
Status: DISCONTINUED | OUTPATIENT
Start: 2018-02-15 | End: 2018-02-21 | Stop reason: HOSPADM

## 2018-02-15 RX ORDER — TIOTROPIUM BROMIDE 18 UG/1
18 CAPSULE ORAL; RESPIRATORY (INHALATION) DAILY
Status: DISCONTINUED | OUTPATIENT
Start: 2018-02-15 | End: 2018-02-21 | Stop reason: HOSPADM

## 2018-02-15 RX ORDER — IBUPROFEN 200 MG
16 TABLET ORAL
Status: DISCONTINUED | OUTPATIENT
Start: 2018-02-15 | End: 2018-02-21 | Stop reason: HOSPADM

## 2018-02-15 RX ORDER — IBUPROFEN 200 MG
24 TABLET ORAL
Status: DISCONTINUED | OUTPATIENT
Start: 2018-02-15 | End: 2018-02-21 | Stop reason: HOSPADM

## 2018-02-15 RX ORDER — SODIUM CHLORIDE 0.9 % (FLUSH) 0.9 %
5 SYRINGE (ML) INJECTION
Status: DISCONTINUED | OUTPATIENT
Start: 2018-02-15 | End: 2018-02-21 | Stop reason: HOSPADM

## 2018-02-15 RX ORDER — NALOXONE HCL 0.4 MG/ML
0.4 VIAL (ML) INJECTION ONCE
Status: COMPLETED | OUTPATIENT
Start: 2018-02-15 | End: 2018-02-15

## 2018-02-15 RX ORDER — METOLAZONE 5 MG/1
5 TABLET ORAL DAILY
Status: DISCONTINUED | OUTPATIENT
Start: 2018-02-15 | End: 2018-02-15

## 2018-02-15 RX ORDER — FUROSEMIDE 10 MG/ML
120 INJECTION INTRAMUSCULAR; INTRAVENOUS 2 TIMES DAILY
Status: DISCONTINUED | OUTPATIENT
Start: 2018-02-15 | End: 2018-02-19

## 2018-02-15 RX ORDER — SPIRONOLACTONE 25 MG/1
25 TABLET ORAL DAILY
Status: DISCONTINUED | OUTPATIENT
Start: 2018-02-15 | End: 2018-02-15

## 2018-02-15 RX ORDER — METOPROLOL SUCCINATE 50 MG/1
50 TABLET, EXTENDED RELEASE ORAL DAILY
Status: DISCONTINUED | OUTPATIENT
Start: 2018-02-15 | End: 2018-02-15

## 2018-02-15 RX ORDER — ACETAMINOPHEN 500 MG
1000 TABLET ORAL EVERY 8 HOURS PRN
Status: DISCONTINUED | OUTPATIENT
Start: 2018-02-15 | End: 2018-02-21 | Stop reason: HOSPADM

## 2018-02-15 RX ORDER — FUROSEMIDE 10 MG/ML
40 INJECTION INTRAMUSCULAR; INTRAVENOUS ONCE
Status: COMPLETED | OUTPATIENT
Start: 2018-02-15 | End: 2018-02-15

## 2018-02-15 RX ORDER — ONDANSETRON 8 MG/1
8 TABLET, ORALLY DISINTEGRATING ORAL EVERY 8 HOURS PRN
Status: DISCONTINUED | OUTPATIENT
Start: 2018-02-15 | End: 2018-02-21 | Stop reason: HOSPADM

## 2018-02-15 RX ORDER — METOLAZONE 5 MG/1
5 TABLET ORAL ONCE
Status: COMPLETED | OUTPATIENT
Start: 2018-02-15 | End: 2018-02-15

## 2018-02-15 RX ORDER — ACETAMINOPHEN 325 MG/1
650 TABLET ORAL EVERY 8 HOURS PRN
Status: DISCONTINUED | OUTPATIENT
Start: 2018-02-15 | End: 2018-02-18

## 2018-02-15 RX ORDER — ONDANSETRON 2 MG/ML
4 INJECTION INTRAMUSCULAR; INTRAVENOUS EVERY 8 HOURS PRN
Status: DISCONTINUED | OUTPATIENT
Start: 2018-02-15 | End: 2018-02-21 | Stop reason: HOSPADM

## 2018-02-15 RX ORDER — GLUCAGON 1 MG
1 KIT INJECTION
Status: DISCONTINUED | OUTPATIENT
Start: 2018-02-15 | End: 2018-02-21 | Stop reason: HOSPADM

## 2018-02-15 RX ORDER — AMLODIPINE BESYLATE 10 MG/1
10 TABLET ORAL DAILY
Status: DISCONTINUED | OUTPATIENT
Start: 2018-02-15 | End: 2018-02-19

## 2018-02-15 RX ORDER — ACETAMINOPHEN 325 MG/1
650 TABLET ORAL EVERY 4 HOURS PRN
Status: DISCONTINUED | OUTPATIENT
Start: 2018-02-15 | End: 2018-02-21 | Stop reason: HOSPADM

## 2018-02-15 RX ORDER — MIRTAZAPINE 30 MG/1
30 TABLET, FILM COATED ORAL NIGHTLY
Status: DISCONTINUED | OUTPATIENT
Start: 2018-02-15 | End: 2018-02-15

## 2018-02-15 RX ORDER — DIVALPROEX SODIUM 250 MG/1
500 TABLET, DELAYED RELEASE ORAL NIGHTLY
Status: DISCONTINUED | OUTPATIENT
Start: 2018-02-15 | End: 2018-02-21 | Stop reason: HOSPADM

## 2018-02-15 RX ORDER — LEVOTHYROXINE SODIUM 100 UG/1
100 TABLET ORAL
Status: DISCONTINUED | OUTPATIENT
Start: 2018-02-16 | End: 2018-02-21 | Stop reason: HOSPADM

## 2018-02-15 RX ORDER — SODIUM BICARBONATE 650 MG/1
1300 TABLET ORAL 3 TIMES DAILY
Status: DISCONTINUED | OUTPATIENT
Start: 2018-02-15 | End: 2018-02-21 | Stop reason: HOSPADM

## 2018-02-15 RX ADMIN — NEPHROCAP 1 CAPSULE: 1 CAP ORAL at 04:02

## 2018-02-15 RX ADMIN — HEPARIN SODIUM 5000 UNITS: 5000 INJECTION, SOLUTION INTRAVENOUS; SUBCUTANEOUS at 09:02

## 2018-02-15 RX ADMIN — DIVALPROEX SODIUM 500 MG: 250 TABLET, DELAYED RELEASE ORAL at 09:02

## 2018-02-15 RX ADMIN — SODIUM BICARBONATE 650 MG TABLET 1300 MG: at 09:02

## 2018-02-15 RX ADMIN — AMLODIPINE BESYLATE 10 MG: 10 TABLET ORAL at 04:02

## 2018-02-15 RX ADMIN — NALOXONE HYDROCHLORIDE 0.4 MG: 0.4 INJECTION, SOLUTION INTRAMUSCULAR; INTRAVENOUS; SUBCUTANEOUS at 08:02

## 2018-02-15 RX ADMIN — ASPIRIN 81 MG CHEWABLE TABLET 81 MG: 81 TABLET CHEWABLE at 04:02

## 2018-02-15 RX ADMIN — METOLAZONE 5 MG: 5 TABLET ORAL at 06:02

## 2018-02-15 RX ADMIN — FUROSEMIDE 120 MG: 10 INJECTION, SOLUTION INTRAMUSCULAR; INTRAVENOUS at 06:02

## 2018-02-15 RX ADMIN — STANDARDIZED SENNA CONCENTRATE AND DOCUSATE SODIUM 1 TABLET: 8.6; 5 TABLET, FILM COATED ORAL at 04:02

## 2018-02-15 RX ADMIN — DIVALPROEX SODIUM 250 MG: 250 TABLET, DELAYED RELEASE ORAL at 04:02

## 2018-02-15 RX ADMIN — FUROSEMIDE 80 MG: 10 INJECTION, SOLUTION INTRAMUSCULAR; INTRAVENOUS at 12:02

## 2018-02-15 RX ADMIN — FUROSEMIDE 40 MG: 10 INJECTION, SOLUTION INTRAMUSCULAR; INTRAVENOUS at 04:02

## 2018-02-15 RX ADMIN — SEVELAMER CARBONATE 800 MG: 800 TABLET, FILM COATED ORAL at 04:02

## 2018-02-15 NOTE — H&P
[facility] Hospital Medicine  History & Physical    Patient Name: Anai Sal  MRN: [id number]  Admission Date: 2/15/2018  Attending Physician: Nichelle Evans MD   Primary Care Provider: Live Young MD (Inactive)    [facility] Medicine Team: [facility] HOSP MED 1 Shellie Kim MD     Patient information was obtained from patient, relative(s), past medical records and ER records.     Subjective:     Principal Problem:Acute combined systolic and diastolic heart failure    Chief Complaint:   Chief Complaint   Patient presents with    Shortness of Breath        HPI: 72 F with HTN, HFrEF 30%, severe mitral regurgitation, CKD IV vs CKD V, Hep C, Breast ca s/p mastectomy, dementia brought in from home by daughter today for hypoxia on pulse oximetry - 85% on room air in AM. Pt was recently admitted at [facility] from 01/30 to 02/06 for CHF exacerbation (at that time pt had SOB). Pt was diuresed and resp status improved; at that time nephrology was consulted who suggested no RRT needed as pt was still making urine. However, discussion of dialysis was made due to pt's acute on chronic kidney disease, which pt declined and palliative care was consulted. Pt was sent home with home hospice. Pt's daughter says that pt has been more edematous and had some dyspnea over the last 4 days and that this morning she checked pt's oxygen saturation with a pulse oximeter at home and it read 85%. Pt was supposed to have a meeting with Dr. Benoit, her nephrologist, but due to her hypoxia pt's daughter brought her to the hospital.    In the ED BNP was 4.6K, pt was satting >95% on 2L NC with no SOB. Pt was given 80mg IV furosemide and admitted to Hospital Medicine for management of CHF exacerbation.    Past Medical History:   Diagnosis Date    Breast cancer 1980    right    CHF (congestive heart failure)     Chronic hepatitis C virus infection 3/14/2017    Coronary artery disease     Hypertension     Hazel     Renal  disorder     Thyroid disease        Past Surgical History:   Procedure Laterality Date    HYSTERECTOMY      MASTECTOMY Right 1980       Review of patient's allergies indicates:   Allergen Reactions    Seroquel [quetiapine]      Causes  profound sedation.       No current facility-administered medications on file prior to encounter.      Current Outpatient Prescriptions on File Prior to Encounter   Medication Sig    allopurinol (ZYLOPRIM) 100 MG tablet Take 0.5 tablets (50 mg total) by mouth once daily.    amLODIPine (NORVASC) 10 MG tablet Take 10 mg by mouth once daily.    bumetanide (BUMEX) 2 MG tablet Take 1 tablet (2 mg total) by mouth 2 (two) times daily.    diclofenac sodium 1 % Gel Apply 4 g topically 4 (four) times daily. Prn pain    isosorbide-hydrALAZINE 20-37.5 mg (BIDIL) 20-37.5 mg Tab Take 2 tablets by mouth 3 (three) times daily.    levothyroxine (SYNTHROID) 100 MCG tablet Take 1 tablet (100 mcg total) by mouth before breakfast.    metoprolol succinate (TOPROL-XL) 50 MG 24 hr tablet Take 1 tablet (50 mg total) by mouth once daily.    ramelteon (ROZEREM) 8 mg tablet Take 1 tablet (8 mg total) by mouth every evening.    senna-docusate 8.6-50 mg (PERICOLACE) 8.6-50 mg per tablet Take 1 tablet by mouth once daily.    sevelamer carbonate (RENVELA) 800 mg Tab Take 1 tablet (800 mg total) by mouth 2 (two) times daily with meals.    sodium bicarbonate 650 MG tablet Take 2 tablets (1,300 mg total) by mouth 3 (three) times daily.    spironolactone (ALDACTONE) 25 MG tablet Take 1 tablet (25 mg total) by mouth once daily.    tiotropium (SPIRIVA) 18 mcg inhalation capsule Inhale 1 capsule (18 mcg total) into the lungs once daily.    vitamin renal formula, B-complex-vitamin c-folic acid, (NEPHROCAP) 1 mg Cap Take 1 capsule by mouth once daily.    amiodarone (PACERONE) 400 MG tablet Take 1 tablet (400 mg total) by mouth once daily.    aspirin 81 MG Chew Take 1 tablet (81 mg total) by mouth once  daily.    divalproex (DEPAKOTE) 250 MG EC tablet Take 1 tablet (250 mg total) by mouth once daily.    divalproex (DEPAKOTE) 500 MG TbEC Take 1 tablet (500 mg total) by mouth every evening.    fluticasone (FLONASE) 50 mcg/actuation nasal spray 2 sprays by Each Nare route once daily. (Patient taking differently: 2 sprays by Each Nare route daily as needed. )    fluticasone-salmeterol 250-50 mcg/dose (ADVAIR) 250-50 mcg/dose diskus inhaler Inhale 1 puff into the lungs 2 (two) times daily. Controller    haloperidol (HALDOL) 1 MG tablet Take 1 mg by mouth 4 (four) times daily.    mirtazapine (REMERON) 30 MG tablet Take 30 mg by mouth every evening.     Family History     Problem Relation (Age of Onset)    Heart attack Brother, Maternal Grandmother    Heart disease Mother, Sister    No Known Problems Father, Maternal Aunt, Maternal Uncle, Paternal Aunt, Paternal Uncle, Maternal Grandfather, Paternal Grandmother, Paternal Grandfather        Social History Main Topics    Smoking status: Former Smoker     Packs/day: 2.00     Years: 20.00     Types: Cigarettes     Quit date: 2/16/1995    Smokeless tobacco: Never Used    Alcohol use No      Comment: 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    Drug use: No    Sexual activity: No      Comment:  with 3 children     Review of Systems   Constitutional: Negative for unexpected weight change.   HENT: Negative for congestion.    Eyes: Negative for visual disturbance.   Respiratory: Negative for choking, chest tightness, shortness of breath and wheezing.    Cardiovascular: Positive for leg swelling. Negative for chest pain.   Gastrointestinal: Negative for abdominal pain.   Musculoskeletal: Negative for arthralgias.   Skin: Negative for wound.   Neurological: Negative for headaches.   Psychiatric/Behavioral: Negative for sleep disturbance.     Objective:     Vital Signs (Most Recent):  Temp: 96.8 °F (36 °C) (02/15/18 1531)  Pulse: (!) 46 (02/15/18 1531)  Resp: 20 (02/15/18 1531)  BP:  137/75 (02/15/18 1531)  SpO2: 98 % (02/15/18 1531) Vital Signs (24h Range):  Temp:  [96.8 °F (36 °C)-98.5 °F (36.9 °C)] 96.8 °F (36 °C)  Pulse:  [46-55] 46  Resp:  [12-20] 20  SpO2:  [93 %-98 %] 98 %  BP: (137-171)/(69-91) 137/75     Weight: 82.3 kg (181 lb 6.4 oz)  Body mass index is 29.28 kg/m².    Physical Exam   Constitutional: She appears well-developed.   HENT:   Head: Normocephalic and atraumatic.   Right Ear: External ear normal.   Left Ear: External ear normal.   Nose: Nose normal.   Eyes: EOM are normal. Pupils are equal, round, and reactive to light.   Neck: No tracheal deviation present. No thyromegaly present.   Cardiovascular: Normal rate.    No murmur heard.  Pulmonary/Chest: Effort normal. No respiratory distress. She has wheezes (Mild end-exp wheezes). She has rales (Mild bibasilar rales).   Abdominal: Soft. There is no tenderness. No hernia.   Musculoskeletal: She exhibits no edema.   Neurological: She displays normal reflexes. No cranial nerve deficit.   Skin: No rash noted.   Psychiatric: She has a normal mood and affect. Judgment normal.   Vitals reviewed.        CRANIAL NERVES     CN III, IV, VI   Pupils are equal, round, and reactive to light.  Extraocular motions are normal.        Significant Labs:   CBC:   Recent Labs  Lab 02/15/18  1011   WBC 4.33   HGB 7.8*   HCT 23.6*   *     CMP:   Recent Labs  Lab 02/15/18  1011      K 4.5      CO2 27   GLU 95   BUN 83*   CREATININE 4.2*   CALCIUM 9.3   PROT 8.4   ALBUMIN 3.5   BILITOT 1.0   ALKPHOS 92   AST 37   ALT 24   ANIONGAP 11   EGFRNONAA 10.0*     Cardiac Markers:   Recent Labs  Lab 02/15/18  1011   BNP 4,621*     Magnesium:   Recent Labs  Lab 02/15/18  1011   MG 2.5       Significant Imaging: I have reviewed all pertinent imaging results/findings within the past 24 hours.    Assessment/Plan:     * Acute combined systolic and diastolic heart failure    CHFrEF 30% + diastolic dysfunction with right-sided heart failure  - Pt  grossly edematous with pitting edema to mid-thigh on admit; however pt's lungs sound clear and she denies having dyspnea  - Given likely fluid overload will diurese with 120 mg lasix IV BID, decreasing doses as appropriate or adding other agents to gauge response, suspect cardiorenal component  - Holding BB in setting of bradycardia, bidil in setting of ADCHF, will not start ACEI at this time given CANDI        Acute renal failure superimposed on stage 4 chronic kidney disease    Likely cardiorenal   - Given clinical volume overload, will aggressively give diuretics and monitor renal function via I/O + daily CMPs  - No abnormalities on UA        Counseling regarding advanced care planning and goals of care    Pt was previously discharged home with hospice; however, it is unclear whether pt is truly ESRD vs CANDI on CKD and whether she would need to be dialyzed (palliative care was consulted and hospice discussion was initiated due to pt's desire to not be on chronic dialysis)  - Will discuss this with daughter and discuss code status again        Essential hypertension    Continue amlodipine, hold toprol in bradycardia        Major neurocognitive disorder    Pt's family friend at bedside (daughter currently not present) stated that pt had been declining in mental status for the past 6-8 months          Chronic gout    Cont allopurinol          VTE Risk Mitigation         Ordered     heparin (porcine) injection 5,000 Units  Every 8 hours     Route:  Subcutaneous        02/15/18 1447     High Risk of VTE  Once      02/15/18 1447             Shellie Kim MD  Department of Hospital Medicine   [facility]                    02/16/2018                             STAFF PHYSICIAN NOTE                                   Attending Attestation for Rounds with Resident  I have reviewed and concur with the resident's history, physical, assessment, and plan.  I have personally interviewed and examined the patient  at bedside and agree with the resident's findings.                                  ________________________________________                                     REASON FOR ADMISSION:     Patient is 72 y.o.female    Body mass index is 29.31 kg/m².,  Acute combined systolic and diastolic heart failure

## 2018-02-15 NOTE — HPI
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 Pt was recently admitted at [facility] from 01/30 to 02/06 for CHF exacerbation (at that time pt had SOB). Pt was diuresed and resp status improved; at that time nephrology was consulted who suggested no RRT needed as pt was still making urine. However, discussion of dialysis was made due to pt's acute on chronic kidney disease, which pt declined and palliative care was consulted. Pt was sent home with home hospice. Pt's daughter says that pt has been more edematous and had some dyspnea over the last 4 days and that this morning she checked pt's oxygen saturation with a pulse oximeter at home and it read 85%. Pt was supposed to have a meeting with Dr. Benoit, her nephrologist, but due to her hypoxia pt's daughter brought her to the hospital.    In the ED BNP was 4.6K, pt was satting >95% on 2L NC with no SOB. Pt was given 80mg IV furosemide and admitted to Hospital Medicine for management of CHF exacerbation.

## 2018-02-15 NOTE — PROGRESS NOTES
Patient follows with [facility] clinic and is enrolled in [facility] ([facility]) for care management. Not a candidate for [facility].     Removed from HF list.

## 2018-02-15 NOTE — ASSESSMENT & PLAN NOTE
Pt's family friend at bedside (daughter currently not present) stated that pt had been declining in mental status for the past 6-8 months

## 2018-02-15 NOTE — ASSESSMENT & PLAN NOTE
Likely cardiorenal   - Given clinical volume overload, will aggressively give diuretics and monitor renal function via I/O + daily CMPs  - No abnormalities on UA

## 2018-02-15 NOTE — PROGRESS NOTES
Paged MD twice to notify pt's bladder [id number].  HR=41bpm prior to admin amlodipine and IV lasix. Awaiting return pagelesly bass.

## 2018-02-15 NOTE — ED PROVIDER NOTES
"Encounter Date: 2/15/2018       History     Chief Complaint   Patient presents with    Shortness of Breath     This is a 72 year old female with a PMH of HTN, severe cardiomyopathy/ combined heart failure (EF 30%), mitral regurgitation, CKD V, Hep C, Breast ca s/p mastectomy, dementia who presents to the ED with a chief complaint of shortness of breath. Patient is present with her daughter and primary caretaker, Rush. She reports the patient has increased edema and shortness of breath over the last 4 days. She checked her pulse ox this morning and noted low oxygenation at 85%. Patient had a scheduled appointment with nephrology today to discuss her need for dialysis. Her daughter brings her to the ED instead of clinic given her hypoxia and worsening shortness of breath. She denies recent fever, mental status changes, chest pain, vomiting, abdominal pain, dysuria or additional complaints. Her daughter administered morphine and ativan, as directed by hospice, last night to keep her comfortable.    Patient had recent admission for CHF exacerbation vs PNA. Discharge summary indicates "Given patient's poor cardiac functioning and poor renal function, as well as patient declining angiography, peritoneal dialysis, and other invasive procedures, patient likely has a poor prognosis. Palliative care was consulted which stated that patient and family demonstrated clear understanding of her disease progression, and ultimately recommended hospice. Patient was discharged from hospital to home hospice."          Review of patient's allergies indicates:   Allergen Reactions    Seroquel [quetiapine]      Causes  profound sedation.     Past Medical History:   Diagnosis Date    Breast cancer 1980    right    CHF (congestive heart failure)     Chronic hepatitis C virus infection 3/14/2017    Coronary artery disease     Hypertension     Hazel     Renal disorder     Thyroid disease      Past Surgical History:   Procedure " Laterality Date    HYSTERECTOMY      MASTECTOMY Right 1980     Family History   Problem Relation Age of Onset    Heart disease Mother     No Known Problems Father     Heart disease Sister     Heart attack Brother     No Known Problems Maternal Aunt     No Known Problems Maternal Uncle     No Known Problems Paternal Aunt     No Known Problems Paternal Uncle     Heart attack Maternal Grandmother     No Known Problems Maternal Grandfather     No Known Problems Paternal Grandmother     No Known Problems Paternal Grandfather     Anemia Neg Hx     Arrhythmia Neg Hx     Asthma Neg Hx     Clotting disorder Neg Hx     Fainting Neg Hx     Heart failure Neg Hx     Hyperlipidemia Neg Hx     Hypertension Neg Hx     Stroke Neg Hx     Atrial Septal Defect Neg Hx      Social History   Substance Use Topics    Smoking status: Former Smoker     Packs/day: 2.00     Years: 20.00     Types: Cigarettes     Quit date: 2/16/1995    Smokeless tobacco: Never Used    Alcohol use No      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   HENT: Negative for sore throat.    Respiratory: Positive for shortness of breath.    Cardiovascular: Positive for leg swelling. Negative for chest pain.   Gastrointestinal: Negative for nausea and vomiting.   Genitourinary: Negative for dysuria.   Musculoskeletal: Negative for back pain.   Skin: Negative for rash.   Neurological: Negative for weakness.   Hematological: Does not bruise/bleed easily.       Physical Exam     Initial Vitals [02/15/18 0843]   BP Pulse Resp Temp SpO2   (!) 140/70 (!) 55 18 98.5 °F (36.9 °C) (!) 93 %      MAP       93.33         Physical Exam    Constitutional: She appears well-developed and well-nourished. No distress.   HENT:   Head: Atraumatic.   Eyes: Conjunctivae and EOM are normal. Pupils are equal, round, and reactive to light.   Neck: JVD present.   Cardiovascular: Normal rate, regular rhythm and  normal heart sounds.   4+ pitting edema of bilateral lower extremities.  Pitting edema of bilateral upper extremities.  +JVD   Pulmonary/Chest: Accessory muscle usage present. Tachypnea noted. She has decreased breath sounds. She has wheezes.   Abdominal: Soft. Bowel sounds are normal. There is no tenderness.   Neurological: She is alert and oriented to person, place, and time.   Skin: Skin is warm and dry. No rash noted.         ED Course   Procedures  Labs Reviewed   CBC W/ AUTO DIFFERENTIAL - Abnormal; Notable for the following:        Result Value    RBC 2.45 (*)     Hemoglobin 7.8 (*)     Hematocrit 23.6 (*)     MCH 31.8 (*)     RDW 19.2 (*)     Platelets 121 (*)     Lymph # 0.6 (*)     Lymph% 14.8 (*)     All other components within normal limits   COMPREHENSIVE METABOLIC PANEL - Abnormal; Notable for the following:     BUN, Bld 83 (*)     Creatinine 4.2 (*)     eGFR if  11.5 (*)     eGFR if non  10.0 (*)     All other components within normal limits   TROPONIN I - Abnormal; Notable for the following:     Troponin I 0.035 (*)     All other components within normal limits   B-TYPE NATRIURETIC PEPTIDE - Abnormal; Notable for the following:     BNP 4,621 (*)     All other components within normal limits   PHOSPHORUS   MAGNESIUM   LACTIC ACID, PLASMA   URINALYSIS, REFLEX TO URINE CULTURE         Imaging Results          X-Ray Chest 1 View (Final result)  Result time 02/15/18 11:06:32    Final result by Calos Reich MD (02/15/18 11:06:32)                 Impression:        Pulmonary vascular congestion/edema.      Electronically signed by: Dr. Calos Reich MD  Date:     02/15/18  Time:    11:06              Narrative:    AP CHEST (1 View):      Comparison: 2/3/18    Findings:      Pulmonary vascular congestion/edema.  Atherosclerotic change within the aorta. The cardiac silhouette is prominent. The pulmonary vasculature is unremarkable. There is no pleural effusion or  pneumothorax. The hilar and mediastinal contours are unremarkable. There are no acute bony abnormalities.                              ECG Results          EKG 12-lead (Final result)  Result time 02/15/18 10:47:19    Final result by Interface, Lab In [facility] (02/15/18 10:47:19)                 Narrative:    Test Reason : R06.02  Blood Pressure : 140/70 mmHG  Vent. Rate : 052 BPM     Atrial Rate : 052 BPM     P-R Int : 138 ms          QRS Dur : 084 ms      QT Int : 446 ms       P-R-T Axes : 000 066 229 degrees     QTc Int : 414 ms    Sinus bradycardia  Low voltage QRS  Nonspecific T wave abnormality  Abnormal ECG  When compared with ECG of 04-FEB-2018 18:32,  Premature ventricular complexes are no longer Present  Vent. rate has decreased BY  39 BPM  Nonspecific T wave abnormality, worse in Anterior leads  QT has shortened  Confirmed by MADHU PARKER MD ([id number]) on 2/15/2018 10:47:13 AM    Referred By: [id number]   SELF           Confirmed By:MADHU PARKER MD                                     APC / Resident Notes:   72 year old female on home hospice presents with SOB and edema.  On exam she is afebrile and nontoxic. Vitals are stable. O2 is 93% on room air. She appears to be tachypneic and with some accessory muscle use. There are significantly decreased breath sounds in bilateral lungs, some wheezing in the upper lung fields. 4+ Pitting extremity edema.    DDX includes but is not limited to decompensated heart failure, renal failure, pneumonia.    Discussed goals of care with patient and daughter who confirm patient as DNR. Patient and daughter are wanting to hear recommendations from cardiology and nephrology in terms of dialysis but ultimately want comfort measures. She does want IV antibiotics and fluids if needed.    Her emergent evaluation is concerning for decompensated heart failure. She will need diuresis. I discussed this with  and her daughter Rush who both agree that they would like her to be  hospitalized to achieve this goal. She was a very difficult stick and the picc team was consulted to place an IV. She will be given lasix and admitted for further management. I discussed the care of this patient with my supervising MD.          Attending Attestation:     Physician Attestation Statement for NP/PA:   I discussed this assessment and plan of this patient with the NP/PA, but I did not personally examine the patient. The face to face encounter was performed by the NP/PA.                  ED Course      Clinical Impression:   The primary encounter diagnosis was Acute combined systolic and diastolic heart failure. A diagnosis of Shortness of breath was also pertinent to this visit.    Disposition:   Disposition: Admitted  Condition: Serious                        Patti Humphreys PA-C  02/19/18 1727       Jerman Moe MD  02/21/18 [id number]

## 2018-02-15 NOTE — TELEPHONE ENCOUNTER
----- Message from Soni Abrams sent at 2/15/2018  9:16 AM CST -----  Bobby Sal    Daughter    -   Pt in ER right now   Oxygen low       Please call daughter about Dr saunders  -    Said he was suppose to discuss possible dialyisis   Ph [phone]    Thanks

## 2018-02-15 NOTE — ASSESSMENT & PLAN NOTE
Pt was previously discharged home with hospice; however, it is unclear whether pt is truly ESRD vs CANDI on CKD and whether she would need to be dialyzed (palliative care was consulted and hospice discussion was initiated due to pt's desire to not be on chronic dialysis)  - Will discuss this with daughter and discuss code status again

## 2018-02-15 NOTE — ED NOTES
Patient identifiers verified and correct for Anai Sal.   LOC: The patient is awake, alert and aware of environment with an appropriate affect, the patient is oriented x 3 and speaking appropriately.   APPEARANCE: Patient appears comfortable and in no acute distress, patient is clean and well groomed.  SKIN: The skin is warm and dry, color consistent with ethnicity, patient has normal skin turgor and moist mucus membranes, skin intact, no breakdown or bruising noted.   MUSCULOSKELETAL: Patient moving all extremities spontaneously, Pt has swelling to upper extremities bilaterally  RESPIRATORY: Airway is open and patent, respirations are spontaneous, patient has a normal effort and rate, no accessory muscle use noted, pt placed on continuous pulse ox with O2 sats noted at 93% on room air.  CARDIAC: Pt placed on cardiac monitor. Patient has a art rate and regular rhythm, no edema noted, capillary refill < 3 seconds.   GASTRO: Soft and non tender to palpation, no distention noted, normoactive bowel sounds present in all four quadrants. Pt states bowel movements have been regular.  : Pt denies any pain or frequency with urination.  NEURO: Pt opens eyes spontaneously, behavior appropriate to situation, follows commands, facial expression symmetrical, bilateral hand grasp equal and even, purposeful motor response noted, normal sensation in all extremities when touched with a finger.

## 2018-02-15 NOTE — SUBJECTIVE & OBJECTIVE
Past Medical History:   Diagnosis Date    Breast cancer 1980    right    CHF (congestive heart failure)     Chronic hepatitis C virus infection 3/14/2017    Coronary artery disease     Hypertension     Hazel     Renal disorder     Thyroid disease        Past Surgical History:   Procedure Laterality Date    HYSTERECTOMY      MASTECTOMY Right 1980       Review of patient's allergies indicates:   Allergen Reactions    Seroquel [quetiapine]      Causes  profound sedation.       No current facility-administered medications on file prior to encounter.      Current Outpatient Prescriptions on File Prior to Encounter   Medication Sig    allopurinol (ZYLOPRIM) 100 MG tablet Take 0.5 tablets (50 mg total) by mouth once daily.    amLODIPine (NORVASC) 10 MG tablet Take 10 mg by mouth once daily.    bumetanide (BUMEX) 2 MG tablet Take 1 tablet (2 mg total) by mouth 2 (two) times daily.    diclofenac sodium 1 % Gel Apply 4 g topically 4 (four) times daily. Prn pain    isosorbide-hydrALAZINE 20-37.5 mg (BIDIL) 20-37.5 mg Tab Take 2 tablets by mouth 3 (three) times daily.    levothyroxine (SYNTHROID) 100 MCG tablet Take 1 tablet (100 mcg total) by mouth before breakfast.    metoprolol succinate (TOPROL-XL) 50 MG 24 hr tablet Take 1 tablet (50 mg total) by mouth once daily.    ramelteon (ROZEREM) 8 mg tablet Take 1 tablet (8 mg total) by mouth every evening.    senna-docusate 8.6-50 mg (PERICOLACE) 8.6-50 mg per tablet Take 1 tablet by mouth once daily.    sevelamer carbonate (RENVELA) 800 mg Tab Take 1 tablet (800 mg total) by mouth 2 (two) times daily with meals.    sodium bicarbonate 650 MG tablet Take 2 tablets (1,300 mg total) by mouth 3 (three) times daily.    spironolactone (ALDACTONE) 25 MG tablet Take 1 tablet (25 mg total) by mouth once daily.    tiotropium (SPIRIVA) 18 mcg inhalation capsule Inhale 1 capsule (18 mcg total) into the lungs once daily.    vitamin renal formula, B-complex-vitamin  c-folic acid, (NEPHROCAP) 1 mg Cap Take 1 capsule by mouth once daily.    amiodarone (PACERONE) 400 MG tablet Take 1 tablet (400 mg total) by mouth once daily.    aspirin 81 MG Chew Take 1 tablet (81 mg total) by mouth once daily.    divalproex (DEPAKOTE) 250 MG EC tablet Take 1 tablet (250 mg total) by mouth once daily.    divalproex (DEPAKOTE) 500 MG TbEC Take 1 tablet (500 mg total) by mouth every evening.    fluticasone (FLONASE) 50 mcg/actuation nasal spray 2 sprays by Each Nare route once daily. (Patient taking differently: 2 sprays by Each Nare route daily as needed. )    fluticasone-salmeterol 250-50 mcg/dose (ADVAIR) 250-50 mcg/dose diskus inhaler Inhale 1 puff into the lungs 2 (two) times daily. Controller    haloperidol (HALDOL) 1 MG tablet Take 1 mg by mouth 4 (four) times daily.    mirtazapine (REMERON) 30 MG tablet Take 30 mg by mouth every evening.     Family History     Problem Relation (Age of Onset)    Heart attack Brother, Maternal Grandmother    Heart disease Mother, Sister    No Known Problems Father, Maternal Aunt, Maternal Uncle, Paternal Aunt, Paternal Uncle, Maternal Grandfather, Paternal Grandmother, Paternal Grandfather        Social History Main Topics    Smoking status: Former Smoker     Packs/day: 2.00     Years: 20.00     Types: Cigarettes     Quit date: 2/16/1995    Smokeless tobacco: Never Used    Alcohol use No      Comment: 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    Drug use: No    Sexual activity: No      Comment:  with 3 children     Review of Systems   Constitutional: Negative for unexpected weight change.   HENT: Negative for congestion.    Eyes: Negative for visual disturbance.   Respiratory: Negative for choking, chest tightness, shortness of breath and wheezing.    Cardiovascular: Positive for leg swelling. Negative for chest pain.   Gastrointestinal: Negative for abdominal pain.   Musculoskeletal: Negative for arthralgias.   Skin: Negative for wound.   Neurological: Negative  for headaches.   Psychiatric/Behavioral: Negative for sleep disturbance.     Objective:     Vital Signs (Most Recent):  Temp: 96.8 °F (36 °C) (02/15/18 1531)  Pulse: (!) 46 (02/15/18 1531)  Resp: 20 (02/15/18 1531)  BP: 137/75 (02/15/18 1531)  SpO2: 98 % (02/15/18 1531) Vital Signs (24h Range):  Temp:  [96.8 °F (36 °C)-98.5 °F (36.9 °C)] 96.8 °F (36 °C)  Pulse:  [46-55] 46  Resp:  [12-20] 20  SpO2:  [93 %-98 %] 98 %  BP: (137-171)/(69-91) 137/75     Weight: 82.3 kg (181 lb 6.4 oz)  Body mass index is 29.28 kg/m².    Physical Exam   Constitutional: She appears well-developed.   HENT:   Head: Normocephalic and atraumatic.   Right Ear: External ear normal.   Left Ear: External ear normal.   Nose: Nose normal.   Eyes: EOM are normal. Pupils are equal, round, and reactive to light.   Neck: No tracheal deviation present. No thyromegaly present.   Cardiovascular: Normal rate.    No murmur heard.  Pulmonary/Chest: Effort normal. No respiratory distress. She has wheezes (Mild end-exp wheezes). She has rales (Mild bibasilar rales).   Abdominal: Soft. There is no tenderness. No hernia.   Musculoskeletal: She exhibits no edema.   Neurological: She displays normal reflexes. No cranial nerve deficit.   Skin: No rash noted.   Psychiatric: She has a normal mood and affect. Judgment normal.   Vitals reviewed.        CRANIAL NERVES     CN III, IV, VI   Pupils are equal, round, and reactive to light.  Extraocular motions are normal.        Significant Labs:   CBC:   Recent Labs  Lab 02/15/18  1011   WBC 4.33   HGB 7.8*   HCT 23.6*   *     CMP:   Recent Labs  Lab 02/15/18  1011      K 4.5      CO2 27   GLU 95   BUN 83*   CREATININE 4.2*   CALCIUM 9.3   PROT 8.4   ALBUMIN 3.5   BILITOT 1.0   ALKPHOS 92   AST 37   ALT 24   ANIONGAP 11   EGFRNONAA 10.0*     Cardiac Markers:   Recent Labs  Lab 02/15/18  1011   BNP 4,621*     Magnesium:   Recent Labs  Lab 02/15/18  1011   MG 2.5       Significant Imaging: I have reviewed  all pertinent imaging results/findings within the past 24 hours.

## 2018-02-15 NOTE — ASSESSMENT & PLAN NOTE
CHFrEF 30% + diastolic dysfunction with right-sided heart failure  - Pt grossly edematous with pitting edema to mid-thigh on admit; however pt's lungs sound clear and she denies having dyspnea  - Given likely fluid overload will diurese with 120 mg lasix IV BID, decreasing doses as appropriate or adding other agents to gauge response, suspect cardiorenal component  - Holding BB in setting of bradycardia, bidil in setting of ADCHF, will not start ACEI at this time given CANDI

## 2018-02-16 LAB
ALBUMIN SERPL BCP-MCNC: 3.3 G/DL
ALP SERPL-CCNC: 91 U/L
ALT SERPL W/O P-5'-P-CCNC: 24 U/L
ANION GAP SERPL CALC-SCNC: 13 MMOL/L
ANISOCYTOSIS BLD QL SMEAR: SLIGHT
AST SERPL-CCNC: 41 U/L
BASOPHILS # BLD AUTO: 0.02 K/UL
BASOPHILS NFR BLD: 0.5 %
BILIRUB SERPL-MCNC: 0.8 MG/DL
BUN SERPL-MCNC: 86 MG/DL
CALCIUM SERPL-MCNC: 9.1 MG/DL
CHLORIDE SERPL-SCNC: 103 MMOL/L
CO2 SERPL-SCNC: 25 MMOL/L
CREAT SERPL-MCNC: 4.2 MG/DL
DIFFERENTIAL METHOD: ABNORMAL
EOSINOPHIL # BLD AUTO: 0.1 K/UL
EOSINOPHIL NFR BLD: 2.2 %
ERYTHROCYTE [DISTWIDTH] IN BLOOD BY AUTOMATED COUNT: 19 %
EST. GFR  (AFRICAN AMERICAN): 11.5 ML/MIN/1.73 M^2
EST. GFR  (NON AFRICAN AMERICAN): 10 ML/MIN/1.73 M^2
GLUCOSE SERPL-MCNC: 83 MG/DL
HCT VFR BLD AUTO: 24.8 %
HGB BLD-MCNC: 8.1 G/DL
HYPOCHROMIA BLD QL SMEAR: ABNORMAL
IMM GRANULOCYTES # BLD AUTO: 0.03 K/UL
IMM GRANULOCYTES NFR BLD AUTO: 0.7 %
LYMPHOCYTES # BLD AUTO: 0.7 K/UL
LYMPHOCYTES NFR BLD: 16 %
MAGNESIUM SERPL-MCNC: 2.7 MG/DL
MCH RBC QN AUTO: 31.5 PG
MCHC RBC AUTO-ENTMCNC: 32.7 G/DL
MCV RBC AUTO: 97 FL
MONOCYTES # BLD AUTO: 0.5 K/UL
MONOCYTES NFR BLD: 11.9 %
NEUTROPHILS # BLD AUTO: 2.8 K/UL
NEUTROPHILS NFR BLD: 68.7 %
NRBC BLD-RTO: 0 /100 WBC
OVALOCYTES BLD QL SMEAR: ABNORMAL
PHOSPHATE SERPL-MCNC: 4.5 MG/DL
PLATELET # BLD AUTO: 117 K/UL
PLATELET BLD QL SMEAR: ABNORMAL
PMV BLD AUTO: 11.8 FL
POIKILOCYTOSIS BLD QL SMEAR: SLIGHT
POLYCHROMASIA BLD QL SMEAR: ABNORMAL
POTASSIUM SERPL-SCNC: 4.7 MMOL/L
PROT SERPL-MCNC: 7.9 G/DL
RBC # BLD AUTO: 2.57 M/UL
SODIUM SERPL-SCNC: 141 MMOL/L
T4 FREE SERPL-MCNC: 0.9 NG/DL
TARGETS BLD QL SMEAR: ABNORMAL
TSH SERPL DL<=0.005 MIU/L-ACNC: 41.87 UIU/ML
WBC # BLD AUTO: 4.13 K/UL

## 2018-02-16 PROCEDURE — 11000001 HC ACUTE MED/SURG PRIVATE ROOM

## 2018-02-16 PROCEDURE — 25000003 PHARM REV CODE 250: Performed by: STUDENT IN AN ORGANIZED HEALTH CARE EDUCATION/TRAINING PROGRAM

## 2018-02-16 PROCEDURE — 85025 COMPLETE CBC W/AUTO DIFF WBC: CPT

## 2018-02-16 PROCEDURE — 80053 COMPREHEN METABOLIC PANEL: CPT

## 2018-02-16 PROCEDURE — 25000242 PHARM REV CODE 250 ALT 637 W/ HCPCS: Performed by: STUDENT IN AN ORGANIZED HEALTH CARE EDUCATION/TRAINING PROGRAM

## 2018-02-16 PROCEDURE — 84439 ASSAY OF FREE THYROXINE: CPT

## 2018-02-16 PROCEDURE — 84443 ASSAY THYROID STIM HORMONE: CPT

## 2018-02-16 PROCEDURE — 25000003 PHARM REV CODE 250: Performed by: INTERNAL MEDICINE

## 2018-02-16 PROCEDURE — 99223 1ST HOSP IP/OBS HIGH 75: CPT | Mod: ,,, | Performed by: HOSPITALIST

## 2018-02-16 PROCEDURE — 83735 ASSAY OF MAGNESIUM: CPT

## 2018-02-16 PROCEDURE — 63600175 PHARM REV CODE 636 W HCPCS: Performed by: STUDENT IN AN ORGANIZED HEALTH CARE EDUCATION/TRAINING PROGRAM

## 2018-02-16 PROCEDURE — 97162 PT EVAL MOD COMPLEX 30 MIN: CPT

## 2018-02-16 PROCEDURE — 36415 COLL VENOUS BLD VENIPUNCTURE: CPT

## 2018-02-16 PROCEDURE — 84100 ASSAY OF PHOSPHORUS: CPT

## 2018-02-16 PROCEDURE — 97165 OT EVAL LOW COMPLEX 30 MIN: CPT

## 2018-02-16 RX ORDER — FLUTICASONE FUROATE AND VILANTEROL 100; 25 UG/1; UG/1
1 POWDER RESPIRATORY (INHALATION) DAILY
Status: DISCONTINUED | OUTPATIENT
Start: 2018-02-16 | End: 2018-02-21 | Stop reason: HOSPADM

## 2018-02-16 RX ORDER — IPRATROPIUM BROMIDE AND ALBUTEROL SULFATE 2.5; .5 MG/3ML; MG/3ML
3 SOLUTION RESPIRATORY (INHALATION) EVERY 4 HOURS PRN
Status: DISCONTINUED | OUTPATIENT
Start: 2018-02-16 | End: 2018-02-21 | Stop reason: HOSPADM

## 2018-02-16 RX ORDER — POLYETHYLENE GLYCOL 3350 17 G/17G
17 POWDER, FOR SOLUTION ORAL DAILY
Status: DISCONTINUED | OUTPATIENT
Start: 2018-02-16 | End: 2018-02-21

## 2018-02-16 RX ADMIN — LEVOTHYROXINE SODIUM 100 MCG: 100 TABLET ORAL at 05:02

## 2018-02-16 RX ADMIN — DIVALPROEX SODIUM 250 MG: 250 TABLET, DELAYED RELEASE ORAL at 09:02

## 2018-02-16 RX ADMIN — FLUTICASONE PROPIONATE 100 MCG: 50 SPRAY, METERED NASAL at 12:02

## 2018-02-16 RX ADMIN — FUROSEMIDE 120 MG: 10 INJECTION, SOLUTION INTRAMUSCULAR; INTRAVENOUS at 06:02

## 2018-02-16 RX ADMIN — HEPARIN SODIUM 5000 UNITS: 5000 INJECTION, SOLUTION INTRAVENOUS; SUBCUTANEOUS at 05:02

## 2018-02-16 RX ADMIN — FUROSEMIDE 120 MG: 10 INJECTION, SOLUTION INTRAMUSCULAR; INTRAVENOUS at 09:02

## 2018-02-16 RX ADMIN — SEVELAMER CARBONATE 800 MG: 800 TABLET, FILM COATED ORAL at 04:02

## 2018-02-16 RX ADMIN — DIVALPROEX SODIUM 500 MG: 250 TABLET, DELAYED RELEASE ORAL at 08:02

## 2018-02-16 RX ADMIN — SODIUM BICARBONATE 650 MG TABLET 1300 MG: at 05:02

## 2018-02-16 RX ADMIN — METHYLDOPA 50 MG: 500 TABLET ORAL at 03:02

## 2018-02-16 RX ADMIN — SODIUM BICARBONATE 650 MG TABLET 1300 MG: at 03:02

## 2018-02-16 RX ADMIN — AMLODIPINE BESYLATE 10 MG: 10 TABLET ORAL at 09:02

## 2018-02-16 RX ADMIN — SEVELAMER CARBONATE 800 MG: 800 TABLET, FILM COATED ORAL at 06:02

## 2018-02-16 RX ADMIN — STANDARDIZED SENNA CONCENTRATE AND DOCUSATE SODIUM 1 TABLET: 8.6; 5 TABLET, FILM COATED ORAL at 09:02

## 2018-02-16 RX ADMIN — SODIUM BICARBONATE 650 MG TABLET 1300 MG: at 09:02

## 2018-02-16 RX ADMIN — NEPHROCAP 1 CAPSULE: 1 CAP ORAL at 09:02

## 2018-02-16 RX ADMIN — POLYETHYLENE GLYCOL 3350 17 G: 17 POWDER, FOR SOLUTION ORAL at 10:02

## 2018-02-16 RX ADMIN — FLUTICASONE FUROATE AND VILANTEROL TRIFENATATE 1 PUFF: 100; 25 POWDER RESPIRATORY (INHALATION) at 03:02

## 2018-02-16 RX ADMIN — ASPIRIN 81 MG CHEWABLE TABLET 81 MG: 81 TABLET CHEWABLE at 09:02

## 2018-02-16 RX ADMIN — SEVELAMER CARBONATE 800 MG: 800 TABLET, FILM COATED ORAL at 08:02

## 2018-02-16 RX ADMIN — TIOTROPIUM BROMIDE 18 MCG: 18 CAPSULE ORAL; RESPIRATORY (INHALATION) at 09:02

## 2018-02-16 RX ADMIN — RAMELTEON 8 MG: 8 TABLET, FILM COATED ORAL at 08:02

## 2018-02-16 NOTE — PLAN OF CARE
Problem: Physical Therapy Goal  Goal: Physical Therapy Goal  Goals to be met by: 18    Patient will increase functional independence with mobility by performin. Supine to sit with Stand-by Assistance  2. Sit to supine with Stand-by Assistance  3. Sit to stand transfer with Contact Guard Assistance with RW   4. Gait  x 50 feet with Contact Guard Assistance using Rolling Walker.   5. Ascend/descend 3 stairs with right Handrails Minimal Assistance .   6. Stand for 3 minutes with Stand-by Assistance using Rolling Walker while performing dynamic standing activities to reduce fall risk.   7. Lower extremity exercise program x20 reps per handout, with assistance as needed    Outcome: Ongoing (interventions implemented as appropriate)  Pt chart reviewed and PT evaluation completed- see note for details. POC initiated.     Agnieszka Pierre PT, DPT   2018  Pager: [phone]

## 2018-02-16 NOTE — ASSESSMENT & PLAN NOTE
CHFrEF 30% + diastolic dysfunction with right-sided heart failure  - Pt grossly edematous with pitting edema to mid-thigh on admit; however pt's lungs sound clear and she denies having dyspnea  - Given likely fluid overload will diurese with 120 mg lasix IV BID, decreasing doses as appropriate or adding other agents to gauge response, suspect cardiorenal component  - Will continue to hold BB in setting of bradycardia, bidil in setting of ADCHF, will not start ACEI at this time given CANDI  - Pt w/ improved LE pitting edema and adequate U/O for her renal function. Will continue w/ current diuresis schedule and monitor electrolytes closely

## 2018-02-16 NOTE — PT/OT/SLP EVAL
"Physical Therapy Evaluation    Patient Name:  Anai Sal   MRN:  [id number]    Recommendations:     Discharge Recommendations:  home with home health (with 24/7 caregiver assistance)   Discharge Equipment Recommendations: none   Barriers to discharge: Inaccessible home (stairs to enter home, stairs within home)     Assessment:     Anai Sal is a 72 y.o. female admitted with a medical diagnosis of Acute combined systolic and diastolic heart failure.  She presents with the following impairments/functional limitations:  weakness, impaired endurance, impaired functional mobilty, gait instability, impaired self care skills, impaired balance, impaired cognition, decreased safety awareness, impaired cardiopulmonary response to activity, edema. Pt with SOB upon exertion and requiring increased assistance for bed mobility, transfers, and gait at this time. Pt with 24/7 caregiver assistance in place home, would benefit from HHPT upon discharge to maximize independence with mobility and quality of life. Pt would benefit from skilled PT intervention to address below listed deficits, reduce fall risk, and maximize (I) and safety with functional mobility.       Rehab Prognosis:  fair; patient would benefit from acute skilled PT services to address these deficits and reach maximum level of function.      Recent Surgery: * No surgery found *      Plan:     During this hospitalization, patient to be seen 4 x/week to address the above listed problems via gait training, therapeutic activities, therapeutic exercises, neuromuscular re-education  · Plan of Care Expires:  03/18/18   Plan of Care Reviewed with: patient, caregiver    Subjective     Communicated with RN prior to session.  Patient found supine upon PT entry to room. Pt alert, pleasant and agreeable to evaluation. Pt's caregiver ([place]) arrived during session.     Chief Complaint: weakness, SOB upon exertion   Patient comments/goals: "I wanna go home and sleep in my " "own bed."   Pain/Comfort:  · Pain Rating 1: 0/10  · Pain Rating Post-Intervention 1: 0/10    Patients cultural, spiritual, Orthodoxy conflicts given the current situation: no conflicts    Living Environment/Caregiver Assistance:  Pt lives with daughter in 2 story home, with full bathroom on 2nd floor of home. Pt able to stay on first level of home, if using bedside commode. 3 VITALIY home. Bathroom with tub/shower combo. Pt's daughter works during the day, provides care for pt at night. Sitter ([place]) available for assistance when daughter is out of the house.     Prior Level of Function:   Prior to admission, patients level of function was ambulatory with RW, requiring supervision or CGA for gait and assistance with all ADLs.  Patient has the following equipment: bedside commode, bath bench, walker, rolling.      Objective:     Patient found with: bed alarm, peripheral IV     General Precautions: Standard, fall   Orthopedic Precautions:N/A   Braces: N/A     Exams:  · Cognitive Exam:  Patient is oriented to Person and Place and follows 100% of simple commands   · Postural Exam:  Patient presented with the following abnormalities:    · -       Rounded shoulders  · -       Forward head  · Skin Integrity/Edema:      · -       Skin integrity: Visible skin intact  · -       Edema: Moderate BLEs  · RLE ROM: WFL  · RLE Strength: grossly 3+/5 all major muscle groups   · LLE ROM: WFL  · LLE Strength:grossly 3+/5 all major muscle groups     Functional Mobility:       Bed Mobility    · Supine to sit: moderate assistance    · Sit to supine: moderate assistance for BLE elevation        Transfers · Sit <> Stand: moderate assistance from EOB x 1 trial with RW; moderate assistance from bedside chair x 1 trial with RW        Gait  · Distance: ~6 ft.   · Assistance level: RW and min A for balance, safety, and RW management    · Gait Deviations: decreased step length, forward lean, decreased christi, decreased toe-to-floor clearance  "            AM-PAC 6 CLICK MOBILITY  Total Score:12       Therapeutic Activities and Exercises:  Pt and caregiver educated on role of PT and POC/goals for therapy as well as safety with mobility. Pt verbalized understanding. Pt expressed no further concerns/questions.     Patient left HOB elevated with all lines intact, call button in reach, bed alarm on, RN notified and sitter present.    GOALS:    Physical Therapy Goals        Problem: Physical Therapy Goal    Goal Priority Disciplines Outcome Goal Variances Interventions   Physical Therapy Goal     PT/OT, PT Ongoing (interventions implemented as appropriate)     Description:  Goals to be met by: 18    Patient will increase functional independence with mobility by performin. Supine to sit with Stand-by Assistance  2. Sit to supine with Stand-by Assistance  3. Sit to stand transfer with Contact Guard Assistance with RW   4. Gait  x 50 feet with Contact Guard Assistance using Rolling Walker.   5. Ascend/descend 3 stairs with right Handrails Minimal Assistance .   6. Stand for 3 minutes with Stand-by Assistance using Rolling Walker while performing dynamic standing activities to reduce fall risk.   7. Lower extremity exercise program x20 reps per handout, with assistance as needed                      History:     Past Medical History:   Diagnosis Date    Breast cancer     right    CHF (congestive heart failure)     Chronic hepatitis C virus infection 3/14/2017    Coronary artery disease     Hypertension     Hazel     Renal disorder     Thyroid disease        Past Surgical History:   Procedure Laterality Date    HYSTERECTOMY      MASTECTOMY Right        Clinical Decision Making:     Moderate complexity evaluation:   · Evolving clinical presentation   · 1-2 personal factors/comorbidities affecting treatment   · Examining and addressing 3+ functional deficits, activity limitations, and participation restrictions    Time Tracking:     PT  Received On: 02/16/18  PT Start Time: 0958     PT Stop Time: 1024  PT Total Time (min): 26 min     Billable Minutes: Evaluation 26 min    Agnieszka Pierre PT, DPT   2/16/2018  Pager: [phone]

## 2018-02-16 NOTE — PHARMACY MED REC
"Admission Medication Reconciliation - Pharmacy Consult Note    The home medication history was taken by Yanet Luther, Pharmacy Technician.  Based on information gathered and subsequent review by the clinical pharmacist, the items below may need attention.     You may go to "Admission" then "Reconcile Home Medications" tabs to review and/or act upon these items.     Potentially problematic discrepancies with current MAR  o Patient IS taking the following which was not ordered upon admit  o Haloperidol 1 mg PO qHS  o Isosorbide-hydrALAZINE 20-37.5 mg 2 tablets PO TID     Potential issues to be addressed PRIOR TO DISCHARGE  o Avoid using spironolactone with SCr > 2.0  o Prescriptions could not be filled prior to admission: divalproex  mg qAM & 500 mg qPM recently prescribed. Patient reports not taking at home.     Please address this information as you see fit.  Feel free to contact us if you have any questions or require assistance.    Rosalba Shankar, PharmD  [id number]     Patient's prior to admission medication regimen was as follows:  Medication Sig    allopurinol (ZYLOPRIM) 100 MG tablet Take 0.5 tablets (50 mg total) by mouth once daily.    amLODIPine (NORVASC) 10 MG tablet Take 10 mg by mouth once daily.    aspirin 81 MG Chew Take 1 tablet (81 mg total) by mouth once daily.    bumetanide (BUMEX) 2 MG tablet Take 1 tablet (2 mg total) by mouth 2 (two) times daily.    diclofenac sodium 1 % Gel Apply 4 g topically 4 (four) times daily. Prn pain    fluticasone (FLONASE) 50 mcg/actuation nasal spray 2 sprays by Each Nare route once daily. (Patient taking differently: 2 sprays by Each Nare route daily as needed. )    fluticasone-salmeterol 250-50 mcg/dose (ADVAIR) 250-50 mcg/dose diskus inhaler Inhale 1 puff into the lungs 2 (two) times daily. Controller    haloperidol (HALDOL) 1 MG tablet Take 1 mg by mouth every evening.     isosorbide-hydrALAZINE 20-37.5 mg (BIDIL) 20-37.5 mg Tab Take 2 tablets by mouth " 3 (three) times daily.    levothyroxine (SYNTHROID) 100 MCG tablet Take 1 tablet (100 mcg total) by mouth before breakfast.    metoprolol succinate (TOPROL-XL) 50 MG 24 hr tablet Take 1 tablet (50 mg total) by mouth once daily.    ramelteon (ROZEREM) 8 mg tablet Take 1 tablet (8 mg total) by mouth every evening.    senna-docusate 8.6-50 mg (PERICOLACE) 8.6-50 mg per tablet Take 1 tablet by mouth once daily.    sevelamer carbonate (RENVELA) 800 mg Tab Take 1 tablet (800 mg total) by mouth 2 (two) times daily with meals. (Patient taking differently: Take 800 mg by mouth 3 (three) times daily with meals. )    sodium bicarbonate 650 MG tablet Take 2 tablets (1,300 mg total) by mouth 3 (three) times daily.    spironolactone (ALDACTONE) 25 MG tablet Take 1 tablet (25 mg total) by mouth once daily.    tiotropium (SPIRIVA) 18 mcg inhalation capsule Inhale 1 capsule (18 mcg total) into the lungs once daily.    vitamin renal formula, B-complex-vitamin c-folic acid, (NEPHROCAP) 1 mg Cap Take 1 capsule by mouth once daily.    amiodarone (PACERONE) 400 MG tablet Take 1 tablet (400 mg total) by mouth once daily.    [DISCONTINUED] divalproex (DEPAKOTE) 250 MG EC tablet Take 1 tablet (250 mg total) by mouth once daily.    [DISCONTINUED] divalproex (DEPAKOTE) 500 MG TbEC Take 1 tablet (500 mg total) by mouth every evening.         .    .

## 2018-02-16 NOTE — PLAN OF CARE
Extended Emergency Contact Information  Primary Emergency Contact: Turner,Treshone  Address: [address] [place] [address] [facility]  Home Phone: [phone]  Mobile Phone: [phone]  Relation: Daughter  Preferred language: English    Live Young MD (Inactive)  [address] [facility] / [place] [address]    Future Appointments  Date Time Provider Department Center   3/19/2018 1:00 PM Camryn Rolle PA-C [facility] [address]   4/9/2018 3:00 PM Kong Marie II, MD [facility] [address]     Payor: [facility] / Plan: PACE [facility] / Product Type: Medicare Advantage /       [facility] - [place] - [address]  [address]  [place] [address]  Phone: [phone] Fax: [phone]    [facility] [address] [place] [address] - [address] AT [facility] [address]  [address]  [facility] [address]  Phone: [phone] Fax: [phone]    [facility] [place] - [address]  [address]  [place] [address]  Phone: [phone] Fax: [phone]       02/16/18 1647   Discharge Assessment   Assessment Type Discharge Planning Assessment   Confirmed/corrected address and phone number on facesheet? No   Assessment information obtained from? Medical Record   Expected Length of Stay (days) 3   Prior to hospitilization cognitive status: Alert/Oriented   Prior to hospitalization functional status: Needs Assistance;Assistive Equipment   Current cognitive status: Alert/Oriented   Current Functional Status: Needs Assistance;Assistive Equipment   Lives With child(kylie), adult   Able to Return to Prior Arrangements yes   Is patient able to care for self after discharge? No   Patient's perception of discharge disposition home or selfcare   Readmission Within The Last 30 Days previous discharge plan unsuccessful   Patient  currently being followed by outpatient case management? No   Patient currently receives any other outside agency services? No   Equipment Currently Used at Home bedside commode;bath bench;walker, rolling   Do you have any problems affording any of your prescribed medications? No   Is the patient taking medications as prescribed? yes   Does the patient have transportation home? Yes   Transportation Available family or friend will provide   Does the patient receive services at the [facility]mad[facility]? No   Discharge Plan A Hospice/home   Discharge Plan B Home with family;Home Health   Patient/Family In Agreement With Plan unable to assess

## 2018-02-16 NOTE — ASSESSMENT & PLAN NOTE
Pt was previously discharged home with hospice; however, it is unclear whether pt is truly ESRD vs CANDI on CKD and whether she would need to be dialyzed (palliative care was consulted and hospice discussion was initiated due to pt's desire to not be on chronic dialysis)  - Will discuss this with daughter and discuss code status again  - Awaiting daughter's arrival to discuss what their goals of care are; patient does have significant co-morbidities such as CKD IV/V that would require dialysis, which she has refused in the past.

## 2018-02-16 NOTE — NURSING
Pt HR sustained in between 38 - 42 bpm. Med team 1 notified. No new orders placed at this time. Advised to page team if HR < 35 bpm. Will continue to monitor.

## 2018-02-16 NOTE — HOSPITAL COURSE
02/16/2018 Pt w/ improvement in her breathing and saturating well on Ra. With adequate U/O w/ IV lasix for her kidney function and improved LE edema w/ diuresis. Awaiting daughter's arrival for plan of care/goals of care discussion this afternoon and appropriate discharge planning.  02/17 Patient with improving LE edema and good U/O. Will continue to diurese w/ IV lasix as patient still hypervolemic on exam. Nephrology consulted for evaluation and discussion of Hd. Patient w/ NAEON  02/18/2018 NAEON VSSA. No indications for acute dialysis. Continuing to diurese well with IV lasix. Breathing improved but still volume overloaded on physical exam. Labs stable.   02/19 Patient diuresing well w/ IV Lasix and is significantly improved from admission status. Will transition to PO medication to ensure that patient is still w/ appropriate U/O before discharge planning. NAEON and no new complaints at this time.  02/20 Continues to diurese very well on oral medication; will schedule Bumex at 3 mg QAM and 2 mg QHS for discharge. Will monitor overnight and anticipate discharge early in the Am. Net - 3146. Lungs clear and edema improving.

## 2018-02-16 NOTE — ASSESSMENT & PLAN NOTE
Likely cardiorenal   - Given clinical volume overload, will aggressively give diuretics and monitor renal function via I/O + daily CMPs  - No abnormalities on UA  - Cr remains at 4.2, same as on admission

## 2018-02-16 NOTE — SUBJECTIVE & OBJECTIVE
Interval History: NAEON. Patient much improved and states that she is feeling better today and almost back to her baseline. Remains w/ LE edema that is not as severe as on arrival. Oxygenating well on RA (mid 90's).     Review of Systems   Constitutional: Negative for unexpected weight change.   HENT: Negative for congestion.    Eyes: Negative for visual disturbance.   Respiratory: Negative for choking, chest tightness, shortness of breath and wheezing.    Cardiovascular: Positive for leg swelling (improved from admission). Negative for chest pain.   Gastrointestinal: Negative for abdominal pain.   Musculoskeletal: Negative for arthralgias.   Skin: Negative for wound.   Neurological: Negative for headaches.   Psychiatric/Behavioral: Negative for sleep disturbance.     Objective:     Vital Signs (Most Recent):  Temp: 98 °F (36.7 °C) (02/16/18 1306)  Pulse: (!) 47 (02/16/18 1306)  Resp: 20 (02/16/18 1306)  BP: 121/77 (02/16/18 1306)  SpO2: 96 % (02/16/18 1306) Vital Signs (24h Range):  Temp:  [96.8 °F (36 °C)-98.5 °F (36.9 °C)] 98 °F (36.7 °C)  Pulse:  [39-54] 47  Resp:  [18-20] 20  SpO2:  [95 %-99 %] 96 %  BP: (116-162)/(58-91) 121/77     Weight: 82.4 kg (181 lb 9.6 oz)  Body mass index is 29.31 kg/m².    Physical Exam   Constitutional: She appears well-developed.   HENT:   Head: Normocephalic and atraumatic.   Right Ear: External ear normal.   Left Ear: External ear normal.   Nose: Nose normal.   Eyes: EOM are normal. Pupils are equal, round, and reactive to light.   Neck: No tracheal deviation present. No thyromegaly present.   Cardiovascular: Normal rate.    No murmur heard.  Pulmonary/Chest: Effort normal. No respiratory distress. She has wheezes (Minimal end-exp wheeze). She has no rales.   Abdominal: Soft. There is no tenderness. No hernia.   Musculoskeletal: She exhibits no edema.   Neurological: She displays normal reflexes. No cranial nerve deficit.   Skin: No rash noted.   Psychiatric: She has a normal mood  and affect. Judgment normal.   Vitals reviewed.        CRANIAL NERVES     CN III, IV, VI   Pupils are equal, round, and reactive to light.  Extraocular motions are normal.        Significant Labs:   CBC:     Recent Labs  Lab 02/15/18  1011 02/16/18  0828   WBC 4.33 4.13   HGB 7.8* 8.1*   HCT 23.6* 24.8*   * 117*     CMP:     Recent Labs  Lab 02/15/18  1011 02/16/18  0828    141   K 4.5 4.7    103   CO2 27 25   GLU 95 83   BUN 83* 86*   CREATININE 4.2* 4.2*   CALCIUM 9.3 9.1   PROT 8.4 7.9   ALBUMIN 3.5 3.3*   BILITOT 1.0 0.8   ALKPHOS 92 91   AST 37 41*   ALT 24 24   ANIONGAP 11 13   EGFRNONAA 10.0* 10.0*     Cardiac Markers:     Recent Labs  Lab 02/15/18  1011   BNP 4,621*     Magnesium:     Recent Labs  Lab 02/15/18  1011 02/16/18  0828   MG 2.5 2.7*       Significant Imaging: I have reviewed all pertinent imaging results/findings within the past 24 hours.

## 2018-02-16 NOTE — PROGRESS NOTES
[facility] Hospital Medicine  Progress Note    Patient Name: Anai Sal  MRN: [id number]  Patient Class: IP- Inpatient   Admission Date: 2/15/2018  Length of Stay: 1 days  Attending Physician: Nichelle Evans MD  Primary Care Provider: Live Young MD (Inactive)    [facility] Medicine Team: [facility] HOSP MED 1 Stacy Montemayor MD    Subjective:     Principal Problem:Acute combined systolic and diastolic heart failure    HPI:  72 F with HTN, HFrEF 30%, severe mitral regurgitation, CKD IV vs CKD V, Hep C, Breast ca s/p mastectomy, dementia brought in from home by daughter today for hypoxia on pulse oximetry - 85% on room air in AM. Pt was recently admitted at [facility] from 01/30 to 02/06 for CHF exacerbation (at that time pt had SOB). Pt was diuresed and resp status improved; at that time nephrology was consulted who suggested no RRT needed as pt was still making urine. However, discussion of dialysis was made due to pt's acute on chronic kidney disease, which pt declined and palliative care was consulted. Pt was sent home with home hospice. Pt's daughter says that pt has been more edematous and had some dyspnea over the last 4 days and that this morning she checked pt's oxygen saturation with a pulse oximeter at home and it read 85%. Pt was supposed to have a meeting with Dr. Benoit, her nephrologist, but due to her hypoxia pt's daughter brought her to the hospital.    In the ED BNP was 4.6K, pt was satting >95% on 2L NC with no SOB. Pt was given 80mg IV furosemide and admitted to Hospital Medicine for management of CHF exacerbation.    Hospital Course:  02/16/2018 Pt w/ improvement in her breathing and saturating well on Ra. With adequate U/O w/ IV lasix for her kidney function and improved LE edema w/ diuresis. Awaiting daughter's arrival for plan of care/goals of care discussion this afternoon and appropriate discharge planning.    Interval History: NAEON. Patient much improved and states that  she is feeling better today and almost back to her baseline. Remains w/ LE edema that is not as severe as on arrival. Oxygenating well on RA (mid 90's).     Review of Systems   Constitutional: Negative for unexpected weight change.   HENT: Negative for congestion.    Eyes: Negative for visual disturbance.   Respiratory: Negative for choking, chest tightness, shortness of breath and wheezing.    Cardiovascular: Positive for leg swelling (improved from admission). Negative for chest pain.   Gastrointestinal: Negative for abdominal pain.   Musculoskeletal: Negative for arthralgias.   Skin: Negative for wound.   Neurological: Negative for headaches.   Psychiatric/Behavioral: Negative for sleep disturbance.     Objective:     Vital Signs (Most Recent):  Temp: 98 °F (36.7 °C) (02/16/18 1306)  Pulse: (!) 47 (02/16/18 1306)  Resp: 20 (02/16/18 1306)  BP: 121/77 (02/16/18 1306)  SpO2: 96 % (02/16/18 1306) Vital Signs (24h Range):  Temp:  [96.8 °F (36 °C)-98.5 °F (36.9 °C)] 98 °F (36.7 °C)  Pulse:  [39-54] 47  Resp:  [18-20] 20  SpO2:  [95 %-99 %] 96 %  BP: (116-162)/(58-91) 121/77     Weight: 82.4 kg (181 lb 9.6 oz)  Body mass index is 29.31 kg/m².    Physical Exam   Constitutional: She appears well-developed.   HENT:   Head: Normocephalic and atraumatic.   Right Ear: External ear normal.   Left Ear: External ear normal.   Nose: Nose normal.   Eyes: EOM are normal. Pupils are equal, round, and reactive to light.   Neck: No tracheal deviation present. No thyromegaly present.   Cardiovascular: Normal rate.    No murmur heard.  Pulmonary/Chest: Effort normal. No respiratory distress. She has wheezes (Minimal end-exp wheeze). She has no rales.   Abdominal: Soft. There is no tenderness. No hernia.   Musculoskeletal: She exhibits no edema.   Neurological: She displays normal reflexes. No cranial nerve deficit.   Skin: No rash noted.   Psychiatric: She has a normal mood and affect. Judgment normal.   Vitals reviewed.        CRANIAL  NERVES     CN III, IV, VI   Pupils are equal, round, and reactive to light.  Extraocular motions are normal.        Significant Labs:   CBC:     Recent Labs  Lab 02/15/18  1011 02/16/18  0828   WBC 4.33 4.13   HGB 7.8* 8.1*   HCT 23.6* 24.8*   * 117*     CMP:     Recent Labs  Lab 02/15/18  1011 02/16/18  0828    141   K 4.5 4.7    103   CO2 27 25   GLU 95 83   BUN 83* 86*   CREATININE 4.2* 4.2*   CALCIUM 9.3 9.1   PROT 8.4 7.9   ALBUMIN 3.5 3.3*   BILITOT 1.0 0.8   ALKPHOS 92 91   AST 37 41*   ALT 24 24   ANIONGAP 11 13   EGFRNONAA 10.0* 10.0*     Cardiac Markers:     Recent Labs  Lab 02/15/18  1011   BNP 4,621*     Magnesium:     Recent Labs  Lab 02/15/18  1011 02/16/18  0828   MG 2.5 2.7*       Significant Imaging: I have reviewed all pertinent imaging results/findings within the past 24 hours.    Assessment/Plan:      * Acute combined systolic and diastolic heart failure    CHFrEF 30% + diastolic dysfunction with right-sided heart failure  - Pt grossly edematous with pitting edema to mid-thigh on admit; however pt's lungs sound clear and she denies having dyspnea  - Given likely fluid overload will diurese with 120 mg lasix IV BID, decreasing doses as appropriate or adding other agents to gauge response, suspect cardiorenal component  - Will continue to hold BB in setting of bradycardia, bidil in setting of ADCHF, will not start ACEI at this time given CANDI  - Pt w/ improved LE pitting edema and adequate U/O for her renal function. Will continue w/ current diuresis schedule and monitor electrolytes closely         Counseling regarding advanced care planning and goals of care    Pt was previously discharged home with hospice; however, it is unclear whether pt is truly ESRD vs CANDI on CKD and whether she would need to be dialyzed (palliative care was consulted and hospice discussion was initiated due to pt's desire to not be on chronic dialysis)  - Will discuss this with daughter and discuss code  status again  - Awaiting daughter's arrival to discuss what their goals of care are; patient does have significant co-morbidities such as CKD IV/V that would require dialysis, which she has refused in the past.        Major neurocognitive disorder    Pt's family friend at bedside (daughter currently not present) stated that pt had been declining in mental status for the past 6-8 months          Acute renal failure superimposed on stage 4 chronic kidney disease    Likely cardiorenal   - Given clinical volume overload, will aggressively give diuretics and monitor renal function via I/O + daily CMPs  - No abnormalities on UA  - Cr remains at 4.2, same as on admission         Chronic gout    - Cont allopurinol at home dose         Essential hypertension    - Continue amlodipine, hold toprol in bradycardia  - 's-130's on most recent readings           VTE Risk Mitigation         Ordered     High Risk of VTE  Once      02/16/18 1125     heparin (porcine) injection 5,000 Units  Every 8 hours     Route:  Subcutaneous        02/15/18 1440              Stacy Montemayor MD  Department of Hospital Medicine   [facility]                    02/16/2018                             STAFF PHYSICIAN NOTE                                   Attending Attestation for Rounds with Resident  I have reviewed and concur with the resident's history, physical, assessment, and plan.  I have personally interviewed and examined the patient at bedside and agree with the resident's findings.                                  ________________________________________                                     REASON FOR ADMISSION:     Patient is 72 y.o.female    Body mass index is 29.31 kg/m².,  Acute combined systolic and diastolic heart failure

## 2018-02-17 LAB
ALBUMIN SERPL BCP-MCNC: 3.2 G/DL
ALP SERPL-CCNC: 89 U/L
ALT SERPL W/O P-5'-P-CCNC: 21 U/L
ANION GAP SERPL CALC-SCNC: 14 MMOL/L
AST SERPL-CCNC: 27 U/L
BASOPHILS # BLD AUTO: 0.01 K/UL
BASOPHILS NFR BLD: 0.3 %
BILIRUB SERPL-MCNC: 0.8 MG/DL
BUN SERPL-MCNC: 84 MG/DL
CALCIUM SERPL-MCNC: 9.2 MG/DL
CHLORIDE SERPL-SCNC: 101 MMOL/L
CO2 SERPL-SCNC: 27 MMOL/L
CREAT SERPL-MCNC: 4.5 MG/DL
DIFFERENTIAL METHOD: ABNORMAL
EOSINOPHIL # BLD AUTO: 0.1 K/UL
EOSINOPHIL NFR BLD: 2.5 %
ERYTHROCYTE [DISTWIDTH] IN BLOOD BY AUTOMATED COUNT: 19.2 %
EST. GFR  (AFRICAN AMERICAN): 10.6 ML/MIN/1.73 M^2
EST. GFR  (NON AFRICAN AMERICAN): 9.2 ML/MIN/1.73 M^2
GLUCOSE SERPL-MCNC: 76 MG/DL
HCT VFR BLD AUTO: 24.4 %
HGB BLD-MCNC: 8 G/DL
IMM GRANULOCYTES # BLD AUTO: 0.01 K/UL
IMM GRANULOCYTES NFR BLD AUTO: 0.3 %
LYMPHOCYTES # BLD AUTO: 0.9 K/UL
LYMPHOCYTES NFR BLD: 25.1 %
MAGNESIUM SERPL-MCNC: 2.6 MG/DL
MCH RBC QN AUTO: 31.6 PG
MCHC RBC AUTO-ENTMCNC: 32.8 G/DL
MCV RBC AUTO: 96 FL
MONOCYTES # BLD AUTO: 0.4 K/UL
MONOCYTES NFR BLD: 11.2 %
NEUTROPHILS # BLD AUTO: 2.2 K/UL
NEUTROPHILS NFR BLD: 60.6 %
NRBC BLD-RTO: 0 /100 WBC
PHOSPHATE SERPL-MCNC: 4.7 MG/DL
PLATELET # BLD AUTO: 137 K/UL
PMV BLD AUTO: 11.8 FL
POTASSIUM SERPL-SCNC: 4.4 MMOL/L
PROT SERPL-MCNC: 8 G/DL
RBC # BLD AUTO: 2.53 M/UL
SODIUM SERPL-SCNC: 142 MMOL/L
WBC # BLD AUTO: 3.66 K/UL

## 2018-02-17 PROCEDURE — 25000003 PHARM REV CODE 250: Performed by: STUDENT IN AN ORGANIZED HEALTH CARE EDUCATION/TRAINING PROGRAM

## 2018-02-17 PROCEDURE — 99232 SBSQ HOSP IP/OBS MODERATE 35: CPT | Mod: GC,,, | Performed by: INTERNAL MEDICINE

## 2018-02-17 PROCEDURE — 25000242 PHARM REV CODE 250 ALT 637 W/ HCPCS: Performed by: STUDENT IN AN ORGANIZED HEALTH CARE EDUCATION/TRAINING PROGRAM

## 2018-02-17 PROCEDURE — 11000001 HC ACUTE MED/SURG PRIVATE ROOM

## 2018-02-17 PROCEDURE — 36415 COLL VENOUS BLD VENIPUNCTURE: CPT

## 2018-02-17 PROCEDURE — 80053 COMPREHEN METABOLIC PANEL: CPT

## 2018-02-17 PROCEDURE — 25000003 PHARM REV CODE 250: Performed by: INTERNAL MEDICINE

## 2018-02-17 PROCEDURE — 84100 ASSAY OF PHOSPHORUS: CPT

## 2018-02-17 PROCEDURE — 63600175 PHARM REV CODE 636 W HCPCS: Performed by: STUDENT IN AN ORGANIZED HEALTH CARE EDUCATION/TRAINING PROGRAM

## 2018-02-17 PROCEDURE — 25000242 PHARM REV CODE 250 ALT 637 W/ HCPCS: Performed by: INTERNAL MEDICINE

## 2018-02-17 PROCEDURE — 85025 COMPLETE CBC W/AUTO DIFF WBC: CPT

## 2018-02-17 PROCEDURE — 83735 ASSAY OF MAGNESIUM: CPT

## 2018-02-17 PROCEDURE — 94761 N-INVAS EAR/PLS OXIMETRY MLT: CPT

## 2018-02-17 PROCEDURE — 94640 AIRWAY INHALATION TREATMENT: CPT

## 2018-02-17 PROCEDURE — 99233 SBSQ HOSP IP/OBS HIGH 50: CPT | Mod: ,,, | Performed by: HOSPITALIST

## 2018-02-17 RX ADMIN — IPRATROPIUM BROMIDE AND ALBUTEROL SULFATE 3 ML: .5; 3 SOLUTION RESPIRATORY (INHALATION) at 12:02

## 2018-02-17 RX ADMIN — DIVALPROEX SODIUM 500 MG: 250 TABLET, DELAYED RELEASE ORAL at 09:02

## 2018-02-17 RX ADMIN — STANDARDIZED SENNA CONCENTRATE AND DOCUSATE SODIUM 1 TABLET: 8.6; 5 TABLET, FILM COATED ORAL at 09:02

## 2018-02-17 RX ADMIN — FUROSEMIDE 120 MG: 10 INJECTION, SOLUTION INTRAMUSCULAR; INTRAVENOUS at 06:02

## 2018-02-17 RX ADMIN — POLYETHYLENE GLYCOL 3350 17 G: 17 POWDER, FOR SOLUTION ORAL at 09:02

## 2018-02-17 RX ADMIN — NEPHROCAP 1 CAPSULE: 1 CAP ORAL at 09:02

## 2018-02-17 RX ADMIN — SODIUM BICARBONATE 650 MG TABLET 1300 MG: at 09:02

## 2018-02-17 RX ADMIN — DIVALPROEX SODIUM 250 MG: 250 TABLET, DELAYED RELEASE ORAL at 09:02

## 2018-02-17 RX ADMIN — SEVELAMER CARBONATE 800 MG: 800 TABLET, FILM COATED ORAL at 09:02

## 2018-02-17 RX ADMIN — METHYLDOPA 50 MG: 500 TABLET ORAL at 09:02

## 2018-02-17 RX ADMIN — LEVOTHYROXINE SODIUM 100 MCG: 100 TABLET ORAL at 05:02

## 2018-02-17 RX ADMIN — SEVELAMER CARBONATE 800 MG: 800 TABLET, FILM COATED ORAL at 06:02

## 2018-02-17 RX ADMIN — RAMELTEON 8 MG: 8 TABLET, FILM COATED ORAL at 09:02

## 2018-02-17 RX ADMIN — FLUTICASONE PROPIONATE 100 MCG: 50 SPRAY, METERED NASAL at 09:02

## 2018-02-17 RX ADMIN — IPRATROPIUM BROMIDE AND ALBUTEROL SULFATE 3 ML: .5; 3 SOLUTION RESPIRATORY (INHALATION) at 09:02

## 2018-02-17 RX ADMIN — TIOTROPIUM BROMIDE 18 MCG: 18 CAPSULE ORAL; RESPIRATORY (INHALATION) at 09:02

## 2018-02-17 RX ADMIN — FLUTICASONE FUROATE AND VILANTEROL TRIFENATATE 1 PUFF: 100; 25 POWDER RESPIRATORY (INHALATION) at 09:02

## 2018-02-17 RX ADMIN — SODIUM BICARBONATE 650 MG TABLET 1300 MG: at 01:02

## 2018-02-17 RX ADMIN — AMLODIPINE BESYLATE 10 MG: 10 TABLET ORAL at 09:02

## 2018-02-17 RX ADMIN — FUROSEMIDE 120 MG: 10 INJECTION, SOLUTION INTRAMUSCULAR; INTRAVENOUS at 10:02

## 2018-02-17 RX ADMIN — ASPIRIN 81 MG CHEWABLE TABLET 81 MG: 81 TABLET CHEWABLE at 09:02

## 2018-02-17 RX ADMIN — SODIUM BICARBONATE 650 MG TABLET 1300 MG: at 05:02

## 2018-02-17 NOTE — ASSESSMENT & PLAN NOTE
Pt was previously discharged home with hospice; however, it is unclear whether pt is truly ESRD vs CANDI on CKD and whether she would need to be dialyzed (palliative care was consulted and hospice discussion was initiated due to pt's desire to not be on chronic dialysis)  - Will discuss this with daughter and discuss code status again  - Currently they would like to explore the possibility of HD as this was not discussed extensively with the pt and daughter on previous admission  - Will continue to abide by the pt's and daughters wishes and work w/ the family to provide most appropriate plan of care for the patient

## 2018-02-17 NOTE — CONSULTS
[facility] [address]  Nephrology  Consult Note    Patient Name: Anai Sla  MRN: [id number]  Admission Date: 2/15/2018  Hospital Length of Stay: 2 days  Attending Provider: Nichelle Evans MD   Primary Care Physician: Live Young MD (Inactive)  Principal Problem:Acute combined systolic and diastolic heart failure    Consults  Subjective:     HPI: CC: AK I on CK D4, with recurrent pulmonary edema.  Nephrology is consulted regarding AK I, renal function, volume overload.     Anai Sal is a 72 y.o. HFrEF 30%, severe mitral regurgitation, CKD IV/V, HTN,  Hep C, Breast ca s/p mastectomy, dementia brought in from home by daughter today for hypoxia on pulse oximetry - 85% on room air in AM.  Nephrology was consulted regarding dialysis discussions in the setting of CKD 4/5 and poor volume control with oral diuretic therapy.  She was recently admitted at [facility] from 01/30 to 02/06 for CHF exacerbation (at that time pt had SOB). Pt was diuresed and resp status improved; at that time nephrology was consulted who suggested no RRT needed as pt was still making urine. However, discussion of dialysis was made due to pt's acute on chronic kidney disease, which pt declined and palliative care was consulted. Patient was sent home with home hospice. Per chart review, her daughter said that pt has been more edematous and had some dyspnea over the last 4 days and that this morning she checked pt's oxygen saturation with a pulse oximeter at home and it read 85%.  She was scheduled to see nephrology for dialysis discussions, but presented to [facility] due to hypoxic respiratory failure.  In the ED, she had an elevated BNP to 4.6k and CXR revealing pulmonary edema.  She was diuresed with lasix 120 mg IV BID with significant volume removal.  She was seen this AM breathing on room air, but still has some wheezing.  Inquired if she was adherent to home medical therapy, to which she states she was.  Otherwise, notes she  previously did not want to undergo dialysis as she has heard numerous stories regarding it and is afraid of dialysis.      Patient with CK D4 to 5 with gradually increasing serum creatinine since at least 2016 from 1.5 to currently 4.5.  She had proteinuria of 1.4 g in the past, with an abnormal free light chain ratio, but urine protein electrophoresis did not demonstrate a paraprotein or light chain.  Urinary protein creatinine ratio has since been 0.2-.3. She is hepatitis C positive. Patient has been responding well to diuresis with urine output of close to 3000 cc in the last 24 hours.  She is lying in bed asking me to get her ice cream. She is not complaining of chest pain though  conversation is difficult  4/2017 renal ultrasound consistent with chronic medical disease and acquired cysts which appear to be Bosniak 1-2        Past Medical History:   Diagnosis Date    Breast cancer 1980    right    CHF (congestive heart failure)     Chronic hepatitis C virus infection 3/14/2017    Coronary artery disease     Hypertension     Hazel     Renal disorder     Thyroid disease        Past Surgical History:   Procedure Laterality Date    HYSTERECTOMY      MASTECTOMY Right 1980       Review of patient's allergies indicates:   Allergen Reactions    Seroquel [quetiapine]      Causes  profound sedation.     Current Facility-Administered Medications   Medication Frequency    acetaminophen tablet 1,000 mg Q8H PRN    acetaminophen tablet 650 mg Q4H PRN    acetaminophen tablet 650 mg Q8H PRN    albuterol-ipratropium 2.5mg-0.5mg/3mL nebulizer solution 3 mL Q4H PRN    allopurinol split tablet 50 mg Daily    amLODIPine tablet 10 mg Daily    aspirin chewable tablet 81 mg Daily    dextrose 50% injection 12.5 g PRN    dextrose 50% injection 25 g PRN    divalproex EC tablet 250 mg Daily    divalproex EC tablet 500 mg QHS    fluticasone 50 mcg/actuation nasal spray 100 mcg Daily    fluticasone-vilanterol 100-25  mcg/dose diskus inhaler 1 puff Daily    furosemide injection 120 mg BID    glucagon (human recombinant) injection 1 mg PRN    glucose chewable tablet 16 g PRN    glucose chewable tablet 24 g PRN    heparin (porcine) injection 5,000 Units Q8H    levothyroxine tablet 100 mcg Before breakfast    ondansetron disintegrating tablet 8 mg Q8H PRN    ondansetron injection 4 mg Q8H PRN    polyethylene glycol packet 17 g Daily    ramelteon tablet 8 mg Nightly PRN    senna-docusate 8.6-50 mg per tablet 1 tablet Daily    sevelamer carbonate tablet 800 mg BID WM    sodium bicarbonate tablet 1,300 mg TID    sodium chloride 0.9% flush 5 mL PRN    tiotropium inhalation capsule 18 mcg Daily    vitamin renal formula (B-complex-vitamin c-folic acid) 1 mg per capsule 1 capsule Daily     Family History     Problem Relation (Age of Onset)    Heart attack Brother, Maternal Grandmother    Heart disease Mother, Sister    No Known Problems Father, Maternal Aunt, Maternal Uncle, Paternal Aunt, Paternal Uncle, Maternal Grandfather, Paternal Grandmother, Paternal Grandfather        Social History Main Topics    Smoking status: Former Smoker     Packs/day: 2.00     Years: 20.00     Types: Cigarettes     Quit date: 2/16/1995    Smokeless tobacco: Never Used    Alcohol use No      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   HENT: Negative for congestion, facial swelling, hearing loss, nosebleeds and trouble swallowing.    Eyes: Negative for pain, discharge, redness and visual disturbance.   Respiratory: Negative for cough, chest tightness and shortness of breath.    Cardiovascular: Positive for leg swelling. Negative for chest pain and palpitations.   Gastrointestinal: Negative for abdominal distention, abdominal pain, constipation, diarrhea,  nausea and vomiting.   Endocrine: Negative for cold intolerance, heat intolerance and polydipsia.   Genitourinary: Negative for decreased urine volume, difficulty urinating, dysuria, flank pain, frequency, hematuria and urgency.   Musculoskeletal: Negative for arthralgias, back pain, joint swelling and myalgias.   Skin: Negative for color change, pallor, rash and wound.   Neurological: Negative for dizziness, tremors, seizures, syncope, speech difficulty, weakness, light-headedness and headaches.   Hematological: Negative for adenopathy. Does not bruise/bleed easily.   Psychiatric/Behavioral: Negative for agitation, behavioral problems, confusion, decreased concentration, dysphoric mood, self-injury and sleep disturbance.     Objective:     Vital Signs (Most Recent):  Temp: 97.5 °F (36.4 °C) (02/17/18 1134)  Pulse: 61 (02/17/18 1135)  Resp: 20 (02/17/18 1134)  BP: (!) 115/59 (02/17/18 1134)  SpO2: (!) 94 % (02/17/18 1134)  O2 Device (Oxygen Therapy): room air (02/17/18 1134) Vital Signs (24h Range):  Temp:  [96.7 °F (35.9 °C)-98.1 °F (36.7 °C)] 97.5 °F (36.4 °C)  Pulse:  [45-97] 61  Resp:  [16-23] 20  SpO2:  [91 %-97 %] 94 %  BP: (115-165)/(59-77) 115/59     Weight: 82.4 kg (181 lb 9.6 oz) (02/16/18 0400)  Body mass index is 29.31 kg/m².  Body surface area is 1.96 meters squared.    I/O last 3 completed shifts:  In: 580 [P.O.:580]  Out: 3775 [Urine:3775]    Physical Exam   Constitutional: She is oriented to person, place, and time. She appears well-developed and well-nourished. No distress.   Obese ,   Ethan mildly dyspneic at a 30° angle when moving   HENT:   Head: Normocephalic and atraumatic.   Mouth/Throat: No oropharyngeal exudate.   Eyes: Conjunctivae and EOM are normal. Pupils are equal, round, and reactive to light. Right eye exhibits no discharge. Left eye exhibits no discharge. No scleral icterus.   Neck: Normal range of motion. Neck supple. No JVD present. No tracheal deviation present. No thyromegaly  present.   Cardiovascular: Normal rate, regular rhythm and intact distal pulses.  Exam reveals no gallop.    Murmur heard.  +2 bilateral lower extremity edema   Pulmonary/Chest: No stridor. No respiratory distress. She has rales. She exhibits no tenderness.   Decreased breath sounds at the bases poor excursion, bilateral basilar crackles   Abdominal: Soft. Bowel sounds are normal. She exhibits no distension and no mass. There is no tenderness. There is no rebound and no guarding. No hernia.   Musculoskeletal: Normal range of motion. She exhibits edema (3+). She exhibits no tenderness.   Lymphadenopathy:     She has no cervical adenopathy.   Neurological: She is alert and oriented to person, place, and time. No cranial nerve deficit. She exhibits normal muscle tone.   Skin: Skin is warm and dry. No rash noted. She is not diaphoretic. No erythema.   Psychiatric: She has a normal mood and affect. Her behavior is normal. Judgment and thought content normal.   Nursing note and vitals reviewed.      Significant Labs:    Recent Results (from the past 24 hour(s))   Comprehensive Metabolic Panel (CMP)    Collection Time: 02/17/18  6:34 AM   Result Value Ref Range    Sodium 142 136 - 145 mmol/L    Potassium 4.4 3.5 - 5.1 mmol/L    Chloride 101 95 - 110 mmol/L    CO2 27 23 - 29 mmol/L    Glucose 76 70 - 110 mg/dL    BUN, Bld 84 (H) 8 - 23 mg/dL    Creatinine 4.5 (H) 0.5 - 1.4 mg/dL    Calcium 9.2 8.7 - 10.5 mg/dL    Total Protein 8.0 6.0 - 8.4 g/dL    Albumin 3.2 (L) 3.5 - 5.2 g/dL    Total Bilirubin 0.8 0.1 - 1.0 mg/dL    Alkaline Phosphatase 89 55 - 135 U/L    AST 27 10 - 40 U/L    ALT 21 10 - 44 U/L    Anion Gap 14 8 - 16 mmol/L    eGFR if African American 10.6 (A) >60 mL/min/1.73 m^2    eGFR if non  9.2 (A) >60 mL/min/1.73 m^2   Magnesium    Collection Time: 02/17/18  6:34 AM   Result Value Ref Range    Magnesium 2.6 1.6 - 2.6 mg/dL   Phosphorus    Collection Time: 02/17/18  6:34 AM   Result Value Ref Range  "   Phosphorus 4.7 (H) 2.7 - 4.5 mg/dL   CBC with Automated Differential    Collection Time: 02/17/18  6:34 AM   Result Value Ref Range    WBC 3.66 (L) 3.90 - 12.70 K/uL    RBC 2.53 (L) 4.00 - 5.40 M/uL    Hemoglobin 8.0 (L) 12.0 - 16.0 g/dL    Hematocrit 24.4 (L) 37.0 - 48.5 %    MCV 96 82 - 98 fL    MCH 31.6 (H) 27.0 - 31.0 pg    MCHC 32.8 32.0 - 36.0 g/dL    RDW 19.2 (H) 11.5 - 14.5 %    Platelets 137 (L) 150 - 350 K/uL    MPV 11.8 9.2 - 12.9 fL    Immature Granulocytes 0.3 0.0 - 0.5 %    Gran # (ANC) 2.2 1.8 - 7.7 K/uL    Immature Grans (Abs) 0.01 0.00 - 0.04 K/uL    Lymph # 0.9 (L) 1.0 - 4.8 K/uL    Mono # 0.4 0.3 - 1.0 K/uL    Eos # 0.1 0.0 - 0.5 K/uL    Baso # 0.01 0.00 - 0.20 K/uL    nRBC 0 0 /100 WBC    Gran% 60.6 38.0 - 73.0 %    Lymph% 25.1 18.0 - 48.0 %    Mono% 11.2 4.0 - 15.0 %    Eosinophil% 2.5 0.0 - 8.0 %    Basophil% 0.3 0.0 - 1.9 %    Differential Method Automated          Significant Imaging:    CXR 2/15/2018    Frontal chest radiograph. Patient is slightly rotated. Monitoring leads and tubing overlie the chest. No PICC line identified to correlate with clinical history of "evaluate PICC line placement".  Correlate clinically. No large pneumothorax or definite new focal opacity. Otherwise, grossly stable radiographic appearance of the chest.    Labs and radiology, echo, renal us  reviewed      Assessment/Plan:     Acute renal failure superimposed on stage 4 chronic kidney disease    Anai Sal is a 72 y.o. lady with CKD 4 and combined systolic and diastolic heart failure who was on home hospice who [place] [facility] [place] for acute hypoxic respiratory failure, secondary to decompensated heart failure.  Patient has numerous risk factors regarding potential for development of CKD, including HTN. Nephrology was consulted regarding dialysis consideration given recent admissions, fluid overloaded state, and failure of fluid control with medical therapy.  Overall, difficult to ascertain full " "reason for medical therapy as patient not accurate historian due to dementia.  Otherwise, patient inquired regarding dialysis options, of which she states "she has been praying on it to make her kidneys better."  She also notes that she is still able to make urine.  Thus far, she is net negative 3.5L with BUN/Cr of 84/4.5, mildly worse since admission.  Given that patient is still fluid overloaded, would benefit from continued diuretic therapy.  Suspect component of cardiorenal syndrome as well for CANDI.      Plan  - will discuss option of dialysis, but as patient currently responding to IV diuretic therapy, continue IV diuretic therapy for diuresis  - further goals of care discussion as patient previously noted not wanting chronic dialysis if optimal medical therapy ineffective in fluid management  - no indication for acute RRT at this time  - daily labs, continue Is and Os  - avoid nephrotoxic medication             Thank you for your consult. I will follow-up with patient. Please contact us if you have any additional questions.    Neela Veronica MD  Nephrology  [facility]  "

## 2018-02-17 NOTE — ASSESSMENT & PLAN NOTE
Pt's family friend at bedside (daughter currently not present) stated that pt had been declining in mental status for the past 6-8 months  Daughter appears to be the main caretaker for the patient and is very involved in her treatment plan

## 2018-02-17 NOTE — ASSESSMENT & PLAN NOTE
"Anai Sal is a 72 y.o. lady with CKD 4 and combined systolic and diastolic heart failure who was on home hospice who [place] [facility] [place] acute hypoxic respiratory failure, secondary to decompensated heart failure.  Patient has numerous risk factors regarding potential for development of CKD, including HTN. Nephrology was consulted regarding dialysis consideration given recent admissions, fluid overloaded state, and failure of fluid control with medical therapy.  Overall, difficult to ascertain full reason for medical therapy as patient not accurate historian due to dementia.  Otherwise, patient inquired regarding dialysis options, of which she states "she has been praying on it to make her kidneys better."  She also notes that she is still able to make urine.  Thus far, she is net negative 3.5L with BUN/Cr of 84/4.5, mildly worse since admission.  Given that patient is still fluid overloaded, would benefit from continued diuretic therapy.  Suspect component of cardiorenal syndrome as well for CANDI.      Plan  - will discuss option of dialysis, but as patient currently responding to IV diuretic therapy, continue IV diuretic therapy for diuresis  - further goals of care discussion as patient previously noted not wanting chronic dialysis if optimal medical therapy ineffective in fluid management  - no indication for acute RRT at this time  - daily labs, continue Is and Os  - avoid nephrotoxic medication   "

## 2018-02-17 NOTE — SUBJECTIVE & OBJECTIVE
Interval History: NAEON. Pt continues to improve w/ high dose IV lasix; Net - 2560 L from last shift; continues to diurese well during this shift. States that her SOB is improving and she is no longer requiring NC O2. Denies any discomfort or pain during the examination.   No other complaints at this time aside from frequent interruptions to her sleep/naps.    Review of Systems   Constitutional: Negative for unexpected weight change.   HENT: Negative for congestion.    Eyes: Negative for visual disturbance.   Respiratory: Negative for choking, chest tightness, shortness of breath and wheezing.    Cardiovascular: Positive for leg swelling (improved from admission). Negative for chest pain.   Gastrointestinal: Negative for abdominal pain.   Musculoskeletal: Negative for arthralgias.   Skin: Negative for wound.   Neurological: Negative for headaches.   Psychiatric/Behavioral: Negative for sleep disturbance.     Objective:     Vital Signs (Most Recent):  Temp: 97.5 °F (36.4 °C) (02/17/18 1134)  Pulse: 61 (02/17/18 1135)  Resp: 20 (02/17/18 1134)  BP: (!) 115/59 (02/17/18 1134)  SpO2: (!) 94 % (02/17/18 1134) Vital Signs (24h Range):  Temp:  [96.7 °F (35.9 °C)-98.1 °F (36.7 °C)] 97.5 °F (36.4 °C)  Pulse:  [45-97] 61  Resp:  [16-23] 20  SpO2:  [91 %-97 %] 94 %  BP: (115-165)/(59-74) 115/59     Weight: 82.4 kg (181 lb 9.6 oz)  Body mass index is 29.31 kg/m².    Physical Exam   Constitutional: She appears well-developed.   HENT:   Head: Normocephalic and atraumatic.   Right Ear: External ear normal.   Left Ear: External ear normal.   Nose: Nose normal.   Eyes: EOM are normal. Pupils are equal, round, and reactive to light.   Neck: No tracheal deviation present. No thyromegaly present.   Cardiovascular: Normal rate.    Murmur heard.  Pulmonary/Chest: Effort normal. No respiratory distress. She has wheezes (Minimal end-exp wheeze). She has no rales.   B/l basilar crackles noted on exam   Abdominal: Soft. There is no  tenderness. No hernia.   Musculoskeletal: She exhibits edema.   2+ b/l LE edema, ascending up to the knees   Neurological: She displays normal reflexes. No cranial nerve deficit.   Skin: No rash noted.   Psychiatric: She has a normal mood and affect. Judgment normal.   Vitals reviewed.        CRANIAL NERVES     CN III, IV, VI   Pupils are equal, round, and reactive to light.  Extraocular motions are normal.        Significant Labs:   CBC:     Recent Labs  Lab 02/16/18  0828 02/17/18  0634   WBC 4.13 3.66*   HGB 8.1* 8.0*   HCT 24.8* 24.4*   * 137*     CMP:     Recent Labs  Lab 02/16/18  0828 02/17/18  0634    142   K 4.7 4.4    101   CO2 25 27   GLU 83 76   BUN 86* 84*   CREATININE 4.2* 4.5*   CALCIUM 9.1 9.2   PROT 7.9 8.0   ALBUMIN 3.3* 3.2*   BILITOT 0.8 0.8   ALKPHOS 91 89   AST 41* 27   ALT 24 21   ANIONGAP 13 14   EGFRNONAA 10.0* 9.2*     Cardiac Markers:   No results for input(s): CKMB, MYOGLOBIN, BNP, TROPISTAT in the last 48 hours.  Magnesium:     Recent Labs  Lab 02/16/18  0828 02/17/18  0634   MG 2.7* 2.6       Significant Imaging: I have reviewed all pertinent imaging results/findings within the past 24 hours.

## 2018-02-17 NOTE — HPI
CC: AK I on CK D4, with recurrent pulmonary edema.  Nephrology is consulted regarding AK I, renal function, volume overload.     Anai Sal is a 72 y.o. HFrEF 30%, severe mitral regurgitation, CKD IV/V, HTN,  Hep C, Breast ca s/p mastectomy, dementia brought in from home by daughter today for hypoxia on pulse oximetry - 85% on room air in AM.  Nephrology was consulted regarding dialysis discussions in the setting of CKD 4/5 and poor volume control with oral diuretic therapy.  She was recently admitted at [facility] from 01/30 to 02/06 for CHF exacerbation (at that time pt had SOB). Pt was diuresed and resp status improved; at that time nephrology was consulted who suggested no RRT needed as pt was still making urine. However, discussion of dialysis was made due to pt's acute on chronic kidney disease, which pt declined and palliative care was consulted. Patient was sent home with home hospice. Per chart review, her daughter said that pt has been more edematous and had some dyspnea over the last 4 days and that this morning she checked pt's oxygen saturation with a pulse oximeter at home and it read 85%.  She was scheduled to see nephrology for dialysis discussions, but presented to [facility] due to hypoxic respiratory failure.  In the ED, she had an elevated BNP to 4.6k and CXR revealing pulmonary edema.  She was diuresed with lasix 120 mg IV BID with significant volume removal.  She was seen this AM breathing on room air, but still has some wheezing.  Inquired if she was adherent to home medical therapy, to which she states she was.  Otherwise, notes she previously did not want to undergo dialysis as she has heard numerous stories regarding it and is afraid of dialysis.      Patient with CK D4 to 5 with gradually increasing serum creatinine since at least 2016 from 1.5 to currently 4.5.  She had proteinuria of 1.4 g in the past, with an abnormal free light chain ratio, but urine protein electrophoresis did not  demonstrate a paraprotein or light chain.  Urinary protein creatinine ratio has since been 0.2-.3. She is hepatitis C positive. Patient has been responding well to diuresis with urine output of close to 3000 cc in the last 24 hours.  She is lying in bed asking me to get her ice cream. She is not complaining of chest pain though  conversation is difficult  4/2017 renal ultrasound consistent with chronic medical disease and acquired cysts which appear to be Bosniak 1-2

## 2018-02-17 NOTE — SUBJECTIVE & OBJECTIVE
Past Medical History:   Diagnosis Date    Breast cancer 1980    right    CHF (congestive heart failure)     Chronic hepatitis C virus infection 3/14/2017    Coronary artery disease     Hypertension     Hazel     Renal disorder     Thyroid disease        Past Surgical History:   Procedure Laterality Date    HYSTERECTOMY      MASTECTOMY Right 1980       Review of patient's allergies indicates:   Allergen Reactions    Seroquel [quetiapine]      Causes  profound sedation.     Current Facility-Administered Medications   Medication Frequency    acetaminophen tablet 1,000 mg Q8H PRN    acetaminophen tablet 650 mg Q4H PRN    acetaminophen tablet 650 mg Q8H PRN    albuterol-ipratropium 2.5mg-0.5mg/3mL nebulizer solution 3 mL Q4H PRN    allopurinol split tablet 50 mg Daily    amLODIPine tablet 10 mg Daily    aspirin chewable tablet 81 mg Daily    dextrose 50% injection 12.5 g PRN    dextrose 50% injection 25 g PRN    divalproex EC tablet 250 mg Daily    divalproex EC tablet 500 mg QHS    fluticasone 50 mcg/actuation nasal spray 100 mcg Daily    fluticasone-vilanterol 100-25 mcg/dose diskus inhaler 1 puff Daily    furosemide injection 120 mg BID    glucagon (human recombinant) injection 1 mg PRN    glucose chewable tablet 16 g PRN    glucose chewable tablet 24 g PRN    heparin (porcine) injection 5,000 Units Q8H    levothyroxine tablet 100 mcg Before breakfast    ondansetron disintegrating tablet 8 mg Q8H PRN    ondansetron injection 4 mg Q8H PRN    polyethylene glycol packet 17 g Daily    ramelteon tablet 8 mg Nightly PRN    senna-docusate 8.6-50 mg per tablet 1 tablet Daily    sevelamer carbonate tablet 800 mg BID WM    sodium bicarbonate tablet 1,300 mg TID    sodium chloride 0.9% flush 5 mL PRN    tiotropium inhalation capsule 18 mcg Daily    vitamin renal formula (B-complex-vitamin c-folic acid) 1 mg per capsule 1 capsule Daily     Family History     Problem Relation (Age of Onset)     Heart attack Brother, Maternal Grandmother    Heart disease Mother, Sister    No Known Problems Father, Maternal Aunt, Maternal Uncle, Paternal Aunt, Paternal Uncle, Maternal Grandfather, Paternal Grandmother, Paternal Grandfather        Social History Main Topics    Smoking status: Former Smoker     Packs/day: 2.00     Years: 20.00     Types: Cigarettes     Quit date: 2/16/1995    Smokeless tobacco: Never Used    Alcohol use No      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   HENT: Negative for congestion, facial swelling, hearing loss, nosebleeds and trouble swallowing.    Eyes: Negative for pain, discharge, redness and visual disturbance.   Respiratory: Negative for cough, chest tightness and shortness of breath.    Cardiovascular: Positive for leg swelling. Negative for chest pain and palpitations.   Gastrointestinal: Negative for abdominal distention, abdominal pain, constipation, diarrhea, nausea and vomiting.   Endocrine: Negative for cold intolerance, heat intolerance and polydipsia.   Genitourinary: Negative for decreased urine volume, difficulty urinating, dysuria, flank pain, frequency, hematuria and urgency.   Musculoskeletal: Negative for arthralgias, back pain, joint swelling and myalgias.   Skin: Negative for color change, pallor, rash and wound.   Neurological: Negative for dizziness, tremors, seizures, syncope, speech difficulty, weakness, light-headedness and headaches.   Hematological: Negative for adenopathy. Does not bruise/bleed easily.   Psychiatric/Behavioral: Negative for agitation, behavioral problems, confusion, decreased concentration, dysphoric mood, self-injury and sleep disturbance.     Objective:     Vital Signs (Most Recent):  Temp: 97.5 °F (36.4 °C) (02/17/18 1134)  Pulse: 61 (02/17/18  1135)  Resp: 20 (02/17/18 1134)  BP: (!) 115/59 (02/17/18 1134)  SpO2: (!) 94 % (02/17/18 1134)  O2 Device (Oxygen Therapy): room air (02/17/18 1134) Vital Signs (24h Range):  Temp:  [96.7 °F (35.9 °C)-98.1 °F (36.7 °C)] 97.5 °F (36.4 °C)  Pulse:  [45-97] 61  Resp:  [16-23] 20  SpO2:  [91 %-97 %] 94 %  BP: (115-165)/(59-77) 115/59     Weight: 82.4 kg (181 lb 9.6 oz) (02/16/18 0400)  Body mass index is 29.31 kg/m².  Body surface area is 1.96 meters squared.    I/O last 3 completed shifts:  In: 580 [P.O.:580]  Out: 3775 [Urine:3775]    Physical Exam   Constitutional: She is oriented to person, place, and time. She appears well-developed and well-nourished. No distress.   Obese ,   Ethan mildly dyspneic at a 30° angle when moving   HENT:   Head: Normocephalic and atraumatic.   Mouth/Throat: No oropharyngeal exudate.   Eyes: Conjunctivae and EOM are normal. Pupils are equal, round, and reactive to light. Right eye exhibits no discharge. Left eye exhibits no discharge. No scleral icterus.   Neck: Normal range of motion. Neck supple. No JVD present. No tracheal deviation present. No thyromegaly present.   Cardiovascular: Normal rate, regular rhythm and intact distal pulses.  Exam reveals no gallop.    Murmur heard.  +2 bilateral lower extremity edema   Pulmonary/Chest: No stridor. No respiratory distress. She has rales. She exhibits no tenderness.   Decreased breath sounds at the bases poor excursion, bilateral basilar crackles   Abdominal: Soft. Bowel sounds are normal. She exhibits no distension and no mass. There is no tenderness. There is no rebound and no guarding. No hernia.   Musculoskeletal: Normal range of motion. She exhibits edema (3+). She exhibits no tenderness.   Lymphadenopathy:     She has no cervical adenopathy.   Neurological: She is alert and oriented to person, place, and time. No cranial nerve deficit. She exhibits normal muscle tone.   Skin: Skin is warm and dry. No rash noted. She is not  diaphoretic. No erythema.   Psychiatric: She has a normal mood and affect. Her behavior is normal. Judgment and thought content normal.   Nursing note and vitals reviewed.      Significant Labs:    Recent Results (from the past 24 hour(s))   Comprehensive Metabolic Panel (CMP)    Collection Time: 02/17/18  6:34 AM   Result Value Ref Range    Sodium 142 136 - 145 mmol/L    Potassium 4.4 3.5 - 5.1 mmol/L    Chloride 101 95 - 110 mmol/L    CO2 27 23 - 29 mmol/L    Glucose 76 70 - 110 mg/dL    BUN, Bld 84 (H) 8 - 23 mg/dL    Creatinine 4.5 (H) 0.5 - 1.4 mg/dL    Calcium 9.2 8.7 - 10.5 mg/dL    Total Protein 8.0 6.0 - 8.4 g/dL    Albumin 3.2 (L) 3.5 - 5.2 g/dL    Total Bilirubin 0.8 0.1 - 1.0 mg/dL    Alkaline Phosphatase 89 55 - 135 U/L    AST 27 10 - 40 U/L    ALT 21 10 - 44 U/L    Anion Gap 14 8 - 16 mmol/L    eGFR if African American 10.6 (A) >60 mL/min/1.73 m^2    eGFR if non  9.2 (A) >60 mL/min/1.73 m^2   Magnesium    Collection Time: 02/17/18  6:34 AM   Result Value Ref Range    Magnesium 2.6 1.6 - 2.6 mg/dL   Phosphorus    Collection Time: 02/17/18  6:34 AM   Result Value Ref Range    Phosphorus 4.7 (H) 2.7 - 4.5 mg/dL   CBC with Automated Differential    Collection Time: 02/17/18  6:34 AM   Result Value Ref Range    WBC 3.66 (L) 3.90 - 12.70 K/uL    RBC 2.53 (L) 4.00 - 5.40 M/uL    Hemoglobin 8.0 (L) 12.0 - 16.0 g/dL    Hematocrit 24.4 (L) 37.0 - 48.5 %    MCV 96 82 - 98 fL    MCH 31.6 (H) 27.0 - 31.0 pg    MCHC 32.8 32.0 - 36.0 g/dL    RDW 19.2 (H) 11.5 - 14.5 %    Platelets 137 (L) 150 - 350 K/uL    MPV 11.8 9.2 - 12.9 fL    Immature Granulocytes 0.3 0.0 - 0.5 %    Gran # (ANC) 2.2 1.8 - 7.7 K/uL    Immature Grans (Abs) 0.01 0.00 - 0.04 K/uL    Lymph # 0.9 (L) 1.0 - 4.8 K/uL    Mono # 0.4 0.3 - 1.0 K/uL    Eos # 0.1 0.0 - 0.5 K/uL    Baso # 0.01 0.00 - 0.20 K/uL    nRBC 0 0 /100 WBC    Gran% 60.6 38.0 - 73.0 %    Lymph% 25.1 18.0 - 48.0 %    Mono% 11.2 4.0 - 15.0 %    Eosinophil% 2.5 0.0 - 8.0  "%    Basophil% 0.3 0.0 - 1.9 %    Differential Method Automated          Significant Imaging:    CXR 2/15/2018    Frontal chest radiograph. Patient is slightly rotated. Monitoring leads and tubing overlie the chest. No PICC line identified to correlate with clinical history of "evaluate PICC line placement".  Correlate clinically. No large pneumothorax or definite new focal opacity. Otherwise, grossly stable radiographic appearance of the chest.    Labs and radiology, echo, renal us  reviewed    "

## 2018-02-17 NOTE — ASSESSMENT & PLAN NOTE
Likely cardiorenal   - Given clinical volume overload, will aggressively give diuretics and monitor renal function via I/O + daily CMPs  - No abnormalities on UA  - Cr remains at 4.5, increasing, will monitor closely  - Nephrology consulted for possible HD treatment initiation --> agree w/ continuation of  IV diuretic therapy for diuresis as patient is responding well for now   - further goals of care discussion as patient previously noted not wanting chronic dialysis if optimal medical therapy ineffective in fluid management   - no indication for acute RRT at this time   - daily labs, continue Is and Os   - avoid nephrotoxic medication

## 2018-02-17 NOTE — CONSULTS
Spoke to SANDIP Serrano informed her that \[facility] [place] [facility]\"" is unable to see patient tonight due to high consult volume.  Suggested anesthesia fellow on call for access if needed.

## 2018-02-18 LAB
ALBUMIN SERPL BCP-MCNC: 3.2 G/DL
ALP SERPL-CCNC: 79 U/L
ALT SERPL W/O P-5'-P-CCNC: 19 U/L
ANION GAP SERPL CALC-SCNC: 16 MMOL/L
ANION GAP SERPL CALC-SCNC: 16 MMOL/L
AST SERPL-CCNC: 24 U/L
BASOPHILS # BLD AUTO: 0.01 K/UL
BASOPHILS NFR BLD: 0.3 %
BILIRUB SERPL-MCNC: 0.7 MG/DL
BUN SERPL-MCNC: 79 MG/DL
BUN SERPL-MCNC: 83 MG/DL
CALCIUM SERPL-MCNC: 8.9 MG/DL
CALCIUM SERPL-MCNC: 8.9 MG/DL
CHLORIDE SERPL-SCNC: 95 MMOL/L
CHLORIDE SERPL-SCNC: 98 MMOL/L
CO2 SERPL-SCNC: 28 MMOL/L
CO2 SERPL-SCNC: 29 MMOL/L
CREAT SERPL-MCNC: 4.2 MG/DL
CREAT SERPL-MCNC: 4.3 MG/DL
DIFFERENTIAL METHOD: ABNORMAL
EOSINOPHIL # BLD AUTO: 0.1 K/UL
EOSINOPHIL NFR BLD: 1.5 %
ERYTHROCYTE [DISTWIDTH] IN BLOOD BY AUTOMATED COUNT: 19.3 %
EST. GFR  (AFRICAN AMERICAN): 11.2 ML/MIN/1.73 M^2
EST. GFR  (AFRICAN AMERICAN): 11.5 ML/MIN/1.73 M^2
EST. GFR  (NON AFRICAN AMERICAN): 10 ML/MIN/1.73 M^2
EST. GFR  (NON AFRICAN AMERICAN): 9.7 ML/MIN/1.73 M^2
GLUCOSE SERPL-MCNC: 100 MG/DL
GLUCOSE SERPL-MCNC: 64 MG/DL
HCT VFR BLD AUTO: 23.1 %
HGB BLD-MCNC: 7.7 G/DL
IMM GRANULOCYTES # BLD AUTO: 0.01 K/UL
IMM GRANULOCYTES NFR BLD AUTO: 0.3 %
LYMPHOCYTES # BLD AUTO: 0.8 K/UL
LYMPHOCYTES NFR BLD: 22.6 %
MAGNESIUM SERPL-MCNC: 2.2 MG/DL
MAGNESIUM SERPL-MCNC: 2.6 MG/DL
MCH RBC QN AUTO: 31.7 PG
MCHC RBC AUTO-ENTMCNC: 33.3 G/DL
MCV RBC AUTO: 95 FL
MONOCYTES # BLD AUTO: 0.5 K/UL
MONOCYTES NFR BLD: 14.5 %
NEUTROPHILS # BLD AUTO: 2.1 K/UL
NEUTROPHILS NFR BLD: 60.8 %
NRBC BLD-RTO: 0 /100 WBC
PHOSPHATE SERPL-MCNC: 4.6 MG/DL
PLATELET # BLD AUTO: 137 K/UL
PMV BLD AUTO: 11.7 FL
POTASSIUM SERPL-SCNC: 4 MMOL/L
POTASSIUM SERPL-SCNC: 4.3 MMOL/L
PROT SERPL-MCNC: 7.7 G/DL
RBC # BLD AUTO: 2.43 M/UL
SODIUM SERPL-SCNC: 140 MMOL/L
SODIUM SERPL-SCNC: 142 MMOL/L
WBC # BLD AUTO: 3.37 K/UL

## 2018-02-18 PROCEDURE — 36415 COLL VENOUS BLD VENIPUNCTURE: CPT

## 2018-02-18 PROCEDURE — 94640 AIRWAY INHALATION TREATMENT: CPT

## 2018-02-18 PROCEDURE — 25000003 PHARM REV CODE 250: Performed by: INTERNAL MEDICINE

## 2018-02-18 PROCEDURE — 99232 SBSQ HOSP IP/OBS MODERATE 35: CPT | Mod: ,,, | Performed by: HOSPITALIST

## 2018-02-18 PROCEDURE — 83735 ASSAY OF MAGNESIUM: CPT

## 2018-02-18 PROCEDURE — 25000003 PHARM REV CODE 250: Performed by: STUDENT IN AN ORGANIZED HEALTH CARE EDUCATION/TRAINING PROGRAM

## 2018-02-18 PROCEDURE — 63600175 PHARM REV CODE 636 W HCPCS: Performed by: STUDENT IN AN ORGANIZED HEALTH CARE EDUCATION/TRAINING PROGRAM

## 2018-02-18 PROCEDURE — 80053 COMPREHEN METABOLIC PANEL: CPT

## 2018-02-18 PROCEDURE — 84100 ASSAY OF PHOSPHORUS: CPT

## 2018-02-18 PROCEDURE — 80048 BASIC METABOLIC PNL TOTAL CA: CPT

## 2018-02-18 PROCEDURE — 11000001 HC ACUTE MED/SURG PRIVATE ROOM

## 2018-02-18 PROCEDURE — 83735 ASSAY OF MAGNESIUM: CPT | Mod: 91

## 2018-02-18 PROCEDURE — 25000242 PHARM REV CODE 250 ALT 637 W/ HCPCS: Performed by: INTERNAL MEDICINE

## 2018-02-18 PROCEDURE — 85025 COMPLETE CBC W/AUTO DIFF WBC: CPT

## 2018-02-18 PROCEDURE — 25000242 PHARM REV CODE 250 ALT 637 W/ HCPCS: Performed by: STUDENT IN AN ORGANIZED HEALTH CARE EDUCATION/TRAINING PROGRAM

## 2018-02-18 RX ADMIN — NEPHROCAP 1 CAPSULE: 1 CAP ORAL at 09:02

## 2018-02-18 RX ADMIN — LEVOTHYROXINE SODIUM 100 MCG: 100 TABLET ORAL at 05:02

## 2018-02-18 RX ADMIN — TIOTROPIUM BROMIDE 18 MCG: 18 CAPSULE ORAL; RESPIRATORY (INHALATION) at 09:02

## 2018-02-18 RX ADMIN — SODIUM BICARBONATE 650 MG TABLET 1300 MG: at 05:02

## 2018-02-18 RX ADMIN — FUROSEMIDE 120 MG: 10 INJECTION, SOLUTION INTRAMUSCULAR; INTRAVENOUS at 09:02

## 2018-02-18 RX ADMIN — SEVELAMER CARBONATE 800 MG: 800 TABLET, FILM COATED ORAL at 05:02

## 2018-02-18 RX ADMIN — SODIUM BICARBONATE 650 MG TABLET 1300 MG: at 09:02

## 2018-02-18 RX ADMIN — FUROSEMIDE 120 MG: 10 INJECTION, SOLUTION INTRAMUSCULAR; INTRAVENOUS at 05:02

## 2018-02-18 RX ADMIN — STANDARDIZED SENNA CONCENTRATE AND DOCUSATE SODIUM 1 TABLET: 8.6; 5 TABLET, FILM COATED ORAL at 09:02

## 2018-02-18 RX ADMIN — AMLODIPINE BESYLATE 10 MG: 10 TABLET ORAL at 09:02

## 2018-02-18 RX ADMIN — IPRATROPIUM BROMIDE AND ALBUTEROL SULFATE 3 ML: .5; 3 SOLUTION RESPIRATORY (INHALATION) at 05:02

## 2018-02-18 RX ADMIN — ASPIRIN 81 MG CHEWABLE TABLET 81 MG: 81 TABLET CHEWABLE at 09:02

## 2018-02-18 RX ADMIN — FLUTICASONE FUROATE AND VILANTEROL TRIFENATATE 1 PUFF: 100; 25 POWDER RESPIRATORY (INHALATION) at 09:02

## 2018-02-18 RX ADMIN — DIVALPROEX SODIUM 500 MG: 250 TABLET, DELAYED RELEASE ORAL at 09:02

## 2018-02-18 RX ADMIN — DIVALPROEX SODIUM 250 MG: 250 TABLET, DELAYED RELEASE ORAL at 09:02

## 2018-02-18 RX ADMIN — POLYETHYLENE GLYCOL 3350 17 G: 17 POWDER, FOR SOLUTION ORAL at 09:02

## 2018-02-18 RX ADMIN — METHYLDOPA 50 MG: 500 TABLET ORAL at 09:02

## 2018-02-18 RX ADMIN — FLUTICASONE PROPIONATE 100 MCG: 50 SPRAY, METERED NASAL at 09:02

## 2018-02-18 RX ADMIN — RAMELTEON 8 MG: 8 TABLET, FILM COATED ORAL at 09:02

## 2018-02-18 RX ADMIN — SODIUM BICARBONATE 650 MG TABLET 1300 MG: at 01:02

## 2018-02-18 NOTE — PROGRESS NOTES
[facility] Hospital Medicine  Progress Note    Patient Name: Anai Sal  MRN: [id number]  Patient Class: IP- Inpatient   Admission Date: 2/15/2018  Length of Stay: 3 days  Attending Physician: Nichelle Evans MD  Primary Care Provider: Live Young MD (Inactive)    Hospital Medicine Team: [facility] HOSP MED 1 Shoaib Samuels II, ISAK    Subjective:     Principal Problem:Acute combined systolic and diastolic heart failure    HPI:  72 F with HTN, HFrEF 30%, severe mitral regurgitation, CKD IV vs CKD V, Hep C, Breast ca s/p mastectomy, dementia brought in from home by daughter today for hypoxia on pulse oximetry - 85% on room air in AM. Pt was recently admitted at [facility] from 01/30 to 02/06 for CHF exacerbation (at that time pt had SOB). Pt was diuresed and resp status improved; at that time nephrology was consulted who suggested no RRT needed as pt was still making urine. However, discussion of dialysis was made due to pt's acute on chronic kidney disease, which pt declined and palliative care was consulted. Pt was sent home with home hospice. Pt's daughter says that pt has been more edematous and had some dyspnea over the last 4 days and that this morning she checked pt's oxygen saturation with a pulse oximeter at home and it read 85%. Pt was supposed to have a meeting with Dr. Benoit, her nephrologist, but due to her hypoxia pt's daughter brought her to the hospital.    In the ED BNP was 4.6K, pt was satting >95% on 2L NC with no SOB. Pt was given 80mg IV furosemide and admitted to Hospital Medicine for management of CHF exacerbation.    Hospital Course:  02/16/2018 Pt w/ improvement in her breathing and saturating well on Ra. With adequate U/O w/ IV lasix for her kidney function and improved LE edema w/ diuresis. Awaiting daughter's arrival for plan of care/goals of care discussion this afternoon and appropriate discharge planning.  02/17 Patient with improving LE edema and good U/O. Will  continue to diurese w/ IV lasix as patient still hypervolemic on exam. Nephrology consulted for evaluation and discussion of Hd. Patient w/ NAEON  02/18/2018 NAEON VSSA. No indications for acute dialysis. Continuing to diurese well with IV lasix. Breathing improved but still volume overloaded on physical exam. Labs stable.     Interval History: NAEON VSSA. No indications for acute dialysis. Continuing to diurese well with IV lasix. Breathing improved but still volume overloaded on physical exam. Labs stable.   Review of Systems   Constitutional: Negative for unexpected weight change.   HENT: Negative for congestion.    Eyes: Negative for visual disturbance.   Respiratory: Negative for choking, chest tightness, shortness of breath and wheezing.    Cardiovascular: Positive for leg swelling (improved from admission). Negative for chest pain.   Gastrointestinal: Negative for abdominal pain.   Musculoskeletal: Negative for arthralgias.   Skin: Negative for wound.   Neurological: Negative for headaches.   Psychiatric/Behavioral: Negative for sleep disturbance.     Objective:     Vital Signs (Most Recent):  Temp: 98.1 °F (36.7 °C) (02/18/18 1224)  Pulse: 62 (02/18/18 1224)  Resp: 18 (02/18/18 0930)  BP: (!) 144/65 (02/18/18 1224)  SpO2: 99 % (02/18/18 1224) Vital Signs (24h Range):  Temp:  [97.4 °F (36.3 °C)-98.1 °F (36.7 °C)] 98.1 °F (36.7 °C)  Pulse:  [51-63] 62  Resp:  [18-20] 18  SpO2:  [94 %-99 %] 99 %  BP: (135-158)/(65-92) 144/65     Weight: 81 kg (178 lb 9.6 oz)  Body mass index is 28.83 kg/m².    Physical Exam   Constitutional: She appears well-developed.   HENT:   Head: Normocephalic and atraumatic.   Right Ear: External ear normal.   Left Ear: External ear normal.   Nose: Nose normal.   Eyes: EOM are normal. Pupils are equal, round, and reactive to light.   Neck: No tracheal deviation present. No thyromegaly present.   Cardiovascular: Normal rate.    Murmur heard.  Pulmonary/Chest: Effort normal. No respiratory  distress. She has wheezes (Minimal end-exp wheeze). She has no rales.   B/l basilar crackles noted on exam   Abdominal: Soft. There is no tenderness. No hernia.   Musculoskeletal: She exhibits edema.   2+ b/l LE edema, ascending up to the knees   Neurological: She displays normal reflexes. No cranial nerve deficit.   Skin: No rash noted.   Psychiatric: She has a normal mood and affect. Judgment normal.   Vitals reviewed.        CRANIAL NERVES     CN III, IV, VI   Pupils are equal, round, and reactive to light.  Extraocular motions are normal.        Significant Labs:   CBC:     Recent Labs  Lab 02/17/18  0634 02/18/18  0509   WBC 3.66* 3.37*   HGB 8.0* 7.7*   HCT 24.4* 23.1*   * 137*     CMP:     Recent Labs  Lab 02/17/18  0634 02/18/18  0509    142   K 4.4 4.0    98   CO2 27 28   GLU 76 64*   BUN 84* 83*   CREATININE 4.5* 4.2*   CALCIUM 9.2 8.9   PROT 8.0 7.7   ALBUMIN 3.2* 3.2*   BILITOT 0.8 0.7   ALKPHOS 89 79   AST 27 24   ALT 21 19   ANIONGAP 14 16   EGFRNONAA 9.2* 10.0*     Cardiac Markers:   No results for input(s): CKMB, MYOGLOBIN, BNP, TROPISTAT in the last 48 hours.  Magnesium:     Recent Labs  Lab 02/17/18  0634 02/18/18  0509   MG 2.6 2.6       Significant Imaging: I have reviewed all pertinent imaging results/findings within the past 24 hours.    Assessment/Plan:      * Acute combined systolic and diastolic heart failure    CHFrEF 30% + diastolic dysfunction with right-sided heart failure  - Pt grossly edematous with pitting edema to mid-thigh on admit; however pt's lungs sound clear and she denies having dyspnea  - Given likely fluid overload will diurese with 120 mg lasix IV BID, decreasing doses as appropriate or adding other agents to gauge response, suspect cardiorenal component  - Will continue to hold BB in setting of bradycardia, bidil in setting of ADCHF, will not start ACEI at this time given CANDI  - Pt w/ improved LE pitting edema and adequate U/O for her renal function.  Will continue w/ current diuresis schedule and monitor electrolytes closely         Counseling regarding advanced care planning and goals of care    Pt was previously discharged home with hospice; however, it is unclear whether pt is truly ESRD vs CANDI on CKD and whether she would need to be dialyzed (palliative care was consulted and hospice discussion was initiated due to pt's desire to not be on chronic dialysis)  - Will discuss this with daughter and discuss code status again  - Currently they would like to explore the possibility of HD as this was not discussed extensively with the pt and daughter on previous admission   -Patient does not meet clinical indications for HD at this time.   - Will continue to abide by the pt's and daughters wishes and work w/ the family to provide most appropriate plan of care for the patient         Major neurocognitive disorder    Pt's family friend at bedside (daughter currently not present) stated that pt had been declining in mental status for the past 6-8 months  Daughter appears to be the main caretaker for the patient and is very involved in her treatment plan          Acute renal failure superimposed on stage 4 chronic kidney disease    Likely cardiorenal   - Given clinical volume overload, will aggressively give diuretics and monitor renal function via I/O + daily CMPs  - No abnormalities on UA  - Cr remains at 4.5, increasing, will monitor closely  - Nephrology consulted for possible HD treatment initiation --> agree w/ continuation of  IV diuretic therapy for diuresis as patient is responding well for now   - further goals of care discussion as patient previously noted not wanting chronic dialysis if optimal medical therapy ineffective in fluid management   - no indication for acute RRT at this time   - daily labs, continue Is and Os   - avoid nephrotoxic medication         Chronic gout    - Cont allopurinol at home dose   - No pain complaints at this time          Essential hypertension    - Continue amlodipine, hold toprol in bradycardia  - SBP labile from 110s-150s           VTE Risk Mitigation         Ordered     High Risk of VTE  Once      02/16/18 1125     heparin (porcine) injection 5,000 Units  Every 8 hours     Route:  Subcutaneous        02/15/18 1447              ISAK Zavala II  Department of Hospital Medicine   [facility]                    02/19/2018                             STAFF PHYSICIAN NOTE                                   Attending Attestation for Rounds with Resident  I have reviewed and concur with the resident's history, physical, assessment, and plan.  I have personally interviewed and examined the patient at bedside and agree with the resident's findings.                                  ________________________________________                                     REASON FOR ADMISSION:     Patient is 72 y.o.female    Body mass index is 28.44 kg/m².,  Acute combined systolic and diastolic heart failure

## 2018-02-18 NOTE — ASSESSMENT & PLAN NOTE
Pt was previously discharged home with hospice; however, it is unclear whether pt is truly ESRD vs CANDI on CKD and whether she would need to be dialyzed (palliative care was consulted and hospice discussion was initiated due to pt's desire to not be on chronic dialysis)  - Will discuss this with daughter and discuss code status again  - Currently they would like to explore the possibility of HD as this was not discussed extensively with the pt and daughter on previous admission   -Patient does not meet clinical indications for HD at this time.   - Will continue to abide by the pt's and daughters wishes and work w/ the family to provide most appropriate plan of care for the patient

## 2018-02-18 NOTE — SUBJECTIVE & OBJECTIVE
Interval History: NAEON VSSA. No indications for acute dialysis. Continuing to diurese well with IV lasix. Breathing improved but still volume overloaded on physical exam. Labs stable.   Review of Systems   Constitutional: Negative for unexpected weight change.   HENT: Negative for congestion.    Eyes: Negative for visual disturbance.   Respiratory: Negative for choking, chest tightness, shortness of breath and wheezing.    Cardiovascular: Positive for leg swelling (improved from admission). Negative for chest pain.   Gastrointestinal: Negative for abdominal pain.   Musculoskeletal: Negative for arthralgias.   Skin: Negative for wound.   Neurological: Negative for headaches.   Psychiatric/Behavioral: Negative for sleep disturbance.     Objective:     Vital Signs (Most Recent):  Temp: 98.1 °F (36.7 °C) (02/18/18 1224)  Pulse: 62 (02/18/18 1224)  Resp: 18 (02/18/18 0930)  BP: (!) 144/65 (02/18/18 1224)  SpO2: 99 % (02/18/18 1224) Vital Signs (24h Range):  Temp:  [97.4 °F (36.3 °C)-98.1 °F (36.7 °C)] 98.1 °F (36.7 °C)  Pulse:  [51-63] 62  Resp:  [18-20] 18  SpO2:  [94 %-99 %] 99 %  BP: (135-158)/(65-92) 144/65     Weight: 81 kg (178 lb 9.6 oz)  Body mass index is 28.83 kg/m².    Physical Exam   Constitutional: She appears well-developed.   HENT:   Head: Normocephalic and atraumatic.   Right Ear: External ear normal.   Left Ear: External ear normal.   Nose: Nose normal.   Eyes: EOM are normal. Pupils are equal, round, and reactive to light.   Neck: No tracheal deviation present. No thyromegaly present.   Cardiovascular: Normal rate.    Murmur heard.  Pulmonary/Chest: Effort normal. No respiratory distress. She has wheezes (Minimal end-exp wheeze). She has no rales.   B/l basilar crackles noted on exam   Abdominal: Soft. There is no tenderness. No hernia.   Musculoskeletal: She exhibits edema.   2+ b/l LE edema, ascending up to the knees   Neurological: She displays normal reflexes. No cranial nerve deficit.   Skin: No  rash noted.   Psychiatric: She has a normal mood and affect. Judgment normal.   Vitals reviewed.        CRANIAL NERVES     CN III, IV, VI   Pupils are equal, round, and reactive to light.  Extraocular motions are normal.        Significant Labs:   CBC:     Recent Labs  Lab 02/17/18  0634 02/18/18  0509   WBC 3.66* 3.37*   HGB 8.0* 7.7*   HCT 24.4* 23.1*   * 137*     CMP:     Recent Labs  Lab 02/17/18  0634 02/18/18  0509    142   K 4.4 4.0    98   CO2 27 28   GLU 76 64*   BUN 84* 83*   CREATININE 4.5* 4.2*   CALCIUM 9.2 8.9   PROT 8.0 7.7   ALBUMIN 3.2* 3.2*   BILITOT 0.8 0.7   ALKPHOS 89 79   AST 27 24   ALT 21 19   ANIONGAP 14 16   EGFRNONAA 9.2* 10.0*     Cardiac Markers:   No results for input(s): CKMB, MYOGLOBIN, BNP, TROPISTAT in the last 48 hours.  Magnesium:     Recent Labs  Lab 02/17/18  0634 02/18/18  0509   MG 2.6 2.6       Significant Imaging: I have reviewed all pertinent imaging results/findings within the past 24 hours.

## 2018-02-19 LAB
ALBUMIN SERPL BCP-MCNC: 3 G/DL
ALP SERPL-CCNC: 73 U/L
ALT SERPL W/O P-5'-P-CCNC: 17 U/L
ANION GAP SERPL CALC-SCNC: 15 MMOL/L
AST SERPL-CCNC: 26 U/L
BASOPHILS # BLD AUTO: 0 K/UL
BASOPHILS NFR BLD: 0 %
BILIRUB SERPL-MCNC: 0.7 MG/DL
BUN SERPL-MCNC: 80 MG/DL
CALCIUM SERPL-MCNC: 8.8 MG/DL
CHLORIDE SERPL-SCNC: 96 MMOL/L
CO2 SERPL-SCNC: 29 MMOL/L
CREAT SERPL-MCNC: 4.2 MG/DL
DIFFERENTIAL METHOD: ABNORMAL
EOSINOPHIL # BLD AUTO: 0.1 K/UL
EOSINOPHIL NFR BLD: 1.9 %
ERYTHROCYTE [DISTWIDTH] IN BLOOD BY AUTOMATED COUNT: 19.1 %
EST. GFR  (AFRICAN AMERICAN): 11.5 ML/MIN/1.73 M^2
EST. GFR  (NON AFRICAN AMERICAN): 10 ML/MIN/1.73 M^2
GLUCOSE SERPL-MCNC: 58 MG/DL
HCT VFR BLD AUTO: 23.4 %
HGB BLD-MCNC: 7.6 G/DL
IMM GRANULOCYTES # BLD AUTO: 0.01 K/UL
IMM GRANULOCYTES NFR BLD AUTO: 0.3 %
LYMPHOCYTES # BLD AUTO: 0.8 K/UL
LYMPHOCYTES NFR BLD: 25.6 %
MAGNESIUM SERPL-MCNC: 2.3 MG/DL
MCH RBC QN AUTO: 30.4 PG
MCHC RBC AUTO-ENTMCNC: 32.5 G/DL
MCV RBC AUTO: 94 FL
MONOCYTES # BLD AUTO: 0.4 K/UL
MONOCYTES NFR BLD: 13.6 %
NEUTROPHILS # BLD AUTO: 1.9 K/UL
NEUTROPHILS NFR BLD: 58.6 %
NRBC BLD-RTO: 0 /100 WBC
PHOSPHATE SERPL-MCNC: 4.4 MG/DL
PLATELET # BLD AUTO: 151 K/UL
PMV BLD AUTO: 11.8 FL
POTASSIUM SERPL-SCNC: 4.5 MMOL/L
PROT SERPL-MCNC: 7.3 G/DL
RBC # BLD AUTO: 2.5 M/UL
SODIUM SERPL-SCNC: 140 MMOL/L
WBC # BLD AUTO: 3.16 K/UL

## 2018-02-19 PROCEDURE — 83735 ASSAY OF MAGNESIUM: CPT

## 2018-02-19 PROCEDURE — 11000001 HC ACUTE MED/SURG PRIVATE ROOM

## 2018-02-19 PROCEDURE — 25000003 PHARM REV CODE 250: Performed by: STUDENT IN AN ORGANIZED HEALTH CARE EDUCATION/TRAINING PROGRAM

## 2018-02-19 PROCEDURE — 25000242 PHARM REV CODE 250 ALT 637 W/ HCPCS: Performed by: STUDENT IN AN ORGANIZED HEALTH CARE EDUCATION/TRAINING PROGRAM

## 2018-02-19 PROCEDURE — 63600175 PHARM REV CODE 636 W HCPCS: Performed by: STUDENT IN AN ORGANIZED HEALTH CARE EDUCATION/TRAINING PROGRAM

## 2018-02-19 PROCEDURE — 99232 SBSQ HOSP IP/OBS MODERATE 35: CPT | Mod: GC,,, | Performed by: INTERNAL MEDICINE

## 2018-02-19 PROCEDURE — 36415 COLL VENOUS BLD VENIPUNCTURE: CPT

## 2018-02-19 PROCEDURE — 25000003 PHARM REV CODE 250: Performed by: INTERNAL MEDICINE

## 2018-02-19 PROCEDURE — 84100 ASSAY OF PHOSPHORUS: CPT

## 2018-02-19 PROCEDURE — 85025 COMPLETE CBC W/AUTO DIFF WBC: CPT

## 2018-02-19 PROCEDURE — 99233 SBSQ HOSP IP/OBS HIGH 50: CPT | Mod: ,,, | Performed by: HOSPITALIST

## 2018-02-19 PROCEDURE — 80053 COMPREHEN METABOLIC PANEL: CPT

## 2018-02-19 RX ORDER — ISOSORBIDE DINITRATE 10 MG/1
10 TABLET ORAL 3 TIMES DAILY
Status: DISCONTINUED | OUTPATIENT
Start: 2018-02-19 | End: 2018-02-21 | Stop reason: HOSPADM

## 2018-02-19 RX ORDER — HYDRALAZINE HYDROCHLORIDE 10 MG/1
10 TABLET, FILM COATED ORAL EVERY 12 HOURS
Status: DISCONTINUED | OUTPATIENT
Start: 2018-02-19 | End: 2018-02-21 | Stop reason: HOSPADM

## 2018-02-19 RX ORDER — BUMETANIDE 1 MG/1
3 TABLET ORAL 2 TIMES DAILY
Status: DISCONTINUED | OUTPATIENT
Start: 2018-02-19 | End: 2018-02-21 | Stop reason: HOSPADM

## 2018-02-19 RX ADMIN — ASPIRIN 81 MG CHEWABLE TABLET 81 MG: 81 TABLET CHEWABLE at 10:02

## 2018-02-19 RX ADMIN — SODIUM BICARBONATE 650 MG TABLET 1300 MG: at 05:02

## 2018-02-19 RX ADMIN — SODIUM BICARBONATE 650 MG TABLET 1300 MG: at 09:02

## 2018-02-19 RX ADMIN — STANDARDIZED SENNA CONCENTRATE AND DOCUSATE SODIUM 1 TABLET: 8.6; 5 TABLET, FILM COATED ORAL at 10:02

## 2018-02-19 RX ADMIN — DIVALPROEX SODIUM 500 MG: 250 TABLET, DELAYED RELEASE ORAL at 09:02

## 2018-02-19 RX ADMIN — SODIUM BICARBONATE 650 MG TABLET 1300 MG: at 03:02

## 2018-02-19 RX ADMIN — METHYLDOPA 50 MG: 500 TABLET ORAL at 10:02

## 2018-02-19 RX ADMIN — ISOSORBIDE DINITRATE 10 MG: 10 TABLET ORAL at 09:02

## 2018-02-19 RX ADMIN — FUROSEMIDE 120 MG: 10 INJECTION, SOLUTION INTRAMUSCULAR; INTRAVENOUS at 10:02

## 2018-02-19 RX ADMIN — TIOTROPIUM BROMIDE 18 MCG: 18 CAPSULE ORAL; RESPIRATORY (INHALATION) at 10:02

## 2018-02-19 RX ADMIN — BUMETANIDE 3 MG: 1 TABLET ORAL at 09:02

## 2018-02-19 RX ADMIN — HYDRALAZINE HYDROCHLORIDE 10 MG: 10 TABLET, FILM COATED ORAL at 12:02

## 2018-02-19 RX ADMIN — HYDRALAZINE HYDROCHLORIDE 10 MG: 10 TABLET, FILM COATED ORAL at 09:02

## 2018-02-19 RX ADMIN — FLUTICASONE PROPIONATE 100 MCG: 50 SPRAY, METERED NASAL at 10:02

## 2018-02-19 RX ADMIN — FLUTICASONE FUROATE AND VILANTEROL TRIFENATATE 1 PUFF: 100; 25 POWDER RESPIRATORY (INHALATION) at 10:02

## 2018-02-19 RX ADMIN — SEVELAMER CARBONATE 800 MG: 800 TABLET, FILM COATED ORAL at 05:02

## 2018-02-19 RX ADMIN — DIVALPROEX SODIUM 250 MG: 250 TABLET, DELAYED RELEASE ORAL at 10:02

## 2018-02-19 RX ADMIN — POLYETHYLENE GLYCOL 3350 17 G: 17 POWDER, FOR SOLUTION ORAL at 10:02

## 2018-02-19 RX ADMIN — NEPHROCAP 1 CAPSULE: 1 CAP ORAL at 10:02

## 2018-02-19 RX ADMIN — LEVOTHYROXINE SODIUM 100 MCG: 100 TABLET ORAL at 05:02

## 2018-02-19 RX ADMIN — ISOSORBIDE DINITRATE 10 MG: 10 TABLET ORAL at 03:02

## 2018-02-19 NOTE — NURSING
Pt had morning labs drawn. Pt bled through gauze dressing placed by . Pressure applied. Bleeding controlled. Med team 1 notified. Advised to hold heparin at this time. Will continue to monitor.

## 2018-02-19 NOTE — ASSESSMENT & PLAN NOTE
CHFrEF 30% + diastolic dysfunction with right-sided heart failure  - Pt grossly edematous with pitting edema to mid-thigh on admit; however pt's lungs sound clear and she denies having dyspnea  - Given likely fluid overload will diurese with 120 mg lasix IV BID, decreasing doses as appropriate or adding other agents to gauge response, suspect cardiorenal component  - Will continue to hold BB in setting of bradycardia,  will not start ACEI at this time given CANDI  - Pt w/ improved LE pitting edema and adequate U/O for her renal function. Will continue w/ current diuresis schedule and monitor electrolytes closely   - Will transition from IV Lasix 120 mg BID to 3 mg Bumex BID (which would be a roughly equivalent PO dose dose) and monitor how patients responds  - Will d/c amlodipine at this time and will continue to hold BB as patient continues to be bradycardic  - Will start Hydralazine 10 mg BID and Isodil 10 mg TID, as patient was on Bidil at home; will monitor how patient responds and up-titrate to home dose as needed

## 2018-02-19 NOTE — SUBJECTIVE & OBJECTIVE
Interval History: NAEON. Pt continues to improve w/ high dose IV lasix and fluid restrictions; unclear how accurate the intake is measures, but patient is at least 2L net negative. Improved on exam as well and reporting that she is feeling significantly better. Will transition to PO medication today and observe for continuous diuresis. No other complaints today,   Minor bleeding from the phlebotomy site noted this AM by nursing staff - no visible blood loss noted on the exam this morning.     Review of Systems   Constitutional: Negative for unexpected weight change.   HENT: Negative for congestion.    Eyes: Negative for visual disturbance.   Respiratory: Negative for choking, chest tightness, shortness of breath and wheezing.    Cardiovascular: Positive for leg swelling (improved from admission). Negative for chest pain.   Gastrointestinal: Negative for abdominal pain.   Musculoskeletal: Negative for arthralgias.   Skin: Negative for wound.   Neurological: Negative for headaches.   Psychiatric/Behavioral: Negative for sleep disturbance.     Objective:     Vital Signs (Most Recent):  Temp: 97.7 °F (36.5 °C) (02/19/18 1120)  Pulse: 61 (02/19/18 1121)  Resp: 17 (02/19/18 1120)  BP: (!) 164/72 (02/19/18 1120)  SpO2: 96 % (02/19/18 1120) Vital Signs (24h Range):  Temp:  [97.7 °F (36.5 °C)-98.5 °F (36.9 °C)] 97.7 °F (36.5 °C)  Pulse:  [54-89] 61  Resp:  [17-20] 17  SpO2:  [94 %-98 %] 96 %  BP: (133-173)/(72-86) 164/72     Weight: 79.9 kg (176 lb 3.2 oz)  Body mass index is 28.44 kg/m².    Physical Exam   Constitutional: She appears well-developed.   HENT:   Head: Normocephalic and atraumatic.   Right Ear: External ear normal.   Left Ear: External ear normal.   Nose: Nose normal.   Eyes: EOM are normal. Pupils are equal, round, and reactive to light.   Neck: No tracheal deviation present. No thyromegaly present.   Cardiovascular: Normal rate.    Murmur heard.  Pulmonary/Chest: Effort normal. No respiratory distress. She  has no wheezes. She has no rales.   No crackles noted this AM   Abdominal: Soft. There is no tenderness. No hernia.   Musculoskeletal: She exhibits edema.   2+ b/l LE edema, ascending to mid calf, non-tender    Neurological: She displays normal reflexes. No cranial nerve deficit.   Skin: No rash noted.   Psychiatric: She has a normal mood and affect. Judgment normal.   Vitals reviewed.        CRANIAL NERVES     CN III, IV, VI   Pupils are equal, round, and reactive to light.  Extraocular motions are normal.        Significant Labs:   CBC:     Recent Labs  Lab 02/18/18 0509 02/19/18  0401   WBC 3.37* 3.16*   HGB 7.7* 7.6*   HCT 23.1* 23.4*   * 151     CMP:     Recent Labs  Lab 02/18/18 0509 02/18/18 1911 02/19/18  0401    140 140   K 4.0 4.3 4.5   CL 98 95 96   CO2 28 29 29   GLU 64* 100 58*   BUN 83* 79* 80*   CREATININE 4.2* 4.3* 4.2*   CALCIUM 8.9 8.9 8.8   PROT 7.7  --  7.3   ALBUMIN 3.2*  --  3.0*   BILITOT 0.7  --  0.7   ALKPHOS 79  --  73   AST 24  --  26   ALT 19  --  17   ANIONGAP 16 16 15   EGFRNONAA 10.0* 9.7* 10.0*     Cardiac Markers:   No results for input(s): CKMB, MYOGLOBIN, BNP, TROPISTAT in the last 48 hours.  Magnesium:     Recent Labs  Lab 02/18/18 0509 02/18/18 1911 02/19/18  0401   MG 2.6 2.2 2.3       Significant Imaging: I have reviewed all pertinent imaging results/findings within the past 24 hours.

## 2018-02-19 NOTE — ASSESSMENT & PLAN NOTE
"Anai Sal is a 72 y.o. lady with CKD 4 and combined systolic and diastolic heart failure who was on home hospice who [place] [facility] [place] acute hypoxic respiratory failure, secondary to decompensated heart failure.  Patient has numerous risk factors regarding potential for development of CKD, including HTN. Nephrology was consulted regarding dialysis consideration given recent admissions, fluid overloaded state, and failure of fluid control with medical therapy.  Overall, difficult to ascertain full reason for medical therapy as patient not accurate historian due to dementia.  Otherwise, patient inquired regarding dialysis options, of which she states "she has been praying on it to make her kidneys better."  She also notes that she is still able to make urine.  Thus far, she is net negative 3.5L with BUN/Cr of 84/4.5, mildly worse since admission.  Improvement of BUN/Cr with diuretic therapy.      Plan  - patient and family to continue to assess possibility of dialysis.  Thus far, patient can continue medical management of decompensated heart failure with diuretic therapy.    - recommend decreasing IV diuretic therapy today.  Can initiate PO Bumex tomorrow at home dosing, or increased per primary team   - no indication for acute RRT at this time  - daily labs, continue Is and Os  - avoid nephrotoxic medication   "

## 2018-02-19 NOTE — PROGRESS NOTES
[facility]  Nephrology  Progress Note    Patient Name: Anai Sal  MRN: [id number]  Admission Date: 2/15/2018  Hospital Length of Stay: 4 days  Attending Provider: Nichelle Evans MD   Primary Care Physician: Live Young MD (Inactive)  Principal Problem:Acute combined systolic and diastolic heart failure    Subjective:     HPI: CC: AK I on CK D4, with recurrent pulmonary edema.  Nephrology is consulted regarding AK I, renal function, volume overload.     Anai Sal is a 72 y.o. HFrEF 30%, severe mitral regurgitation, CKD IV/V, HTN,  Hep C, Breast ca s/p mastectomy, dementia brought in from home by daughter today for hypoxia on pulse oximetry - 85% on room air in AM.  Nephrology was consulted regarding dialysis discussions in the setting of CKD 4/5 and poor volume control with oral diuretic therapy.  She was recently admitted at [facility] from 01/30 to 02/06 for CHF exacerbation (at that time pt had SOB). Pt was diuresed and resp status improved; at that time nephrology was consulted who suggested no RRT needed as pt was still making urine. However, discussion of dialysis was made due to pt's acute on chronic kidney disease, which pt declined and palliative care was consulted. Patient was sent home with home hospice. Per chart review, her daughter said that pt has been more edematous and had some dyspnea over the last 4 days and that this morning she checked pt's oxygen saturation with a pulse oximeter at home and it read 85%.  She was scheduled to see nephrology for dialysis discussions, but presented to [facility] [place] due to hypoxic respiratory failure.  In the ED, she had an elevated BNP to 4.6k and CXR revealing pulmonary edema.  She was diuresed with lasix 120 mg IV BID with significant volume removal.  She was seen this AM breathing on room air, but still has some wheezing.  Inquired if she was adherent to home medical therapy, to which she states she was.  Otherwise, notes she previously  did not want to undergo dialysis as she has heard numerous stories regarding it and is afraid of dialysis.      Patient with CK D4 to 5 with gradually increasing serum creatinine since at least 2016 from 1.5 to currently 4.5.  She had proteinuria of 1.4 g in the past, with an abnormal free light chain ratio, but urine protein electrophoresis did not demonstrate a paraprotein or light chain.  Urinary protein creatinine ratio has since been 0.2-.3. She is hepatitis C positive. Patient has been responding well to diuresis with urine output of close to 3000 cc in the last 24 hours.  She is lying in bed asking me to get her ice cream. She is not complaining of chest pain though  conversation is difficult  4/2017 renal ultrasound consistent with chronic medical disease and acquired cysts which appear to be Bosniak 1-2        Interval History: Patient seen in room, doing well.  Diuresed approximately 3 L on current therapy.  Mild improvement in Cr from 4.3 to 4.2 since yesterday.  Notes not having wheeze anymore.  Previous weights of 175 lbs, approaching such.      Review of patient's allergies indicates:   Allergen Reactions    Seroquel [quetiapine]      Causes  profound sedation.     Current Facility-Administered Medications   Medication Frequency    acetaminophen tablet 1,000 mg Q8H PRN    acetaminophen tablet 650 mg Q4H PRN    albuterol-ipratropium 2.5mg-0.5mg/3mL nebulizer solution 3 mL Q4H PRN    allopurinol split tablet 50 mg Daily    aspirin chewable tablet 81 mg Daily    bumetanide tablet 3 mg BID    dextrose 50% injection 12.5 g PRN    dextrose 50% injection 25 g PRN    divalproex EC tablet 250 mg Daily    divalproex EC tablet 500 mg QHS    fluticasone 50 mcg/actuation nasal spray 100 mcg Daily    fluticasone-vilanterol 100-25 mcg/dose diskus inhaler 1 puff Daily    glucagon (human recombinant) injection 1 mg PRN    glucose chewable tablet 16 g PRN    glucose chewable tablet 24 g PRN    heparin  (porcine) injection 5,000 Units Q8H    hydrALAZINE tablet 10 mg Q12H    isosorbide dinitrate tablet 10 mg TID    levothyroxine tablet 100 mcg Before breakfast    ondansetron disintegrating tablet 8 mg Q8H PRN    ondansetron injection 4 mg Q8H PRN    polyethylene glycol packet 17 g Daily    ramelteon tablet 8 mg Nightly PRN    senna-docusate 8.6-50 mg per tablet 1 tablet Daily    sevelamer carbonate tablet 800 mg BID WM    sodium bicarbonate tablet 1,300 mg TID    sodium chloride 0.9% flush 5 mL PRN    tiotropium inhalation capsule 18 mcg Daily    vitamin renal formula (B-complex-vitamin c-folic acid) 1 mg per capsule 1 capsule Daily       Objective:     Vital Signs (Most Recent):  Temp: 97.7 °F (36.5 °C) (02/19/18 0820)  Pulse: 89 (02/19/18 1000)  Resp: 20 (02/19/18 1000)  BP: 133/86 (02/19/18 0820)  SpO2: (!) 94 % (02/19/18 0820)  O2 Device (Oxygen Therapy): room air (02/19/18 0820) Vital Signs (24h Range):  Temp:  [97.7 °F (36.5 °C)-98.5 °F (36.9 °C)] 97.7 °F (36.5 °C)  Pulse:  [54-89] 89  Resp:  [18-20] 20  SpO2:  [94 %-99 %] 94 %  BP: (133-173)/(65-86) 133/86     Weight: 79.9 kg (176 lb 3.2 oz) (02/19/18 0400)  Body mass index is 28.44 kg/m².  Body surface area is 1.93 meters squared.    I/O last 3 completed shifts:  In: 60 [P.O.:60]  Out: 4100 [Urine:4100]    Physical Exam   Constitutional: She is oriented to person, place, and time. She appears well-developed and well-nourished. No distress.   Obese   HENT:   Head: Normocephalic and atraumatic.   Neck: Normal range of motion. Neck supple. No JVD present. No tracheal deviation present. No thyromegaly present.   Cardiovascular: Normal rate, regular rhythm and intact distal pulses.  Exam reveals no gallop.    Murmur heard.  +2 bilateral lower extremity edema   Pulmonary/Chest: No stridor. No respiratory distress. She has rales. She exhibits no tenderness.   Decreased breath sounds at the bases poor excursion, bilateral basilar crackles   Abdominal:  Soft. Bowel sounds are normal. She exhibits no distension and no mass. There is no tenderness. There is no rebound and no guarding. No hernia.   Musculoskeletal: Normal range of motion. She exhibits edema (3+). She exhibits no tenderness.   Lymphadenopathy:     She has no cervical adenopathy.   Neurological: She is alert and oriented to person, place, and time. No cranial nerve deficit. She exhibits normal muscle tone.   Skin: Skin is warm and dry. No rash noted. She is not diaphoretic. No erythema.       Significant Labs:    Recent Results (from the past 24 hour(s))   Basic metabolic panel    Collection Time: 02/18/18  7:11 PM   Result Value Ref Range    Sodium 140 136 - 145 mmol/L    Potassium 4.3 3.5 - 5.1 mmol/L    Chloride 95 95 - 110 mmol/L    CO2 29 23 - 29 mmol/L    Glucose 100 70 - 110 mg/dL    BUN, Bld 79 (H) 8 - 23 mg/dL    Creatinine 4.3 (H) 0.5 - 1.4 mg/dL    Calcium 8.9 8.7 - 10.5 mg/dL    Anion Gap 16 8 - 16 mmol/L    eGFR if African American 11.2 (A) >60 mL/min/1.73 m^2    eGFR if non  9.7 (A) >60 mL/min/1.73 m^2   Magnesium    Collection Time: 02/18/18  7:11 PM   Result Value Ref Range    Magnesium 2.2 1.6 - 2.6 mg/dL   Comprehensive Metabolic Panel (CMP)    Collection Time: 02/19/18  4:01 AM   Result Value Ref Range    Sodium 140 136 - 145 mmol/L    Potassium 4.5 3.5 - 5.1 mmol/L    Chloride 96 95 - 110 mmol/L    CO2 29 23 - 29 mmol/L    Glucose 58 (L) 70 - 110 mg/dL    BUN, Bld 80 (H) 8 - 23 mg/dL    Creatinine 4.2 (H) 0.5 - 1.4 mg/dL    Calcium 8.8 8.7 - 10.5 mg/dL    Total Protein 7.3 6.0 - 8.4 g/dL    Albumin 3.0 (L) 3.5 - 5.2 g/dL    Total Bilirubin 0.7 0.1 - 1.0 mg/dL    Alkaline Phosphatase 73 55 - 135 U/L    AST 26 10 - 40 U/L    ALT 17 10 - 44 U/L    Anion Gap 15 8 - 16 mmol/L    eGFR if African American 11.5 (A) >60 mL/min/1.73 m^2    eGFR if non African American 10.0 (A) >60 mL/min/1.73 m^2   Magnesium    Collection Time: 02/19/18  4:01 AM   Result Value Ref Range     "Magnesium 2.3 1.6 - 2.6 mg/dL   Phosphorus    Collection Time: 02/19/18  4:01 AM   Result Value Ref Range    Phosphorus 4.4 2.7 - 4.5 mg/dL   CBC with Automated Differential    Collection Time: 02/19/18  4:01 AM   Result Value Ref Range    WBC 3.16 (L) 3.90 - 12.70 K/uL    RBC 2.50 (L) 4.00 - 5.40 M/uL    Hemoglobin 7.6 (L) 12.0 - 16.0 g/dL    Hematocrit 23.4 (L) 37.0 - 48.5 %    MCV 94 82 - 98 fL    MCH 30.4 27.0 - 31.0 pg    MCHC 32.5 32.0 - 36.0 g/dL    RDW 19.1 (H) 11.5 - 14.5 %    Platelets 151 150 - 350 K/uL    MPV 11.8 9.2 - 12.9 fL    Immature Granulocytes 0.3 0.0 - 0.5 %    Gran # (ANC) 1.9 1.8 - 7.7 K/uL    Immature Grans (Abs) 0.01 0.00 - 0.04 K/uL    Lymph # 0.8 (L) 1.0 - 4.8 K/uL    Mono # 0.4 0.3 - 1.0 K/uL    Eos # 0.1 0.0 - 0.5 K/uL    Baso # 0.00 0.00 - 0.20 K/uL    nRBC 0 0 /100 WBC    Gran% 58.6 38.0 - 73.0 %    Lymph% 25.6 18.0 - 48.0 %    Mono% 13.6 4.0 - 15.0 %    Eosinophil% 1.9 0.0 - 8.0 %    Basophil% 0.0 0.0 - 1.9 %    Differential Method Automated        Significant Imaging:  Labs: Reviewed    Assessment/Plan:     Acute renal failure superimposed on stage 4 chronic kidney disease    Anai Sal is a 72 y.o. lady with CKD 4 and combined systolic and diastolic heart failure who was on home hospice who presents to [facility] for evaluation for acute hypoxic respiratory failure, secondary to decompensated heart failure.  Patient has numerous risk factors regarding potential for development of CKD, including HTN. Nephrology was consulted regarding dialysis consideration given recent admissions, fluid overloaded state, and failure of fluid control with medical therapy.  Overall, difficult to ascertain full reason for medical therapy as patient not accurate historian due to dementia.  Otherwise, patient inquired regarding dialysis options, of which she states "she has been praying on it to make her kidneys better."  She also notes that she is still able to make urine.  Thus far, she is net negative " 3.5L with BUN/Cr of 84/4.5, mildly worse since admission.  Improvement of BUN/Cr with diuretic therapy.      Plan  - patient and family to continue to assess possibility of dialysis.  Thus far, patient can continue medical management of decompensated heart failure with diuretic therapy.    - recommend decreasing IV diuretic therapy today.  Can initiate PO Bumex tomorrow at home dosing, or increased per primary team   - no indication for acute RRT at this time  - daily labs, continue Is and Os  - avoid nephrotoxic medication             Alistair James MD  Nephrology  [facility]

## 2018-02-19 NOTE — ASSESSMENT & PLAN NOTE
Likely cardiorenal   - Given clinical volume overload, will aggressively give diuretics and monitor renal function via I/O + daily CMPs  - No abnormalities on UA  - Cr remains elevated at 4.2, but is improving; will monitor closely  - Nephrology consulted for possible HD treatment initiation --> agree w/ continuation of  IV diuretic therapy for diuresis as patient is responding well for now   - further goals of care discussion as patient previously noted not wanting chronic dialysis if optimal medical therapy ineffective in fluid management   - no indication for acute RRT at this time   - daily labs, continue Is and Os and will continue 1000 mL fluid restriction   - avoid nephrotoxic medication

## 2018-02-19 NOTE — PROGRESS NOTES
[facility] Hospital Medicine  Progress Note    Patient Name: Anai Sal  MRN: [id number]  Patient Class: IP- Inpatient   Admission Date: 2/15/2018  Length of Stay: 4 days  Attending Physician: Nichelle Evans MD  Primary Care Provider: Live Young MD (Inactive)    [facility] Medicine Team: [facility] [place] [facility] MED 1 Stacy Montemayor MD    Subjective:     Principal Problem:Acute combined systolic and diastolic heart failure    HPI:  72 F with HTN, HFrEF 30%, severe mitral regurgitation, CKD IV vs CKD V, Hep C, Breast ca s/p mastectomy, dementia brought in from home by daughter today for hypoxia on pulse oximetry - 85% on room air in AM. Pt was recently admitted at [facility] from 01/30 to 02/06 for CHF exacerbation (at that time pt had SOB). Pt was diuresed and resp status improved; at that time nephrology was consulted who suggested no RRT needed as pt was still making urine. However, discussion of dialysis was made due to pt's acute on chronic kidney disease, which pt declined and palliative care was consulted. Pt was sent home with home hospice. Pt's daughter says that pt has been more edematous and had some dyspnea over the last 4 days and that this morning she checked pt's oxygen saturation with a pulse oximeter at home and it read 85%. Pt was supposed to have a meeting with Dr. Benoit, her nephrologist, but due to her hypoxia pt's daughter brought her to the hospital.    In the ED BNP was 4.6K, pt was satting >95% on 2L NC with no SOB. Pt was given 80mg IV furosemide and admitted to Hospital Medicine for management of CHF exacerbation.    Hospital Course:  02/16/2018 Pt w/ improvement in her breathing and saturating well on Ra. With adequate U/O w/ IV lasix for her kidney function and improved LE edema w/ diuresis. Awaiting daughter's arrival for plan of care/goals of care discussion this afternoon and appropriate discharge planning.  02/17 Patient with improving LE edema and good U/O. Will continue  to diurese w/ IV lasix as patient still hypervolemic on exam. Nephrology consulted for evaluation and discussion of Hd. Patient w/ NAEON  02/18/2018 NAEON LEILASA. No indications for acute dialysis. Continuing to diurese well with IV lasix. Breathing improved but still volume overloaded on physical exam. Labs stable.   02/19 Patient diuresing well w/ IV Lasix and is significantly improved from admission status. Will transition to PO medication to ensure that patient is still w/ appropriate U/O before discharge planning. NAEON and no new complaints at this time.    Interval History: NAEON. Pt continues to improve w/ high dose IV lasix and fluid restrictions; unclear how accurate the intake is measures, but patient is at least 2L net negative. Improved on exam as well and reporting that she is feeling significantly better. Will transition to PO medication today and observe for continuous diuresis. No other complaints today,   Minor bleeding from the phlebotomy site noted this AM by nursing staff - no visible blood loss noted on the exam this morning.     Review of Systems   Constitutional: Negative for unexpected weight change.   HENT: Negative for congestion.    Eyes: Negative for visual disturbance.   Respiratory: Negative for choking, chest tightness, shortness of breath and wheezing.    Cardiovascular: Positive for leg swelling (improved from admission). Negative for chest pain.   Gastrointestinal: Negative for abdominal pain.   Musculoskeletal: Negative for arthralgias.   Skin: Negative for wound.   Neurological: Negative for headaches.   Psychiatric/Behavioral: Negative for sleep disturbance.     Objective:     Vital Signs (Most Recent):  Temp: 97.7 °F (36.5 °C) (02/19/18 1120)  Pulse: 61 (02/19/18 1121)  Resp: 17 (02/19/18 1120)  BP: (!) 164/72 (02/19/18 1120)  SpO2: 96 % (02/19/18 1120) Vital Signs (24h Range):  Temp:  [97.7 °F (36.5 °C)-98.5 °F (36.9 °C)] 97.7 °F (36.5 °C)  Pulse:  [54-89] 61  Resp:  [17-20]  17  SpO2:  [94 %-98 %] 96 %  BP: (133-173)/(72-86) 164/72     Weight: 79.9 kg (176 lb 3.2 oz)  Body mass index is 28.44 kg/m².    Physical Exam   Constitutional: She appears well-developed.   HENT:   Head: Normocephalic and atraumatic.   Right Ear: External ear normal.   Left Ear: External ear normal.   Nose: Nose normal.   Eyes: EOM are normal. Pupils are equal, round, and reactive to light.   Neck: No tracheal deviation present. No thyromegaly present.   Cardiovascular: Normal rate.    Murmur heard.  Pulmonary/Chest: Effort normal. No respiratory distress. She has no wheezes. She has no rales.   No crackles noted this AM   Abdominal: Soft. There is no tenderness. No hernia.   Musculoskeletal: She exhibits edema.   2+ b/l LE edema, ascending to mid calf, non-tender    Neurological: She displays normal reflexes. No cranial nerve deficit.   Skin: No rash noted.   Psychiatric: She has a normal mood and affect. Judgment normal.   Vitals reviewed.        CRANIAL NERVES     CN III, IV, VI   Pupils are equal, round, and reactive to light.  Extraocular motions are normal.        Significant Labs:   CBC:     Recent Labs  Lab 02/18/18  0509 02/19/18  0401   WBC 3.37* 3.16*   HGB 7.7* 7.6*   HCT 23.1* 23.4*   * 151     CMP:     Recent Labs  Lab 02/18/18  0509 02/18/18  1911 02/19/18  0401    140 140   K 4.0 4.3 4.5   CL 98 95 96   CO2 28 29 29   GLU 64* 100 58*   BUN 83* 79* 80*   CREATININE 4.2* 4.3* 4.2*   CALCIUM 8.9 8.9 8.8   PROT 7.7  --  7.3   ALBUMIN 3.2*  --  3.0*   BILITOT 0.7  --  0.7   ALKPHOS 79  --  73   AST 24  --  26   ALT 19  --  17   ANIONGAP 16 16 15   EGFRNONAA 10.0* 9.7* 10.0*     Cardiac Markers:   No results for input(s): CKMB, MYOGLOBIN, BNP, TROPISTAT in the last 48 hours.  Magnesium:     Recent Labs  Lab 02/18/18  0509 02/18/18  1911 02/19/18  0401   MG 2.6 2.2 2.3       Significant Imaging: I have reviewed all pertinent imaging results/findings within the past 24  hours.    Assessment/Plan:      * Acute combined systolic and diastolic heart failure    CHFrEF 30% + diastolic dysfunction with right-sided heart failure  - Pt grossly edematous with pitting edema to mid-thigh on admit; however pt's lungs sound clear and she denies having dyspnea  - Given likely fluid overload will diurese with 120 mg lasix IV BID, decreasing doses as appropriate or adding other agents to gauge response, suspect cardiorenal component  - Will continue to hold BB in setting of bradycardia,  will not start ACEI at this time given CANDI  - Pt w/ improved LE pitting edema and adequate U/O for her renal function. Will continue w/ current diuresis schedule and monitor electrolytes closely   - Will transition from IV Lasix 120 mg BID to 3 mg Bumex BID (which would be a roughly equivalent PO dose dose) and monitor how patients responds  - Will d/c amlodipine at this time and will continue to hold BB as patient continues to be bradycardic  - Will start Hydralazine 10 mg BID and Isodil 10 mg TID, as patient was on Bidil at home; will monitor how patient responds and up-titrate to home dose as needed         Counseling regarding advanced care planning and goals of care    Pt was previously discharged home with hospice; however, it is unclear whether pt is truly ESRD vs CANDI on CKD and whether she would need to be dialyzed (palliative care was consulted and hospice discussion was initiated due to pt's desire to not be on chronic dialysis)  - Will discuss this with daughter and discuss code status again  - Currently they would like to explore the possibility of HD as this was not discussed extensively with the pt and daughter on previous admission   -Patient does not meet clinical indications for HD at this time.   - Will continue to abide by the pt's and daughters wishes and work w/ the family to provide most appropriate plan of care for the patient         Major neurocognitive disorder    Pt's family friend at  bedside (daughter currently not present) stated that pt had been declining in mental status for the past 6-8 months  Daughter appears to be the main caretaker for the patient and is very involved in her treatment plan          Acute renal failure superimposed on stage 4 chronic kidney disease    Likely cardiorenal   - Given clinical volume overload, will aggressively give diuretics and monitor renal function via I/O + daily CMPs  - No abnormalities on UA  - Cr remains elevated at 4.2, but is improving; will monitor closely  - Nephrology consulted for possible HD treatment initiation --> agree w/ continuation of  IV diuretic therapy for diuresis as patient is responding well for now   - further goals of care discussion as patient previously noted not wanting chronic dialysis if optimal medical therapy ineffective in fluid management   - no indication for acute RRT at this time   - daily labs, continue Is and Os and will continue 1000 mL fluid restriction   - avoid nephrotoxic medication         Chronic gout    - Cont allopurinol at home dose   - No pain complaints at this time         Essential hypertension    - Will d/c amlodipine and continue to hold toprol in bradycardia  - Bumex, Isodil and Hydralazine restarted on 2/19  - SBP labile from 130s-160s           VTE Risk Mitigation         Ordered     High Risk of VTE  Once      02/16/18 1125     heparin (porcine) injection 5,000 Units  Every 8 hours     Route:  Subcutaneous        02/15/18 1447              Stacy Montemayor MD  Department of Hospital Medicine   [facility]                    02/19/2018                             STAFF PHYSICIAN NOTE                                   Attending Attestation for Rounds with Resident  I have reviewed and concur with the resident's history, physical, assessment, and plan.  I have personally interviewed and examined the patient at bedside and agree with the resident's findings.                                   ________________________________________                                     REASON FOR ADMISSION:     Patient is 72 y.o.female    Body mass index is 28.44 kg/m².,  Acute combined systolic and diastolic heart failure

## 2018-02-19 NOTE — ASSESSMENT & PLAN NOTE
- Will d/c amlodipine and continue to hold toprol in bradycardia  - Bumex, Isodil and Hydralazine restarted on 2/19  - SBP labile from 130s-160s

## 2018-02-19 NOTE — SUBJECTIVE & OBJECTIVE
Interval History: Patient seen in room, doing well.  Diuresed approximately 3 L on current therapy.  Mild improvement in Cr from 4.3 to 4.2 since yesterday.  Notes not having wheeze anymore.  Previous weights of 175 lbs, approaching such.      Review of patient's allergies indicates:   Allergen Reactions    Seroquel [quetiapine]      Causes  profound sedation.     Current Facility-Administered Medications   Medication Frequency    acetaminophen tablet 1,000 mg Q8H PRN    acetaminophen tablet 650 mg Q4H PRN    albuterol-ipratropium 2.5mg-0.5mg/3mL nebulizer solution 3 mL Q4H PRN    allopurinol split tablet 50 mg Daily    aspirin chewable tablet 81 mg Daily    bumetanide tablet 3 mg BID    dextrose 50% injection 12.5 g PRN    dextrose 50% injection 25 g PRN    divalproex EC tablet 250 mg Daily    divalproex EC tablet 500 mg QHS    fluticasone 50 mcg/actuation nasal spray 100 mcg Daily    fluticasone-vilanterol 100-25 mcg/dose diskus inhaler 1 puff Daily    glucagon (human recombinant) injection 1 mg PRN    glucose chewable tablet 16 g PRN    glucose chewable tablet 24 g PRN    heparin (porcine) injection 5,000 Units Q8H    hydrALAZINE tablet 10 mg Q12H    isosorbide dinitrate tablet 10 mg TID    levothyroxine tablet 100 mcg Before breakfast    ondansetron disintegrating tablet 8 mg Q8H PRN    ondansetron injection 4 mg Q8H PRN    polyethylene glycol packet 17 g Daily    ramelteon tablet 8 mg Nightly PRN    senna-docusate 8.6-50 mg per tablet 1 tablet Daily    sevelamer carbonate tablet 800 mg BID WM    sodium bicarbonate tablet 1,300 mg TID    sodium chloride 0.9% flush 5 mL PRN    tiotropium inhalation capsule 18 mcg Daily    vitamin renal formula (B-complex-vitamin c-folic acid) 1 mg per capsule 1 capsule Daily       Objective:     Vital Signs (Most Recent):  Temp: 97.7 °F (36.5 °C) (02/19/18 0820)  Pulse: 89 (02/19/18 1000)  Resp: 20 (02/19/18 1000)  BP: 133/86 (02/19/18 0820)  SpO2:  (!) 94 % (02/19/18 0820)  O2 Device (Oxygen Therapy): room air (02/19/18 0820) Vital Signs (24h Range):  Temp:  [97.7 °F (36.5 °C)-98.5 °F (36.9 °C)] 97.7 °F (36.5 °C)  Pulse:  [54-89] 89  Resp:  [18-20] 20  SpO2:  [94 %-99 %] 94 %  BP: (133-173)/(65-86) 133/86     Weight: 79.9 kg (176 lb 3.2 oz) (02/19/18 0400)  Body mass index is 28.44 kg/m².  Body surface area is 1.93 meters squared.    I/O last 3 completed shifts:  In: 60 [P.O.:60]  Out: 4100 [Urine:4100]    Physical Exam   Constitutional: She is oriented to person, place, and time. She appears well-developed and well-nourished. No distress.   Obese   HENT:   Head: Normocephalic and atraumatic.   Mouth/Throat: No oropharyngeal exudate.   Eyes: Conjunctivae and EOM are normal. Pupils are equal, round, and reactive to light. Right eye exhibits no discharge. Left eye exhibits no discharge. No scleral icterus.   Neck: Normal range of motion. Neck supple. No JVD present. No tracheal deviation present. No thyromegaly present.   Cardiovascular: Normal rate, regular rhythm and intact distal pulses.  Exam reveals no gallop.    Murmur heard.  +2 bilateral lower extremity edema   Pulmonary/Chest: No stridor. No respiratory distress. She has rales. She exhibits no tenderness.   Decreased breath sounds at the bases poor excursion, bilateral basilar crackles   Abdominal: Soft. Bowel sounds are normal. She exhibits no distension and no mass. There is no tenderness. There is no rebound and no guarding. No hernia.   Musculoskeletal: Normal range of motion. She exhibits edema (3+). She exhibits no tenderness.   Lymphadenopathy:     She has no cervical adenopathy.   Neurological: She is alert and oriented to person, place, and time. No cranial nerve deficit. She exhibits normal muscle tone.   Skin: Skin is warm and dry. No rash noted. She is not diaphoretic. No erythema.   Psychiatric: She has a normal mood and affect. Her behavior is normal. Judgment and thought content normal.    Nursing note and vitals reviewed.      Significant Labs:    Recent Results (from the past 24 hour(s))   Basic metabolic panel    Collection Time: 02/18/18  7:11 PM   Result Value Ref Range    Sodium 140 136 - 145 mmol/L    Potassium 4.3 3.5 - 5.1 mmol/L    Chloride 95 95 - 110 mmol/L    CO2 29 23 - 29 mmol/L    Glucose 100 70 - 110 mg/dL    BUN, Bld 79 (H) 8 - 23 mg/dL    Creatinine 4.3 (H) 0.5 - 1.4 mg/dL    Calcium 8.9 8.7 - 10.5 mg/dL    Anion Gap 16 8 - 16 mmol/L    eGFR if African American 11.2 (A) >60 mL/min/1.73 m^2    eGFR if non  9.7 (A) >60 mL/min/1.73 m^2   Magnesium    Collection Time: 02/18/18  7:11 PM   Result Value Ref Range    Magnesium 2.2 1.6 - 2.6 mg/dL   Comprehensive Metabolic Panel (CMP)    Collection Time: 02/19/18  4:01 AM   Result Value Ref Range    Sodium 140 136 - 145 mmol/L    Potassium 4.5 3.5 - 5.1 mmol/L    Chloride 96 95 - 110 mmol/L    CO2 29 23 - 29 mmol/L    Glucose 58 (L) 70 - 110 mg/dL    BUN, Bld 80 (H) 8 - 23 mg/dL    Creatinine 4.2 (H) 0.5 - 1.4 mg/dL    Calcium 8.8 8.7 - 10.5 mg/dL    Total Protein 7.3 6.0 - 8.4 g/dL    Albumin 3.0 (L) 3.5 - 5.2 g/dL    Total Bilirubin 0.7 0.1 - 1.0 mg/dL    Alkaline Phosphatase 73 55 - 135 U/L    AST 26 10 - 40 U/L    ALT 17 10 - 44 U/L    Anion Gap 15 8 - 16 mmol/L    eGFR if African American 11.5 (A) >60 mL/min/1.73 m^2    eGFR if non African American 10.0 (A) >60 mL/min/1.73 m^2   Magnesium    Collection Time: 02/19/18  4:01 AM   Result Value Ref Range    Magnesium 2.3 1.6 - 2.6 mg/dL   Phosphorus    Collection Time: 02/19/18  4:01 AM   Result Value Ref Range    Phosphorus 4.4 2.7 - 4.5 mg/dL   CBC with Automated Differential    Collection Time: 02/19/18  4:01 AM   Result Value Ref Range    WBC 3.16 (L) 3.90 - 12.70 K/uL    RBC 2.50 (L) 4.00 - 5.40 M/uL    Hemoglobin 7.6 (L) 12.0 - 16.0 g/dL    Hematocrit 23.4 (L) 37.0 - 48.5 %    MCV 94 82 - 98 fL    MCH 30.4 27.0 - 31.0 pg    MCHC 32.5 32.0 - 36.0 g/dL    RDW 19.1 (H)  11.5 - 14.5 %    Platelets 151 150 - 350 K/uL    MPV 11.8 9.2 - 12.9 fL    Immature Granulocytes 0.3 0.0 - 0.5 %    Gran # (ANC) 1.9 1.8 - 7.7 K/uL    Immature Grans (Abs) 0.01 0.00 - 0.04 K/uL    Lymph # 0.8 (L) 1.0 - 4.8 K/uL    Mono # 0.4 0.3 - 1.0 K/uL    Eos # 0.1 0.0 - 0.5 K/uL    Baso # 0.00 0.00 - 0.20 K/uL    nRBC 0 0 /100 WBC    Gran% 58.6 38.0 - 73.0 %    Lymph% 25.6 18.0 - 48.0 %    Mono% 13.6 4.0 - 15.0 %    Eosinophil% 1.9 0.0 - 8.0 %    Basophil% 0.0 0.0 - 1.9 %    Differential Method Automated        Significant Imaging:  Labs: Reviewed

## 2018-02-20 PROBLEM — I10 ESSENTIAL HYPERTENSION: Status: ACTIVE | Noted: 2018-02-20

## 2018-02-20 PROBLEM — N18.5 ANEMIA IN STAGE 5 CHRONIC KIDNEY DISEASE, NOT ON CHRONIC DIALYSIS: Status: ACTIVE | Noted: 2018-02-20

## 2018-02-20 PROBLEM — D63.1 ANEMIA IN STAGE 5 CHRONIC KIDNEY DISEASE, NOT ON CHRONIC DIALYSIS: Status: ACTIVE | Noted: 2018-02-20

## 2018-02-20 PROBLEM — N18.5 CHRONIC KIDNEY DISEASE, STAGE V: Status: ACTIVE | Noted: 2018-02-20

## 2018-02-20 PROBLEM — D64.9 ANEMIA: Status: ACTIVE | Noted: 2018-02-20

## 2018-02-20 PROBLEM — N18.5 ACUTE RENAL FAILURE SUPERIMPOSED ON STAGE 5 CHRONIC KIDNEY DISEASE, NOT ON CHRONIC DIALYSIS: Status: ACTIVE | Noted: 2017-04-11

## 2018-02-20 LAB
ALBUMIN SERPL BCP-MCNC: 3.1 G/DL
ALP SERPL-CCNC: 76 U/L
ALT SERPL W/O P-5'-P-CCNC: 15 U/L
ANION GAP SERPL CALC-SCNC: 15 MMOL/L
AST SERPL-CCNC: 25 U/L
BASOPHILS # BLD AUTO: 0.01 K/UL
BASOPHILS NFR BLD: 0.3 %
BILIRUB SERPL-MCNC: 0.8 MG/DL
BUN SERPL-MCNC: 77 MG/DL
CALCIUM SERPL-MCNC: 9.1 MG/DL
CHLORIDE SERPL-SCNC: 97 MMOL/L
CO2 SERPL-SCNC: 29 MMOL/L
CREAT SERPL-MCNC: 3.8 MG/DL
DIFFERENTIAL METHOD: ABNORMAL
EOSINOPHIL # BLD AUTO: 0.1 K/UL
EOSINOPHIL NFR BLD: 1.6 %
ERYTHROCYTE [DISTWIDTH] IN BLOOD BY AUTOMATED COUNT: 18.8 %
EST. GFR  (AFRICAN AMERICAN): 12.9 ML/MIN/1.73 M^2
EST. GFR  (NON AFRICAN AMERICAN): 11.2 ML/MIN/1.73 M^2
GLUCOSE SERPL-MCNC: 76 MG/DL
HCT VFR BLD AUTO: 25.5 %
HGB BLD-MCNC: 8.3 G/DL
IMM GRANULOCYTES # BLD AUTO: 0.01 K/UL
IMM GRANULOCYTES NFR BLD AUTO: 0.3 %
LYMPHOCYTES # BLD AUTO: 0.9 K/UL
LYMPHOCYTES NFR BLD: 24.5 %
MAGNESIUM SERPL-MCNC: 2.3 MG/DL
MCH RBC QN AUTO: 30.4 PG
MCHC RBC AUTO-ENTMCNC: 32.5 G/DL
MCV RBC AUTO: 93 FL
MONOCYTES # BLD AUTO: 0.5 K/UL
MONOCYTES NFR BLD: 13.3 %
NEUTROPHILS # BLD AUTO: 2.3 K/UL
NEUTROPHILS NFR BLD: 60 %
NRBC BLD-RTO: 0 /100 WBC
PHOSPHATE SERPL-MCNC: 3.1 MG/DL
PLATELET # BLD AUTO: 169 K/UL
PMV BLD AUTO: 11.2 FL
POTASSIUM SERPL-SCNC: 4.2 MMOL/L
PROT SERPL-MCNC: 7.8 G/DL
RBC # BLD AUTO: 2.73 M/UL
SODIUM SERPL-SCNC: 141 MMOL/L
WBC # BLD AUTO: 3.76 K/UL

## 2018-02-20 PROCEDURE — 25000003 PHARM REV CODE 250: Performed by: STUDENT IN AN ORGANIZED HEALTH CARE EDUCATION/TRAINING PROGRAM

## 2018-02-20 PROCEDURE — 25000003 PHARM REV CODE 250: Performed by: INTERNAL MEDICINE

## 2018-02-20 PROCEDURE — 97530 THERAPEUTIC ACTIVITIES: CPT

## 2018-02-20 PROCEDURE — 97116 GAIT TRAINING THERAPY: CPT

## 2018-02-20 PROCEDURE — 36415 COLL VENOUS BLD VENIPUNCTURE: CPT

## 2018-02-20 PROCEDURE — 25000242 PHARM REV CODE 250 ALT 637 W/ HCPCS: Performed by: STUDENT IN AN ORGANIZED HEALTH CARE EDUCATION/TRAINING PROGRAM

## 2018-02-20 PROCEDURE — 97110 THERAPEUTIC EXERCISES: CPT

## 2018-02-20 PROCEDURE — 83735 ASSAY OF MAGNESIUM: CPT

## 2018-02-20 PROCEDURE — 80053 COMPREHEN METABOLIC PANEL: CPT

## 2018-02-20 PROCEDURE — 11000001 HC ACUTE MED/SURG PRIVATE ROOM

## 2018-02-20 PROCEDURE — 85025 COMPLETE CBC W/AUTO DIFF WBC: CPT

## 2018-02-20 PROCEDURE — 99232 SBSQ HOSP IP/OBS MODERATE 35: CPT | Mod: GC,,, | Performed by: HOSPITALIST

## 2018-02-20 PROCEDURE — 99232 SBSQ HOSP IP/OBS MODERATE 35: CPT | Mod: GC,,, | Performed by: INTERNAL MEDICINE

## 2018-02-20 PROCEDURE — 84100 ASSAY OF PHOSPHORUS: CPT

## 2018-02-20 RX ORDER — BUMETANIDE 1 MG/1
2 TABLET ORAL NIGHTLY
Status: DISCONTINUED | OUTPATIENT
Start: 2018-02-20 | End: 2018-02-21 | Stop reason: HOSPADM

## 2018-02-20 RX ORDER — AMLODIPINE BESYLATE 10 MG/1
10 TABLET ORAL DAILY
Status: CANCELLED
Start: 2018-02-20

## 2018-02-20 RX ADMIN — HYDRALAZINE HYDROCHLORIDE 10 MG: 10 TABLET, FILM COATED ORAL at 09:02

## 2018-02-20 RX ADMIN — NEPHROCAP 1 CAPSULE: 1 CAP ORAL at 09:02

## 2018-02-20 RX ADMIN — SODIUM BICARBONATE 650 MG TABLET 1300 MG: at 05:02

## 2018-02-20 RX ADMIN — ISOSORBIDE DINITRATE 10 MG: 10 TABLET ORAL at 05:02

## 2018-02-20 RX ADMIN — STANDARDIZED SENNA CONCENTRATE AND DOCUSATE SODIUM 1 TABLET: 8.6; 5 TABLET, FILM COATED ORAL at 09:02

## 2018-02-20 RX ADMIN — FLUTICASONE PROPIONATE 100 MCG: 50 SPRAY, METERED NASAL at 09:02

## 2018-02-20 RX ADMIN — LEVOTHYROXINE SODIUM 100 MCG: 100 TABLET ORAL at 05:02

## 2018-02-20 RX ADMIN — ISOSORBIDE DINITRATE 10 MG: 10 TABLET ORAL at 09:02

## 2018-02-20 RX ADMIN — TIOTROPIUM BROMIDE 18 MCG: 18 CAPSULE ORAL; RESPIRATORY (INHALATION) at 09:02

## 2018-02-20 RX ADMIN — SODIUM BICARBONATE 650 MG TABLET 1300 MG: at 09:02

## 2018-02-20 RX ADMIN — POLYETHYLENE GLYCOL 3350 17 G: 17 POWDER, FOR SOLUTION ORAL at 09:02

## 2018-02-20 RX ADMIN — ISOSORBIDE DINITRATE 10 MG: 10 TABLET ORAL at 03:02

## 2018-02-20 RX ADMIN — DIVALPROEX SODIUM 250 MG: 250 TABLET, DELAYED RELEASE ORAL at 09:02

## 2018-02-20 RX ADMIN — SEVELAMER CARBONATE 800 MG: 800 TABLET, FILM COATED ORAL at 09:02

## 2018-02-20 RX ADMIN — DIVALPROEX SODIUM 500 MG: 250 TABLET, DELAYED RELEASE ORAL at 09:02

## 2018-02-20 RX ADMIN — BUMETANIDE 3 MG: 1 TABLET ORAL at 09:02

## 2018-02-20 RX ADMIN — FLUTICASONE FUROATE AND VILANTEROL TRIFENATATE 1 PUFF: 100; 25 POWDER RESPIRATORY (INHALATION) at 09:02

## 2018-02-20 RX ADMIN — SEVELAMER CARBONATE 800 MG: 800 TABLET, FILM COATED ORAL at 05:02

## 2018-02-20 RX ADMIN — ASPIRIN 81 MG CHEWABLE TABLET 81 MG: 81 TABLET CHEWABLE at 09:02

## 2018-02-20 RX ADMIN — SODIUM BICARBONATE 650 MG TABLET 1300 MG: at 02:02

## 2018-02-20 RX ADMIN — BUMETANIDE 2 MG: 1 TABLET ORAL at 09:02

## 2018-02-20 RX ADMIN — METHYLDOPA 50 MG: 500 TABLET ORAL at 09:02

## 2018-02-20 NOTE — PROGRESS NOTES
[facility] Hospital Medicine  Progress Note    Patient Name: Anai Sal  MRN: [id number]  Patient Class: IP- Inpatient   Admission Date: 2/15/2018  Length of Stay: 5 days  Attending Physician: Live Coffey MD  Primary Care Provider: Live Young MD (Inactive)    [place] [facility] Medicine Team: [facility] HOSP MED 1 Stacy Montemayor MD    Subjective:     Principal Problem:Acute combined systolic and diastolic heart failure    HPI:  72 F with HTN, HFrEF 30%, severe mitral regurgitation, CKD IV vs CKD V, Hep C, Breast ca s/p mastectomy, dementia brought in from home by daughter today for hypoxia on pulse oximetry - 85% on room air in AM. Pt was recently admitted at [facility] from 01/30 to 02/06 for CHF exacerbation (at that time pt had SOB). Pt was diuresed and resp status improved; at that time nephrology was consulted who suggested no RRT needed as pt was still making urine. However, discussion of dialysis was made due to pt's acute on chronic kidney disease, which pt declined and palliative care was consulted. Pt was sent home with home hospice. Pt's daughter says that pt has been more edematous and had some dyspnea over the last 4 days and that this morning she checked pt's oxygen saturation with a pulse oximeter at home and it read 85%. Pt was supposed to have a meeting with Dr. Benoit, her nephrologist, but due to her hypoxia pt's daughter brought her to the hospital.    In the ED BNP was 4.6K, pt was satting >95% on 2L NC with no SOB. Pt was given 80mg IV furosemide and admitted to Hospital Medicine for management of CHF exacerbation.    Hospital Course:  02/16/2018 Pt w/ improvement in her breathing and saturating well on Ra. With adequate U/O w/ IV lasix for her kidney function and improved LE edema w/ diuresis. Awaiting daughter's arrival for plan of care/goals of care discussion this afternoon and appropriate discharge planning.  02/17 Patient with improving LE edema and good U/O. Will continue  to diurese w/ IV lasix as patient still hypervolemic on exam. Nephrology consulted for evaluation and discussion of Hd. Patient w/ NAEON  02/18/2018 NAEON VSSA. No indications for acute dialysis. Continuing to diurese well with IV lasix. Breathing improved but still volume overloaded on physical exam. Labs stable.   02/19 Patient diuresing well w/ IV Lasix and is significantly improved from admission status. Will transition to PO medication to ensure that patient is still w/ appropriate U/O before discharge planning. NAEON and no new complaints at this time.  02/20 Continues to diurese very well on oral medication; will schedule Bumex at 3 mg QAM and 2 mg QHS for discharge. Will monitor overnight and anticipate discharge early in the Am. Net - 3146. Lungs clear and edema improving.    Interval History: NAEON. Pt w/ adequate U/O after transition to PO Bumex 3 mg BID. Will change the evening dose to 2 mg to avoid over-diuresing and f.u w/ labs as an outpatient after pt's discharge to monitor electrolytes and volume status. Significant improvement on exam and patients only complaint is the fluid restriction.  Daughter at bedside; discussed the plan with her as well.     Review of Systems   Constitutional: Negative for unexpected weight change.   HENT: Negative for congestion.    Eyes: Negative for visual disturbance.   Respiratory: Negative for choking, chest tightness, shortness of breath and wheezing.    Cardiovascular: Positive for leg swelling (improved from admission). Negative for chest pain.   Gastrointestinal: Negative for abdominal pain.   Musculoskeletal: Negative for arthralgias.   Skin: Negative for wound.   Neurological: Negative for headaches.   Psychiatric/Behavioral: Negative for sleep disturbance.     Objective:     Vital Signs (Most Recent):  Temp: 98 °F (36.7 °C) (02/20/18 0722)  Pulse: 63 (02/20/18 1059)  Resp: 20 (02/20/18 0928)  BP: 128/89 (02/20/18 0722)  SpO2: 100 % (02/20/18 0722) Vital Signs  (24h Range):  Temp:  [97.6 °F (36.4 °C)-98.3 °F (36.8 °C)] 98 °F (36.7 °C)  Pulse:  [59-74] 63  Resp:  [15-20] 20  SpO2:  [94 %-100 %] 100 %  BP: (128-164)/(67-89) 128/89     Weight: 79.9 kg (176 lb 3.2 oz)  Body mass index is 28.44 kg/m².    Physical Exam   Constitutional: She appears well-developed.   HENT:   Head: Normocephalic and atraumatic.   Right Ear: External ear normal.   Left Ear: External ear normal.   Nose: Nose normal.   Eyes: EOM are normal. Pupils are equal, round, and reactive to light.   Neck: No tracheal deviation present. No thyromegaly present.   Cardiovascular: Normal rate.    Murmur heard.  Pulmonary/Chest: Effort normal. No respiratory distress. She has no wheezes. She has no rales.   No crackles noted this AM   Abdominal: Soft. There is no tenderness. No hernia.   Musculoskeletal: She exhibits edema.   2+ b/l LE edema, ascending to mid shin, non-tender    Neurological: She displays normal reflexes. No cranial nerve deficit.   Skin: No rash noted.   Psychiatric: She has a normal mood and affect. Judgment normal.   Vitals reviewed.        CRANIAL NERVES     CN III, IV, VI   Pupils are equal, round, and reactive to light.  Extraocular motions are normal.        Significant Labs:   CBC:     Recent Labs  Lab 02/19/18  0401 02/20/18  0518   WBC 3.16* 3.76*   HGB 7.6* 8.3*   HCT 23.4* 25.5*    169     CMP:     Recent Labs  Lab 02/18/18  1911 02/19/18  0401 02/20/18  0518    140 141   K 4.3 4.5 4.2   CL 95 96 97   CO2 29 29 29    58* 76   BUN 79* 80* 77*   CREATININE 4.3* 4.2* 3.8*   CALCIUM 8.9 8.8 9.1   PROT  --  7.3 7.8   ALBUMIN  --  3.0* 3.1*   BILITOT  --  0.7 0.8   ALKPHOS  --  73 76   AST  --  26 25   ALT  --  17 15   ANIONGAP 16 15 15   EGFRNONAA 9.7* 10.0* 11.2*     Cardiac Markers:   No results for input(s): CKMB, MYOGLOBIN, BNP, TROPISTAT in the last 48 hours.  Magnesium:     Recent Labs  Lab 02/18/18  1911 02/19/18  0401 02/20/18  0518   MG 2.2 2.3 2.3        Significant Imaging: I have reviewed all pertinent imaging results/findings within the past 24 hours.    Assessment/Plan:      * Acute combined systolic and diastolic heart failure    CHFrEF 30% + diastolic dysfunction with right-sided heart failure  - Pt grossly edematous with pitting edema to mid-thigh on admit; however pt's lungs sound clear and she denies having dyspnea  - Likely 2/2 diet non-compliance or lack of fluid restriction + worsening heart function  - Given likely fluid overload will diurese with 120 mg lasix IV BID, decreasing doses as appropriate or adding other agents to gauge response, suspect cardiorenal component  - Will continue to hold BB in setting of bradycardia,  will not start ACEI at this time given CANDI  - Pt w/ improved LE pitting edema and adequate U/O for her renal function. Will continue w/ current diuresis schedule and monitor electrolytes closely   - Will switch to patient's new home regiment of Bumex BID ( 3 mg Q AM and 2 mg QHS) on 2/20 and observe overnight  - Will d/c amlodipine at this time and will continue to hold BB as patient continues to be bradycardic (HR in 60s)  - Will start Hydralazine 10 mg BID and Isodil 10 mg TID, as patient was on Bidil at home; will monitor how patient responds and up-titrate to home dose as needed         Counseling regarding advanced care planning and goals of care    Pt was previously discharged home with hospice; however, it is unclear whether pt is truly ESRD vs CANDI on CKD and whether she would need to be dialyzed (palliative care was consulted and hospice discussion was initiated due to pt's desire to not be on chronic dialysis)  - Will discuss this with daughter and discuss code status again  - Currently they would like to explore the possibility of HD as this was not discussed extensively with the pt and daughter on previous admission   -Patient does not meet clinical indications for HD at this time.   - Will continue to abide by the  pt's and daughters wishes and work w/ the family to provide most appropriate plan of care for the patient         Major neurocognitive disorder    - Pt's family friend at bedside (daughter currently not present) stated that pt had been declining in mental status for the past 6-8 months  - Daughter appears to be the main caretaker for the patient and is very involved in her treatment plan        Acute renal failure superimposed on stage 5 chronic kidney disease, not on chronic dialysis    Likely cardiorenal   - Given clinical volume overload, will aggressively give diuretics and monitor renal function via I/O + daily CMPs  - No abnormalities on UA  - Cr remains elevated at 3.8, but is improving; will monitor closely  - Nephrology consulted for possible HD treatment initiation --> agree w/ continuation of  IV diuretic therapy for diuresis as patient is responding well for now   - further goals of care discussion as patient previously noted not wanting chronic dialysis if optimal medical therapy ineffective in fluid management   - no indication for acute RRT at this time   - daily labs, continue Is and Os and will continue 1000 mL fluid restriction   - avoid nephrotoxic medication  - Will require Nephrology f/u as an outpatient        Chronic gout    - Cont allopurinol at home dose   - No pain complaints at this time         Renovascular hypertension    - Will d/c amlodipine and continue to hold toprol in bradycardia  - Bumex, Isodil and Hydralazine restarted on 2/19  - SBP improved; last in upper 120's          VTE Risk Mitigation         Ordered     High Risk of VTE  Once      02/16/18 1125     heparin (porcine) injection 5,000 Units  Every 8 hours     Route:  Subcutaneous        02/15/18 0176              Stacy Montemayor MD  Department of Hospital Medicine   [facility]

## 2018-02-20 NOTE — SUBJECTIVE & OBJECTIVE
Interval History: Patient seen in room in AM sitting in chair.  Spoke with family member.  Overall, patient doing better, no respiratory issues today.  Net negative 3 liters yesterday, overall 12 liters since admission.      Review of patient's allergies indicates:   Allergen Reactions    Seroquel [quetiapine]      Causes  profound sedation.     Current Facility-Administered Medications   Medication Frequency    acetaminophen tablet 1,000 mg Q8H PRN    acetaminophen tablet 650 mg Q4H PRN    albuterol-ipratropium 2.5mg-0.5mg/3mL nebulizer solution 3 mL Q4H PRN    allopurinol split tablet 50 mg Daily    aspirin chewable tablet 81 mg Daily    bumetanide tablet 2 mg QHS    bumetanide tablet 3 mg BID    dextrose 50% injection 12.5 g PRN    dextrose 50% injection 25 g PRN    divalproex EC tablet 250 mg Daily    divalproex EC tablet 500 mg QHS    fluticasone 50 mcg/actuation nasal spray 100 mcg Daily    fluticasone-vilanterol 100-25 mcg/dose diskus inhaler 1 puff Daily    glucagon (human recombinant) injection 1 mg PRN    glucose chewable tablet 16 g PRN    glucose chewable tablet 24 g PRN    heparin (porcine) injection 5,000 Units Q8H    hydrALAZINE tablet 10 mg Q12H    isosorbide dinitrate tablet 10 mg TID    levothyroxine tablet 100 mcg Before breakfast    ondansetron disintegrating tablet 8 mg Q8H PRN    ondansetron injection 4 mg Q8H PRN    polyethylene glycol packet 17 g Daily    ramelteon tablet 8 mg Nightly PRN    senna-docusate 8.6-50 mg per tablet 1 tablet Daily    sevelamer carbonate tablet 800 mg BID WM    sodium bicarbonate tablet 1,300 mg TID    sodium chloride 0.9% flush 5 mL PRN    tiotropium inhalation capsule 18 mcg Daily    vitamin renal formula (B-complex-vitamin c-folic acid) 1 mg per capsule 1 capsule Daily       Objective:     Vital Signs (Most Recent):  Temp: 96.3 °F (35.7 °C) (02/20/18 1205)  Pulse: 76 (02/20/18 1205)  Resp: 17 (02/20/18 1205)  BP: (!) 156/69  (02/20/18 1205)  SpO2: 99 % (02/20/18 1205)  O2 Device (Oxygen Therapy): room air (02/20/18 1205) Vital Signs (24h Range):  Temp:  [96.3 °F (35.7 °C)-98.3 °F (36.8 °C)] 96.3 °F (35.7 °C)  Pulse:  [59-76] 76  Resp:  [15-20] 17  SpO2:  [94 %-100 %] 99 %  BP: (128-159)/(67-89) 156/69     Weight: 79.9 kg (176 lb 3.2 oz) (02/19/18 0400)  Body mass index is 28.44 kg/m².  Body surface area is 1.93 meters squared.    I/O last 3 completed shifts:  In: 1264 [P.O.:1264]  Out: 5800 [Urine:5800]    Physical Exam   Constitutional: She is oriented to person, place, and time. She appears well-developed and well-nourished. No distress.   Obese   HENT:   Head: Normocephalic and atraumatic.   Mouth/Throat: No oropharyngeal exudate.   Eyes: Conjunctivae and EOM are normal. Pupils are equal, round, and reactive to light. Right eye exhibits no discharge. Left eye exhibits no discharge.   Neck: Normal range of motion. Neck supple. No thyromegaly present.   Cardiovascular: Normal rate, regular rhythm and intact distal pulses.    Murmur heard.  Pulmonary/Chest: No respiratory distress. She has rales (minimal ). She exhibits no tenderness.   Abdominal: Soft. Bowel sounds are normal. She exhibits no distension. There is no tenderness.   Musculoskeletal: Normal range of motion. She exhibits edema (2+ pitting edema to mid tibia bilaterally, improved since prior ). She exhibits no tenderness.   Neurological: She is alert and oriented to person, place, and time. No cranial nerve deficit. She exhibits normal muscle tone.   Skin: Skin is warm and dry. No rash noted. She is not diaphoretic. No erythema.   Psychiatric: She has a normal mood and affect. Her behavior is normal.   Nursing note and vitals reviewed.      Significant Labs:    Recent Results (from the past 24 hour(s))   Comprehensive Metabolic Panel (CMP)    Collection Time: 02/20/18  5:18 AM   Result Value Ref Range    Sodium 141 136 - 145 mmol/L    Potassium 4.2 3.5 - 5.1 mmol/L     Chloride 97 95 - 110 mmol/L    CO2 29 23 - 29 mmol/L    Glucose 76 70 - 110 mg/dL    BUN, Bld 77 (H) 8 - 23 mg/dL    Creatinine 3.8 (H) 0.5 - 1.4 mg/dL    Calcium 9.1 8.7 - 10.5 mg/dL    Total Protein 7.8 6.0 - 8.4 g/dL    Albumin 3.1 (L) 3.5 - 5.2 g/dL    Total Bilirubin 0.8 0.1 - 1.0 mg/dL    Alkaline Phosphatase 76 55 - 135 U/L    AST 25 10 - 40 U/L    ALT 15 10 - 44 U/L    Anion Gap 15 8 - 16 mmol/L    eGFR if African American 12.9 (A) >60 mL/min/1.73 m^2    eGFR if non  11.2 (A) >60 mL/min/1.73 m^2   Magnesium    Collection Time: 02/20/18  5:18 AM   Result Value Ref Range    Magnesium 2.3 1.6 - 2.6 mg/dL   Phosphorus    Collection Time: 02/20/18  5:18 AM   Result Value Ref Range    Phosphorus 3.1 2.7 - 4.5 mg/dL   CBC with Automated Differential    Collection Time: 02/20/18  5:18 AM   Result Value Ref Range    WBC 3.76 (L) 3.90 - 12.70 K/uL    RBC 2.73 (L) 4.00 - 5.40 M/uL    Hemoglobin 8.3 (L) 12.0 - 16.0 g/dL    Hematocrit 25.5 (L) 37.0 - 48.5 %    MCV 93 82 - 98 fL    MCH 30.4 27.0 - 31.0 pg    MCHC 32.5 32.0 - 36.0 g/dL    RDW 18.8 (H) 11.5 - 14.5 %    Platelets 169 150 - 350 K/uL    MPV 11.2 9.2 - 12.9 fL    Immature Granulocytes 0.3 0.0 - 0.5 %    Gran # (ANC) 2.3 1.8 - 7.7 K/uL    Immature Grans (Abs) 0.01 0.00 - 0.04 K/uL    Lymph # 0.9 (L) 1.0 - 4.8 K/uL    Mono # 0.5 0.3 - 1.0 K/uL    Eos # 0.1 0.0 - 0.5 K/uL    Baso # 0.01 0.00 - 0.20 K/uL    nRBC 0 0 /100 WBC    Gran% 60.0 38.0 - 73.0 %    Lymph% 24.5 18.0 - 48.0 %    Mono% 13.3 4.0 - 15.0 %    Eosinophil% 1.6 0.0 - 8.0 %    Basophil% 0.3 0.0 - 1.9 %    Differential Method Automated        Significant Imaging:  Labs: Reviewed

## 2018-02-20 NOTE — PT/OT/SLP PROGRESS
Physical Therapy Treatment    Patient Name:  Anai Sal   MRN:  [id number]    Recommendations:     Discharge Recommendations:  home with home health   Discharge Equipment Recommendations: none   Barriers to discharge: Inaccessible home    Assessment:     Anai Sal is a 72 y.o. female admitted with a medical diagnosis of Acute combined systolic and diastolic heart failure.  She presents with the following impairments/functional limitations:  weakness, decreased safety awareness, impaired self care skills, impaired functional mobilty, decreased coordination. Pt tolerated increased gait distance well, and will continue to benefit from PT services at this time. Continue with PT POC as indicated.     Rehab Prognosis:  fair; patient would benefit from acute skilled PT services to address these deficits and reach maximum level of function.      Recent Surgery: * No surgery found *      Plan:     During this hospitalization, patient to be seen 4 x/week to address the above listed problems via gait training, therapeutic activities, therapeutic exercises, neuromuscular re-education  · Plan of Care Expires:  03/18/18   Plan of Care Reviewed with: patient, daughter    Subjective     Communicated with nursing prior to session.  Patient found supine upon PT entry to room, agreeable to treatment.      Chief Complaint: none stated  Patient comments/goals: none stated  Pain/Comfort:  · Pain Rating 1: 0/10  · Pain Rating Post-Intervention 1: 0/10    Patients cultural, spiritual, Holiness conflicts given the current situation: none stated    Objective:     Patient found with: bed alarm, peripheral IV     General Precautions: Standard, fall   Orthopedic Precautions:N/A   Braces: N/A     Functional Mobility:  · Bed Mobility:  Supine to Sit: contact guard assistance  · Sit to Supine: contact guard assistance  · Transfers:  Sit to Stand:  minimum assistance with rolling walker  · Toilet Transfer (bedside commode): sit to stand  with RW and Min A  · Gait: 30ftx1 with RW and Min A. VC's for direction, and RW management.       AM-PAC 6 CLICK MOBILITY  Turning over in bed (including adjusting bedclothes, sheets and blankets)?: 3  Sitting down on and standing up from a chair with arms (e.g., wheelchair, bedside commode, etc.): 2  Moving from lying on back to sitting on the side of the bed?: 2  Moving to and from a bed to a chair (including a wheelchair)?: 2  Need to walk in hospital room?: 2  Climbing 3-5 steps with a railing?: 1  Total Score: 12       Therapeutic Activities and Exercises:   -B LE therex x20 reps: AP, LAQ, Hip Flexion, and GS. Cueing for proper technique.    Patient left HOB elevated with all lines intact, call button in reach and bed alarm on..    GOALS:    Physical Therapy Goals        Problem: Physical Therapy Goal    Goal Priority Disciplines Outcome Goal Variances Interventions   Physical Therapy Goal     PT/OT, PT Ongoing (interventions implemented as appropriate)     Description:  Goals to be met by: 18    Patient will increase functional independence with mobility by performin. Supine to sit with Stand-by Assistance  2. Sit to supine with Stand-by Assistance  3. Sit to stand transfer with Contact Guard Assistance with RW   4. Gait  x 50 feet with Contact Guard Assistance using Rolling Walker.   5. Ascend/descend 3 stairs with right Handrails Minimal Assistance .   6. Stand for 3 minutes with Stand-by Assistance using Rolling Walker while performing dynamic standing activities to reduce fall risk.   7. Lower extremity exercise program x20 reps per handout, with assistance as needed                      Time Tracking:     PT Received On: 18  PT Start Time: 1403     PT Stop Time: 1432  PT Total Time (min): 29 min     Billable Minutes: Gait Training 10 and Therapeutic Activity 19    Treatment Type: Treatment  PT/PTA: PTA     PTA Visit Number: 1     Halima Salmeron, LUDA  2018

## 2018-02-20 NOTE — ASSESSMENT & PLAN NOTE
- Pt's family friend at bedside (daughter currently not present) stated that pt had been declining in mental status for the past 6-8 months  - Daughter appears to be the main caretaker for the patient and is very involved in her treatment plan

## 2018-02-20 NOTE — ASSESSMENT & PLAN NOTE
CHFrEF 30% + diastolic dysfunction with right-sided heart failure  - Pt grossly edematous with pitting edema to mid-thigh on admit; however pt's lungs sound clear and she denies having dyspnea  - Likely 2/2 diet non-compliance or lack of fluid restriction + worsening heart function  - Given likely fluid overload will diurese with 120 mg lasix IV BID, decreasing doses as appropriate or adding other agents to gauge response, suspect cardiorenal component  - Will continue to hold BB in setting of bradycardia,  will not start ACEI at this time given CANDI  - Pt w/ improved LE pitting edema and adequate U/O for her renal function. Will continue w/ current diuresis schedule and monitor electrolytes closely   - Will switch to patient's new home regiment of Bumex BID ( 3 mg Q AM and 2 mg QHS) on 2/20 and observe overnight  - Will d/c amlodipine at this time and will continue to hold BB as patient continues to be bradycardic (HR in 60s)  - Will start Hydralazine 10 mg BID and Isodil 10 mg TID, as patient was on Bidil at home; will monitor how patient responds and up-titrate to home dose as needed

## 2018-02-20 NOTE — ASSESSMENT & PLAN NOTE
Likely cardiorenal   - Given clinical volume overload, will aggressively give diuretics and monitor renal function via I/O + daily CMPs  - No abnormalities on UA  - Cr remains elevated at 3.8, but is improving; will monitor closely  - Nephrology consulted for possible HD treatment initiation --> agree w/ continuation of  IV diuretic therapy for diuresis as patient is responding well for now   - further goals of care discussion as patient previously noted not wanting chronic dialysis if optimal medical therapy ineffective in fluid management   - no indication for acute RRT at this time   - daily labs, continue Is and Os and will continue 1000 mL fluid restriction   - avoid nephrotoxic medication  - Will require Nephrology f/u as an outpatient

## 2018-02-20 NOTE — PLAN OF CARE
Problem: Patient Care Overview  Goal: Plan of Care Review  Outcome: Ongoing (interventions implemented as appropriate)  SR up x2, call bell in place, bed in lowest/locked position, skid proof socks on.  [employer] monitor and bed alarm on.  Care plan explained to patient; no additional complaints at this time.

## 2018-02-20 NOTE — ASSESSMENT & PLAN NOTE
"Anai Sal is a 72 y.o. lady with CKD 4 and combined systolic and diastolic heart failure who was on home hospice who presents to [facility] for evaluation for acute hypoxic respiratory failure, secondary to decompensated heart failure.  Patient has numerous risk factors regarding potential for development of CKD, including HTN. Nephrology was consulted regarding dialysis consideration given recent admissions, fluid overloaded state, and failure of fluid control with medical therapy.  Overall, difficult to ascertain full reason for medical therapy as patient not accurate historian due to dementia.  Otherwise, patient inquired regarding dialysis options, of which she states "she has been praying on it to make her kidneys better."  She also notes that she is still able to make urine.  Kidney function continues to improve, now on oral diuretic therapy.      Plan  - patient and family to continue to assess possibility of dialysis.  Thus far, patient can continue medical management of decompensated heart failure with diuretic therapy.    - continue bumex dosing if wanting to continue diuresis, can decrease prior to discharge for maintenance   - no indication for acute RRT at this time  - daily labs, continue Is and Os  - avoid nephrotoxic medication   "

## 2018-02-20 NOTE — ASSESSMENT & PLAN NOTE
- Will d/c amlodipine and continue to hold toprol in bradycardia  - Bumex, Isodil and Hydralazine restarted on 2/19  - SBP improved; last in upper 120's

## 2018-02-20 NOTE — PT/OT/SLP PROGRESS
Occupational Therapy   Treatment    Name: Anai Sal  MRN: [id number]  Admitting Diagnosis:  Acute combined systolic and diastolic heart failure       Recommendations:     Discharge Recommendations: home with home health  Discharge Equipment Recommendations:     Barriers to discharge:       Subjective     Communicated with: RN prior to session.  Pain/Comfort:  · Pain Rating 1: 0/10    Patients cultural, spiritual, Sabianism conflicts given the current situation: none stated     Objective:     Patient found with:      General Precautions: Standard, fall   Orthopedic Precautions:N/A   Braces:       Occupational Performance:    Bed Mobility:    · Patient completed Rolling/Turning to Left with  stand by assistance  · Patient completed Scooting/Bridging with stand by assistance  · Patient completed Supine to Sit with stand by assistance     Functional Mobility/Transfers:  · Patient completed Sit <> Stand Transfer with contact guard assistance  with  rolling walker   · Patient completed Bed <> Chair Transfer using Step Transfer technique with contact guard assistance with rolling walker  · Patient completed Toilet Transfer Step Transfer technique with minimum assistance and moderate assistance with  grab bars and rolling walker (Min to sit / Mod to stand).  · Functional Mobility: Pt walked from bed<>bathroom (~ 6' x 2) with CGA using a RW.     Activities of Daily Living:  · UB Dressing: moderate assistance to don gown   · LB Dressing: moderate assistance for help getting socks over toes.  · Toileting: contact guard assistance to clean herself in standing.    Patient left up in chair with call button in reach    AMPA 6 Click:  AMPA Total Score: 16    Treatment & Education:  From chair level, completed BUE therex AROm including elbow flexion/extension, Hor Abd/Add, shld presses and fist pumps --- x 15 reps each.    Pt required education on proper RW management (keeping it close to her body) and proper/safe t/f  techniques (reaching back for surface). Requested return-demonstration where pt still left RW to the side prior to sitting so she needs continuous reinforcement.  Education:    Assessment:     Anai Sal is a 72 y.o. female with a medical diagnosis of Acute combined systolic and diastolic heart failure.  She presents with decreased (I) with ADLs, functional mobility & t/fs as well as decreased overall strength, ROM, endurance and balance. Demonstrates decreased safety awareness during t/fs and requires ongoing education to improve. Pt would benefit from skilled OT services as well as HHOT to address these deficits and to facilitate improving (I) with daily tasks. Performance deficits affecting function are weakness, decreased safety awareness, impaired self care skills, impaired functional mobilty, decreased coordination.      Rehab Prognosis:  Good; patient would benefit from acute skilled OT services to address these deficits and reach maximum level of function.       Plan:     Patient to be seen 3 x/week to address the above listed problems via self-care/home management, therapeutic activities, therapeutic exercises  · Plan of Care Expires: 03/16/18  · Plan of Care Reviewed with: patient, daughter    This Plan of care has been discussed with the patient who was involved in its development and understands and is in agreement with the identified goals and treatment plan    GOALS:    Occupational Therapy Goals        Problem: Occupational Therapy Goal    Goal Priority Disciplines Outcome Interventions   Occupational Therapy Goal     OT, PT/OT Ongoing (interventions implemented as appropriate)    Description:  Goals to be met by: 2/27/18    Patient will increase functional independence with ADLs by performing:    UE Dressing with Supervision.  LE Dressing with Stand-by Assistance.  Grooming while standing at sink with Stand-by Assistance.  Toileting from toilet with Stand-by Assistance for hygiene and clothing  management.   Supine to sit with Supervision.  Stand pivot transfers with Supervision.  Toilet transfer to toilet with Supervision.                      Time Tracking:     OT Date of Treatment: 02/20/18  OT Start Time: 0929  OT Stop Time: 0958  OT Total Time (min): 29 min    Billable Minutes:Therapeutic Activity 15  Therapeutic Exercise 14    ERIBERTO Castaneda  2/20/2018

## 2018-02-20 NOTE — SUBJECTIVE & OBJECTIVE
Interval History: NAEON. Pt w/ adequate U/O after transition to PO Bumex 3 mg BID. Will change the evening dose to 2 mg to avoid over-diuresing and f.u w/ labs as an outpatient after pt's discharge to monitor electrolytes and volume status. Significant improvement on exam and patients only complaint is the fluid restriction.  Daughter at bedside; discussed the plan with her as well.     Review of Systems   Constitutional: Negative for unexpected weight change.   HENT: Negative for congestion.    Eyes: Negative for visual disturbance.   Respiratory: Negative for choking, chest tightness, shortness of breath and wheezing.    Cardiovascular: Positive for leg swelling (improved from admission). Negative for chest pain.   Gastrointestinal: Negative for abdominal pain.   Musculoskeletal: Negative for arthralgias.   Skin: Negative for wound.   Neurological: Negative for headaches.   Psychiatric/Behavioral: Negative for sleep disturbance.     Objective:     Vital Signs (Most Recent):  Temp: 98 °F (36.7 °C) (02/20/18 0722)  Pulse: 63 (02/20/18 1059)  Resp: 20 (02/20/18 0928)  BP: 128/89 (02/20/18 0722)  SpO2: 100 % (02/20/18 0722) Vital Signs (24h Range):  Temp:  [97.6 °F (36.4 °C)-98.3 °F (36.8 °C)] 98 °F (36.7 °C)  Pulse:  [59-74] 63  Resp:  [15-20] 20  SpO2:  [94 %-100 %] 100 %  BP: (128-164)/(67-89) 128/89     Weight: 79.9 kg (176 lb 3.2 oz)  Body mass index is 28.44 kg/m².    Physical Exam   Constitutional: She appears well-developed.   HENT:   Head: Normocephalic and atraumatic.   Right Ear: External ear normal.   Left Ear: External ear normal.   Nose: Nose normal.   Eyes: EOM are normal. Pupils are equal, round, and reactive to light.   Neck: No tracheal deviation present. No thyromegaly present.   Cardiovascular: Normal rate.    Murmur heard.  Pulmonary/Chest: Effort normal. No respiratory distress. She has no wheezes. She has no rales.   No crackles noted this AM   Abdominal: Soft. There is no tenderness. No hernia.    Musculoskeletal: She exhibits edema.   2+ b/l LE edema, ascending to mid shin, non-tender    Neurological: She displays normal reflexes. No cranial nerve deficit.   Skin: No rash noted.   Psychiatric: She has a normal mood and affect. Judgment normal.   Vitals reviewed.        CRANIAL NERVES     CN III, IV, VI   Pupils are equal, round, and reactive to light.  Extraocular motions are normal.        Significant Labs:   CBC:     Recent Labs  Lab 02/19/18 0401 02/20/18 0518   WBC 3.16* 3.76*   HGB 7.6* 8.3*   HCT 23.4* 25.5*    169     CMP:     Recent Labs  Lab 02/18/18 1911 02/19/18 0401 02/20/18  0518    140 141   K 4.3 4.5 4.2   CL 95 96 97   CO2 29 29 29    58* 76   BUN 79* 80* 77*   CREATININE 4.3* 4.2* 3.8*   CALCIUM 8.9 8.8 9.1   PROT  --  7.3 7.8   ALBUMIN  --  3.0* 3.1*   BILITOT  --  0.7 0.8   ALKPHOS  --  73 76   AST  --  26 25   ALT  --  17 15   ANIONGAP 16 15 15   EGFRNONAA 9.7* 10.0* 11.2*     Cardiac Markers:   No results for input(s): CKMB, MYOGLOBIN, BNP, TROPISTAT in the last 48 hours.  Magnesium:     Recent Labs  Lab 02/18/18 1911 02/19/18 0401 02/20/18  0518   MG 2.2 2.3 2.3       Significant Imaging: I have reviewed all pertinent imaging results/findings within the past 24 hours.

## 2018-02-20 NOTE — PLAN OF CARE
Problem: Occupational Therapy Goal  Goal: Occupational Therapy Goal  Goals to be met by: 2/27/18    Patient will increase functional independence with ADLs by performing:    UE Dressing with Supervision.  LE Dressing with Stand-by Assistance.  Grooming while standing at sink with Stand-by Assistance.  Toileting from toilet with Stand-by Assistance for hygiene and clothing management.   Supine to sit with Supervision.  Stand pivot transfers with Supervision.  Toilet transfer to toilet with Supervision.    Outcome: Ongoing (interventions implemented as appropriate)  Evaluation completed and POC established.    ERIBERTO Rhoades

## 2018-02-20 NOTE — PROGRESS NOTES
[facility]  Nephrology  Progress Note    Patient Name: Anai Sal  MRN: [id number]  Admission Date: 2/15/2018  Hospital Length of Stay: 5 days  Attending Provider: Live Coffey MD   Primary Care Physician: Live Young MD (Inactive)  Principal Problem:Acute combined systolic and diastolic heart failure    Subjective:     HPI: CC: AK I on CK D4, with recurrent pulmonary edema.  Nephrology is consulted regarding AK I, renal function, volume overload.    Interval History: Patient seen in room in AM sitting in chair.  Spoke with family member.  Overall, patient doing better, no respiratory issues today.  Net negative 3 liters yesterday, overall 12 liters since admission.      Review of patient's allergies indicates:   Allergen Reactions    Seroquel [quetiapine]      Causes  profound sedation.     Current Facility-Administered Medications   Medication Frequency    acetaminophen tablet 1,000 mg Q8H PRN    acetaminophen tablet 650 mg Q4H PRN    albuterol-ipratropium 2.5mg-0.5mg/3mL nebulizer solution 3 mL Q4H PRN    allopurinol split tablet 50 mg Daily    aspirin chewable tablet 81 mg Daily    bumetanide tablet 2 mg QHS    bumetanide tablet 3 mg BID    dextrose 50% injection 12.5 g PRN    dextrose 50% injection 25 g PRN    divalproex EC tablet 250 mg Daily    divalproex EC tablet 500 mg QHS    fluticasone 50 mcg/actuation nasal spray 100 mcg Daily    fluticasone-vilanterol 100-25 mcg/dose diskus inhaler 1 puff Daily    glucagon (human recombinant) injection 1 mg PRN    glucose chewable tablet 16 g PRN    glucose chewable tablet 24 g PRN    heparin (porcine) injection 5,000 Units Q8H    hydrALAZINE tablet 10 mg Q12H    isosorbide dinitrate tablet 10 mg TID    levothyroxine tablet 100 mcg Before breakfast    ondansetron disintegrating tablet 8 mg Q8H PRN    ondansetron injection 4 mg Q8H PRN    polyethylene glycol packet 17 g Daily    ramelteon tablet 8 mg Nightly PRN     senna-docusate 8.6-50 mg per tablet 1 tablet Daily    sevelamer carbonate tablet 800 mg BID WM    sodium bicarbonate tablet 1,300 mg TID    sodium chloride 0.9% flush 5 mL PRN    tiotropium inhalation capsule 18 mcg Daily    vitamin renal formula (B-complex-vitamin c-folic acid) 1 mg per capsule 1 capsule Daily       Objective:     Vital Signs (Most Recent):  Temp: 96.3 °F (35.7 °C) (02/20/18 1205)  Pulse: 76 (02/20/18 1205)  Resp: 17 (02/20/18 1205)  BP: (!) 156/69 (02/20/18 1205)  SpO2: 99 % (02/20/18 1205)  O2 Device (Oxygen Therapy): room air (02/20/18 1205) Vital Signs (24h Range):  Temp:  [96.3 °F (35.7 °C)-98.3 °F (36.8 °C)] 96.3 °F (35.7 °C)  Pulse:  [59-76] 76  Resp:  [15-20] 17  SpO2:  [94 %-100 %] 99 %  BP: (128-159)/(67-89) 156/69     Weight: 79.9 kg (176 lb 3.2 oz) (02/19/18 0400)  Body mass index is 28.44 kg/m².  Body surface area is 1.93 meters squared.    I/O last 3 completed shifts:  In: 1264 [P.O.:1264]  Out: 5800 [Urine:5800]    Physical Exam   Constitutional: She is oriented to person, place, and time. She appears well-developed and well-nourished. No distress.   Obese   HENT:   Head: Normocephalic and atraumatic.   Mouth/Throat: No oropharyngeal exudate.   Eyes: Conjunctivae and EOM are normal. Pupils are equal, round, and reactive to light. Right eye exhibits no discharge. Left eye exhibits no discharge.   Neck: Normal range of motion. Neck supple. No thyromegaly present.   Cardiovascular: Normal rate, regular rhythm and intact distal pulses.    Murmur heard.  Pulmonary/Chest: No respiratory distress. She has rales ( minimal ). She exhibits no tenderness.   Abdominal: Soft. Bowel sounds are normal. She exhibits no distension. There is no tenderness.   Musculoskeletal: Normal range of motion. She exhibits edema (2+ pitting edema to mid tibia bilaterally, improved since prior ). She exhibits no tenderness.   Neurological: She is alert and oriented to person, place, and time. No cranial  nerve deficit. She exhibits normal muscle tone.   Skin: Skin is warm and dry. No rash noted. She is not diaphoretic. No erythema.   Psychiatric: She has a normal mood and affect. Her behavior is normal.   Nursing note and vitals reviewed.      Significant Labs:    Recent Results (from the past 24 hour(s))   Comprehensive Metabolic Panel (CMP)    Collection Time: 02/20/18  5:18 AM   Result Value Ref Range    Sodium 141 136 - 145 mmol/L    Potassium 4.2 3.5 - 5.1 mmol/L    Chloride 97 95 - 110 mmol/L    CO2 29 23 - 29 mmol/L    Glucose 76 70 - 110 mg/dL    BUN, Bld 77 (H) 8 - 23 mg/dL    Creatinine 3.8 (H) 0.5 - 1.4 mg/dL    Calcium 9.1 8.7 - 10.5 mg/dL    Total Protein 7.8 6.0 - 8.4 g/dL    Albumin 3.1 (L) 3.5 - 5.2 g/dL    Total Bilirubin 0.8 0.1 - 1.0 mg/dL    Alkaline Phosphatase 76 55 - 135 U/L    AST 25 10 - 40 U/L    ALT 15 10 - 44 U/L    Anion Gap 15 8 - 16 mmol/L    eGFR if African American 12.9 (A) >60 mL/min/1.73 m^2    eGFR if non  11.2 (A) >60 mL/min/1.73 m^2   Magnesium    Collection Time: 02/20/18  5:18 AM   Result Value Ref Range    Magnesium 2.3 1.6 - 2.6 mg/dL   Phosphorus    Collection Time: 02/20/18  5:18 AM   Result Value Ref Range    Phosphorus 3.1 2.7 - 4.5 mg/dL   CBC with Automated Differential    Collection Time: 02/20/18  5:18 AM   Result Value Ref Range    WBC 3.76 (L) 3.90 - 12.70 K/uL    RBC 2.73 (L) 4.00 - 5.40 M/uL    Hemoglobin 8.3 (L) 12.0 - 16.0 g/dL    Hematocrit 25.5 (L) 37.0 - 48.5 %    MCV 93 82 - 98 fL    MCH 30.4 27.0 - 31.0 pg    MCHC 32.5 32.0 - 36.0 g/dL    RDW 18.8 (H) 11.5 - 14.5 %    Platelets 169 150 - 350 K/uL    MPV 11.2 9.2 - 12.9 fL    Immature Granulocytes 0.3 0.0 - 0.5 %    Gran # (ANC) 2.3 1.8 - 7.7 K/uL    Immature Grans (Abs) 0.01 0.00 - 0.04 K/uL    Lymph # 0.9 (L) 1.0 - 4.8 K/uL    Mono # 0.5 0.3 - 1.0 K/uL    Eos # 0.1 0.0 - 0.5 K/uL    Baso # 0.01 0.00 - 0.20 K/uL    nRBC 0 0 /100 WBC    Gran% 60.0 38.0 - 73.0 %    Lymph% 24.5 18.0 - 48.0 %  "   Mono% 13.3 4.0 - 15.0 %    Eosinophil% 1.6 0.0 - 8.0 %    Basophil% 0.3 0.0 - 1.9 %    Differential Method Automated        Significant Imaging:  Labs: Reviewed    Assessment/Plan:     Acute renal failure superimposed on stage 5 chronic kidney disease, not on chronic dialysis    Anai Sal is a 72 y.o. lady with CKD 4 and combined systolic and diastolic heart failure who was on home hospice who presents to [facility] for evaluation for acute hypoxic respiratory failure, secondary to decompensated heart failure.  Patient has numerous risk factors regarding potential for development of CKD, including HTN. Nephrology was consulted regarding dialysis consideration given recent admissions, fluid overloaded state, and failure of fluid control with medical therapy.  Overall, difficult to ascertain full reason for medical therapy as patient not accurate historian due to dementia.  Otherwise, patient inquired regarding dialysis options, of which she states "she has been praying on it to make her kidneys better."  She also notes that she is still able to make urine.  Kidney function continues to improve, now on oral diuretic therapy.      Plan  - patient and family to continue to assess possibility of dialysis.  Thus far, patient can continue medical management of decompensated heart failure with diuretic therapy.    - continue bumex dosing if wanting to continue diuresis, can decrease prior to discharge for maintenance   - given heart failure as primary drive for CANDI, can consider cardiology consult for further optimization of medical therapy or further medical options in relation to advanced heart failure.  Overall, family member does admit to dietary indiscretion and notes that is one change they will make upon discharge  - no indication for acute RRT at this time  - daily labs, continue Is and Os  - avoid nephrotoxic medication           Alistair James MD  Nephrology  [facility]  "

## 2018-02-20 NOTE — PLAN OF CARE
Problem: Physical Therapy Goal  Goal: Physical Therapy Goal  Goals to be met by: 18    Patient will increase functional independence with mobility by performin. Supine to sit with Stand-by Assistance  2. Sit to supine with Stand-by Assistance  3. Sit to stand transfer with Contact Guard Assistance with RW   4. Gait  x 50 feet with Contact Guard Assistance using Rolling Walker.   5. Ascend/descend 3 stairs with right Handrails Minimal Assistance .   6. Stand for 3 minutes with Stand-by Assistance using Rolling Walker while performing dynamic standing activities to reduce fall risk.   7. Lower extremity exercise program x20 reps per handout, with assistance as needed     Goals remain appropriate at time. Continue with PT POC as indicated.

## 2018-02-20 NOTE — PLAN OF CARE
Problem: Patient Care Overview  Goal: Plan of Care Review  Outcome: Ongoing (interventions implemented as appropriate)  Patient AAOx2. Patient's vitals remain stable. Patient remains free from falls/injury throughout shift. Continuous cardiac monitoring in place per MD orders. AVASYS system in place for fall prevention. No complaints of pain this shift. Will continue to monitor.

## 2018-02-21 VITALS
DIASTOLIC BLOOD PRESSURE: 65 MMHG | OXYGEN SATURATION: 97 % | BODY MASS INDEX: 28.32 KG/M2 | SYSTOLIC BLOOD PRESSURE: 136 MMHG | TEMPERATURE: 99 F | RESPIRATION RATE: 18 BRPM | HEIGHT: 66 IN | WEIGHT: 176.19 LBS | HEART RATE: 70 BPM

## 2018-02-21 LAB
ALBUMIN SERPL BCP-MCNC: 3.1 G/DL
ALP SERPL-CCNC: 75 U/L
ALT SERPL W/O P-5'-P-CCNC: 16 U/L
ANION GAP SERPL CALC-SCNC: 14 MMOL/L
AST SERPL-CCNC: 29 U/L
BASOPHILS # BLD AUTO: 0.02 K/UL
BASOPHILS NFR BLD: 0.5 %
BILIRUB SERPL-MCNC: 0.9 MG/DL
BUN SERPL-MCNC: 75 MG/DL
CALCIUM SERPL-MCNC: 9.3 MG/DL
CHLORIDE SERPL-SCNC: 97 MMOL/L
CO2 SERPL-SCNC: 28 MMOL/L
CREAT SERPL-MCNC: 3.7 MG/DL
DIFFERENTIAL METHOD: ABNORMAL
EOSINOPHIL # BLD AUTO: 0.1 K/UL
EOSINOPHIL NFR BLD: 1.6 %
ERYTHROCYTE [DISTWIDTH] IN BLOOD BY AUTOMATED COUNT: 18.8 %
EST. GFR  (AFRICAN AMERICAN): 13.4 ML/MIN/1.73 M^2
EST. GFR  (NON AFRICAN AMERICAN): 11.6 ML/MIN/1.73 M^2
GLUCOSE SERPL-MCNC: 70 MG/DL
HCT VFR BLD AUTO: 26.2 %
HGB BLD-MCNC: 8.7 G/DL
IMM GRANULOCYTES # BLD AUTO: 0.01 K/UL
IMM GRANULOCYTES NFR BLD AUTO: 0.3 %
LYMPHOCYTES # BLD AUTO: 1 K/UL
LYMPHOCYTES NFR BLD: 27.4 %
MAGNESIUM SERPL-MCNC: 2.2 MG/DL
MCH RBC QN AUTO: 31.2 PG
MCHC RBC AUTO-ENTMCNC: 33.2 G/DL
MCV RBC AUTO: 94 FL
MONOCYTES # BLD AUTO: 0.5 K/UL
MONOCYTES NFR BLD: 13.3 %
NEUTROPHILS # BLD AUTO: 2.1 K/UL
NEUTROPHILS NFR BLD: 56.9 %
NRBC BLD-RTO: 0 /100 WBC
PHOSPHATE SERPL-MCNC: 2.5 MG/DL
PLATELET # BLD AUTO: 192 K/UL
PMV BLD AUTO: 11.3 FL
POTASSIUM SERPL-SCNC: 4 MMOL/L
PROT SERPL-MCNC: 8.2 G/DL
RBC # BLD AUTO: 2.79 M/UL
SODIUM SERPL-SCNC: 139 MMOL/L
WBC # BLD AUTO: 3.76 K/UL

## 2018-02-21 PROCEDURE — 36415 COLL VENOUS BLD VENIPUNCTURE: CPT

## 2018-02-21 PROCEDURE — 80053 COMPREHEN METABOLIC PANEL: CPT

## 2018-02-21 PROCEDURE — 84100 ASSAY OF PHOSPHORUS: CPT

## 2018-02-21 PROCEDURE — 25000003 PHARM REV CODE 250: Performed by: STUDENT IN AN ORGANIZED HEALTH CARE EDUCATION/TRAINING PROGRAM

## 2018-02-21 PROCEDURE — 25000242 PHARM REV CODE 250 ALT 637 W/ HCPCS: Performed by: STUDENT IN AN ORGANIZED HEALTH CARE EDUCATION/TRAINING PROGRAM

## 2018-02-21 PROCEDURE — 97530 THERAPEUTIC ACTIVITIES: CPT

## 2018-02-21 PROCEDURE — 99238 HOSP IP/OBS DSCHRG MGMT 30/<: CPT | Mod: GC,,, | Performed by: HOSPITALIST

## 2018-02-21 PROCEDURE — 99233 SBSQ HOSP IP/OBS HIGH 50: CPT | Mod: GC,,, | Performed by: INTERNAL MEDICINE

## 2018-02-21 PROCEDURE — 83735 ASSAY OF MAGNESIUM: CPT

## 2018-02-21 PROCEDURE — 85025 COMPLETE CBC W/AUTO DIFF WBC: CPT

## 2018-02-21 RX ORDER — METOPROLOL SUCCINATE 25 MG/1
25 TABLET, EXTENDED RELEASE ORAL DAILY
Qty: 30 TABLET | Refills: 1 | Status: SHIPPED | OUTPATIENT
Start: 2018-02-21 | End: 2018-04-11 | Stop reason: SDUPTHER

## 2018-02-21 RX ORDER — BUMETANIDE 2 MG/1
TABLET ORAL
Qty: 75 TABLET | Refills: 3 | Status: SHIPPED | OUTPATIENT
Start: 2018-02-21 | End: 2018-04-11 | Stop reason: SDUPTHER

## 2018-02-21 RX ORDER — ISOSORBIDE DINITRATE 10 MG/1
10 TABLET ORAL 3 TIMES DAILY
Qty: 90 TABLET | Refills: 3 | Status: SHIPPED | OUTPATIENT
Start: 2018-02-21 | End: 2018-04-11 | Stop reason: SDUPTHER

## 2018-02-21 RX ORDER — POLYETHYLENE GLYCOL 3350 17 G/17G
17 POWDER, FOR SOLUTION ORAL DAILY
Status: DISCONTINUED | OUTPATIENT
Start: 2018-02-21 | End: 2018-02-21 | Stop reason: HOSPADM

## 2018-02-21 RX ORDER — SODIUM,POTASSIUM PHOSPHATES 280-250MG
2 POWDER IN PACKET (EA) ORAL ONCE
Status: COMPLETED | OUTPATIENT
Start: 2018-02-21 | End: 2018-02-21

## 2018-02-21 RX ORDER — HYDRALAZINE HYDROCHLORIDE 10 MG/1
10 TABLET, FILM COATED ORAL EVERY 12 HOURS
Qty: 60 TABLET | Refills: 3 | Status: SHIPPED | OUTPATIENT
Start: 2018-02-21 | End: 2018-04-11 | Stop reason: SDUPTHER

## 2018-02-21 RX ORDER — NAPROXEN SODIUM 220 MG/1
81 TABLET, FILM COATED ORAL DAILY
Refills: 0 | COMMUNITY
Start: 2018-02-21 | End: 2019-02-21

## 2018-02-21 RX ORDER — DIVALPROEX SODIUM 250 MG/1
TABLET, DELAYED RELEASE ORAL
Qty: 90 TABLET | Refills: 1 | Status: SHIPPED | OUTPATIENT
Start: 2018-02-21 | End: 2018-04-11

## 2018-02-21 RX ADMIN — LEVOTHYROXINE SODIUM 100 MCG: 100 TABLET ORAL at 06:02

## 2018-02-21 RX ADMIN — ISOSORBIDE DINITRATE 10 MG: 10 TABLET ORAL at 01:02

## 2018-02-21 RX ADMIN — DIVALPROEX SODIUM 250 MG: 250 TABLET, DELAYED RELEASE ORAL at 09:02

## 2018-02-21 RX ADMIN — SEVELAMER CARBONATE 800 MG: 800 TABLET, FILM COATED ORAL at 09:02

## 2018-02-21 RX ADMIN — SODIUM BICARBONATE 650 MG TABLET 1300 MG: at 01:02

## 2018-02-21 RX ADMIN — POTASSIUM & SODIUM PHOSPHATES POWDER PACK 280-160-250 MG 2 PACKET: 280-160-250 PACK at 09:02

## 2018-02-21 RX ADMIN — NEPHROCAP 1 CAPSULE: 1 CAP ORAL at 09:02

## 2018-02-21 RX ADMIN — BUMETANIDE 3 MG: 1 TABLET ORAL at 09:02

## 2018-02-21 RX ADMIN — ISOSORBIDE DINITRATE 10 MG: 10 TABLET ORAL at 06:02

## 2018-02-21 RX ADMIN — FLUTICASONE FUROATE AND VILANTEROL TRIFENATATE 1 PUFF: 100; 25 POWDER RESPIRATORY (INHALATION) at 09:02

## 2018-02-21 RX ADMIN — ASPIRIN 81 MG CHEWABLE TABLET 81 MG: 81 TABLET CHEWABLE at 09:02

## 2018-02-21 RX ADMIN — METHYLDOPA 50 MG: 500 TABLET ORAL at 09:02

## 2018-02-21 RX ADMIN — FLUTICASONE PROPIONATE 100 MCG: 50 SPRAY, METERED NASAL at 09:02

## 2018-02-21 RX ADMIN — HYDRALAZINE HYDROCHLORIDE 10 MG: 10 TABLET, FILM COATED ORAL at 09:02

## 2018-02-21 RX ADMIN — SODIUM BICARBONATE 650 MG TABLET 1300 MG: at 06:02

## 2018-02-21 RX ADMIN — TIOTROPIUM BROMIDE 18 MCG: 18 CAPSULE ORAL; RESPIRATORY (INHALATION) at 09:02

## 2018-02-21 NOTE — PLAN OF CARE
[facility]    HOME HEALTH ORDERS  FACE TO FACE ENCOUNTER    Patient Name: Anai Sal  YOB: 1945    PCP: Live Young MD (Inactive)   PCP Address: [address] [place] [id number]  PCP Phone Number: [phone]  PCP Fax: [phone]    Encounter Date: 02/21/2018    Admit to Home Health    Diagnoses:  Active Hospital Problems    Diagnosis  POA    *Acute combined systolic and diastolic heart failure [I50.41]  Yes    Anemia in stage 5 chronic kidney disease, not on chronic dialysis [N18.5, D63.1]  Yes    Essential hypertension [I10]  Yes    Counseling regarding advanced care planning and goals of care [Z71.89]  Not Applicable    Major neurocognitive disorder [F03.90]  Yes    Acute renal failure superimposed on stage 5 chronic kidney disease, not on chronic dialysis [N17.9, N18.5]  Yes    Renovascular hypertension [I15.0]  Yes    Chronic gout [M1A.9XX0]  Yes      Resolved Hospital Problems    Diagnosis Date Resolved POA   No resolved problems to display.       Future Appointments  Date Time Provider Department Center   3/19/2018 1:00 PM Camryn Rolle PA-C [facility] [place]   4/9/2018 3:00 PM Kong Marie II, MD [facility] END[facility]CRN [place]           I have seen and examined this patient face to face today. My clinical findings that support the need for the home health skilled services and home bound status are the following:  Requiring assistive device to leave home due to unsteady gait caused by  Heart Failure and Weakness/Debility.  Medical restrictions requiring assistance of another human to leave home due to  Dyspnea on exertion (SOB).    Allergies:  Review of patient's allergies indicates:   Allergen Reactions    Seroquel [quetiapine]      Causes  profound sedation.       Diet: cardiac diet    Activities: activity as tolerated    Nursing:   SN to complete comprehensive assessment including routine vital signs. Instruct on disease process and s/s  of complications to report to MD. Review/verify medication list sent home with the patient at time of discharge  and instruct patient/caregiver as needed. Frequency may be adjusted depending on start of care date.    Notify MD if SBP > 160 or < 90; DBP > 90 or < 50; HR > 120 or < 50; Temp > 101;       CONSULTS:    Physical Therapy to evaluate and treat. Evaluate for home safety and equipment needs; Establish/upgrade home exercise program. Perform / instruct on therapeutic exercises, gait training, transfer training, and Range of Motion.  Occupational Therapy to evaluate and treat. Evaluate home environment for safety and equipment needs. Perform/Instruct on transfers, ADL training, ROM, and therapeutic exercises.   to evaluate for community resources/long-range planning.    MISCELLANEOUS CARE:  Heart Failure:      SN to instruct on the following:    Instruct on the definition of CHF.   Instruct on the signs/sympoms of CHF to be reported.   Instruct on and monitor daily weights.   Instruct on factors that cause exacerbation.   Instruct on action, dose, schedule, and side effects of medications.   Instruct on diet as prescribed.   Instruct on activity allowed.   Instruct on life-style modifications for life long management of CHF   SN to assess compliance with daily weights, diet, medications, fluid retention,    safety precautions, activities permitted and life-style modifications.   Additional 1-2 SN visits per week as needed for signs and symptoms     of CHF exacerbation.      For Weight Gain > 2-3 lbs in 1 day or 4-6 lbs over 1 week notify PCP:  Obtain BMP lab test in 3 days and fax to [facility]  N/A    WOUND CARE ORDERS  n/a      Medications: Review discharge medications with patient and family and provide education.      Current Discharge Medication List      START taking these medications    Details   hydrALAZINE (APRESOLINE) 10 MG tablet Take 1 tablet (10 mg total) by mouth every 12 (twelve)  hours.  Qty: 60 tablet, Refills: 3      isosorbide dinitrate (ISORDIL) 10 MG tablet Take 1 tablet (10 mg total) by mouth 3 (three) times daily.  Qty: 90 tablet, Refills: 3         CONTINUE these medications which have CHANGED    Details   aspirin 81 MG Chew Take 1 tablet (81 mg total) by mouth once daily.  Refills: 0      bumetanide (BUMEX) 2 MG tablet Take 1.5 tablets (3mg) by mouth every morning and 1 tab (2mg) every evening  Qty: 75 tablet, Refills: 3    Associated Diagnoses: Chronic combined systolic and diastolic heart failure, NYHA class 3      divalproex (DEPAKOTE) 250 MG EC tablet Take 1 tablet (250 mg) by mouth every morning and 2 tabs (500 mg) every evening  Qty: 90 tablet, Refills: 1      metoprolol succinate (TOPROL-XL) 25 MG 24 hr tablet Take 1 tablet (25 mg total) by mouth once daily.  Qty: 30 tablet, Refills: 1         CONTINUE these medications which have NOT CHANGED    Details   allopurinol (ZYLOPRIM) 100 MG tablet Take 0.5 tablets (50 mg total) by mouth once daily.  Qty: 90 tablet, Refills: 1      diclofenac sodium 1 % Gel Apply 4 g topically 4 (four) times daily. Prn pain      fluticasone (FLONASE) 50 mcg/actuation nasal spray 2 sprays by Each Nare route once daily.  Qty: 1 Bottle, Refills: 3      fluticasone-salmeterol 250-50 mcg/dose (ADVAIR) 250-50 mcg/dose diskus inhaler Inhale 1 puff into the lungs 2 (two) times daily. Controller  Refills: 0      levothyroxine (SYNTHROID) 100 MCG tablet Take 1 tablet (100 mcg total) by mouth before breakfast.  Qty: 30 tablet, Refills: 11      ramelteon (ROZEREM) 8 mg tablet Take 1 tablet (8 mg total) by mouth every evening.  Qty: 30 tablet, Refills: 11      senna-docusate 8.6-50 mg (PERICOLACE) 8.6-50 mg per tablet Take 1 tablet by mouth once daily.      sevelamer carbonate (RENVELA) 800 mg Tab Take 1 tablet (800 mg total) by mouth 2 (two) times daily with meals.  Qty: 30 tablet, Refills: 0      sodium bicarbonate 650 MG tablet Take 2 tablets (1,300 mg total)  by mouth 3 (three) times daily.  Qty: 180 tablet, Refills: 11      tiotropium (SPIRIVA) 18 mcg inhalation capsule Inhale 1 capsule (18 mcg total) into the lungs once daily.  Qty: 90 capsule, Refills: 3      vitamin renal formula, B-complex-vitamin c-folic acid, (NEPHROCAP) 1 mg Cap Take 1 capsule by mouth once daily.  Qty: 90 capsule, Refills: 1         STOP taking these medications       amLODIPine (NORVASC) 10 MG tablet Comments:   Reason for Stopping:         haloperidol (HALDOL) 1 MG tablet Comments:   Reason for Stopping:         isosorbide-hydrALAZINE 20-37.5 mg (BIDIL) 20-37.5 mg Tab Comments:   Reason for Stopping:         LORazepam (ATIVAN) 0.5 MG tablet Comments:   Reason for Stopping:         spironolactone (ALDACTONE) 25 MG tablet Comments:   Reason for Stopping:               I certify that this patient is confined to her home and needs physical therapy and occupational therapy.

## 2018-02-21 NOTE — PLAN OF CARE
Problem: Patient Care Overview  Goal: Plan of Care Review  Outcome: Ongoing (interventions implemented as appropriate)  Patient AAOx2. Patient's vitals remain stable. Patient remains free from falls/injury throughout shift. Continuous cardiac monitoring in place per MD orders. AVZuki telesitter system in place for fall prevention. Patient with multiple episodes of urinary incontinence this shift r/t inability to ambulate quickly to beside commode. No complaints of pain this shift. Will continue to monitor.

## 2018-02-21 NOTE — DISCHARGE SUMMARY
[facility] Hospital Medicine  Discharge Summary      Patient Name: Anai Sal  MRN: [id number]  Admission Date: 2/15/2018  Hospital Length of Stay: 6 days  Discharge Date and Time:  02/21/2018 3:19 PM  Attending Physician: Live Coffey MD   Discharging Provider: Stacy Montemayor MD  Primary Care Provider: Live Young MD (Inactive)  Hospital Medicine Team: [facility] HOSP MED 1 Satcy Montemayor MD    HPI:   72 F with HTN, HFrEF 30%, severe mitral regurgitation, CKD IV vs CKD V, Hep C, Breast ca s/p mastectomy, dementia brought in from home by daughter today for hypoxia on pulse oximetry - 85% on room air in AM. Pt was recently admitted at [facility] from 01/30 to 02/06 for CHF exacerbation (at that time pt had SOB). Pt was diuresed and resp status improved; at that time nephrology was consulted who suggested no RRT needed as pt was still making urine. However, discussion of dialysis was made due to pt's acute on chronic kidney disease, which pt declined and palliative care was consulted. Pt was sent home with home hospice. Pt's daughter says that pt has been more edematous and had some dyspnea over the last 4 days and that this morning she checked pt's oxygen saturation with a pulse oximeter at home and it read 85%. Pt was supposed to have a meeting with Dr. Benoit, her nephrologist, but due to her hypoxia pt's daughter brought her to the hospital.    In the ED BNP was 4.6K, pt was satting >95% on 2L NC with no SOB. Pt was given 80mg IV furosemide and admitted to Hospital Medicine for management of CHF exacerbation.    * No surgery found *      Hospital Course:   02/16/2018 Pt w/ improvement in her breathing and saturating well on Ra. With adequate U/O w/ IV lasix for her kidney function and improved LE edema w/ diuresis. Awaiting daughter's arrival for plan of care/goals of care discussion this afternoon and appropriate discharge planning.  02/17 Patient with improving LE edema and good U/O. Will  continue to diurese w/ IV lasix as patient still hypervolemic on exam. Nephrology consulted for evaluation and discussion of Hd. Patient w/ NAEON  02/18/2018 NAEON VSSA. No indications for acute dialysis. Continuing to diurese well with IV lasix. Breathing improved but still volume overloaded on physical exam. Labs stable.   02/19 Patient diuresing well w/ IV Lasix and is significantly improved from admission status. Will transition to PO medication to ensure that patient is still w/ appropriate U/O before discharge planning. NAEON and no new complaints at this time.  02/20 Continues to diurese very well on oral medication; will schedule Bumex at 3 mg QAM and 2 mg QHS for discharge. Will monitor overnight and anticipate discharge early in the Am. Net - 3146. Lungs clear and edema improving.     Consults:   Consults         Status Ordering Provider     Inpatient consult to Nephrology  Once     Provider:  (Not yet assigned)    Completed JOSETTE CLIFFORD     [facility]  [facility]     Provider:  (Not yet assigned)    Completed MJ WEI     [facility]  Once     Provider:  (Not yet assigned)    Completed BRANT MARIE          No new Assessment & Plan notes have been filed under this hospital service since the last note was generated.  Service: Hospital Medicine    Final Active Diagnoses:    Diagnosis Date Noted POA    PRINCIPAL PROBLEM:  Acute combined systolic and diastolic heart failure [I50.41] 02/15/2018 Yes    Anemia in stage 5 chronic kidney disease, not on chronic dialysis [N18.5, D63.1] 02/20/2018 Yes    Essential hypertension [I10] 02/20/2018 Yes    Counseling regarding advanced care planning and goals of care [Z71.89] 02/05/2018 Not Applicable    Major neurocognitive disorder [F03.90] 12/16/2017 Yes    Acute renal failure superimposed on stage 5 chronic kidney disease, not on chronic dialysis [N17.9, N18.5] 04/11/2017 Yes    Renovascular  "hypertension [I15.0] 12/16/2016 Yes    Chronic gout [M1A.9XX0] 12/16/2016 Yes      Problems Resolved During this Admission:    Diagnosis Date Noted Date Resolved POA       Discharged Condition: stable    Disposition: Home-Health Care Svc    Follow Up:  Follow up w/ labs drawn by H/H  Follow up w/ PCP and Nephrology as needed    Patient Instructions:   No discharge procedures on file.    Significant Diagnostic Studies: Labs:   CMP   Recent Labs  Lab 02/20/18 0518 02/21/18  0406    139   K 4.2 4.0   CL 97 97   CO2 29 28   GLU 76 70   BUN 77* 75*   CREATININE 3.8* 3.7*   CALCIUM 9.1 9.3   PROT 7.8 8.2   ALBUMIN 3.1* 3.1*   BILITOT 0.8 0.9   ALKPHOS 76 75   AST 25 29   ALT 15 16   ANIONGAP 15 14   ESTGFRAFRICA 12.9* 13.4*   EGFRNONAA 11.2* 11.6*   , CBC   Recent Labs  Lab 02/20/18 0518 02/21/18  0406   WBC 3.76* 3.76*   HGB 8.3* 8.7*   HCT 25.5* 26.2*    192   , INR   Lab Results   Component Value Date    INR 1.1 01/30/2018    INR 1.4 (H) 12/19/2017    INR 1.0 12/16/2016   , Lipid Panel   Lab Results   Component Value Date    CHOL 103 (L) 03/11/2017    HDL 38 (L) 03/11/2017    LDLCALC 57.0 (L) 03/11/2017    TRIG 40 03/11/2017    CHOLHDL 36.9 03/11/2017   , Troponin   Recent Labs  Lab 02/15/18  2151   TROPONINI 0.031*    and A1C: No results for input(s): HGBA1C in the last 4320 hours.  Radiology: X-Ray: CXR: X-Ray Chest 1 View (CXR):   Results for orders placed or performed during the hospital encounter of 02/15/18   X-Ray Chest 1 View for PICC_Central line    Narrative    COMPARISON: Chest radiograph earlier same day at 10.3 hours    FINDINGS: Frontal chest radiograph. Patient is slightly rotated. Monitoring leads and tubing overlie the chest. No PICC line identified to correlate with clinical history of "evaluate PICC line placement".  Correlate clinically. No large pneumothorax or definite new focal opacity. Otherwise, grossly stable radiographic appearance of the chest.    Impression    As " above.      Electronically signed by: CHELSEA MURRAY MD, MD  Date:     02/15/18  Time:    15:23        Pending Diagnostic Studies:     None         Medications:  Reconciled Home Medications:   Current Discharge Medication List      START taking these medications    Details   hydrALAZINE (APRESOLINE) 10 MG tablet Take 1 tablet (10 mg total) by mouth every 12 (twelve) hours.  Qty: 60 tablet, Refills: 3      isosorbide dinitrate (ISORDIL) 10 MG tablet Take 1 tablet (10 mg total) by mouth 3 (three) times daily.  Qty: 90 tablet, Refills: 3         CONTINUE these medications which have CHANGED    Details   aspirin 81 MG Chew Take 1 tablet (81 mg total) by mouth once daily.  Refills: 0      bumetanide (BUMEX) 2 MG tablet Take 1.5 tablets (3mg) by mouth every morning and 1 tab (2mg) every evening  Qty: 75 tablet, Refills: 3    Associated Diagnoses: Chronic combined systolic and diastolic heart failure, NYHA class 3      divalproex (DEPAKOTE) 250 MG EC tablet Take 1 tablet (250 mg) by mouth every morning and 2 tabs (500 mg) every evening  Qty: 90 tablet, Refills: 1      metoprolol succinate (TOPROL-XL) 25 MG 24 hr tablet Take 1 tablet (25 mg total) by mouth once daily.  Qty: 30 tablet, Refills: 1         CONTINUE these medications which have NOT CHANGED    Details   allopurinol (ZYLOPRIM) 100 MG tablet Take 0.5 tablets (50 mg total) by mouth once daily.  Qty: 90 tablet, Refills: 1      diclofenac sodium 1 % Gel Apply 4 g topically 4 (four) times daily. Prn pain      fluticasone (FLONASE) 50 mcg/actuation nasal spray 2 sprays by Each Nare route once daily.  Qty: 1 Bottle, Refills: 3      fluticasone-salmeterol 250-50 mcg/dose (ADVAIR) 250-50 mcg/dose diskus inhaler Inhale 1 puff into the lungs 2 (two) times daily. Controller  Refills: 0      levothyroxine (SYNTHROID) 100 MCG tablet Take 1 tablet (100 mcg total) by mouth before breakfast.  Qty: 30 tablet, Refills: 11      ramelteon (ROZEREM) 8 mg tablet Take 1 tablet (8 mg  total) by mouth every evening.  Qty: 30 tablet, Refills: 11      senna-docusate 8.6-50 mg (PERICOLACE) 8.6-50 mg per tablet Take 1 tablet by mouth once daily.      sevelamer carbonate (RENVELA) 800 mg Tab Take 1 tablet (800 mg total) by mouth 2 (two) times daily with meals.  Qty: 30 tablet, Refills: 0      sodium bicarbonate 650 MG tablet Take 2 tablets (1,300 mg total) by mouth 3 (three) times daily.  Qty: 180 tablet, Refills: 11      tiotropium (SPIRIVA) 18 mcg inhalation capsule Inhale 1 capsule (18 mcg total) into the lungs once daily.  Qty: 90 capsule, Refills: 3      vitamin renal formula, B-complex-vitamin c-folic acid, (NEPHROCAP) 1 mg Cap Take 1 capsule by mouth once daily.  Qty: 90 capsule, Refills: 1         STOP taking these medications       amLODIPine (NORVASC) 10 MG tablet Comments:   Reason for Stopping:         haloperidol (HALDOL) 1 MG tablet Comments:   Reason for Stopping:         isosorbide-hydrALAZINE 20-37.5 mg (BIDIL) 20-37.5 mg Tab Comments:   Reason for Stopping:         LORazepam (ATIVAN) 0.5 MG tablet Comments:   Reason for Stopping:         spironolactone (ALDACTONE) 25 MG tablet Comments:   Reason for Stopping:               Indwelling Lines/Drains at time of discharge:   Lines/Drains/Airways          No matching active lines, drains, or airways          Time spent on the discharge of patient: 35 minutes  Patient was seen and examined on the date of discharge and determined to be suitable for discharge.         Stacy Montemayor MD  Department of Hospital Medicine  [facility]

## 2018-02-21 NOTE — PT/OT/SLP PROGRESS
Physical Therapy Treatment    Patient Name:  Anai Sal   MRN:  [id number]    Recommendations:     Discharge Recommendations:  home with home health   Discharge Equipment Recommendations: none   Barriers to discharge: None    Assessment:     Anai Sal is a 72 y.o. female admitted with a medical diagnosis of Acute combined systolic and diastolic heart failure.  She presents with the following impairments/functional limitations:  weakness, decreased safety awareness, impaired self care skills, impaired functional mobilty, decreased coordination. Pt tolerated treatment well, and will continue to benefit from PT at this time. Continue with PT POC as indicated.     Rehab Prognosis:  Good; patient would benefit from acute skilled PT services to address these deficits and reach maximum level of function.      Recent Surgery: * No surgery found *      Plan:     During this hospitalization, patient to be seen 4 x/week to address the above listed problems via gait training, therapeutic activities, therapeutic exercises, neuromuscular re-education  · Plan of Care Expires:  03/18/18   Plan of Care Reviewed with: patient    Subjective     Communicated with nursing prior to session.  Patient found supine upon PT entry to room, agreeable to treatment.      Chief Complaint: none stated  Patient comments/goals: none stated  Pain/Comfort:  · Pain Rating 1: 0/10  · Pain Rating Post-Intervention 1: 0/10    Patients cultural, spiritual, Taoist conflicts given the current situation: none stated    Objective:     Patient found with: bed alarm     General Precautions: Standard, fall   Orthopedic Precautions:N/A   Braces: N/A     Functional Mobility:  · Bed Mobility:     · Supine to Sit: stand by assistance  · Sit to Supine: stand by assistance  · Transfers:  Sit to Stand:  minimum assistance with no AD  · Toilet Transfer: minimum assistance with  no AD  using  Stand Pivot      AM-PAC 6 CLICK MOBILITY  Turning over in bed  (including adjusting bedclothes, sheets and blankets)?: 3  Sitting down on and standing up from a chair with arms (e.g., wheelchair, bedside commode, etc.): 3  Moving from lying on back to sitting on the side of the bed?: 3  Moving to and from a bed to a chair (including a wheelchair)?: 2  Need to walk in hospital room?: 2  Climbing 3-5 steps with a railing?: 1  Total Score: 14       Therapeutic Activities and Exercises:   -Assisted pt with toileting.     Patient left supine with all lines intact, call button in reach, bed alarm on and friend present..    GOALS:    Physical Therapy Goals        Problem: Physical Therapy Goal    Goal Priority Disciplines Outcome Goal Variances Interventions   Physical Therapy Goal     PT/OT, PT Ongoing (interventions implemented as appropriate)     Description:  Goals to be met by: 18    Patient will increase functional independence with mobility by performin. Supine to sit with Stand-by Assistance  2. Sit to supine with Stand-by Assistance  3. Sit to stand transfer with Contact Guard Assistance with RW   4. Gait  x 50 feet with Contact Guard Assistance using Rolling Walker.   5. Ascend/descend 3 stairs with right Handrails Minimal Assistance .   6. Stand for 3 minutes with Stand-by Assistance using Rolling Walker while performing dynamic standing activities to reduce fall risk.   7. Lower extremity exercise program x20 reps per handout, with assistance as needed                      Time Tracking:     PT Received On: 18  PT Start Time: 1335     PT Stop Time: 1347  PT Total Time (min): 12 min     Billable Minutes: Therapeutic Activity 12    Treatment Type: Treatment  PT/PTA: PTA     PTA Visit Number: 2     Halima Salmeron PTA  2018

## 2018-02-21 NOTE — PROGRESS NOTES
[facility]  Nephrology  Progress Note    Patient Name: Anai Sal  MRN: [id number]  Admission Date: 2/15/2018  Hospital Length of Stay: 6 days  Attending Provider: Live Coffey MD   Primary Care Physician: Live Young MD (Inactive)  Principal Problem:Acute combined systolic and diastolic heart failure    Subjective:     HPI: CC: AK I on CK D4, with recurrent pulmonary edema.  Nephrology is consulted regarding AK I, renal function, volume overload.     Anai Sal is a 72 y.o. HFrEF 30%, severe mitral regurgitation, CKD IV/V, HTN,  Hep C, Breast ca s/p mastectomy, dementia brought in from home by daughter today for hypoxia on pulse oximetry - 85% on room air in AM.  Nephrology was consulted regarding dialysis discussions in the setting of CKD 4/5 and poor volume control with oral diuretic therapy.  She was recently admitted at [facility] from 01/30 to 02/06 for CHF exacerbation (at that time pt had SOB). Pt was diuresed and resp status improved; at that time nephrology was consulted who suggested no RRT needed as pt was still making urine. However, discussion of dialysis was made due to pt's acute on chronic kidney disease, which pt declined and palliative care was consulted. Patient was sent home with home hospice. Per chart review, her daughter said that pt has been more edematous and had some dyspnea over the last 4 days and that this morning she checked pt's oxygen saturation with a pulse oximeter at home and it read 85%.  She was scheduled to see nephrology for dialysis discussions, but [place] [facility] [place] to [place] respiratory failure.  In the ED, she had an elevated BNP to 4.6k and CXR revealing pulmonary edema.  She was diuresed with lasix 120 mg IV BID with significant volume removal.  She was seen this AM breathing on room air, but still has some wheezing.  Inquired if she was adherent to home medical therapy, to which she states she was.  Otherwise, notes she  previously did not want to undergo dialysis as she has heard numerous stories regarding it and is afraid of dialysis.      Patient with CK D4 to 5 with gradually increasing serum creatinine since at least 2016 from 1.5 to currently 4.5.  She had proteinuria of 1.4 g in the past, with an abnormal free light chain ratio, but urine protein electrophoresis did not demonstrate a paraprotein or light chain.  Urinary protein creatinine ratio has since been 0.2-.3. She is hepatitis C positive. Patient has been responding well to diuresis with urine output of close to 3000 cc in the last 24 hours.  She is lying in bed asking me to get her ice cream. She is not complaining of chest pain though  conversation is difficult  4/2017 renal ultrasound consistent with chronic medical disease and acquired cysts which appear to be Bosniak 1-2        Interval History: Patient seen in room in AM sitting in chair.  Doing well, no respiratory issues.  Noted urination with unmeasured output overnight.  BUN/Cr continues to improve with diuretic therapy.      Review of patient's allergies indicates:   Allergen Reactions    Seroquel [quetiapine]      Causes  profound sedation.     Current Facility-Administered Medications   Medication Frequency    acetaminophen tablet 1,000 mg Q8H PRN    acetaminophen tablet 650 mg Q4H PRN    albuterol-ipratropium 2.5mg-0.5mg/3mL nebulizer solution 3 mL Q4H PRN    allopurinol split tablet 50 mg Daily    aspirin chewable tablet 81 mg Daily    bumetanide tablet 2 mg QHS    bumetanide tablet 3 mg BID    dextrose 50% injection 12.5 g PRN    dextrose 50% injection 25 g PRN    divalproex EC tablet 250 mg Daily    divalproex EC tablet 500 mg QHS    fluticasone 50 mcg/actuation nasal spray 100 mcg Daily    fluticasone-vilanterol 100-25 mcg/dose diskus inhaler 1 puff Daily    glucagon (human recombinant) injection 1 mg PRN    glucose chewable tablet 16 g PRN    glucose chewable tablet 24 g PRN     heparin (porcine) injection 5,000 Units Q8H    hydrALAZINE tablet 10 mg Q12H    isosorbide dinitrate tablet 10 mg TID    levothyroxine tablet 100 mcg Before breakfast    ondansetron disintegrating tablet 8 mg Q8H PRN    ondansetron injection 4 mg Q8H PRN    polyethylene glycol packet 17 g Daily    ramelteon tablet 8 mg Nightly PRN    senna-docusate 8.6-50 mg per tablet 1 tablet Daily    sevelamer carbonate tablet 800 mg BID WM    sodium bicarbonate tablet 1,300 mg TID    sodium chloride 0.9% flush 5 mL PRN    tiotropium inhalation capsule 18 mcg Daily    vitamin renal formula (B-complex-vitamin c-folic acid) 1 mg per capsule 1 capsule Daily       Objective:     Vital Signs (Most Recent):  Temp: 98.5 °F (36.9 °C) (02/21/18 1149)  Pulse: 76 (02/21/18 1149)  Resp: 18 (02/21/18 1149)  BP: 136/65 (02/21/18 1149)  SpO2: 97 % (02/21/18 1149)  O2 Device (Oxygen Therapy): room air (02/21/18 1149) Vital Signs (24h Range):  Temp:  [97 °F (36.1 °C)-98.6 °F (37 °C)] 98.5 °F (36.9 °C)  Pulse:  [66-77] 76  Resp:  [17-18] 18  SpO2:  [97 %-99 %] 97 %  BP: (127-157)/(61-82) 136/65     Weight: 79.9 kg (176 lb 3.2 oz) (02/19/18 0400)  Body mass index is 28.44 kg/m².  Body surface area is 1.93 meters squared.    I/O last 3 completed shifts:  In: 1950 [P.O.:1950]  Out: 4000 [Urine:4000]    Physical Exam   Constitutional: She is oriented to person, place, and time. She appears well-developed and well-nourished. No distress.   Obese   HENT:   Head: Normocephalic and atraumatic.   Mouth/Throat: No oropharyngeal exudate.   Eyes: Conjunctivae and EOM are normal. Pupils are equal, round, and reactive to light. Right eye exhibits no discharge. Left eye exhibits no discharge.   Neck: Normal range of motion. Neck supple. No thyromegaly present.   Cardiovascular: Normal rate, regular rhythm and intact distal pulses.    Murmur heard.  Pulmonary/Chest: No respiratory distress. She has rales ( minimal ). She exhibits no tenderness.    Abdominal: Soft. Bowel sounds are normal. She exhibits no distension. There is no tenderness.   Musculoskeletal: Normal range of motion. She exhibits edema (1+ pitting edema to mid tibia bilaterally, improved since prior ). She exhibits no tenderness.   Neurological: She is alert and oriented to person, place, and time. No cranial nerve deficit. She exhibits normal muscle tone.   Skin: Skin is warm and dry. No rash noted. She is not diaphoretic. No erythema.   Psychiatric: She has a normal mood and affect. Her behavior is normal.   Nursing note and vitals reviewed.      Significant Labs:    Recent Results (from the past 24 hour(s))   Comprehensive Metabolic Panel (CMP)    Collection Time: 02/21/18  4:06 AM   Result Value Ref Range    Sodium 139 136 - 145 mmol/L    Potassium 4.0 3.5 - 5.1 mmol/L    Chloride 97 95 - 110 mmol/L    CO2 28 23 - 29 mmol/L    Glucose 70 70 - 110 mg/dL    BUN, Bld 75 (H) 8 - 23 mg/dL    Creatinine 3.7 (H) 0.5 - 1.4 mg/dL    Calcium 9.3 8.7 - 10.5 mg/dL    Total Protein 8.2 6.0 - 8.4 g/dL    Albumin 3.1 (L) 3.5 - 5.2 g/dL    Total Bilirubin 0.9 0.1 - 1.0 mg/dL    Alkaline Phosphatase 75 55 - 135 U/L    AST 29 10 - 40 U/L    ALT 16 10 - 44 U/L    Anion Gap 14 8 - 16 mmol/L    eGFR if African American 13.4 (A) >60 mL/min/1.73 m^2    eGFR if non African American 11.6 (A) >60 mL/min/1.73 m^2   Magnesium    Collection Time: 02/21/18  4:06 AM   Result Value Ref Range    Magnesium 2.2 1.6 - 2.6 mg/dL   Phosphorus    Collection Time: 02/21/18  4:06 AM   Result Value Ref Range    Phosphorus 2.5 (L) 2.7 - 4.5 mg/dL   CBC with Automated Differential    Collection Time: 02/21/18  4:06 AM   Result Value Ref Range    WBC 3.76 (L) 3.90 - 12.70 K/uL    RBC 2.79 (L) 4.00 - 5.40 M/uL    Hemoglobin 8.7 (L) 12.0 - 16.0 g/dL    Hematocrit 26.2 (L) 37.0 - 48.5 %    MCV 94 82 - 98 fL    MCH 31.2 (H) 27.0 - 31.0 pg    MCHC 33.2 32.0 - 36.0 g/dL    RDW 18.8 (H) 11.5 - 14.5 %    Platelets 192 150 - 350 K/uL    MPV  "11.3 9.2 - 12.9 fL    Immature Granulocytes 0.3 0.0 - 0.5 %    Gran # (ANC) 2.1 1.8 - 7.7 K/uL    Immature Grans (Abs) 0.01 0.00 - 0.04 K/uL    Lymph # 1.0 1.0 - 4.8 K/uL    Mono # 0.5 0.3 - 1.0 K/uL    Eos # 0.1 0.0 - 0.5 K/uL    Baso # 0.02 0.00 - 0.20 K/uL    nRBC 0 0 /100 WBC    Gran% 56.9 38.0 - 73.0 %    Lymph% 27.4 18.0 - 48.0 %    Mono% 13.3 4.0 - 15.0 %    Eosinophil% 1.6 0.0 - 8.0 %    Basophil% 0.5 0.0 - 1.9 %    Differential Method Automated        Significant Imaging:  Labs: Reviewed    Assessment/Plan:     Acute renal failure superimposed on stage 5 chronic kidney disease, not on chronic dialysis    Anai Sal is a 72 y.o. lady with CKD 4 and combined systolic and diastolic heart failure who was on home hospice who presents to [facility] for evaluation for acute hypoxic respiratory failure, secondary to decompensated heart failure.  Patient has numerous risk factors regarding potential for development of CKD, including HTN. Nephrology was consulted regarding dialysis consideration given recent admissions, fluid overloaded state, and failure of fluid control with medical therapy.  Overall, difficult to ascertain full reason for medical therapy as patient not accurate historian due to dementia.  Otherwise, patient inquired regarding dialysis options, of which she states "she has been praying on it to make her kidneys better."  She also notes that she is still able to make urine.  Kidney function continues to improve, now on oral diuretic therapy.  CANDI 2/2 CRS.  Decompensated heart failure likely due to dietary indiscretion (patient eats salted foods, I.e. Potato chips) and medication non-adherence.      Plan  - if planning to discharge, can go home on previous home diuretic therapy of bumex 2 mg PO BID  - daily labs, continue Is and Os  - avoid nephrotoxic medication             Thank you for your consult. I will sign off. Please contact us if you have any additional questions.    Alistair James, " MD  Nephrology  [facility]    ATTENDING PHYSICIAN ATTESTATION  I have personally interviewed and examined the patient. I thoroughly reviewed the demographic, clinical, laboratorial and imaging information available in medical records. I agree with the assessment and recommendations provided by the subspecialty resident. Dr. James was under my supervision.

## 2018-02-21 NOTE — PT/OT/SLP PROGRESS
Physical Therapy      Patient Name:  Anai Sal   MRN:  [id number]    Patient not seen today secondary to pt refusal (polite refusal) both AM and PM. Pt stated she was tired and wanted to think about what she was going to have to eat when she got home. Will follow-up at next scheduled visit per PT POC.    Halima Salmeron, PTA

## 2018-02-21 NOTE — SUBJECTIVE & OBJECTIVE
Interval History: Patient seen in room in AM sitting in chair.  Doing well, no respiratory issues.  Noted urination with unmeasured output overnight.  BUN/Cr continues to improve with diuretic therapy.      Review of patient's allergies indicates:   Allergen Reactions    Seroquel [quetiapine]      Causes  profound sedation.     Current Facility-Administered Medications   Medication Frequency    acetaminophen tablet 1,000 mg Q8H PRN    acetaminophen tablet 650 mg Q4H PRN    albuterol-ipratropium 2.5mg-0.5mg/3mL nebulizer solution 3 mL Q4H PRN    allopurinol split tablet 50 mg Daily    aspirin chewable tablet 81 mg Daily    bumetanide tablet 2 mg QHS    bumetanide tablet 3 mg BID    dextrose 50% injection 12.5 g PRN    dextrose 50% injection 25 g PRN    divalproex EC tablet 250 mg Daily    divalproex EC tablet 500 mg QHS    fluticasone 50 mcg/actuation nasal spray 100 mcg Daily    fluticasone-vilanterol 100-25 mcg/dose diskus inhaler 1 puff Daily    glucagon (human recombinant) injection 1 mg PRN    glucose chewable tablet 16 g PRN    glucose chewable tablet 24 g PRN    heparin (porcine) injection 5,000 Units Q8H    hydrALAZINE tablet 10 mg Q12H    isosorbide dinitrate tablet 10 mg TID    levothyroxine tablet 100 mcg Before breakfast    ondansetron disintegrating tablet 8 mg Q8H PRN    ondansetron injection 4 mg Q8H PRN    polyethylene glycol packet 17 g Daily    ramelteon tablet 8 mg Nightly PRN    senna-docusate 8.6-50 mg per tablet 1 tablet Daily    sevelamer carbonate tablet 800 mg BID WM    sodium bicarbonate tablet 1,300 mg TID    sodium chloride 0.9% flush 5 mL PRN    tiotropium inhalation capsule 18 mcg Daily    vitamin renal formula (B-complex-vitamin c-folic acid) 1 mg per capsule 1 capsule Daily       Objective:     Vital Signs (Most Recent):  Temp: 98.5 °F (36.9 °C) (02/21/18 1149)  Pulse: 76 (02/21/18 1149)  Resp: 18 (02/21/18 1149)  BP: 136/65 (02/21/18 1149)  SpO2: 97 %  (02/21/18 1149)  O2 Device (Oxygen Therapy): room air (02/21/18 1149) Vital Signs (24h Range):  Temp:  [97 °F (36.1 °C)-98.6 °F (37 °C)] 98.5 °F (36.9 °C)  Pulse:  [66-77] 76  Resp:  [17-18] 18  SpO2:  [97 %-99 %] 97 %  BP: (127-157)/(61-82) 136/65     Weight: 79.9 kg (176 lb 3.2 oz) (02/19/18 0400)  Body mass index is 28.44 kg/m².  Body surface area is 1.93 meters squared.    I/O last 3 completed shifts:  In: 1950 [P.O.:1950]  Out: 4000 [Urine:4000]    Physical Exam   Constitutional: She is oriented to person, place, and time. She appears well-developed and well-nourished. No distress.   Obese   HENT:   Head: Normocephalic and atraumatic.   Mouth/Throat: No oropharyngeal exudate.   Eyes: Conjunctivae and EOM are normal. Pupils are equal, round, and reactive to light. Right eye exhibits no discharge. Left eye exhibits no discharge.   Neck: Normal range of motion. Neck supple. No thyromegaly present.   Cardiovascular: Normal rate, regular rhythm and intact distal pulses.    Murmur heard.  Pulmonary/Chest: No respiratory distress. She has rales (minimal ). She exhibits no tenderness.   Abdominal: Soft. Bowel sounds are normal. She exhibits no distension. There is no tenderness.   Musculoskeletal: Normal range of motion. She exhibits edema (1+ pitting edema to mid tibia bilaterally, improved since prior ). She exhibits no tenderness.   Neurological: She is alert and oriented to person, place, and time. No cranial nerve deficit. She exhibits normal muscle tone.   Skin: Skin is warm and dry. No rash noted. She is not diaphoretic. No erythema.   Psychiatric: She has a normal mood and affect. Her behavior is normal.   Nursing note and vitals reviewed.      Significant Labs:    Recent Results (from the past 24 hour(s))   Comprehensive Metabolic Panel (CMP)    Collection Time: 02/21/18  4:06 AM   Result Value Ref Range    Sodium 139 136 - 145 mmol/L    Potassium 4.0 3.5 - 5.1 mmol/L    Chloride 97 95 - 110 mmol/L    CO2 28  23 - 29 mmol/L    Glucose 70 70 - 110 mg/dL    BUN, Bld 75 (H) 8 - 23 mg/dL    Creatinine 3.7 (H) 0.5 - 1.4 mg/dL    Calcium 9.3 8.7 - 10.5 mg/dL    Total Protein 8.2 6.0 - 8.4 g/dL    Albumin 3.1 (L) 3.5 - 5.2 g/dL    Total Bilirubin 0.9 0.1 - 1.0 mg/dL    Alkaline Phosphatase 75 55 - 135 U/L    AST 29 10 - 40 U/L    ALT 16 10 - 44 U/L    Anion Gap 14 8 - 16 mmol/L    eGFR if African American 13.4 (A) >60 mL/min/1.73 m^2    eGFR if non African American 11.6 (A) >60 mL/min/1.73 m^2   Magnesium    Collection Time: 02/21/18  4:06 AM   Result Value Ref Range    Magnesium 2.2 1.6 - 2.6 mg/dL   Phosphorus    Collection Time: 02/21/18  4:06 AM   Result Value Ref Range    Phosphorus 2.5 (L) 2.7 - 4.5 mg/dL   CBC with Automated Differential    Collection Time: 02/21/18  4:06 AM   Result Value Ref Range    WBC 3.76 (L) 3.90 - 12.70 K/uL    RBC 2.79 (L) 4.00 - 5.40 M/uL    Hemoglobin 8.7 (L) 12.0 - 16.0 g/dL    Hematocrit 26.2 (L) 37.0 - 48.5 %    MCV 94 82 - 98 fL    MCH 31.2 (H) 27.0 - 31.0 pg    MCHC 33.2 32.0 - 36.0 g/dL    RDW 18.8 (H) 11.5 - 14.5 %    Platelets 192 150 - 350 K/uL    MPV 11.3 9.2 - 12.9 fL    Immature Granulocytes 0.3 0.0 - 0.5 %    Gran # (ANC) 2.1 1.8 - 7.7 K/uL    Immature Grans (Abs) 0.01 0.00 - 0.04 K/uL    Lymph # 1.0 1.0 - 4.8 K/uL    Mono # 0.5 0.3 - 1.0 K/uL    Eos # 0.1 0.0 - 0.5 K/uL    Baso # 0.02 0.00 - 0.20 K/uL    nRBC 0 0 /100 WBC    Gran% 56.9 38.0 - 73.0 %    Lymph% 27.4 18.0 - 48.0 %    Mono% 13.3 4.0 - 15.0 %    Eosinophil% 1.6 0.0 - 8.0 %    Basophil% 0.5 0.0 - 1.9 %    Differential Method Automated        Significant Imaging:  Labs: Reviewed

## 2018-02-21 NOTE — ASSESSMENT & PLAN NOTE
"Anai Sal is a 72 y.o. lady with CKD 4 and combined systolic and diastolic heart failure who was on home hospice who presents to [facility] for evaluation for acute hypoxic respiratory failure, secondary to decompensated heart failure.  Patient has numerous risk factors regarding potential for development of CKD, including HTN. Nephrology was consulted regarding dialysis consideration given recent admissions, fluid overloaded state, and failure of fluid control with medical therapy.  Overall, difficult to ascertain full reason for medical therapy as patient not accurate historian due to dementia.  Otherwise, patient inquired regarding dialysis options, of which she states "she has been praying on it to make her kidneys better."  She also notes that she is still able to make urine.  Kidney function continues to improve, now on oral diuretic therapy.  CANDI 2/2 CRS.  Decompensated heart failure likely due to dietary indiscretion (patient eats salted foods, I.e. Potato chips) and medication non-adherence.      Plan  - if planning to discharge, can go home on previous home diuretic therapy of bumex 2 mg PO BID  - daily labs, continue Is and Os  - avoid nephrotoxic medication   "

## 2018-02-22 NOTE — PROGRESS NOTES
Pt discharged via wheelchair with all gathered personal belongings accompanied by daughter. Vitals stable. Temp: 98.5 HR: 70 RR: 18 O2 Sat: 97% on room air BP: 136/65. Denies pain. Shows no signs of distress. Peripheral IV removed with pressure held x 3 min. IV catheter intact with no bleeding at site at time of discharge. Discharge instructions/educational information given and taught and pt verbalized understanding.

## 2018-02-26 NOTE — PT/OT/SLP DISCHARGE
Physical Therapy Discharge Summary    Name: Anai Sal  MRN: [id number]   Principal Problem: Acute combined systolic and diastolic heart failure     Patient Discharged from acute Physical Therapy on 18.  Please refer to prior PT noted date on 18 for functional status.     Assessment:     Patient was discharged unexpectedly.  Information required to complete an accurate discharge summary is unknown.  Refer to therapy initial evaluation and last progress note for initial and most recent functional status and goal achievement.  Recommendations made may be found in medical record. Patient has not met goals.    Objective:     GOALS:    Physical Therapy Goals        Problem: Physical Therapy Goal    Goal Priority Disciplines Outcome Goal Variances Interventions   Physical Therapy Goal     PT/OT, PT Ongoing (interventions implemented as appropriate)     Description:  Goals to be met by: 18    Patient will increase functional independence with mobility by performin. Supine to sit with Stand-by Assistance  2. Sit to supine with Stand-by Assistance  3. Sit to stand transfer with Contact Guard Assistance with RW   4. Gait  x 50 feet with Contact Guard Assistance using Rolling Walker.   5. Ascend/descend 3 stairs with right Handrails Minimal Assistance .   6. Stand for 3 minutes with Stand-by Assistance using Rolling Walker while performing dynamic standing activities to reduce fall risk.   7. Lower extremity exercise program x20 reps per handout, with assistance as needed                      Reasons for Discontinuation of Therapy Services  Transfer to alternate level of care.      Plan:     Patient Discharged to: Home with Home Health Service.    Agnieszka Pierre, PT  2018

## 2018-03-01 ENCOUNTER — LAB VISIT (OUTPATIENT)
Dept: LAB | Facility: HOSPITAL | Age: 73
End: 2018-03-01
Attending: INTERNAL MEDICINE
Payer: COMMERCIAL

## 2018-03-01 DIAGNOSIS — N18.4 ANEMIA OF CHRONIC RENAL FAILURE, STAGE 4 (SEVERE): ICD-10-CM

## 2018-03-01 DIAGNOSIS — D63.1 ANEMIA OF CHRONIC RENAL FAILURE, STAGE 4 (SEVERE): ICD-10-CM

## 2018-03-01 LAB
HCT VFR BLD AUTO: 28.9 %
HGB BLD-MCNC: 9.8 G/DL
IRON SERPL-MCNC: 48 UG/DL
SATURATED IRON: 11 %
TOTAL IRON BINDING CAPACITY: 456 UG/DL
TRANSFERRIN SERPL-MCNC: 308 MG/DL

## 2018-03-01 PROCEDURE — 83540 ASSAY OF IRON: CPT

## 2018-03-01 PROCEDURE — 85018 HEMOGLOBIN: CPT

## 2018-03-01 PROCEDURE — 85014 HEMATOCRIT: CPT

## 2018-03-01 PROCEDURE — 36415 COLL VENOUS BLD VENIPUNCTURE: CPT

## 2018-03-02 ENCOUNTER — INFUSION (OUTPATIENT)
Dept: INFECTIOUS DISEASES | Facility: HOSPITAL | Age: 73
End: 2018-03-02
Attending: INTERNAL MEDICINE
Payer: COMMERCIAL

## 2018-03-02 VITALS — SYSTOLIC BLOOD PRESSURE: 142 MMHG | DIASTOLIC BLOOD PRESSURE: 64 MMHG

## 2018-03-02 DIAGNOSIS — D63.1 ANEMIA OF CHRONIC RENAL FAILURE, STAGE 4 (SEVERE): Primary | ICD-10-CM

## 2018-03-02 DIAGNOSIS — N18.4 ANEMIA OF CHRONIC RENAL FAILURE, STAGE 4 (SEVERE): Primary | ICD-10-CM

## 2018-03-02 PROCEDURE — 63600175 PHARM REV CODE 636 W HCPCS: Mod: JG,EC | Performed by: INTERNAL MEDICINE

## 2018-03-02 PROCEDURE — 96372 THER/PROPH/DIAG INJ SC/IM: CPT

## 2018-03-02 RX ADMIN — DARBEPOETIN ALFA 100 MCG: 100 INJECTION, SOLUTION INTRAVENOUS; SUBCUTANEOUS at 01:03

## 2018-03-02 NOTE — PROGRESS NOTES
Hgb 9.4;Hct 28.9    No dose change  PER PROTOCOL FORM DROD-188 due to being over due for injection due to hospital stay.  Aranesp 100 mcg given and 2 week return appt set.  Dr Benoit notified.    Gfr 13.4/stage 5

## 2018-03-02 NOTE — PLAN OF CARE
Problem: Health Knowledge, Opportunity to Enhance (Adult,NICU,[place],Obstetrics,Pediatric)  Goal: Knowledgeable about Health Subject/Topic  Patient will demonstrate the desired outcomes by discharge/transition of care.   Outcome: Ongoing (interventions implemented as appropriate)  PATIENT EDUCATED ON HAND WASHING AND INFECTION CONTROL. PATIENT VERBALIZED UNDERSTANDING

## 2018-03-20 ENCOUNTER — INFUSION (OUTPATIENT)
Dept: INFECTIOUS DISEASES | Facility: HOSPITAL | Age: 73
End: 2018-03-20
Attending: INTERNAL MEDICINE
Payer: COMMERCIAL

## 2018-03-20 VITALS
DIASTOLIC BLOOD PRESSURE: 90 MMHG | SYSTOLIC BLOOD PRESSURE: 158 MMHG | BODY MASS INDEX: 26.58 KG/M2 | HEIGHT: 66 IN | WEIGHT: 165.38 LBS

## 2018-03-20 DIAGNOSIS — N18.4 ANEMIA OF CHRONIC RENAL FAILURE, STAGE 4 (SEVERE): ICD-10-CM

## 2018-03-20 DIAGNOSIS — D63.1 ANEMIA IN STAGE 5 CHRONIC KIDNEY DISEASE: Primary | ICD-10-CM

## 2018-03-20 DIAGNOSIS — D63.1 ANEMIA OF CHRONIC RENAL FAILURE, STAGE 4 (SEVERE): ICD-10-CM

## 2018-03-20 DIAGNOSIS — N18.5 ANEMIA IN STAGE 5 CHRONIC KIDNEY DISEASE: Primary | ICD-10-CM

## 2018-03-20 PROCEDURE — 63600175 PHARM REV CODE 636 W HCPCS: Mod: JG,EC | Performed by: INTERNAL MEDICINE

## 2018-03-20 PROCEDURE — 96372 THER/PROPH/DIAG INJ SC/IM: CPT

## 2018-03-20 RX ADMIN — DARBEPOETIN ALFA 100 MCG: 100 INJECTION, SOLUTION INTRAVENOUS; SUBCUTANEOUS at 12:03

## 2018-03-20 NOTE — PROGRESS NOTES
Hgb 8.8;Hct 27.8    Mrs jennings was by herself today.  Hgb is down from 9.4 two weeks ago.  Prior to last injection she was in hospital and skipped shot for 2 months.  I didn't raise dose  PER PROTOCOL FORM DROD-188 due to patient not having any symptoms of Sob, weakness or fatigue .  Patient denies any Dark stools. She looked so much better today.  Her pressure was elevated and she is having trouble sleeping so she skipped her lasix at night.  Retake pressure was 156/88.  Aranesp 100 mcg given and 2 week return appt set.  I wrote a note for her daughter to keep an eye on her stools.  Dr Benoit notified.    Gfr 13.4/stage 5

## 2018-04-03 ENCOUNTER — INFUSION (OUTPATIENT)
Dept: INFECTIOUS DISEASES | Facility: HOSPITAL | Age: 73
End: 2018-04-03
Attending: INTERNAL MEDICINE
Payer: COMMERCIAL

## 2018-04-03 VITALS
HEIGHT: 66 IN | DIASTOLIC BLOOD PRESSURE: 77 MMHG | WEIGHT: 165.38 LBS | BODY MASS INDEX: 26.58 KG/M2 | SYSTOLIC BLOOD PRESSURE: 154 MMHG

## 2018-04-03 DIAGNOSIS — D63.1 ANEMIA OF CHRONIC RENAL FAILURE, STAGE 4 (SEVERE): Primary | ICD-10-CM

## 2018-04-03 DIAGNOSIS — N18.4 ANEMIA OF CHRONIC RENAL FAILURE, STAGE 4 (SEVERE): Primary | ICD-10-CM

## 2018-04-03 DIAGNOSIS — D63.1 ANEMIA IN STAGE 5 CHRONIC KIDNEY DISEASE, NOT ON CHRONIC DIALYSIS: Primary | ICD-10-CM

## 2018-04-03 DIAGNOSIS — N18.5 ANEMIA IN STAGE 5 CHRONIC KIDNEY DISEASE, NOT ON CHRONIC DIALYSIS: Primary | ICD-10-CM

## 2018-04-03 PROCEDURE — 63600175 PHARM REV CODE 636 W HCPCS: Mod: JG,EC | Performed by: INTERNAL MEDICINE

## 2018-04-03 PROCEDURE — 96372 THER/PROPH/DIAG INJ SC/IM: CPT

## 2018-04-03 RX ORDER — SODIUM CHLORIDE 0.9 % (FLUSH) 0.9 %
10 SYRINGE (ML) INJECTION
Status: CANCELLED | OUTPATIENT
Start: 2018-04-03

## 2018-04-03 RX ORDER — HEPARIN 100 UNIT/ML
500 SYRINGE INTRAVENOUS
Status: CANCELLED | OUTPATIENT
Start: 2018-04-03

## 2018-04-03 RX ADMIN — DARBEPOETIN ALFA 150 MCG: 150 INJECTION, SOLUTION INTRAVENOUS; SUBCUTANEOUS at 11:04

## 2018-04-03 NOTE — PLAN OF CARE
Problem: Health Knowledge, Opportunity to Enhance (Adult,NICU,[place],Obstetrics,Pediatric)  Goal: Knowledgeable about Health Subject/Topic  Patient will demonstrate the desired outcomes by discharge/transition of care.  Outcome: Ongoing (interventions implemented as appropriate)  PATIENT EDUCATED ON HAND WASHING AND INFECTION CONTROL. PATIENT VERBALIZED UNDERSTANDING

## 2018-04-03 NOTE — PROGRESS NOTES
Hgb 8.1;hct 25.8    Hgb down from 8.8 last visit.  Dose increased  PER PROTOCOL FORM DROD-188 from Aranesp 100 mcg to Aranesp 150 mcg.  Aranesp 150 mcg given and 2 week return appt set.  Dr Benoit notified and he ordered IV Iron Venofer x 10 doses to start on 4/9/18.       Gfr 13.4/stage 5

## 2018-04-09 ENCOUNTER — INFUSION (OUTPATIENT)
Dept: INFECTIOUS DISEASES | Facility: HOSPITAL | Age: 73
End: 2018-04-09
Attending: INTERNAL MEDICINE
Payer: COMMERCIAL

## 2018-04-09 VITALS
RESPIRATION RATE: 20 BRPM | TEMPERATURE: 99 F | HEART RATE: 77 BPM | OXYGEN SATURATION: 100 % | BODY MASS INDEX: 26.69 KG/M2 | WEIGHT: 165.38 LBS | SYSTOLIC BLOOD PRESSURE: 162 MMHG | DIASTOLIC BLOOD PRESSURE: 79 MMHG

## 2018-04-09 DIAGNOSIS — D63.1 ANEMIA OF CHRONIC RENAL FAILURE, STAGE 4 (SEVERE): Primary | ICD-10-CM

## 2018-04-09 DIAGNOSIS — N18.4 ANEMIA OF CHRONIC RENAL FAILURE, STAGE 4 (SEVERE): Primary | ICD-10-CM

## 2018-04-09 PROCEDURE — 63600175 PHARM REV CODE 636 W HCPCS: Performed by: INTERNAL MEDICINE

## 2018-04-09 PROCEDURE — 96365 THER/PROPH/DIAG IV INF INIT: CPT

## 2018-04-09 PROCEDURE — 25000003 PHARM REV CODE 250: Performed by: INTERNAL MEDICINE

## 2018-04-09 RX ORDER — SODIUM CHLORIDE 0.9 % (FLUSH) 0.9 %
10 SYRINGE (ML) INJECTION
Status: DISCONTINUED | OUTPATIENT
Start: 2018-04-09 | End: 2018-04-09 | Stop reason: HOSPADM

## 2018-04-09 RX ORDER — HEPARIN 100 UNIT/ML
500 SYRINGE INTRAVENOUS
Status: CANCELLED | OUTPATIENT
Start: 2018-04-30

## 2018-04-09 RX ORDER — SODIUM CHLORIDE 0.9 % (FLUSH) 0.9 %
10 SYRINGE (ML) INJECTION
Status: CANCELLED | OUTPATIENT
Start: 2018-04-30

## 2018-04-09 RX ADMIN — IRON SUCROSE 100 MG: 20 INJECTION, SOLUTION INTRAVENOUS at 11:04

## 2018-04-09 NOTE — PROGRESS NOTES
Pt received iv venofer.  Observed patient for 1hour post infusion for possible reaction.  Pt tolerated well and left in NAD.

## 2018-04-11 ENCOUNTER — OFFICE VISIT (OUTPATIENT)
Dept: CARDIOLOGY | Facility: CLINIC | Age: 73
End: 2018-04-11
Payer: COMMERCIAL

## 2018-04-11 VITALS
DIASTOLIC BLOOD PRESSURE: 81 MMHG | BODY MASS INDEX: 26.61 KG/M2 | HEIGHT: 66 IN | OXYGEN SATURATION: 93 % | SYSTOLIC BLOOD PRESSURE: 170 MMHG | HEART RATE: 120 BPM | WEIGHT: 165.56 LBS

## 2018-04-11 DIAGNOSIS — N18.5 ANEMIA IN STAGE 5 CHRONIC KIDNEY DISEASE, NOT ON CHRONIC DIALYSIS: ICD-10-CM

## 2018-04-11 DIAGNOSIS — R00.0 TACHYCARDIA: ICD-10-CM

## 2018-04-11 DIAGNOSIS — I27.20 PULMONARY HYPERTENSION: ICD-10-CM

## 2018-04-11 DIAGNOSIS — I15.0 RENOVASCULAR HYPERTENSION: ICD-10-CM

## 2018-04-11 DIAGNOSIS — B18.2 CHRONIC HEPATITIS C WITHOUT HEPATIC COMA: ICD-10-CM

## 2018-04-11 DIAGNOSIS — D63.1 ANEMIA IN STAGE 5 CHRONIC KIDNEY DISEASE, NOT ON CHRONIC DIALYSIS: ICD-10-CM

## 2018-04-11 DIAGNOSIS — I50.42 CHRONIC COMBINED SYSTOLIC AND DIASTOLIC HEART FAILURE, NYHA CLASS 3: Primary | Chronic | ICD-10-CM

## 2018-04-11 DIAGNOSIS — E89.0 POSTABLATIVE HYPOTHYROIDISM: ICD-10-CM

## 2018-04-11 DIAGNOSIS — I48.0 PAROXYSMAL ATRIAL FIBRILLATION: Chronic | ICD-10-CM

## 2018-04-11 DIAGNOSIS — N18.5 CHRONIC KIDNEY DISEASE, STAGE V: ICD-10-CM

## 2018-04-11 DIAGNOSIS — I34.0 SEVERE MITRAL REGURGITATION: ICD-10-CM

## 2018-04-11 DIAGNOSIS — E66.3 OVERWEIGHT (BMI 25.0-29.9): ICD-10-CM

## 2018-04-11 PROCEDURE — 93000 ELECTROCARDIOGRAM COMPLETE: CPT | Mod: S$GLB,,, | Performed by: INTERNAL MEDICINE

## 2018-04-11 PROCEDURE — 99214 OFFICE O/P EST MOD 30 MIN: CPT | Mod: S$GLB,,, | Performed by: NURSE PRACTITIONER

## 2018-04-11 PROCEDURE — 99999 PR PBB SHADOW E&M-EST. PATIENT-LVL III: CPT | Mod: PBBFAC,,, | Performed by: NURSE PRACTITIONER

## 2018-04-11 RX ORDER — ISOSORBIDE DINITRATE 20 MG/1
20 TABLET ORAL 3 TIMES DAILY
Qty: 90 TABLET | Refills: 11 | Status: ON HOLD | OUTPATIENT
Start: 2018-04-11 | End: 2018-04-26

## 2018-04-11 RX ORDER — BUMETANIDE 2 MG/1
TABLET ORAL
Qty: 90 TABLET | Refills: 3 | Status: SHIPPED | OUTPATIENT
Start: 2018-04-11

## 2018-04-11 RX ORDER — METOPROLOL SUCCINATE 50 MG/1
50 TABLET, EXTENDED RELEASE ORAL DAILY
Qty: 30 TABLET | Refills: 11 | Status: ON HOLD | OUTPATIENT
Start: 2018-04-11 | End: 2018-04-26 | Stop reason: HOSPADM

## 2018-04-11 NOTE — PROGRESS NOTES
Ms. Sal is a patient of Dr. Miller and was last seen in [facility] 2/7/2018.      Subjective:   Patient ID:  Anai Sal is a 72 y.o. female who presents for follow-up of Congestive Heart Failure (Hospital d/c 2/21/18); Shortness of Breath; Cough; and Leg Swelling    Problems  Breast CA s/p mastectomy in 1980  HFrEF, EF 30-35%  Severe mitral regurgitation  Eccentric LVH  Pulmonary hypertension  HCV  CKD stage IV  CAD  HTN  HLD  40 pack year h/o smoking, quit in 1995  COPD    HPI  Ms. Sal is in clinic today for hospital follow up for acute on chronic systolic heart failure.  Her weight is down in 11 pounds from admission to now.  She is a [facility] patient so she is not on the heart failure program.  Her blood pressure is elevated because her hydralazine and isosorbide dinitrate doses were decreased.  Her heart rate is elevated because her metoprolol dose was decreased to 25mg daily.      Review of Systems   Constitution: Negative for decreased appetite, diaphoresis, weakness, malaise/fatigue, weight gain and weight loss.   Eyes: Negative for visual disturbance.   Cardiovascular: Positive for leg swelling and orthopnea. Negative for chest pain, claudication, dyspnea on exertion, irregular heartbeat, near-syncope, palpitations, paroxysmal nocturnal dyspnea and syncope.        Denies chest pressure   Respiratory: Positive for cough and shortness of breath. Negative for hemoptysis, sleep disturbances due to breathing and snoring.    Endocrine: Negative for cold intolerance and heat intolerance.   Hematologic/Lymphatic: Negative for bleeding problem. Does not bruise/bleed easily.   Musculoskeletal: Negative for myalgias.   Gastrointestinal: Negative for bloating, abdominal pain, anorexia, change in bowel habit, constipation, diarrhea, nausea and vomiting.   Neurological: Positive for excessive daytime sleepiness. Negative for difficulty with concentration, disturbances in coordination, dizziness, headaches,  light-headedness, loss of balance and numbness.   Psychiatric/Behavioral: The patient is nervous/anxious. The patient does not have insomnia.        Allergies and current medications updated and reviewed:  Review of patient's allergies indicates:   Allergen Reactions    Seroquel [quetiapine]      Causes  profound sedation.     Current Outpatient Prescriptions   Medication Sig    allopurinol (ZYLOPRIM) 100 MG tablet Take 0.5 tablets (50 mg total) by mouth once daily.    aspirin 81 MG Chew Take 1 tablet (81 mg total) by mouth once daily.    bumetanide (BUMEX) 2 MG tablet Take 1.5 tablets (3mg) by mouth two times a day    diclofenac sodium 1 % Gel Apply 4 g topically 4 (four) times daily. Prn pain    fluticasone (FLONASE) 50 mcg/actuation nasal spray 2 sprays by Each Nare route once daily. (Patient taking differently: 2 sprays by Each Nare route daily as needed. )    hydrALAZINE (APRESOLINE) 25 MG Tab Take 1.5 tablets (37.5mg) by mouth three times a day    isosorbide dinitrate (ISORDIL) 20 MG tablet Take 1 tablet (20 mg total) by mouth 3 (three) times daily.    levothyroxine (SYNTHROID) 100 MCG tablet Take 1 tablet (100 mcg total) by mouth before breakfast. (Patient taking differently: Take 125 mcg by mouth before breakfast. )    metoprolol succinate (TOPROL-XL) 50 MG 24 hr tablet Take 1 tablet (50 mg total) by mouth once daily.    ramelteon (ROZEREM) 8 mg tablet Take 1 tablet (8 mg total) by mouth every evening.    senna-docusate 8.6-50 mg (PERICOLACE) 8.6-50 mg per tablet Take 1 tablet by mouth once daily.    sevelamer carbonate (RENVELA) 800 mg Tab Take 1 tablet (800 mg total) by mouth 2 (two) times daily with meals. (Patient taking differently: Take 800 mg by mouth 3 (three) times daily with meals. )    sodium bicarbonate 650 MG tablet Take 2 tablets (1,300 mg total) by mouth 3 (three) times daily.    tiotropium (SPIRIVA) 18 mcg inhalation capsule Inhale 1 capsule (18 mcg total) into the lungs once  "daily.    traZODone (DESYREL) 50 MG tablet Take 50 mg by mouth every evening.    vitamin renal formula, B-complex-vitamin c-folic acid, (NEPHROCAP) 1 mg Cap Take 1 capsule by mouth once daily.    fluticasone-salmeterol 250-50 mcg/dose (ADVAIR) 250-50 mcg/dose diskus inhaler Inhale 1 puff into the lungs 2 (two) times daily. Controller     No current facility-administered medications for this visit.      Objective:     Right Arm BP - Sittin/76 (18 1124)  Left Arm BP - Sittin/82 (18 1124)    BP (!) 170/81 (BP Location: Left arm, Patient Position: Sitting, BP Method: Medium (Automatic))   Pulse (!) 120   Ht 5' 6" (1.676 m)   Wt 75.1 kg (165 lb 9.1 oz)   LMP  (LMP Unknown)   SpO2 (!) 93%   BMI 26.72 kg/m²     Physical Exam   Constitutional: She is oriented to person, place, and time. Vital signs are normal. She appears well-developed and well-nourished. She is active. No distress.   HENT:   Head: Normocephalic and atraumatic.   Eyes: Conjunctivae and lids are normal. No scleral icterus.   Neck: Neck supple. Normal carotid pulses, no hepatojugular reflux and no JVD present. Carotid bruit is not present.   Cardiovascular: Normal rate, regular rhythm, S1 normal, S2 normal and intact distal pulses.  PMI is not displaced.  Exam reveals no gallop and no friction rub.    Murmur heard.  High-pitched blowing holosystolic murmur is present with a grade of 3/6  at the apex Mitral murmur can be heard in her back  Pulses:       Carotid pulses are 2+ on the right side, and 2+ on the left side.       Radial pulses are 2+ on the right side, and 2+ on the left side.        Dorsalis pedis pulses are 2+ on the right side, and 2+ on the left side.        Posterior tibial pulses are 2+ on the right side, and 2+ on the left side.   Pulmonary/Chest: Effort normal and breath sounds normal. No respiratory distress. She has no decreased breath sounds. She has no wheezes. She has no rhonchi. She has no rales. She " exhibits no tenderness.   Abdominal: Soft. Normal appearance and bowel sounds are normal. She exhibits no distension, no fluid wave, no abdominal bruit, no ascites and no pulsatile midline mass. There is no hepatosplenomegaly. There is no tenderness.   Musculoskeletal: She exhibits edema (Trace to +1 pitting edema).   Neurological: She is alert and oriented to person, place, and time. Gait normal.   Skin: Skin is warm, dry and intact. No rash noted. She is not diaphoretic. Nails show no clubbing.   Psychiatric: She has a normal mood and affect. Her speech is normal and behavior is normal. Judgment and thought content normal. Cognition and memory are normal.   Nursing note and vitals reviewed.      Chemistry        Component Value Date/Time     04/03/2018 1105    K 3.9 04/03/2018 1105    CL 97 04/03/2018 1105    CO2 32 (H) 04/03/2018 1105    BUN 45 (H) 04/03/2018 1105    CREATININE 2.9 (H) 04/03/2018 1105    GLU 96 04/03/2018 1105        Component Value Date/Time    CALCIUM 9.4 04/03/2018 1105    ALKPHOS 100 04/03/2018 1105    AST 25 04/03/2018 1105    ALT 17 04/03/2018 1105    BILITOT 0.9 04/03/2018 1105    ESTGFRAFRICA 18.0 (A) 04/03/2018 1105    EGFRNONAA 15.6 (A) 04/03/2018 1105          Recent Labs  Lab 03/11/17  0412  12/23/17  0356  01/30/18  1707  02/15/18  1011  04/03/18  1105   WHITE BLOOD CELL COUNT 3.17 L  < > 6.05  < > 2.95 L  < > 4.33  < > 4.21   HEMOGLOBIN 10.3 L  < > 9.9 L  < > 8.1 L  < > 7.8 L  < > 8.1 L  8.1 L   POC HEMATOCRIT  --   < >  --   --   --   --   --   --   --    HEMATOCRIT 29.1 L  < > 28.6 L  < > 24.7 L  < > 23.6 L  < > 25.8 L  25.8 L   MCV 93  < > 103 H  < > 97  < > 96  < > 100 H   PLATELETS 170  < > 127 L  < > 244  < > 121 L  < > 151   BNP  --   < > 670 H  --  >4,900 H  --  4,621 H  --   --    TSH  --   < >  --   < >  --   < >  --   < > 23.671 H   CHOLESTEROL 103 L  --   --   --   --   --   --   --   --    HDL 38 L  --   --   --   --   --   --   --   --    LDL CHOLESTEROL 57.0 L   --   --   --   --   --   --   --   --    TRIGLYCERIDES 40  --   --   --   --   --   --   --   --    HDL/CHOLESTEROL RATIO 36.9  --   --   --   --   --   --   --   --    < > = values in this interval not displayed.      Recent Labs  Lab 12/16/16  1852 12/19/17  0740 01/30/18  1820   INR 1.0 1.4 H 1.1        Test(s) Reviewed  I have reviewed the following in detail:  [] Stress test   [] Angiography   [x] Echocardiogram   [x] Labs   [] Other:       Assessment/Plan:     Chronic combined systolic and diastolic heart failure, NYHA class 3  Comments:  Symptomatic. BLE edema. Increase bumex to 3mg BID. Increase hydralazine to 20mg TID and isordil to 37.5mg TID.  Orders:  -     isosorbide dinitrate (ISORDIL) 20 MG tablet; Take 1 tablet (20 mg total) by mouth 3 (three) times daily.  Dispense: 90 tablet; Refill: 11  -     hydrALAZINE (APRESOLINE) 25 MG Tab; Take 1.5 tablets (37.5mg) by mouth three times a day  Dispense: 135 tablet; Refill: 11  -     bumetanide (BUMEX) 2 MG tablet; Take 1.5 tablets (3mg) by mouth two times a day  Dispense: 90 tablet; Refill: 3  -     metoprolol succinate (TOPROL-XL) 50 MG 24 hr tablet; Take 1 tablet (50 mg total) by mouth once daily.  Dispense: 30 tablet; Refill: 11  -     Basic metabolic panel; Future; Expected date: 04/11/2018    Severe mitral regurgitation    Pulmonary hypertension    Renovascular hypertension  Comments:  BP not at goal <130/80. 2gm sodium diet.     Paroxysmal atrial fibrillation  Comments:  Irregularly irregular. Tachycardic. Increase metoprolol to 50mg. Not anticoagulated because of high fall risk.   Orders:  -     EKG 12-lead  -     metoprolol succinate (TOPROL-XL) 50 MG 24 hr tablet; Take 1 tablet (50 mg total) by mouth once daily.  Dispense: 30 tablet; Refill: 11    Tachycardia  Comments:  HR in the 100's  Orders:  -     EKG 12-lead    Anemia in stage 5 chronic kidney disease, not on chronic dialysis    Chronic kidney disease, stage V    Postablative  hypothyroidism    Chronic hepatitis C without hepatic coma    Overweight (BMI 25.0-29.9)  Comments:  BMI 26.7    Spoke with Toya the provider in charge of her care.  Will fax note to [facility]    Follow-up in about 3 weeks (around 5/2/2018).

## 2018-04-11 NOTE — PATIENT INSTRUCTIONS
LOW-SALT DIET (2 grams/day)   This diet eliminates foods that are high in salt and restricts the amount of salt that you cook with. It is most often used for patients with high blood pressure, edema (fluid retention), kidney, liver, and heart disease.   Table salt contains the mineral sodium. The body needs a little bit of sodium to work normally. But too much sodium can make your health problems worse. Your total daily allowance of salt (sodium) is 2 grams. This equals 2000 milligrams (mg). This is about a teaspoon of salt. This means you can have only about 500 mg of sodium at each meal. Most individuals get excessive sodium from snacks. Look carefully for sodium levels at or around 250 mg per serving and do not eat more than 1 serving. Really low-sodium snacks contain less than 100 mg per serving.    When you cook, limit the salt you use. And if you can avoid using salt, even better. Do not add salt at the table. So, throw away the saltshaker!   When shopping, read the package labels. Salt is often called sodium on the label. Choose foods that are Salt-Free, Low Salt, or Very Low Salt. Note that foods with Reduced Salt may not lower your salt intake enough.     BEVERAGES OK: Tea, coffee, carbonated beverages, juices   AVOID: Flavored international coffees, electrolyte replacement drinks, sports beverages     BREAD & CEREALS OK: All regular bread, rolls, cereals, cakes; low-salt crackers, matzoh crackers   AVOID: Salted crackers, pretzels, popcorn; Bulgarian toast, pancakes, muffins     FRUITS & DESSERTS OK: Ice cream, frozen yogurt, juice bars, gelatin (Jell-O), cookies and pies, sugar, honey, jelly, hard candy   AVOID: Most pies, cakes and cookies prepared or processed with salt, instant pudding     MEATS OK: All fresh meat, fish, poultry, low-salt tuna   AVOID: Smoked, pickled, brine-cured, or salted meats or fish. This includes valle, chipped beef, corned beef, hot dogs, luncheon meats, ham, kosher meats, salt  pork, sausage, canned tuna, salted codfish, smoked salmon, herring, sardines, or anchovies.     DAIRY OK: Milk, chocolate milk, hot chocolate mix; eggs, Low Salt cheeses, yogurt, egg substitute   AVOID: Processed cheese, cheese spreads, Roquefort, Camembert, and cottage cheese, buttermilk, instant breakfast drink     BEANS, POTATOES & PASTA OK: Dry beans, split peas, lentils, potatoes, rice, macaroni, noodles, spaghetti without added salt   AVOID: Potato chips, tortilla chips, and similar products     SOUPS OK: Low-salt soups and broths made with allowed foods   AVOID: Bouillon cubes, soups with smoked or salted meats, regular soup and broth     VEGETABLES OK: Most are okay; low-salt tomato and vegetable juices   AVOID: Sauerkraut and other brine-soaked vegetables, pickles and other pickled vegetables, tomato juice, olives       SEASONING & SPICES: OK: Most seasonings are okay. Good substitutes for salt include: fresh herb blends, Tabasco, lemon, garlic, mendoza, vinegar, dry mustard, parsley, cilantro, horseradish, tomato paste, regular margarine, mayonnaise, butter, cream cheese, vegetable oil, cream, low-salt salad dressing and gravy   AVOID: Regular ketchup, relishes, pickles, soy sauce, teriyaki sauce, Worcestershire sauce, BBQ sauce, tartar sauce, meat tenderizer, chili sauce, regular gravy, regular salad dressing   © 3665-5320 [employer] \[facility] [employer] [facility] [place] [facility]\"", [address] [place] [address], [place] [address]. All rights reserved. This information is not intended as a substitute for professional medical care. Always follow your healthcare professional's instructions.

## 2018-04-16 ENCOUNTER — INFUSION (OUTPATIENT)
Dept: INFECTIOUS DISEASES | Facility: HOSPITAL | Age: 73
End: 2018-04-16
Attending: INTERNAL MEDICINE
Payer: COMMERCIAL

## 2018-04-16 VITALS
RESPIRATION RATE: 16 BRPM | TEMPERATURE: 99 F | SYSTOLIC BLOOD PRESSURE: 183 MMHG | WEIGHT: 174.5 LBS | HEART RATE: 63 BPM | OXYGEN SATURATION: 95 % | DIASTOLIC BLOOD PRESSURE: 86 MMHG | BODY MASS INDEX: 28.16 KG/M2

## 2018-04-16 VITALS — HEIGHT: 66 IN | DIASTOLIC BLOOD PRESSURE: 83 MMHG | SYSTOLIC BLOOD PRESSURE: 176 MMHG

## 2018-04-16 DIAGNOSIS — N18.4 ANEMIA OF CHRONIC RENAL FAILURE, STAGE 4 (SEVERE): Primary | ICD-10-CM

## 2018-04-16 DIAGNOSIS — D63.1 ANEMIA OF CHRONIC RENAL FAILURE, STAGE 4 (SEVERE): Primary | ICD-10-CM

## 2018-04-16 PROCEDURE — 63600175 PHARM REV CODE 636 W HCPCS: Mod: JG,EC | Performed by: INTERNAL MEDICINE

## 2018-04-16 PROCEDURE — 96372 THER/PROPH/DIAG INJ SC/IM: CPT

## 2018-04-16 RX ORDER — HEPARIN 100 UNIT/ML
500 SYRINGE INTRAVENOUS
Status: CANCELLED | OUTPATIENT
Start: 2018-04-30

## 2018-04-16 RX ORDER — SODIUM CHLORIDE 0.9 % (FLUSH) 0.9 %
10 SYRINGE (ML) INJECTION
Status: CANCELLED | OUTPATIENT
Start: 2018-04-30

## 2018-04-16 RX ADMIN — DARBEPOETIN ALFA 150 MCG: 150 INJECTION, SOLUTION INTRAVENOUS; SUBCUTANEOUS at 12:04

## 2018-04-16 NOTE — PROGRESS NOTES
Hgb 8.2;Hct 25.9    No dose increase  PER PROTOCOL FORM DROD-188 due to being increased on 4/3/18.  Aranesp 150 mcg given and 2 week return appt set.  Dr Benoit notified.  Patient is also on IV Venofer weekly but they couldn't get a vein for todays appt.    Gfr 18.0/stage 4

## 2018-04-19 ENCOUNTER — HOSPITAL ENCOUNTER (INPATIENT)
Facility: HOSPITAL | Age: 73
LOS: 9 days | Discharge: HOSPICE | DRG: 291 | End: 2018-04-28
Attending: EMERGENCY MEDICINE | Admitting: EMERGENCY MEDICINE
Payer: COMMERCIAL

## 2018-04-19 DIAGNOSIS — I50.43 ACUTE ON CHRONIC COMBINED SYSTOLIC AND DIASTOLIC CONGESTIVE HEART FAILURE: ICD-10-CM

## 2018-04-19 DIAGNOSIS — N18.5 CKD (CHRONIC KIDNEY DISEASE), STAGE V: ICD-10-CM

## 2018-04-19 DIAGNOSIS — I15.0 RENOVASCULAR HYPERTENSION: ICD-10-CM

## 2018-04-19 DIAGNOSIS — R54 AGE-RELATED PHYSICAL DEBILITY: ICD-10-CM

## 2018-04-19 DIAGNOSIS — Z71.89 COUNSELING REGARDING ADVANCED CARE PLANNING AND GOALS OF CARE: ICD-10-CM

## 2018-04-19 DIAGNOSIS — R07.9 CHEST PAIN: ICD-10-CM

## 2018-04-19 DIAGNOSIS — F33.41 RECURRENT MAJOR DEPRESSIVE DISORDER, IN PARTIAL REMISSION: ICD-10-CM

## 2018-04-19 DIAGNOSIS — I50.9 CHF (CONGESTIVE HEART FAILURE): ICD-10-CM

## 2018-04-19 DIAGNOSIS — R79.89 ELEVATED TROPONIN: ICD-10-CM

## 2018-04-19 DIAGNOSIS — Z71.89 GOALS OF CARE, COUNSELING/DISCUSSION: ICD-10-CM

## 2018-04-19 DIAGNOSIS — E89.0 POSTABLATIVE HYPOTHYROIDISM: ICD-10-CM

## 2018-04-19 DIAGNOSIS — I50.9 ACUTE ON CHRONIC CONGESTIVE HEART FAILURE: ICD-10-CM

## 2018-04-19 DIAGNOSIS — I27.20 PULMONARY HYPERTENSION: ICD-10-CM

## 2018-04-19 DIAGNOSIS — I48.0 PAROXYSMAL ATRIAL FIBRILLATION: ICD-10-CM

## 2018-04-19 DIAGNOSIS — R06.02 SOB (SHORTNESS OF BREATH): ICD-10-CM

## 2018-04-19 DIAGNOSIS — I34.0 SEVERE MITRAL REGURGITATION: ICD-10-CM

## 2018-04-19 DIAGNOSIS — R41.89 COGNITIVE IMPAIRMENT: ICD-10-CM

## 2018-04-19 DIAGNOSIS — I50.42 CHRONIC COMBINED SYSTOLIC AND DIASTOLIC HEART FAILURE, NYHA CLASS 3: Chronic | ICD-10-CM

## 2018-04-19 DIAGNOSIS — N18.5 ANEMIA OF CHRONIC RENAL FAILURE, STAGE 5: ICD-10-CM

## 2018-04-19 DIAGNOSIS — I50.9 ACUTE ON CHRONIC CONGESTIVE HEART FAILURE, UNSPECIFIED CONGESTIVE HEART FAILURE TYPE: Primary | ICD-10-CM

## 2018-04-19 DIAGNOSIS — D63.1 ANEMIA OF CHRONIC RENAL FAILURE, STAGE 5: ICD-10-CM

## 2018-04-19 DIAGNOSIS — Z51.5 PALLIATIVE CARE ENCOUNTER: ICD-10-CM

## 2018-04-19 DIAGNOSIS — Z85.3 HISTORY OF BREAST CANCER: ICD-10-CM

## 2018-04-19 DIAGNOSIS — J44.9 MIXED TYPE COPD (CHRONIC OBSTRUCTIVE PULMONARY DISEASE): ICD-10-CM

## 2018-04-19 LAB
ALBUMIN SERPL BCP-MCNC: 3.4 G/DL
ALP SERPL-CCNC: 75 U/L
ALT SERPL W/O P-5'-P-CCNC: 15 U/L
ANION GAP SERPL CALC-SCNC: 14 MMOL/L
AST SERPL-CCNC: 34 U/L
BASOPHILS # BLD AUTO: 0.01 K/UL
BASOPHILS NFR BLD: 0.2 %
BILIRUB SERPL-MCNC: 1.2 MG/DL
BUN SERPL-MCNC: 50 MG/DL
CALCIUM SERPL-MCNC: 9.5 MG/DL
CHLORIDE SERPL-SCNC: 95 MMOL/L
CO2 SERPL-SCNC: 31 MMOL/L
CREAT SERPL-MCNC: 3.3 MG/DL
DIFFERENTIAL METHOD: ABNORMAL
EOSINOPHIL # BLD AUTO: 0 K/UL
EOSINOPHIL NFR BLD: 0.7 %
ERYTHROCYTE [DISTWIDTH] IN BLOOD BY AUTOMATED COUNT: 17.8 %
EST. GFR  (AFRICAN AMERICAN): 15.2 ML/MIN/1.73 M^2
EST. GFR  (NON AFRICAN AMERICAN): 13.2 ML/MIN/1.73 M^2
GLUCOSE SERPL-MCNC: 91 MG/DL
HCT VFR BLD AUTO: 25 %
HGB BLD-MCNC: 8.1 G/DL
IMM GRANULOCYTES # BLD AUTO: 0.02 K/UL
IMM GRANULOCYTES NFR BLD AUTO: 0.5 %
INR PPP: 1.5
LYMPHOCYTES # BLD AUTO: 0.6 K/UL
LYMPHOCYTES NFR BLD: 15.3 %
MCH RBC QN AUTO: 30.6 PG
MCHC RBC AUTO-ENTMCNC: 32.4 G/DL
MCV RBC AUTO: 94 FL
MONOCYTES # BLD AUTO: 0.5 K/UL
MONOCYTES NFR BLD: 12.4 %
NEUTROPHILS # BLD AUTO: 3 K/UL
NEUTROPHILS NFR BLD: 70.9 %
NRBC BLD-RTO: 0 /100 WBC
PLATELET # BLD AUTO: 132 K/UL
PMV BLD AUTO: 12 FL
POTASSIUM SERPL-SCNC: 4.1 MMOL/L
PROT SERPL-MCNC: 8.1 G/DL
PROTHROMBIN TIME: 14.5 SEC
RBC # BLD AUTO: 2.65 M/UL
SODIUM SERPL-SCNC: 140 MMOL/L
TROPONIN I SERPL DL<=0.01 NG/ML-MCNC: 0.04 NG/ML
WBC # BLD AUTO: 4.18 K/UL

## 2018-04-19 PROCEDURE — 85025 COMPLETE CBC W/AUTO DIFF WBC: CPT

## 2018-04-19 PROCEDURE — 84484 ASSAY OF TROPONIN QUANT: CPT

## 2018-04-19 PROCEDURE — 83880 ASSAY OF NATRIURETIC PEPTIDE: CPT

## 2018-04-19 PROCEDURE — 80053 COMPREHEN METABOLIC PANEL: CPT

## 2018-04-19 PROCEDURE — 96374 THER/PROPH/DIAG INJ IV PUSH: CPT

## 2018-04-19 PROCEDURE — 99285 EMERGENCY DEPT VISIT HI MDM: CPT | Mod: 25

## 2018-04-19 PROCEDURE — 93005 ELECTROCARDIOGRAM TRACING: CPT

## 2018-04-19 PROCEDURE — 93010 ELECTROCARDIOGRAM REPORT: CPT | Mod: ,,, | Performed by: INTERNAL MEDICINE

## 2018-04-19 PROCEDURE — 99285 EMERGENCY DEPT VISIT HI MDM: CPT | Mod: ,,, | Performed by: EMERGENCY MEDICINE

## 2018-04-19 PROCEDURE — 12000002 HC ACUTE/MED SURGE SEMI-PRIVATE ROOM

## 2018-04-19 PROCEDURE — 85610 PROTHROMBIN TIME: CPT

## 2018-04-19 RX ORDER — IPRATROPIUM BROMIDE AND ALBUTEROL SULFATE 2.5; .5 MG/3ML; MG/3ML
3 SOLUTION RESPIRATORY (INHALATION)
Status: COMPLETED | OUTPATIENT
Start: 2018-04-20 | End: 2018-04-20

## 2018-04-19 RX ORDER — FUROSEMIDE 10 MG/ML
60 INJECTION INTRAMUSCULAR; INTRAVENOUS
Status: DISCONTINUED | OUTPATIENT
Start: 2018-04-20 | End: 2018-04-20

## 2018-04-20 PROBLEM — I50.9 ACUTE ON CHRONIC CONGESTIVE HEART FAILURE: Status: ACTIVE | Noted: 2018-04-20

## 2018-04-20 PROBLEM — I50.43 ACUTE ON CHRONIC COMBINED SYSTOLIC AND DIASTOLIC CONGESTIVE HEART FAILURE: Status: ACTIVE | Noted: 2018-04-20

## 2018-04-20 PROBLEM — N18.5 ANEMIA OF CHRONIC RENAL FAILURE, STAGE 5: Status: ACTIVE | Noted: 2017-05-10

## 2018-04-20 LAB
AORTIC VALVE REGURGITATION: ABNORMAL
BNP SERPL-MCNC: >4900 PG/ML
ESTIMATED PA SYSTOLIC PRESSURE: 49.57
MITRAL VALVE REGURGITATION: ABNORMAL
RETIRED EF AND QEF - SEE NOTES: 25 (ref 55–65)
TRICUSPID VALVE REGURGITATION: ABNORMAL

## 2018-04-20 PROCEDURE — 25000003 PHARM REV CODE 250: Performed by: STUDENT IN AN ORGANIZED HEALTH CARE EDUCATION/TRAINING PROGRAM

## 2018-04-20 PROCEDURE — 93306 TTE W/DOPPLER COMPLETE: CPT

## 2018-04-20 PROCEDURE — 25000003 PHARM REV CODE 250: Performed by: HOSPITALIST

## 2018-04-20 PROCEDURE — 63600175 PHARM REV CODE 636 W HCPCS: Performed by: HOSPITALIST

## 2018-04-20 PROCEDURE — 25000242 PHARM REV CODE 250 ALT 637 W/ HCPCS: Performed by: STUDENT IN AN ORGANIZED HEALTH CARE EDUCATION/TRAINING PROGRAM

## 2018-04-20 PROCEDURE — 93010 ELECTROCARDIOGRAM REPORT: CPT | Mod: ,,, | Performed by: INTERNAL MEDICINE

## 2018-04-20 PROCEDURE — 20600001 HC STEP DOWN PRIVATE ROOM

## 2018-04-20 PROCEDURE — 25000003 PHARM REV CODE 250: Performed by: EMERGENCY MEDICINE

## 2018-04-20 PROCEDURE — 63600175 PHARM REV CODE 636 W HCPCS: Performed by: STUDENT IN AN ORGANIZED HEALTH CARE EDUCATION/TRAINING PROGRAM

## 2018-04-20 PROCEDURE — 99223 1ST HOSP IP/OBS HIGH 75: CPT | Mod: GC,,, | Performed by: HOSPITALIST

## 2018-04-20 PROCEDURE — 63600175 PHARM REV CODE 636 W HCPCS: Performed by: EMERGENCY MEDICINE

## 2018-04-20 PROCEDURE — 25000242 PHARM REV CODE 250 ALT 637 W/ HCPCS: Performed by: EMERGENCY MEDICINE

## 2018-04-20 PROCEDURE — 27000221 HC OXYGEN, UP TO 24 HOURS

## 2018-04-20 PROCEDURE — 93005 ELECTROCARDIOGRAM TRACING: CPT

## 2018-04-20 PROCEDURE — 93306 TTE W/DOPPLER COMPLETE: CPT | Mod: 26,,, | Performed by: INTERNAL MEDICINE

## 2018-04-20 PROCEDURE — 94640 AIRWAY INHALATION TREATMENT: CPT

## 2018-04-20 RX ORDER — ENOXAPARIN SODIUM 100 MG/ML
40 INJECTION SUBCUTANEOUS EVERY 24 HOURS
Status: DISCONTINUED | OUTPATIENT
Start: 2018-04-20 | End: 2018-04-20

## 2018-04-20 RX ORDER — LORAZEPAM 0.5 MG/1
0.5 TABLET ORAL EVERY 6 HOURS PRN
Status: DISCONTINUED | OUTPATIENT
Start: 2018-04-20 | End: 2018-04-28 | Stop reason: HOSPADM

## 2018-04-20 RX ORDER — SEVELAMER CARBONATE 800 MG/1
800 TABLET, FILM COATED ORAL 2 TIMES DAILY WITH MEALS
Status: DISCONTINUED | OUTPATIENT
Start: 2018-04-20 | End: 2018-04-28 | Stop reason: HOSPADM

## 2018-04-20 RX ORDER — FUROSEMIDE 10 MG/ML
80 INJECTION INTRAMUSCULAR; INTRAVENOUS 2 TIMES DAILY
Status: DISCONTINUED | OUTPATIENT
Start: 2018-04-20 | End: 2018-04-20

## 2018-04-20 RX ORDER — ENOXAPARIN SODIUM 100 MG/ML
30 INJECTION SUBCUTANEOUS EVERY 24 HOURS
Status: DISCONTINUED | OUTPATIENT
Start: 2018-04-20 | End: 2018-04-23

## 2018-04-20 RX ORDER — FUROSEMIDE 10 MG/ML
120 INJECTION INTRAMUSCULAR; INTRAVENOUS 2 TIMES DAILY
Status: DISCONTINUED | OUTPATIENT
Start: 2018-04-20 | End: 2018-04-20

## 2018-04-20 RX ORDER — IBUPROFEN 200 MG
16 TABLET ORAL
Status: DISCONTINUED | OUTPATIENT
Start: 2018-04-20 | End: 2018-04-28 | Stop reason: HOSPADM

## 2018-04-20 RX ORDER — FUROSEMIDE 10 MG/ML
40 INJECTION INTRAMUSCULAR; INTRAVENOUS
Status: COMPLETED | OUTPATIENT
Start: 2018-04-20 | End: 2018-04-20

## 2018-04-20 RX ORDER — IBUPROFEN 200 MG
24 TABLET ORAL
Status: DISCONTINUED | OUTPATIENT
Start: 2018-04-20 | End: 2018-04-28 | Stop reason: HOSPADM

## 2018-04-20 RX ORDER — METOPROLOL SUCCINATE 25 MG/1
50 TABLET, EXTENDED RELEASE ORAL DAILY
Status: DISCONTINUED | OUTPATIENT
Start: 2018-04-20 | End: 2018-04-22

## 2018-04-20 RX ORDER — IPRATROPIUM BROMIDE AND ALBUTEROL SULFATE 2.5; .5 MG/3ML; MG/3ML
3 SOLUTION RESPIRATORY (INHALATION) EVERY 4 HOURS PRN
Status: DISCONTINUED | OUTPATIENT
Start: 2018-04-20 | End: 2018-04-28 | Stop reason: HOSPADM

## 2018-04-20 RX ORDER — ISOSORBIDE DINITRATE 10 MG/1
20 TABLET ORAL 3 TIMES DAILY
Status: DISCONTINUED | OUTPATIENT
Start: 2018-04-20 | End: 2018-04-20

## 2018-04-20 RX ORDER — FUROSEMIDE 10 MG/ML
40 INJECTION INTRAMUSCULAR; INTRAVENOUS
Status: DISCONTINUED | OUTPATIENT
Start: 2018-04-20 | End: 2018-04-20

## 2018-04-20 RX ORDER — SODIUM BICARBONATE 650 MG/1
1300 TABLET ORAL 3 TIMES DAILY
Status: DISCONTINUED | OUTPATIENT
Start: 2018-04-20 | End: 2018-04-25

## 2018-04-20 RX ORDER — ISOSORBIDE DINITRATE 10 MG/1
20 TABLET ORAL 3 TIMES DAILY
Status: DISCONTINUED | OUTPATIENT
Start: 2018-04-20 | End: 2018-04-21

## 2018-04-20 RX ORDER — FUROSEMIDE 40 MG/1
40 TABLET ORAL
Status: COMPLETED | OUTPATIENT
Start: 2018-04-20 | End: 2018-04-20

## 2018-04-20 RX ORDER — BUMETANIDE 1 MG/1
3 TABLET ORAL 2 TIMES DAILY
Status: DISCONTINUED | OUTPATIENT
Start: 2018-04-20 | End: 2018-04-20

## 2018-04-20 RX ORDER — FUROSEMIDE 10 MG/ML
20 INJECTION INTRAMUSCULAR; INTRAVENOUS
Status: COMPLETED | OUTPATIENT
Start: 2018-04-20 | End: 2018-04-20

## 2018-04-20 RX ORDER — TIOTROPIUM BROMIDE 18 UG/1
18 CAPSULE ORAL; RESPIRATORY (INHALATION) DAILY
Status: DISCONTINUED | OUTPATIENT
Start: 2018-04-20 | End: 2018-04-28 | Stop reason: HOSPADM

## 2018-04-20 RX ORDER — FUROSEMIDE 10 MG/ML
100 INJECTION INTRAMUSCULAR; INTRAVENOUS ONCE
Status: COMPLETED | OUTPATIENT
Start: 2018-04-20 | End: 2018-04-20

## 2018-04-20 RX ORDER — ISOSORBIDE DINITRATE 20 MG/1
20 TABLET ORAL 3 TIMES DAILY
Status: DISCONTINUED | OUTPATIENT
Start: 2018-04-20 | End: 2018-04-20

## 2018-04-20 RX ORDER — GLUCAGON 1 MG
1 KIT INJECTION
Status: DISCONTINUED | OUTPATIENT
Start: 2018-04-20 | End: 2018-04-28 | Stop reason: HOSPADM

## 2018-04-20 RX ORDER — SODIUM CHLORIDE 0.9 % (FLUSH) 0.9 %
5 SYRINGE (ML) INJECTION
Status: DISCONTINUED | OUTPATIENT
Start: 2018-04-20 | End: 2018-04-28 | Stop reason: HOSPADM

## 2018-04-20 RX ORDER — IPRATROPIUM BROMIDE AND ALBUTEROL SULFATE 2.5; .5 MG/3ML; MG/3ML
3 SOLUTION RESPIRATORY (INHALATION) EVERY 4 HOURS PRN
Status: DISCONTINUED | OUTPATIENT
Start: 2018-04-20 | End: 2018-04-20

## 2018-04-20 RX ORDER — NAPROXEN SODIUM 220 MG/1
81 TABLET, FILM COATED ORAL DAILY
Status: DISCONTINUED | OUTPATIENT
Start: 2018-04-20 | End: 2018-04-28 | Stop reason: HOSPADM

## 2018-04-20 RX ORDER — TRAZODONE HYDROCHLORIDE 50 MG/1
50 TABLET ORAL NIGHTLY
Status: DISCONTINUED | OUTPATIENT
Start: 2018-04-20 | End: 2018-04-28 | Stop reason: HOSPADM

## 2018-04-20 RX ADMIN — ISOSORBIDE DINITRATE 20 MG: 10 TABLET ORAL at 10:04

## 2018-04-20 RX ADMIN — FUROSEMIDE 80 MG: 10 INJECTION, SOLUTION INTRAMUSCULAR; INTRAVENOUS at 10:04

## 2018-04-20 RX ADMIN — FUROSEMIDE 40 MG: 10 INJECTION, SOLUTION INTRAMUSCULAR; INTRAVENOUS at 02:04

## 2018-04-20 RX ADMIN — IPRATROPIUM BROMIDE AND ALBUTEROL SULFATE 3 ML: .5; 3 SOLUTION RESPIRATORY (INHALATION) at 12:04

## 2018-04-20 RX ADMIN — FUROSEMIDE 20 MG: 10 INJECTION, SOLUTION INTRAMUSCULAR; INTRAVENOUS at 02:04

## 2018-04-20 RX ADMIN — ISOSORBIDE DINITRATE 20 MG: 20 TABLET ORAL at 03:04

## 2018-04-20 RX ADMIN — FUROSEMIDE 10 MG/HR: 10 INJECTION, SOLUTION INTRAVENOUS at 05:04

## 2018-04-20 RX ADMIN — ENOXAPARIN SODIUM 30 MG: 100 INJECTION SUBCUTANEOUS at 04:04

## 2018-04-20 RX ADMIN — SODIUM BICARBONATE 650 MG TABLET 1300 MG: at 10:04

## 2018-04-20 RX ADMIN — LORAZEPAM 0.5 MG: 0.5 TABLET ORAL at 05:04

## 2018-04-20 RX ADMIN — HYDRALAZINE HYDROCHLORIDE 35 MG: 10 TABLET, FILM COATED ORAL at 08:04

## 2018-04-20 RX ADMIN — TRAZODONE HYDROCHLORIDE 50 MG: 50 TABLET ORAL at 09:04

## 2018-04-20 RX ADMIN — ISOSORBIDE DINITRATE 20 MG: 10 TABLET ORAL at 08:04

## 2018-04-20 RX ADMIN — ISOSORBIDE DINITRATE 20 MG: 10 TABLET ORAL at 04:04

## 2018-04-20 RX ADMIN — FUROSEMIDE 40 MG: 40 TABLET ORAL at 01:04

## 2018-04-20 RX ADMIN — LORAZEPAM 0.5 MG: 0.5 TABLET ORAL at 11:04

## 2018-04-20 RX ADMIN — TIOTROPIUM BROMIDE 18 MCG: 18 CAPSULE ORAL; RESPIRATORY (INHALATION) at 10:04

## 2018-04-20 RX ADMIN — HYDRALAZINE HYDROCHLORIDE 35 MG: 10 TABLET, FILM COATED ORAL at 03:04

## 2018-04-20 RX ADMIN — ASPIRIN 81 MG CHEWABLE TABLET 81 MG: 81 TABLET CHEWABLE at 10:04

## 2018-04-20 RX ADMIN — LEVOTHYROXINE SODIUM 125 MCG: 25 TABLET ORAL at 04:04

## 2018-04-20 RX ADMIN — IPRATROPIUM BROMIDE AND ALBUTEROL SULFATE 3 ML: .5; 3 SOLUTION RESPIRATORY (INHALATION) at 05:04

## 2018-04-20 RX ADMIN — FUROSEMIDE 100 MG: 10 INJECTION, SOLUTION INTRAMUSCULAR; INTRAVENOUS at 05:04

## 2018-04-20 RX ADMIN — SODIUM BICARBONATE 650 MG TABLET 1300 MG: at 04:04

## 2018-04-20 RX ADMIN — SODIUM BICARBONATE 650 MG TABLET 1300 MG: at 08:04

## 2018-04-20 RX ADMIN — Medication 1 CAPSULE: at 11:04

## 2018-04-20 RX ADMIN — METHYLDOPA 50 MG: 500 TABLET ORAL at 10:04

## 2018-04-20 RX ADMIN — HYDRALAZINE HYDROCHLORIDE 35 MG: 10 TABLET, FILM COATED ORAL at 11:04

## 2018-04-20 NOTE — HPI
Mrs. Sal is a 73 year old female with a past medical history significant for HTN, CHF (last EF 30%), hypothyroidism, CAD, CKD, breast cancer, marlon presents with worsening SOB over the past 2 weeks as well as chest pain of one days duration. The patient and family state that the patient has been feeling more SOB over the past two weeks, they state that they have noticed her with increasing work of breathing and fluid accumulation. The patient denies any recent illness (although dose have chronic cough), change in diet, but do state that they have been somewhat confused as to what medications the patient should be taking following her recent discharge for acute on chronic heart failure.

## 2018-04-20 NOTE — ED NOTES
Labs collected by Md, will send to lab. Unable to place INT. MD talking with SANDIP Olivera charge nurse regarding patient status.

## 2018-04-20 NOTE — ASSESSMENT & PLAN NOTE
- continue outpatient medications   - hydralazine 35 TID  - isosorbide dinitrate 20 TID  - toprol xl 50 daily

## 2018-04-20 NOTE — ED NOTES
Pt presents to ED from home with c/o substernal chest pain and sob that pt states has worsened over the past week.  Does not wear O2 at baseline.  States that substernal cp is made worse by movement and cough.  States that she has had increased dyspnea upon exertion during this time as well.

## 2018-04-20 NOTE — NURSING
Pt arrived via stretcher from ED to [facility] .  AAOX4.  VSS.  No complaints of pain.  PIV is intact.  Pt SOB on exertion.  Doctor at the bedside.  BP was 188/98.  Pt received PO hydralazine and isorbide right before being transferred from ED.  Doctor aware of BP.  Will restart home bp medications.  Daughter is at the bedside.  Bed is in lowest position, call bell is in reach, and pt instructed to call nurse when needing assistance.  Will continue to monitor.

## 2018-04-20 NOTE — ED NOTES
Multiple attempts by multiple nurses for IV access without success. Charge nurse informed and will use u/s machine to establish IV access.

## 2018-04-20 NOTE — ASSESSMENT & PLAN NOTE
- initial concerns given complaints of CP  - troponin 0.037 on admission  - patient has history of chronically elevated troponin around this level

## 2018-04-20 NOTE — PLAN OF CARE
"CM to bedside - pt & dgtr present; dgtr provided assessment info. Pt w/ DME in place, lives w/ dgtr. Pt will likely d/c home w/ no needs. Pt is a part of the [facility] and attends 5 days per week - pt will resume.    CM provided patient anticipated [facility] which will be update by nursing staff. Patient provided a Blue "My Health Packet" for d/c planning and health tool. Patient verbalized understanding.     04/20/18 1109   Discharge Assessment   Assessment Type Discharge Planning Assessment   Confirmed/corrected address and phone number on facesheet? Yes   Assessment information obtained from? Patient;Caregiver   Expected Length of Stay (days) 3   Communicated expected length of stay with patient/caregiver yes   Prior to hospitilization cognitive status: Alert/Oriented   Prior to hospitalization functional status: Assistive Equipment;Needs Assistance   Current cognitive status: Alert/Oriented   Current Functional Status: Assistive Equipment;Needs Assistance   Facility Arrived From: N/A   Lives With child(kylie), adult   Able to Return to Prior Arrangements yes   Is patient able to care for self after discharge? No   Who are your caregiver(s) and their phone number(s)? dgtr - [place] [phone]   Patient's perception of discharge disposition home or selfcare   Readmission Within The Last 30 Days no previous admission in last 30 days   Patient currently being followed by outpatient case management? No   Patient currently receives any other outside agency services? Yes   Name and contact number of agency or person providing outside services [facility] days per week   Is it the patient/care giver preference to resume care with the current outside agency? Yes   Equipment Currently Used at Home bedside commode;shower chair;walker, rolling;nebulizer   Do you have any problems affording any of your prescribed medications? No   Is the patient taking medications as prescribed? yes   Does the patient have " transportation home? Yes   Transportation Available other (see comments)  ([facility] transport)   Dialysis Name and Scheduled days N/A   Does the patient receive services at the [facility]? No   Discharge Plan A Home with family  (pt in [facility] day program)   Discharge Plan B Home with family;Home Health   Patient/Family In Agreement With Plan yes

## 2018-04-20 NOTE — PROVIDER PROGRESS NOTES - EMERGENCY DEPT.
I have assumed care for this patient from Dr. Mensah           patient w/ PMH of CHF, present w/ LE edema and dyspnea x 2 weeks. On bumex 2 mg tid  Seen by cardiology, Dr Miller on 4/11/2018 when bumex was increased to 3 mg bid, metop increased  D/c from the hospital 2/21/2018 at which time she was diuresed  patient w/ +2 pitting edema b/l legs  + pitting edema abd wall  + bibasilar crackles, + JVD      CXR positive for pulmonary edema. Labs reviewed show anemia at baseline. CMP with CKD. Creatine at baseline. Troponin is elevated at 0.037, but previous troponins elevated in the same range. INR is 1.5. BNP greater than 4900. CXR shows CHF exacerbation with multiple attempts of peripheral IV failed. Will give 40mg PO lasix.     IV line successful, will give 60 mg IV lasix  Admitted to medicine

## 2018-04-20 NOTE — ED PROVIDER NOTES
"Encounter Date: 4/19/2018    SCRIBE #1 NOTE: I, Lucinda Munoz, am scribing for, and in the presence of,  Dr. Irby. I have scribed the entire note.       History     Chief Complaint   Patient presents with    Chest Pain     Generalized chest pain onset yesterday accompanied by SOB and nausea without emesis today     Shortness of Breath     Time patient was seen by the provider: 9:17 PM      The patient is a 73 y.o. female with co-morbidities including: HTN, CHF, thyroid disease, CAD, CKD, breast cancer, and hazel who presents to the ED with a complaint of generalized and intermittent CP as well as SOB since yesterday.  Also notes nausea without emesis today.  Reports that she has a leaky valve.  Rates CP as a 10/10.  She is not on oxygen at home, but was given oxygen on arrival to the ED today and notes that she feels better.  Family member notes pt saw cardiology a few days ago and was told to maintain her fluid.  She also notes pt has had a nonproductive cough and that pt's abdomen is distended.  Family states pt's oxygen level was low around 80's-low 90's at home, as well as "slimy" BM.  Denies fever, syncope, or dizziness.  Pt is on diuresis medication now, but notes decreased urine.        The history is provided by the patient, a relative and medical records.     Review of patient's allergies indicates:   Allergen Reactions    Seroquel [quetiapine]      Causes  profound sedation.     Past Medical History:   Diagnosis Date    Breast cancer 1980    right, s/p mastectomy    CHF (congestive heart failure)     Chronic hepatitis C virus infection 3/14/2017    Chronic kidney disease, stage V 2/20/2018    Coronary artery disease     Hypertension     Hazel     Thyroid disease      Past Surgical History:   Procedure Laterality Date    HYSTERECTOMY      MASTECTOMY Right 1980     Family History   Problem Relation Age of Onset    Heart disease Mother     No Known Problems Father     Heart disease Sister  "    Heart attack Brother     No Known Problems Maternal Aunt     No Known Problems Maternal Uncle     No Known Problems Paternal Aunt     No Known Problems Paternal Uncle     Heart attack Maternal Grandmother     No Known Problems Maternal Grandfather     No Known Problems Paternal Grandmother     No Known Problems Paternal Grandfather     Anemia Neg Hx     Arrhythmia Neg Hx     Asthma Neg Hx     Clotting disorder Neg Hx     Fainting Neg Hx     Heart failure Neg Hx     Hyperlipidemia Neg Hx     Hypertension Neg Hx     Stroke Neg Hx     Atrial Septal Defect Neg Hx      Social History   Substance Use Topics    Smoking status: Former Smoker     Packs/day: 2.00     Years: 20.00     Types: Cigarettes     Quit date: 2/16/1995    Smokeless tobacco: Never Used    Alcohol use No      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   HENT: Negative for ear pain and nosebleeds.    Eyes: Negative for pain and discharge.   Respiratory: Positive for shortness of breath.    Cardiovascular: Positive for chest pain.   Gastrointestinal: Positive for abdominal distention and nausea. Negative for vomiting.   Genitourinary: Positive for decreased urine volume. Negative for dysuria and hematuria.   Musculoskeletal: Negative for neck pain and neck stiffness.   Skin: Negative for rash.   Neurological: Negative for dizziness and syncope.       Physical Exam     Initial Vitals [04/19/18 2046]   BP Pulse Resp Temp SpO2   (!) 173/86 (!) 59 (!) 26 98.4 °F (36.9 °C) (!) 91 %      MAP       115         Physical Exam    Nursing note and vitals reviewed.  Constitutional: She appears well-developed and well-nourished. She is not diaphoretic.   HENT:   Head: Normocephalic and atraumatic.   Mouth/Throat: Oropharynx is clear and moist.   Neck: Normal range of motion. Neck supple. No JVD present.   Cardiovascular: Normal rate, regular rhythm, normal heart sounds and intact distal  pulses.   Pulmonary/Chest: She has wheezes.   Slight bibasilar crackles.  Faint expiratory wheezing.     Abdominal: Soft. She exhibits no distension. There is no tenderness.   Musculoskeletal: Normal range of motion. She exhibits no edema.   Lymphadenopathy:     She has no cervical adenopathy.   Neurological: She is alert and oriented to person, place, and time. No cranial nerve deficit or sensory deficit.   Skin: Skin is warm and dry.         ED Course   Procedures  Labs Reviewed   CBC W/ AUTO DIFFERENTIAL - Abnormal; Notable for the following:        Result Value    RBC 2.65 (*)     Hemoglobin 8.1 (*)     Hematocrit 25.0 (*)     RDW 17.8 (*)     Platelets 132 (*)     Lymph # 0.6 (*)     Lymph% 15.3 (*)     All other components within normal limits   COMPREHENSIVE METABOLIC PANEL - Abnormal; Notable for the following:     CO2 31 (*)     BUN, Bld 50 (*)     Creatinine 3.3 (*)     Albumin 3.4 (*)     Total Bilirubin 1.2 (*)     eGFR if  15.2 (*)     eGFR if non  13.2 (*)     All other components within normal limits   TROPONIN I - Abnormal; Notable for the following:     Troponin I 0.037 (*)     All other components within normal limits   PROTIME-INR - Abnormal; Notable for the following:     Prothrombin Time 14.5 (*)     INR 1.5 (*)     All other components within normal limits   B-TYPE NATRIURETIC PEPTIDE     EKG Readings: (Independently Interpreted)   Sinus bradycardia at 60.  Prolonged QT of 746.  No ST elevations.            Medical Decision Making:   History:   Old Medical Records: I decided to obtain old medical records.  Initial Assessment:   Emergent evaluation of 73 y.o. female with CP and SOB.    Differential Diagnosis:   Differential includes CHF exacerbation, COPD exacerbation, electrolyte derangement, PNA.      Independently Interpreted Test(s):   I have ordered and independently interpreted X-rays - see prior notes.  I have ordered and independently interpreted EKG  Reading(s) - see prior notes  Clinical Tests:   Lab Tests: Ordered and Reviewed  Radiological Study: Ordered and Reviewed  Medical Tests: Ordered and Reviewed  ED Management:  Pt with difficult IV axis.  Bedside US performed.  I was able to obtain blood for labs, but unsuccessful obtaining IV access.     I initially evaluated this patient and ordered workup while in intake.  The patient will receive a full evaluation in an ED pod when space is available.  All results from tests ordered in intake will not be followed by the intake team, including myself. All results will be followed by the ED Pod team.  Sign out given to Dr. Bhardwaj.               Scribe Attestation:   Scribe #1: I performed the above scribed service and the documentation accurately describes the services I performed. I attest to the accuracy of the note.    Attending Attestation:           Physician Attestation for Scribe:      Comments: I, Dr. Silva Irby, personally performed the services described in this documentation. All medical record entries made by the scribe were at my direction and in my presence.  I have reviewed the chart and agree that the record reflects my personal performance and is accurate and complete. Silva Irby MD.                 Clinical Impression:   Diagnoses of Chest pain and SOB (shortness of breath) were pertinent to this visit.    Disposition:   Disposition: Admitted  Condition: Fair                        Silva Irby MD  04/22/18 0716

## 2018-04-20 NOTE — ED NOTES
This RN attempted to obtain lab via straight stick to left wrist and hand unsuccessfully. Pressure dressing applied, pt. Tolerated well. Jarod,RN charge nurse aware, will try to place INT with US.

## 2018-04-20 NOTE — H&P
[facility] Hospital Medicine  History & Physical    Patient Name: Anai Sal  MRN: [id number]  Admission Date: 4/19/2018  Attending Physician: Live oCffey MD   Primary Care Provider: Live Young MD    [facility] Medicine Team: [facility] 4 Francisco Villarreal MD     Patient information was obtained from patient, relative(s) and ER records.     Subjective:     Principal Problem:Acute on chronic congestive heart failure    Chief Complaint:   Chief Complaint   Patient presents with    Chest Pain     Generalized chest pain onset yesterday accompanied by SOB and nausea without emesis today     Shortness of Breath        HPI: Mrs. Sal is a 73 year old female with a past medical history significant for HTN, CHF (last EF 30%), hypothyroidism, CAD, CKD, breast cancer, marlon presents with worsening SOB over the past 2 weeks as well as chest pain of one days duration. The patient and family state that the patient has been feeling more SOB over the past two weeks, they state that they have noticed her with increasing work of breathing and fluid accumulation. The patient denies any recent illness (although dose have chronic cough), change in diet, but do state that they have been somewhat confused as to what medications the patient should be taking following her recent discharge for acute on chronic heart failure.     Past Medical History:   Diagnosis Date    Breast cancer 1980    right, s/p mastectomy    CHF (congestive heart failure)     Chronic hepatitis C virus infection 3/14/2017    Chronic kidney disease, stage V 2/20/2018    Coronary artery disease     Hypertension     Marlon     Thyroid disease        Past Surgical History:   Procedure Laterality Date    HYSTERECTOMY      MASTECTOMY Right 1980       Review of patient's allergies indicates:   Allergen Reactions    Seroquel [quetiapine]      Causes  profound sedation.       No current facility-administered medications on file  prior to encounter.      Current Outpatient Prescriptions on File Prior to Encounter   Medication Sig    allopurinol (ZYLOPRIM) 100 MG tablet Take 0.5 tablets (50 mg total) by mouth once daily.    aspirin 81 MG Chew Take 1 tablet (81 mg total) by mouth once daily.    bumetanide (BUMEX) 2 MG tablet Take 1.5 tablets (3mg) by mouth two times a day    diclofenac sodium 1 % Gel Apply 4 g topically 4 (four) times daily. Prn pain    fluticasone (FLONASE) 50 mcg/actuation nasal spray 2 sprays by Each Nare route once daily. (Patient taking differently: 2 sprays by Each Nare route daily as needed. )    fluticasone-salmeterol 250-50 mcg/dose (ADVAIR) 250-50 mcg/dose diskus inhaler Inhale 1 puff into the lungs 2 (two) times daily. Controller    hydrALAZINE (APRESOLINE) 25 MG Tab Take 1.5 tablets (37.5mg) by mouth three times a day    isosorbide dinitrate (ISORDIL) 20 MG tablet Take 1 tablet (20 mg total) by mouth 3 (three) times daily.    levothyroxine (SYNTHROID) 100 MCG tablet Take 1 tablet (100 mcg total) by mouth before breakfast. (Patient taking differently: Take 125 mcg by mouth before breakfast. )    metoprolol succinate (TOPROL-XL) 50 MG 24 hr tablet Take 1 tablet (50 mg total) by mouth once daily.    ramelteon (ROZEREM) 8 mg tablet Take 1 tablet (8 mg total) by mouth every evening.    senna-docusate 8.6-50 mg (PERICOLACE) 8.6-50 mg per tablet Take 1 tablet by mouth once daily.    sevelamer carbonate (RENVELA) 800 mg Tab Take 1 tablet (800 mg total) by mouth 2 (two) times daily with meals. (Patient taking differently: Take 800 mg by mouth 3 (three) times daily with meals. )    sodium bicarbonate 650 MG tablet Take 2 tablets (1,300 mg total) by mouth 3 (three) times daily.    tiotropium (SPIRIVA) 18 mcg inhalation capsule Inhale 1 capsule (18 mcg total) into the lungs once daily.    traZODone (DESYREL) 50 MG tablet Take 50 mg by mouth every evening.    vitamin renal formula, B-complex-vitamin c-folic  acid, (NEPHROCAP) 1 mg Cap Take 1 capsule by mouth once daily.     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 Negative for activity change, chills, diaphoresis, fatigue and fever.   HENT: Negative for congestion, sore throat and trouble swallowing.    Eyes: Negative for photophobia, redness and visual disturbance.   Respiratory: Positive for cough, chest tightness and shortness of breath. Negative for choking and wheezing.    Cardiovascular: Positive for chest pain. Negative for palpitations and leg swelling.   Gastrointestinal: Negative for abdominal distention, abdominal pain, constipation, diarrhea, nausea and vomiting.   Genitourinary: Negative for dysuria and hematuria.   Musculoskeletal: Negative for arthralgias and myalgias.   Skin: Negative for rash and wound.   Neurological: Negative for dizziness, syncope, weakness, light-headedness, numbness and headaches.     Objective:     Vital Signs (Most Recent):  Temp: 98.3 °F (36.8 °C) (04/19/18 2348)  Pulse: (!) 59 (04/20/18 0415)  Resp: 18 (04/20/18 0415)  BP: (!) 188/98 (04/20/18 0415)  SpO2: 98 % (04/20/18 0415) Vital Signs (24h Range):  Temp:  [98.3 °F (36.8 °C)-98.4 °F (36.9 °C)] 98.3 °F (36.8 °C)  Pulse:  [55-62] 59  Resp:  [18-26] 18  SpO2:  [91 %-100 %] 98 %  BP: (171-188)/(79-98) 188/98     Weight: 74.8 kg (164 lb 14.5 oz)  Body mass  index is 26.62 kg/m².    Physical Exam   Constitutional: She is oriented to person, place, and time. She appears well-developed and well-nourished. No distress.   HENT:   Head: Normocephalic and atraumatic.   Right Ear: External ear normal.   Left Ear: External ear normal.   Mouth/Throat: Oropharynx is clear and moist. No oropharyngeal exudate.   Eyes: EOM are normal. Pupils are equal, round, and reactive to light. Right eye exhibits no discharge. Left eye exhibits no discharge. No scleral icterus.   Neck: Normal range of motion. Neck supple.   Cardiovascular: Normal rate and regular rhythm.    Murmur heard.  Pulmonary/Chest: Effort normal and breath sounds normal. No respiratory distress. She has no wheezes. She has no rales.   Saturating well on room air    Abdominal: Soft. Bowel sounds are normal. She exhibits no distension and no mass. There is no tenderness. There is no guarding.   Musculoskeletal: She exhibits edema (1+ pitting edema of bilateral lower extremities ). She exhibits no tenderness.   Neurological: She is alert and oriented to person, place, and time.   Skin: Skin is warm and dry. She is not diaphoretic.   Psychiatric: She has a normal mood and affect. Her behavior is normal.   Vitals reviewed.        CRANIAL NERVES     CN III, IV, VI   Pupils are equal, round, and reactive to light.  Extraocular motions are normal.        Significant Labs:   BMP:   Recent Labs  Lab 04/19/18 2306   GLU 91      K 4.1   CL 95   CO2 31*   BUN 50*   CREATININE 3.3*   CALCIUM 9.5     CBC:   Recent Labs  Lab 04/19/18 2306   WBC 4.18   HGB 8.1*   HCT 25.0*   *     Cardiac Markers:   Recent Labs  Lab 04/19/18 2306   BNP >4,900*       Significant Imaging: I have reviewed all pertinent imaging results/findings within the past 24 hours.    Assessment/Plan:     * Acute on chronic congestive heart failure    - patient presents with 2 week history of worsening SOB and CP  - BNP >4900 on admission  - initial  troponin 0.037, appears to be stable per previous labs  - CXR with evidence of pulmonary edema   - given 60mg lasix IV and 20mg lasix PO in ED  - will continue to diurese with 80 mg lasix IV BID  - strict I and O  - repeat cardiac echo          Essential hypertension    - continue outpatient medications   - hydralazine 35 TID  - isosorbide dinitrate 20 TID  - toprol xl 50 daily           CKD (chronic kidney disease), stage V    - creatinine stable on admission at 3.3  - renally dose medications  - continue to follow           Anemia of chronic renal failure, stage 4 (severe)    - hemoglobin 8.1 on admission  - stable   - continue to trend with daily CBC          Paroxysmal atrial fibrillation    - continue metoprolol succinate 50mg daily  - cardiac monitoring           Chronic hepatitis C virus infection    - LFTs stable           Elevated troponin    - initial concerns given complaints of CP  - troponin 0.037 on admission  - patient has history of chronically elevated troponin around this level          Chronic gout    - stable   - continue home dose allopurinol             VTE Risk Mitigation         Ordered     enoxaparin injection 30 mg  Daily      04/20/18 0457     Place JANIA hose  Until discontinued      04/20/18 0400     Place sequential compression device  Until discontinued      04/20/18 0400     IP VTE HIGH RISK PATIENT  Once      04/20/18 0400     Place JANIA hose  Until discontinued      04/20/18 0303             Francisco Villarreal MD  Department of Hospital Medicine   [facility]

## 2018-04-20 NOTE — SUBJECTIVE & OBJECTIVE
Past Medical History:   Diagnosis Date    Breast cancer 1980    right, s/p mastectomy    CHF (congestive heart failure)     Chronic hepatitis C virus infection 3/14/2017    Chronic kidney disease, stage V 2/20/2018    Coronary artery disease     Hypertension     Hazel     Thyroid disease        Past Surgical History:   Procedure Laterality Date    HYSTERECTOMY      MASTECTOMY Right 1980       Review of patient's allergies indicates:   Allergen Reactions    Seroquel [quetiapine]      Causes  profound sedation.       No current facility-administered medications on file prior to encounter.      Current Outpatient Prescriptions on File Prior to Encounter   Medication Sig    allopurinol (ZYLOPRIM) 100 MG tablet Take 0.5 tablets (50 mg total) by mouth once daily.    aspirin 81 MG Chew Take 1 tablet (81 mg total) by mouth once daily.    bumetanide (BUMEX) 2 MG tablet Take 1.5 tablets (3mg) by mouth two times a day    diclofenac sodium 1 % Gel Apply 4 g topically 4 (four) times daily. Prn pain    fluticasone (FLONASE) 50 mcg/actuation nasal spray 2 sprays by Each Nare route once daily. (Patient taking differently: 2 sprays by Each Nare route daily as needed. )    fluticasone-salmeterol 250-50 mcg/dose (ADVAIR) 250-50 mcg/dose diskus inhaler Inhale 1 puff into the lungs 2 (two) times daily. Controller    hydrALAZINE (APRESOLINE) 25 MG Tab Take 1.5 tablets (37.5mg) by mouth three times a day    isosorbide dinitrate (ISORDIL) 20 MG tablet Take 1 tablet (20 mg total) by mouth 3 (three) times daily.    levothyroxine (SYNTHROID) 100 MCG tablet Take 1 tablet (100 mcg total) by mouth before breakfast. (Patient taking differently: Take 125 mcg by mouth before breakfast. )    metoprolol succinate (TOPROL-XL) 50 MG 24 hr tablet Take 1 tablet (50 mg total) by mouth once daily.    ramelteon (ROZEREM) 8 mg tablet Take 1 tablet (8 mg total) by mouth every evening.    senna-docusate 8.6-50 mg (PERICOLACE) 8.6-50 mg  per tablet Take 1 tablet by mouth once daily.    sevelamer carbonate (RENVELA) 800 mg Tab Take 1 tablet (800 mg total) by mouth 2 (two) times daily with meals. (Patient taking differently: Take 800 mg by mouth 3 (three) times daily with meals. )    sodium bicarbonate 650 MG tablet Take 2 tablets (1,300 mg total) by mouth 3 (three) times daily.    tiotropium (SPIRIVA) 18 mcg inhalation capsule Inhale 1 capsule (18 mcg total) into the lungs once daily.    traZODone (DESYREL) 50 MG tablet Take 50 mg by mouth every evening.    vitamin renal formula, B-complex-vitamin c-folic acid, (NEPHROCAP) 1 mg Cap Take 1 capsule by mouth once daily.     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 Negative for activity change, chills, diaphoresis, fatigue and fever.   HENT: Negative for congestion, sore throat and trouble swallowing.    Eyes: Negative for photophobia, redness and visual disturbance.   Respiratory: Positive for cough, chest tightness and shortness of breath. Negative for choking and wheezing.    Cardiovascular: Positive for chest pain. Negative for palpitations and leg swelling.   Gastrointestinal: Negative for abdominal distention, abdominal pain, constipation, diarrhea, nausea and vomiting.   Genitourinary: Negative for dysuria and hematuria.   Musculoskeletal: Negative for arthralgias and  myalgias.   Skin: Negative for rash and wound.   Neurological: Negative for dizziness, syncope, weakness, light-headedness, numbness and headaches.     Objective:     Vital Signs (Most Recent):  Temp: 98.3 °F (36.8 °C) (04/19/18 2348)  Pulse: (!) 59 (04/20/18 0415)  Resp: 18 (04/20/18 0415)  BP: (!) 188/98 (04/20/18 0415)  SpO2: 98 % (04/20/18 0415) Vital Signs (24h Range):  Temp:  [98.3 °F (36.8 °C)-98.4 °F (36.9 °C)] 98.3 °F (36.8 °C)  Pulse:  [55-62] 59  Resp:  [18-26] 18  SpO2:  [91 %-100 %] 98 %  BP: (171-188)/(79-98) 188/98     Weight: 74.8 kg (164 lb 14.5 oz)  Body mass index is 26.62 kg/m².    Physical Exam   Constitutional: She is oriented to person, place, and time. She appears well-developed and well-nourished. No distress.   HENT:   Head: Normocephalic and atraumatic.   Right Ear: External ear normal.   Left Ear: External ear normal.   Mouth/Throat: Oropharynx is clear and moist. No oropharyngeal exudate.   Eyes: EOM are normal. Pupils are equal, round, and reactive to light. Right eye exhibits no discharge. Left eye exhibits no discharge. No scleral icterus.   Neck: Normal range of motion. Neck supple.   Cardiovascular: Normal rate and regular rhythm.    Murmur heard.  Pulmonary/Chest: Effort normal and breath sounds normal. No respiratory distress. She has no wheezes. She has no rales.   Saturating well on room air    Abdominal: Soft. Bowel sounds are normal. She exhibits no distension and no mass. There is no tenderness. There is no guarding.   Musculoskeletal: She exhibits edema (1+ pitting edema of bilateral lower extremities ). She exhibits no tenderness.   Neurological: She is alert and oriented to person, place, and time.   Skin: Skin is warm and dry. She is not diaphoretic.   Psychiatric: She has a normal mood and affect. Her behavior is normal.   Vitals reviewed.        CRANIAL NERVES     CN III, IV, VI   Pupils are equal, round, and reactive to light.  Extraocular motions are normal.         Significant Labs:   BMP:   Recent Labs  Lab 04/19/18 2306   GLU 91      K 4.1   CL 95   CO2 31*   BUN 50*   CREATININE 3.3*   CALCIUM 9.5     CBC:   Recent Labs  Lab 04/19/18 2306   WBC 4.18   HGB 8.1*   HCT 25.0*   *     Cardiac Markers:   Recent Labs  Lab 04/19/18 2306   BNP >4,900*       Significant Imaging: I have reviewed all pertinent imaging results/findings within the past 24 hours.

## 2018-04-20 NOTE — ASSESSMENT & PLAN NOTE
- patient presents with 2 week history of worsening SOB and CP  - BNP >4900 on admission  - initial troponin 0.037, appears to be stable per previous labs  - CXR with evidence of pulmonary edema   - given 60mg lasix IV and 20mg lasix PO in ED  - will continue to diurese with 80 mg lasix IV BID  - strict I and O  - repeat cardiac echo

## 2018-04-21 PROBLEM — R54 AGE-RELATED PHYSICAL DEBILITY: Status: ACTIVE | Noted: 2018-04-21

## 2018-04-21 LAB
ALBUMIN SERPL BCP-MCNC: 3.3 G/DL
ANION GAP SERPL CALC-SCNC: 15 MMOL/L
BUN SERPL-MCNC: 55 MG/DL
CALCIUM SERPL-MCNC: 9.3 MG/DL
CHLORIDE SERPL-SCNC: 96 MMOL/L
CO2 SERPL-SCNC: 28 MMOL/L
CREAT SERPL-MCNC: 3.1 MG/DL
EST. GFR  (AFRICAN AMERICAN): 16.4 ML/MIN/1.73 M^2
EST. GFR  (NON AFRICAN AMERICAN): 14.3 ML/MIN/1.73 M^2
GLUCOSE SERPL-MCNC: 79 MG/DL
OB PNL STL: POSITIVE
PHOSPHATE SERPL-MCNC: 3.6 MG/DL
POTASSIUM SERPL-SCNC: 3.6 MMOL/L
SODIUM SERPL-SCNC: 139 MMOL/L

## 2018-04-21 PROCEDURE — 20600001 HC STEP DOWN PRIVATE ROOM

## 2018-04-21 PROCEDURE — 25000003 PHARM REV CODE 250: Performed by: STUDENT IN AN ORGANIZED HEALTH CARE EDUCATION/TRAINING PROGRAM

## 2018-04-21 PROCEDURE — 63600175 PHARM REV CODE 636 W HCPCS: Performed by: STUDENT IN AN ORGANIZED HEALTH CARE EDUCATION/TRAINING PROGRAM

## 2018-04-21 PROCEDURE — 25000003 PHARM REV CODE 250: Performed by: HOSPITALIST

## 2018-04-21 PROCEDURE — 99232 SBSQ HOSP IP/OBS MODERATE 35: CPT | Mod: GC,,, | Performed by: HOSPITALIST

## 2018-04-21 PROCEDURE — 25000242 PHARM REV CODE 250 ALT 637 W/ HCPCS: Performed by: STUDENT IN AN ORGANIZED HEALTH CARE EDUCATION/TRAINING PROGRAM

## 2018-04-21 PROCEDURE — 80069 RENAL FUNCTION PANEL: CPT

## 2018-04-21 PROCEDURE — 82272 OCCULT BLD FECES 1-3 TESTS: CPT

## 2018-04-21 PROCEDURE — 63600175 PHARM REV CODE 636 W HCPCS: Performed by: HOSPITALIST

## 2018-04-21 PROCEDURE — 36415 COLL VENOUS BLD VENIPUNCTURE: CPT

## 2018-04-21 RX ORDER — POTASSIUM CHLORIDE 20 MEQ/1
40 TABLET, EXTENDED RELEASE ORAL ONCE
Status: COMPLETED | OUTPATIENT
Start: 2018-04-21 | End: 2018-04-21

## 2018-04-21 RX ORDER — FUROSEMIDE 10 MG/ML
100 INJECTION INTRAMUSCULAR; INTRAVENOUS ONCE
Status: COMPLETED | OUTPATIENT
Start: 2018-04-21 | End: 2018-04-21

## 2018-04-21 RX ORDER — ISOSORBIDE DINITRATE 40 MG/1
40 TABLET ORAL 3 TIMES DAILY
Status: DISCONTINUED | OUTPATIENT
Start: 2018-04-21 | End: 2018-04-28 | Stop reason: HOSPADM

## 2018-04-21 RX ORDER — HYDRALAZINE HYDROCHLORIDE 50 MG/1
50 TABLET, FILM COATED ORAL EVERY 8 HOURS
Status: DISCONTINUED | OUTPATIENT
Start: 2018-04-21 | End: 2018-04-22

## 2018-04-21 RX ADMIN — SODIUM BICARBONATE 650 MG TABLET 1300 MG: at 08:04

## 2018-04-21 RX ADMIN — HYDRALAZINE HYDROCHLORIDE 50 MG: 50 TABLET ORAL at 08:04

## 2018-04-21 RX ADMIN — SEVELAMER CARBONATE 800 MG: 800 TABLET, FILM COATED ORAL at 04:04

## 2018-04-21 RX ADMIN — ISOSORBIDE DINITRATE 40 MG: 40 TABLET ORAL at 02:04

## 2018-04-21 RX ADMIN — METOPROLOL SUCCINATE 50 MG: 25 TABLET, EXTENDED RELEASE ORAL at 08:04

## 2018-04-21 RX ADMIN — ISOSORBIDE DINITRATE 40 MG: 40 TABLET ORAL at 08:04

## 2018-04-21 RX ADMIN — TIOTROPIUM BROMIDE 18 MCG: 18 CAPSULE ORAL; RESPIRATORY (INHALATION) at 08:04

## 2018-04-21 RX ADMIN — ENOXAPARIN SODIUM 30 MG: 100 INJECTION SUBCUTANEOUS at 04:04

## 2018-04-21 RX ADMIN — LEVOTHYROXINE SODIUM 125 MCG: 25 TABLET ORAL at 05:04

## 2018-04-21 RX ADMIN — HYDRALAZINE HYDROCHLORIDE 35 MG: 10 TABLET, FILM COATED ORAL at 05:04

## 2018-04-21 RX ADMIN — TRAZODONE HYDROCHLORIDE 50 MG: 50 TABLET ORAL at 08:04

## 2018-04-21 RX ADMIN — Medication 1 CAPSULE: at 08:04

## 2018-04-21 RX ADMIN — FUROSEMIDE 100 MG: 10 INJECTION, SOLUTION INTRAMUSCULAR; INTRAVENOUS at 12:04

## 2018-04-21 RX ADMIN — ASPIRIN 81 MG CHEWABLE TABLET 81 MG: 81 TABLET CHEWABLE at 08:04

## 2018-04-21 RX ADMIN — SODIUM BICARBONATE 650 MG TABLET 1300 MG: at 02:04

## 2018-04-21 RX ADMIN — METHYLDOPA 50 MG: 500 TABLET ORAL at 08:04

## 2018-04-21 RX ADMIN — HYDRALAZINE HYDROCHLORIDE 50 MG: 50 TABLET ORAL at 02:04

## 2018-04-21 RX ADMIN — POTASSIUM CHLORIDE 40 MEQ: 1500 TABLET, EXTENDED RELEASE ORAL at 02:04

## 2018-04-21 NOTE — ASSESSMENT & PLAN NOTE
- continue metoprolol succinate 50mg daily  - cardiac monitoring   - not on anticoagulation 2/2 high fall risk and possible slow GIB - per prior cardiology notes

## 2018-04-21 NOTE — ASSESSMENT & PLAN NOTE
- patient presents with 2 week history of worsening SOB and CP. BNP >4900 on admission. initial troponin 0.037, appears to be stable per previous labs. CXR with evidence of pulmonary edema . Echo with stable findings - 25% EF,severe MR, and 50mmHg Pa pressures  - lasix gtt at 10cc/hr  And redose on 4/22  - cont toprol XL 50mg - Hr at goal of 60  - increase hydralazine to 50 Q8 and isosorbide dinitrate to 40 TID- for optimal BP control  - strict I and O  - replete lytes

## 2018-04-21 NOTE — SUBJECTIVE & OBJECTIVE
Interval History:     Lost her IV access 4/20 PM, lasix gtt has been off since then. I/O minimal,net -600cc. On  RA, breathing well. Still has cough. Cr still stable from 4/19.  IV access was regained    Review of Systems   Constitutional: Negative for activity change, chills, diaphoresis, fatigue and fever.   HENT: Negative for congestion, sore throat and trouble swallowing.    Eyes: Negative for photophobia, redness and visual disturbance.   Respiratory: Positive for cough and chest tightness. Negative for choking, shortness of breath and wheezing.    Cardiovascular: Negative for chest pain, palpitations and leg swelling.   Gastrointestinal: Negative for abdominal distention, abdominal pain, constipation, diarrhea, nausea and vomiting.   Genitourinary: Negative for difficulty urinating, dysuria and hematuria.   Musculoskeletal: Negative for arthralgias and myalgias.   Skin: Negative for rash and wound.   Neurological: Negative for dizziness, syncope, weakness, light-headedness, numbness and headaches.   Psychiatric/Behavioral: Negative for confusion.      allopurinol  50 mg Oral Daily    aspirin  81 mg Oral Daily    enoxaparin  30 mg Subcutaneous Daily    INV hydrALAZINE  50 mg Oral Q8H    isosorbide dinitrate  40 mg Oral TID    levothyroxine  125 mcg Oral Before breakfast    metoprolol succinate  50 mg Oral Daily    potassium chloride SA  40 mEq Oral Once    sevelamer carbonate  800 mg Oral BID WM    sodium bicarbonate  1,300 mg Oral TID    tiotropium  18 mcg Inhalation Daily    traZODone  50 mg Oral QHS    vitamin renal formula (B-complex-vitamin c-folic acid)  1 capsule Oral Daily     .   furosemide (LASIX) 2 mg/mL infusion (non-titrating) 10 mg/hr (04/20/18 2819)         Objective:     Vital Signs (Most Recent):  Temp: 97.6 °F (36.4 °C) (04/21/18 1125)  Pulse: 60 (04/21/18 1126)  Resp: 20 (04/21/18 1125)  BP: (!) 155/72 (04/21/18 1125)  SpO2: (!) 93 % (04/21/18 1125) Vital Signs (24h  Range):  Temp:  [97.2 °F (36.2 °C)-100.1 °F (37.8 °C)] 97.6 °F (36.4 °C)  Pulse:  [55-65] 60  Resp:  [16-22] 20  SpO2:  [91 %-99 %] 93 %  BP: (148-175)/(72-97) 155/72     Weight: 76.6 kg (168 lb 14 oz)  Body mass index is 27.26 kg/m².    Intake/Output Summary (Last 24 hours) at 04/21/18 1248  Last data filed at 04/21/18 0600   Gross per 24 hour   Intake              240 ml   Output              925 ml   Net             -685 ml      Physical Exam   Constitutional: She is oriented to person, place, and time. She appears well-developed and well-nourished. No distress.   HENT:   Head: Normocephalic and atraumatic.   Right Ear: External ear normal.   Left Ear: External ear normal.   Mouth/Throat: Oropharynx is clear and moist. No oropharyngeal exudate.   Eyes: EOM are normal. Pupils are equal, round, and reactive to light. Right eye exhibits no discharge. Left eye exhibits no discharge. No scleral icterus.   Neck: Normal range of motion. Neck supple.   Cardiovascular: Normal rate and regular rhythm.    Murmur heard.  Pulmonary/Chest: Effort normal. No respiratory distress. She has no wheezes. She has no rales.   Decreased breath sounds bilat   Abdominal: Soft. Bowel sounds are normal. She exhibits no distension and no mass. There is no tenderness. There is no guarding.   Musculoskeletal: She exhibits edema (1+ pitting edema of bilateral lower extremities ). She exhibits no tenderness.   Neurological: She is alert and oriented to person, place, and time. No cranial nerve deficit.   Skin: Skin is warm and dry. She is not diaphoretic.   Psychiatric: She has a normal mood and affect.   Vitals reviewed.      Significant Labs:   CBC:   Recent Labs  Lab 04/19/18  2306   WBC 4.18   HGB 8.1*   HCT 25.0*   *     CMP:   Recent Labs  Lab 04/19/18  2306 04/21/18  0532    139   K 4.1 3.6   CL 95 96   CO2 31* 28   GLU 91 79   BUN 50* 55*   CREATININE 3.3* 3.1*   CALCIUM 9.5 9.3   PROT 8.1  --    ALBUMIN 3.4* 3.3*   BILITOT  1.2*  --    ALKPHOS 75  --    AST 34  --    ALT 15  --    ANIONGAP 14 15   EGFRNONAA 13.2* 14.3*     Cardiac Markers:   Recent Labs  Lab 18  2306   BNP >4,900*       Significant ImaginD echo  CONCLUSIONS     1 - Moderate left ventricular enlargement.     2 - Severely depressed left ventricular systolic function (EF 25-30%).     3 - Right ventricular enlargement with moderately depressed systolic function.     4 - Biatrial enlargement.     5 - Mild to moderate tricuspid regurgitation.     6 - Severe functional mitral regurgitation.     7 - Pulmonary hypertension. The estimated PA systolic pressure is 50 mmHg.     8 - Increased central venous pressure.

## 2018-04-21 NOTE — ASSESSMENT & PLAN NOTE
- cont toprol XL 50mg - Hr at goal of 60  - increase hydralazine to 50 Q8 and isosorbide dinitrate to 40 TID- for optimal BP control

## 2018-04-21 NOTE — PROGRESS NOTES
[facility] Hospital Medicine  Progress Note    Patient Name: Anai Sal  MRN: [id number]  Patient Class: IP- Inpatient   Admission Date: 4/19/2018  Length of Stay: 1 days  Attending Physician: Live Coffey MD  Primary Care Provider: Live Young MD    [facility] Medicine Team: [facility] 4 Carol Morejon MD    Subjective:     Principal Problem:Acute on chronic combined systolic and diastolic congestive heart failure    HPI:  Mrs. Sal is a 73 year old female with a past medical history significant for HTN, CHF (last EF 30%), hypothyroidism, CAD, CKD, breast cancer, marlon presents with worsening SOB over the past 2 weeks as well as chest pain of one days duration. The patient and family state that the patient has been feeling more SOB over the past two weeks, they state that they have noticed her with increasing work of breathing and fluid accumulation. The patient denies any recent illness (although dose have chronic cough), change in diet, but do state that they have been somewhat confused as to what medications the patient should be taking following her recent discharge for acute on chronic heart failure.     Hospital Course:  No notes on file    Interval History:     Lost her IV access 4/20 PM, lasix gtt has been off since then. I/O minimal,net -600cc. On  RA, breathing well. Still has cough. Cr still stable from 4/19.  IV access was regained    Review of Systems   Constitutional: Negative for activity change, chills, diaphoresis, fatigue and fever.   HENT: Negative for congestion, sore throat and trouble swallowing.    Eyes: Negative for photophobia, redness and visual disturbance.   Respiratory: Positive for cough and chest tightness. Negative for choking, shortness of breath and wheezing.    Cardiovascular: Negative for chest pain, palpitations and leg swelling.   Gastrointestinal: Negative for abdominal distention, abdominal pain, constipation, diarrhea, nausea and  vomiting.   Genitourinary: Negative for difficulty urinating, dysuria and hematuria.   Musculoskeletal: Negative for arthralgias and myalgias.   Skin: Negative for rash and wound.   Neurological: Negative for dizziness, syncope, weakness, light-headedness, numbness and headaches.   Psychiatric/Behavioral: Negative for confusion.      allopurinol  50 mg Oral Daily    aspirin  81 mg Oral Daily    enoxaparin  30 mg Subcutaneous Daily    INV hydrALAZINE  50 mg Oral Q8H    isosorbide dinitrate  40 mg Oral TID    levothyroxine  125 mcg Oral Before breakfast    metoprolol succinate  50 mg Oral Daily    potassium chloride SA  40 mEq Oral Once    sevelamer carbonate  800 mg Oral BID WM    sodium bicarbonate  1,300 mg Oral TID    tiotropium  18 mcg Inhalation Daily    traZODone  50 mg Oral QHS    vitamin renal formula (B-complex-vitamin c-folic acid)  1 capsule Oral Daily     .   furosemide (LASIX) 2 mg/mL infusion (non-titrating) 10 mg/hr (04/20/18 1729)         Objective:     Vital Signs (Most Recent):  Temp: 97.6 °F (36.4 °C) (04/21/18 1125)  Pulse: 60 (04/21/18 1126)  Resp: 20 (04/21/18 1125)  BP: (!) 155/72 (04/21/18 1125)  SpO2: (!) 93 % (04/21/18 1125) Vital Signs (24h Range):  Temp:  [97.2 °F (36.2 °C)-100.1 °F (37.8 °C)] 97.6 °F (36.4 °C)  Pulse:  [55-65] 60  Resp:  [16-22] 20  SpO2:  [91 %-99 %] 93 %  BP: (148-175)/(72-97) 155/72     Weight: 76.6 kg (168 lb 14 oz)  Body mass index is 27.26 kg/m².    Intake/Output Summary (Last 24 hours) at 04/21/18 1248  Last data filed at 04/21/18 0600   Gross per 24 hour   Intake              240 ml   Output              925 ml   Net             -685 ml      Physical Exam   Constitutional: She is oriented to person, place, and time. She appears well-developed and well-nourished. No distress.   HENT:   Head: Normocephalic and atraumatic.   Right Ear: External ear normal.   Left Ear: External ear normal.   Mouth/Throat: Oropharynx is clear and moist. No oropharyngeal  exudate.   Eyes: EOM are normal. Pupils are equal, round, and reactive to light. Right eye exhibits no discharge. Left eye exhibits no discharge. No scleral icterus.   Neck: Normal range of motion. Neck supple.   Cardiovascular: Normal rate and regular rhythm.    Murmur heard.  Pulmonary/Chest: Effort normal. No respiratory distress. She has no wheezes. She has no rales.   Decreased breath sounds bilat   Abdominal: Soft. Bowel sounds are normal. She exhibits no distension and no mass. There is no tenderness. There is no guarding.   Musculoskeletal: She exhibits edema (1+ pitting edema of bilateral lower extremities ). She exhibits no tenderness.   Neurological: She is alert and oriented to person, place, and time. No cranial nerve deficit.   Skin: Skin is warm and dry. She is not diaphoretic.   Psychiatric: She has a normal mood and affect.   Vitals reviewed.      Significant Labs:   CBC:   Recent Labs  Lab 18   WBC 4.18   HGB 8.1*   HCT 25.0*   *     CMP:   Recent Labs  Lab 18  0532    139   K 4.1 3.6   CL 95 96   CO2 31* 28   GLU 91 79   BUN 50* 55*   CREATININE 3.3* 3.1*   CALCIUM 9.5 9.3   PROT 8.1  --    ALBUMIN 3.4* 3.3*   BILITOT 1.2*  --    ALKPHOS 75  --    AST 34  --    ALT 15  --    ANIONGAP 14 15   EGFRNONAA 13.2* 14.3*     Cardiac Markers:   Recent Labs  Lab 18   BNP >4,900*       Significant ImaginD echo  CONCLUSIONS     1 - Moderate left ventricular enlargement.     2 - Severely depressed left ventricular systolic function (EF 25-30%).     3 - Right ventricular enlargement with moderately depressed systolic function.     4 - Biatrial enlargement.     5 - Mild to moderate tricuspid regurgitation.     6 - Severe functional mitral regurgitation.     7 - Pulmonary hypertension. The estimated PA systolic pressure is 50 mmHg.     8 - Increased central venous pressure.     Assessment/Plan:      * Acute on chronic combined systolic and diastolic  congestive heart failure    - patient presents with 2 week history of worsening SOB and CP. BNP >4900 on admission. initial troponin 0.037, appears to be stable per previous labs. CXR with evidence of pulmonary edema . Echo with stable findings - 25% EF,severe MR, and 50mmHg Pa pressures  - lasix gtt at 10cc/hr  And redose on 4/22  - cont toprol XL 50mg - Hr at goal of 60  - increase hydralazine to 50 Q8 and isosorbide dinitrate to 40 TID- for optimal BP control  - strict I and O  - replete lytes        Renovascular hypertension    - cont toprol XL 50mg - Hr at goal of 60  - increase hydralazine to 50 Q8 and isosorbide dinitrate to 40 TID- for optimal BP control          Paroxysmal atrial fibrillation    - continue metoprolol succinate 50mg daily  - cardiac monitoring   - not on anticoagulation 2/2 high fall risk and possible slow GIB - per prior cardiology notes        CKD (chronic kidney disease), stage V    - creatinine stable on admission at 3.3, at [facility]  - monitor Cr  - cont home sodium bicarb at 1300mg TID and sevelamer at 800mg BID  - monitor UOP          Anemia of chronic renal failure, stage 5    - hemoglobin 8.1 on admission, stable at her baseline,   -monitor        Chronic obstructive pulmonary disease    - not on home O2  - cont home tiotropium  - prn duo nebs        Age-related physical debility    -PT , OT consult        Cognitive impairment    - delirium precautions          Chronic hepatitis C virus infection    - LFTs stable           Elevated troponin    - initial concerns given complaints of CP, unlikely ACS. Likely 2/2 ADHF  - troponin 0.037 on admission  - patient has history of chronically elevated troponin around this level          Chronic gout    - stable   - continue home dose allopurinol           Postablative hypothyroidism    -cont home synthroid          VTE Risk Mitigation         Ordered     enoxaparin injection 30 mg  Daily      04/20/18 0457     Place sequential compression  device  Until discontinued      04/20/18 0400     IP VTE HIGH RISK PATIENT  Once      04/20/18 0400          Cont to monitor  PT/OT    Carol Morejon MD  Department of Hospital Medicine   [facility]

## 2018-04-21 NOTE — PROGRESS NOTES
Spoke to MD on call for IM4 regarding patient's (+) occult blood result. No orders given at this time. Will continue to monitor.

## 2018-04-21 NOTE — PROGRESS NOTES
Pt accidentally removed PIV while removing arm from gown. Charge RN attempted two times to reestablish access. ICU RN made three attempts with no success. Howard ZAPIEN notified. MD stated that he would put in a PICC consult and possibly order oral diuretics to replace lasix drip. Okayed by MD for patient to be on floor with no PIV access for now. Will continue to monitor and update MD on patient status.

## 2018-04-21 NOTE — ASSESSMENT & PLAN NOTE
- initial concerns given complaints of CP, unlikely ACS. Likely 2/2 ADHF  - troponin 0.037 on admission  - patient has history of chronically elevated troponin around this level

## 2018-04-21 NOTE — ASSESSMENT & PLAN NOTE
- creatinine stable on admission at 3.3, at [facility]  - monitor Cr  - cont home sodium bicarb at 1300mg TID and sevelamer at 800mg BID  - monitor UOP

## 2018-04-21 NOTE — PLAN OF CARE
Problem: Patient Care Overview  Goal: Plan of Care Review  Outcome: Ongoing (interventions implemented as appropriate)  Fall precautions maintained. Bed alarm on and audible. Patient OOB with 1 person assist to BS commode. Some SOB noted with activity. PRN oxygen at bedside. Patient confused to place, but easily reoriented. Lasix gtt infusing at 5 cc/hr. IVP of Lasix given per orders. 1 unmeasured urine noted thus far, 200 cc of urine measured. Patient with 2 dark BMs today, occult blood +. MD aware, no further orders at this time. Patient denies c/o pain. Bed locked and low. Side rails up x's two. Call bell and personal belongings in close reach.

## 2018-04-22 LAB
ALBUMIN SERPL BCP-MCNC: 3.3 G/DL
ANION GAP SERPL CALC-SCNC: 15 MMOL/L
BUN SERPL-MCNC: 65 MG/DL
CALCIUM SERPL-MCNC: 9.2 MG/DL
CHLORIDE SERPL-SCNC: 97 MMOL/L
CO2 SERPL-SCNC: 27 MMOL/L
CREAT SERPL-MCNC: 3.1 MG/DL
EST. GFR  (AFRICAN AMERICAN): 16.4 ML/MIN/1.73 M^2
EST. GFR  (NON AFRICAN AMERICAN): 14.3 ML/MIN/1.73 M^2
GLUCOSE SERPL-MCNC: 79 MG/DL
MAGNESIUM SERPL-MCNC: 2.2 MG/DL
PHOSPHATE SERPL-MCNC: 3.6 MG/DL
POTASSIUM SERPL-SCNC: 3.8 MMOL/L
SODIUM SERPL-SCNC: 139 MMOL/L

## 2018-04-22 PROCEDURE — 99232 SBSQ HOSP IP/OBS MODERATE 35: CPT | Mod: GC,,, | Performed by: HOSPITALIST

## 2018-04-22 PROCEDURE — 25000003 PHARM REV CODE 250: Performed by: STUDENT IN AN ORGANIZED HEALTH CARE EDUCATION/TRAINING PROGRAM

## 2018-04-22 PROCEDURE — 80069 RENAL FUNCTION PANEL: CPT

## 2018-04-22 PROCEDURE — 25000003 PHARM REV CODE 250: Performed by: HOSPITALIST

## 2018-04-22 PROCEDURE — 25000242 PHARM REV CODE 250 ALT 637 W/ HCPCS: Performed by: STUDENT IN AN ORGANIZED HEALTH CARE EDUCATION/TRAINING PROGRAM

## 2018-04-22 PROCEDURE — G8988 SELF CARE GOAL STATUS: HCPCS | Mod: CJ

## 2018-04-22 PROCEDURE — 36415 COLL VENOUS BLD VENIPUNCTURE: CPT

## 2018-04-22 PROCEDURE — 83735 ASSAY OF MAGNESIUM: CPT

## 2018-04-22 PROCEDURE — 20600001 HC STEP DOWN PRIVATE ROOM

## 2018-04-22 PROCEDURE — G8987 SELF CARE CURRENT STATUS: HCPCS | Mod: CK

## 2018-04-22 PROCEDURE — 97165 OT EVAL LOW COMPLEX 30 MIN: CPT

## 2018-04-22 PROCEDURE — 63600175 PHARM REV CODE 636 W HCPCS: Performed by: HOSPITALIST

## 2018-04-22 PROCEDURE — 63600175 PHARM REV CODE 636 W HCPCS: Performed by: STUDENT IN AN ORGANIZED HEALTH CARE EDUCATION/TRAINING PROGRAM

## 2018-04-22 RX ORDER — AMLODIPINE BESYLATE 5 MG/1
5 TABLET ORAL DAILY
Status: DISCONTINUED | OUTPATIENT
Start: 2018-04-22 | End: 2018-04-22

## 2018-04-22 RX ORDER — AMLODIPINE BESYLATE 10 MG/1
10 TABLET ORAL DAILY
Status: DISCONTINUED | OUTPATIENT
Start: 2018-04-22 | End: 2018-04-22

## 2018-04-22 RX ORDER — FUROSEMIDE 10 MG/ML
80 INJECTION INTRAMUSCULAR; INTRAVENOUS ONCE
Status: DISCONTINUED | OUTPATIENT
Start: 2018-04-22 | End: 2018-04-22

## 2018-04-22 RX ORDER — CARVEDILOL 12.5 MG/1
12.5 TABLET ORAL 2 TIMES DAILY WITH MEALS
Status: DISCONTINUED | OUTPATIENT
Start: 2018-04-23 | End: 2018-04-28 | Stop reason: HOSPADM

## 2018-04-22 RX ADMIN — TRAZODONE HYDROCHLORIDE 50 MG: 50 TABLET ORAL at 09:04

## 2018-04-22 RX ADMIN — HYDRALAZINE HYDROCHLORIDE 75 MG: 50 TABLET ORAL at 02:04

## 2018-04-22 RX ADMIN — METHYLDOPA 50 MG: 500 TABLET ORAL at 11:04

## 2018-04-22 RX ADMIN — TIOTROPIUM BROMIDE 18 MCG: 18 CAPSULE ORAL; RESPIRATORY (INHALATION) at 10:04

## 2018-04-22 RX ADMIN — SEVELAMER CARBONATE 800 MG: 800 TABLET, FILM COATED ORAL at 05:04

## 2018-04-22 RX ADMIN — ASPIRIN 81 MG CHEWABLE TABLET 81 MG: 81 TABLET CHEWABLE at 09:04

## 2018-04-22 RX ADMIN — SODIUM BICARBONATE 650 MG TABLET 1300 MG: at 02:04

## 2018-04-22 RX ADMIN — HYDRALAZINE HYDROCHLORIDE 50 MG: 50 TABLET ORAL at 06:04

## 2018-04-22 RX ADMIN — SEVELAMER CARBONATE 800 MG: 800 TABLET, FILM COATED ORAL at 08:04

## 2018-04-22 RX ADMIN — ENOXAPARIN SODIUM 30 MG: 100 INJECTION SUBCUTANEOUS at 05:04

## 2018-04-22 RX ADMIN — SODIUM BICARBONATE 650 MG TABLET 1300 MG: at 09:04

## 2018-04-22 RX ADMIN — FUROSEMIDE 10 MG/HR: 10 INJECTION, SOLUTION INTRAVENOUS at 06:04

## 2018-04-22 RX ADMIN — HYDRALAZINE HYDROCHLORIDE 75 MG: 50 TABLET ORAL at 09:04

## 2018-04-22 RX ADMIN — Medication 1 CAPSULE: at 09:04

## 2018-04-22 RX ADMIN — ISOSORBIDE DINITRATE 40 MG: 40 TABLET ORAL at 09:04

## 2018-04-22 RX ADMIN — LEVOTHYROXINE SODIUM 125 MCG: 25 TABLET ORAL at 06:04

## 2018-04-22 RX ADMIN — METOPROLOL SUCCINATE 50 MG: 25 TABLET, EXTENDED RELEASE ORAL at 09:04

## 2018-04-22 RX ADMIN — ISOSORBIDE DINITRATE 40 MG: 40 TABLET ORAL at 02:04

## 2018-04-22 NOTE — ASSESSMENT & PLAN NOTE
- patient presents with 2 week history of worsening SOB and CP. BNP >4900 on admission. initial troponin 0.037, appears to be stable per previous labs. CXR with evidence of pulmonary edema . Echo with stable findings - 25% EF,severe MR, and 50mmHg Pa pressures    - continue lasix gtt at 10cc/hr ; I/Os net positive +340 cc; however 6 unmeasured urinary movements   - stop toprol XL 50mg (4/22)  - started coreg 12.5 mg BID; Hr at goal of 60  - increase hydralazine to 75 Q8 and isosorbide dinitrate to 40 TID- for optimal BP control  - strict I and O  - replete lytes

## 2018-04-22 NOTE — PLAN OF CARE
Problem: Occupational Therapy Goal  Goal: Occupational Therapy Goal  Goals to be met by: 7 days (4/29/18)     Patient will increase functional independence with ADLs by performing:    UE Dressing with Supervision.  LE Dressing with Minimal Assistance.  Grooming while standing with SBA.  Supine to sit with Supervision.  Toilet transfer to toilet with Stand-by Assistance.  Pt will complete functional mobility household distance with SVBA using AD as needed.    Outcome: Ongoing (interventions implemented as appropriate)  Eval and POC set 4/22/18

## 2018-04-22 NOTE — PROGRESS NOTES
No urine output for hours since placement of Pure Wick. Pt used bedside commode and running water to urinate. 325mL output.

## 2018-04-22 NOTE — SUBJECTIVE & OBJECTIVE
Interval History:   NAEON. Patient responding to lasix gtt; 10mg/hr. I/O + 340 however 6 unmeasured urine movements. Cr. 3.1 previously 3.3. Sating 98% RA.       Review of Systems   Constitutional: Negative for activity change, chills, diaphoresis, fatigue and fever.   HENT: Negative for congestion, sore throat and trouble swallowing.    Eyes: Negative for photophobia, redness and visual disturbance.   Respiratory: Positive for cough. Negative for choking, chest tightness, shortness of breath and wheezing.    Cardiovascular: Negative for chest pain, palpitations and leg swelling.   Gastrointestinal: Negative for abdominal distention, abdominal pain, constipation, diarrhea, nausea and vomiting.   Genitourinary: Negative for difficulty urinating, dysuria and hematuria.   Musculoskeletal: Negative for arthralgias and myalgias.   Skin: Negative for rash and wound.   Neurological: Negative for dizziness, syncope, weakness, light-headedness, numbness and headaches.   Psychiatric/Behavioral: Negative for confusion.      allopurinol  50 mg Oral Daily    aspirin  81 mg Oral Daily    [START ON 4/23/2018] carvedilol  12.5 mg Oral BID WM    enoxaparin  30 mg Subcutaneous Daily    INV hydrALAZINE  75 mg Oral Q8H    isosorbide dinitrate  40 mg Oral TID    levothyroxine  125 mcg Oral Before breakfast    sevelamer carbonate  800 mg Oral BID WM    sodium bicarbonate  1,300 mg Oral TID    tiotropium  18 mcg Inhalation Daily    traZODone  50 mg Oral QHS    vitamin renal formula (B-complex-vitamin c-folic acid)  1 capsule Oral Daily     .   furosemide (LASIX) 2 mg/mL infusion (non-titrating) 10 mg/hr (04/22/18 0900)         Objective:     Vital Signs (Most Recent):  Temp: 98.3 °F (36.8 °C) (04/22/18 1159)  Pulse: 62 (04/22/18 1159)  Resp: 18 (04/22/18 1159)  BP: (!) 174/80 (04/22/18 1159)  SpO2: (!) 91 % (04/22/18 1159) Vital Signs (24h Range):  Temp:  [97 °F (36.1 °C)-98.7 °F (37.1 °C)] 98.3 °F (36.8 °C)  Pulse:  [57-80]  62  Resp:  [16-20] 18  SpO2:  [91 %-98 %] 91 %  BP: (162-192)/(77-90) 174/80     Weight: 76.6 kg (168 lb 14 oz)  Body mass index is 27.26 kg/m².    Intake/Output Summary (Last 24 hours) at 04/22/18 1454  Last data filed at 04/22/18 1400   Gross per 24 hour   Intake              840 ml   Output                0 ml   Net              840 ml      Physical Exam   Constitutional: She is oriented to person, place, and time. She appears well-developed and well-nourished. No distress.   HENT:   Head: Normocephalic and atraumatic.   Right Ear: External ear normal.   Left Ear: External ear normal.   Mouth/Throat: Oropharynx is clear and moist. No oropharyngeal exudate.   Eyes: EOM are normal. Pupils are equal, round, and reactive to light. Right eye exhibits no discharge. Left eye exhibits no discharge. No scleral icterus.   Neck: Normal range of motion. Neck supple.   Cardiovascular: Normal rate and regular rhythm.    Murmur heard.  Pulmonary/Chest: Effort normal. No respiratory distress. She has no wheezes. She has no rales.   Decreased breath sounds bilat   Abdominal: Soft. Bowel sounds are normal. She exhibits no distension and no mass. There is no tenderness. There is no guarding.   Musculoskeletal: She exhibits edema (1+ pitting edema of bilateral lower extremities ). She exhibits no tenderness.   Neurological: She is alert and oriented to person, place, and time. No cranial nerve deficit.   Skin: Skin is warm and dry. She is not diaphoretic.   Psychiatric: She has a normal mood and affect.   Vitals reviewed.      Significant Labs:   CBC: No results for input(s): WBC, HGB, HCT, PLT in the last 48 hours.  CMP:     Recent Labs  Lab 04/21/18  0532 04/22/18  0354    139   K 3.6 3.8   CL 96 97   CO2 28 27   GLU 79 79   BUN 55* 65*   CREATININE 3.1* 3.1*   CALCIUM 9.3 9.2   ALBUMIN 3.3* 3.3*   ANIONGAP 15 15   EGFRNONAA 14.3* 14.3*     Cardiac Markers: No results for input(s): CKMB, MYOGLOBIN, BNP, TROPISTAT in the last  48 hours.    Significant ImaginD echo  CONCLUSIONS     1 - Moderate left ventricular enlargement.     2 - Severely depressed left ventricular systolic function (EF 25-30%).     3 - Right ventricular enlargement with moderately depressed systolic function.     4 - Biatrial enlargement.     5 - Mild to moderate tricuspid regurgitation.     6 - Severe functional mitral regurgitation.     7 - Pulmonary hypertension. The estimated PA systolic pressure is 50 mmHg.     8 - Increased central venous pressure.

## 2018-04-22 NOTE — PLAN OF CARE
Problem: Patient Care Overview  Goal: Plan of Care Review  Plan of care reviewed with patient. Pt has limb alert on right arm from breast cancer mastectomy. Patient denies pain at this time. Denies chest pain or SOB. Patient on telemetry sinus rhythm noted. Lasix infusing at 5mL/hr. Purewick in place. Patient ambulating with 1 person assistance, fall precautions in place. Daily goals discussed with patient. Patient free from fall/injury. Patient has no questions at this time. Will monitor.

## 2018-04-22 NOTE — PROGRESS NOTES
[facility] Hospital Medicine  Progress Note    Patient Name: Anai Sal  MRN: [id number]  Patient Class: IP- Inpatient   Admission Date: 4/19/2018  Length of Stay: 2 days  Attending Physician: Live Coffey MD  Primary Care Provider: Live Young MD    [facility] Medicine Team: [facility] 4 Maria Del Carmen Charles MD    Subjective:     Principal Problem:Acute on chronic combined systolic and diastolic congestive heart failure    HPI:  Mrs. Sal is a 73 year old female with a past medical history significant for HTN, CHF (last EF 30%), hypothyroidism, CAD, CKD, breast cancer, marlon presents with worsening SOB over the past 2 weeks as well as chest pain of one days duration. The patient and family state that the patient has been feeling more SOB over the past two weeks, they state that they have noticed her with increasing work of breathing and fluid accumulation. The patient denies any recent illness (although dose have chronic cough), change in diet, but do state that they have been somewhat confused as to what medications the patient should be taking following her recent discharge for acute on chronic heart failure.     Hospital Course:  No notes on file    Interval History:   NAEON. Patient responding to lasix gtt; 10mg/hr. I/O + 340 however 6 unmeasured urine movements. Cr. 3.1 previously 3.3. Sating 98% RA.       Review of Systems   Constitutional: Negative for activity change, chills, diaphoresis, fatigue and fever.   HENT: Negative for congestion, sore throat and trouble swallowing.    Eyes: Negative for photophobia, redness and visual disturbance.   Respiratory: Positive for cough. Negative for choking, chest tightness, shortness of breath and wheezing.    Cardiovascular: Negative for chest pain, palpitations and leg swelling.   Gastrointestinal: Negative for abdominal distention, abdominal pain, constipation, diarrhea, nausea and vomiting.   Genitourinary: Negative for difficulty  urinating, dysuria and hematuria.   Musculoskeletal: Negative for arthralgias and myalgias.   Skin: Negative for rash and wound.   Neurological: Negative for dizziness, syncope, weakness, light-headedness, numbness and headaches.   Psychiatric/Behavioral: Negative for confusion.      allopurinol  50 mg Oral Daily    aspirin  81 mg Oral Daily    [START ON 4/23/2018] carvedilol  12.5 mg Oral BID WM    enoxaparin  30 mg Subcutaneous Daily    INV hydrALAZINE  75 mg Oral Q8H    isosorbide dinitrate  40 mg Oral TID    levothyroxine  125 mcg Oral Before breakfast    sevelamer carbonate  800 mg Oral BID WM    sodium bicarbonate  1,300 mg Oral TID    tiotropium  18 mcg Inhalation Daily    traZODone  50 mg Oral QHS    vitamin renal formula (B-complex-vitamin c-folic acid)  1 capsule Oral Daily     .   furosemide (LASIX) 2 mg/mL infusion (non-titrating) 10 mg/hr (04/22/18 0900)         Objective:     Vital Signs (Most Recent):  Temp: 98.3 °F (36.8 °C) (04/22/18 1159)  Pulse: 62 (04/22/18 1159)  Resp: 18 (04/22/18 1159)  BP: (!) 174/80 (04/22/18 1159)  SpO2: (!) 91 % (04/22/18 1159) Vital Signs (24h Range):  Temp:  [97 °F (36.1 °C)-98.7 °F (37.1 °C)] 98.3 °F (36.8 °C)  Pulse:  [57-80] 62  Resp:  [16-20] 18  SpO2:  [91 %-98 %] 91 %  BP: (162-192)/(77-90) 174/80     Weight: 76.6 kg (168 lb 14 oz)  Body mass index is 27.26 kg/m².    Intake/Output Summary (Last 24 hours) at 04/22/18 1454  Last data filed at 04/22/18 1400   Gross per 24 hour   Intake              840 ml   Output                0 ml   Net              840 ml      Physical Exam   Constitutional: She is oriented to person, place, and time. She appears well-developed and well-nourished. No distress.   HENT:   Head: Normocephalic and atraumatic.   Right Ear: External ear normal.   Left Ear: External ear normal.   Mouth/Throat: Oropharynx is clear and moist. No oropharyngeal exudate.   Eyes: EOM are normal. Pupils are equal, round, and reactive to light.  Right eye exhibits no discharge. Left eye exhibits no discharge. No scleral icterus.   Neck: Normal range of motion. Neck supple.   Cardiovascular: Normal rate and regular rhythm.    Murmur heard.  Pulmonary/Chest: Effort normal. No respiratory distress. She has no wheezes. She has no rales.   Decreased breath sounds bilat   Abdominal: Soft. Bowel sounds are normal. She exhibits no distension and no mass. There is no tenderness. There is no guarding.   Musculoskeletal: She exhibits edema (1+ pitting edema of bilateral lower extremities ). She exhibits no tenderness.   Neurological: She is alert and oriented to person, place, and time. No cranial nerve deficit.   Skin: Skin is warm and dry. She is not diaphoretic.   Psychiatric: She has a normal mood and affect.   Vitals reviewed.      Significant Labs:   CBC: No results for input(s): WBC, HGB, HCT, PLT in the last 48 hours.  CMP:     Recent Labs  Lab 18  0532 18  0354    139   K 3.6 3.8   CL 96 97   CO2 28 27   GLU 79 79   BUN 55* 65*   CREATININE 3.1* 3.1*   CALCIUM 9.3 9.2   ALBUMIN 3.3* 3.3*   ANIONGAP 15 15   EGFRNONAA 14.3* 14.3*     Cardiac Markers: No results for input(s): CKMB, MYOGLOBIN, BNP, TROPISTAT in the last 48 hours.    Significant ImaginD echo  CONCLUSIONS     1 - Moderate left ventricular enlargement.     2 - Severely depressed left ventricular systolic function (EF 25-30%).     3 - Right ventricular enlargement with moderately depressed systolic function.     4 - Biatrial enlargement.     5 - Mild to moderate tricuspid regurgitation.     6 - Severe functional mitral regurgitation.     7 - Pulmonary hypertension. The estimated PA systolic pressure is 50 mmHg.     8 - Increased central venous pressure.     Assessment/Plan:      * Acute on chronic combined systolic and diastolic congestive heart failure    - patient presents with 2 week history of worsening SOB and CP. BNP >4900 on admission. initial troponin 0.037, appears to  be stable per previous labs. CXR with evidence of pulmonary edema . Echo with stable findings - 25% EF,severe MR, and 50mmHg Pa pressures    - continue lasix gtt at 10cc/hr ; I/Os net positive +340 cc; however 6 unmeasured urinary movements   - stop toprol XL 50mg (4/22)  - started coreg 12.5 mg BID; Hr at goal of 60  - increase hydralazine to 75 Q8 and isosorbide dinitrate to 40 TID- for optimal BP control  - strict I and O  - replete lytes        Age-related physical debility    -PT , OT consult        CKD (chronic kidney disease), stage V    - creatinine stable on admission at 3.3, at [place]  - monitor Cr  - cont home sodium bicarb at 1300mg TID and sevelamer at 800mg BID  - monitor UOP          Cognitive impairment    - delirium precautions          Anemia of chronic renal failure, stage 5    - hemoglobin 8.1 on admission, stable at her baseline,   -monitor        Paroxysmal atrial fibrillation    - discontinue metoprolol succinate 50mg daily  - coreg started (4/22) should provide HR control   - cardiac monitoring   - not on anticoagulation 2/2 high fall risk and possible slow GIB - per prior cardiology notes        Chronic obstructive pulmonary disease    - not on home O2  - cont home tiotropium  - prn duo nebs        Chronic hepatitis C virus infection    - LFTs stable           Elevated troponin    - initial concerns given complaints of CP, unlikely ACS. Likely 2/2 ADHF  - troponin 0.037 on admission  - patient has history of chronically elevated troponin around this level          Chronic gout    - stable   - continue home dose allopurinol           Postablative hypothyroidism    -cont home synthroid        Renovascular hypertension    - discontinue toprol XL 50mg   - start coreg 12.5 BID- Hr at goal of 60  - increase hydralazine to 75 Q8 and isosorbide dinitrate to 40 TID- for optimal BP control            VTE Risk Mitigation         Ordered     enoxaparin injection 30 mg  Daily      04/20/18 0007      Place sequential compression device  Until discontinued      04/20/18 0400     IP VTE HIGH RISK PATIENT  Once      04/20/18 0400              Maria Del Carmen Charles MD  Department of Hospital Medicine   [facility]

## 2018-04-22 NOTE — ASSESSMENT & PLAN NOTE
- discontinue metoprolol succinate 50mg daily  - coreg started (4/22) should provide HR control   - cardiac monitoring   - not on anticoagulation 2/2 high fall risk and possible slow GIB - per prior cardiology notes

## 2018-04-22 NOTE — ASSESSMENT & PLAN NOTE
- discontinue toprol XL 50mg   - start coreg 12.5 BID- Hr at goal of 60  - increase hydralazine to 75 Q8 and isosorbide dinitrate to 40 TID- for optimal BP control

## 2018-04-22 NOTE — PLAN OF CARE
Problem: Patient Care Overview  Goal: Plan of Care Review  Outcome: Ongoing (interventions implemented as appropriate)  Pt AAOx4. Pt free from falls. Pt wears non slip socks when ambulating. Pt bed low and locked position. Pt afebrile. Pt IV site without redness or edema.  Pt has denied any pain or discomfort this shift.

## 2018-04-22 NOTE — PT/OT/SLP EVAL
Occupational Therapy   Evaluation    Name: Anai Sal  MRN: [id number]  Admitting Diagnosis:  Acute on chronic combined systolic and diastolic congestive heart failure      Recommendations:     Discharge Recommendations: home (resume [facility] day program)  Discharge Equipment Recommendations:  none  Barriers to discharge:  None    History:     Occupational Profile:  Living Environment: Pt lives with daughter in 2-story house; bedroom downstairs and bathroom with tub/shower upstairs.  Previous level of function: Uses RW for mobility, daughter assists with dressing and bathing  Equipment Owned:  bedside commode, bath bench, walker, rolling  Assistance upon Discharge: Daughter is able to thomas; pt [facility]    Past Medical History:   Diagnosis Date    Breast cancer 1980    right, s/p mastectomy    CHF (congestive heart failure)     Chronic hepatitis C virus infection 3/14/2017    Chronic kidney disease, stage V 2/20/2018    Coronary artery disease     Hypertension     Hazel     Thyroid disease        Past Surgical History:   Procedure Laterality Date    HYSTERECTOMY      MASTECTOMY Right 1980       Subjective     Chief Complaint: None stated, pt lethargic and slow to respond  Patient/Family stated goals: Get stronger  Communicated with: RN prior to session.  Pain/Comfort:  · Pain Rating 1: 0/10  · Pain Rating Post-Intervention 1: 0/10    Patients cultural, spiritual, Anglican conflicts given the current situation: None    Objective:     Patient found with: peripheral IV, telemetry    General Precautions: Standard, fall   Orthopedic Precautions:N/A   Braces: N/A     Occupational Performance:    Bed Mobility:    · Patient completed Supine to Sit with stand by assistance with increased time and HOB elevated    Functional Mobility/Transfers:  · Patient completed Sit <> Stand Transfer with stand by assistance with rolling walker from EOB  · Functional Mobility: Few steps from EOB to bedside chair  "with CGA using RW, forward flexed posture    Activities of Daily Living:  · UB Dressing: minimum assistance to don gown  · LB Dressing: maximal assistance to don socks    Cognitive/Visual Perceptual:  Cognitive/Psychosocial Skills:     -       Oriented to: Person and Place   -       Follows Commands/attention:Follows one-step commands, slow to respond  -       Safety awareness/insight to disability: impaired     Physical Exam:  Balance:    -       SBA for sitting, CGA for dynamic standing  Postural examination/scapula alignment:    -       Rounded shoulders  -       Forward head  Upper Extremity Range of Motion:     -    B shld AROM deficits  Upper Extremity Strength:    -       Right Upper Extremity: 3+/5 throughout  -       Left Upper Extremity: 3+/5 throughout    Patient left up in chair with all lines intact and call button in reach    [facility] 6 Click:  [facility] Total Score: 17    Treatment & Education:  OT eval; educated on OT role and POC; white board updated  Education:    Assessment:     Anai Sal is a 73 y.o. female with a medical diagnosis of Acute on chronic combined systolic and diastolic congestive heart failure.  She presents with performance deficits including weakness, impaired endurance, impaired self care skills, impaired functional mobilty, impaired cardiopulmonary response to activity, decreased safety awareness. Pt would continue to benefit from OT to increase independence and safety.     Rehab Prognosis:  Good; patient would benefit from acute skilled OT services to address these deficits and reach maximum level of function.         Clinical Decision Makin.  OT Low:  "Pt evaluation falls under low complexity for evaluation coding due to performance deficits noted in 1-3 areas as stated above and 0 co-morbities affecting current functional status. Data obtained from problem focused assessments. No modifications or assistance was required for completion of evaluation. Only brief " "occupational profile and history review completed."     Plan:     Patient to be seen 3 x/week to address the above listed problems via self-care/home management, therapeutic activities, therapeutic exercises  · Plan of Care Expires: 05/22/18  · Plan of Care Reviewed with: patient    This Plan of care has been discussed with the patient who was involved in its development and understands and is in agreement with the identified goals and treatment plan    GOALS:    Occupational Therapy Goals        Problem: Occupational Therapy Goal    Goal Priority Disciplines Outcome Interventions   Occupational Therapy Goal     OT, PT/OT Ongoing (interventions implemented as appropriate)    Description:  Goals to be met by: 7 days (4/29/18)     Patient will increase functional independence with ADLs by performing:    UE Dressing with Supervision.  LE Dressing with Minimal Assistance.  Grooming while standing with SBA.  Supine to sit with Supervision.  Toilet transfer to toilet with Stand-by Assistance.  Pt will complete functional mobility household distance with SVBA using AD as needed.                      Time Tracking:     OT Date of Treatment: 04/22/18  OT Start Time: 1058  OT Stop Time: 1116  OT Total Time (min): 18 min    Billable Minutes:Evaluation 18 minutes    ERIBERTO Matthews  4/22/2018    "

## 2018-04-23 LAB
ABO + RH BLD: NORMAL
ALBUMIN SERPL BCP-MCNC: 3.1 G/DL
ANION GAP SERPL CALC-SCNC: 17 MMOL/L
ANISOCYTOSIS BLD QL SMEAR: SLIGHT
BASOPHILS # BLD AUTO: 0.01 K/UL
BASOPHILS NFR BLD: 0.2 %
BLD GP AB SCN CELLS X3 SERPL QL: NORMAL
BLD PROD TYP BPU: NORMAL
BLOOD UNIT EXPIRATION DATE: NORMAL
BLOOD UNIT TYPE CODE: 6200
BLOOD UNIT TYPE: NORMAL
BUN SERPL-MCNC: 66 MG/DL
CALCIUM SERPL-MCNC: 9.2 MG/DL
CHLORIDE SERPL-SCNC: 96 MMOL/L
CO2 SERPL-SCNC: 24 MMOL/L
CODING SYSTEM: NORMAL
CREAT SERPL-MCNC: 3.2 MG/DL
DIFFERENTIAL METHOD: ABNORMAL
DISPENSE STATUS: NORMAL
EOSINOPHIL # BLD AUTO: 0 K/UL
EOSINOPHIL NFR BLD: 1 %
ERYTHROCYTE [DISTWIDTH] IN BLOOD BY AUTOMATED COUNT: 17.6 %
ERYTHROCYTE [DISTWIDTH] IN BLOOD BY AUTOMATED COUNT: 18.1 %
EST. GFR  (AFRICAN AMERICAN): 15.8 ML/MIN/1.73 M^2
EST. GFR  (NON AFRICAN AMERICAN): 13.7 ML/MIN/1.73 M^2
GLUCOSE SERPL-MCNC: 87 MG/DL
HCT VFR BLD AUTO: 20.7 %
HCT VFR BLD AUTO: 21.1 %
HGB BLD-MCNC: 6.9 G/DL
HGB BLD-MCNC: 7 G/DL
HYPOCHROMIA BLD QL SMEAR: ABNORMAL
IMM GRANULOCYTES # BLD AUTO: 0.01 K/UL
IMM GRANULOCYTES NFR BLD AUTO: 0.2 %
LYMPHOCYTES # BLD AUTO: 0.7 K/UL
LYMPHOCYTES NFR BLD: 17.7 %
MAGNESIUM SERPL-MCNC: 2.2 MG/DL
MCH RBC QN AUTO: 29.9 PG
MCH RBC QN AUTO: 30 PG
MCHC RBC AUTO-ENTMCNC: 32.7 G/DL
MCHC RBC AUTO-ENTMCNC: 33.8 G/DL
MCV RBC AUTO: 89 FL
MCV RBC AUTO: 92 FL
MONOCYTES # BLD AUTO: 0.6 K/UL
MONOCYTES NFR BLD: 15 %
NEUTROPHILS # BLD AUTO: 2.7 K/UL
NEUTROPHILS NFR BLD: 65.9 %
NRBC BLD-RTO: 0 /100 WBC
OVALOCYTES BLD QL SMEAR: ABNORMAL
PHOSPHATE SERPL-MCNC: 3.1 MG/DL
PLATELET # BLD AUTO: 180 K/UL
PLATELET # BLD AUTO: 204 K/UL
PMV BLD AUTO: 11.7 FL
PMV BLD AUTO: ABNORMAL FL
POIKILOCYTOSIS BLD QL SMEAR: SLIGHT
POLYCHROMASIA BLD QL SMEAR: ABNORMAL
POTASSIUM SERPL-SCNC: 3.5 MMOL/L
RBC # BLD AUTO: 2.3 M/UL
RBC # BLD AUTO: 2.34 M/UL
SODIUM SERPL-SCNC: 137 MMOL/L
TRANS ERYTHROCYTES VOL PATIENT: NORMAL ML
WBC # BLD AUTO: 3.94 K/UL
WBC # BLD AUTO: 4.12 K/UL

## 2018-04-23 PROCEDURE — 86985 SPLIT BLOOD OR PRODUCTS: CPT

## 2018-04-23 PROCEDURE — 25000242 PHARM REV CODE 250 ALT 637 W/ HCPCS: Performed by: STUDENT IN AN ORGANIZED HEALTH CARE EDUCATION/TRAINING PROGRAM

## 2018-04-23 PROCEDURE — 83735 ASSAY OF MAGNESIUM: CPT

## 2018-04-23 PROCEDURE — 25000003 PHARM REV CODE 250: Performed by: HOSPITALIST

## 2018-04-23 PROCEDURE — C9113 INJ PANTOPRAZOLE SODIUM, VIA: HCPCS | Performed by: HOSPITALIST

## 2018-04-23 PROCEDURE — 85025 COMPLETE CBC W/AUTO DIFF WBC: CPT

## 2018-04-23 PROCEDURE — 25000003 PHARM REV CODE 250: Performed by: STUDENT IN AN ORGANIZED HEALTH CARE EDUCATION/TRAINING PROGRAM

## 2018-04-23 PROCEDURE — 36430 TRANSFUSION BLD/BLD COMPNT: CPT

## 2018-04-23 PROCEDURE — P9011 BLOOD SPLIT UNIT: HCPCS

## 2018-04-23 PROCEDURE — 20600001 HC STEP DOWN PRIVATE ROOM

## 2018-04-23 PROCEDURE — 85027 COMPLETE CBC AUTOMATED: CPT

## 2018-04-23 PROCEDURE — 86901 BLOOD TYPING SEROLOGIC RH(D): CPT

## 2018-04-23 PROCEDURE — 97162 PT EVAL MOD COMPLEX 30 MIN: CPT

## 2018-04-23 PROCEDURE — 80069 RENAL FUNCTION PANEL: CPT

## 2018-04-23 PROCEDURE — 63600175 PHARM REV CODE 636 W HCPCS: Performed by: HOSPITALIST

## 2018-04-23 PROCEDURE — 97530 THERAPEUTIC ACTIVITIES: CPT

## 2018-04-23 PROCEDURE — 36415 COLL VENOUS BLD VENIPUNCTURE: CPT

## 2018-04-23 PROCEDURE — 63600175 PHARM REV CODE 636 W HCPCS: Performed by: STUDENT IN AN ORGANIZED HEALTH CARE EDUCATION/TRAINING PROGRAM

## 2018-04-23 PROCEDURE — 86920 COMPATIBILITY TEST SPIN: CPT

## 2018-04-23 RX ORDER — MIRTAZAPINE 30 MG/1
30 TABLET, ORALLY DISINTEGRATING ORAL NIGHTLY
COMMUNITY

## 2018-04-23 RX ORDER — LEVOTHYROXINE SODIUM 125 UG/1
125 TABLET ORAL
COMMUNITY

## 2018-04-23 RX ORDER — FUROSEMIDE 10 MG/ML
80 INJECTION INTRAMUSCULAR; INTRAVENOUS ONCE
Status: COMPLETED | OUTPATIENT
Start: 2018-04-23 | End: 2018-04-23

## 2018-04-23 RX ORDER — IPRATROPIUM BROMIDE AND ALBUTEROL SULFATE 2.5; .5 MG/3ML; MG/3ML
3 SOLUTION RESPIRATORY (INHALATION) 4 TIMES DAILY
COMMUNITY

## 2018-04-23 RX ORDER — HYDROCODONE BITARTRATE AND ACETAMINOPHEN 500; 5 MG/1; MG/1
TABLET ORAL
Status: DISCONTINUED | OUTPATIENT
Start: 2018-04-23 | End: 2018-04-23

## 2018-04-23 RX ORDER — DESLORATADINE 5 MG/1
5 TABLET ORAL DAILY
COMMUNITY

## 2018-04-23 RX ORDER — SEVELAMER HYDROCHLORIDE 800 MG/1
800 TABLET, FILM COATED ORAL 2 TIMES DAILY WITH MEALS
COMMUNITY

## 2018-04-23 RX ORDER — HYDROCODONE BITARTRATE AND ACETAMINOPHEN 500; 5 MG/1; MG/1
TABLET ORAL
Status: DISCONTINUED | OUTPATIENT
Start: 2018-04-23 | End: 2018-04-28 | Stop reason: HOSPADM

## 2018-04-23 RX ORDER — PANTOPRAZOLE SODIUM 40 MG/10ML
80 INJECTION, POWDER, LYOPHILIZED, FOR SOLUTION INTRAVENOUS ONCE
Status: DISCONTINUED | OUTPATIENT
Start: 2018-04-23 | End: 2018-04-23

## 2018-04-23 RX ORDER — HEPARIN SODIUM 5000 [USP'U]/ML
5000 INJECTION, SOLUTION INTRAVENOUS; SUBCUTANEOUS EVERY 8 HOURS
Status: DISCONTINUED | OUTPATIENT
Start: 2018-04-23 | End: 2018-04-23

## 2018-04-23 RX ORDER — HYDROCORTISONE 25 MG/G
CREAM TOPICAL 2 TIMES DAILY
Status: DISCONTINUED | OUTPATIENT
Start: 2018-04-23 | End: 2018-04-28 | Stop reason: HOSPADM

## 2018-04-23 RX ORDER — DONEPEZIL HYDROCHLORIDE 5 MG/1
5 TABLET, FILM COATED ORAL NIGHTLY
COMMUNITY

## 2018-04-23 RX ORDER — PANTOPRAZOLE SODIUM 40 MG/10ML
40 INJECTION, POWDER, LYOPHILIZED, FOR SOLUTION INTRAVENOUS EVERY 12 HOURS
Status: DISCONTINUED | OUTPATIENT
Start: 2018-04-23 | End: 2018-04-23

## 2018-04-23 RX ADMIN — SODIUM BICARBONATE 650 MG TABLET 1300 MG: at 05:04

## 2018-04-23 RX ADMIN — HYDRALAZINE HYDROCHLORIDE 75 MG: 50 TABLET ORAL at 06:04

## 2018-04-23 RX ADMIN — METHYLDOPA 50 MG: 500 TABLET ORAL at 09:04

## 2018-04-23 RX ADMIN — Medication 1 CAPSULE: at 09:04

## 2018-04-23 RX ADMIN — FUROSEMIDE 10 MG/HR: 10 INJECTION, SOLUTION INTRAVENOUS at 01:04

## 2018-04-23 RX ADMIN — LEVOTHYROXINE SODIUM 125 MCG: 25 TABLET ORAL at 06:04

## 2018-04-23 RX ADMIN — DEXTROSE 80 MG: 50 INJECTION, SOLUTION INTRAVENOUS at 01:04

## 2018-04-23 RX ADMIN — CARVEDILOL 12.5 MG: 12.5 TABLET, FILM COATED ORAL at 09:04

## 2018-04-23 RX ADMIN — ASPIRIN 81 MG CHEWABLE TABLET 81 MG: 81 TABLET CHEWABLE at 09:04

## 2018-04-23 RX ADMIN — SEVELAMER CARBONATE 800 MG: 800 TABLET, FILM COATED ORAL at 09:04

## 2018-04-23 RX ADMIN — SODIUM BICARBONATE 650 MG TABLET 1300 MG: at 10:04

## 2018-04-23 RX ADMIN — TRAZODONE HYDROCHLORIDE 50 MG: 50 TABLET ORAL at 10:04

## 2018-04-23 RX ADMIN — SODIUM BICARBONATE 650 MG TABLET 1300 MG: at 09:04

## 2018-04-23 RX ADMIN — HYDRALAZINE HYDROCHLORIDE 75 MG: 50 TABLET ORAL at 10:04

## 2018-04-23 RX ADMIN — ISOSORBIDE DINITRATE 40 MG: 40 TABLET ORAL at 09:04

## 2018-04-23 RX ADMIN — ISOSORBIDE DINITRATE 40 MG: 40 TABLET ORAL at 10:04

## 2018-04-23 RX ADMIN — LORAZEPAM 0.5 MG: 0.5 TABLET ORAL at 01:04

## 2018-04-23 RX ADMIN — HYDRALAZINE HYDROCHLORIDE 75 MG: 50 TABLET ORAL at 02:04

## 2018-04-23 RX ADMIN — CARVEDILOL 12.5 MG: 12.5 TABLET, FILM COATED ORAL at 06:04

## 2018-04-23 RX ADMIN — ISOSORBIDE DINITRATE 40 MG: 40 TABLET ORAL at 04:04

## 2018-04-23 RX ADMIN — SEVELAMER CARBONATE 800 MG: 800 TABLET, FILM COATED ORAL at 06:04

## 2018-04-23 RX ADMIN — FUROSEMIDE 80 MG: 10 INJECTION, SOLUTION INTRAMUSCULAR; INTRAVENOUS at 09:04

## 2018-04-23 RX ADMIN — DEXTROSE 40 MG: 50 INJECTION, SOLUTION INTRAVENOUS at 10:04

## 2018-04-23 RX ADMIN — TIOTROPIUM BROMIDE 18 MCG: 18 CAPSULE ORAL; RESPIRATORY (INHALATION) at 09:04

## 2018-04-23 RX ADMIN — HYDROCORTISONE 2.5%: 25 CREAM TOPICAL at 12:04

## 2018-04-23 RX ADMIN — HYDROCORTISONE 2.5%: 25 CREAM TOPICAL at 11:04

## 2018-04-23 NOTE — PT/OT/SLP EVAL
Physical Therapy Evaluation    Patient Name:  Anai Sal   MRN:  [id number]    Recommendations:     Discharge Recommendations:  home with home health (and return [facility] program w/ transportation)   Discharge Equipment Recommendations: none   Barriers to discharge: Inaccessible home (4 VITALIY home and 2 story house w/ bathroom upstairs)    Assessment:     Anai aSl is a 73 y.o. female admitted with a medical diagnosis of Acute on chronic combined systolic and diastolic congestive heart failure.  She presents with the following impairments/functional limitations:  weakness, impaired endurance, impaired self care skills, impaired functional mobilty, gait instability, impaired balance, impaired cardiopulmonary response to activity, impaired cognition requiring CGA for safety w/ transfers. Pt declined ambulation trial due to fatigue. Pt is appropriate for acute PT and will begin PT POC.    Rehab Prognosis:  Good; patient would benefit from acute skilled PT services to address these deficits and reach maximum level of function.      Recent Surgery: * No surgery found *      Plan:     During this hospitalization, patient to be seen 4 x/week to address the above listed problems via gait training, therapeutic activities, therapeutic exercises  · Plan of Care Expires:  05/20/18   Plan of Care Reviewed with: patient, daughter    Subjective     Communicated with nursing prior to session.  Patient found supine upon PT entry to room, agreeable to evaluation.  Pt's daughter present t/o session.    Chief Complaint: fatigue  Patient comments/goals: return home to PLOF  Pain/Comfort:  · Pain Rating 1: 0/10    Patients cultural, spiritual, Faith conflicts given the current situation: none reported    Living Environment:  Pt lives w/ her daughter in a 2SH w/ 4 VITALIY and (L) rail. Pt's bedroom is downstairs but her bathroom is upstairs w/ 12 steps and (R) rail. Pt has a tub/shower combo on the 2nd floor. Downstairs there is  only a half bath.  Prior to admission, patients level of function was Dwight w/ RW for mobility but her daughter would assist w/ ADLs as needed. [facility] 5d[facility] w/ transportation provided.  Patient has the following equipment: bedside commode, bath bench, walker, rolling.  DME owned (not currently used): none.  Upon discharge, patient will have assistance from her daughter when she is home from school. Pt's daughter is in nursing school.    Objective:     Patient found with: telemetry, peripheral IV     General Precautions: Standard, fall   Orthopedic Precautions:N/A   Braces: N/A     Exams:  · Cognitive Exam:  Patient is oriented to Person and Place and follows 100% of simple commands   · Disoriented to time and situation  · Gross Motor Coordination:  WFL  · Postural Exam:  Patient presented with the following abnormalities:    · -       Rounded shoulders  · -       Forward head  · Sensation:    · -       Intact  · RLE ROM: WFL  · RLE Strength: grossly 4/5  · LLE ROM: WFL  · LLE Strength: grossly 4/5    Functional Mobility:  · Bed Mobility:    · Supine>sit on bed w/ ModA for trunk, vc's for technique  · Transfers:   · Sit>Stand from EOB w/ RW and CGA for safety  · SPT to bedside chair w/ RW and Min/CGA for stability, cueing for upright posture and to remain inside RW when pivoting  · Gait:   · Pt declined due to fatigue  · Balance:   · Static sit EOB w/ CGA/SBA due to occasional posterior lean  · Static stand w/ RW and CGA for safety, vc's for upright posture    AM-PAC 6 CLICK MOBILITY  Total Score:15       Therapeutic Activities and Exercises:   Seated BLE therex x10 reps (HF and LAQ)  Pt and daughter educated on PT role and POC. Both verb understanding.    Patient left up in chair with all lines intact, call button in reach and daughter present.    GOALS:    Physical Therapy Goals        Problem: Physical Therapy Goal    Goal Priority Disciplines Outcome Goal Variances Interventions   Physical  Therapy Goal     PT/OT, PT Ongoing (interventions implemented as appropriate)     Description:  Goals to be met by: 18     Patient will increase functional independence with mobility by performin. Supine to sit with Stand-by Assistance  2. Sit to stand transfer with Supervision  3. Bed to chair transfer with Supervision using Rolling Walker  4. Gait  x 100 feet with Stand-by Assistance using Rolling Walker.   5. Ascend/descend 4 stair with left Handrails Contact Guard Assistance.                       History:     Past Medical History:   Diagnosis Date    Breast cancer     right, s/p mastectomy    CHF (congestive heart failure)     Chronic hepatitis C virus infection 3/14/2017    Chronic kidney disease, stage V 2018    Coronary artery disease     Hypertension     Hazel     Thyroid disease        Past Surgical History:   Procedure Laterality Date    HYSTERECTOMY      MASTECTOMY Right        Clinical Decision Making:     History  Co-morbidities and personal factors that may impact the plan of care Examination  Body Structures and Functions, activity limitations and participation restrictions that may impact the plan of care Clinical Presentation   Decision Making/ Complexity Score   Co-morbidities:   [] Time since onset of injury / illness / exacerbation  [x] Status of current condition  [x]Patient's cognitive status and safety concerns    [x] Multiple Medical Problems (see med hx)  Personal Factors:   [] Patient's age  [] Prior Level of function   [] Patient's home situation (environment and family support)  [] Patient's level of motivation  [] Expected progression of patient      HISTORY:(criteria)    [] 78748 - no personal factors/history    [] 69730 - has 1-2 personal factor/comorbidity     [x] 73792 - has >3 personal factor/comorbidity     Body Regions:  [] Objective examination findings  [] Head     []  Neck  [] Trunk   [] Upper Extremity  [] Lower Extremity    Body  Systems:  [x] For communication ability, affect, cognition, language, and learning style: the assessment of the ability to make needs known, consciousness, orientation (person, place, and time), expected emotional /behavioral responses, and learning preferences (eg, learning barriers, education  needs)  [x] For the neuromuscular system: a general assessment of gross coordinated movement (eg, balance, gait, locomotion, transfers, and transitions) and motor function  (motor control and motor learning)  [x] For the musculoskeletal system: the assessment of gross symmetry, gross range of motion, gross strength, height, and weight  [] For the integumentary system: the assessment of pliability(texture), presence of scar formation, skin color, and skin integrity  [x] For cardiovascular/pulmonary system: the assessment of heart rate, respiratory rate, blood pressure, and edema     Activity limitations:    [x] Patient's cognitive status and saf ety concerns          [] Status of current condition      [] Weight bearing restriction  [] Cardiopulmunary Restriction    Participation Restrictions:   [] Goals and goal agreement with the patient     [] Rehab potential (prognosis) and probable outcome      Examination of Body System: (criteria)    [] 12555 - addressing 1-2 elements    [] 72147 - addressing a total of 3 or more elements     [x] 37850 -  Addressing a total of 4 or more elements         Clinical Presentation: (criteria)  Evolving - 98269     On examination of body system using standardized tests and measures patient presents with 4 or more elements from any of the following: body structures and functions, activity limitations, and/or participation restrictions.  Leading to a clinical presentation that is considered evolving with changing characteristics                              Clinical Decision Making  (Eval Complexity):  Moderate - 70110     Time Tracking:     PT Received On: 04/23/18  PT Start Time: 1100     PT  Stop Time: 1120  PT Total Time (min): 20 min     Billable Minutes: Evaluation 10, Therapeutic Activity 10 and Total Time 20      Verito Moscoso, PT  04/23/2018

## 2018-04-23 NOTE — PLAN OF CARE
Problem: Patient Care Overview  Goal: Plan of Care Review  Outcome: Ongoing (interventions implemented as appropriate)  Pt continues on lasix drip at 10 mg/hr.Pt AAOx4. Pt free from falls. Pt wears non slip socks when ambulating. Pt bed low and locked position. Pt afebrile. Pt IV site without redness or edema.. Pt has denied any pain or discomfort this shift.

## 2018-04-23 NOTE — PLAN OF CARE
Problem: Physical Therapy Goal  Goal: Physical Therapy Goal  Goals to be met by: 18     Patient will increase functional independence with mobility by performin. Supine to sit with Stand-by Assistance  2. Sit to stand transfer with Supervision  3. Bed to chair transfer with Supervision using Rolling Walker  4. Gait  x 100 feet with Stand-by Assistance using Rolling Walker.   5. Ascend/descend 4 stair with left Handrails Contact Guard Assistance.     Outcome: Ongoing (interventions implemented as appropriate)  PT eval completed. Pt will begin PT POC.    Verito Moscoso, PT  2018

## 2018-04-23 NOTE — PROGRESS NOTES
Pt states she is feeling anxious and request ativan. Pt O@ 94 % on room air, Resp even and unlabored.

## 2018-04-23 NOTE — PHARMACY MED REC
"Admission Medication Reconciliation - Pharmacy Consult Note    The home medication history was taken by Yanet Luther, Pharmacy Tech.  Based on information gathered and subsequent review by the clinical pharmacist, the items below may need attention.     You may go to "Admission" then "Reconcile Home Medications" tabs to review and/or act upon these items. Based on information gathered and subsequent review by the clinical pharmacist, the items below may need attention.    Potentially problematic discrepancies with current MAR  o Patient IS taking the following which was not ordered upon admit  o Donepezil 5 mg hs  o Advair inhaler (Breo)      Please address this information as you see fit.  Feel free to contact us if you have any questions or require assistance.    Sandra Thao PharmD, [facility]  Internal Medicine Clinical Pharmacy Specialist  Spectra link: [phone]      Patient's prior to admission medication regimen was as follows:  Medication Sig    albuterol sulfate (PROAIR HFA INHL) Inhale 2 puffs into the lungs 4 (four) times daily as needed     albuterol-ipratropium 2.5mg-0.5mg/3mL (DUO-NEB) nebulizer  Take 3 mLs by nebulization 4 (four) times daily. Rescue    allopurinol (ZYLOPRIM) 50 MG Tab tablet Take 50 mg by mouth once daily.    ammonium lactate 5 % Lotn Apply to the affected area(s) of skin as directed    aspirin 81 MG Chew Take 1 tablet (81 mg total) by mouth once daily.    bumetanide (BUMEX) 2 MG tablet Take 1.5 tablets (3mg) by mouth two times a day    darbepoetin jaun-polysorbate (ARANESP) 25 mcg/0.42 mL injection Inject into the skin every 14 (fourteen) days.    desloratadine (CLARINEX) 5 mg tablet Take 5 mg by mouth once daily.    dext 70/polycarbophil/peg/NaCl (ARTIFICIAL TEAR SOLUTION OPHT) Instil 1 drop in each eye twice daily    Docusate (COLACE) 50 MG capsule Take by mouth 2 (two) times daily.    donepezil (ARICEPT) 5 MG tablet Take 5 mg by mouth every evening.    fluticasone (FLONASE) 50 " mcg/actuation nasal spray 2 sprays by Each Nare route once daily. (Patient taking differently: 1 spray by Each Nare route once daily. )    fluticasone-salmeterol 250-50 mcg/dose (ADVAIR) diskus inhaler Inhale 1 puff into the lungs 2 (two) times daily. Controller    hydrALAZINE (APRESOLINE) 25 MG Tab Take 1.5 tablets (37.5mg) by mouth three times a day    iron sucrose (VENOFER) 200 mg iron/10 mL Soln injection Inject as directed for 10 doses    isosorbide dinitrate (ISORDIL) 20 MG tablet Take 1 tablet (20 mg total) by mouth 3 (three) times daily.    levothyroxine (SYNTHROID) 125 MCG tablet Take 125 mcg by mouth before breakfast.    metoprolol succinate (TOPROL-XL) 50 MG 24 hr tablet Take 1 tablet (50 mg total) by mouth once daily.    mirtazapine (REMERON SOL-TAB) 30 MG disintegrating tablet Take 30 mg by mouth every evening.    sevelamer HCl (RENAGEL) 800 MG Tab Take 800 mg by mouth 2 (two) times daily with meals.    sodium bicarbonate 650 MG tablet Take 650 mg by mouth 3 (three) times daily.     tiotropium (SPIRIVA) 18 mcg inhalation capsule Inhale 1 capsule (18 mcg total) into the lungs once daily.    trazodone (DESYREL) 25 MG tablet Take 25-50 mg by mouth every evening.     vitamin renal formula, B-complex-vitamin c-folic acid (NEPHROCAP)  Take 1 capsule by mouth once daily.           .    .

## 2018-04-24 PROBLEM — D72.819 LEUKOPENIA: Status: RESOLVED | Noted: 2017-04-04 | Resolved: 2018-04-24

## 2018-04-24 PROBLEM — I20.0 UNSTABLE ANGINA: Status: RESOLVED | Noted: 2017-03-14 | Resolved: 2018-04-24

## 2018-04-24 PROBLEM — F05 DELIRIUM DUE TO ANOTHER MEDICAL CONDITION: Status: RESOLVED | Noted: 2017-04-04 | Resolved: 2018-04-24

## 2018-04-24 PROBLEM — I50.42 CHRONIC COMBINED SYSTOLIC AND DIASTOLIC HEART FAILURE, NYHA CLASS 3: Status: RESOLVED | Noted: 2017-01-11 | Resolved: 2018-04-24

## 2018-04-24 PROBLEM — B18.2 CHRONIC HEPATITIS C VIRUS INFECTION: Status: RESOLVED | Noted: 2017-03-14 | Resolved: 2018-04-24

## 2018-04-24 PROBLEM — K20.80 ESOPHAGITIS, LOS ANGELES GRADE C: Status: RESOLVED | Noted: 2017-12-19 | Resolved: 2018-04-24

## 2018-04-24 LAB
ALBUMIN SERPL BCP-MCNC: 3 G/DL
ANION GAP SERPL CALC-SCNC: 10 MMOL/L
BASOPHILS # BLD AUTO: 0.01 K/UL
BASOPHILS # BLD AUTO: 0.01 K/UL
BASOPHILS NFR BLD: 0.2 %
BASOPHILS NFR BLD: 0.2 %
BUN SERPL-MCNC: 65 MG/DL
CALCIUM SERPL-MCNC: 9.2 MG/DL
CHLORIDE SERPL-SCNC: 96 MMOL/L
CO2 SERPL-SCNC: 32 MMOL/L
CREAT SERPL-MCNC: 3.4 MG/DL
DIFFERENTIAL METHOD: ABNORMAL
DIFFERENTIAL METHOD: ABNORMAL
EOSINOPHIL # BLD AUTO: 0 K/UL
EOSINOPHIL # BLD AUTO: 0.1 K/UL
EOSINOPHIL NFR BLD: 0.7 %
EOSINOPHIL NFR BLD: 1 %
ERYTHROCYTE [DISTWIDTH] IN BLOOD BY AUTOMATED COUNT: 17.2 %
ERYTHROCYTE [DISTWIDTH] IN BLOOD BY AUTOMATED COUNT: 17.3 %
EST. GFR  (AFRICAN AMERICAN): 14.7 ML/MIN/1.73 M^2
EST. GFR  (NON AFRICAN AMERICAN): 12.8 ML/MIN/1.73 M^2
GLUCOSE SERPL-MCNC: 110 MG/DL
HCT VFR BLD AUTO: 22.6 %
HCT VFR BLD AUTO: 24.3 %
HGB BLD-MCNC: 7.6 G/DL
HGB BLD-MCNC: 7.9 G/DL
IMM GRANULOCYTES # BLD AUTO: 0.03 K/UL
IMM GRANULOCYTES # BLD AUTO: 0.04 K/UL
IMM GRANULOCYTES NFR BLD AUTO: 0.5 %
IMM GRANULOCYTES NFR BLD AUTO: 0.7 %
LYMPHOCYTES # BLD AUTO: 0.6 K/UL
LYMPHOCYTES # BLD AUTO: 0.8 K/UL
LYMPHOCYTES NFR BLD: 10.4 %
LYMPHOCYTES NFR BLD: 13.3 %
MAGNESIUM SERPL-MCNC: 2.3 MG/DL
MCH RBC QN AUTO: 29.9 PG
MCH RBC QN AUTO: 30.2 PG
MCHC RBC AUTO-ENTMCNC: 32.5 G/DL
MCHC RBC AUTO-ENTMCNC: 33.6 G/DL
MCV RBC AUTO: 89 FL
MCV RBC AUTO: 93 FL
MONOCYTES # BLD AUTO: 0.7 K/UL
MONOCYTES # BLD AUTO: 0.7 K/UL
MONOCYTES NFR BLD: 11.5 %
MONOCYTES NFR BLD: 12.4 %
NEUTROPHILS # BLD AUTO: 4.1 K/UL
NEUTROPHILS # BLD AUTO: 4.5 K/UL
NEUTROPHILS NFR BLD: 72.9 %
NEUTROPHILS NFR BLD: 76.2 %
NRBC BLD-RTO: 1 /100 WBC
NRBC BLD-RTO: 1 /100 WBC
PHOSPHATE SERPL-MCNC: 2.6 MG/DL
PLATELET # BLD AUTO: 169 K/UL
PLATELET # BLD AUTO: 196 K/UL
PMV BLD AUTO: 10.3 FL
PMV BLD AUTO: 10.8 FL
POTASSIUM SERPL-SCNC: 3 MMOL/L
RBC # BLD AUTO: 2.54 M/UL
RBC # BLD AUTO: 2.62 M/UL
SODIUM SERPL-SCNC: 138 MMOL/L
WBC # BLD AUTO: 5.65 K/UL
WBC # BLD AUTO: 5.89 K/UL

## 2018-04-24 PROCEDURE — 25000242 PHARM REV CODE 250 ALT 637 W/ HCPCS: Performed by: STUDENT IN AN ORGANIZED HEALTH CARE EDUCATION/TRAINING PROGRAM

## 2018-04-24 PROCEDURE — 25000003 PHARM REV CODE 250: Performed by: STUDENT IN AN ORGANIZED HEALTH CARE EDUCATION/TRAINING PROGRAM

## 2018-04-24 PROCEDURE — 27000221 HC OXYGEN, UP TO 24 HOURS

## 2018-04-24 PROCEDURE — 25000003 PHARM REV CODE 250: Performed by: HOSPITALIST

## 2018-04-24 PROCEDURE — 99223 1ST HOSP IP/OBS HIGH 75: CPT | Mod: ,,, | Performed by: CLINICAL NURSE SPECIALIST

## 2018-04-24 PROCEDURE — 94799 UNLISTED PULMONARY SVC/PX: CPT

## 2018-04-24 PROCEDURE — 20600001 HC STEP DOWN PRIVATE ROOM

## 2018-04-24 PROCEDURE — 63600175 PHARM REV CODE 636 W HCPCS: Mod: JG | Performed by: STUDENT IN AN ORGANIZED HEALTH CARE EDUCATION/TRAINING PROGRAM

## 2018-04-24 PROCEDURE — 99233 SBSQ HOSP IP/OBS HIGH 50: CPT | Mod: GC,,, | Performed by: INTERNAL MEDICINE

## 2018-04-24 PROCEDURE — 63600175 PHARM REV CODE 636 W HCPCS: Performed by: STUDENT IN AN ORGANIZED HEALTH CARE EDUCATION/TRAINING PROGRAM

## 2018-04-24 PROCEDURE — 80069 RENAL FUNCTION PANEL: CPT

## 2018-04-24 PROCEDURE — 36415 COLL VENOUS BLD VENIPUNCTURE: CPT

## 2018-04-24 PROCEDURE — 85025 COMPLETE CBC W/AUTO DIFF WBC: CPT | Mod: 91

## 2018-04-24 PROCEDURE — 83735 ASSAY OF MAGNESIUM: CPT

## 2018-04-24 PROCEDURE — 63600175 PHARM REV CODE 636 W HCPCS: Performed by: HOSPITALIST

## 2018-04-24 RX ORDER — HEPARIN SODIUM 5000 [USP'U]/ML
5000 INJECTION, SOLUTION INTRAVENOUS; SUBCUTANEOUS EVERY 8 HOURS
Status: DISCONTINUED | OUTPATIENT
Start: 2018-04-24 | End: 2018-04-28 | Stop reason: HOSPADM

## 2018-04-24 RX ORDER — METOLAZONE 2.5 MG/1
10 TABLET ORAL 2 TIMES DAILY
Status: DISCONTINUED | OUTPATIENT
Start: 2018-04-24 | End: 2018-04-28 | Stop reason: HOSPADM

## 2018-04-24 RX ORDER — FUROSEMIDE 10 MG/ML
80 INJECTION INTRAMUSCULAR; INTRAVENOUS ONCE
Status: COMPLETED | OUTPATIENT
Start: 2018-04-24 | End: 2018-04-24

## 2018-04-24 RX ORDER — DIPHENOXYLATE HYDROCHLORIDE AND ATROPINE SULFATE 2.5; .025 MG/1; MG/1
2 TABLET ORAL 4 TIMES DAILY PRN
Status: DISCONTINUED | OUTPATIENT
Start: 2018-04-24 | End: 2018-04-24

## 2018-04-24 RX ORDER — PANTOPRAZOLE SODIUM 40 MG/1
40 TABLET, DELAYED RELEASE ORAL DAILY
Status: DISCONTINUED | OUTPATIENT
Start: 2018-04-24 | End: 2018-04-28 | Stop reason: HOSPADM

## 2018-04-24 RX ADMIN — TRAZODONE HYDROCHLORIDE 50 MG: 50 TABLET ORAL at 09:04

## 2018-04-24 RX ADMIN — HYDROCORTISONE 2.5%: 25 CREAM TOPICAL at 09:04

## 2018-04-24 RX ADMIN — LEVOTHYROXINE SODIUM 125 MCG: 25 TABLET ORAL at 05:04

## 2018-04-24 RX ADMIN — CARVEDILOL 12.5 MG: 12.5 TABLET, FILM COATED ORAL at 09:04

## 2018-04-24 RX ADMIN — SEVELAMER CARBONATE 800 MG: 800 TABLET, FILM COATED ORAL at 05:04

## 2018-04-24 RX ADMIN — TIOTROPIUM BROMIDE 18 MCG: 18 CAPSULE ORAL; RESPIRATORY (INHALATION) at 12:04

## 2018-04-24 RX ADMIN — PANTOPRAZOLE SODIUM 40 MG: 40 TABLET, DELAYED RELEASE ORAL at 09:04

## 2018-04-24 RX ADMIN — SODIUM BICARBONATE 650 MG TABLET 1300 MG: at 02:04

## 2018-04-24 RX ADMIN — SODIUM BICARBONATE 650 MG TABLET 1300 MG: at 09:04

## 2018-04-24 RX ADMIN — HEPARIN SODIUM 5000 UNITS: 5000 INJECTION, SOLUTION INTRAVENOUS; SUBCUTANEOUS at 09:04

## 2018-04-24 RX ADMIN — METOLAZONE 10 MG: 2.5 TABLET ORAL at 12:04

## 2018-04-24 RX ADMIN — METHYLDOPA 50 MG: 500 TABLET ORAL at 09:04

## 2018-04-24 RX ADMIN — HYDRALAZINE HYDROCHLORIDE 75 MG: 50 TABLET ORAL at 05:04

## 2018-04-24 RX ADMIN — Medication 1 CAPSULE: at 09:04

## 2018-04-24 RX ADMIN — ISOSORBIDE DINITRATE 40 MG: 40 TABLET ORAL at 09:04

## 2018-04-24 RX ADMIN — FUROSEMIDE 15 MG/HR: 10 INJECTION, SOLUTION INTRAVENOUS at 10:04

## 2018-04-24 RX ADMIN — FUROSEMIDE 80 MG: 10 INJECTION, SOLUTION INTRAMUSCULAR; INTRAVENOUS at 09:04

## 2018-04-24 RX ADMIN — ASPIRIN 81 MG CHEWABLE TABLET 81 MG: 81 TABLET CHEWABLE at 09:04

## 2018-04-24 RX ADMIN — ISOSORBIDE DINITRATE 40 MG: 40 TABLET ORAL at 03:04

## 2018-04-24 RX ADMIN — HYDRALAZINE HYDROCHLORIDE 75 MG: 50 TABLET ORAL at 02:04

## 2018-04-24 RX ADMIN — LORAZEPAM 0.5 MG: 0.5 TABLET ORAL at 01:04

## 2018-04-24 RX ADMIN — HYDRALAZINE HYDROCHLORIDE 75 MG: 50 TABLET ORAL at 09:04

## 2018-04-24 RX ADMIN — SEVELAMER CARBONATE 800 MG: 800 TABLET, FILM COATED ORAL at 09:04

## 2018-04-24 RX ADMIN — POTASSIUM BICARBONATE 50 MEQ: 25 TABLET, EFFERVESCENT ORAL at 09:04

## 2018-04-24 RX ADMIN — FUROSEMIDE 10 MG/HR: 10 INJECTION, SOLUTION INTRAVENOUS at 09:04

## 2018-04-24 RX ADMIN — METOLAZONE 10 MG: 2.5 TABLET ORAL at 09:04

## 2018-04-24 RX ADMIN — CARVEDILOL 12.5 MG: 12.5 TABLET, FILM COATED ORAL at 05:04

## 2018-04-24 RX ADMIN — ERYTHROPOIETIN 20000 UNITS: 20000 INJECTION, SOLUTION INTRAVENOUS; SUBCUTANEOUS at 12:04

## 2018-04-24 RX ADMIN — POTASSIUM BICARBONATE 50 MEQ: 25 TABLET, EFFERVESCENT ORAL at 12:04

## 2018-04-24 NOTE — PLAN OF CARE
TIFFANIE spoke with Sabrina at [facility] ([phone], [phone]). Pt will need HH upon d/c-Sabrina asked for orders to be sent to her and she will set up HH for pt, as [facility] has its own HH service.     Candice Noel, [facility] [phone]

## 2018-04-24 NOTE — ASSESSMENT & PLAN NOTE
Impression: Pt is a 74 y/o female with Diastolic heart failure, EF 25%, CKD 5. Pt on lasix drip. Pt is alert and oriented. No distress noted.     Goals of care:  1100:  Met with pt and pt's daughter. Pt has [facility] program. Per pt and daughter, they are in aggrrement to DNR status.  Per daughter, she is aware that pt is declining more and not diuresing as well as she was. Per pt and daughter, pt would not want to be on dialysis if offered. Per daughter, hospice care was discussed on [facility].ad[facility] to [facility]. Per daughter, hospice was not set up upon d/c with [facility]. Per daughter, they are open to hospice care through [facility] at d/c. Daughter is taking her finals for Nursing School today, wed, and Thursday. Pt and daughter are hopeful pt will be able to make daughter's graduation on May 12th.     Called and spoke to [facility] resident to let her know of above conversation.     Plan:  1) Pt and daughter agreeable to DNR status.   2) Pt is a [facility] pt and open to hospice at home through [facility].  3) Will follow.

## 2018-04-24 NOTE — CONSULTS
[facility]  Palliative Medicine  Consult Note    Patient Name: Anai Sal  MRN: [id number]  Admission Date: 4/19/2018  Hospital Length of Stay: 4 days  Code Status: DNR   Attending Provider: Parker James MD  Consulting Provider: GARY Barton  Primary Care Physician: Live Young MD  Principal Problem:Acute on chronic combined systolic and diastolic congestive heart failure    Patient information was obtained from patient and ER records.      Inpatient consult to Palliative Care  Consult performed by: STACY SALEH.  Consult ordered by: MOY MANRIQUEZ        Assessment/Plan:     Palliative care encounter    Impression: Pt is a 74 y/o female with Diastolic heart failure, EF 25%, CKD 5. Pt on lasix drip. Pt is alert and oriented. No distress noted.     Goals of care:  1100:  Met with pt and pt's daughter. Pt has [facility] program. Per pt and daughter, they are in aggrrement to DNR status.  Per daughter, she is aware that pt is declining more and not diuresing as well as she was. Per pt and daughter, pt would not want to be on dialysis if offered. Per daughter, hospice care was discussed on Feb.admit to [facility]. Per daughter, hospice was not set up upon d/c with [facility]. Per daughter, they are open to hospice care through [facility] at d/c. Daughter is taking her finals for Nursing School today, wed, and Thursday. Pt and daughter are hopeful pt will be able to make daughter's graduation on May 12th.     Called and spoke to [facility] resident to let her know of above conversation.     Plan:  1) Pt and daughter agreeable to DNR status.   2) Pt is a [facility] pt and open to hospice at home through [facility].  3) Will follow.             Thank you for your consult. I will follow-up with patient. Please contact us if you have any additional questions.    Subjective:     HPI:   Pt is a 73 year old female with a past medical history significant for HTN, CHF (last EF 30%), hypothyroidism, CAD, CKD V, breast  cancer, hazel presents with worsening SOB over the past 2 weeks as well as chest pain of one days duration. The patient and family state that the patient has been feeling more SOB over the past two weeks, they state that they have noticed her with increasing work of breathing and fluid accumulation. The patient denies any recent illness (although dose have chronic cough), change in diet, but do state that they have been somewhat confused as to what medications the patient should be taking following her recent discharge for acute on chronic heart failure.      Pt is a [facility] pt.     Hospital Course:  No notes on file        Past Medical History:   Diagnosis Date    Anxiety     Breast cancer 1980    right, s/p mastectomy    CHF (congestive heart failure)     Chronic combined systolic and diastolic heart failure, NYHA class 3 1/11/2017    Chronic hepatitis C virus infection 3/14/2017    Chronic kidney disease, stage V 2/20/2018    Coronary artery disease     Delirium due to another medical condition 4/4/2017    Demand ischemia of myocardium 12/16/2016    Esophagitis, [place] grade C 12/19/2017    History of breast cancer 2/16/2017    Hypertension     Leukopenia 4/4/2017    Hazel     Thyroid disease        Past Surgical History:   Procedure Laterality Date    HYSTERECTOMY      MASTECTOMY Right 1980       Review of patient's allergies indicates:   Allergen Reactions    Seroquel [quetiapine]      Causes  profound sedation.       Medications:  Continuous Infusions:   furosemide (LASIX) 2 mg/mL infusion (non-titrating) 15 mg/hr (04/24/18 0902)     Scheduled Meds:   allopurinol  50 mg Oral Daily    aspirin  81 mg Oral Daily    carvedilol  12.5 mg Oral BID WM    epoetin jaun (PROCRIT) injection  20,000 Units Subcutaneous Once    INV hydrALAZINE  75 mg Oral Q8H    hydrocortisone   Rectal BID    isosorbide dinitrate  40 mg Oral TID    levothyroxine  125 mcg Oral Before breakfast    metOLazone  10  mg Oral BID    pantoprazole  40 mg Oral Daily    potassium bicarbonate  50 mEq Oral Q4H    sevelamer carbonate  800 mg Oral BID WM    sodium bicarbonate  1,300 mg Oral TID    tiotropium  18 mcg Inhalation Daily    traZODone  50 mg Oral QHS    vitamin renal formula (B-complex-vitamin c-folic acid)  1 capsule Oral Daily     PRN Meds:sodium chloride, albuterol-ipratropium 2.5mg-0.5mg/3mL, dextrose 50%, dextrose 50%, glucagon (human recombinant), glucose, glucose, LORazepam, sodium chloride 0.9%    Family History     Problem Relation (Age of Onset)    Heart attack Brother, Maternal Grandmother    Heart disease Mother, Sister    No Known Problems Father, Maternal Aunt, Maternal Uncle, Paternal Aunt, Paternal Uncle, Maternal Grandfather, Paternal Grandmother, Paternal Grandfather        Social History Main Topics    Smoking status: Former Smoker     Packs/day: 2.00     Years: 20.00     Types: Cigarettes     Quit date: 2/16/1995    Smokeless tobacco: Never Used    Alcohol use No      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   Neurological: Positive for weakness.     Objective:     Vital Signs (Most Recent):  Temp: 98.4 °F (36.9 °C) (04/24/18 0737)  Pulse: (!) 58 (04/24/18 0912)  Resp: 18 (04/24/18 0737)  BP: (!) 145/79 (04/24/18 0737)  SpO2: 98 % (04/24/18 0737) Vital Signs (24h Range):  Temp:  [96.7 °F (35.9 °C)-98.6 °F (37 °C)] 98.4 °F (36.9 °C)  Pulse:  [52-61] 58  Resp:  [16-20] 18  SpO2:  [90 %-99 %] 98 %  BP: (110-155)/(58-79) 145/79     Weight: 76.6 kg (168 lb 14 oz)  Body mass index is 27.26 kg/m².    Review of Symptoms  Symptom Assessment (ESAS 0-10 scale)   ESAS 0 1 2 3 4 5 6 7 8 9 10   Pain X             Dyspnea X             Anxiety X             Nausea X             Depression  X             Anorexia X             Fatigue     X         Insomnia X              Restlessness  X             Agitation X             CAM / Delirium __ --  ___+   Constipation     __ --  ___+   Diarrhea           __ --  ___+  Bowel Management Plan (BMP): No    Pain Assessment: No complaints of pain.    OME in 24 hours: 0    Performance Status:     ECOG Performance Status Grade: 3 - Confined to bed or chair 50% of waking hours    Physical Exam    Significant Labs: All pertinent labs within the past 24 hours have been reviewed.  CBC:     Recent Labs  Lab 18  0832   WBC 5.65   HGB 7.9*   HCT 24.3*   MCV 93        BMP:    Recent Labs  Lab 18  0618         K 3.0*   CL 96   CO2 32*   BUN 65*   CREATININE 3.4*   CALCIUM 9.2   MG 2.3     LFT:  Lab Results   Component Value Date    AST 34 2018    ALKPHOS 75 2018    BILITOT 1.2 (H) 2018     Albumin:   Albumin   Date Value Ref Range Status   2018 3.0 (L) 3.5 - 5.2 g/dL Final     Protein:   Total Protein   Date Value Ref Range Status   2018 8.1 6.0 - 8.4 g/dL Final     Lactic acid:   Lab Results   Component Value Date    LACTATE 0.8 02/15/2018    LACTATE 1.2 2018       Significant Imaging: I have reviewed all pertinent imaging results/findings within the past 24 hours.    Advanced Directives::  Living Will: Yes. Copy on chart: yes in Epic  LaPOST: No  Do Not Resuscitate Status: No at this time. Pt and daughter report they want pt to be DNR.  Medical Power of : Pt daughter Vicky is next of kin along with other son who is not involved in pt's care.  Vicky is involved in pt's care.     Decision-Making Capacity: Patient answered questions, Family answered questions    Living Arrangements: Lives with family    Psychosocial/Cultural:  Pt lives with daughter who is about to finish nursing school.  Pt has [facility].   Pt has two sons, one is .         > 50% of 70 min visit spent in chart review, face to face discussion of goals of care,  symptom assessment, coordination of care  and emotional support.    Sarah Yu, CNS  Palliative Medicine  [facility]

## 2018-04-24 NOTE — PLAN OF CARE
"Palliative Care Social Work   Assessment  Name: Anai Sal  MRN: [id number]  Date of Birth/Age:  1945  Sex: female  Ethnicity:      Primary Language:English   Needed: no    Attending Physician: Dr. James  Reason for Referral: assistance with clarification of goals of care  Consult Order Date: 18 1011  Primary CM/SW: Candice Noel LCSW    Palliative Care Provider: DERIK Ye    Present during Interview: pt asleep, pt's daughter Treshone, [facility] Care APRN and this SW    Past & Current Medical Situation:   Diagnosis: Acute on chronic combined systolic and diastolic congestive heart failure  PMH:   Past Medical History:   Diagnosis Date    Anxiety     Breast cancer 1980    right, s/p mastectomy    CHF (congestive heart failure)     Chronic combined systolic and diastolic heart failure, NYHA class 3 2017    Chronic hepatitis C virus infection 3/14/2017    Chronic kidney disease, stage V 2018    Coronary artery disease     Delirium due to another medical condition 2017    Demand ischemia of myocardium 2016    Esophagitis, [place] grade C 2017    History of breast cancer 2017    Hypertension     Leukopenia 2017    Marlon     Thyroid disease      Mental Health/Substance Use History: anxiety. "marlon" per chart  Non-traditional Health practices:     Understanding of diagnosis and prognosis: Dtr will benefit from continued support regarding prognosis. Dtr has good understanding of dx.     Patients Mental Status: asleep during visit.    Socio-Economic Factors/Resources:  Address: [address] [place] [address] Dr  [place] [address]  Phone Number: [phone] (home)     Marital Status:   Household Composition: Dtr lives with pt in pt's home  Children: 3 children: 1 . Dtr Treshone and two sons  Relationships with Family: dtr stated that she has been living with her mother all of her life. Dtr has two other brothers but dtr is only " "caregiver/decision-maker is Treshone. One of pt's sons Fuentes, unexpectedly  in 2017.    Dtr stated that she helped care for her grandmother when she was ill as well. Grandmother  at [facility] as well.      Dtr is graduating from nursing school on May 12th. Dtr has her last exams today at 12:30, Wednesday and Thursday.     Emergency Contacts:   Dtr: Treshone Sal: [phone]    Activities of Daily Living: Some assistance with ADLs  Support Systems-Family & Community (Home Health, HME etc): [facility] program. Goes to  5 days a week. bedside commode, shower chair, rolling walker, nebulizer    Transportation:  [employer]    Work/Education History: Pt use to work as a    HistoryReceives VA Pension from her late . 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     Financial Resources:[facility]      Advanced Care Planning & Legal Concerns:   Advanced Directives/Living Will: Living Will scanned in chart from [facility]   Planning:  no    Power of : no  Surrogate Decision Maker: 2 children. Pt's daughter Main decision-maker      Spirituality, Culture & Coping Mechanisms:  F- Kate and Belief: Restorationism   I - Importance: Strong Kate  C - Community/Culture Values:   A - Address in Care: Spiritual Care to follow      Strengths/Coping Strategies: Dtr supportive  Self-Care Activities/Hobbies: Enjoyed going to the casino.    Goals/Hopes/Expectations: Dtr hoping pt can "make it to graduation" on May 12, 2018.  Fears/Anxiety/Concerns: Dtr trying to focus on her exams which are the next three days        Preferences about EOL Environment: (own bed, family nearby, pets, music, etc)  home    Complicated Bereavement Risk Assessment Tool (CBRAT)  Reference:  [facility] Palliative [facility] Clinical Practice Group (May 2016). Bereavement Risk Screening and Management Guidelines.  Retrieved from: " http://[email or web address].com.au/wp-content/uploads//PHTOM-Fbjtgnyekai-Kghzfxgvb-and-Management-Guideline-2016.pdf      Client Characteristics (Bereaved Client)  ? Under 18      no  ? Was a Twin   no  ? Young Spouse   no  ? Elderly Spouse    no  ? Isolated    no  ? Lacks Meaningful Social Support   yes  ? Dissatisfied with help available during illness   no  ? New to Financial [place] no  ? New to Decision-Making   no   History of Loss (Bereaved Client)  ? Cumulative Multiple Losses   yes  ? Previous Mental Health Illnesses   no  ? Current Mental Health Illness   no  ? Other Significant Health Issues   no   ? Migrant/Refugee   no    Illness  ? Inherited Disorder   no  ? Stigmatized Disease in the   no  ?  Family/Community   no  ? Lengthy/Burdensome   yes Relationship with   ? Profound Lifelong Partner   no  ? Highly Dependent    yes  ? Antagonistic   no  ? Ambivalent   no  ? Deeply Connected   yes  ? Culturally Defined   no   Death  ? Sudden or Unexpected   no  ? Traumatic Circumstances Associated with Death   no  ? Significant Cultural/Social Burdens as a result of Death   no   Risk Factors Scores  0-2  Low  3-5  Moderate  5+  High  All persons scoring moderate to high presume to be at risk**    (** It is acknowledged that protective factors and resilience may outweigh apparent risk factors.      Total Risk Factors Score:   Moderate to High    Dtr has been the main caregiver for her grandmother and now her mother for a number of years. She receives no support from her additional sibling. One of pt's children, Fuentes, unexpectedly  in 2017.     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  Dtr to benefit from continued emotional support and bereavement care.       Discharge Planning Needs/Plan of Care:       TIFFANIE and [facility] Care APRN met with pt and dtr at bedside. Pt asleep at time of visit. Dtr at bedside studying. Dtr  "agreeable to DNR status and hospice care. Dtr verbalized understanding that [facility] would need to set up hospice services. Dtr hoping to have pt diresed as much as possible in hopes of her "hanging on" until she graduates from nursing school in May.     Dtr verbalized being focused on completing her nursing finals at this time. Dtr will complete her last nursing exam on Thursday at 12:30 to 3:30. Dtr open to hospice and DNR status.    Dtr agreeable to hospice set up upon discharge. Will follow up with pt and family as appropriate.        Halima Livingston LCSW, ACHP-SW  "

## 2018-04-24 NOTE — HPI
Pt is a 73 year old female with a past medical history significant for HTN, CHF (last EF 30%), hypothyroidism, CAD, CKD V, breast cancer, marlon presents with worsening SOB over the past 2 weeks as well as chest pain of one days duration. The patient and family state that the patient has been feeling more SOB over the past two weeks, they state that they have noticed her with increasing work of breathing and fluid accumulation. The patient denies any recent illness (although dose have chronic cough), change in diet, but do state that they have been somewhat confused as to what medications the patient should be taking following her recent discharge for acute on chronic heart failure.      Pt is a [facility] pt.

## 2018-04-24 NOTE — PLAN OF CARE
PAM johns w/ TIFFANIE Bennett/ YINKA p [phone] f [phone] - she states that pt is eligible for h/h and h/h orders can be sent for review/processing when pt d/c's.

## 2018-04-24 NOTE — SUBJECTIVE & OBJECTIVE
Past Medical History:   Diagnosis Date    Anxiety     Breast cancer 1980    right, s/p mastectomy    CHF (congestive heart failure)     Chronic combined systolic and diastolic heart failure, NYHA class 3 1/11/2017    Chronic hepatitis C virus infection 3/14/2017    Chronic kidney disease, stage V 2/20/2018    Coronary artery disease     Delirium due to another medical condition 4/4/2017    Demand ischemia of myocardium 12/16/2016    Esophagitis, [place] grade C 12/19/2017    History of breast cancer 2/16/2017    Hypertension     Leukopenia 4/4/2017    Hazel     Thyroid disease        Past Surgical History:   Procedure Laterality Date    HYSTERECTOMY      MASTECTOMY Right 1980       Review of patient's allergies indicates:   Allergen Reactions    Seroquel [quetiapine]      Causes  profound sedation.       Medications:  Continuous Infusions:   furosemide (LASIX) 2 mg/mL infusion (non-titrating) 15 mg/hr (04/24/18 0947)     Scheduled Meds:   allopurinol  50 mg Oral Daily    aspirin  81 mg Oral Daily    carvedilol  12.5 mg Oral BID WM    epoetin jaun (PROCRIT) injection  20,000 Units Subcutaneous Once    INV hydrALAZINE  75 mg Oral Q8H    hydrocortisone   Rectal BID    isosorbide dinitrate  40 mg Oral TID    levothyroxine  125 mcg Oral Before breakfast    metOLazone  10 mg Oral BID    pantoprazole  40 mg Oral Daily    potassium bicarbonate  50 mEq Oral Q4H    sevelamer carbonate  800 mg Oral BID WM    sodium bicarbonate  1,300 mg Oral TID    tiotropium  18 mcg Inhalation Daily    traZODone  50 mg Oral QHS    vitamin renal formula (B-complex-vitamin c-folic acid)  1 capsule Oral Daily     PRN Meds:sodium chloride, albuterol-ipratropium 2.5mg-0.5mg/3mL, dextrose 50%, dextrose 50%, glucagon (human recombinant), glucose, glucose, LORazepam, sodium chloride 0.9%    Family History     Problem Relation (Age of Onset)    Heart attack Brother, Maternal Grandmother    Heart disease  Mother, Sister    No Known Problems Father, Maternal Aunt, Maternal Uncle, Paternal Aunt, Paternal Uncle, Maternal Grandfather, Paternal Grandmother, Paternal Grandfather        Social History Main Topics    Smoking status: Former Smoker     Packs/day: 2.00     Years: 20.00     Types: Cigarettes     Quit date: 2/16/1995    Smokeless tobacco: Never Used    Alcohol use No      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   Neurological: Positive for weakness.     Objective:     Vital Signs (Most Recent):  Temp: 98.4 °F (36.9 °C) (04/24/18 0737)  Pulse: (!) 58 (04/24/18 0912)  Resp: 18 (04/24/18 0737)  BP: (!) 145/79 (04/24/18 0737)  SpO2: 98 % (04/24/18 0737) Vital Signs (24h Range):  Temp:  [96.7 °F (35.9 °C)-98.6 °F (37 °C)] 98.4 °F (36.9 °C)  Pulse:  [52-61] 58  Resp:  [16-20] 18  SpO2:  [90 %-99 %] 98 %  BP: (110-155)/(58-79) 145/79     Weight: 76.6 kg (168 lb 14 oz)  Body mass index is 27.26 kg/m².    Review of Symptoms  Symptom Assessment (ESAS 0-10 scale)   ESAS 0 1 2 3 4 5 6 7 8 9 10   Pain X             Dyspnea X             Anxiety X             Nausea X             Depression  X             Anorexia X             Fatigue     X         Insomnia X             Restlessness  X             Agitation X             CAM / Delirium __ --  ___+   Constipation     __ --  ___+   Diarrhea           __ --  ___+  Bowel Management Plan (BMP): No    Pain Assessment: No complaints of pain.    OME in 24 hours: 0    Performance Status:     ECOG Performance Status Grade: 3 - Confined to bed or chair 50% of waking hours    Physical Exam    Significant Labs: All pertinent labs within the past 24 hours have been reviewed.  CBC:     Recent Labs  Lab 04/24/18  0832   WBC 5.65   HGB 7.9*   HCT 24.3*   MCV 93        BMP:    Recent Labs  Lab 04/24/18  0618         K  3.0*   CL 96   CO2 32*   BUN 65*   CREATININE 3.4*   CALCIUM 9.2   MG 2.3     LFT:  Lab Results   Component Value Date    AST 34 2018    ALKPHOS 75 2018    BILITOT 1.2 (H) 2018     Albumin:   Albumin   Date Value Ref Range Status   2018 3.0 (L) 3.5 - 5.2 g/dL Final     Protein:   Total Protein   Date Value Ref Range Status   2018 8.1 6.0 - 8.4 g/dL Final     Lactic acid:   Lab Results   Component Value Date    LACTATE 0.8 02/15/2018    LACTATE 1.2 2018       Significant Imaging: I have reviewed all pertinent imaging results/findings within the past 24 hours.    Advanced Directives::  Living Will: Yes. Copy on chart: yes in Epic  LaPOST: No  Do Not Resuscitate Status: No at this time. Pt and daughter report they want pt to be DNR.  Medical Power of : Pt daughter Vicky is next of kin along with other son who is not involved in pt's care.  Vicky is involved in pt's care.     Decision-Making Capacity: Patient answered questions, Family answered questions    Living Arrangements: Lives with family    Psychosocial/Cultural:  Pt lives with daughter who is about to finish nursing school.  Pt has PACE.   Pt has two sons, one is .

## 2018-04-24 NOTE — PLAN OF CARE
Problem: Patient Care Overview  Goal: Plan of Care Review  Outcome: Ongoing (interventions implemented as appropriate)  NO FALL RELATED INJURY. LASIX DRIP MAINTAINED AT 10MG/G. UOP UNMEASURED X1. 250ML MEASURED. C-DIFF PRECAUTION INITIATED, NO STOOL SPECIMIN SENT(STOOL FORMED.) RECEIVED I UNIT PRBC. OVER 6 HOURS. CONTINUE TO MONITOR INTAKE AND OUT PUT.

## 2018-04-24 NOTE — PLAN OF CARE
CM contacted Sabrina MOERNO/ [facility] p [phone] to update that pt will now d/c to home w/ home hospice. Sabrina stated that [facility] is contracted w/ [facility] and requested that a referral be initiated w/ them to ensure that they can accept pt and she will also update medical team on pt's case for home hospice approval. Pt is expected to d/c on Thursday. Email sent to @[facility] to initiate referral and CM will update covering .     04/24/18 2568   Discharge Reassessment   Assessment Type Discharge Planning Reassessment   Do you have any problems affording any of your prescribed medications? No   Discharge Plan A Hospice/home   Discharge Plan B Home with family;Home Health   Patient choice form signed by patient/caregiver N/A   Can the patient answer the patient profile reliably? Yes, cognitively intact   How does the patient rate their overall health at the present time? Poor   Describe the patient's ability to walk at the present time. Major restrictions/daily assistance from another person   How often would a person be available to care for the patient? Occasionally   Number of comorbid conditions (as recorded on the chart) Three   During the past month, has the patient often been bothered by feeling down, depressed or hopeless? Yes   During the past month, has the patient often been bothered by little interest or pleasure in doing things? Yes

## 2018-04-25 PROBLEM — R32 DERMATITIS ASSOCIATED WITH MOISTURE FROM URINARY INCONTINENCE: Status: ACTIVE | Noted: 2018-04-25

## 2018-04-25 PROBLEM — L25.8 DERMATITIS ASSOCIATED WITH MOISTURE FROM URINARY INCONTINENCE: Status: ACTIVE | Noted: 2018-04-25

## 2018-04-25 LAB
ALBUMIN SERPL BCP-MCNC: 3.2 G/DL
ANION GAP SERPL CALC-SCNC: 9 MMOL/L
BNP SERPL-MCNC: 4103 PG/ML
BUN SERPL-MCNC: 61 MG/DL
CALCIUM SERPL-MCNC: 9.5 MG/DL
CHLORIDE SERPL-SCNC: 94 MMOL/L
CO2 SERPL-SCNC: 35 MMOL/L
CREAT SERPL-MCNC: 3.6 MG/DL
ERYTHROCYTE [DISTWIDTH] IN BLOOD BY AUTOMATED COUNT: 17.3 %
EST. GFR  (AFRICAN AMERICAN): 13.7 ML/MIN/1.73 M^2
EST. GFR  (NON AFRICAN AMERICAN): 11.9 ML/MIN/1.73 M^2
GLUCOSE SERPL-MCNC: 97 MG/DL
HCT VFR BLD AUTO: 24.2 %
HGB BLD-MCNC: 7.9 G/DL
MAGNESIUM SERPL-MCNC: 2.2 MG/DL
MCH RBC QN AUTO: 30 PG
MCHC RBC AUTO-ENTMCNC: 32.6 G/DL
MCV RBC AUTO: 92 FL
PHOSPHATE SERPL-MCNC: 2.1 MG/DL
PLATELET # BLD AUTO: 177 K/UL
PMV BLD AUTO: 11.3 FL
POTASSIUM SERPL-SCNC: 3.8 MMOL/L
RBC # BLD AUTO: 2.63 M/UL
SODIUM SERPL-SCNC: 138 MMOL/L
WBC # BLD AUTO: 4.59 K/UL

## 2018-04-25 PROCEDURE — 97116 GAIT TRAINING THERAPY: CPT

## 2018-04-25 PROCEDURE — 25000003 PHARM REV CODE 250: Performed by: STUDENT IN AN ORGANIZED HEALTH CARE EDUCATION/TRAINING PROGRAM

## 2018-04-25 PROCEDURE — 97530 THERAPEUTIC ACTIVITIES: CPT

## 2018-04-25 PROCEDURE — 25000242 PHARM REV CODE 250 ALT 637 W/ HCPCS: Performed by: STUDENT IN AN ORGANIZED HEALTH CARE EDUCATION/TRAINING PROGRAM

## 2018-04-25 PROCEDURE — 99233 SBSQ HOSP IP/OBS HIGH 50: CPT | Mod: GC,,, | Performed by: INTERNAL MEDICINE

## 2018-04-25 PROCEDURE — 25000242 PHARM REV CODE 250 ALT 637 W/ HCPCS: Performed by: HOSPITALIST

## 2018-04-25 PROCEDURE — 25000003 PHARM REV CODE 250: Performed by: HOSPITALIST

## 2018-04-25 PROCEDURE — 85027 COMPLETE CBC AUTOMATED: CPT

## 2018-04-25 PROCEDURE — 20600001 HC STEP DOWN PRIVATE ROOM

## 2018-04-25 PROCEDURE — 99900035 HC TECH TIME PER 15 MIN (STAT)

## 2018-04-25 PROCEDURE — 83880 ASSAY OF NATRIURETIC PEPTIDE: CPT

## 2018-04-25 PROCEDURE — 63600175 PHARM REV CODE 636 W HCPCS: Performed by: STUDENT IN AN ORGANIZED HEALTH CARE EDUCATION/TRAINING PROGRAM

## 2018-04-25 PROCEDURE — 83735 ASSAY OF MAGNESIUM: CPT

## 2018-04-25 PROCEDURE — 63600175 PHARM REV CODE 636 W HCPCS: Performed by: HOSPITALIST

## 2018-04-25 PROCEDURE — 80069 RENAL FUNCTION PANEL: CPT

## 2018-04-25 PROCEDURE — 36415 COLL VENOUS BLD VENIPUNCTURE: CPT

## 2018-04-25 RX ORDER — FLUTICASONE PROPIONATE 50 MCG
1 SPRAY, SUSPENSION (ML) NASAL DAILY
Status: DISCONTINUED | OUTPATIENT
Start: 2018-04-25 | End: 2018-04-28 | Stop reason: HOSPADM

## 2018-04-25 RX ORDER — FLUTICASONE FUROATE AND VILANTEROL 100; 25 UG/1; UG/1
1 POWDER RESPIRATORY (INHALATION) DAILY
Status: DISCONTINUED | OUTPATIENT
Start: 2018-04-25 | End: 2018-04-28 | Stop reason: HOSPADM

## 2018-04-25 RX ORDER — DONEPEZIL HYDROCHLORIDE 5 MG/1
5 TABLET, FILM COATED ORAL NIGHTLY
Status: DISCONTINUED | OUTPATIENT
Start: 2018-04-25 | End: 2018-04-28 | Stop reason: HOSPADM

## 2018-04-25 RX ORDER — SODIUM BICARBONATE 650 MG/1
1 TABLET ORAL 3 TIMES DAILY
Status: DISCONTINUED | OUTPATIENT
Start: 2018-04-25 | End: 2018-04-28 | Stop reason: HOSPADM

## 2018-04-25 RX ADMIN — ISOSORBIDE DINITRATE 40 MG: 40 TABLET ORAL at 09:04

## 2018-04-25 RX ADMIN — FLUTICASONE FUROATE AND VILANTEROL TRIFENATATE 1 PUFF: 100; 25 POWDER RESPIRATORY (INHALATION) at 10:04

## 2018-04-25 RX ADMIN — METOLAZONE 10 MG: 2.5 TABLET ORAL at 09:04

## 2018-04-25 RX ADMIN — SEVELAMER CARBONATE 800 MG: 800 TABLET, FILM COATED ORAL at 04:04

## 2018-04-25 RX ADMIN — TRAZODONE HYDROCHLORIDE 50 MG: 50 TABLET ORAL at 09:04

## 2018-04-25 RX ADMIN — ISOSORBIDE DINITRATE 40 MG: 40 TABLET ORAL at 02:04

## 2018-04-25 RX ADMIN — HYDRALAZINE HYDROCHLORIDE 75 MG: 50 TABLET ORAL at 09:04

## 2018-04-25 RX ADMIN — FLUTICASONE PROPIONATE 50 MCG: 50 SPRAY, METERED NASAL at 10:04

## 2018-04-25 RX ADMIN — TIOTROPIUM BROMIDE 18 MCG: 18 CAPSULE ORAL; RESPIRATORY (INHALATION) at 10:04

## 2018-04-25 RX ADMIN — HYDRALAZINE HYDROCHLORIDE 75 MG: 50 TABLET ORAL at 05:04

## 2018-04-25 RX ADMIN — PANTOPRAZOLE SODIUM 40 MG: 40 TABLET, DELAYED RELEASE ORAL at 09:04

## 2018-04-25 RX ADMIN — SEVELAMER CARBONATE 800 MG: 800 TABLET, FILM COATED ORAL at 09:04

## 2018-04-25 RX ADMIN — CARVEDILOL 12.5 MG: 12.5 TABLET, FILM COATED ORAL at 09:04

## 2018-04-25 RX ADMIN — SODIUM BICARBONATE 650 MG TABLET 650 MG: at 02:04

## 2018-04-25 RX ADMIN — DONEPEZIL HYDROCHLORIDE 5 MG: 5 TABLET, FILM COATED ORAL at 09:04

## 2018-04-25 RX ADMIN — SODIUM BICARBONATE 650 MG TABLET 1300 MG: at 09:04

## 2018-04-25 RX ADMIN — HYDROCORTISONE 2.5%: 25 CREAM TOPICAL at 09:04

## 2018-04-25 RX ADMIN — HEPARIN SODIUM 5000 UNITS: 5000 INJECTION, SOLUTION INTRAVENOUS; SUBCUTANEOUS at 05:04

## 2018-04-25 RX ADMIN — LEVOTHYROXINE SODIUM 125 MCG: 25 TABLET ORAL at 05:04

## 2018-04-25 RX ADMIN — Medication 1 CAPSULE: at 09:04

## 2018-04-25 RX ADMIN — METHYLDOPA 50 MG: 500 TABLET ORAL at 09:04

## 2018-04-25 RX ADMIN — ASPIRIN 81 MG CHEWABLE TABLET 81 MG: 81 TABLET CHEWABLE at 09:04

## 2018-04-25 RX ADMIN — HEPARIN SODIUM 5000 UNITS: 5000 INJECTION, SOLUTION INTRAVENOUS; SUBCUTANEOUS at 02:04

## 2018-04-25 RX ADMIN — CARVEDILOL 12.5 MG: 12.5 TABLET, FILM COATED ORAL at 04:04

## 2018-04-25 RX ADMIN — FUROSEMIDE 10 MG/HR: 10 INJECTION, SOLUTION INTRAVENOUS at 10:04

## 2018-04-25 RX ADMIN — HEPARIN SODIUM 5000 UNITS: 5000 INJECTION, SOLUTION INTRAVENOUS; SUBCUTANEOUS at 09:04

## 2018-04-25 RX ADMIN — HYDRALAZINE HYDROCHLORIDE 75 MG: 50 TABLET ORAL at 02:04

## 2018-04-25 RX ADMIN — SODIUM BICARBONATE 650 MG TABLET 650 MG: at 09:04

## 2018-04-25 NOTE — PROGRESS NOTES
Notified MD James that patient has been having formed stools yesterday and overnight and was on C. Diff precautions. MD discontinued orders. MD requested purewick be placed. Purewick is in place and on continuous suction. Will continue to monitor.

## 2018-04-25 NOTE — PROGRESS NOTES
[facility] Hospital Medicine  Progress Note    Patient Name: Anai Sal  MRN: [id number]  Patient Class: IP- Inpatient   Admission Date: 4/19/2018  Length of Stay: 4 days  Attending Physician: Parker James MD  Primary Care Provider: Live Young MD    [facility] Medicine Team: [facility]OSP MED 4 Maria Del Carmen Charles MD    Subjective:     Principal Problem:Acute on chronic combined systolic and diastolic congestive heart failure    HPI:  Mrs. Sal is a 73 year old female with a past medical history significant for HTN, CHF (last EF 30%), hypothyroidism, CAD, CKD, breast cancer, marlon presents with worsening SOB over the past 2 weeks as well as chest pain of one days duration. The patient and family state that the patient has been feeling more SOB over the past two weeks, they state that they have noticed her with increasing work of breathing and fluid accumulation. The patient denies any recent illness (although dose have chronic cough), change in diet, but do state that they have been somewhat confused as to what medications the patient should be taking following her recent discharge for acute on chronic heart failure.     Hospital Course:  No notes on file    Interval History:   NAEON. Patient not responding to lasix gtt; increased to 15mg/hr. Added metolazone 10mg BID. Cr. 3.4; baseline 3.3.        Review of Systems   Constitutional: Negative for activity change, chills, diaphoresis, fatigue and fever.   HENT: Negative for congestion, sore throat and trouble swallowing.    Eyes: Negative for photophobia, redness and visual disturbance.   Respiratory: Positive for cough and shortness of breath. Negative for choking, chest tightness and wheezing.    Cardiovascular: Negative for chest pain, palpitations and leg swelling.   Gastrointestinal: Negative for abdominal distention, abdominal pain, constipation, diarrhea, nausea and vomiting.   Genitourinary: Negative for difficulty urinating, dysuria and  hematuria.   Musculoskeletal: Negative for arthralgias and myalgias.   Skin: Negative for rash and wound.   Neurological: Negative for dizziness, syncope, weakness, light-headedness, numbness and headaches.   Psychiatric/Behavioral: Negative for confusion.      allopurinol  50 mg Oral Daily    aspirin  81 mg Oral Daily    carvedilol  12.5 mg Oral BID WM    heparin (porcine)  5,000 Units Subcutaneous Q8H    INV hydrALAZINE  75 mg Oral Q8H    hydrocortisone   Rectal BID    isosorbide dinitrate  40 mg Oral TID    levothyroxine  125 mcg Oral Before breakfast    metOLazone  10 mg Oral BID    pantoprazole  40 mg Oral Daily    sevelamer carbonate  800 mg Oral BID WM    sodium bicarbonate  1,300 mg Oral TID    tiotropium  18 mcg Inhalation Daily    traZODone  50 mg Oral QHS    vitamin renal formula (B-complex-vitamin c-folic acid)  1 capsule Oral Daily     .   furosemide (LASIX) 2 mg/mL infusion (non-titrating) 15 mg/hr (04/24/18 2231)         Objective:     Vital Signs (Most Recent):  Temp: 98.3 °F (36.8 °C) (04/24/18 2254)  Pulse: 61 (04/24/18 2254)  Resp: 20 (04/24/18 2254)  BP: 135/64 (04/24/18 2254)  SpO2: 96 % (04/24/18 2254) Vital Signs (24h Range):  Temp:  [97.4 °F (36.3 °C)-98.6 °F (37 °C)] 98.3 °F (36.8 °C)  Pulse:  [55-62] 61  Resp:  [17-20] 20  SpO2:  [95 %-100 %] 96 %  BP: (126-155)/(58-79) 135/64     Weight: 76.6 kg (168 lb 14 oz)  Body mass index is 27.26 kg/m².    Intake/Output Summary (Last 24 hours) at 04/24/18 2348  Last data filed at 04/24/18 1430   Gross per 24 hour   Intake              675 ml   Output              250 ml   Net              425 ml      Physical Exam   Constitutional: She is oriented to person, place, and time. She appears well-developed and well-nourished. No distress.   HENT:   Head: Normocephalic and atraumatic.   Right Ear: External ear normal.   Left Ear: External ear normal.   Mouth/Throat: Oropharynx is clear and moist. No oropharyngeal exudate.   Eyes: EOM are  normal. Pupils are equal, round, and reactive to light. Right eye exhibits no discharge. Left eye exhibits no discharge. No scleral icterus.   Neck: Normal range of motion. Neck supple.   Cardiovascular: Normal rate and regular rhythm.    Murmur heard.  Pulmonary/Chest: Effort normal. No respiratory distress. She has no wheezes. She has no rales.   Decreased breath sounds bilat   Abdominal: Soft. Bowel sounds are normal. She exhibits no distension and no mass. There is no tenderness. There is no guarding.   Musculoskeletal: She exhibits edema (1+ pitting edema of bilateral lower extremities ). She exhibits no tenderness.   Neurological: She is alert and oriented to person, place, and time. No cranial nerve deficit.   Skin: Skin is warm and dry. She is not diaphoretic.   Psychiatric: She has a normal mood and affect.   Vitals reviewed.      Significant Labs:   CBC:     Recent Labs  Lab 18  1101 18  0618 18  0832   WBC 4.12 5.89 5.65   HGB 7.0* 7.6* 7.9*   HCT 20.7* 22.6* 24.3*    169 196     CMP:     Recent Labs  Lab 18  0342 18  0618    138   K 3.5 3.0*   CL 96 96   CO2 24 32*   GLU 87 110   BUN 66* 65*   CREATININE 3.2* 3.4*   CALCIUM 9.2 9.2   ALBUMIN 3.1* 3.0*   ANIONGAP 17* 10   EGFRNONAA 13.7* 12.8*     Cardiac Markers: No results for input(s): CKMB, MYOGLOBIN, BNP, TROPISTAT in the last 48 hours.    Significant ImaginD echo  CONCLUSIONS     1 - Moderate left ventricular enlargement.     2 - Severely depressed left ventricular systolic function (EF 25-30%).     3 - Right ventricular enlargement with moderately depressed systolic function.     4 - Biatrial enlargement.     5 - Mild to moderate tricuspid regurgitation.     6 - Severe functional mitral regurgitation.     7 - Pulmonary hypertension. The estimated PA systolic pressure is 50 mmHg.     8 - Increased central venous pressure.     Assessment/Plan:      * Acute on chronic combined systolic and diastolic  congestive heart failure    - patient presents with 2 week history of worsening SOB and CP. BNP >4900 on admission. initial troponin 0.037, appears to be stable per previous labs. CXR with evidence of pulmonary edema . Echo with stable findings - 25% EF,severe MR, and 50mmHg Pa pressures    - increase lasix gtt at 15cc/hr   - added metolazone 10mg BID  - difficult to diuresis   - stop toprol XL 50mg (4/22)  - continue coreg 12.5 mg BID; Hr at goal of 60  - continue hydralazine to 75 Q8 and isosorbide dinitrate to 40 TID- for optimal BP control  - strict I and O  - replete lytes        Palliative care encounter    - Consult placed; appreciate assistance         Goals of care, counseling/discussion              Age-related physical debility    -PT , OT consult        CKD (chronic kidney disease), stage V    - creatinine stable on admission at 3.3, at [place]  - monitor Cr  - cont home sodium bicarb at 1300mg TID and sevelamer at 800mg BID  - monitor UOP          Counseling regarding advanced care planning and goals of care              Cognitive impairment    - delirium precautions          Anemia of chronic renal failure, stage 5    - hemoglobin 8.1 on admission, stable at her baseline,   -monitor        Paroxysmal atrial fibrillation    - discontinue metoprolol succinate 50mg daily  - coreg started (4/22) should provide HR control   - cardiac monitoring   - not on anticoagulation 2/2 high fall risk and possible slow GIB - per prior cardiology notes        Mixed type COPD (chronic obstructive pulmonary disease)    - not on home O2  - cont home tiotropium  - prn duo nebs        Elevated troponin    - initial concerns given complaints of CP, unlikely ACS. Likely 2/2 ADHF  - troponin 0.037 on admission  - patient has history of chronically elevated troponin around this level          Chronic gout    - stable   - continue home dose allopurinol           Postablative hypothyroidism    -cont home synthroid         Renovascular hypertension    - discontinue toprol XL 50mg   - start coreg 12.5 BID- Hr at goal of 60  - increase hydralazine to 75 Q8 and isosorbide dinitrate to 40 TID- for optimal BP control            VTE Risk Mitigation         Ordered     heparin (porcine) injection 5,000 Units  Every 8 hours      04/24/18 1957     Place sequential compression device  Until discontinued      04/20/18 0400     IP VTE HIGH RISK PATIENT  Once      04/20/18 0400              Maria Del Carmen Charles MD  Department of Hospital Medicine   [facility]

## 2018-04-25 NOTE — PLAN OF CARE
RAGHU spoke with Temitope from [facility]. She has been unable to reach pts dtg, messages left. RAGHU explained that dtg is taking exams today, so Temitope will try her again around 3pm and call raghu back.    Candice Noel, YANELY [phone]

## 2018-04-25 NOTE — PLAN OF CARE
Problem: Patient Care Overview  Goal: Plan of Care Review  Outcome: Ongoing (interventions implemented as appropriate)  LASIX DRIP INCREASED TO 15MG/H, METOLAZONE ADDED, UNABLE TO MEASURE ACCURATE  UOP DUE TO URINARY AND BOWEL INCONTINENCE. FREQUENT FORMED STOOL SMALL AMOUNTS. NO FALL TRLATED INJURY. DISCHARGE TO HOME WOTH HOSPICE ON Thursday. CODE STATUS NOW DNR. POC DISCUSSED WITH DAUGHTER, VERBALIZE UNDERSTANDING.

## 2018-04-25 NOTE — ASSESSMENT & PLAN NOTE
- patient presents with 2 week history of worsening SOB and CP. BNP >4900 on admission. initial troponin 0.037, appears to be stable per previous labs. CXR with evidence of pulmonary edema . Echo with stable findings - 25% EF,severe MR, and 50mmHg Pa pressures    - increase lasix gtt at 15cc/hr   - added metolazone 10mg BID  - difficult to diuresis   - stop toprol XL 50mg (4/22)  - continue coreg 12.5 mg BID; Hr at goal of 60  - continue hydralazine to 75 Q8 and isosorbide dinitrate to 40 TID- for optimal BP control  - strict I and O  - replete lytes

## 2018-04-25 NOTE — SUBJECTIVE & OBJECTIVE
Interval History:   NAEON. Patient's lasix ggt increased to 15 from 10 & added metolazone 10mg BID., responded well according to the granddaughter who reports multiple urinations;Cr. 3.6; baseline 3.3.        Review of Systems   Constitutional: Negative for activity change, chills, diaphoresis, fatigue and fever.   HENT: Negative for congestion, sore throat and trouble swallowing.    Eyes: Negative for photophobia, redness and visual disturbance.   Respiratory: Positive for cough and shortness of breath. Negative for choking, chest tightness and wheezing.    Cardiovascular: Negative for chest pain, palpitations and leg swelling.   Gastrointestinal: Negative for abdominal distention, abdominal pain, constipation, diarrhea, nausea and vomiting.   Genitourinary: Negative for difficulty urinating, dysuria and hematuria.   Musculoskeletal: Negative for arthralgias and myalgias.   Skin: Negative for rash and wound.   Neurological: Negative for dizziness, syncope, weakness, light-headedness, numbness and headaches.   Psychiatric/Behavioral: Negative for confusion.      allopurinol  50 mg Oral Daily    aspirin  81 mg Oral Daily    carvedilol  12.5 mg Oral BID WM    donepezil  5 mg Oral QHS    fluticasone  1 spray Each Nare Daily    fluticasone-vilanterol  1 puff Inhalation Daily    heparin (porcine)  5,000 Units Subcutaneous Q8H    INV hydrALAZINE  75 mg Oral Q8H    hydrocortisone   Rectal BID    isosorbide dinitrate  40 mg Oral TID    levothyroxine  125 mcg Oral Before breakfast    metOLazone  10 mg Oral BID    pantoprazole  40 mg Oral Daily    sevelamer carbonate  800 mg Oral BID WM    sodium bicarbonate  1 tablet Oral TID    tiotropium  18 mcg Inhalation Daily    traZODone  50 mg Oral QHS    vitamin renal formula (B-complex-vitamin c-folic acid)  1 capsule Oral Daily     .   furosemide (LASIX) 2 mg/mL infusion (non-titrating) 10 mg/hr (04/25/18 1054)         Objective:     Vital Signs (Most  Recent):  Temp: 98.4 °F (36.9 °C) (04/25/18 1208)  Pulse: (!) 55 (04/25/18 1208)  Resp: 18 (04/25/18 1208)  BP: 132/64 (04/25/18 1208)  SpO2: 95 % (04/25/18 1208) Vital Signs (24h Range):  Temp:  [97.4 °F (36.3 °C)-99 °F (37.2 °C)] 98.4 °F (36.9 °C)  Pulse:  [55-65] 55  Resp:  [14-20] 18  SpO2:  [95 %-98 %] 95 %  BP: (126-153)/(58-74) 132/64     Weight: 76.6 kg (168 lb 14 oz)  Body mass index is 27.26 kg/m².    Intake/Output Summary (Last 24 hours) at 04/25/18 1437  Last data filed at 04/25/18 1045   Gross per 24 hour   Intake            254.1 ml   Output             1550 ml   Net          -1295.9 ml      Physical Exam   Constitutional: She is oriented to person, place, and time. She appears well-developed and well-nourished. No distress.   HENT:   Head: Normocephalic and atraumatic.   Right Ear: External ear normal.   Left Ear: External ear normal.   Mouth/Throat: Oropharynx is clear and moist. No oropharyngeal exudate.   Eyes: EOM are normal. Pupils are equal, round, and reactive to light. Right eye exhibits no discharge. Left eye exhibits no discharge. No scleral icterus.   Neck: Normal range of motion. Neck supple.   Cardiovascular: Normal rate and regular rhythm.    Murmur heard.  Pulmonary/Chest: Effort normal. No respiratory distress. She has no wheezes. She has no rales.   Decreased breath sounds bilat   Abdominal: Soft. Bowel sounds are normal. She exhibits no distension and no mass. There is no tenderness. There is no guarding.   Musculoskeletal: She exhibits edema (1+ pitting edema of bilateral lower extremities ). She exhibits no tenderness.   Neurological: She is alert and oriented to person, place, and time. No cranial nerve deficit.   Skin: Skin is warm and dry. She is not diaphoretic.   Psychiatric: She has a normal mood and affect.   Vitals reviewed.      Significant Labs:   CBC:     Recent Labs  Lab 04/24/18  0618 04/24/18  0832 04/25/18  0346   WBC 5.89 5.65 4.59   HGB 7.6* 7.9* 7.9*   HCT 22.6*  24.3* 24.2*    196 177     CMP:     Recent Labs  Lab 18  0618 18  0346    138   K 3.0* 3.8   CL 96 94*   CO2 32* 35*    97   BUN 65* 61*   CREATININE 3.4* 3.6*   CALCIUM 9.2 9.5   ALBUMIN 3.0* 3.2*   ANIONGAP 10 9   EGFRNONAA 12.8* 11.9*     Cardiac Markers:     Recent Labs  Lab 18  0855   BNP 4,103*       Significant ImaginD echo  CONCLUSIONS     1 - Moderate left ventricular enlargement.     2 - Severely depressed left ventricular systolic function (EF 25-30%).     3 - Right ventricular enlargement with moderately depressed systolic function.     4 - Biatrial enlargement.     5 - Mild to moderate tricuspid regurgitation.     6 - Severe functional mitral regurgitation.     7 - Pulmonary hypertension. The estimated PA systolic pressure is 50 mmHg.     8 - Increased central venous pressure.

## 2018-04-25 NOTE — CONSULTS
"  Nutrition consult stating "skin concerns & Albumin 3.2."    Skin concerns not a valid reason for RD consult.     Please note: Albumin and Prealbumin shouldn't be used as nutritional indicators. Both can be affected by inflammation and fluid status. Recommend obtaining CRP. Elevated CRP can cause albumin/prealbumin to be decreased.     "

## 2018-04-25 NOTE — SUBJECTIVE & OBJECTIVE
Interval History:   NAEON. Patient not responding to lasix gtt; increased to 15mg/hr. Added metolazone 10mg BID. Cr. 3.4; baseline 3.3.        Review of Systems   Constitutional: Negative for activity change, chills, diaphoresis, fatigue and fever.   HENT: Negative for congestion, sore throat and trouble swallowing.    Eyes: Negative for photophobia, redness and visual disturbance.   Respiratory: Positive for cough and shortness of breath. Negative for choking, chest tightness and wheezing.    Cardiovascular: Negative for chest pain, palpitations and leg swelling.   Gastrointestinal: Negative for abdominal distention, abdominal pain, constipation, diarrhea, nausea and vomiting.   Genitourinary: Negative for difficulty urinating, dysuria and hematuria.   Musculoskeletal: Negative for arthralgias and myalgias.   Skin: Negative for rash and wound.   Neurological: Negative for dizziness, syncope, weakness, light-headedness, numbness and headaches.   Psychiatric/Behavioral: Negative for confusion.      allopurinol  50 mg Oral Daily    aspirin  81 mg Oral Daily    carvedilol  12.5 mg Oral BID WM    heparin (porcine)  5,000 Units Subcutaneous Q8H    INV hydrALAZINE  75 mg Oral Q8H    hydrocortisone   Rectal BID    isosorbide dinitrate  40 mg Oral TID    levothyroxine  125 mcg Oral Before breakfast    metOLazone  10 mg Oral BID    pantoprazole  40 mg Oral Daily    sevelamer carbonate  800 mg Oral BID WM    sodium bicarbonate  1,300 mg Oral TID    tiotropium  18 mcg Inhalation Daily    traZODone  50 mg Oral QHS    vitamin renal formula (B-complex-vitamin c-folic acid)  1 capsule Oral Daily     .   furosemide (LASIX) 2 mg/mL infusion (non-titrating) 15 mg/hr (04/24/18 2231)         Objective:     Vital Signs (Most Recent):  Temp: 98.3 °F (36.8 °C) (04/24/18 2254)  Pulse: 61 (04/24/18 2254)  Resp: 20 (04/24/18 2254)  BP: 135/64 (04/24/18 2254)  SpO2: 96 % (04/24/18 2254) Vital Signs (24h Range):  Temp:  [97.4  °F (36.3 °C)-98.6 °F (37 °C)] 98.3 °F (36.8 °C)  Pulse:  [55-62] 61  Resp:  [17-20] 20  SpO2:  [95 %-100 %] 96 %  BP: (126-155)/(58-79) 135/64     Weight: 76.6 kg (168 lb 14 oz)  Body mass index is 27.26 kg/m².    Intake/Output Summary (Last 24 hours) at 04/24/18 2348  Last data filed at 04/24/18 1430   Gross per 24 hour   Intake              675 ml   Output              250 ml   Net              425 ml      Physical Exam   Constitutional: She is oriented to person, place, and time. She appears well-developed and well-nourished. No distress.   HENT:   Head: Normocephalic and atraumatic.   Right Ear: External ear normal.   Left Ear: External ear normal.   Mouth/Throat: Oropharynx is clear and moist. No oropharyngeal exudate.   Eyes: EOM are normal. Pupils are equal, round, and reactive to light. Right eye exhibits no discharge. Left eye exhibits no discharge. No scleral icterus.   Neck: Normal range of motion. Neck supple.   Cardiovascular: Normal rate and regular rhythm.    Murmur heard.  Pulmonary/Chest: Effort normal. No respiratory distress. She has no wheezes. She has no rales.   Decreased breath sounds bilat   Abdominal: Soft. Bowel sounds are normal. She exhibits no distension and no mass. There is no tenderness. There is no guarding.   Musculoskeletal: She exhibits edema (1+ pitting edema of bilateral lower extremities ). She exhibits no tenderness.   Neurological: She is alert and oriented to person, place, and time. No cranial nerve deficit.   Skin: Skin is warm and dry. She is not diaphoretic.   Psychiatric: She has a normal mood and affect.   Vitals reviewed.      Significant Labs:   CBC:     Recent Labs  Lab 04/23/18  1101 04/24/18  0618 04/24/18  0832   WBC 4.12 5.89 5.65   HGB 7.0* 7.6* 7.9*   HCT 20.7* 22.6* 24.3*    169 196     CMP:     Recent Labs  Lab 04/23/18  0342 04/24/18  0618    138   K 3.5 3.0*   CL 96 96   CO2 24 32*   GLU 87 110   BUN 66* 65*   CREATININE 3.2* 3.4*   CALCIUM  9.2 9.2   ALBUMIN 3.1* 3.0*   ANIONGAP 17* 10   EGFRNONAA 13.7* 12.8*     Cardiac Markers: No results for input(s): CKMB, MYOGLOBIN, BNP, TROPISTAT in the last 48 hours.    Significant ImaginD echo  CONCLUSIONS     1 - Moderate left ventricular enlargement.     2 - Severely depressed left ventricular systolic function (EF 25-30%).     3 - Right ventricular enlargement with moderately depressed systolic function.     4 - Biatrial enlargement.     5 - Mild to moderate tricuspid regurgitation.     6 - Severe functional mitral regurgitation.     7 - Pulmonary hypertension. The estimated PA systolic pressure is 50 mmHg.     8 - Increased central venous pressure.

## 2018-04-25 NOTE — PLAN OF CARE
UPDATE 9:30am-TIFFANIE spoke with Temitope from [facility] ([phone]), referral received. Temitope will contact pts dtg to begin paperwork.      Covering sw reviewed medical record-hospice referral sent to [facility] per [facility] request. SW will f/u with [facility] and [facility] and pt/dtg.    Candice Noel, CAROLINW [phone]

## 2018-04-25 NOTE — PLAN OF CARE
SW spoke with pts dtg-she asked if sw could see if [facility] was an option through [facility]. SW agreed to f/u on this and call dtg in the morning.    Candice Noel, [facility] [phone]

## 2018-04-25 NOTE — ASSESSMENT & PLAN NOTE
- patient presents with 2 week history of worsening SOB and CP. BNP >4900 on admission. initial troponin 0.037, appears to be stable per previous labs. CXR with evidence of pulmonary edema . Echo with stable findings - 25% EF,severe MR, and 50mmHg Pa pressures    - decrease lasix gtt at 10cc/hr   - continue metolazone 10mg BID  - diuresised decently well overnight; however there is inaccurate I/Os   - stop toprol XL 50mg (4/22)  - continue coreg 12.5 mg BID; Hr at goal of 60  - continue hydralazine to 75 Q8 and isosorbide dinitrate to 40 TID- for optimal BP control  - strict I and O  - replete lytes

## 2018-04-25 NOTE — PROGRESS NOTES
[facility] Hospital Medicine  Progress Note    Patient Name: Anai Sal  MRN: [id number]  Patient Class: IP- Inpatient   Admission Date: 4/19/2018  Length of Stay: 5 days  Attending Physician: Parker James MD  Primary Care Provider: Live Young MD    [facility] Medicine Team: [facility]OSP MED 4 Maria Del Carmen Charles MD    Subjective:     Principal Problem:Acute on chronic combined systolic and diastolic congestive heart failure    HPI:  Mrs. Sal is a 73 year old female with a past medical history significant for HTN, CHF (last EF 30%), hypothyroidism, CAD, CKD, breast cancer, marlon presents with worsening SOB over the past 2 weeks as well as chest pain of one days duration. The patient and family state that the patient has been feeling more SOB over the past two weeks, they state that they have noticed her with increasing work of breathing and fluid accumulation. The patient denies any recent illness (although dose have chronic cough), change in diet, but do state that they have been somewhat confused as to what medications the patient should be taking following her recent discharge for acute on chronic heart failure.     Hospital Course:  No notes on file    Interval History:   NAEON. Patient's lasix ggt increased to 15 from 10 & added metolazone 10mg BID., responded well according to the granddaughter who reports multiple urinations;Cr. 3.6; baseline 3.3.        Review of Systems   Constitutional: Negative for activity change, chills, diaphoresis, fatigue and fever.   HENT: Negative for congestion, sore throat and trouble swallowing.    Eyes: Negative for photophobia, redness and visual disturbance.   Respiratory: Positive for cough and shortness of breath. Negative for choking, chest tightness and wheezing.    Cardiovascular: Negative for chest pain, palpitations and leg swelling.   Gastrointestinal: Negative for abdominal distention, abdominal pain, constipation, diarrhea, nausea and  vomiting.   Genitourinary: Negative for difficulty urinating, dysuria and hematuria.   Musculoskeletal: Negative for arthralgias and myalgias.   Skin: Negative for rash and wound.   Neurological: Negative for dizziness, syncope, weakness, light-headedness, numbness and headaches.   Psychiatric/Behavioral: Negative for confusion.      allopurinol  50 mg Oral Daily    aspirin  81 mg Oral Daily    carvedilol  12.5 mg Oral BID WM    donepezil  5 mg Oral QHS    fluticasone  1 spray Each Nare Daily    fluticasone-vilanterol  1 puff Inhalation Daily    heparin (porcine)  5,000 Units Subcutaneous Q8H    INV hydrALAZINE  75 mg Oral Q8H    hydrocortisone   Rectal BID    isosorbide dinitrate  40 mg Oral TID    levothyroxine  125 mcg Oral Before breakfast    metOLazone  10 mg Oral BID    pantoprazole  40 mg Oral Daily    sevelamer carbonate  800 mg Oral BID WM    sodium bicarbonate  1 tablet Oral TID    tiotropium  18 mcg Inhalation Daily    traZODone  50 mg Oral QHS    vitamin renal formula (B-complex-vitamin c-folic acid)  1 capsule Oral Daily     .   furosemide (LASIX) 2 mg/mL infusion (non-titrating) 10 mg/hr (04/25/18 1054)         Objective:     Vital Signs (Most Recent):  Temp: 98.4 °F (36.9 °C) (04/25/18 1208)  Pulse: (!) 55 (04/25/18 1208)  Resp: 18 (04/25/18 1208)  BP: 132/64 (04/25/18 1208)  SpO2: 95 % (04/25/18 1208) Vital Signs (24h Range):  Temp:  [97.4 °F (36.3 °C)-99 °F (37.2 °C)] 98.4 °F (36.9 °C)  Pulse:  [55-65] 55  Resp:  [14-20] 18  SpO2:  [95 %-98 %] 95 %  BP: (126-153)/(58-74) 132/64     Weight: 76.6 kg (168 lb 14 oz)  Body mass index is 27.26 kg/m².    Intake/Output Summary (Last 24 hours) at 04/25/18 1437  Last data filed at 04/25/18 1045   Gross per 24 hour   Intake            254.1 ml   Output             1550 ml   Net          -1295.9 ml      Physical Exam   Constitutional: She is oriented to person, place, and time. She appears well-developed and well-nourished. No distress.    HENT:   Head: Normocephalic and atraumatic.   Right Ear: External ear normal.   Left Ear: External ear normal.   Mouth/Throat: Oropharynx is clear and moist. No oropharyngeal exudate.   Eyes: EOM are normal. Pupils are equal, round, and reactive to light. Right eye exhibits no discharge. Left eye exhibits no discharge. No scleral icterus.   Neck: Normal range of motion. Neck supple.   Cardiovascular: Normal rate and regular rhythm.    Murmur heard.  Pulmonary/Chest: Effort normal. No respiratory distress. She has no wheezes. She has no rales.   Decreased breath sounds bilat   Abdominal: Soft. Bowel sounds are normal. She exhibits no distension and no mass. There is no tenderness. There is no guarding.   Musculoskeletal: She exhibits edema (1+ pitting edema of bilateral lower extremities ). She exhibits no tenderness.   Neurological: She is alert and oriented to person, place, and time. No cranial nerve deficit.   Skin: Skin is warm and dry. She is not diaphoretic.   Psychiatric: She has a normal mood and affect.   Vitals reviewed.      Significant Labs:   CBC:     Recent Labs  Lab 18  0618 18  0832 18  0346   WBC 5.89 5.65 4.59   HGB 7.6* 7.9* 7.9*   HCT 22.6* 24.3* 24.2*    196 177     CMP:     Recent Labs  Lab 18  0618 18  0346    138   K 3.0* 3.8   CL 96 94*   CO2 32* 35*    97   BUN 65* 61*   CREATININE 3.4* 3.6*   CALCIUM 9.2 9.5   ALBUMIN 3.0* 3.2*   ANIONGAP 10 9   EGFRNONAA 12.8* 11.9*     Cardiac Markers:     Recent Labs  Lab 18  0855   BNP 4,103*       Significant ImaginD echo  CONCLUSIONS     1 - Moderate left ventricular enlargement.     2 - Severely depressed left ventricular systolic function (EF 25-30%).     3 - Right ventricular enlargement with moderately depressed systolic function.     4 - Biatrial enlargement.     5 - Mild to moderate tricuspid regurgitation.     6 - Severe functional mitral regurgitation.     7 - Pulmonary  hypertension. The estimated PA systolic pressure is 50 mmHg.     8 - Increased central venous pressure.     Assessment/Plan:      * Acute on chronic combined systolic and diastolic congestive heart failure    - patient presents with 2 week history of worsening SOB and CP. BNP >4900 on admission. initial troponin 0.037, appears to be stable per previous labs. CXR with evidence of pulmonary edema . Echo with stable findings - 25% EF,severe MR, and 50mmHg Pa pressures    - decrease lasix gtt at 10cc/hr   - continue metolazone 10mg BID  - diuresised decently well overnight; however there is inaccurate I/Os   - stop toprol XL 50mg (4/22)  - continue coreg 12.5 mg BID; Hr at goal of 60  - continue hydralazine to 75 Q8 and isosorbide dinitrate to 40 TID- for optimal BP control  - strict I and O  - replete lytes        Palliative care encounter    - Consult placed; appreciate assistance         Goals of care, counseling/discussion              Age-related physical debility    -PT , OT consult        CKD (chronic kidney disease), stage V    - creatinine stable on admission at 3.3, at [facility]  - monitor Cr  - cont home sodium bicarb at 1300mg TID and sevelamer at 800mg BID  - monitor UOP          Counseling regarding advanced care planning and goals of care              Cognitive impairment    - delirium precautions          Anemia of chronic renal failure, stage 5    - hemoglobin 8.1 on admission, stable at her baseline,   -monitor        Paroxysmal atrial fibrillation    - discontinue metoprolol succinate 50mg daily  - coreg started (4/22) should provide HR control   - cardiac monitoring   - not on anticoagulation 2/2 high fall risk and possible slow GIB - per prior cardiology notes        Mixed type COPD (chronic obstructive pulmonary disease)    - not on home O2  - cont home tiotropium  - prn duo nebs        Elevated troponin    - initial concerns given complaints of CP, unlikely ACS. Likely 2/2 ADHF  - troponin 0.037 on  admission  - patient has history of chronically elevated troponin around this level          Chronic gout    - stable   - continue home dose allopurinol           Postablative hypothyroidism    -cont home synthroid        Renovascular hypertension    - discontinue toprol XL 50mg   - start coreg 12.5 BID- Hr at goal of 60  - increase hydralazine to 75 Q8 and isosorbide dinitrate to 40 TID- for optimal BP control            VTE Risk Mitigation         Ordered     heparin (porcine) injection 5,000 Units  Every 8 hours      04/24/18 1957     Place sequential compression device  Until discontinued      04/20/18 0400     IP VTE HIGH RISK PATIENT  Once      04/20/18 0400              Maria Del Carmen Charles MD  Department of Hospital Medicine   [facility]

## 2018-04-25 NOTE — PLAN OF CARE
Problem: Patient Care Overview  Goal: Individualization & Mutuality  Outcome: Ongoing (interventions implemented as appropriate)  Plan of care discussed with patient. Patient is free of fall/trauma/injury. Plan to D/C home on hospice Thursday. Patient has purewick in place. All four side rails in place per family request to prevent falls. Continued on lasix at adjusted rate. Frequent weight shifting provided. Bed alarm on and audible. Denies CP, SOB, or pain/discomfort. All questions addressed. Will continue to monitor

## 2018-04-25 NOTE — PLAN OF CARE
TIFFANIE spoke with Temitope from [employer], she has left several messages throughout the day for pts dtg, but has been unable to reach her. SW left message at 4pm, leaving sw name and number as well as name and number for [facility] liaison (Temitope [phone]). SW will f/u in the morning.    Candice Noel, LCSW [id number]

## 2018-04-25 NOTE — PROGRESS NOTES
Consulted for incontinence associated dermatitis   Patient has a PureWick device in place and barrier creams to buttocks and gluteal crease . Skin with mild redness and barrier creams would be most effective for skin protection due to incontinence. Has her own Sapphire's butt paste and offered her critic aid paste as well.       04/25/18 1300       Wound 04/24/18 1200 Incontinence associated dermatitis;Moisture associated dermatitis Buttocks   Date First Assessed/Time First Assessed: 04/24/18 1200   Pre-existing: Yes  Wound Type: Incontinence associated dermatitis;Moisture associated dermatitis  Side: Left  Location: Buttocks   Wound WDL ex   Appearance Red;Moist   Periwound Area Moist;Redness   Care Cleansed with:;Soap and water;Applied:;Skin Barrier       Wound 04/24/18 1200 Moisture associated dermatitis;Incontinence associated dermatitis Buttocks   Date First Assessed/Time First Assessed: 04/24/18 1200   Pre-existing: Yes  Wound Type: Moisture associated dermatitis;Incontinence associated dermatitis  Side: Right  Location: Buttocks   Wound WDL ex   Dressing Appearance Open to air;No dressing   Drainage Amount None   Appearance Red;Moist   Periwound Area Moist;Redness   Care Cleansed with:;Soap and water;Applied:;Skin Barrier     Recommendations:   1. Chair cushion   2. Low air loss mattress overlay  3. Frequent turning schedule  4. Apply critic aid paste to gluteal crease and buttocks BID and prn each incontinent episode  5. Nutritional consult    Lucie Camacho RN CWON  [id number]

## 2018-04-25 NOTE — PROGRESS NOTES
STAFF PHYSICIAN NOTE                                   Attending Attestation for Rounds with Resident  I have reviewed and concur with the resident's history, physical, assessment, and plan.  I have personally interviewed and examined the patient at bedside.  See below for my evaluation and additional findings.                                   ________________________________________                                                   Discussed with family about goals of care.  Severe systolic and diastolic CHF.  CKD stage V.    No dialysis per patient and family.  Plan for home hospice once more diuresis achieved for comfort.    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     2 - Severely depressed left ventricular systolic function (EF 25-30%).     3 - Right ventricular enlargement with moderately depressed systolic function.     4 - Biatrial enlargement.     5 - Mild to moderate tricuspid regurgitation.     6 - Severe functional mitral regurgitation.     7 - Pulmonary hypertension. The estimated PA systolic pressure is 50 mmHg.     8 - Increased central venous pressure.       Pulmonary hypertension [I27.20]  Yes     Priority: 3     Counseling regarding advanced care planning and goals of care [Z71.89]  Not Applicable     Priority: 3     Severe mitral regurgitation [I34.0]  Yes     Priority: 3     Elevated troponin [R74.8]  Yes     Priority: 3     Anemia of chronic renal failure, stage 5 [N18.5, D63.1]  Yes     Priority: 5     Renovascular hypertension [I15.0]  Yes     Priority: 5     Postablative hypothyroidism [E89.0]  Yes     Priority: 5     Chronic gout [M1A.9XX0]  Yes     Priority: 6     CKD (chronic kidney disease), stage V [N18.5]  Yes     Priority: 8      Mixed type COPD (chronic obstructive pulmonary disease) [J44.9]  Yes     Priority: 8     Paroxysmal atrial fibrillation [I48.0]  Yes     Priority: 20     Age-related physical debility [R54]  Yes     Priority: 24     History of breast cancer [Z85.3]  Not Applicable     Priority: 24     Recurrent major depressive disorder, in partial remission [F33.41]  Yes     Priority: 24     Cognitive impairment [R41.89]  Yes     Priority: 25 - Low    Goals of care, counseling/discussion [Z71.89]  Not Applicable    Palliative care encounter [Z51.5]  Not Applicable      Resolved Hospital Problems    Diagnosis Date Resolved POA   No resolved problems to display.       Signing Physician:  Parker James MD

## 2018-04-25 NOTE — PT/OT/SLP PROGRESS
Physical Therapy Treatment    Patient Name:  Anai Sal   MRN:  [id number]    Recommendations:     Discharge Recommendations:  home with home health   Discharge Equipment Recommendations: none   Barriers to discharge: Inaccessible home    Assessment:     Anai Sal is a 73 y.o. female admitted with a medical diagnosis of Acute on chronic combined systolic and diastolic congestive heart failure.  She presents with the following impairments/functional limitations:  weakness, impaired endurance, impaired self care skills, impaired functional mobilty, gait instability, impaired balance, impaired cardiopulmonary response to activity, impaired cognition. Pt tolerated session well with focus on gait training, bed mobility, transfers and education. Pt progressing well with pt tolerating gait training this day and pt agreeable to sitting up in chair. Pt will continue to benefit from skilled therapy to improve impairments listed below.     Rehab Prognosis:  Good ; patient would benefit from acute skilled PT services to address these deficits and reach maximum level of function.      Recent Surgery: * No surgery found *      Plan:     During this hospitalization, patient to be seen 4 x/week to address the above listed problems via gait training, therapeutic activities, therapeutic exercises  · Plan of Care Expires:  05/20/18   Plan of Care Reviewed with: patient    Subjective     Communicated with NSG prior to session.  Patient found supine in bed upon PTA entry to room, agreeable to treatment.      Chief Complaint: fatigue/sleepy  Patient comments/goals: Pt states she is willing to work with therapy to get better despite being tired.   Pain/Comfort:  · Pain Rating 1: 0/10  · Pain Rating Post-Intervention 1: 0/10    Patients cultural, spiritual, Ep[employer]opalian conflicts given the current situation: none stated    Objective:     Patient found with: telemetry, peripheral IV, purewick, oxygen 2L    General Precautions:  Standard, fall   Orthopedic Precautions:N/A   Braces: N/A     Functional Mobility:  · Bed Mobility:     · Scooting: contact guard assistance  · Supine to Sit: contact guard assistance  · Transfers:     · Sit to Stand:  minimum assistance with rolling walker  · Gait: Pt ambulated 8 ft x 2 trials with RW and Min A. Pt requires assistance for balance, posture, and safety. Extremely slow christi and forward flexed posture despite v/t cues. Pt ambulates to/from bedsde commode set up in room. PTA manages IV pole and oxygen tank.   · Balance: Pt requires CGA for static standing balance with BUE support on RW and cues to improve posture.       AM-PAC 6 CLICK MOBILITY  Turning over in bed (including adjusting bedclothes, sheets and blankets)?: 3  Sitting down on and standing up from a chair with arms (e.g., wheelchair, bedside commode, etc.): 3  Moving from lying on back to sitting on the side of the bed?: 2  Moving to and from a bed to a chair (including a wheelchair)?: 3  Need to walk in hospital room?: 2  Climbing 3-5 steps with a railing?: 2  Total Score: 15       Therapeutic Activities and Exercises:   Pt assisted to EOB where she sits unsupported for ~2 minutes with SBA.   Pt assisted onto/off bedside commode for positive void during ambulation trial.   Pt educated on participating with therapy and improving tolerance to OOB mobility and upright positioning.     Patient left up in chair with all lines intact, call button in reach and NSG notified..    GOALS:    Physical Therapy Goals        Problem: Physical Therapy Goal    Goal Priority Disciplines Outcome Goal Variances Interventions   Physical Therapy Goal     PT/OT, PT Ongoing (interventions implemented as appropriate)     Description:  Goals to be met by: 18     Patient will increase functional independence with mobility by performin. Supine to sit with Stand-by Assistance  2. Sit to stand transfer with Supervision  3. Bed to chair transfer with  Supervision using Rolling Walker  4. Gait  x 100 feet with Stand-by Assistance using Rolling Walker.   5. Ascend/descend 4 stair with left Handrails Contact Guard Assistance.                       Time Tracking:     PT Received On: 04/25/18  PT Start Time: 0948     PT Stop Time: 1014  PT Total Time (min): 26 min     Billable Minutes: Gait Training 14 and Therapeutic Activity 12    Treatment Type: Treatment  PT/PTA: PTA     PTA Visit Number: 1     Flako Carpio, LUDA  04/25/2018

## 2018-04-25 NOTE — PLAN OF CARE
Problem: Physical Therapy Goal  Goal: Physical Therapy Goal  Goals to be met by: 18     Patient will increase functional independence with mobility by performin. Supine to sit with Stand-by Assistance  2. Sit to stand transfer with Supervision  3. Bed to chair transfer with Supervision using Rolling Walker  4. Gait  x 100 feet with Stand-by Assistance using Rolling Walker.   5. Ascend/descend 4 stair with left Handrails Contact Guard Assistance.      Outcome: Ongoing (interventions implemented as appropriate)  Goals remain appropriate.

## 2018-04-26 LAB
ALBUMIN SERPL BCP-MCNC: 3.1 G/DL
ANION GAP SERPL CALC-SCNC: 10 MMOL/L
BASOPHILS # BLD AUTO: 0 K/UL
BASOPHILS NFR BLD: 0 %
BUN SERPL-MCNC: 58 MG/DL
CALCIUM SERPL-MCNC: 9.7 MG/DL
CHLORIDE SERPL-SCNC: 90 MMOL/L
CO2 SERPL-SCNC: 36 MMOL/L
CREAT SERPL-MCNC: 3.6 MG/DL
DIFFERENTIAL METHOD: ABNORMAL
EOSINOPHIL # BLD AUTO: 0 K/UL
EOSINOPHIL NFR BLD: 0.9 %
ERYTHROCYTE [DISTWIDTH] IN BLOOD BY AUTOMATED COUNT: 17.8 %
EST. GFR  (AFRICAN AMERICAN): 13.7 ML/MIN/1.73 M^2
EST. GFR  (NON AFRICAN AMERICAN): 11.9 ML/MIN/1.73 M^2
GLUCOSE SERPL-MCNC: 88 MG/DL
HCT VFR BLD AUTO: 25.5 %
HGB BLD-MCNC: 8.1 G/DL
IMM GRANULOCYTES # BLD AUTO: 0.01 K/UL
IMM GRANULOCYTES NFR BLD AUTO: 0.2 %
LYMPHOCYTES # BLD AUTO: 0.7 K/UL
LYMPHOCYTES NFR BLD: 16.8 %
MCH RBC QN AUTO: 29.3 PG
MCHC RBC AUTO-ENTMCNC: 31.8 G/DL
MCV RBC AUTO: 92 FL
MONOCYTES # BLD AUTO: 0.6 K/UL
MONOCYTES NFR BLD: 13.8 %
NEUTROPHILS # BLD AUTO: 2.9 K/UL
NEUTROPHILS NFR BLD: 68.3 %
NRBC BLD-RTO: 1 /100 WBC
PHOSPHATE SERPL-MCNC: 2.5 MG/DL
PLATELET # BLD AUTO: 175 K/UL
PMV BLD AUTO: 11.5 FL
POTASSIUM SERPL-SCNC: 3.4 MMOL/L
RBC # BLD AUTO: 2.76 M/UL
SODIUM SERPL-SCNC: 136 MMOL/L
WBC # BLD AUTO: 4.29 K/UL

## 2018-04-26 PROCEDURE — 97530 THERAPEUTIC ACTIVITIES: CPT

## 2018-04-26 PROCEDURE — 25000003 PHARM REV CODE 250: Performed by: HOSPITALIST

## 2018-04-26 PROCEDURE — G8987 SELF CARE CURRENT STATUS: HCPCS | Mod: CK

## 2018-04-26 PROCEDURE — 85025 COMPLETE CBC W/AUTO DIFF WBC: CPT

## 2018-04-26 PROCEDURE — 97116 GAIT TRAINING THERAPY: CPT

## 2018-04-26 PROCEDURE — 20600001 HC STEP DOWN PRIVATE ROOM

## 2018-04-26 PROCEDURE — 36415 COLL VENOUS BLD VENIPUNCTURE: CPT

## 2018-04-26 PROCEDURE — 63600175 PHARM REV CODE 636 W HCPCS: Performed by: STUDENT IN AN ORGANIZED HEALTH CARE EDUCATION/TRAINING PROGRAM

## 2018-04-26 PROCEDURE — 80069 RENAL FUNCTION PANEL: CPT

## 2018-04-26 PROCEDURE — G8988 SELF CARE GOAL STATUS: HCPCS | Mod: CJ

## 2018-04-26 PROCEDURE — 25000003 PHARM REV CODE 250: Performed by: STUDENT IN AN ORGANIZED HEALTH CARE EDUCATION/TRAINING PROGRAM

## 2018-04-26 PROCEDURE — 97110 THERAPEUTIC EXERCISES: CPT

## 2018-04-26 PROCEDURE — 25000242 PHARM REV CODE 250 ALT 637 W/ HCPCS: Performed by: STUDENT IN AN ORGANIZED HEALTH CARE EDUCATION/TRAINING PROGRAM

## 2018-04-26 PROCEDURE — 63600175 PHARM REV CODE 636 W HCPCS: Performed by: HOSPITALIST

## 2018-04-26 PROCEDURE — 97535 SELF CARE MNGMENT TRAINING: CPT

## 2018-04-26 PROCEDURE — 99232 SBSQ HOSP IP/OBS MODERATE 35: CPT | Mod: GC,,, | Performed by: INTERNAL MEDICINE

## 2018-04-26 RX ORDER — BUMETANIDE 1 MG/1
3 TABLET ORAL 2 TIMES DAILY
Status: DISCONTINUED | OUTPATIENT
Start: 2018-04-26 | End: 2018-04-28 | Stop reason: HOSPADM

## 2018-04-26 RX ORDER — ISOSORBIDE DINITRATE 20 MG/1
40 TABLET ORAL 3 TIMES DAILY
Start: 2018-04-26 | End: 2019-04-26

## 2018-04-26 RX ORDER — HYDROCORTISONE 25 MG/G
CREAM TOPICAL 2 TIMES DAILY
COMMUNITY
Start: 2018-04-26 | End: 2018-05-06

## 2018-04-26 RX ORDER — CARVEDILOL 12.5 MG/1
12.5 TABLET ORAL 2 TIMES DAILY WITH MEALS
Qty: 60 TABLET | Refills: 3
Start: 2018-04-26 | End: 2019-04-26

## 2018-04-26 RX ORDER — ACETAMINOPHEN 325 MG/1
325 TABLET ORAL EVERY 6 HOURS PRN
Status: DISCONTINUED | OUTPATIENT
Start: 2018-04-26 | End: 2018-04-28 | Stop reason: HOSPADM

## 2018-04-26 RX ORDER — PANTOPRAZOLE SODIUM 40 MG/1
40 TABLET, DELAYED RELEASE ORAL DAILY
Qty: 30 TABLET | Refills: 11
Start: 2018-04-26 | End: 2019-04-26

## 2018-04-26 RX ORDER — METOLAZONE 10 MG/1
10 TABLET ORAL 2 TIMES DAILY
Qty: 60 TABLET | Refills: 11
Start: 2018-04-26 | End: 2019-04-26

## 2018-04-26 RX ORDER — LORAZEPAM 0.5 MG/1
0.5 TABLET ORAL EVERY 6 HOURS PRN
Qty: 30 TABLET | Refills: 0 | Status: SHIPPED | OUTPATIENT
Start: 2018-04-26 | End: 2018-05-26

## 2018-04-26 RX ORDER — CIPROFLOXACIN 500 MG/1
500 TABLET ORAL EVERY 24 HOURS
Status: COMPLETED | OUTPATIENT
Start: 2018-04-26 | End: 2018-04-26

## 2018-04-26 RX ADMIN — FLUTICASONE FUROATE AND VILANTEROL TRIFENATATE 1 PUFF: 100; 25 POWDER RESPIRATORY (INHALATION) at 09:04

## 2018-04-26 RX ADMIN — HEPARIN SODIUM 5000 UNITS: 5000 INJECTION, SOLUTION INTRAVENOUS; SUBCUTANEOUS at 04:04

## 2018-04-26 RX ADMIN — CARVEDILOL 12.5 MG: 12.5 TABLET, FILM COATED ORAL at 06:04

## 2018-04-26 RX ADMIN — ISOSORBIDE DINITRATE 40 MG: 40 TABLET ORAL at 09:04

## 2018-04-26 RX ADMIN — METHYLDOPA 50 MG: 500 TABLET ORAL at 09:04

## 2018-04-26 RX ADMIN — FLUTICASONE PROPIONATE 50 MCG: 50 SPRAY, METERED NASAL at 09:04

## 2018-04-26 RX ADMIN — SODIUM BICARBONATE 650 MG TABLET 650 MG: at 09:04

## 2018-04-26 RX ADMIN — Medication 1 CAPSULE: at 09:04

## 2018-04-26 RX ADMIN — CARVEDILOL 12.5 MG: 12.5 TABLET, FILM COATED ORAL at 12:04

## 2018-04-26 RX ADMIN — TRAZODONE HYDROCHLORIDE 50 MG: 50 TABLET ORAL at 09:04

## 2018-04-26 RX ADMIN — HYDRALAZINE HYDROCHLORIDE 75 MG: 50 TABLET ORAL at 04:04

## 2018-04-26 RX ADMIN — ASPIRIN 81 MG CHEWABLE TABLET 81 MG: 81 TABLET CHEWABLE at 09:04

## 2018-04-26 RX ADMIN — ISOSORBIDE DINITRATE 40 MG: 40 TABLET ORAL at 04:04

## 2018-04-26 RX ADMIN — SODIUM BICARBONATE 650 MG TABLET 650 MG: at 04:04

## 2018-04-26 RX ADMIN — SEVELAMER CARBONATE 800 MG: 800 TABLET, FILM COATED ORAL at 09:04

## 2018-04-26 RX ADMIN — BUMETANIDE 3 MG: 1 TABLET ORAL at 12:04

## 2018-04-26 RX ADMIN — HEPARIN SODIUM 5000 UNITS: 5000 INJECTION, SOLUTION INTRAVENOUS; SUBCUTANEOUS at 09:04

## 2018-04-26 RX ADMIN — PANTOPRAZOLE SODIUM 40 MG: 40 TABLET, DELAYED RELEASE ORAL at 09:04

## 2018-04-26 RX ADMIN — SEVELAMER CARBONATE 800 MG: 800 TABLET, FILM COATED ORAL at 06:04

## 2018-04-26 RX ADMIN — FUROSEMIDE 10 MG/HR: 10 INJECTION, SOLUTION INTRAVENOUS at 04:04

## 2018-04-26 RX ADMIN — HYDRALAZINE HYDROCHLORIDE 75 MG: 50 TABLET ORAL at 09:04

## 2018-04-26 RX ADMIN — LEVOTHYROXINE SODIUM 125 MCG: 25 TABLET ORAL at 04:04

## 2018-04-26 RX ADMIN — DONEPEZIL HYDROCHLORIDE 5 MG: 5 TABLET, FILM COATED ORAL at 09:04

## 2018-04-26 RX ADMIN — METOLAZONE 10 MG: 2.5 TABLET ORAL at 09:04

## 2018-04-26 RX ADMIN — ACETAMINOPHEN 325 MG: 325 TABLET ORAL at 09:04

## 2018-04-26 RX ADMIN — CIPROFLOXACIN HYDROCHLORIDE 500 MG: 500 TABLET, FILM COATED ORAL at 12:04

## 2018-04-26 RX ADMIN — LORAZEPAM 0.5 MG: 0.5 TABLET ORAL at 01:04

## 2018-04-26 RX ADMIN — BUMETANIDE 3 MG: 1 TABLET ORAL at 09:04

## 2018-04-26 NOTE — PLAN OF CARE
[facility]     Department of Hospital Medicine     [address] [place] [address]     [phone] after hours  [phone] fax                                   HOSPICE  ORDERS     04/26/2018    Admit to Hospice:  Home Service      Diagnoses:  Active Hospital Problems    Diagnosis  POA    *Acute on chronic combined systolic and diastolic congestive heart failure [I50.43]  Yes     4-20-18    1 - Moderate left ventricular enlargement.     2 - Severely depressed left ventricular systolic function (EF 25-30%).     3 - Right ventricular enlargement with moderately depressed systolic function.     4 - Biatrial enlargement.     5 - Mild to moderate tricuspid regurgitation.     6 - Severe functional mitral regurgitation.     7 - Pulmonary hypertension. The estimated PA systolic pressure is 50 mmHg.     8 - Increased central venous pressure.       Palliative care encounter [Z51.5]  Not Applicable     Priority: 1 - High    Dermatitis associated with moisture from urinary incontinence [L25.8, R32]  Yes    Goals of care, counseling/discussion [Z71.89]  Not Applicable    Age-related physical debility [R54]  Yes    Pulmonary hypertension [I27.20]  Yes    CKD (chronic kidney disease), stage V [N18.5]  Yes    Counseling regarding advanced care planning and goals of care [Z71.89]  Not Applicable    Cognitive impairment [R41.89]  Yes    Anemia of chronic renal failure, stage 5 [N18.5, D63.1]  Yes    Paroxysmal atrial fibrillation [I48.0]  Yes    Mixed type COPD (chronic obstructive pulmonary disease) [J44.9]  Yes    Severe mitral regurgitation [I34.0]  Yes    Elevated troponin [R74.8]  Yes    History of breast cancer [Z85.3]  Not Applicable    Renovascular hypertension [I15.0]  Yes    Postablative hypothyroidism [E89.0]  Yes    Chronic gout [M1A.9XX0]  Yes    Recurrent major depressive disorder, in partial remission [F33.41]  Yes      Resolved Hospital Problems     Diagnosis Date Resolved POA   No resolved problems to display.       Hospice Qualifying Diagnoses: Multiple Comorbidities: HfrEF - NYHA Class IV, CKD IV (refusing dialysis), COPD, Anemia of Chronic disease, Afib (not on AC)         Patient has a life expectancy < 6 months due to these conditions.    Vital Signs: Routine per Hospice Protocol.    Allergies:  Review of patient's allergies indicates:   Allergen Reactions    Seroquel [quetiapine]      Causes  profound sedation.       Diet: Cardiac, Sodium <2g    Activities: As tolerated    Nursing: Per Hospice Routine    Future Orders:  Hospice Medical Director may dictate new orders for comfortable care measures & sign death certificate.    Medications:         Comfort Care Medications Only    Anai Sal   Home Medication Instructions MARY:[id number]    Printed on:04/26/18 6772   Medication Information                      albuterol sulfate (PROAIR HFA INHL)  Inhale 2 puffs into the lungs 4 (four) times daily as needed (Shortness of Breath/ Wheezing).             albuterol-ipratropium 2.5mg-0.5mg/3mL (DUO-NEB) 0.5 mg-3 mg(2.5 mg base)/3 mL nebulizer solution  Take 3 mLs by nebulization 4 (four) times daily. Rescue             allopurinol (ZYLOPRIM) 50 MG Tab tablet  Take 50 mg by mouth once daily.             ammonium lactate (LAC-HYDRIN FIVE) 5 % Lotn  Apply to the affected area(s) of skin as directed             aspirin 81 MG Chew  Take 1 tablet (81 mg total) by mouth once daily.             bumetanide (BUMEX) 2 MG tablet  Take 1.5 tablets (3mg) by mouth two times a day             carvedilol (COREG) 12.5 MG tablet  Take 1 tablet (12.5 mg total) by mouth 2 (two) times daily with meals.             darbepoetin jaun-polysorbate (ARANESP, IN POLYSORBATE,) 25 mcg/0.42 mL injection  Inject into the skin every 14 (fourteen) days.             desloratadine (CLARINEX) 5 mg tablet  Take 5 mg by mouth once daily.             dext 70/polycarbophil/peg/NaCl (ARTIFICIAL  TEAR SOLUTION OPHT)  Instil 1 drop in each eye twice daily             docusate sodium (COLACE) 50 MG capsule  Take by mouth 2 (two) times daily.             donepezil (ARICEPT) 5 MG tablet  Take 5 mg by mouth every evening.             fluticasone (FLONASE) 50 mcg/actuation nasal spray  2 sprays by Each Nare route once daily.             fluticasone-salmeterol 250-50 mcg/dose (ADVAIR) 250-50 mcg/dose diskus inhaler  Inhale 1 puff into the lungs 2 (two) times daily. Controller             hydrocortisone (ANUSOL-HC) 2.5 % rectal cream  Place rectally 2 (two) times daily.             INV hydrALAZINE (APRESOLINE) 25 MG Tab  Take 3 tablets (75mg) by mouth three times a day.   Take with isosorbide dinitrate (Isordil).             iron sucrose (VENOFER) 200 mg iron/10 mL Soln injection  Inject as directed for 10 doses             isosorbide dinitrate (ISORDIL) 20 MG tablet  Take 2 tablets (40 mg total) by mouth 3 (three) times daily. Take with hydralazine (Apresoline)             levothyroxine (SYNTHROID) 125 MCG tablet  Take 125 mcg by mouth before breakfast.             metOLazone (ZAROXOLYN) 10 MG tablet  Take 1 tablet (10 mg total) by mouth 2 (two) times daily. Take 30 -60 mins; before taking Bumex.             mirtazapine (REMERON SOL-TAB) 30 MG disintegrating tablet  Take 30 mg by mouth every evening.             pantoprazole (PROTONIX) 40 MG tablet  Take 1 tablet (40 mg total) by mouth once daily.             sevelamer HCl (RENAGEL) 800 MG Tab  Take 800 mg by mouth 2 (two) times daily with meals.             sodium bicarbonate 650 MG tablet  Take 2 tablets (1,300 mg total) by mouth 3 (three) times daily.             tiotropium (SPIRIVA) 18 mcg inhalation capsule  Inhale 1 capsule (18 mcg total) into the lungs once daily.             trazodone (DESYREL) 25 MG tablet  Take 25-50 mg by mouth every evening.              vitamin renal formula, B-complex-vitamin c-folic acid, (NEPHROCAP) 1 mg Cap  Take 1 capsule by mouth  once daily.                       _________________________________  Maria Del Carmen Charles MD  04/26/2018

## 2018-04-26 NOTE — PT/OT/SLP PROGRESS
Physical Therapy Treatment    Patient Name:  Anai Sal   MRN:  [id number]    Recommendations:     Discharge Recommendations:  home health PT   Discharge Equipment Recommendations: none   Barriers to discharge: None    Assessment:     Anai Sal is a 73 y.o. female admitted with a medical diagnosis of Acute on chronic combined systolic and diastolic congestive heart failure.  She presents with the following impairments/functional limitations:  weakness, impaired endurance, impaired functional mobilty, gait instability, decreased lower extremity function, impaired cognition, decreased safety awareness  Pt thea treatment better being able to gait train farther. Pt will benefit from cont skilled PT 4x/wk to progress physically. Pt will need HHPT and 24 hour supervision. .    Rehab Prognosis:  good; patient would benefit from acute skilled PT services to address these deficits and reach maximum level of function.      Recent Surgery: * No surgery found *      Plan:     During this hospitalization, patient to be seen 4 x/week to address the above listed problems via gait training, therapeutic activities, therapeutic exercises  · Plan of Care Expires:  05/20/18   Plan of Care Reviewed with: patient (cousin)    Subjective     Communicated with nurse prior to session.  Patient found supine  upon PT entry to room, agreeable to treatment.      Chief Complaint:  Pt had no complaints during treatment.   Patient comments/goals: to get better and go home.   Pain/Comfort:  · Pain Rating 1: 0/10  · Pain Rating Post-Intervention 1: 0/10    Patients cultural, spiritual, Cheondoism conflicts given the current situation: none    Objective:     Patient found with: oxygen, telemetry (hep lock IV)     General Precautions: Standard, fall   Orthopedic Precautions:N/A   Braces:       Functional Mobility:  · Bed Mobility:     · Rolling Right: stand by assistance  · Supine to Sit: stand by assistance  ·   · Transfers:     · Sit to  Stand:  minimum assistance with rolling walker  · Bed to Chair: minimum assistance with  rolling walker  using  Stand Pivot  · Toilet Transfer: minimum assistance with  no AD  using  Stand Pivot  ·   · Gait: pt pulse ox 82% on room air prior to gait training. pt received gait training ~ 3 min 33 sec, 56 ft with RW and CGA. pt had O2 intact and pulse ox was 88% after gait training.        AM-PAC 6 CLICK MOBILITY  Turning over in bed (including adjusting bedclothes, sheets and blankets)?: 4  Sitting down on and standing up from a chair with arms (e.g., wheelchair, bedside commode, etc.): 3  Moving from lying on back to sitting on the side of the bed?: 3  Moving to and from a bed to a chair (including a wheelchair)?: 3  Need to walk in hospital room?: 3  Climbing 3-5 steps with a railing?: 1  Total Score: 17         Patient left up in chair with all lines intact, call button in reach and cousin present..    GOALS:    Physical Therapy Goals        Problem: Physical Therapy Goal    Goal Priority Disciplines Outcome Goal Variances Interventions   Physical Therapy Goal     PT/OT, PT Ongoing (interventions implemented as appropriate)     Description:  Goals to be met by: 18     Patient will increase functional independence with mobility by performin. Supine to sit with Stand-by Assistance -not met  2. Sit to stand transfer with Supervision -not met  3. Bed to chair transfer with Supervision using Rolling Walker-not met  4. Gait  x 100 feet with Stand-by Assistance using Rolling Walker. -not met  5. Ascend/descend 4 stair with left Handrails Contact Guard Assistance. -not met                       Time Tracking:     PT Received On: 18  PT Start Time: 1249     PT Stop Time: 1314  PT Total Time (min): 25 min     Billable Minutes: Gait Training 12 min and Therapeutic Activity 13 min    Treatment Type: Treatment  PT/PTA: PT     PTA Visit Number: 0     Yuli Blake, SHANTI  2018

## 2018-04-26 NOTE — PROGRESS NOTES
[facility] Hospital Medicine  Progress Note    Patient Name: Anai Sal  MRN: [id number]  Patient Class: IP- Inpatient   Admission Date: 4/19/2018  Length of Stay: 6 days  Attending Physician: Parker James MD  Primary Care Provider: Live Young MD    [facility] Medicine Team: [facility] [place] 4 Maria Del Carmen Charles MD    Subjective:     Principal Problem:Acute on chronic combined systolic and diastolic congestive heart failure    HPI:  Mrs. Sal is a 73 year old female with a past medical history significant for HTN, CHF (last EF 30%), hypothyroidism, CAD, CKD, breast cancer, marlon presents with worsening SOB over the past 2 weeks as well as chest pain of one days duration. The patient and family state that the patient has been feeling more SOB over the past two weeks, they state that they have noticed her with increasing work of breathing and fluid accumulation. The patient denies any recent illness (although dose have chronic cough), change in diet, but do state that they have been somewhat confused as to what medications the patient should be taking following her recent discharge for acute on chronic heart failure.     Hospital Course:  No notes on file    Interval History:   NAEON. Patient's lasix ggt discontinued; bumex 3mg BID & added metolazone 10mg BID,  Responded well to ggt according to the granddaughter who reports multiple urinations;Cr. 3.6; baseline 3.3.  SOB improving; difficult to determine baseline as she has COPD, HFref, CKD IV + anemia.         Review of Systems   Constitutional: Negative for activity change, chills, diaphoresis, fatigue and fever.   HENT: Negative for congestion, sore throat and trouble swallowing.    Eyes: Negative for photophobia, redness and visual disturbance.   Respiratory: Positive for cough and shortness of breath. Negative for choking, chest tightness and wheezing.    Cardiovascular: Negative for chest pain, palpitations and leg swelling.    Gastrointestinal: Negative for abdominal distention, abdominal pain, constipation, diarrhea, nausea and vomiting.   Genitourinary: Negative for difficulty urinating, dysuria and hematuria.   Musculoskeletal: Negative for arthralgias and myalgias.   Skin: Negative for rash and wound.   Neurological: Negative for dizziness, syncope, weakness, light-headedness, numbness and headaches.   Psychiatric/Behavioral: Negative for confusion.      allopurinol  50 mg Oral Daily    aspirin  81 mg Oral Daily    bumetanide  3 mg Oral BID    carvedilol  12.5 mg Oral BID WM    donepezil  5 mg Oral QHS    fluticasone  1 spray Each Nare Daily    fluticasone-vilanterol  1 puff Inhalation Daily    heparin (porcine)  5,000 Units Subcutaneous Q8H    INV hydrALAZINE  75 mg Oral Q8H    hydrocortisone   Rectal BID    isosorbide dinitrate  40 mg Oral TID    levothyroxine  125 mcg Oral Before breakfast    metOLazone  10 mg Oral BID    pantoprazole  40 mg Oral Daily    sevelamer carbonate  800 mg Oral BID WM    sodium bicarbonate  1 tablet Oral TID    tiotropium  18 mcg Inhalation Daily    traZODone  50 mg Oral QHS    vitamin renal formula (B-complex-vitamin c-folic acid)  1 capsule Oral Daily     .        Objective:     Vital Signs (Most Recent):  Temp: 97.7 °F (36.5 °C) (04/26/18 1154)  Pulse: (!) 57 (04/26/18 1154)  Resp: 18 (04/26/18 1154)  BP: 128/63 (04/26/18 1154)  SpO2: 95 % (04/26/18 1154) Vital Signs (24h Range):  Temp:  [97.7 °F (36.5 °C)-100.5 °F (38.1 °C)] 97.7 °F (36.5 °C)  Pulse:  [56-63] 57  Resp:  [18-20] 18  SpO2:  [90 %-98 %] 95 %  BP: (127-144)/(62-69) 128/63     Weight: 76.7 kg (169 lb 1.5 oz)  Body mass index is 27.29 kg/m².    Intake/Output Summary (Last 24 hours) at 04/26/18 1547  Last data filed at 04/26/18 1400   Gross per 24 hour   Intake           1147.5 ml   Output              750 ml   Net            397.5 ml      Physical Exam   Constitutional: She is oriented to person, place, and time. She  appears well-developed and well-nourished. No distress.   HENT:   Head: Normocephalic and atraumatic.   Right Ear: External ear normal.   Left Ear: External ear normal.   Mouth/Throat: Oropharynx is clear and moist. No oropharyngeal exudate.   Eyes: EOM are normal. Pupils are equal, round, and reactive to light. Right eye exhibits no discharge. Left eye exhibits no discharge. No scleral icterus.   Neck: Normal range of motion. Neck supple.   Cardiovascular: Normal rate and regular rhythm.    Murmur heard.  Pulmonary/Chest: Effort normal. No respiratory distress. She has no wheezes. She has no rales.   Decreased breath sounds bilat   Abdominal: Soft. Bowel sounds are normal. She exhibits no distension and no mass. There is no tenderness. There is no guarding.   Musculoskeletal: She exhibits edema (1+ pitting edema of bilateral lower extremities ). She exhibits no tenderness.   Neurological: She is alert and oriented to person, place, and time. No cranial nerve deficit.   Skin: Skin is warm and dry. She is not diaphoretic.   Psychiatric: She has a normal mood and affect.   Vitals reviewed.      Significant Labs:   CBC:     Recent Labs  Lab 18  0346 18  0728   WBC 4.59 4.29   HGB 7.9* 8.1*   HCT 24.2* 25.5*    175     CMP:     Recent Labs  Lab 18  0346 18  0728    136   K 3.8 3.4*   CL 94* 90*   CO2 35* 36*   GLU 97 88   BUN 61* 58*   CREATININE 3.6* 3.6*   CALCIUM 9.5 9.7   ALBUMIN 3.2* 3.1*   ANIONGAP 9 10   EGFRNONAA 11.9* 11.9*     Cardiac Markers:     Recent Labs  Lab 18  0855   BNP 4,103*       Significant ImaginD echo  CONCLUSIONS     1 - Moderate left ventricular enlargement.     2 - Severely depressed left ventricular systolic function (EF 25-30%).     3 - Right ventricular enlargement with moderately depressed systolic function.     4 - Biatrial enlargement.     5 - Mild to moderate tricuspid regurgitation.     6 - Severe functional mitral regurgitation.     7 -  Pulmonary hypertension. The estimated PA systolic pressure is 50 mmHg.     8 - Increased central venous pressure.     Assessment/Plan:      * Acute on chronic combined systolic and diastolic congestive heart failure    - patient presents with 2 week history of worsening SOB and CP. BNP >4900 on admission. initial troponin 0.037, appears to be stable per previous labs. CXR with evidence of pulmonary edema . Echo with stable findings - 25% EF,severe MR, and 50mmHg Pa pressures    - stopped lasix gtt at 10cc/hr   - continue metolazone 10mg BID  - Bumex 3mg BID   - diuresised decently well overnight; however there is inaccurate I/Os   - stop toprol XL 50mg (4/22)  - continue coreg 12.5 mg BID; Hr at goal of 60  - continue hydralazine to 75 Q8 and isosorbide dinitrate to 40 TID- for optimal BP control  - strict I and O  - replete lytes        Palliative care encounter    - Consult placed; appreciate assistance   - home hospice tomorrow 4/27        Goals of care, counseling/discussion              Age-related physical debility    -PT , OT consult        CKD (chronic kidney disease), stage V    - creatinine stable on admission at 3.3, at [place]  - monitor Cr  - cont home sodium bicarb at 1300mg TID and sevelamer at 800mg BID  - monitor UOP          Counseling regarding advanced care planning and goals of care              Cognitive impairment    - delirium precautions          Anemia of chronic renal failure, stage 5    - hemoglobin 8.1 on admission, stable at her baseline,   -monitor        Paroxysmal atrial fibrillation    - discontinue metoprolol succinate 50mg daily  - coreg started (4/22) should provide HR control   - cardiac monitoring   - not on anticoagulation 2/2 high fall risk and possible slow GIB - per prior cardiology notes        Mixed type COPD (chronic obstructive pulmonary disease)    - not on home O2  - cont home tiotropium  - prn duo nebs        Elevated troponin    - initial concerns given complaints  of CP, unlikely ACS. Likely 2/2 ADHF  - troponin 0.037 on admission  - patient has history of chronically elevated troponin around this level          Chronic gout    - stable   - continue home dose allopurinol           Postablative hypothyroidism    -cont home synthroid        Renovascular hypertension    - discontinue toprol XL 50mg   - start coreg 12.5 BID- Hr at goal of 60  - increase hydralazine to 75 Q8 and isosorbide dinitrate to 40 TID- for optimal BP control            VTE Risk Mitigation         Ordered     heparin (porcine) injection 5,000 Units  Every 8 hours      04/24/18 1957     Place sequential compression device  Until discontinued      04/20/18 0400     IP VTE HIGH RISK PATIENT  Once      04/20/18 0400              Maria Del Carmen Charles MD  Department of Hospital Medicine   [facility]

## 2018-04-26 NOTE — PLAN OF CARE
Problem: Physical Therapy Goal  Goal: Physical Therapy Goal  Goals to be met by: 18     Patient will increase functional independence with mobility by performin. Supine to sit with Stand-by Assistance -not met  2. Sit to stand transfer with Supervision -not met  3. Bed to chair transfer with Supervision using Rolling Walker-not met  4. Gait  x 100 feet with Stand-by Assistance using Rolling Walker. -not met  5. Ascend/descend 4 stair with left Handrails Contact Guard Assistance. -not met     Goals remain appropriate. Yuli Blake, PT 2018

## 2018-04-26 NOTE — PHYSICIAN QUERY
PT Name: Anai Sal  MR #: [id number]     Physician Query Form - Documentation Clarification      CDS/: Annabel Bah RN, CDI              Contact information:[phone]    This form is a permanent document in the medical record.     Query Date: April 26, 2018    By submitting this query, we are merely seeking further clarification of documentation. Please utilize your independent clinical judgment when addressing the question(s) below.    The Medical record reflects the following:    Supporting Clinical Findings Location in Medical Record   *Acute on chronic combined systolic and diastolic congestive heart failure [I50.43]      Yes           Priority: 1 - High           4-20-18     1 - Moderate left ventricular enlargement.     2 - Severely depressed left ventricular systolic function (EF 25-30%).     3  - Right ventricular enlargement with moderately depressed systolic function.     4 - Biatrial enlargement.     5 - Mild to moderate tricuspid regurgitation.     6 - Severe functional mitral  regurgitation.     7 - Pulmonary hypertension. The estimated PA systolic pressure is 50 mmHg.    Active Hospital Problems :       Diagnosis                                                      POA          Pulmonary hypertension                            Yes         Progress Note 4/24                                    Progress Note 4/24     7 - Pulmonary hypertension. The estimated PA systolic pressure is 50 mmHg         2D Echo 4/20                                                                            Doctor, Please specify diagnosis or diagnoses associated with above clinical findings.    Please specify the known or suspected type of Pulmonary Hypertension.    Provider Use Only      [ X    ] Pulmonary hypertension due to Left  heart disease (Group 2)    [     ] Other: ___________________                                                                                                               [  ]  Clinically undetermined

## 2018-04-26 NOTE — PLAN OF CARE
Problem: Patient Care Overview  Goal: Plan of Care Review  Outcome: Ongoing (interventions implemented as appropriate)  AAOX4.  VSS.  No complaints of pain.  Lasix drip is infusing continuously at 10mg/hr.  Pt's daughter requesting purewick be left off of patient.  Pt has had one bout of urine incontinence.  PIV's are intact and free of infection.  Daughter at the bedside.  Bed is in lowest position, call bell is in reach, and pt instructed to call nurse when needing assistance.  Will continue to monitor.

## 2018-04-26 NOTE — ASSESSMENT & PLAN NOTE
- patient presents with 2 week history of worsening SOB and CP. BNP >4900 on admission. initial troponin 0.037, appears to be stable per previous labs. CXR with evidence of pulmonary edema . Echo with stable findings - 25% EF,severe MR, and 50mmHg Pa pressures    - stopped lasix gtt at 10cc/hr   - continue metolazone 10mg BID  - Bumex 3mg BID   - diuresised decently well overnight; however there is inaccurate I/Os   - stop toprol XL 50mg (4/22)  - continue coreg 12.5 mg BID; Hr at goal of 60  - continue hydralazine to 75 Q8 and isosorbide dinitrate to 40 TID- for optimal BP control  - strict I and O  - replete lytes

## 2018-04-26 NOTE — PLAN OF CARE
Covering  spoke to pt's daughter regarding hospice placement.  She is a little overwhelmed and voiced her concerns over [facility] sending the referral to [facility]. She would like to call  back after she take her exams. Patient will be using [facility] vs. [facility].    Camille Knight LMSW

## 2018-04-26 NOTE — PT/OT/SLP PROGRESS
Occupational Therapy   Treatment    Name: Anai Sal  MRN: [id number]  Admitting Diagnosis:  Acute on chronic combined systolic and diastolic congestive heart failure       Recommendations:     Discharge Recommendations: home health OT  Discharge Equipment Recommendations:  none  Barriers to discharge:  None    Subjective     Communicated with: RN prior to session.  Pain/Comfort:  · Pain Rating 1: 0/10  · Pain Addressed 1: Reposition, Distraction  · Pain Rating Post-Intervention 1: 0/10    Patients cultural, spiritual, Buddhist conflicts given the current situation: None    Objective:     Patient found with: oxygen, telemetry    General Precautions: Standard, fall   Orthopedic Precautions:N/A   Braces: N/A     Occupational Performance:    Bed Mobility:    · Patient completed Rolling/Turning to Right with minimum assistance  · Patient completed Scooting/Bridging with minimum assistance  · Patient completed Supine to Sit with moderate assistance  · Patient completed Sit to Supine with minimum assistance     Functional Mobility/Transfers:  · Patient completed Sit <> Stand Transfer with minimum assistance  with  rolling walker   · Functional Mobility: Pt took 4 steps to the R toward HOB with min A using RW    Activities of Daily Living:  · Grooming: setup assistance to wash face while seated EOB  · UB Dressing: minimum assistance to don backward gown while seated EOB  · LB Dressing: maximal assistance to don B socks in supine  · Toileting: maximal assistance for yanelis cleaning in standing    Patient left supine with all lines intact, call button in reach and family present    [facility] 6 Click:  [facility] Total Score: 17    Treatment & Education:  BUE AAROM exercises while seated EOB 10 x 1 for shoulder flex, elbow flex/ext, and chest press with arm supported in 60 abd  Pt educated on role of OT/POC  White board/communication board updated  Education:    Assessment:     Anai Sal is a 73 y.o. female with a medical  diagnosis of Acute on chronic combined systolic and diastolic congestive heart failure.  She presents with generalized weakness impairing functional mobility and self care.  Performance deficits affecting function are weakness, impaired endurance, impaired functional mobilty, impaired self care skills, gait instability, impaired balance, decreased ROM, impaired cardiopulmonary response to activity.      Rehab Prognosis:  Good; patient would benefit from acute skilled OT services to address these deficits and reach maximum level of function.       Plan:     Patient to be seen 3 x/week to address the above listed problems via self-care/home management, therapeutic activities, therapeutic exercises  · Plan of Care Expires: 05/22/18  · Plan of Care Reviewed with: patient    This Plan of care has been discussed with the patient who was involved in its development and understands and is in agreement with the identified goals and treatment plan    GOALS:    Occupational Therapy Goals        Problem: Occupational Therapy Goal    Goal Priority Disciplines Outcome Interventions   Occupational Therapy Goal     OT, PT/OT Ongoing (interventions implemented as appropriate)    Description:  Goals to be met by: 7 days (4/29/18)     Patient will increase functional independence with ADLs by performing:    UE Dressing with Supervision.  LE Dressing with Minimal Assistance.  Grooming while standing with SBA.  Supine to sit with Supervision.  Toilet transfer to toilet with Stand-by Assistance.  Pt will complete functional mobility household distance with SVBA using AD as needed.                      Time Tracking:     OT Date of Treatment: 04/26/18  OT Start Time: 1029  OT Stop Time: 1052  OT Total Time (min): 23 min    Billable Minutes:Self Care/Home Management 10  Therapeutic Exercise 13    Tia Perrin, OT  4/26/2018

## 2018-04-26 NOTE — PLAN OF CARE
Problem: Occupational Therapy Goal  Goal: Occupational Therapy Goal  Goals to be met by: 7 days (4/29/18)     Patient will increase functional independence with ADLs by performing:    UE Dressing with Supervision.  LE Dressing with Minimal Assistance.  Grooming while standing with SBA.  Supine to sit with Supervision.  Toilet transfer to toilet with Stand-by Assistance.  Pt will complete functional mobility household distance with SVBA using AD as needed.     Outcome: Ongoing (interventions implemented as appropriate)  Goals remain appropriate    Comments: Continue OT BENY Perrin OT  4/26/2018

## 2018-04-26 NOTE — PLAN OF CARE
[facility]     Department of Hospital Medicine     [address] [place] [address]     [phone] after hours  [phone] fax                                   HOSPICE  ORDERS     04/26/2018    Admit to Hospice:  Home Service      Diagnoses:  Active Hospital Problems    Diagnosis  POA    *Acute on chronic combined systolic and diastolic congestive heart failure [I50.43]  Yes     4-20-18    1 - Moderate left ventricular enlargement.     2 - Severely depressed left ventricular systolic function (EF 25-30%).     3 - Right ventricular enlargement with moderately depressed systolic function.     4 - Biatrial enlargement.     5 - Mild to moderate tricuspid regurgitation.     6 - Severe functional mitral regurgitation.     7 - Pulmonary hypertension. The estimated PA systolic pressure is 50 mmHg.     8 - Increased central venous pressure.       Palliative care encounter [Z51.5]  Not Applicable     Priority: 1 - High    Dermatitis associated with moisture from urinary incontinence [L25.8, R32]  Yes    Goals of care, counseling/discussion [Z71.89]  Not Applicable    Age-related physical debility [R54]  Yes    Pulmonary hypertension [I27.20]  Yes    CKD (chronic kidney disease), stage V [N18.5]  Yes    Counseling regarding advanced care planning and goals of care [Z71.89]  Not Applicable    Cognitive impairment [R41.89]  Yes    Anemia of chronic renal failure, stage 5 [N18.5, D63.1]  Yes    Paroxysmal atrial fibrillation [I48.0]  Yes    Mixed type COPD (chronic obstructive pulmonary disease) [J44.9]  Yes    Severe mitral regurgitation [I34.0]  Yes    Elevated troponin [R74.8]  Yes    History of breast cancer [Z85.3]  Not Applicable    Renovascular hypertension [I15.0]  Yes    Postablative hypothyroidism [E89.0]  Yes    Chronic gout [M1A.9XX0]  Yes    Recurrent major depressive disorder, in partial remission [F33.41]  Yes      Resolved Hospital Problems     Diagnosis Date Resolved POA   No resolved problems to display.       Hospice Qualifying Diagnoses: Multiple Comorbidities: HfrEF - NYHA Class IV, CKD IV (refusing dialysis), COPD, Anemia of Chronic disease, Afib (not on AC)          Patient has a life expectancy < 6 months due to these conditions.    Vital Signs: Routine per Hospice Protocol.     Allergies:  Review of patient's allergies indicates:   Allergen Reactions    Seroquel [quetiapine]      Causes  profound sedation.       Diet: Cardiac, Sodium <2g     Activities: As tolerated    Nursing: Per Hospice Routine    Miscellaneous   Home Oxygen:  Oxygen at 2 L/min nasal canula to be used:  As needed for SOB. Comfort Care.     Future Orders:  Hospice Medical Director may dictate new orders for comfortable care measures & sign death certificate.     Medications:            Anai Sal   Home Medication Instructions MARY:[id number]    Printed on:04/26/18 7389   Medication Information                      albuterol sulfate (PROAIR HFA INHL)  Inhale 2 puffs into the lungs 4 (four) times daily as needed (Shortness of Breath/ Wheezing).             albuterol-ipratropium 2.5mg-0.5mg/3mL (DUO-NEB) 0.5 mg-3 mg(2.5 mg base)/3 mL nebulizer solution  Take 3 mLs by nebulization 4 (four) times daily. Rescue             allopurinol (ZYLOPRIM) 50 MG Tab tablet  Take 50 mg by mouth once daily.             ammonium lactate (LAC-HYDRIN FIVE) 5 % Lotn  Apply to the affected area(s) of skin as directed             aspirin 81 MG Chew  Take 1 tablet (81 mg total) by mouth once daily.               bumetanide (BUMEX) 2 MG tablet  Take 1.5 tablets (3mg) by mouth two times a day             carvedilol (COREG) 12.5 MG tablet  Take 1 tablet (12.5 mg total) by mouth 2 (two) times daily with meals.             darbepoetin jaun-polysorbate (ARANESP, IN POLYSORBATE,) 25 mcg/0.42 mL injection  Inject into the skin every 14 (fourteen) days.             desloratadine (CLARINEX) 5 mg  tablet  Take 5 mg by mouth once daily.             dext 70/polycarbophil/peg/NaCl (ARTIFICIAL TEAR SOLUTION OPHT)  Instil 1 drop in each eye twice daily             docusate sodium (COLACE) 50 MG capsule  Take by mouth 2 (two) times daily.             donepezil (ARICEPT) 5 MG tablet  Take 5 mg by mouth every evening.             fluticasone (FLONASE) 50 mcg/actuation nasal spray  2 sprays by Each Nare route once daily.             fluticasone-salmeterol 250-50 mcg/dose (ADVAIR) 250-50 mcg/dose diskus inhaler  Inhale 1 puff into the lungs 2 (two) times daily. Controller             hydrocortisone (ANUSOL-HC) 2.5 % rectal cream  Place rectally 2 (two) times daily.             INV hydrALAZINE (APRESOLINE) 25 MG Tab  Take 3 tablets (75mg) by mouth three times a day.   Take with isosorbide dinitrate (Isordil).             iron sucrose (VENOFER) 200 mg iron/10 mL Soln injection  Inject as directed for 10 doses             isosorbide dinitrate (ISORDIL) 20 MG tablet  Take 2 tablets (40 mg total) by mouth 3 (three) times daily. Take with hydralazine (Apresoline)             levothyroxine (SYNTHROID) 125 MCG tablet  Take 125 mcg by mouth before breakfast.             LORazepam (ATIVAN) 0.5 MG tablet  Take 1 tablet (0.5 mg total) by mouth every 6 (six) hours as needed for Anxiety (Insommnia).             metOLazone (ZAROXOLYN) 10 MG tablet  Take 1 tablet (10 mg total) by mouth 2 (two) times daily. Take 30 -60 mins; before taking Bumex.             mirtazapine (REMERON SOL-TAB) 30 MG disintegrating tablet  Take 30 mg by mouth every evening.             pantoprazole (PROTONIX) 40 MG tablet  Take 1 tablet (40 mg total) by mouth once daily.             sevelamer HCl (RENAGEL) 800 MG Tab  Take 800 mg by mouth 2 (two) times daily with meals.             sodium bicarbonate 650 MG tablet  Take 2 tablets (1,300 mg total) by mouth 3 (three) times daily.             tiotropium (SPIRIVA) 18 mcg inhalation capsule  Inhale 1 capsule (18  mcg total) into the lungs once daily.             trazodone (DESYREL) 25 MG tablet  Take 25-50 mg by mouth every evening.              vitamin renal formula, B-complex-vitamin c-folic acid, (NEPHROCAP) 1 mg Cap  Take 1 capsule by mouth once daily.                     _________________________________  Maria Del Carmen Charles MD  04/26/2018

## 2018-04-26 NOTE — PLAN OF CARE
PAM spoke w/ Sabrina MORENO w/ [facility] p [phone] to clarify any & all choices for hospice w/ [facility]. Sabrina stated that the only 2 hospices that can provide services thru [facility] are [facility] and [facility]. PAM will relay info to kaiden MORENO.

## 2018-04-27 PROBLEM — Z71.89 COUNSELING REGARDING ADVANCED CARE PLANNING AND GOALS OF CARE: Status: RESOLVED | Noted: 2018-02-05 | Resolved: 2018-04-27

## 2018-04-27 PROBLEM — Z51.5 PALLIATIVE CARE ENCOUNTER: Status: RESOLVED | Noted: 2017-12-19 | Resolved: 2018-04-27

## 2018-04-27 LAB
ALBUMIN SERPL BCP-MCNC: 2.9 G/DL
ANION GAP SERPL CALC-SCNC: 8 MMOL/L
BUN SERPL-MCNC: 59 MG/DL
CALCIUM SERPL-MCNC: 9.4 MG/DL
CHLORIDE SERPL-SCNC: 86 MMOL/L
CO2 SERPL-SCNC: 37 MMOL/L
CREAT SERPL-MCNC: 3.7 MG/DL
EST. GFR  (AFRICAN AMERICAN): 13.3 ML/MIN/1.73 M^2
EST. GFR  (NON AFRICAN AMERICAN): 11.5 ML/MIN/1.73 M^2
GLUCOSE SERPL-MCNC: 96 MG/DL
PHOSPHATE SERPL-MCNC: 2.8 MG/DL
POTASSIUM SERPL-SCNC: 3.4 MMOL/L
SODIUM SERPL-SCNC: 131 MMOL/L

## 2018-04-27 PROCEDURE — 80069 RENAL FUNCTION PANEL: CPT

## 2018-04-27 PROCEDURE — 25000003 PHARM REV CODE 250: Performed by: STUDENT IN AN ORGANIZED HEALTH CARE EDUCATION/TRAINING PROGRAM

## 2018-04-27 PROCEDURE — 99232 SBSQ HOSP IP/OBS MODERATE 35: CPT | Mod: ,,, | Performed by: INTERNAL MEDICINE

## 2018-04-27 PROCEDURE — 63600175 PHARM REV CODE 636 W HCPCS: Performed by: HOSPITALIST

## 2018-04-27 PROCEDURE — 25000003 PHARM REV CODE 250: Performed by: HOSPITALIST

## 2018-04-27 PROCEDURE — 36415 COLL VENOUS BLD VENIPUNCTURE: CPT

## 2018-04-27 PROCEDURE — 20600001 HC STEP DOWN PRIVATE ROOM

## 2018-04-27 PROCEDURE — 25000242 PHARM REV CODE 250 ALT 637 W/ HCPCS: Performed by: STUDENT IN AN ORGANIZED HEALTH CARE EDUCATION/TRAINING PROGRAM

## 2018-04-27 RX ADMIN — ISOSORBIDE DINITRATE 40 MG: 40 TABLET ORAL at 10:04

## 2018-04-27 RX ADMIN — LORAZEPAM 0.5 MG: 0.5 TABLET ORAL at 03:04

## 2018-04-27 RX ADMIN — SEVELAMER CARBONATE 800 MG: 800 TABLET, FILM COATED ORAL at 10:04

## 2018-04-27 RX ADMIN — ASPIRIN 81 MG CHEWABLE TABLET 81 MG: 81 TABLET CHEWABLE at 10:04

## 2018-04-27 RX ADMIN — DONEPEZIL HYDROCHLORIDE 5 MG: 5 TABLET, FILM COATED ORAL at 09:04

## 2018-04-27 RX ADMIN — FLUTICASONE FUROATE AND VILANTEROL TRIFENATATE 1 PUFF: 100; 25 POWDER RESPIRATORY (INHALATION) at 04:04

## 2018-04-27 RX ADMIN — SODIUM BICARBONATE 650 MG TABLET 650 MG: at 10:04

## 2018-04-27 RX ADMIN — CARVEDILOL 12.5 MG: 12.5 TABLET, FILM COATED ORAL at 03:04

## 2018-04-27 RX ADMIN — Medication 1 CAPSULE: at 10:04

## 2018-04-27 RX ADMIN — BUMETANIDE 3 MG: 1 TABLET ORAL at 10:04

## 2018-04-27 RX ADMIN — HYDRALAZINE HYDROCHLORIDE 75 MG: 50 TABLET ORAL at 03:04

## 2018-04-27 RX ADMIN — HEPARIN SODIUM 5000 UNITS: 5000 INJECTION, SOLUTION INTRAVENOUS; SUBCUTANEOUS at 03:04

## 2018-04-27 RX ADMIN — ISOSORBIDE DINITRATE 40 MG: 40 TABLET ORAL at 03:04

## 2018-04-27 RX ADMIN — METOLAZONE 10 MG: 2.5 TABLET ORAL at 09:04

## 2018-04-27 RX ADMIN — LEVOTHYROXINE SODIUM 125 MCG: 25 TABLET ORAL at 04:04

## 2018-04-27 RX ADMIN — HYDRALAZINE HYDROCHLORIDE 75 MG: 50 TABLET ORAL at 04:04

## 2018-04-27 RX ADMIN — METHYLDOPA 50 MG: 500 TABLET ORAL at 10:04

## 2018-04-27 RX ADMIN — SEVELAMER CARBONATE 800 MG: 800 TABLET, FILM COATED ORAL at 04:04

## 2018-04-27 RX ADMIN — METOLAZONE 10 MG: 2.5 TABLET ORAL at 10:04

## 2018-04-27 RX ADMIN — ISOSORBIDE DINITRATE 40 MG: 40 TABLET ORAL at 09:04

## 2018-04-27 RX ADMIN — TRAZODONE HYDROCHLORIDE 50 MG: 50 TABLET ORAL at 09:04

## 2018-04-27 RX ADMIN — PANTOPRAZOLE SODIUM 40 MG: 40 TABLET, DELAYED RELEASE ORAL at 10:04

## 2018-04-27 RX ADMIN — HYDRALAZINE HYDROCHLORIDE 75 MG: 50 TABLET ORAL at 09:04

## 2018-04-27 RX ADMIN — FLUTICASONE PROPIONATE 50 MCG: 50 SPRAY, METERED NASAL at 03:04

## 2018-04-27 RX ADMIN — LORAZEPAM 0.5 MG: 0.5 TABLET ORAL at 09:04

## 2018-04-27 RX ADMIN — SODIUM BICARBONATE 650 MG TABLET 650 MG: at 03:04

## 2018-04-27 RX ADMIN — SODIUM BICARBONATE 650 MG TABLET 650 MG: at 09:04

## 2018-04-27 RX ADMIN — BUMETANIDE 3 MG: 1 TABLET ORAL at 09:04

## 2018-04-27 RX ADMIN — HYDROCORTISONE 2.5%: 25 CREAM TOPICAL at 09:04

## 2018-04-27 RX ADMIN — TIOTROPIUM BROMIDE 18 MCG: 18 CAPSULE ORAL; RESPIRATORY (INHALATION) at 04:04

## 2018-04-27 NOTE — DISCHARGE SUMMARY
[facility] Hospital Medicine  Discharge Summary      Patient Name: Anai Sal  MRN: [id number]  Admission Date: 4/19/2018  Hospital Length of Stay: 8 days  Discharge Date and Time:  04/28/2018 11:43 AM  Attending Physician: Parker James MD   Discharging Provider: Francisco Villarreal MD  Primary Care Provider: Live Young MD    [facility] Hospital Medicine Team: [facility] HOSP MED 4 Francisco Villarreal MD    HPI: Mrs. Sal is a 73 year old female with a past medical history significant for HTN, CHF (last EF 30%), hypothyroidism, CAD, CKD, breast cancer, marlon presents with worsening SOB over the past 2 weeks as well as chest pain of one days duration. The patient and family state that the patient has been feeling more SOB over the past two weeks, they state that they have noticed her with increasing work of breathing and fluid accumulation. The patient denies any recent illness (although dose have chronic cough), change in diet, but do state that they have been somewhat confused as to what medications the patient should be taking following her recent discharge for acute on chronic heart failure.     * No surgery found *      Hospital Course: Patient initially admitted for acute on chronic heart failure. Diuresed with IV lasix initially then regimen changed to bumex and metolazone. Respiratory status improved with diuresis. Palliative care was consulted and patient and family decided to transfer patient to hospice.     Physical Exam   Constitutional: No distress.   HENT:   Head: Normocephalic and atraumatic.   Right Ear: External ear normal.   Left Ear: External ear normal.   Eyes: EOM are normal. Pupils are equal, round, and reactive to light. No scleral icterus.   Neck: Normal range of motion. Neck supple.   Cardiovascular: Normal rate.    Murmur heard.  Pulmonary/Chest: Effort normal and breath sounds normal. No respiratory distress.   Abdominal: Soft. Bowel sounds are normal. She exhibits no distension  and no mass. There is no tenderness. There is no guarding.   Musculoskeletal: She exhibits no tenderness.   Neurological: She is alert.   Skin: Skin is warm and dry. She is not diaphoretic.   Psychiatric: She has a normal mood and affect. Her behavior is normal.   Vitals reviewed.        Consults:   Consults         Status Ordering Provider     Inpatient consult to Hospice  Once     Provider:  (Not yet assigned)    Acknowledged BRANT BERG     Inpatient consult to Palliative Care  Once     Provider:  (Not yet assigned)    Completed MOY MANRIQUEZ     Inpatient consult to PICC team (NIAS)  Once     Provider:  (Not yet assigned)    Completed HANY JIMENEZ     Inpatient consult to Registered Dietitian/Nutritionist  Once     Provider:  (Not yet assigned)    Completed LUIS ANGEL SINGH          Final Active Diagnoses:    Diagnosis Date Noted POA    PRINCIPAL PROBLEM:  Acute on chronic combined systolic and diastolic congestive heart failure [I50.43] 04/20/2018 Yes    Dermatitis associated with moisture from urinary incontinence [L25.8, R32] 04/25/2018 Yes    Age-related physical debility [R54] 04/21/2018 Yes    Pulmonary hypertension [I27.20] 04/11/2018 Yes    CKD (chronic kidney disease), stage V [N18.5] 02/06/2018 Yes    Cognitive impairment [R41.89] 12/13/2017 Yes    Anemia of chronic renal failure, stage 5 [N18.5, D63.1] 05/10/2017 Yes    Paroxysmal atrial fibrillation [I48.0] 04/08/2017 Yes    Mixed type COPD (chronic obstructive pulmonary disease) [J44.9] 04/04/2017 Yes    Severe mitral regurgitation [I34.0] 03/16/2017 Yes    Elevated troponin [R74.8]  Yes    History of breast cancer [Z85.3] 02/16/2017 Not Applicable    Renovascular hypertension [I15.0] 12/16/2016 Yes    Postablative hypothyroidism [E89.0] 12/16/2016 Yes    Chronic gout [M1A.9XX0] 12/16/2016 Yes    Recurrent major depressive disorder, in partial remission [F33.41] 09/30/2016 Yes      Problems Resolved During this  Admission:    Diagnosis Date Noted Date Resolved POA    Goals of care, counseling/discussion [Z71.89]  04/27/2018 Not Applicable    Counseling regarding advanced care planning and goals of care [Z71.89] 02/05/2018 04/27/2018 Not Applicable    Palliative care encounter [Z51.5] 12/19/2017 04/27/2018 Not Applicable      Discharged Condition: stable    Disposition: Hospice/Medical Facility    Follow Up:  Follow-up Information     [facility].    Specialty:  Physical Therapy  Why:  Patient attends [place] [facility] 5 days per week and will see PCP Dr. Young at day [facility].  Contact information:  [address] [facility] [address] [facility] [address]  [phone]                 Patient Instructions:   No discharge procedures on file.  Medications:  Reconciled Home Medications:      Medication List      START taking these medications    carvedilol 12.5 MG tablet  Commonly known as:  COREG  Take 1 tablet (12.5 mg total) by mouth 2 (two) times daily with meals.     hydrocortisone 2.5 % rectal cream  Commonly known as:  ANUSOL-HC  Place rectally 2 (two) times daily.     LORazepam 0.5 MG tablet  Commonly known as:  ATIVAN  Take 1 tablet (0.5 mg total) by mouth every 6 (six) hours as needed for Anxiety (Insommnia).     metOLazone 10 MG tablet  Commonly known as:  ZAROXOLYN  Take 1 tablet (10 mg total) by mouth 2 (two) times daily. Take 30 -60 mins; before taking Bumex.     pantoprazole 40 MG tablet  Commonly known as:  PROTONIX  Take 1 tablet (40 mg total) by mouth once daily.        CHANGE how you take these medications    fluticasone 50 mcg/actuation nasal spray  Commonly known as:  FLONASE  2 sprays by Each Nare route once daily.  What changed:  how much to take     INV hydrALAZINE 25 MG Tab  Commonly known as:  APRESOLINE  Take 3 tablets (75mg) by mouth three times a day.  Take with isosorbide dinitrate (Isordil).  What changed:  additional instructions     isosorbide dinitrate 20 MG tablet  Commonly known as:   ISORDIL  Take 2 tablets (40 mg total) by mouth 3 (three) times daily. Take with hydralazine (Apresoline)  What changed:  · how much to take  · additional instructions     sodium bicarbonate 650 MG tablet  Take 2 tablets (1,300 mg total) by mouth 3 (three) times daily.  What changed:  how much to take        CONTINUE taking these medications    albuterol-ipratropium 2.5mg-0.5mg/3mL 0.5 mg-3 mg(2.5 mg base)/3 mL nebulizer solution  Commonly known as:  DUO-NEB  Take 3 mLs by nebulization 4 (four) times daily. Rescue     allopurinol 50 MG Tab tablet  Commonly known as:  ZYLOPRIM  Take 50 mg by mouth once daily.     ARANESP (IN POLYSORBATE) 25 mcg/0.42 mL injection  Generic drug:  darbepoetin jaun-polysorbate  Inject into the skin every 14 (fourteen) days.     ARTIFICIAL TEAR SOLUTION OPHT  Instil 1 drop in each eye twice daily     aspirin 81 MG Chew  Take 1 tablet (81 mg total) by mouth once daily.     bumetanide 2 MG tablet  Commonly known as:  BUMEX  Take 1.5 tablets (3mg) by mouth two times a day     desloratadine 5 mg tablet  Commonly known as:  CLARINEX  Take 5 mg by mouth once daily.     docusate sodium 50 MG capsule  Commonly known as:  COLACE  Take by mouth 2 (two) times daily.     donepezil 5 MG tablet  Commonly known as:  ARICEPT  Take 5 mg by mouth every evening.     fluticasone-salmeterol 250-50 mcg/dose 250-50 mcg/dose diskus inhaler  Commonly known as:  ADVAIR  Inhale 1 puff into the lungs 2 (two) times daily. Controller     LAC-HYDRIN FIVE 5 % Lotn  Generic drug:  ammonium lactate  Apply to the affected area(s) of skin as directed     levothyroxine 125 MCG tablet  Commonly known as:  SYNTHROID  Take 125 mcg by mouth before breakfast.     mirtazapine 30 MG disintegrating tablet  Commonly known as:  REMERON SOL-TAB  Take 30 mg by mouth every evening.     PROAIR HFA INHL  Inhale 2 puffs into the lungs 4 (four) times daily as needed (Shortness of Breath/ Wheezing).     sevelamer HCl 800 MG Tab  Commonly known  as:  RENAGEL  Take 800 mg by mouth 2 (two) times daily with meals.     tiotropium 18 mcg inhalation capsule  Commonly known as:  SPIRIVA  Inhale 1 capsule (18 mcg total) into the lungs once daily.     trazodone 25 MG tablet  Commonly known as:  DESYREL  Take 25-50 mg by mouth every evening.     VENOFER 200 mg iron/10 mL Soln injection  Generic drug:  iron sucrose  Inject as directed for 10 doses     vitamin renal formula (B-complex-vitamin c-folic acid) 1 mg Cap  Commonly known as:  NEPHROCAP  Take 1 capsule by mouth once daily.        STOP taking these medications    metoprolol succinate 50 MG 24 hr tablet  Commonly known as:  TOPROL-XL            Significant Diagnostic Studies: Labs:   BMP:     Recent Labs  Lab 04/27/18  0415 04/28/18  0607   GLU 96 107   * 135*   K 3.4* 3.1*   CL 86* 85*   CO2 37* 40*   BUN 59* 54*   CREATININE 3.7* 3.8*   CALCIUM 9.4 9.3    and CBC No results for input(s): WBC, HGB, HCT, PLT in the last 48 hours.    Pending Diagnostic Studies:     None        Indwelling Lines/Drains at time of discharge:   Lines/Drains/Airways          No matching active lines, drains, or airways          Time spent on the discharge of patient: 30 minutes  Patient was seen and examined on the date of discharge and determined to be suitable for discharge.         Francisco Villarreal MD  Department of Hospital Medicine  [facility]                                                      STAFF PHYSICIAN NOTE                                   Attending Attestation for Rounds with Resident  I have reviewed and concur with the resident's history, physical, assessment, and plan.  I have personally interviewed and examined the patient at bedside.  See below for my evaluation and additional findings.                                   ________________________________________                                                     DC to home hospice as planned.    Scheduled Follow-up :  Future  Appointments  Date Time Provider Department Center   4/30/2018 10:30 AM LAB, APPOINTMENT [place] [facility] LAB VNP [facility]   4/30/2018 11:00 AM INFECTIOUS INFUSION, CHAIR 4 [facility] ID INF [facility]   4/30/2018 1:00 PM EPO, INJECTION [facility] ID INF [facility]   5/15/2018 8:00 AM Camryn Rolle PA-C [facility] GASTRO [facility]   5/18/2018 11:20 AM Bud Benoit MD [facility] NEPHRO [facility]   8/13/2018 11:00 AM Katerine Hidalgo MD [facility] ENDOCRN [facility]       Current Problems List:  Active Hospital Problems    Diagnosis  POA    *Acute on chronic combined systolic and diastolic congestive heart failure [I50.43]  Yes     4-20-18    1 - Moderate left ventricular enlargement.     2 - Severely depressed left ventricular systolic function (EF 25-30%).     3 - Right ventricular enlargement with moderately depressed systolic function.     4 - Biatrial enlargement.     5 - Mild to moderate tricuspid regurgitation.     6 - Severe functional mitral regurgitation.     7 - Pulmonary hypertension. The estimated PA systolic pressure is 50 mmHg.     8 - Increased central venous pressure.       Dermatitis associated with moisture from urinary incontinence [L25.8, R32]  Yes    Age-related physical debility [R54]  Yes    Pulmonary hypertension [I27.20]  Yes    CKD (chronic kidney disease), stage V [N18.5]  Yes    Cognitive impairment [R41.89]  Yes    Anemia of chronic renal failure, stage 5 [N18.5, D63.1]  Yes    Paroxysmal atrial fibrillation [I48.0]  Yes    Mixed type COPD (chronic obstructive pulmonary disease) [J44.9]  Yes    Severe mitral regurgitation [I34.0]  Yes    Elevated troponin [R74.8]  Yes    History of breast cancer [Z85.3]  Not Applicable    Renovascular hypertension [I15.0]  Yes    Postablative hypothyroidism [E89.0]  Yes    Chronic gout [M1A.9XX0]  Yes    Recurrent major depressive disorder, in partial remission [F33.41]  Yes      Resolved Hospital Problems    Diagnosis Date Resolved POA    Goals of care,  counseling/discussion [Z71.89] 04/27/2018 Not Applicable    Counseling regarding advanced care planning and goals of care [Z71.89] 04/27/2018 Not Applicable    Palliative care encounter [Z51.5] 04/27/2018 Not Applicable         Signing Physician:  Francisco Villarreal MD

## 2018-04-27 NOTE — PLAN OF CARE
Covering  left message for pt's daughter regarding discharge today-home hospice.  Awaiting call back.    Pt's daughter called back and spoke to .  She seems to be very overwhelmed and declined making a decision on which agency she would like to use. She told  that she would have to call her back.    Camille Knight LMSW

## 2018-04-27 NOTE — PROGRESS NOTES
Pt to d/c home with hospice through [facility]. Pal care will sign off. Please call-back if needed.     Sarah TAVERAS, APRN, AGCNS

## 2018-04-27 NOTE — PLAN OF CARE
Problem: Patient Care Overview  Goal: Plan of Care Review  Outcome: Ongoing (interventions implemented as appropriate)  AAOX4.  VSS.  No complaints of pain.  Pt using bedside commode and also having some urine incontinence.  PIV is intact and free of infection.  Pt on 2L NC with SPO2 in the upper 90's.  Family is at the bedside.  Plan is to DC in the morning on home hospice.  Bed is in lowest position, call bell is in reach, and pt instructed to call nurse when needing assistance.  Will continue to monitor.

## 2018-04-27 NOTE — NURSING
Notified  on call about patient's daughter not wanting to be discharged today. Hospice nurse speaking to patient and daughter.

## 2018-04-27 NOTE — NURSING
Hospice came to see patient and daughter. Patient's daughter not wanting to leave tonight. Call put in to med team

## 2018-04-28 VITALS
SYSTOLIC BLOOD PRESSURE: 168 MMHG | WEIGHT: 157.63 LBS | BODY MASS INDEX: 25.33 KG/M2 | DIASTOLIC BLOOD PRESSURE: 85 MMHG | RESPIRATION RATE: 18 BRPM | HEART RATE: 61 BPM | OXYGEN SATURATION: 95 % | TEMPERATURE: 98 F | HEIGHT: 66 IN

## 2018-04-28 LAB
ALBUMIN SERPL BCP-MCNC: 2.9 G/DL
ANION GAP SERPL CALC-SCNC: 10 MMOL/L
BUN SERPL-MCNC: 54 MG/DL
CALCIUM SERPL-MCNC: 9.3 MG/DL
CHLORIDE SERPL-SCNC: 85 MMOL/L
CO2 SERPL-SCNC: 40 MMOL/L
CREAT SERPL-MCNC: 3.8 MG/DL
EST. GFR  (AFRICAN AMERICAN): 12.9 ML/MIN/1.73 M^2
EST. GFR  (NON AFRICAN AMERICAN): 11.2 ML/MIN/1.73 M^2
GLUCOSE SERPL-MCNC: 107 MG/DL
PHOSPHATE SERPL-MCNC: 2.1 MG/DL
POTASSIUM SERPL-SCNC: 3.1 MMOL/L
SODIUM SERPL-SCNC: 135 MMOL/L

## 2018-04-28 PROCEDURE — 25000003 PHARM REV CODE 250: Performed by: STUDENT IN AN ORGANIZED HEALTH CARE EDUCATION/TRAINING PROGRAM

## 2018-04-28 PROCEDURE — 80069 RENAL FUNCTION PANEL: CPT

## 2018-04-28 PROCEDURE — 99239 HOSP IP/OBS DSCHRG MGMT >30: CPT | Mod: GC,,, | Performed by: INTERNAL MEDICINE

## 2018-04-28 PROCEDURE — 94761 N-INVAS EAR/PLS OXIMETRY MLT: CPT

## 2018-04-28 PROCEDURE — 25000003 PHARM REV CODE 250: Performed by: HOSPITALIST

## 2018-04-28 PROCEDURE — G8989 SELF CARE D/C STATUS: HCPCS | Mod: CK

## 2018-04-28 PROCEDURE — 27000221 HC OXYGEN, UP TO 24 HOURS

## 2018-04-28 PROCEDURE — 25000242 PHARM REV CODE 250 ALT 637 W/ HCPCS: Performed by: STUDENT IN AN ORGANIZED HEALTH CARE EDUCATION/TRAINING PROGRAM

## 2018-04-28 PROCEDURE — G8987 SELF CARE CURRENT STATUS: HCPCS | Mod: CK

## 2018-04-28 PROCEDURE — G8988 SELF CARE GOAL STATUS: HCPCS | Mod: CK

## 2018-04-28 PROCEDURE — 36415 COLL VENOUS BLD VENIPUNCTURE: CPT

## 2018-04-28 RX ADMIN — HYDRALAZINE HYDROCHLORIDE 75 MG: 50 TABLET ORAL at 05:04

## 2018-04-28 RX ADMIN — SEVELAMER CARBONATE 800 MG: 800 TABLET, FILM COATED ORAL at 09:04

## 2018-04-28 RX ADMIN — LEVOTHYROXINE SODIUM 125 MCG: 25 TABLET ORAL at 05:04

## 2018-04-28 RX ADMIN — LORAZEPAM 0.5 MG: 0.5 TABLET ORAL at 05:04

## 2018-04-28 RX ADMIN — ASPIRIN 81 MG CHEWABLE TABLET 81 MG: 81 TABLET CHEWABLE at 09:04

## 2018-04-28 RX ADMIN — FLUTICASONE FUROATE AND VILANTEROL TRIFENATATE 1 PUFF: 100; 25 POWDER RESPIRATORY (INHALATION) at 09:04

## 2018-04-28 RX ADMIN — METHYLDOPA 50 MG: 500 TABLET ORAL at 09:04

## 2018-04-28 RX ADMIN — PANTOPRAZOLE SODIUM 40 MG: 40 TABLET, DELAYED RELEASE ORAL at 09:04

## 2018-04-28 RX ADMIN — ISOSORBIDE DINITRATE 40 MG: 40 TABLET ORAL at 09:04

## 2018-04-28 RX ADMIN — SODIUM BICARBONATE 650 MG TABLET 650 MG: at 09:04

## 2018-04-28 RX ADMIN — HYDROCORTISONE 2.5%: 25 CREAM TOPICAL at 09:04

## 2018-04-28 RX ADMIN — CARVEDILOL 12.5 MG: 12.5 TABLET, FILM COATED ORAL at 09:04

## 2018-04-28 RX ADMIN — ISOSORBIDE DINITRATE 40 MG: 40 TABLET ORAL at 02:04

## 2018-04-28 RX ADMIN — METOLAZONE 10 MG: 2.5 TABLET ORAL at 09:04

## 2018-04-28 RX ADMIN — HYDRALAZINE HYDROCHLORIDE 75 MG: 50 TABLET ORAL at 02:04

## 2018-04-28 RX ADMIN — TIOTROPIUM BROMIDE 18 MCG: 18 CAPSULE ORAL; RESPIRATORY (INHALATION) at 09:04

## 2018-04-28 RX ADMIN — Medication 1 CAPSULE: at 09:04

## 2018-04-28 RX ADMIN — BUMETANIDE 3 MG: 1 TABLET ORAL at 09:04

## 2018-04-28 NOTE — PROGRESS NOTES
[facility] Hospital Medicine  Progress Note    Patient Name: Anai Sal  MRN: [id number]  Patient Class: IP- Inpatient   Admission Date: 4/19/2018  Length of Stay: 8 days  Attending Physician: Parker James MD  Primary Care Provider: Live Young MD    Note added in place of discharge summary which was originally dated 04/27/18    Hospital Medicine Team: [facility] [place] [facility] HOSP MED 4 Francisco Villarreal MD    Subjective:     Principal Problem:Acute on chronic combined systolic and diastolic congestive heart failure    HPI:  Mrs. Sal is a 73 year old female with a past medical history significant for HTN, CHF (last EF 30%), hypothyroidism, CAD, CKD, breast cancer, marlon presents with worsening SOB over the past 2 weeks as well as chest pain of one days duration. The patient and family state that the patient has been feeling more SOB over the past two weeks, they state that they have noticed her with increasing work of breathing and fluid accumulation. The patient denies any recent illness (although dose have chronic cough), change in diet, but do state that they have been somewhat confused as to what medications the patient should be taking following her recent discharge for acute on chronic heart failure.     Hospital Course:  No notes on file    Interval History: NAEON    Review of Systems   Constitutional: Negative for activity change, chills, diaphoresis, fatigue and fever.   HENT: Negative for congestion, sore throat and trouble swallowing.    Eyes: Negative for photophobia, redness and visual disturbance.   Respiratory: Positive for cough and shortness of breath. Negative for choking, chest tightness and wheezing.    Cardiovascular: Negative for chest pain, palpitations and leg swelling.   Gastrointestinal: Negative for abdominal distention, abdominal pain, constipation, diarrhea, nausea and vomiting.   Genitourinary: Negative for difficulty urinating, dysuria and hematuria.   Musculoskeletal:  Negative for arthralgias and myalgias.   Skin: Negative for rash and wound.   Neurological: Negative for dizziness, syncope, weakness, light-headedness, numbness and headaches.   Psychiatric/Behavioral: Negative for confusion.     Objective:     Vital Signs (Most Recent):  Temp: 98.3 °F (36.8 °C) (04/28/18 0940)  Pulse: 68 (04/28/18 0940)  Resp: 19 (04/28/18 0940)  BP: (!) 174/81 (04/28/18 0940)  SpO2: (!) 93 % (04/28/18 0940) Vital Signs (24h Range):  Temp:  [98.3 °F (36.8 °C)-99.3 °F (37.4 °C)] 98.3 °F (36.8 °C)  Pulse:  [59-68] 68  Resp:  [18-19] 19  SpO2:  [90 %-98 %] 93 %  BP: (149-174)/(60-81) 174/81     Weight: 71.5 kg (157 lb 10.1 oz)  Body mass index is 25.44 kg/m².    Intake/Output Summary (Last 24 hours) at 04/28/18 1019  Last data filed at 04/28/18 0959   Gross per 24 hour   Intake             1225 ml   Output                0 ml   Net             1225 ml      Physical Exam   Constitutional: She is oriented to person, place, and time. She appears well-developed and well-nourished. No distress.   HENT:   Head: Normocephalic and atraumatic.   Right Ear: External ear normal.   Left Ear: External ear normal.   Mouth/Throat: Oropharynx is clear and moist. No oropharyngeal exudate.   Eyes: EOM are normal. Pupils are equal, round, and reactive to light. Right eye exhibits no discharge. Left eye exhibits no discharge. No scleral icterus.   Neck: Normal range of motion. Neck supple.   Cardiovascular: Normal rate and regular rhythm.    Murmur heard.  Pulmonary/Chest: Effort normal. No respiratory distress. She has no wheezes. She has no rales.   Decreased breath sounds bilat   Abdominal: Soft. Bowel sounds are normal. She exhibits no distension and no mass. There is no tenderness. There is no guarding.   Musculoskeletal: She exhibits edema (1+ pitting edema of bilateral lower extremities ). She exhibits no tenderness.   Neurological: She is alert and oriented to person, place, and time. No cranial nerve deficit.    Skin: Skin is warm and dry. She is not diaphoretic.   Psychiatric: She has a normal mood and affect.   Vitals reviewed.      Significant Labs:   BMP:   Recent Labs  Lab 04/28/18  0607      *   K 3.1*   CL 85*   CO2 40*   BUN 54*   CREATININE 3.8*   CALCIUM 9.3     CBC: No results for input(s): WBC, HGB, HCT, PLT in the last 48 hours.    Significant Imaging: I have reviewed all pertinent imaging results/findings within the past 24 hours.    Assessment/Plan:      * Acute on chronic combined systolic and diastolic congestive heart failure    - patient presents with 2 week history of worsening SOB and CP. BNP >4900 on admission. initial troponin 0.037, appears to be stable per previous labs. CXR with evidence of pulmonary edema . Echo with stable findings - 25% EF,severe MR, and 50mmHg Pa pressures    - stopped lasix gtt at 10cc/hr   - continue metolazone 10mg BID  - Bumex 3mg BID   - diuresised decently well overnight; however there is inaccurate I/Os   - stop toprol XL 50mg (4/22)  - continue coreg 12.5 mg BID; Hr at goal of 60  - continue hydralazine to 75 Q8 and isosorbide dinitrate to 40 TID- for optimal BP control  - strict I and O  - replete lytes        Age-related physical debility    -PT , OT consult        CKD (chronic kidney disease), stage V    - creatinine stable on admission at 3.3, at [place]  - monitor Cr  - cont home sodium bicarb at 1300mg TID and sevelamer at 800mg BID  - monitor UOP          Cognitive impairment    - delirium precautions          Anemia of chronic renal failure, stage 5    - hemoglobin 8.1 on admission, stable at her baseline,   -monitor        Paroxysmal atrial fibrillation    - discontinue metoprolol succinate 50mg daily  - coreg started (4/22) should provide HR control   - cardiac monitoring   - not on anticoagulation 2/2 high fall risk and possible slow GIB - per prior cardiology notes        Mixed type COPD (chronic obstructive pulmonary disease)    - not on home  O2  - cont home tiotropium  - prn duo nebs        Elevated troponin    - initial concerns given complaints of CP, unlikely ACS. Likely 2/2 ADHF  - troponin 0.037 on admission  - patient has history of chronically elevated troponin around this level          Chronic gout    - stable   - continue home dose allopurinol           Postablative hypothyroidism    -cont home synthroid        Renovascular hypertension    - discontinue toprol XL 50mg   - start coreg 12.5 BID- Hr at goal of 60  - increase hydralazine to 75 Q8 and isosorbide dinitrate to 40 TID- for optimal BP control            VTE Risk Mitigation         Ordered     heparin (porcine) injection 5,000 Units  Every 8 hours      04/24/18 1957     Place sequential compression device  Until discontinued      04/20/18 0400     IP VTE HIGH RISK PATIENT  Once      04/20/18 0400              Francisco Villarreal MD  Department of Hospital Medicine   [facility]

## 2018-04-28 NOTE — SUBJECTIVE & OBJECTIVE
Interval History: NAEON    Review of Systems   Constitutional: Negative for activity change, chills, diaphoresis, fatigue and fever.   HENT: Negative for congestion, sore throat and trouble swallowing.    Eyes: Negative for photophobia, redness and visual disturbance.   Respiratory: Positive for cough and shortness of breath. Negative for choking, chest tightness and wheezing.    Cardiovascular: Negative for chest pain, palpitations and leg swelling.   Gastrointestinal: Negative for abdominal distention, abdominal pain, constipation, diarrhea, nausea and vomiting.   Genitourinary: Negative for difficulty urinating, dysuria and hematuria.   Musculoskeletal: Negative for arthralgias and myalgias.   Skin: Negative for rash and wound.   Neurological: Negative for dizziness, syncope, weakness, light-headedness, numbness and headaches.   Psychiatric/Behavioral: Negative for confusion.     Objective:     Vital Signs (Most Recent):  Temp: 98.3 °F (36.8 °C) (04/28/18 0940)  Pulse: 68 (04/28/18 0940)  Resp: 19 (04/28/18 0940)  BP: (!) 174/81 (04/28/18 0940)  SpO2: (!) 93 % (04/28/18 0940) Vital Signs (24h Range):  Temp:  [98.3 °F (36.8 °C)-99.3 °F (37.4 °C)] 98.3 °F (36.8 °C)  Pulse:  [59-68] 68  Resp:  [18-19] 19  SpO2:  [90 %-98 %] 93 %  BP: (149-174)/(60-81) 174/81     Weight: 71.5 kg (157 lb 10.1 oz)  Body mass index is 25.44 kg/m².    Intake/Output Summary (Last 24 hours) at 04/28/18 1019  Last data filed at 04/28/18 0959   Gross per 24 hour   Intake             1225 ml   Output                0 ml   Net             1225 ml      Physical Exam   Constitutional: She is oriented to person, place, and time. She appears well-developed and well-nourished. No distress.   HENT:   Head: Normocephalic and atraumatic.   Right Ear: External ear normal.   Left Ear: External ear normal.   Mouth/Throat: Oropharynx is clear and moist. No oropharyngeal exudate.   Eyes: EOM are normal. Pupils are equal, round, and reactive to light. Right  eye exhibits no discharge. Left eye exhibits no discharge. No scleral icterus.   Neck: Normal range of motion. Neck supple.   Cardiovascular: Normal rate and regular rhythm.    Murmur heard.  Pulmonary/Chest: Effort normal. No respiratory distress. She has no wheezes. She has no rales.   Decreased breath sounds bilat   Abdominal: Soft. Bowel sounds are normal. She exhibits no distension and no mass. There is no tenderness. There is no guarding.   Musculoskeletal: She exhibits edema (1+ pitting edema of bilateral lower extremities ). She exhibits no tenderness.   Neurological: She is alert and oriented to person, place, and time. No cranial nerve deficit.   Skin: Skin is warm and dry. She is not diaphoretic.   Psychiatric: She has a normal mood and affect.   Vitals reviewed.      Significant Labs:   BMP:   Recent Labs  Lab 04/28/18  0607      *   K 3.1*   CL 85*   CO2 40*   BUN 54*   CREATININE 3.8*   CALCIUM 9.3     CBC: No results for input(s): WBC, HGB, HCT, PLT in the last 48 hours.    Significant Imaging: I have reviewed all pertinent imaging results/findings within the past 24 hours.

## 2018-04-28 NOTE — PLAN OF CARE
received call from nurse stating that pt will need transportation home.  inquired about the agency the pt's daughter chose because  wasn't aware that the issue regarding PACE and hospice choice of [facility] or [facility] had been resolved.  Nurse place patient's daughter on the phone to speak with .  Patient's daughter told  that she has spoken to [facility] and they accepted patient.  She further states that they already delivered equipment to the home.     called the on-call nurse at [facility] [phone] to confirm acceptance and to see if PACE approved.  Madeleine at [facility] told  that patient is accepted and that equipment has already been delivered to home. Madeleine said that the patient is on her list today and that they will bill Medicare.    Transportation arranged with Jaimee ([phone]).    Camille Knight LMSW

## 2018-04-28 NOTE — PLAN OF CARE
Problem: Patient Care Overview  Goal: Plan of Care Review  Patient to go home with hospice in the morning. Daughter discussed with hospice rep. Patient free of falls. Bed alarm on and daughter at bedside.

## 2018-04-28 NOTE — NURSING
EMS to take patient to her home. Daughter at the bedside. Discharge instructions given to daughter and explained.

## 2018-04-29 NOTE — PLAN OF CARE
Problem: Occupational Therapy Goal  Goal: Occupational Therapy Goal  Goals to be met by: 7 days (4/29/18)     Patient will increase functional independence with ADLs by performing:    UE Dressing with Supervision.  LE Dressing with Minimal Assistance.  Grooming while standing with SBA.  Supine to sit with Supervision.  Toilet transfer to toilet with Stand-by Assistance.  Pt will complete functional mobility household distance with SVBA using AD as needed.     Outcome: Outcome(s) achieved Date Met: 04/29/18  Goals not met; pt d/c from hospital

## 2018-04-30 NOTE — PLAN OF CARE
Pt d/c to home w/ home hospice - [facility].     04/30/18 1541   Final Note   Assessment Type Final Discharge Note   [facility]charge Disposition [facility]   What phone number can be called within the next 1-3 days to see how you are doing after discharge? [phone]   [facility] Referral Info   Post Acute Recommendation Other   Referral Type home hospice   Facility Name [facility]

## 2018-05-23 NOTE — PT/OT/SLP DISCHARGE
Occupational Therapy Discharge Summary    Anai Sal  MRN: [id number]   Principal Problem: <principal problem not specified>      Patient Discharged from acute Occupational Therapy on 2/21/18.  Please refer to prior OT note dated 2/20/18 for functional status.    Assessment:      Patient was discharged unexpectedly.  Information required to complete an accurate discharge summary is unknown.  Refer to therapy initial evaluation and last progress note for initial and most recent functional status and goal achievement.  Recommendations made may be found in medical record. Patient appropriate for care in another setting.    Objective:     GOALS:    Occupational Therapy Goals        Problem: Occupational Therapy Goal    Goal Priority Disciplines Outcome Interventions   Occupational Therapy Goal     OT, PT/OT Ongoing (interventions implemented as appropriate)    Description:  Goals to be met by: 2/27/18    Patient will increase functional independence with ADLs by performing:    UE Dressing with Supervision.  LE Dressing with Stand-by Assistance.  Grooming while standing at sink with Stand-by Assistance.  Toileting from toilet with Stand-by Assistance for hygiene and clothing management.   Supine to sit with Supervision.  Stand pivot transfers with Supervision.  Toilet transfer to toilet with Supervision.                      Reasons for Discontinuation of Therapy Services  Transfer to alternate level of care.      Plan:     Patient Discharged to: Home with Home Health Service    ERIBERTO Castaneda  5/23/2018

## 2019-07-25 NOTE — ASSESSMENT & PLAN NOTE
Patient: Gabriel Sharma Date: 2019   : 1957 Attending: Sukh Elliott MD   61 year old male Allergies:   ALLERGIES:   Allergen Reactions   • [place] PRURITUS     And hives (blistering)        Post-op Day: 3    Operation Procedure:   1. Robotic assisted laparoscopic cystoprostatectomy with Ileal loop urinary diversion   2. Bilateral extensive pelvic lymph node dissection.     Chief Complaint: Urothelial carcinoma of the urinary bladder    Subjective/HPI: Patient feeling somewhat better today, nauseous again at 0300, no vomiting. Symptoms improved after medication. Patient is afebrile. Not sleeping well.     Urine output: 2300 cc/24 hours  JOSE J output 575 cc/24 hours  Serum creatinine 0.78 2019  Fluid creatinine 0.87 19      Vital Last Value 24 Hour Range   Temperature 98.1 °F (36.7 °C) (19) Temp  Min: 98.1 °F (36.7 °C)  Max: 98.8 °F (37.1 °C)   Pulse 70 (19) Pulse  Min: 70  Max: 89   Respiratory 18 (19) Resp  Min: 16  Max: 18   Non-Invasive  Blood Pressure 152/79 (19) BP  Min: 145/80  Max: 155/81   Arterial   Blood Pressure 153/58 (19 1900) No data recorded   Pulse Oximetry 98 % (19) SpO2  Min: 95 %  Max: 98 %     Vital Today Admitted   Weight 76.5 kg (19) Weight: 78.5 kg (19)   Height N/A Height: 5' 9\" (175.3 cm) (19)   BMI N/A BMI (Calculated): 25.56 (19)     Intake/Output:    I/O this shift:  In: -   Out: 100 [Drains:100]    I/O last 3 completed shifts:  In: 1813.8 [I.V.:1813.8]  Out: 2875 [Urine:2300; Drains:575]      Intake/Output Summary (Last 24 hours) at 2019 1041  Last data filed at 2019 1018  Gross per 24 hour   Intake 1813.77 ml   Output 2425 ml   Net -611.23 ml       Medications/Infusions:  Scheduled:   • methotrexate  17.5 mg Oral Once per day on Tue   • folic acid  1 mg Oral Daily   • alvimopan  12 mg Oral 2 times per day   • docusate calcium  240 mg Oral Daily   •  - no active flares   - Holding home allopurinol in setting of CANDI   insulin regular (human)   Subcutaneous 4 times per day   • enoxaparin (LOVENOX) injection  40 mg Subcutaneous Q24H       Continuous Infusions:   • sodium chloride 0.9% infusion 75 mL/hr at 07/25/19 0035   • dextrose 5 % infusion         Physical Exam:       Head:  Normocephalic, without obvious abnormality, atraumatic   Back:   Symmetric, no curvature, ROM normal, no CVA tenderness   Lungs:   Respirations unlabored   Abdomen:   Soft, non-tender, no masses, no organomegaly. Stoma red, stents x 2 and catheter   Extremities: Extremities normal, atraumatic, no cyanosis or edema   Incisions: Clean, dry, intact   Urine: yellow per urostomy          Laboratory Results:    Lab Results   Component Value Date    WBC 9.3 07/25/2019    HCT 42.2 07/25/2019    HGB 13.3 07/25/2019     07/25/2019    INR 1.0 04/29/2019    BUN 19 07/25/2019    CREATININE 0.78 07/25/2019    SODIUM 143 07/25/2019    POTASSIUM 3.8 07/25/2019    CHLORIDE 112 (H) 07/25/2019    AST 19 07/10/2019    ALKPT 72 07/10/2019    BILIRUBIN 0.2 07/10/2019    CO2 20 (L) 07/25/2019    GPT 29 07/10/2019    GLUCOSE 103 (H) 07/25/2019    CALCIUM 8.8 07/25/2019       Urinalysis    Lab Results   Component Value Date    USPG >1.030 (H) 04/15/2019    UWBC NEGATIVE 04/15/2019    LEUK NONE SEEN 04/15/2019    ERYT 1 to 3 04/15/2019    URBC TRACE (A) 04/15/2019    UBILI NEGATIVE 04/15/2019    UPH 5.5 04/15/2019    UBACTR NONE SEEN 04/15/2019     Stents up on KUB     Surgical Care Improvement Project:    Newman: No  DVT/VTE Prophylaxis:   VTE Pharmacologic Prophylaxis: Yes  VTE Mechanical Prophylaxis: Yes  Antibiotics D/C'd within 24 hrs of surgery end: Yes    Central Line: No    Diagnosis/Comorbidities/Complications:  Urothelial carcinoma of the urinary bladder  S/P  Robotic assisted laparoscopic cystoprostatectomy with Ileal loop urinary diversion and Bilateral extensive pelvic lymph node dissection.   Leukocytosis- most likely reactive   Expected post op ileus      Plans/Recommendations:  Daily labs  Drain creatinine tomorrow   Stoma following   Pain control- prn tylenol/dilaudid/toradol  Ambulate 4-6 x daily, up to chair TID   PT/OT   Diet per - NPO, ice chips  Lovenox daily, continue x 30 days at home- RN to begin teaching   Await pathology     Discussed with or notes reviewed:  Patient    Nayely Beltreasap, NP    [facility]  Phone: [phone]  Pager: [phone]    Please page urology ADRIANO with questions on weekdays between the hours of 9:00 am and 5:00 pm. Please page urologist with questions on nights and weekends.    ** If you have paged me and have not had page returned in 15 minutes- Please page [phone]**    Sukh Elliott MD  [facility]  Pager [phone]  Office [phone]

## 2020-05-25 NOTE — PT/OT/SLP DISCHARGE
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  Please refer to prior OT note dated 4/26/18 for functional status.    Assessment:      Patient has not met goals.    Objective:     GOALS:    Occupational Therapy Goals     Not on file          Multidisciplinary Problems (Resolved)        Problem: Occupational Therapy Goal    Goal Priority Disciplines Outcome Interventions   Occupational Therapy Goal   (Resolved)     OT, PT/OT Outcome(s) achieved    Description:  Goals to be met by: 7 days (4/29/18)     Patient will increase functional independence with ADLs by performing:    UE Dressing with Supervision.  LE Dressing with Minimal Assistance.  Grooming while standing with SBA.  Supine to sit with Supervision.  Toilet transfer to toilet with Stand-by Assistance.  Pt will complete functional mobility household distance with SVBA using AD as needed.                      Reasons for Discontinuation of Therapy Services  Transfer to alternate level of care.      Plan:     Patient Discharged to: Palliative Care/Hospice    Tia Perrin, OT  4/29/2018  
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  Please refer to prior PT noted date on 18 for functional status.     Assessment:     Patient has not met goals.    Objective:     GOALS:    Physical Therapy Goals        Problem: Physical Therapy Goal    Goal Priority Disciplines Outcome Goal Variances Interventions   Physical Therapy Goal     PT/OT, PT Ongoing (interventions implemented as appropriate)     Description:  Goals to be met by: 18     Patient will increase functional independence with mobility by performin. Supine to sit with Stand-by Assistance -not met  2. Sit to stand transfer with Supervision -not met  3. Bed to chair transfer with Supervision using Rolling Walker-not met  4. Gait  x 100 feet with Stand-by Assistance using Rolling Walker. -not met  5. Ascend/descend 4 stair with left Handrails Contact Guard Assistance. -not met                       Reasons for Discontinuation of Therapy Services  Transfer to alternate level of care.      Plan:     Patient Discharged to: Palliative Care/Hospice.    Yuli Blake, PT  2018  
Female

## 2022-09-23 NOTE — PLAN OF CARE
Problem: Patient Care Overview  Goal: Individualization & Mutuality  Outcome: Ongoing (interventions implemented as appropriate)  Pt educated on proper hand hygiene and infection risks. Pt verbalized understanding.         Status post kyphoplasty on 9/23/2022    Pain management  PT OT  Rehab referral

## 2023-08-29 NOTE — ASSESSMENT & PLAN NOTE
- creatinine stable on admission at 3.3  - renally dose medications  - continue to follow      Labs look good.  Continue a low-cholesterol diet and aerobic activity.  Continue current meds.  Recheck 1 year.

## 2023-09-08 NOTE — ASSESSMENT & PLAN NOTE
Signs of delirium with waxing and waning quality to mental status. Risk factors: dementia, multiple medical co-morbidities, current admission for CHF exacerbation. QTc 406.     - Continue seroquel 250 mg QHS for now. Per daughter, this medication has not really been working for sleep recently, but the patient has been sleeping much better in the hospital (primary team attributes this to identification/treatment of thyroid disease). She is also not displaying any indications of akathisia, and denies subjective feelings of this as well. There is therefore no indication to make a change at this point.    - underlying bipolar disorder questionable, with clinical picture more suggestive of a neurocognitive disorder with psychosis or just a primary psychotic disorder   - can refer for neuropsychologic testing to characterize cognitive deficits   - should also follow-up with outpatient psychiatrist. I am working to get her into my own clinic - we can hopefully have an appointment for her on January 9.     Suturegard Intro: Intraoperative tissue expansion was performed, utilizing the SUTUREGARD device, in order to reduce wound tension.

## 2024-04-17 NOTE — TRANSFER OF CARE
"Anesthesia Transfer of Care Note    Patient: Anai Sal    Procedure(s) Performed: Procedure(s) (LRB):  ESOPHAGOGASTRODUODENOSCOPY (EGD) (N/A)    Patient location: [facility]    Anesthesia Type: general    Transport from OR: Transported from OR on 2-3 L/min O2 by NC with adequate spontaneous ventilation    Post assessment: no apparent anesthetic complications and tolerated procedure well    Post vital signs: stable    Level of consciousness: sedated    Nausea/Vomiting: no nausea/vomiting    Complications: none    Transfer of care protocol was followed      Last vitals:   Visit Vitals  BP (!) 145/69   Pulse 68   Temp 37.1 °C (98.7 °F) (Temporal)   Resp 19   Ht 5' 6" (1.676 m)   Wt 80.1 kg (176 lb 9.4 oz)   LMP  (LMP Unknown)   SpO2 100%   Breastfeeding? No   BMI 28.50 kg/m²     " denies

## 2024-05-06 NOTE — ANESTHESIA POSTPROCEDURE EVALUATION
"Anesthesia Post Evaluation    Patient: Anai Sal    Procedure(s) Performed: Procedure(s) (LRB):  ESOPHAGOGASTRODUODENOSCOPY (EGD) (N/A)    Final Anesthesia Type: general  Patient location during evaluation: PACU  Patient participation: Yes- Able to Participate  Level of consciousness: awake and alert  Post-procedure vital signs: reviewed and stable  Pain management: adequate  Airway patency: patent  PONV status at discharge: No PONV  Anesthetic complications: no      Cardiovascular status: blood pressure returned to baseline and hemodynamically stable  Respiratory status: unassisted, spontaneous ventilation and room air  Hydration status: euvolemic  Follow-up not needed.        Visit Vitals  /64   Pulse 69   Temp 37.1 °C (98.7 °F) (Temporal)   Resp 18   Ht 5' 6" (1.676 m)   Wt 80.1 kg (176 lb 9.4 oz)   LMP  (LMP Unknown)   SpO2 100%   Breastfeeding? No   BMI 28.50 kg/m²       Pain/Moriah Score: Pain Assessment Performed: Yes (12/19/2017  3:00 PM)  Presence of Pain: non-verbal indicators absent (12/19/2017  3:00 PM)  Moriah Score: 8 (12/19/2017  3:00 PM)  Modified Moriah Score: 17 (12/19/2017  3:00 PM)      " What Type Of Note Output Would You Prefer (Optional)?: Bullet Format Is This A New Presentation, Or A Follow-Up?: Skin Lesions